# Patient Record
Sex: FEMALE | Race: WHITE | NOT HISPANIC OR LATINO | Employment: UNEMPLOYED | URBAN - METROPOLITAN AREA
[De-identification: names, ages, dates, MRNs, and addresses within clinical notes are randomized per-mention and may not be internally consistent; named-entity substitution may affect disease eponyms.]

---

## 2017-01-11 ENCOUNTER — GENERIC CONVERSION - ENCOUNTER (OUTPATIENT)
Dept: OTHER | Facility: OTHER | Age: 52
End: 2017-01-11

## 2017-01-11 ENCOUNTER — HOSPITAL ENCOUNTER (OUTPATIENT)
Dept: MAMMOGRAPHY | Facility: HOSPITAL | Age: 52
Discharge: HOME | End: 2017-01-11
Payer: COMMERCIAL

## 2017-01-11 DIAGNOSIS — Z12.31 ENCOUNTER FOR SCREENING MAMMOGRAM FOR MALIGNANT NEOPLASM OF BREAST: ICD-10-CM

## 2017-01-11 PROCEDURE — G0202 SCR MAMMO BI INCL CAD: HCPCS

## 2017-01-11 PROCEDURE — 77063 BREAST TOMOSYNTHESIS BI: CPT

## 2017-03-02 ENCOUNTER — ALLSCRIPTS OFFICE VISIT (OUTPATIENT)
Dept: OTHER | Facility: OTHER | Age: 52
End: 2017-03-02

## 2017-03-02 DIAGNOSIS — M51.36 OTHER INTERVERTEBRAL DISC DEGENERATION, LUMBAR REGION: ICD-10-CM

## 2017-03-02 DIAGNOSIS — M47.812 SPONDYLOSIS OF CERVICAL REGION WITHOUT MYELOPATHY OR RADICULOPATHY: ICD-10-CM

## 2017-03-03 ENCOUNTER — GENERIC CONVERSION - ENCOUNTER (OUTPATIENT)
Dept: OTHER | Facility: OTHER | Age: 52
End: 2017-03-03

## 2017-03-08 ENCOUNTER — TRANSCRIBE ORDERS (OUTPATIENT)
Dept: LAB | Facility: MEDICAL CENTER | Age: 52
End: 2017-03-08

## 2017-03-08 ENCOUNTER — APPOINTMENT (OUTPATIENT)
Dept: LAB | Facility: MEDICAL CENTER | Age: 52
End: 2017-03-08
Payer: COMMERCIAL

## 2017-03-08 DIAGNOSIS — M79.671 RIGHT FOOT PAIN: ICD-10-CM

## 2017-03-08 DIAGNOSIS — M79.671 RIGHT FOOT PAIN: Primary | ICD-10-CM

## 2017-03-08 LAB
ANION GAP SERPL CALCULATED.3IONS-SCNC: 5 MMOL/L (ref 4–13)
BUN SERPL-MCNC: 8 MG/DL (ref 5–25)
CALCIUM SERPL-MCNC: 9.2 MG/DL (ref 8.3–10.1)
CHLORIDE SERPL-SCNC: 104 MMOL/L (ref 100–108)
CO2 SERPL-SCNC: 31 MMOL/L (ref 21–32)
CREAT SERPL-MCNC: 0.63 MG/DL (ref 0.6–1.3)
GFR SERPL CREATININE-BSD FRML MDRD: >60 ML/MIN/1.73SQ M
GLUCOSE SERPL-MCNC: 96 MG/DL (ref 65–140)
POTASSIUM SERPL-SCNC: 4.3 MMOL/L (ref 3.5–5.3)
SODIUM SERPL-SCNC: 140 MMOL/L (ref 136–145)

## 2017-03-08 PROCEDURE — 80048 BASIC METABOLIC PNL TOTAL CA: CPT

## 2017-03-08 PROCEDURE — 36415 COLL VENOUS BLD VENIPUNCTURE: CPT

## 2017-04-11 DIAGNOSIS — J20.9 ACUTE BRONCHITIS: ICD-10-CM

## 2017-04-11 DIAGNOSIS — I25.10 ATHEROSCLEROTIC HEART DISEASE OF NATIVE CORONARY ARTERY WITHOUT ANGINA PECTORIS: ICD-10-CM

## 2017-04-11 DIAGNOSIS — M79.606 PAIN OF LOWER EXTREMITY: ICD-10-CM

## 2017-04-11 DIAGNOSIS — J06.9 ACUTE UPPER RESPIRATORY INFECTION: ICD-10-CM

## 2017-04-11 DIAGNOSIS — J44.9 CHRONIC OBSTRUCTIVE PULMONARY DISEASE (HCC): ICD-10-CM

## 2017-04-12 ENCOUNTER — GENERIC CONVERSION - ENCOUNTER (OUTPATIENT)
Dept: OTHER | Facility: OTHER | Age: 52
End: 2017-04-12

## 2017-04-12 ENCOUNTER — TRANSCRIBE ORDERS (OUTPATIENT)
Dept: LAB | Facility: MEDICAL CENTER | Age: 52
End: 2017-04-12

## 2017-04-12 ENCOUNTER — APPOINTMENT (OUTPATIENT)
Dept: LAB | Facility: MEDICAL CENTER | Age: 52
End: 2017-04-12
Payer: COMMERCIAL

## 2017-04-12 DIAGNOSIS — J06.9 ACUTE UPPER RESPIRATORY INFECTION: ICD-10-CM

## 2017-04-12 LAB
ALBUMIN SERPL BCP-MCNC: 3.6 G/DL (ref 3.5–5)
ALP SERPL-CCNC: 69 U/L (ref 46–116)
ALT SERPL W P-5'-P-CCNC: 38 U/L (ref 12–78)
ANION GAP SERPL CALCULATED.3IONS-SCNC: 7 MMOL/L (ref 4–13)
AST SERPL W P-5'-P-CCNC: 27 U/L (ref 5–45)
BASOPHILS # BLD AUTO: 0.03 THOUSANDS/ΜL (ref 0–0.1)
BASOPHILS NFR BLD AUTO: 0 % (ref 0–1)
BILIRUB SERPL-MCNC: 0.68 MG/DL (ref 0.2–1)
BUN SERPL-MCNC: 9 MG/DL (ref 5–25)
CALCIUM SERPL-MCNC: 8.8 MG/DL (ref 8.3–10.1)
CHLORIDE SERPL-SCNC: 103 MMOL/L (ref 100–108)
CO2 SERPL-SCNC: 26 MMOL/L (ref 21–32)
CREAT SERPL-MCNC: 0.56 MG/DL (ref 0.6–1.3)
EOSINOPHIL # BLD AUTO: 0.16 THOUSAND/ΜL (ref 0–0.61)
EOSINOPHIL NFR BLD AUTO: 2 % (ref 0–6)
ERYTHROCYTE [DISTWIDTH] IN BLOOD BY AUTOMATED COUNT: 12.4 % (ref 11.6–15.1)
GFR SERPL CREATININE-BSD FRML MDRD: >60 ML/MIN/1.73SQ M
GLUCOSE P FAST SERPL-MCNC: 93 MG/DL (ref 65–99)
HCT VFR BLD AUTO: 42.7 % (ref 34.8–46.1)
HGB BLD-MCNC: 14.6 G/DL (ref 11.5–15.4)
LYMPHOCYTES # BLD AUTO: 3.41 THOUSANDS/ΜL (ref 0.6–4.47)
LYMPHOCYTES NFR BLD AUTO: 43 % (ref 14–44)
MCH RBC QN AUTO: 33.1 PG (ref 26.8–34.3)
MCHC RBC AUTO-ENTMCNC: 34.2 G/DL (ref 31.4–37.4)
MCV RBC AUTO: 97 FL (ref 82–98)
MONOCYTES # BLD AUTO: 0.66 THOUSAND/ΜL (ref 0.17–1.22)
MONOCYTES NFR BLD AUTO: 8 % (ref 4–12)
NEUTROPHILS # BLD AUTO: 3.59 THOUSANDS/ΜL (ref 1.85–7.62)
NEUTS SEG NFR BLD AUTO: 47 % (ref 43–75)
NRBC BLD AUTO-RTO: 0 /100 WBCS
PLATELET # BLD AUTO: 192 THOUSANDS/UL (ref 149–390)
PMV BLD AUTO: 11.1 FL (ref 8.9–12.7)
POTASSIUM SERPL-SCNC: 4 MMOL/L (ref 3.5–5.3)
PROT SERPL-MCNC: 7.6 G/DL (ref 6.4–8.2)
RBC # BLD AUTO: 4.41 MILLION/UL (ref 3.81–5.12)
SODIUM SERPL-SCNC: 136 MMOL/L (ref 136–145)
WBC # BLD AUTO: 7.87 THOUSAND/UL (ref 4.31–10.16)

## 2017-04-12 PROCEDURE — 85025 COMPLETE CBC W/AUTO DIFF WBC: CPT

## 2017-04-12 PROCEDURE — 80053 COMPREHEN METABOLIC PANEL: CPT

## 2017-04-12 PROCEDURE — 36415 COLL VENOUS BLD VENIPUNCTURE: CPT

## 2017-04-13 ENCOUNTER — GENERIC CONVERSION - ENCOUNTER (OUTPATIENT)
Dept: OTHER | Facility: OTHER | Age: 52
End: 2017-04-13

## 2017-04-28 ENCOUNTER — HOSPITAL ENCOUNTER (OUTPATIENT)
Dept: ULTRASOUND IMAGING | Facility: HOSPITAL | Age: 52
Discharge: HOME | End: 2017-04-28
Payer: COMMERCIAL

## 2017-04-28 DIAGNOSIS — M79.606 PAIN OF LOWER EXTREMITY: ICD-10-CM

## 2017-04-28 DIAGNOSIS — I25.10 ATHEROSCLEROTIC HEART DISEASE OF NATIVE CORONARY ARTERY WITHOUT ANGINA PECTORIS: ICD-10-CM

## 2017-04-28 PROCEDURE — 93925 LOWER EXTREMITY STUDY: CPT

## 2017-04-28 PROCEDURE — 93923 UPR/LXTR ART STDY 3+ LVLS: CPT

## 2017-05-01 ENCOUNTER — GENERIC CONVERSION - ENCOUNTER (OUTPATIENT)
Dept: OTHER | Facility: OTHER | Age: 52
End: 2017-05-01

## 2017-05-04 ENCOUNTER — TRANSCRIBE ORDERS (OUTPATIENT)
Dept: RADIOLOGY | Facility: MEDICAL CENTER | Age: 52
End: 2017-05-04

## 2017-05-04 ENCOUNTER — ALLSCRIPTS OFFICE VISIT (OUTPATIENT)
Dept: OTHER | Facility: OTHER | Age: 52
End: 2017-05-04

## 2017-05-04 ENCOUNTER — HOSPITAL ENCOUNTER (OUTPATIENT)
Dept: RADIOLOGY | Facility: MEDICAL CENTER | Age: 52
Discharge: HOME | End: 2017-05-04
Payer: COMMERCIAL

## 2017-05-04 DIAGNOSIS — J20.9 ACUTE BRONCHITIS: ICD-10-CM

## 2017-05-04 DIAGNOSIS — J44.9 CHRONIC OBSTRUCTIVE PULMONARY DISEASE (HCC): ICD-10-CM

## 2017-05-04 PROCEDURE — 71020 HB CHEST X-RAY 2VW FRONTAL&LATL: CPT

## 2017-05-05 ENCOUNTER — GENERIC CONVERSION - ENCOUNTER (OUTPATIENT)
Dept: OTHER | Facility: OTHER | Age: 52
End: 2017-05-05

## 2017-05-18 ENCOUNTER — ANESTHESIA EVENT (OUTPATIENT)
Dept: PERIOP | Facility: HOSPITAL | Age: 52
End: 2017-05-18

## 2017-05-22 ENCOUNTER — ANESTHESIA (OUTPATIENT)
Dept: PERIOP | Facility: HOSPITAL | Age: 52
End: 2017-05-22

## 2017-07-18 ENCOUNTER — ALLSCRIPTS OFFICE VISIT (OUTPATIENT)
Dept: OTHER | Facility: OTHER | Age: 52
End: 2017-07-18

## 2017-10-12 ENCOUNTER — TRANSCRIBE ORDERS (OUTPATIENT)
Dept: LAB | Facility: MEDICAL CENTER | Age: 52
End: 2017-10-12

## 2017-10-12 ENCOUNTER — APPOINTMENT (OUTPATIENT)
Dept: LAB | Facility: MEDICAL CENTER | Age: 52
End: 2017-10-12
Payer: COMMERCIAL

## 2017-10-12 DIAGNOSIS — M79.671 PAIN, FOOT, RIGHT, CHRONIC: ICD-10-CM

## 2017-10-12 DIAGNOSIS — G89.29 PAIN, FOOT, RIGHT, CHRONIC: Primary | ICD-10-CM

## 2017-10-12 DIAGNOSIS — M79.671 PAIN, FOOT, RIGHT, CHRONIC: Primary | ICD-10-CM

## 2017-10-12 DIAGNOSIS — G89.29 PAIN, FOOT, RIGHT, CHRONIC: ICD-10-CM

## 2017-10-12 LAB — URATE SERPL-MCNC: 4 MG/DL (ref 2–6.8)

## 2017-10-12 PROCEDURE — 36415 COLL VENOUS BLD VENIPUNCTURE: CPT

## 2017-10-12 PROCEDURE — 84550 ASSAY OF BLOOD/URIC ACID: CPT

## 2017-10-25 DIAGNOSIS — E78.5 HYPERLIPIDEMIA: ICD-10-CM

## 2017-10-25 DIAGNOSIS — I25.10 ATHEROSCLEROTIC HEART DISEASE OF NATIVE CORONARY ARTERY WITHOUT ANGINA PECTORIS: ICD-10-CM

## 2017-10-25 DIAGNOSIS — Z13.820 ENCOUNTER FOR SCREENING FOR OSTEOPOROSIS: ICD-10-CM

## 2017-10-26 ENCOUNTER — ALLSCRIPTS OFFICE VISIT (OUTPATIENT)
Dept: OTHER | Facility: OTHER | Age: 52
End: 2017-10-26

## 2017-10-27 NOTE — PROGRESS NOTES
Assessment  Assessed    1  CAD in native artery (414 01) (I25 10)   2  Current tobacco use (305 1) (Z72 0)   3  Hyperlipidemia (272 4) (E78 5)    Plan  CAD in native artery    · Aspirin 81 MG Oral Tablet Chewable; Take 1 tablet daily   Rx By: Craig Boxer; Dispense: 30 Days ; #:30 Tablet Chewable; Refill: 0;For: CAD in native artery; ANNE MARIE = N; Verified Transmission to [facility]; Last Updated By: System, [place]; 10/26/2017 11:28:01 AM  CAD in native artery, Chronic obstructive pulmonary disease    · EKG/ECG- POC; Status:Complete;   Done: 63JFV0681   Perform: In Office; 959 70 139; Last Updated By:Oscar Myers; 10/26/2017 11:33:09 AM;Ordered; For:CAD in native artery, Chronic obstructive pulmonary disease; Ordered By:Griselda Rausch;  CAD in native artery, Hyperlipidemia    · Rosuvastatin Calcium 5 MG Oral Tablet; take 1 tablet every other day   Rx By: Craig Boxer; Dispense: 30 Days ; #:15 Tablet; Refill: 0;For: CAD in native artery, Hyperlipidemia; ANNE MARIE = N; Verified Transmission to [facility]; Last Updated By: [facility]; 10/26/2017 11:33:11 AM   · (1) LIPID PANEL FASTING W DIRECT LDL REFLEX; Status:Active; Requested  for:16Nov2017;    Perform:[facility]; QPR:96IAP1527; Ordered; For:CAD in native artery, Hyperlipidemia; Ordered By:Griselda Rausch;   · Follow-up visit in 3 months Evaluation and Treatment  Follow-up  Status: Hold For -  Scheduling  Requested for: 26Oct2017   Ordered; For: CAD in native artery, Hyperlipidemia; Ordered By: Craig Boxer Performed:  Due: 45LFV9260    Discussion/Summary  Counseling Documentation With Imm: The patient was counseled regarding diagnostic results,-- instructions for management,-- risk factor reductions,-- prognosis,-- patient and family education,-- impressions,-- risks and benefits of treatment options,-- importance of compliance with treatment  Discussion Summary:   I had the pleasure of seeing Ms Marva Ramirez for a FU visit  She is a pleasant 51-year-old lady with a history of coronary disease, prior percutaneous coronary intervention to the left anterior descending coronary artery in 1998, hypertension as well as marked dyslipidemia with LDL has high as 170 to 200  For atypical CPs, I had her do a Bia Nuc perfusion study that did not demonstrate ischemia - in September 2014   followed up 1 years ago And prior to that 2 years before that appointment  Has also been noncompliant with medications and followups  Continues to smoke currently 2 packs a day    Her risk factors include hypertension, marked dyslipidemia, existing coronary artery disease as well as smoking 2 packs a day   cardiac physical exam is within normal limits  low volume carotid impulse on the right    pains: has had atypical chest pains, no recent chest pains  Considering her ongoing smoking and untreated dyslipidemia, no further evaluation at this time  Currently reasonably active without symptoms  Pravastatin with worsening leg pains  I'll try Crestor  5 mg q OD - prescribed at the last visit, did not , for a short while she was on  Repatha (PCSK9 inhibitor)  missed a few doses  Then it became expensive apparently  Currently not on anything2016-total cholesterol 280, triglycerides 137, HDL 54, , LDL was 225-April 2016  artery disease, status post PCI to the LAD in 98, atypical chest pains- resolved: Bia Nuc in Sept 2014 without ischemia  If has recurrence of chest pains, will then check a stress test  At this time she needs to quit smoking  Start aspirin and rosuvastatin 5 mg every other day  currently controlled, hold off on adding new medications, will await till her next visit  use: She is rolling her own cigarettes  , equivalent of at least 2 packs a day  Cessation was strongly advised again    up in 3 months with with a lipid profilewith future follow-ups and medications was strongly reinforced  without good compliance with medications and followups, we will not be able to have her approved for a PCSK9 inhibitor  her tobacco use remains in equally potent risk factor - cessation was advised  History of Present Illness  Cardiology HPI Free Text Note Form [facility]: I had the pleasure of seeing Ms Phil Ya for a FU visit  She is a pleasant 72-year-old lady with a history of coronary disease, prior percutaneous coronary intervention to the left anterior descending coronary artery in 1998, hypertension as well as marked dyslipidemia with LDL has high as 170 to 200  For atypical CPs, I had her do a Bia Nuc perfusion study that did not demonstrate ischemia - in September 2014   followed up 1 years ago And prior to that 2 years before that appointment  Has also been noncompliant with medications and followups  Continues to smoke currently 2 packs a day      Review of Systems  Cardiology Female ROS:     Cardiac: palpitations present , but-- no chest pain,-- no rhythm problems,-- no fainting/blackouts,-- no heart murmur present-- and-- no signs of swelling  Skin: No complaints of nonhealing sores or skin rash  Genitourinary: No complaints of recurrent urinary tract infections, frequent urination at night, difficult urination, blood in urine, kidney stones, loss of bladder control, kidney problems, denies any birth control or hormone replacement, is not post menopausal, not currently pregnant  Psychological: No complaints of feeling depressed, anxiety, panic attacks, or difficulty concentrating  General: No complaints of trouble sleeping, lack of energy, fatigue, appetite changes, weight changes, fever, frequent infections, or night sweats  Respiratory: No complaints of shortness of breath, cough with sputum, or wheezing  HEENT: No complaints of serious problems, hearing problems, nose problems, throat problems, or snoring     Gastrointestinal: No complaints of liver problems, nausea, vomiting, heartburn, constipation, bloody stools, diarrhea, problems swallowing, adbominal pain, or rectal bleeding  Hematologic: No complaints of bleeding disorders, anemia, blood clots, or excessive brusing  Neurological: No complaints of numbness, tingling, dizziness, weakness, seizures, headaches, syncope or fainting, AM fatigue, daytime sleepiness, no witnessed apnea episodes  Musculoskeletal: arthritis,-- back pain-- and-- swelling/pain   ROS Reviewed:   ROS reviewed  Active Problems  Problems    1  Abdominal pain, RLQ (right lower quadrant) (789 03) (R10 31)   2  Acute bronchitis (466 0) (J20 9)   3  Acute upper respiratory infection (465 9) (J06 9)   4  Allergic rhinitis (477 9) (J30 9)   5  Alternating constipation and diarrhea (787 99) (R19 8)   6  Arm pain, right (729 5) (M79 601)   7  Bilateral carpal tunnel syndrome (354 0) (G56 03)   8  CAD in native artery (414 01) (I25 10)   9  Cervical spondylosis without myelopathy (721 0) (M47 812)   10  Chronic obstructive pulmonary disease (496) (J44 9)   11  Current tobacco use (305 1) (Z72 0)   12  Degenerative disc disease, cervical (722 4) (M50 30)   13  Depression with anxiety (300 4) (F41 8)   14  Encounter for screening mammogram for malignant neoplasm of breast (V76 12)    (Z12 31)   15  Esophageal reflux (530 81) (K21 9)   16  Flank pain (789 09) (R10 9)   17  Hyperlipidemia (272 4) (E78 5)   18  Hypertension (401 9) (I10)   19  Increased BMI (783 9) (R63 8)   20  Insect sting (989 5,E905 5) (T63 481A)   21  Insomnia (780 52) (G47 00)   22  Left knee pain (719 46) (M25 562)   23  Lower back pain (724 2) (M54 5)   24  Lumbar degenerative disc disease (722 52) (M51 36)   25  Mammogram abnormal (793 80) (R92 8)   26  Myofascial pain (729 1) (M79 1)   27  Neck pain (723 1) (M54 2)   28  Need for hepatitis C screening test (V73 89) (Z11 59)   29  Need for prophylactic vaccination and inoculation against influenza (V04 81) (Z23)   30  Neuropathic pain of foot, right (355 8) (C21 76)   31   History of Osteopenia (733 90) (M85 80)   32  Osteoporosis screening (V82 81) (Z13 820)   33  Pain of lower extremity (729 5) (M79 606)   34  Right sided weakness (728 87) (R53 1)   35  Screening for depression (V79 0) (Z13 89)   36  Spondylosis of lumbar region without myelopathy or radiculopathy (721 3) (M47 816)   37  Subacromial bursitis of right shoulder joint (726 19) (M75 51)   38  Thoracic back pain (724 1) (M54 6)   39  Weight loss (783 21) (R63 4)    Past Medical History  Problems    1  History of Abdominal pain, epigastric (789 06) (R10 13)   2  History of Abdominal pain, RUQ (789 01) (R10 11)   3  History of Acquired Ichthyosis (701 1)   4  Acute upper respiratory infection (465 9) (J06 9)   5  History of Atypical chest pain (786 59) (R07 89)   6  History of Blood in stool (578 1) (K92 1)   7  History of Cervical radiculopathy (723 4) (M54 12)   8  History of Chest pain (786 50) (R07 9)   9  History of Colorectal polyps (211 3) (K63 5)   10  History of Constipation (564 00) (K59 00)   11  History of Elbow pain, unspecified laterality (719 42) (M25 529)   12  History of Encounter for routine pelvic examination (V72 31) (Z01 419)   13  History of Hip pain, left (719 45) (M25 552)   14  History of LLQ pain (789 04) (R10 32)   15  History of Osteopenia (733 90) (M85 80)   16  History of Pain in joint of left shoulder (719 41) (M25 512)   17  History of Pain in joint of right hip (719 45) (M25 551)   18  History of Pain in right foot (729 5) (M79 671)   19  History of Pain in right shoulder (719 41) (M25 511)   20  History of Pain of upper extremity, unspecified laterality (729 5) (M79 603)   21  History of Palpitations (785 1) (R00 2)   22  History of Peripheral vascular disease (443 9) (I73 9)   23  History of Postmenopausal disorder (627 9) (N95 9)   24  History of Scapular dyskinesis (781 3) (G25 89)   25  History of Tendinitis of left rotator cuff (726 10) (M75 82)   26   History of Urinary tract infection (599 0) (N39 0)   27  History of Vocal cord polyps (478 4) (J38 1)  Active Problems And Past Medical History Reviewed: The active problems and past medical history were reviewed and updated today  Surgical History  Problems    1  History of  Section   2  History of Cholecystectomy   3  History of Complete Colonoscopy   4  History of Dental Surgery   5  History of Elbow Arthroplasty   6  History of Foot Surgery   7  History of Foot Surgery   8  History of Hysterectomy   9  History of Previous Stent Placement  Surgical History Reviewed: The surgical history was reviewed and updated today  Family History  Mother    1  No pertinent family history    Social History  Problems    · Alcohol Use (History)   · Current every day smoker (305 1) (F17 200)   · Denied: History of Drug use   · No living will  Social History Reviewed: The social history was reviewed and updated today  The social history was reviewed and is unchanged  Current Meds   1  Percocet 5-325 MG Oral Tablet; TAKE 1 TABLET every 4 hours as needed for pain   Recorded   2  RaNITidine HCl - 150 MG Oral Tablet; TAKE 2 TABLETS AT BEDTIME  Requested for:   97IEH6515; Last VJ:07EPZ3613 Ordered   3  Ventolin  (90 Base) MCG/ACT Inhalation Aerosol Solution; INHALE 2 PUFFS   EVERY 6 HOURS AS NEEDED FOR WHEEZING; Therapy: 56CTU6427 to (Evaluate:2017)  Requested for: 32ZQY8436; Last   JA:82XLU9395 Ordered  Medication List Reviewed: The medication list was reviewed and updated today  Allergies  Medication    1  Penicillins   2  Ticlid   3  Cephalexin CAPS   4  Codeine Sulfate TABS    Vitals  Vital Signs    Recorded: 44LUM9429 11:26AM Recorded: 94WCO2472 61:79OL   Systolic 140 678, LUE, Sitting   Diastolic 80 84, LUE, Sitting   Height  5 ft    Weight  148 lb 2 oz   BMI Calculated  28 93   BSA Calculated  1 64     Physical Exam    Constitutional   General appearance: No acute distress, well appearing and well nourished     Eyes Conjunctiva and Sclera examination: Conjunctiva pink, sclera anicteric  Ears, Nose, Mouth, and Throat - Oropharynx: Clear, nares are clear, mucous membranes are moist    Neck   Neck and thyroid: Normal, supple, trachea midline, no thyromegaly  Pulmonary   Respiratory effort: No increased work of breathing or signs of respiratory distress  Auscultation of lungs: Clear to auscultation, no rales, no rhonchi, no wheezing, good air movement  Cardiovascular   Auscultation of heart: Normal rate and rhythm, normal S1 and S2, no murmurs  Carotid pulses: Normal, 2+ bilaterally  Peripheral vascular exam: Normal pulses throughout, no tenderness, erythema or swelling  Pedal pulses: Normal, 2+ bilaterally  Examination of extremities for edema and/or varicosities: Normal     Abdomen   Abdomen: Non-tender and no distention  Liver and spleen: No hepatomegaly or splenomegaly  Musculoskeletal Gait and station: Normal gait  -- Digits and nails: Normal without clubbing or cyanosis  -- Inspection/palpation of joints, bones, and muscles: Normal, ROM normal     Skin - Skin and subcutaneous tissue: Normal without rashes or lesions  Skin is warm and well perfused, normal turgor  Neurologic - Cranial nerves: II - XII intact  -- Speech: Normal     Psychiatric - Orientation to person, place, and time: Normal -- Mood and affect: Normal       Results/Data  ECG Report: A 12 lead ECG was performed and was normal    Rhythm and rate:  normal sinus rhythm  Signatures   Electronically signed by :  JAMILA Muniz ; Oct 26 2017 11:39AM EST                       (Author)

## 2017-11-01 ENCOUNTER — HOSPITAL ENCOUNTER (OUTPATIENT)
Dept: BONE DENSITY | Facility: HOSPITAL | Age: 52
Discharge: HOME | End: 2017-11-01
Payer: COMMERCIAL

## 2017-11-01 DIAGNOSIS — Z13.820 ENCOUNTER FOR SCREENING FOR OSTEOPOROSIS: ICD-10-CM

## 2017-11-01 PROCEDURE — 77080 DXA BONE DENSITY AXIAL: CPT

## 2017-11-02 ENCOUNTER — GENERIC CONVERSION - ENCOUNTER (OUTPATIENT)
Dept: OTHER | Facility: OTHER | Age: 52
End: 2017-11-02

## 2018-01-10 NOTE — RESULT NOTES
Verified Results  * XR CHEST PA & LATERAL [id number] 02:09PM Rosy Howard Order Number: [id number]     Test Name Result Flag Reference   XR CHEST PA & LATERAL (Report)     CHEST - DUAL ENERGY     INDICATION: Cough  Right posterior rib pain  COMPARISON: November 2, 2015  VIEWS: PA (including soft tissue/bone algorithms) and lateral projections     IMAGES: 4     FINDINGS:        Cardiomediastinal silhouette appears unremarkable  The lungs are clear  No pneumothorax or pleural effusion  There are several healed right-sided rib fractures  There is no evidence of acute fracture         IMPRESSION:     No active disease in the chest        Workstation performed: [id number]     Signed by:   Mis Shannon MD   5/5/17

## 2018-01-10 NOTE — RESULT NOTES
Verified Results  (1) TISSUE EXAM 60SVY3040 12:12PM Karis Vergara     Test Name Result Flag Reference   LAB AP CASE REPORT (Report)     Surgical Pathology Report             Case: [id number]                   Authorizing Provider: Alejandra Locke MD      Collected:      11/04/2016 1212        Ordering Location:   [facility] Received:      11/04/2016 1340                    Operating Room                                 Pathologist:      Evelyn Garland MD                                 Specimens:  A) - Duodenum, rule out Celiac, retrieved with cold biopsy forcep                   B) - Stomach, stomach biopsy, rule out h  pylori, retrieved with cold biopsy forcep   LAB AP FINAL DIAGNOSIS (Report)     A  Duodenum (biopsy):    - Small bowel mucosa with no significant pathologic abnormalities  - No villous atrophy, increased intraepithelial lymphocytes or crypt   hyperplasia to suggest     malabsorptive enteropathy     - No active inflammation, granulomas, organisms, dysplasia or neoplasia   identified  B  Stomach (biopsy):    - Gastric oxyntic and antral mucosa with no pathologic abnormality      - Immunostain for H  pylori (with appropriate positive control) is   negative  - No intestinal metaplasia, dysplasia or neoplasia identified  Electronically signed by Evelyn Garland MD on 11/8/2016 at 2:41 PM   LAB AP NOTE      Interpretation performed at [facility], [address] [place] [address], [address] [place] [id number]  LAB AP SURGICAL ADDITIONAL INFORMATION (Report)     These tests were developed and their performance characteristics   determined by [facility] or [facility]  They may not be cleared or approved by the [place]  Food and   Drug Administration  The FDA has determined that such clearance or   approval is not necessary  These tests are used for clinical purposes  They should not be regarded as investigational or for research   This   laboratory has been approved by [facility], designated as a high-complexity   laboratory and is qualified to perform these tests  LAB AP GROSS DESCRIPTION (Report)     A  The specimen is received in formalin, labeled with the patient's name   and hospital number, and is designated duodenum rule out celiac  The   specimen consists of 2 tan soft tissue fragments measuring 0 4 cm each  Entirely submitted  One cassette  B  The specimen is received in formalin, labeled with the patient's name   and hospital number, and is designated stomach biopsy rule out H    pylori  The specimen consists of 2 tan soft tissue fragments each   measuring 0 4 cm  Entirely submitted  One cassette  Note: The estimated total formalin fixation time based upon information   provided by the submitting clinician and the standard processing schedule   is 64 25 hours  MAC   LAB AP CLINICAL INFORMATION      Abnormal weight loss  ??? Right lower quadrant pain  ???  Gastro-esophageal reflux disease without esophagitis

## 2018-01-10 NOTE — RESULT NOTES
Verified Results  * XR SPINE CERVICAL COMPLETE 4 OR 5 VW NON INJURY [id number] 02:26PM [employer] Order Number: [id number]     Test Name Result Flag Reference   XR SPINE CERVICAL COMPLETE 4 OR 5 VW (Report)     CERVICAL SPINE     INDICATION: Neck pain  COMPARISON: None     VIEWS: AP, lateral and obliques an open-mouth AP ; 6 images     FINDINGS:     No evidence of fracture or subluxation  Narrowing of the C4-5 and C5-6 intervertebral disc spaces is seen with marginal osteophytes encroach upon the neural foramina bilaterally at these levels, left greater than right  The neural foramina are patent  The prevertebral soft tissues are within normal limits  The lung apices are intact  IMPRESSION:     Degenerative disc disease, C4-5 and C5-6  Workstation performed: [id number]     Signed by:   Rosalinda Pino MD   12/29/16     XR SPINE THORACIC 2 VIEW [id number] 02:26PM [employer] Order Number: [id number]     Test Name Result Flag Reference   XR SPINE THORACIC 2 VW (Report)     THORACIC SPINE     INDICATION: Back pain  COMPARISON: None     VIEWS: AP and lateral projections; 2 images     FINDINGS: Small anterolateral marginal osteophytes are noted in the lower thoracic spine  Thoracic vertebrae demonstrate normal stature and alignment  There is no fracture or pathologic bone lesion  There is no displacement of the paraspinal line  The pedicles are intact  Coronary stent present  Surgical clips are noted in the right upper quadrant of the abdomen  IMPRESSION:     Mild degenerative changes, lower thoracic spine  Workstation performed: [id number]     Signed by:   Rosalinda Pino MD   12/29/16

## 2018-01-11 NOTE — MISCELLANEOUS
Message   Recorded as Task   Date: 03/02/2017 01:47 PM, Created By: Sia Patterson   Task Name: Care Coordination   Assigned To: Roland Collins   Regarding Patient: Maximo Ferro, Status: Active   Comment:    Zainab Turner - 02 Mar 2017 1:47 PM     TASK CREATED  Pt called and stated the Lidocain cream requires authorization and the pharmacy told her to call us  Is there something else you can give her? Patricia Whitman - 97 Mar 2017 1:55 PM     TASK REPLIED TO: Previously Assigned To Patricia Whitman                      We will try to do the prior authorization for the patient because there is no good alternative at this time, or the patient can try a 4% over-the-counter lidocaine patch to the area as directed on the package   YueZainab - 03 Mar 2017 7:44 AM     TASK REASSIGNED: Previously Assigned To Judy Dexter - 01 Mar 2017 1:44 PM     TASK EDITED  Attempted to contact patient, left message w/ dr response on her voice mail  She is to call w/ any questions or concerns        Active Problems    1  Abdominal pain, RLQ (right lower quadrant) (789 03) (R10 31)   2  Acute bronchitis (466 0) (J20 9)   3  Allergic rhinitis (477 9) (J30 9)   4  Alternating constipation and diarrhea (787 99) (R19 8)   5  Arm pain, right (729 5) (M79 601)   6  Bilateral carpal tunnel syndrome (354 0) (G56 03)   7  CAD in native artery (414 01) (I25 10)   8  Cervical spondylosis without myelopathy (721 0) (M47 812)   9  Chronic obstructive pulmonary disease (496) (J44 9)   10  Current tobacco use (305 1) (Z72 0)   11  Degenerative disc disease, cervical (722 4) (M50 30)   12  Depression with anxiety (300 4) (F41 8)   13  Encounter for screening mammogram for malignant neoplasm of breast (V76 12)    (Z12 31)   14  Esophageal reflux (530 81) (K21 9)   15  Flank pain (789 09) (R10 9)   16  Hyperlipidemia (272 4) (E78 5)   17  Hypertension (401 9) (I10)   18  Insomnia (780 52) (G47 00)   19   Left knee pain (719 46) (M25 562)   20  Lower back pain (724 2) (M54 5)   21  Lumbar degenerative disc disease (722 52) (M51 36)   22  Mammogram abnormal (793 80) (R92 8)   23  Myofascial pain (729 1) (M79 1)   24  Neck pain (723 1) (M54 2)   25  Need for hepatitis C screening test (V73 89) (Z11 59)   26  Need for prophylactic vaccination and inoculation against influenza (V04 81) (Z23)   27  Neuropathic pain of foot, right (355 8) (G57 91)   28  History of Osteopenia (733 90) (M85 80)   29  Pain of lower extremity (729 5) (M79 606)   30  Right sided weakness (728 87) (M62 81)   31  Spondylosis of lumbar region without myelopathy or radiculopathy (721 3) (M47 816)   32  Subacromial bursitis of right shoulder joint (726 19) (M75 51)   33  Thoracic back pain (724 1) (M54 6)   34  Weight loss (783 21) (R63 4)    Current Meds   1  Gabapentin 300 MG Oral Capsule; TAKE 1 CAPSULE 3 TIMES DAILY; Therapy: 30CPJ9426 to (Evaluate:28Jej6683)  Requested for: 56MSO3693; Last   Rx:02Mar2017 Ordered   2  Lidocaine 5 % External Ointment; APPLY TO AFFECTED AREAS AS DIRECTED; Therapy: 23NLL9612 to (Evaluate:72Fuw0755)  Requested for: 10JGQ9914; Last   Rx:02Mar2017 Ordered   3  Methocarbamol 500 MG Oral Tablet; take 1 tablet tid prn; Therapy: 92OPZ4745 to (Evaluate:15Dwp4094)  Requested for: 58XQL9507; Last   Rx:02Mar2017 Ordered   4  Percocet 5-325 MG Oral Tablet (Oxycodone-Acetaminophen); TAKE 1 TABLET every 4   hours as needed for pain Recorded   5  Zantac 150 Maximum Strength 150 MG Oral Tablet (RaNITidine HCl); TAKE 2 TABLETS   AT BEDTIME; Therapy: (Recorded:13Caj3569) to Recorded    Allergies    1  Penicillins   2  Ticlid   3  Cephalexin CAPS   4   Codeine Sulfate TABS    Signatures   Electronically signed by : Bhupendra Platt, ; Mar  3 2017  1:44PM EST                       (Author)

## 2018-01-11 NOTE — RESULT NOTES
Verified Results  MAMMO SCREENING BILATERAL W 3D & CAD [id number] 11:21AM Ken Godfrey Order Number: [id number]    - Patient Instructions: To schedule this appointment, please contact Central Scheduling at [phone]  Do not wear any perfume, powder, lotion or deodorant on breast or underarm area  Please bring your doctors order, referral (if needed) and insurance information with you on the day of the test  Failure to bring this information may result in this test being rescheduled  Arrive 15 minutes prior to your appointment time to register  On the day of your test, please bring any prior mammogram or breast studies with you that were not performed at a [facility]  Failure to bring prior exams may result in your test needing to be rescheduled  Test Name Result Flag Reference   MAMMO SCREENING BILATERAL W 3D & CAD (Report)     Patient History:   No known family history of cancer  Patient is an every day smoker  Patient's BMI is 29 5  Reason for exam: screening (asymptomatic)  Mammo Screening Bilateral W DBT and CAD: January 11, 2017 - Check   In #: [de-identified]   2D/3D Procedure   3D views: Bilateral MLO view(s) were taken  2D views: Bilateral CC view(s) were taken  Technologist: JAUN Bernabe (JAUN)(M)   Prior study comparison: October 19, 2015, bilateral digital    screening mammogram performed at [address] [place] [address]  April 6, 2015, left breast digital unilat mammo/CAD    performed at [address] [place] [address]  April 6, 2015,    left breast unilateral limited US QUS MUS AUS TUS performed at    [address] [place] [address]  October 3, 2014, left breast    digital unilat mammo/CAD performed at [address] [place] [address]  October 3, 2014, bilateral ultrasound performed at [address] [place] [address]  September 25, 2014, bilateral    digital screening mammogram performed at [address] [place] [address]  There are scattered fibroglandular densities  A combination of    mediolateral oblique 3-D tomographic slices as well as standard    two-dimensional orthogonal images were obtained  No dominant soft tissue mass, architectural distortion or    suspicious calcifications are noted in either breast   The skin    and nipple structures are within normal limits  Scattered benign   appearing calcifications are noted  No evidence of malignancy   No significant changes when compared with prior studies  ASSESSMENT: BiRad:2 - Benign     Recommendation:   Routine screening mammogram of both breasts in 1 year  A    reminder letter will be scheduled  8-10% of cancers will be missed on mammography  Management of a    palpable abnormality must be based on clinical grounds  Patients    will be notified of their results via letter from our facility  Accredited by [employer] of [employer] and FDA       Transcription Location: [facility] 98: [id number]     Risk Value(s):   Tyrer-Cuzick 10 Year: 2 028%, Tyrer-Cuzick Lifetime: 8 333%,    Myriad Table: 1 5%, NORMA 5 Year: 0 8%, NCI Lifetime: 7 0%   Signed by:   Jahaira Garvey MD   1/11/17

## 2018-01-11 NOTE — RESULT NOTES
Verified Results  (1) CBC/PLT/DIFF [id number] 08:45AM T[facility] Order Number: [id number]     Test Name Result Flag Reference   WBC COUNT 9 68 Thousand/uL  4 31-10 16   RBC COUNT 4 72 Million/uL  3 81-5 12   HEMOGLOBIN 16 2 g/dL H 11 5-15 4   HEMATOCRIT 47 1 % H 34 8-46  1    fL H 82-98   MCH 34 3 pg  26 8-34 3   MCHC 34 4 g/dL  31 4-37 4   RDW 12 9 %  11 6-15 1   MPV 11 3 fL  8 9-12 7   PLATELET COUNT 138 Thousands/uL  149-390   nRBC AUTOMATED 0 /100 WBCs     NEUTROPHILS RELATIVE PERCENT 50 %  43-75   LYMPHOCYTES RELATIVE PERCENT 37 %  14-44   MONOCYTES RELATIVE PERCENT 9 %  4-12   EOSINOPHILS RELATIVE PERCENT 4 %  0-6   BASOPHILS RELATIVE PERCENT 0 %  0-1   NEUTROPHILS ABSOLUTE COUNT 4 88 Thousands/?L  1 85-7 62   LYMPHOCYTES ABSOLUTE COUNT 3 53 Thousands/?L  0 60-4 47   MONOCYTES ABSOLUTE COUNT 0 83 Thousand/?L  0 17-1 22   EOSINOPHILS ABSOLUTE COUNT 0 39 Thousand/?L  0 00-0 61   BASOPHILS ABSOLUTE COUNT 0 02 Thousands/?L  0 00-0 10   - Patient Instructions: This bloodwork is non-fasting  Please drink two glasses of water morning of bloodwork  - Patient Instructions: This bloodwork is non-fasting  Please drink two glasses of water morning of bloodwork  (1) COMPREHENSIVE METABOLIC PANEL [id number] 48:13UG [facility] Order Number: [id number]     Test Name Result Flag Reference   GLUCOSE,RANDM 96 mg/dL     If the patient is fasting, the ADA then defines impaired fasting glucose as > 100 mg/dL and diabetes as > or equal to 123 mg/dL     SODIUM 137 mmol/L  136-145   POTASSIUM 4 2 mmol/L  3 5-5 3   CHLORIDE 104 mmol/L  100-108   CARBON DIOXIDE 23 mmol/L  21-32   ANION GAP (CALC) 10 mmol/L  4-13   BLOOD UREA NITROGEN 13 mg/dL  5-25   CREATININE 0 63 mg/dL  0 60-1 30   Standardized to IDMS reference method   CALCIUM 8 9 mg/dL  8 3-10 1   BILI, TOTAL 0 44 mg/dL  0 20-1 00   ALK PHOSPHATAS 73 U/L     ALT (SGPT) 35 U/L  12-78   AST(SGOT) 18 U/L  5-45 ALBUMIN 4 1 g/dL  3 5-5 0   TOTAL PROTEIN 7 6 g/dL  6 4-8 2   eGFR Non-African American      >60 0 ml/min/1 73sq m   - Patient Instructions: This is a fasting blood test  Water, black tea or black coffee only after 9:00pm the night before test Drink 2 glasses of water the morning of test   National Kidney Disease Education Program recommendations are as follows:  GFR calculation is accurate only with a steady state creatinine  Chronic Kidney disease less than 60 ml/min/1 73 sq  meters  Kidney failure less than 15 ml/min/1 73 sq  meters  (1) LIPID PANEL FASTING W DIRECT LDL REFLEX [id number] 08:45AM Beto Fernandez Order Number: [id number]     Test Name Result Flag Reference   CHOLESTEROL 280 mg/dL H    LDL CHOLESTEROL CALCULATED 199 mg/dL H 0-100   - Patient Instructions: This is a fasting blood test  Water, black tea or black coffee only after 9:00pm the night before test   Drink 2 glasses of water the morning of test     - Patient Instructions: This is a fasting blood test  Water, black tea or black coffee only after 9:00pm the night before test Drink 2 glasses of water the morning of test   Triglyceride:         Normal              <150 mg/dl       Borderline High    150-199 mg/dl       High               200-499 mg/dl       Very High          >499 mg/dl  Cholesterol:         Desirable        <200 mg/dl      Borderline High  200-239 mg/dl      High             >239 mg/dl  HDL Cholesterol:        High    >59 mg/dL      Low     <41 mg/dL  LDL Cholesterol:        Optimal          <100 mg/dl        Near Optimal     100-129 mg/dl        Above Optimal          Borderline High   130-159 mg/dl          High              160-189 mg/dl          Very High        >189 mg/dl  LDL CALCULATED:    This screening LDL is a calculated result  It does not have the accuracy of the Direct Measured LDL in the monitoring of patients with hyperlipidemia and/or statin therapy     Direct Measure LDL (TAI289) must be ordered separately in these patients  TRIGLYCERIDES 137 mg/dL  <=150   Specimen collection should occur prior to N-Acetylcysteine or Metamizole administration due to the potential for falsely depressed results  HDL,DIRECT 54 mg/dL  40-60   Specimen collection should occur prior to Metamizole administration due to the potential for falsely depressed results

## 2018-01-11 NOTE — MISCELLANEOUS
To Whom It May Concern,    Mrs Cayden Pickett has been a patient of mine for several years  She has known coronary artery disease status, post angioplasty of the LAD  Patient has been intolerant of cholesterol medications, including  pravastatin and Crestor  She gets severe leg cramps with any statin  Her last LDL was 199  Since patient already has CAD and has severe hyperlipidemia, I know she is a candidate for Repatha  If you have any questions, please feel free to contact me  Sincerely,          Sung XIE        Electronically signed by:Lee Hernandez DO  Sep 22 2016  4:32PM EST

## 2018-01-12 NOTE — RESULT NOTES
Verified Results  * MRI BRAIN W WO CONTRAST [id number] 03:56PM Erika Guan Order Number: [id number]     Test Name Result Flag Reference   MRI BRAIN W [address] (Report)     This is a summary report  The complete report is available in the patient's medical record  If you cannot access the medical record, please contact the sending organization for a detailed fax or copy  MRI BRAIN WITH AND WITHOUT CONTRAST     INDICATION: Muscle weakness, right leg numbness, right arm pain     COMPARISON: CT 8/30/2011     TECHNIQUE:   Sagittal T1, axial T2, axial FLAIR, axial T1, axial Gradient, axial diffusion  Sagittal, axial and coronal T1 postcontrast  Axial BRAVO post contrast     6 mL of Gadavist was injected intravenously without immediate consequence  IMAGE QUALITY:  Minor motion related artifact     FINDINGS:     BRAIN PARENCHYMA: No acute disease  No acute ischemia, intracranial mass, mass effect, edema or pathologic enhancement  Several tiny foci of high signal in the frontal lobes, nonspecific  These could reflect sequela of complicated migraine, collagen vascular disease, Lyme disease, or precocious chronic small vessel disease  Postcontrast imaging of the brain    demonstrates no abnormal enhancement  VENTRICLES: Normal      SELLA AND PITUITARY GLAND: Normal      ORBITS: Normal      PARANASAL SINUSES: Scattered mucosal disease anterior ethmoid air cells  VASCULATURE: Evaluation of the major intracranial vasculature demonstrates appropriate flow voids  CALVARIUM AND SKULL BASE: Normal      EXTRACRANIAL SOFT TISSUES: Inflammatory cyst right nasopharynx       IMPRESSION:     No acute disease  Minor, nonspecific small white matter foci of high signal in the frontal lobes  Etiologic considerations included above  Workstation performed: [id number]     Signed by:    Grant Ching MD   4/8/16   7

## 2018-01-12 NOTE — RESULT NOTES
Verified Results  * DXA BONE DENSITY SPINE HIP AND PELVIS [id number] 01:44PM John Mccoy Order Number: [id number]    - Patient Instructions: To schedule this appointment, please contact Central Scheduling at [phone]  Test Name Result Flag Reference   DXA BONE DENSITY SPINE HIP AND PELVIS (Report)     CENTRAL DXA SCAN     CLINICAL HISTORY:  46year old post-menopausal  female with history of COPD and use of antireflux medication  The patient does not exercise and does not take calcium or vitamin D supplements  There is a stated loss in height of 3 inches  TECHNIQUE: Bone densitometry was performed using a [employer] Discovery A bone densitometer  Regions of interest appear properly placed  There are degenerative changes of the lumbar spine, which can artificially elevate bone mineral density results  COMPARISON: October 19, 2015     RESULTS:    LUMBAR SPINE: L1-L4:   BMD 0 837 gm/cm2   T-score -1 9   Z-score -1 0     LEFT TOTAL HIP:   BMD 0 815 gm/cm2   T-score -1 0   Z-score -0 5     LEFT FEMORAL NECK:   BMD 0 726 gm/cm2   T-score -1 1   Z-score -0 2     LEFT FOREARM-ONE 3RD REGION:   BMD 0 644 gm/cm2   T-score -0 7   Z-score 0 2       IMPRESSION:   1  Based on the [facility] classification, the T-score of -1 9 in the lumbar spine is consistent with low bone mineral density  2  Since the prior study, there has been a borderline significant decrease in BMD in the left hip of 0 021 gm/cm2 or 2 5%  In the left forearm, there has been a significant decrease in BMD of 0 045 gm/cm2 or 6 6%%  There has been no significant change   in BMD in the lumbar spine  These changes do exceed our own least significant change and, therefore, are statistically significant within 95% confidence level  3  Any secondary causes of low bone mineral density should be excluded prior to treatment, if clinically indicated     4  A daily intake of at least 1200 mg calcium and 800 to 1000 IU of Vitamin D, as well as weight bearing and muscle strengthening exercise, fall prevention and avoidance of tobacco and excessive alcohol intake as basic preventive measures are suggested  5  Repeat DXA in 18 - 24 months, on the same machine, as clinically indicated  The 10 year risk of hip fracture is 0 4%, with the 10 year risk of major osteoporotic fracture being 4 8%, as calculated by the [facility] fracture risk assessment tool (FRAX)  The current NOF guidelines recommend treating patients with FRAX 10 year risk score   of >3% for hip fracture and >20% for major osteoporotic fracture        WHO CLASSIFICATION:   Normal (a T-score of -1 0 or higher)   Low bone mineral density (a T-score of less than -1 0 but higher than -2 5)   Osteoporosis (a T-score of -2 5 or less)   Severe osteoporosis (a T-score of -2 5 or less with a fragility fracture)             Workstation performed: [id number]     Signed by:   Stas Kang MD   11/2/17

## 2018-01-13 VITALS
SYSTOLIC BLOOD PRESSURE: 118 MMHG | HEIGHT: 60 IN | BODY MASS INDEX: 28.49 KG/M2 | DIASTOLIC BLOOD PRESSURE: 66 MMHG | WEIGHT: 145.13 LBS

## 2018-01-13 VITALS
BODY MASS INDEX: 29.08 KG/M2 | SYSTOLIC BLOOD PRESSURE: 130 MMHG | WEIGHT: 148.13 LBS | DIASTOLIC BLOOD PRESSURE: 80 MMHG | HEIGHT: 60 IN

## 2018-01-13 NOTE — RESULT NOTES
Verified Results  * XR SPINE LUMBAR MINIMUM 4 VIEWS NON INJURY [id number] 01:26PM Ken Godfrey Order Number: [id number]     Test Name Result Flag Reference   XR SPINE LUMBAR MINIMUM 4 VIEWS (Report)     LUMBAR SPINE     INDICATION: Lower back pain  COMPARISON: January 6, 2016     VIEWS: AP, lateral and bilateral oblique projections; 4 images     FINDINGS:     Alignment is unremarkable  There is no radiographic evidence of acute fracture or destructive osseous lesion  Mild to moderate degenerative changes are noted in the posterior elements L3-4 through L5-S1  The abdominal aorta is partially calcified  Surgical clips are noted in the right upper quadrant of the abdomen and true pelvis          IMPRESSION:     Degenerative changes, lower lumbar spine  Workstation performed: [id number]     Signed by:   Rhona Severance Gerldine Gemma, MD   12/19/16

## 2018-01-14 VITALS
WEIGHT: 145.38 LBS | BODY MASS INDEX: 28.54 KG/M2 | HEIGHT: 60 IN | SYSTOLIC BLOOD PRESSURE: 114 MMHG | DIASTOLIC BLOOD PRESSURE: 62 MMHG

## 2018-01-14 VITALS
BODY MASS INDEX: 29.74 KG/M2 | TEMPERATURE: 98.3 F | HEIGHT: 60 IN | WEIGHT: 151.5 LBS | DIASTOLIC BLOOD PRESSURE: 70 MMHG | SYSTOLIC BLOOD PRESSURE: 96 MMHG

## 2018-01-14 NOTE — RESULT NOTES
Verified Results  VAS LOWER LIMB ARTERIAL DUPLEX, COMPLETE BILATERAL/GRAFTS 05Apr2016 09:47AM Claudette Pigg Order Number: [id number]     Test Name Result Flag Reference   VAS LOWER LIMB ARTERIAL DUPLEX, COMPLETE BILATERAL/GRAFTS (Report)     [facility] RE[facility]   CLINICAL:   Indications: Bilateral PVD, Unspecified [I73 9]  Right leg numbness and bilateral limb weakness when walking one block  Patient states right foot surgery in November  Risk Factors: The patient has no history of Diabetes  Right Pressure: 104/ mm Hg, Left Pressure: 120/ mm Hg  FINDINGS:      Segment        Rig    Left                        PSV EDV PSV EDV    Common Femoral Artery 158  18 104  13    Prox Profunda      64  8  65  6    Prox SFA        87  0  90  0    Mid SFA         87  0  93  0    Dist SFA        59  0  61  0    Proximal Pop      50  0  74  0    Distal Pop       43  0  57  4    Prox Post Tibial    41  0  57  0    Dist Post Tibial    29  0  46  4    Prox  Ant  Tibial    44  0  58  0    Dist  Ant  Tibial    29  0  37  0    Dist Peroneal      37  0  37  0             CONCLUSION:   Impression:   RIGHT LOWER LIMB:   There is minimal plaque noted in the common femoral artery  Ankle/Brachial index: 1 07 within the normal range   Metatarsal pressure of 155 mmHg   Great toe pressure of 90 mmHg, within the healing range      LEFT LOWER LIMB:   There is minimal plaque noted in the common femoral artery  Ankle/Brachial index: 1 06, within the normal range   Metatarsal pressure of 111 mmHg   Great toe pressure of 87 mmHg, within the healing range      No prior study available for comparison  Recommend repeat testing in 1year as per protocol unless otherwise indicated        SIGNATURE:   Electronically Signed by: Dakota Nuñez MD on 2016-04-05 12:23:32 PM

## 2018-01-15 NOTE — MISCELLANEOUS
Signatures   Electronically signed by :  Nicole Whalen, ; Apr 13 2017  3:37PM EST                       (Author)

## 2018-01-15 NOTE — RESULT NOTES
Verified Results  (1) CBC/PLT/DIFF 15Apr2016 10:29AM PF Changs Order Number: [id number]     Order Number: [id number]     Test Name Result Flag Reference   WBC COUNT 6 93 Thousand/uL  4 31-10 16   RBC COUNT 4 74 Million/uL  3 81-5 12   HEMOGLOBIN 16 1 g/dL H 11 5-15 4   HEMATOCRIT 46 0 %  34 8-46  1   MCV 97 fL  82-98   MCH 34 0 pg  26 8-34 3   MCHC 35 0 g/dL  31 4-37 4   RDW 12 4 %  11 6-15 1   MPV 11 0 fL  8 9-12 7   PLATELET COUNT 589 Thousands/uL  149-390   NEUTROPHILS RELATIVE PERCENT 50 %  43-75   LYMPHOCYTES RELATIVE PERCENT 36 %  14-44   MONOCYTES RELATIVE PERCENT 10 %  4-12   EOSINOPHILS RELATIVE PERCENT 4 %  0-6   BASOPHILS RELATIVE PERCENT 0 %  0-1   NEUTROPHILS ABSOLUTE COUNT 3 43 Thousands/µL  1 85-7 62   LYMPHOCYTES ABSOLUTE COUNT 2 49 Thousands/µL  0 60-4 47   MONOCYTES ABSOLUTE COUNT 0 70 Thousand/µL  0 17-1 22   EOSINOPHILS ABSOLUTE COUNT 0 29 Thousand/µL  0 00-0 61   BASOPHILS ABSOLUTE COUNT 0 02 Thousands/µL  0 00-0 10     (1) COMPREHENSIVE METABOLIC PANEL [id number] 73:81EV PF Changs Order Number: [id number]     Order Number: [id number] Order Number: [id number] Order Number: [id number]  National Kidney Disease Education Program recommendations are as follows:  GFR calculation is accurate only with a steady state creatinine  Chronic Kidney disease less than 60 ml/min/1 73 sq  meters  Kidney failure less than 15 ml/min/1 73 sq  meters  Test Name Result Flag Reference   GLUCOSE,RANDM 101 mg/dL     If the patient is fasting, the ADA then defines impaired fasting glucose as > 100 mg/dL and diabetes as > or equal to 123 mg/dL     SODIUM 139 mmol/L  136-145   POTASSIUM 4 2 mmol/L  3 5-5 3   CHLORIDE 100 mmol/L  100-108   CARBON DIOXIDE 28 mmol/L  21-32   ANION GAP (CALC) 11 mmol/L  4-13   BLOOD UREA NITROGEN 8 mg/dL  5-25   CREATININE 0 62 mg/dL  0 60-1 30   Standardized to IDMS reference method   CALCIUM 9 0 mg/dL  8 3-10 1   BILI, TOTAL 0 53 mg/dL 0  20-1 00   ALK PHOSPHATAS 79 U/L     ALT (SGPT) 49 U/L  12-78   AST(SGOT) 25 U/L  5-45   ALBUMIN 4 1 g/dL  3 5-5 0   TOTAL PROTEIN 7 7 g/dL  6 4-8 2   eGFR Non-African American      >60 0 ml/min/1 73sq m     (1) HEMOGLOBIN A1C 15Apr2016 10:29AM Tara Rojo Order Number: [id number]      5 7-6 4% impaired fasting glucose  >=6 5% diagnosis of diabetes    Falsely low levels are seen in conditions linked to short RBC life span-  hemolytic anemia, and splenomegaly  Falsely elevated levels are seen in situations where there is an increased production of RBC- receipt of erythropoietin or blood transfusions  Adopted from ADA-Clinical Practice Recommendations     Test Name Result Flag Reference   HEMOGLOBIN A1C 5 3 %  4 0-5 6   EST  AVG  GLUCOSE 105 mg/dl       (1) LIPID PANEL, FASTING 15Apr2016 10:29AM Tara Rojo Order Number: [id number]    TW Order Number: [id number] Order Number: [id number] Order Number: [id number]  Triglyceride:         Normal              <150 mg/dl       Borderline High    150-199 mg/dl       High               200-499 mg/dl       Very High          >499 mg/dl  Cholesterol:         Desirable        <200 mg/dl      Borderline High  200-239 mg/dl      High             >239 mg/dl  HDL Cholesterol:        High    >59 mg/dL      Low     <41 mg/dL  LDL CALCULATED:    This screening LDL is a calculated result  It does not have the accuracy of the Direct Measured LDL in the monitoring of patients with hyperlipidemia and/or statin therapy  Direct Measure LDL (LXG978) must be ordered separately in these patients  Test Name Result Flag Reference   CHOLESTEROL 314 mg/dL H    HDL,DIRECT 58 mg/dL  40-60   Specimen collection should occur prior to Metamizole administration due to the potential for falsely depressed results     LDL CHOLESTEROL CALCULATED 225 mg/dL H 0-100   TRIGLYCERIDES 153 mg/dL H <=150   Specimen collection should occur prior to N-Acetylcysteine or Metamizole administration due to the potential for falsely depressed results       (1) TSH 15Apr2016 10:29AM Eugene Carrero   TW Order Number: [id number]    TW Order Number: [id number] Order Number: [id number] Order Number: [id number]        The recommended reference ranges for TSH during pregnancy are as follows:  First trimester 0 1 to 2 5 uIU/mL  Second trimester  0 2 to 3 0 uIU/mL  Third trimester 0 3 to 3 0 uIU/m     Test Name Result Flag Reference   TSH 0 983 uIU/mL  0 358-3 740     (1) VITAMIN B12 15Apr2016 10:29AM [employer] Order Number: [id number]    TW Order Number: [id number] Order Number: [id number]     Test Name Result Flag Reference   VITAMIN B12 420 pg/mL  100-900     (1) VITAMIN D 25-HYDROXY 15Apr2016 10:29AM [employer] Order Number: [id number]    TW Order Number: [id number]     Test Name Result Flag Reference   VIT D 25-HYDROX 6 1 ng/mL L 30 0-100 0     (1) FOLATE 15Apr2016 10:29AM [employer] Order Number: [id number]    TW Order Number: [id number] Order Number: [id number]     Test Name Result Flag Reference   FOLATE >20 0 ng/mL H 3 1-17 5   [id number]

## 2018-01-16 DIAGNOSIS — Z12.31 ENCOUNTER FOR SCREENING MAMMOGRAM FOR MALIGNANT NEOPLASM OF BREAST: ICD-10-CM

## 2018-01-17 NOTE — RESULT NOTES
Verified Results  (1) CBC/PLT/DIFF 12Apr2017 12:40PM [facility] Order Number: [id number]     Test Name Result Flag Reference   WBC COUNT 7 87 Thousand/uL  4 31-10 16   RBC COUNT 4 41 Million/uL  3 81-5 12   HEMOGLOBIN 14 6 g/dL  11 5-15 4   HEMATOCRIT 42 7 %  34 8-46  1   MCV 97 fL  82-98   MCH 33 1 pg  26 8-34 3   MCHC 34 2 g/dL  31 4-37 4   RDW 12 4 %  11 6-15 1   MPV 11 1 fL  8 9-12 7   PLATELET COUNT 081 Thousands/uL  149-390   nRBC AUTOMATED 0 /100 WBCs     NEUTROPHILS RELATIVE PERCENT 47 %  43-75   LYMPHOCYTES RELATIVE PERCENT 43 %  14-44   MONOCYTES RELATIVE PERCENT 8 %  4-12   EOSINOPHILS RELATIVE PERCENT 2 %  0-6   BASOPHILS RELATIVE PERCENT 0 %  0-1   NEUTROPHILS ABSOLUTE COUNT 3 59 Thousands/? ??L  1 85-7 62   LYMPHOCYTES ABSOLUTE COUNT 3 41 Thousands/? ??L  0 60-4 47   MONOCYTES ABSOLUTE COUNT 0 66 Thousand/? ??L  0 17-1 22   EOSINOPHILS ABSOLUTE COUNT 0 16 Thousand/? ??L  0 00-0 61   BASOPHILS ABSOLUTE COUNT 0 03 Thousands/? ??L  0 00-0 10   - Patient Instructions: This bloodwork is non-fasting  Please drink two glasses of water morning of bloodwork  - Patient Instructions: This bloodwork is non-fasting  Please drink two glasses of water morning of bloodwork  (1) COMPREHENSIVE METABOLIC PANEL [id number] 33:75OQ [facility] Order Number: [id number]     Test Name Result Flag Reference   SODIUM 136 mmol/L  136-145   POTASSIUM 4 0 mmol/L  3 5-5 3   Slightly Hemolyzed; Results May be Affected   CHLORIDE 103 mmol/L  100-108   CARBON DIOXIDE 26 mmol/L  21-32   ANION GAP (CALC) 7 mmol/L  4-13   BLOOD UREA NITROGEN 9 mg/dL  5-25   CREATININE 0 56 mg/dL L 0 60-1 30   Standardized to IDMS reference method   CALCIUM 8 8 mg/dL  8 3-10 1   BILI, TOTAL 0 68 mg/dL  0 20-1 00   ALK PHOSPHATAS 69 U/L     ALT (SGPT) 38 U/L  12-78   AST(SGOT) 27 U/L  5-45   Slightly Hemolyzed;  Results May be Affected   ALBUMIN 3 6 g/dL  3 5-5 0   TOTAL PROTEIN 7 6 g/dL  6 4-8 2   eGFR Non-African American      >60 0 ml/min/1 73sq m   [employer] Disease Education Program recommendations are as follows:  GFR calculation is accurate only with a steady state creatinine  Chronic Kidney disease less than 60 ml/min/1 73 sq  meters  Kidney failure less than 15 ml/min/1 73 sq  meters     GLUCOSE FASTING 93 mg/dL  65-99

## 2018-01-17 NOTE — RESULT NOTES
Verified Results  * XR KNEE 4+ VIEW LEFT [id number] 12:40PM Guy Veliz Order Number: [id number]     Test Name Result Flag Reference   XR KNEE 4+ VW LEFT (Report)     LEFT KNEE     INDICATION: Left knee pain  COMPARISON: None     VIEWS: AP, lateral and obliques ; 4 images     FINDINGS:     There is no acute fracture or dislocation  There is no joint effusion  No degenerative changes  No lytic or blastic lesions are seen  Soft tissues are unremarkable  IMPRESSION:     No acute osseous abnormality  Workstation performed: [id number]     Signed by:   Nadira Garrett MD   5/23/16

## 2018-01-18 NOTE — RESULT NOTES
Verified Results  VAS LOWER LIMB ARTERIAL DUPLEX, COMPLETE BILATERAL/GRAFTS [id number] 02:54PM Tammy Fuentes Order Number: [id number]    - Patient Instructions: To schedule this appointment, please contact Central Scheduling at [phone]  Test Name Result Flag Reference   VAS LOWER LIMB ARTERIAL DUPLEX, COMPLETE BILATERAL/GRAFTS (Report)     [facility] REPORT   CLINICAL:   Indications: PVD, Unspecified [I73 9]  Follow up exam  Patient continues to have bilateral leg weakness  Patient has had numerous surgeries in the metatarsal region of the right foot  Risk Factors: The patient has history of Hypertension and smoking  She has no   history of Carotid Artery Disease  Operative History   Right metatarsal surgeries   Right Brachial Pressure: 124/ mmHg, Left Brachial Pressure: 129/ mmHg  FINDINGS:      Segment        Right           Left                        Impression    PSV EDV Impression    PSV EDV    Common Femoral Artery Diffuse Disease 148  16 Diffuse Disease  90  0    Prox Profunda              49  0          57  0    Prox SFA                126  0          97  21    Mid SFA                 81  0          66  0    Dist SFA                 85  0          43  6    Proximal Pop               37  2          52  0    Distal Pop                53  2          51  0    Prox Post Tibial             54  3          49  0    Dist Post Tibial             29  3          43  0    Prox  Ant  Tibial            58  0          50  4    Dist  Ant  Tibial            27  0          34  0    Prox Peroneal              40  0          27  0    Dist Peroneal              11  0          26  0             CONCLUSION:   Impression:   RIGHT LOWER LIMB:   There is minimal atherosclerotic disease noted  Ankle/Brachial index: 0 98 (within the normal range)  , Prior 1 07   PVR/ PPG tracings are normal    Metatarsal pressure declined by patient     Great toe pressure of 85 mmHg, within the healing range   LEFT LOWER LIMB:   There is minimal atherosclerotic disease noted  Ankle/Brachial index:  1 12 (within the normal range)  , Prior 1 06   PVR/ PPG tracings are normal    Metatarsal pressure of 81 mmHg   Great toe pressure of 74 mmHg, within the healing range      Compared to previous study on 4/5/2016 , there has been no significant change  Recommend repeat testing in 1year as per protocol unless otherwise indicated        SIGNATURE:   Electronically Signed by: Damaris Maynard MD on 2017-04-29 08:18:33 PM

## 2018-02-21 ENCOUNTER — APPOINTMENT (OUTPATIENT)
Dept: RADIOLOGY | Facility: MEDICAL CENTER | Age: 53
End: 2018-02-21
Payer: COMMERCIAL

## 2018-02-21 ENCOUNTER — OFFICE VISIT (OUTPATIENT)
Dept: OBGYN CLINIC | Facility: CLINIC | Age: 53
End: 2018-02-21
Payer: COMMERCIAL

## 2018-02-21 VITALS
DIASTOLIC BLOOD PRESSURE: 85 MMHG | HEIGHT: 61 IN | SYSTOLIC BLOOD PRESSURE: 118 MMHG | BODY MASS INDEX: 27.94 KG/M2 | WEIGHT: 148 LBS | HEART RATE: 82 BPM

## 2018-02-21 DIAGNOSIS — M54.12 CERVICAL RADICULOPATHY: ICD-10-CM

## 2018-02-21 DIAGNOSIS — M75.42 IMPINGEMENT SYNDROME OF LEFT SHOULDER: Primary | ICD-10-CM

## 2018-02-21 DIAGNOSIS — M25.512 PAIN IN JOINT OF LEFT SHOULDER: ICD-10-CM

## 2018-02-21 DIAGNOSIS — M25.512 ACUTE PAIN OF LEFT SHOULDER: ICD-10-CM

## 2018-02-21 PROCEDURE — 73030 X-RAY EXAM OF SHOULDER: CPT

## 2018-02-21 PROCEDURE — 72050 X-RAY EXAM NECK SPINE 4/5VWS: CPT

## 2018-02-21 PROCEDURE — 99203 OFFICE O/P NEW LOW 30 MIN: CPT | Performed by: ORTHOPAEDIC SURGERY

## 2018-02-21 PROCEDURE — 20610 DRAIN/INJ JOINT/BURSA W/O US: CPT | Performed by: ORTHOPAEDIC SURGERY

## 2018-02-21 RX ORDER — BETAMETHASONE SODIUM PHOSPHATE AND BETAMETHASONE ACETATE 3; 3 MG/ML; MG/ML
6 INJECTION, SUSPENSION INTRA-ARTICULAR; INTRALESIONAL; INTRAMUSCULAR; SOFT TISSUE
Status: COMPLETED | OUTPATIENT
Start: 2018-02-21 | End: 2018-02-21

## 2018-02-21 RX ORDER — BUPIVACAINE HYDROCHLORIDE 2.5 MG/ML
4 INJECTION, SOLUTION INFILTRATION; PERINEURAL
Status: COMPLETED | OUTPATIENT
Start: 2018-02-21 | End: 2018-02-21

## 2018-02-21 RX ORDER — ASPIRIN 81 MG/1
1 TABLET, CHEWABLE ORAL DAILY
COMMUNITY
Start: 2017-10-26

## 2018-02-21 RX ORDER — ALBUTEROL SULFATE 90 UG/1
2 AEROSOL, METERED RESPIRATORY (INHALATION) EVERY 6 HOURS PRN
COMMUNITY
Start: 2017-05-04 | End: 2018-10-21 | Stop reason: SDUPTHER

## 2018-02-21 RX ADMIN — BETAMETHASONE SODIUM PHOSPHATE AND BETAMETHASONE ACETATE 6 MG: 3; 3 INJECTION, SUSPENSION INTRA-ARTICULAR; INTRALESIONAL; INTRAMUSCULAR; SOFT TISSUE at 09:29

## 2018-02-21 RX ADMIN — BUPIVACAINE HYDROCHLORIDE 4 ML: 2.5 INJECTION, SOLUTION INFILTRATION; PERINEURAL at 09:29

## 2018-02-21 NOTE — PATIENT INSTRUCTIONS
Cigarette Smoking and Your Health   AMBULATORY CARE:   Risks to your health if you smoke:  Nicotine and other chemicals found in tobacco damage every cell in your body  Even if you are a light smoker, you have an increased risk for cancer, heart disease, and lung disease  If you are pregnant or have diabetes, smoking increases your risk for complications  Benefits to your health if you stop smoking:   · You decrease respiratory symptoms such as coughing, wheezing, and shortness of breath  · You reduce your risk for cancers of the lung, mouth, throat, kidney, bladder, pancreas, stomach, and cervix  If you already have cancer, you increase the benefits of chemotherapy  You also reduce your risk for cancer returning or a second cancer from developing  · You reduce your risk for heart disease, blood clots, heart attack, and stroke  · You reduce your risk for lung infections, and diseases such as pneumonia, asthma, chronic bronchitis, and emphysema  · Your circulation improves  More oxygen can be delivered to your body  If you have diabetes, you lower your risk for complications, such as kidney, artery, and eye diseases  You also lower your risk for nerve damage  Nerve damage can lead to amputations, poor vision, and blindness  · You improve your body's ability to heal and to fight infections  Benefits to the health of others if you stop smoking:  Tobacco is harmful to nonsmokers who breathe in your secondhand smoke  The following are ways the health of others around you may improve when you stop smoking:  · You lower the risks for lung cancer and heart disease in nonsmoking adults  · If you are pregnant, you lower the risk for miscarriage, early delivery, low birth weight, and stillbirth  You also lower your baby's risk for SIDS, obesity, developmental delay, and neurobehavioral problems, such as ADHD       · If you have children, you lower their risk for ear infections, colds, pneumonia, bronchitis, and asthma  For more information and support to stop smoking:   · Smokefree  gov  Phone: 7- 475 - 348-4240  Web Address: www [employer]  Follow up with your healthcare provider as directed:  Write down your questions so you remember to ask them during your visits  Rotator Cuff Tendinitis   WHAT YOU NEED TO KNOW:   Rotator cuff tendinitis is inflammation of the tendons in your shoulder joint  A tendon is a cord of tough tissue that connects your muscles to your bones  The rotator cuff is made up of a group of muscles and tendons that hold the shoulder joint in place  DISCHARGE INSTRUCTIONS:   Medicines:   · NSAIDs:  These medicines decrease swelling and pain  NSAIDs are available without a doctor's order  Ask your healthcare provider which medicine is right for you  Ask how much to take and when to take it  Take as directed  NSAIDs can cause stomach bleeding or kidney problems if not taken correctly  · Steroids: This medicine may be injected into the rotator cuff area to decrease inflammation and pain  · Take your medicine as directed  Contact your healthcare provider if you think your medicine is not helping or if you have side effects  Tell him or her if you are allergic to any medicine  Keep a list of the medicines, vitamins, and herbs you take  Include the amounts, and when and why you take them  Bring the list or the pill bottles to follow-up visits  Carry your medicine list with you in case of an emergency  Follow up with your healthcare provider or orthopedist as directed:  Write down your questions so you remember to ask them during your visits  Self-care:   · Rest:  Limit activity on your affected shoulder to decrease stress on the tendon  This may help prevent further damage, decrease pain, and promote healing  · Ice:  Ice helps decrease swelling and pain  Ice may also help prevent tissue damage  Use an ice pack, or put crushed ice in a plastic bag   Cover it with a towel and place it on your shoulder for 15 to 20 minutes every hour or as directed  · Shoulder position:  Keep your shoulder in the correct position so it will heal faster  This may be done by increasing the height of armrests while you work, drive, and sit  Try not to sleep on the side of your injured shoulder  If you are a woman, wear a sports bra so that the straps are closer to your neck  This may help decrease stress in the affected shoulder  Physical therapy:  A physical therapist can teach you exercises to help improve movement and strength, and to decrease pain  The exercises may help you move your shoulder normally again and strengthen your rotator cuff  You may also learn other exercises, such as stretching and strengthening of your shoulder muscles  You may learn changes to make to your daily activities that will help decrease stress on your tendons  Contact your healthcare provider or orthopedist if:   · You have a fever  · You have pain and swelling in your shoulder even after you take pain medicine  · Your skin is itchy, swollen, or has a rash  · Your symptoms are not getting better or are getting worse  · You have questions or concerns about your condition or care  Seek care immediately or call 911 if:   · You have sudden shortness of breath or chest pain  · Any part of your arm is numb, tingly, cold, blue, or pale  © 2017 [facility] Information is for End User's use only and may not be sold, redistributed or otherwise used for commercial purposes  All illustrations and images included in CareNotes® are the copyrighted property of [employer]  or Joao Edmondson  The above information is an  only  It is not intended as medical advice for individual conditions or treatments  Talk to your doctor, nurse or pharmacist before following any medical regimen to see if it is safe and effective for you

## 2018-02-21 NOTE — PROGRESS NOTES
Chief Complaint  Left shoulder pain 6 weeks    History Of Presenting Illness  Vanessa Mccarthy 1965 presents with left shoulder pain of 6 weeks duration  Started insidiously  Gradually getting worse  Aggravated by overhead activities  Decreased with rest   Now interfering with activities daily living  Patient has had no treatment for this  Patient is right handed  Patient used to work as a   Patient is in the process of getting disability due to back pain multiple foot surgeries elbow surgeries and cardiac disease and COPD  Patient smokes 1/2 pack cigarettes per day  Patient uses inhaler as needed  Current Medications  Current Outpatient Prescriptions   Medication Sig Dispense Refill    albuterol (VENTOLIN HFA) 90 mcg/act inhaler Inhale 2 puffs every 6 (six) hours as needed      aspirin 81 mg chewable tablet Chew 1 tablet daily      cyclobenzaprine (FLEXERIL) 10 mg tablet Take 10 mg by mouth 3 (three) times a day as needed for muscle spasms      oxyCODONE-acetaminophen (PERCOCET) 5-325 mg per tablet Take 1 tablet by mouth every 4 (four) hours as needed for moderate pain   ranitidine (ZANTAC) 150 mg tablet Take 150 mg by mouth 2 (two) times a day        ondansetron (ZOFRAN-ODT) 4 mg disintegrating tablet Take 1 tablet (4 mg total) by mouth every 8 (eight) hours as needed for nausea or vomiting  10 tablet 0     No current facility-administered medications for this visit          Current Problems    Active Problems:   Patient Active Problem List    Diagnosis Date Noted    Acute pain of left shoulder 02/21/2018    Gastro-esophageal reflux disease without esophagitis 11/04/2016    Right lower quadrant pain 11/04/2016    Abnormal weight loss 11/04/2016           Review Of Systems:   · Skin: Normal  · Neuro: See HPI  · Musculoskeletal: See HPI  · 14 point review of systems negative except as stated above     Past Medical History:   Past Medical History:   Diagnosis Date    Anxiety     Cervical radiculopathy     COPD (chronic obstructive pulmonary disease) (HCC)     Depression     Diverticulosis of colon     Foot pain     GERD (gastroesophageal reflux disease)     Hyperlipemia     Hypertension     Myocardial infarction     at age 28    Shortness of breath        Past Surgical History:   Past Surgical History:   Procedure Laterality Date    ABDOMINAL SURGERY      exploratory    CARDIAC SURGERY      cardiac stent      SECTION      CHOLECYSTECTOMY      COLONOSCOPY      ELBOW SURGERY Bilateral     ESOPHAGOGASTRODUODENOSCOPY      ESOPHAGOGASTRODUODENOSCOPY N/A 2016    Procedure: ESOPHAGOGASTRODUODENOSCOPY (EGD); Surgeon: Humberto Diaz MD;  Location: [facility];  Service:     FOOT SURGERY Right     x 4    HYSTERECTOMY      THROAT SURGERY      TOOTH EXTRACTION         Family History:  Family history reviewed and non-contributory  Family History   Problem Relation Age of Onset    No Known Problems Mother     No Known Problems Father     No Known Problems Maternal Grandmother     No Known Problems Maternal Grandfather     No Known Problems Paternal Grandmother     No Known Problems Paternal Grandfather        Social History:  Social History     Social History    Marital status:      Spouse name: N/A    Number of children: N/A    Years of education: N/A     Social History Main Topics    Smoking status: Current Every Day Smoker     Packs/day: 1 50    Smokeless tobacco: Never Used    Alcohol use Yes      Comment: socially    Drug use: No    Sexual activity: Not Asked     Other Topics Concern    None     Social History Narrative    None       Allergies:    Allergies   Allergen Reactions    Ceclor [Cefaclor] Anaphylaxis    Codeine Itching    Ticlid [Ticlopidine] Hives    Penicillins Rash           Physical ExaminationBP 118/85   Pulse 82   Ht 5' 1" (1 549 m)   Wt 67 1 kg (148 lb)   BMI 27 96 kg/m²   Gen: Alert and oriented to person, place, time  HEENT: EOMI, eyes clear, moist mucus membranes, hearing intact  Respiratory: Bilateral chest rise   No audible wheezing found  Cardiovascular: Regular Rate and Rhythm  Abdomen: soft nontender/nondistended    Orthopedic Exam    Left shoulder no obvious swelling or deformity  Forward flexion is 150°  AB duction is 140°  External rotation 45°  Full internal rotation  Extension 50°  Cuff strength is 4/5 with signs of impingement positive    Cervical spine pain-free range of motion with some spasm of the left para cervical muscles    No motor or sensory deficits in the left upper extremity          Impression    Impingement syndrome left shoulder with an element of a cervical spondylosis          Plan    Discussed treatment with the patient  Left subacromial bursa injected with steroid  Patient will start physical therapy program  Patient counseled on smoking cessation and lifestyle changes  Patient will follow up in 2 months  If the shoulder still symptomatic we could consider getting an MRI  Patient referred to pain and spine for neck pain    Large joint arthrocentesis  Date/Time: 2/21/2018 9:29 AM  Consent given by: patient  Site marked: site marked  Timeout: Immediately prior to procedure a time out was called to verify the correct patient, procedure, equipment, support staff and site/side marked as required   Supporting Documentation  Indications: pain and diagnostic evaluation   Procedure Details  Location: shoulder - L subacromial bursa  Preparation: Patient was prepped and draped in the usual sterile fashion  Needle size: 22 G  Ultrasound guidance: no  Approach: posterolateral  Medications administered: 4 mL bupivacaine 0 25 %; 6 mg betamethasone acetate-betamethasone sodium phosphate 6 (3-3) mg/mL    Patient tolerance: patient tolerated the procedure well with no immediate complications  Dressing:  Sterile dressing applied            Portions of the record may have been created with voice recognition software   Occasional wrong word or "sound a like" substitutions may have occurred due to the inherent limitations of voice recognition software   Read the chart carefully and recognize, using context, where substitutions have occurred

## 2018-02-28 ENCOUNTER — TELEPHONE (OUTPATIENT)
Dept: PAIN MEDICINE | Facility: CLINIC | Age: 53
End: 2018-02-28

## 2018-02-28 DIAGNOSIS — E78.5 HYPERLIPIDEMIA, UNSPECIFIED HYPERLIPIDEMIA TYPE: Primary | ICD-10-CM

## 2018-02-28 RX ORDER — ROSUVASTATIN CALCIUM 5 MG/1
5 TABLET, COATED ORAL EVERY OTHER DAY
COMMUNITY
Start: 2017-10-26 | End: 2018-02-28 | Stop reason: SDUPTHER

## 2018-03-01 RX ORDER — ROSUVASTATIN CALCIUM 5 MG/1
5 TABLET, COATED ORAL EVERY OTHER DAY
Qty: 15 TABLET | Refills: 2 | Status: SHIPPED | OUTPATIENT
Start: 2018-03-01 | End: 2018-05-30 | Stop reason: SDUPTHER

## 2018-03-01 NOTE — TELEPHONE ENCOUNTER
Received message that pt called back and left message on the MA's back line  Called pt and left message for return call  No intake started

## 2018-03-02 NOTE — TELEPHONE ENCOUNTER
Patient is prior patient of Dr Cayetano Brambila in [place]  Patient has not seen any other pm  Requesting to schedule for same issue  Patient scheduled for appointment

## 2018-03-05 ENCOUNTER — OFFICE VISIT (OUTPATIENT)
Dept: OCCUPATIONAL THERAPY | Facility: HOME HEALTHCARE | Age: 53
End: 2018-03-05
Payer: COMMERCIAL

## 2018-03-05 DIAGNOSIS — M75.42 IMPINGEMENT SYNDROME OF LEFT SHOULDER: Primary | ICD-10-CM

## 2018-03-05 PROCEDURE — 97166 OT EVAL MOD COMPLEX 45 MIN: CPT

## 2018-03-05 PROCEDURE — 97535 SELF CARE MNGMENT TRAINING: CPT

## 2018-03-05 PROCEDURE — G8984 CARRY CURRENT STATUS: HCPCS

## 2018-03-05 PROCEDURE — G8985 CARRY GOAL STATUS: HCPCS

## 2018-03-05 NOTE — PROGRESS NOTES
OT Evaluation     Today's date: 3/5/2018  Patient name: Ken Saldana  : 1965  MRN: [id number]  Referring provider: Petra Brewer MD  Dx:   Encounter Diagnosis     ICD-10-CM    1  Impingement syndrome of left shoulder M75 42 Ambulatory referral to Physical Therapy   2  Cervical radiculopathy M54 12 Ambulatory referral to Physical Therapy                  Assessment  Impairments: abnormal or restricted ROM, activity intolerance, impaired physical strength, pain with function and weight-bearing intolerance    Patient is not irritable  Assessment details: Pt presents to outpatient occupational therapy evaluation with L shoulder pain  Pt referred by orthopedic due to increased pain in L shoulder; pt with decreased AROM/PROM in L shoulder with positive neer's test  Pt reports pain with all strength and ROM assessment as well as at rest describing it as a "pulling" sensation while rested  Therapist recommended sling for comfort while resting at home due to increased pain; pt states the pain interferes with ADLs/IADLs currently  Recommend pt continue with skilled OT intervention in order to maximize (I) c ADL tasks and improve overall strength and ROM of L shoulder as well as decrease pain  Pt given HEP, demonstrated and instructed pt on exercises; pt demonstrates understanding  Recommend x2 a week OT for 4 weeks to begin, pt agreeable with plan  Thank you for the referral!  Barriers to therapy: multiple co-moridities  Understanding of Dx/Px/POC: good   Prognosis: good    Goals  STGs    Pt will increase  strength by 5-10#  Pt will increase left shoulder strength by 1/2 grade  Pt will increase L shoulder ROM to [facility]  Pt will increase pinch strength by 3-5#  Pt will report decrease in morning stiffness by 25%    Pt will report increased sleep by 1 hour        LTGs     Pt will increase  strength by 5-10#  Pt will increase left shoulder strength by 1/2 grade      Pt will increase left shoulder ROM to [facility]  Pt will report decrease in morning stiffness by 50%    Pt will report increased sleep by 2 hours  Plan  Planned modality interventions: TENS, unattended electrical stimulation and cryotherapy  Planned therapy interventions: ADL training, manual therapy, joint mobilization, massage, body mechanics training, patient education, postural training, strengthening, stretching, flexibility, therapeutic exercise, graded exercise, graded activity, functional ROM exercises and home exercise program  Frequency: 2x week  Duration in visits: 8  Duration in weeks: 4  Treatment plan discussed with: patient        Subjective Evaluation    History of Present Illness  Date of onset: 2017  Mechanism of injury: Sitting at computer; and states anterior/posterior portion are painful  Recurrent probem    Quality of life: good    Pain  Current pain ratin  At best pain ratin  At worst pain rating: 10  Location: entire shoulder   Quality: sharp, burning, dull ache, radiating and pulling  Relieving factors: heat  Aggravating factors: overhead activity, walking, sitting and lifting  Progression: worsening    Social Support  Steps to enter house: yes  Stairs in house: no   Lives in: apartment  Lives with: alone    Employment status: not working  Hand dominance: right  Life stress: significant stress      Diagnostic Tests  X-ray: abnormal  Treatments  Previous treatment: injection treatment and medication  Current treatment: medication  Current treatment comments: every 8 hours  Patient Goals  Patient goals for therapy: decreased pain, increased strength and independence with ADLs/IADLs          Objective     Static Posture     Shoulders  Rounded  Tenderness     Left Shoulder   Tenderness in the lateral scapula, medial scapula and scapular spine  Right Shoulder  No tenderness     Left Elbow   No tenderness in the medial epicondyle       Left Wrist/Hand   No tenderness in the medial epicondyle  Neurological Testing     Sensation     Shoulder   Left Shoulder   Diminished: light touch  Paresthesia: light touch    Right Shoulder   Intact: light touch    Comments   Left light touch: tingling sensation throughout lateral aspect of elbow radiating into the 1st and 2nd digit    Elbow   Left Elbow   Diminished: light touch and pin prick  Paresthesia: light touch    Right Elbow   Intact: light touch    Active Range of Motion   Left Shoulder   Flexion: 160 (with moderate pain) degrees   Extension: 20 degrees   Abduction: 130 degrees   Internal rotation 90°: 60 degrees   Horizontal abduction: 70 degrees   Horizontal adduction: 35 degrees     Right Shoulder   Normal active range of motion    Left Elbow   Normal active range of motion    Right Elbow   Normal active range of motion    Left Wrist   Normal active range of motion    Right Wrist   Normal active range of motion    Passive Range of Motion   Left Shoulder   Flexion: 165 degrees   Extension: 25 degrees     Right Shoulder   Normal passive range of motion    Left Wrist   Normal passive range of motion    Right Wrist   Normal passive range of motion    Scapular Mobility   Left Shoulder   Scapular mobility: WFL    Strength/Myotome Testing     Left Shoulder     Planes of Motion   Flexion: 3+   Extension: 3+   Abduction: 3+   Adduction: 3+   External rotation at 0°: 3+   Horizontal abduction: 3+   Horizontal adduction: 3+     Right Shoulder   Normal muscle strength    Left Elbow   Flexion: 4+  Extension: 4+  Forearm supination: 4+  Forearm pronation: 4+    Right Elbow   Normal strength    Left Wrist/Hand   Wrist extension: 4  Wrist flexion: 4  Radial deviation: 4  Ulnar deviation: 4     (2nd hand position)     Trial 1: 21    Right Wrist/Hand   Normal wrist strength     (2nd hand position)     Trial 1: 10    Additional Strength Details  Pt with moderate amount of pain during strength assessment    Tests     Left Shoulder   Positive Neer's

## 2018-03-09 ENCOUNTER — CONSULT (OUTPATIENT)
Dept: PAIN MEDICINE | Facility: CLINIC | Age: 53
End: 2018-03-09
Payer: COMMERCIAL

## 2018-03-09 ENCOUNTER — TELEPHONE (OUTPATIENT)
Dept: PAIN MEDICINE | Facility: CLINIC | Age: 53
End: 2018-03-09

## 2018-03-09 VITALS — SYSTOLIC BLOOD PRESSURE: 102 MMHG | WEIGHT: 150.2 LBS | DIASTOLIC BLOOD PRESSURE: 70 MMHG | BODY MASS INDEX: 28.38 KG/M2

## 2018-03-09 DIAGNOSIS — M75.51 SUBACROMIAL BURSITIS OF RIGHT SHOULDER JOINT: ICD-10-CM

## 2018-03-09 DIAGNOSIS — M50.30 DEGENERATIVE CERVICAL DISC: ICD-10-CM

## 2018-03-09 DIAGNOSIS — G60.9 IDIOPATHIC PERIPHERAL NEUROPATHY: ICD-10-CM

## 2018-03-09 DIAGNOSIS — M79.2 NEUROPATHIC PAIN OF RIGHT FOOT: ICD-10-CM

## 2018-03-09 DIAGNOSIS — M79.18 MYOFASCIAL PAIN SYNDROME: ICD-10-CM

## 2018-03-09 DIAGNOSIS — M47.816 LUMBAR SPONDYLOSIS: Primary | ICD-10-CM

## 2018-03-09 DIAGNOSIS — M47.22 OSTEOARTHRITIS OF SPINE WITH RADICULOPATHY, CERVICAL REGION: ICD-10-CM

## 2018-03-09 DIAGNOSIS — G56.03 BILATERAL CARPAL TUNNEL SYNDROME: ICD-10-CM

## 2018-03-09 DIAGNOSIS — M51.36 LUMBAR DEGENERATIVE DISC DISEASE: ICD-10-CM

## 2018-03-09 PROBLEM — M51.369 LUMBAR DEGENERATIVE DISC DISEASE: Status: ACTIVE | Noted: 2018-03-09

## 2018-03-09 PROCEDURE — 99214 OFFICE O/P EST MOD 30 MIN: CPT | Performed by: ANESTHESIOLOGY

## 2018-03-09 RX ORDER — PREGABALIN 50 MG/1
50 CAPSULE ORAL 2 TIMES DAILY
Qty: 90 CAPSULE | Refills: 2 | Status: SHIPPED | OUTPATIENT
Start: 2018-03-09 | End: 2018-09-27

## 2018-03-09 NOTE — TELEPHONE ENCOUNTER
Pt called regarding her Lyrica  The pharmacy stated they need more information from the doctor   Please call [phone]

## 2018-03-09 NOTE — PROGRESS NOTES
Assessment:  1  Lumbar spondylosis    2  Osteoarthritis of spine with radiculopathy, cervical region    3  Degenerative cervical disc    4  Lumbar degenerative disc disease    5  Bilateral carpal tunnel syndrome    6  Subacromial bursitis of right shoulder joint    7  Myofascial pain syndrome    8  Neuropathic pain of right foot        Plan:  Patient is a 49-year-old female with history of chronic neck pain and chronic back pain with radicular symptoms of the upper left extremity complicated by multi joint arthritic pathology presents today for follow-up visit  Patient has a history of cervical and lumbar facet hypertrophy in addition to degenerative disc disease in the lumbar spine and the cervical spine  Patient is currently attending physical therapy for left shoulder pain  Patient has taking gabapentin 300 mg 3 times q h s  And denied any alleviation of her symptoms with this dose  Patient reports having increase facial and scalp numbness with certain positions  1  We will tach on Neck and low back physical therapy to patient's current physical therapy  Once patient has completed the physical therapy will then proceed with an MRI of the neck and the low back to assess the discogenic causes spine the radicular symptoms that the patient is experiencing  Once we receive the results of that we can then proceed with an interventional therapy mainly focused on either the cervical degenerative disc disease via a ANDREIA and eventually lumbar medial branch blocks for arthritic/facetogenic low back pain  We may decided do cervical medial branch blocks based on patient's symptoms once she has finished physical therapy  2  We will trial Lyrica 50 mg p o  T i d  for peripheral neuropathy of lower and upper extremities due to the fact that patient was not able to attain relief with taking high-dose gabapentin    3  Patient was instructed to stop taking Percocet since it is not alleviate any of her symptoms due to side effect profile without benefits of taking the medication there is no need to be exposed to the risks  Patient instructed to switch from taking 3 times a day to taking 2 times a day for a week following week to take the medication once a day and then to completely stop the week thereafter  Complete risks and benefits including bleeding, infection, tissue reaction, nerve injury and allergic reaction were discussed  The approach was demonstrated using models and literature was provided  Verbal and written consent was obtained  There are risks associated with opioid medications, including dependence, addiction and tolerance  The patient understands and agrees to use these medications only as prescribed  Potential side effects of the medications include, but are not limited to, constipation, drowsiness, addiction, impaired judgment and risk of fatal overdose if not taken as prescribed  The patient was warned against driving while taking sedation medications  Sharing medications is a felony  At this point in time, the patient is showing no signs of addiction, abuse, diversion or suicidal ideation  [place] Prescription Drug Monitoring Program report was reviewed and was appropriate       History of Present Illness: The patient is a 46 y o  female who presents for a follow up office visit in regards to Back Pain; Neck Pain; and Shoulder Pain (left)  The patients current symptoms include 10 yrs    Current pain medications includes:  Percocet, gabapentin    The patient reports that this regimen is providing 25% pain relief  The patient is reporting no side effects from this pain medication regimen  I have personally reviewed and/or updated the patient's past medical history, past surgical history, family history, social history, current medications, allergies, and vital signs today  Review of Systems  Review of Systems   HENT: Positive for trouble swallowing      Eyes: Positive for visual disturbance  Respiratory: Positive for shortness of breath and wheezing  Cardiovascular: Positive for chest pain and palpitations  Musculoskeletal: Positive for back pain and gait problem  Muscle weakness, joint stiffness, decreased ROM, leg and feet swelling, pain   Neurological: Positive for dizziness  Memory loss   All other systems reviewed and are negative  Past Medical History:   Diagnosis Date    Acquired ichthyosis     last assessed: 2013    Anxiety     Cervical radiculopathy     last assessed: 2014    Colorectal polyps     last assessed: 3/9/2015    [facility]ch[facility] disease) ([facility] )     Depression     Diverticulosis of colon     Foot pain     GERD (gastroesophageal reflux disease)     Hyperlipemia     Hypertension     Myocardial infarction     at age 28    Osteopenia     last assessed: 2013    Palpitations     last assessed: 2014    PVD (peripheral vascular disease) ([facility] )     last assessed: 12/3/2012    Scapular dyskinesis     last assessed: 2014    Shortness of breath     Tendonitis of left rotator cuff     last assessed: 2014    Vocal cord polyps     last assessed: 2014       Past Surgical History:   Procedure Laterality Date    ABDOMINAL SURGERY      exploratory    CARDIAC SURGERY      cardiac stent      SECTION      last assessed: 2014    CHOLECYSTECTOMY      last assessed: 2014    COLONOSCOPY  10/27/2014    CORONARY ANGIOPLASTY WITH STENT PLACEMENT      LAD stent    DENTAL SURGERY      last assessed: 2014    ELBOW SURGERY Bilateral     arthroplasty    ESOPHAGOGASTRODUODENOSCOPY      ESOPHAGOGASTRODUODENOSCOPY N/A 2016    Procedure: ESOPHAGOGASTRODUODENOSCOPY (EGD);   Surgeon: Elisabeth Foster MD;  Location: [facility];  Service:     FOOT SURGERY Right 02/06/2015    x 4    HYSTERECTOMY      last assessed: 2014    THROAT SURGERY      TOOTH EXTRACTION Family History   Problem Relation Age of Onset    No Known Problems Mother     No Known Problems Father     No Known Problems Maternal Grandmother     No Known Problems Maternal Grandfather     No Known Problems Paternal Grandmother     No Known Problems Paternal Grandfather        Social History     Occupational History    Not on file  Social History Main Topics    Smoking status: Current Every Day Smoker     Packs/day: 1 50    Smokeless tobacco: Never Used    Alcohol use Yes      Comment: socially    Drug use: No    Sexual activity: Not on file         Current Outpatient Prescriptions:     albuterol (VENTOLIN HFA) 90 mcg/act inhaler, Inhale 2 puffs every 6 (six) hours as needed, Disp: , Rfl:     aspirin 81 mg chewable tablet, Chew 1 tablet daily, Disp: , Rfl:     cyclobenzaprine (FLEXERIL) 10 mg tablet, Take 10 mg by mouth 3 (three) times a day as needed for muscle spasms, Disp: , Rfl:     ondansetron (ZOFRAN-ODT) 4 mg disintegrating tablet, Take 1 tablet (4 mg total) by mouth every 8 (eight) hours as needed for nausea or vomiting , Disp: 10 tablet, Rfl: 0    oxyCODONE-acetaminophen (PERCOCET) 5-325 mg per tablet, Take 1 tablet by mouth every 4 (four) hours as needed for moderate pain , Disp: , Rfl:     ranitidine (ZANTAC) 150 mg tablet, Take 150 mg by mouth 2 (two) times a day  , Disp: , Rfl:     rosuvastatin (CRESTOR) 5 mg tablet, Take 1 tablet (5 mg total) by mouth every other day, Disp: 15 tablet, Rfl: 2    Allergies   Allergen Reactions    Ceclor [Cefaclor] Anaphylaxis    Codeine Itching    Ticlid [Ticlopidine] Hives    Penicillins Rash       Physical Exam:    /70   Wt 68 1 kg (150 lb 3 2 oz)   BMI 28 38 kg/m²     Constitutional:normal, well developed, well nourished, alert, in no distress and non-toxic and no overt pain behavior    Eyes:anicteric  HEENT:grossly intact  Neck:supple, symmetric, trachea midline and no masses   Pulmonary:even and unlabored  Cardiovascular:No edema or pitting edema present  Skin:Normal without rashes or lesions and well hydrated  Psychiatric:Mood and affect appropriate  Neurologic:Cranial Nerves II-XII grossly intact  Musculoskeletal:normal     Cervical Sensory Exam:  intact to light touch and pinprick in the upper extremities  ROM: Full    hand strength was normal bilaterally  wrist strength was normal bilaterally  elbow strength was normal bilaterally  shoulder strength was normal bilaterally  Evaluation of Muscle Stretch Reflexes on the right side demonstrates negative Huynh's Test right upper extremity and negative right ankle clonus   muscle stretch reflexes equal and symmetric in the right upper and lower limbs  Evaluation of Muscle Stretch Reflexes on the left side demonstrates negative left ankle clonus, but muscle stretch reflexes equal and symmetric in the left upper and lower limbs and negative Huynh's Test left upper extremity  Special Tests: positive Spurling's Maneuver to the left and positive Spurling's Maneuver to the left  Special Tests Shoulder: positive Neer Test on left and positive Contreras Test on left  Lumbar/Sacral Spine examination demonstrates  Full range of motion lumbar spine with pain upon: flexion, lateral rotation to the left/right, and bending to the left/right  Bilateral lumbar paraspinals tender to palpation  Muscle spasms noted in the lumbar area bilaterally  4/5 lower extremity strength in all muscle groups bilaterally  Positive seated straight leg raise for bilateral lower extremities  Sensitivity to light touch intact bilateral lower extremities  4+ reflexes in the patella and Achilles  No ankle clonus    Imaging  No orders to display       No orders of the defined types were placed in this encounter

## 2018-03-12 NOTE — TELEPHONE ENCOUNTER
Patient left message with answering service stating she need prior auth for her medication  Please call patient back at [phone]  Thank you

## 2018-03-13 ENCOUNTER — APPOINTMENT (OUTPATIENT)
Dept: OCCUPATIONAL THERAPY | Facility: HOME HEALTHCARE | Age: 53
End: 2018-03-13
Payer: COMMERCIAL

## 2018-03-15 ENCOUNTER — OFFICE VISIT (OUTPATIENT)
Dept: OCCUPATIONAL THERAPY | Facility: HOME HEALTHCARE | Age: 53
End: 2018-03-15
Payer: COMMERCIAL

## 2018-03-15 DIAGNOSIS — M75.42 IMPINGEMENT SYNDROME OF LEFT SHOULDER: Primary | ICD-10-CM

## 2018-03-15 PROCEDURE — 97110 THERAPEUTIC EXERCISES: CPT

## 2018-03-15 PROCEDURE — 97014 ELECTRIC STIMULATION THERAPY: CPT

## 2018-03-15 PROCEDURE — 97140 MANUAL THERAPY 1/> REGIONS: CPT

## 2018-03-15 NOTE — PROGRESS NOTES
Daily Note     Today's date: 3/15/2018  Patient name: Domenico Epperson  : 1965  MRN: [id number]  Referring provider: Debi Posey MD  Dx:   Encounter Diagnosis     ICD-10-CM    1  Impingement syndrome of left shoulder M75 42                   Subjective: I feel it pulling in my shoulder      Objective: See treatment diary below    Precautions: NA    Daily Treatment Diary     Manual  3/15/18            Should  Flex stretch 10 sec hold            Should  Ext s                                                        Exercise Diary  3/15/18            Arm Bike 8 min-5 min forward;3 min backward            Pulleys  2 min vertical; 2 min horizontal            Shoulder shrugs 2 x 10            Shoulder rolls 2 x 10            TB Should  IR 2 x 10            TB Should  ER 2 x 10            TB Scap squeezes 2 x10-yellow            FW Should  Forward rows             Bicep curls 2 x10            Finger ladder x8            Towel slides-wall             Med ball wall rolls x20            Med ball flex up wall x20            Wall push ups                                                                                               Modalities  3/15/18            E-STIM c Heat 10 min                                                Assessment: Tolerated treatment fair  Patient demonstrated fatigue post treatment and would benefit from continued OT  Pt tolerated e-stim with heat at end of session on setting 23  Pt with no pain throughout session  Plan: Continue per plan of care  Progress treatment as tolerated

## 2018-03-16 ENCOUNTER — TELEPHONE (OUTPATIENT)
Dept: PAIN MEDICINE | Facility: CLINIC | Age: 53
End: 2018-03-16

## 2018-03-16 ENCOUNTER — OFFICE VISIT (OUTPATIENT)
Dept: FAMILY MEDICINE CLINIC | Facility: CLINIC | Age: 53
End: 2018-03-16
Payer: COMMERCIAL

## 2018-03-16 VITALS
DIASTOLIC BLOOD PRESSURE: 68 MMHG | WEIGHT: 151 LBS | HEIGHT: 61 IN | SYSTOLIC BLOOD PRESSURE: 124 MMHG | BODY MASS INDEX: 28.51 KG/M2

## 2018-03-16 DIAGNOSIS — F41.8 DEPRESSION WITH ANXIETY: ICD-10-CM

## 2018-03-16 DIAGNOSIS — R51.9 HEADACHE DISORDER: ICD-10-CM

## 2018-03-16 DIAGNOSIS — I73.9 PVD (PERIPHERAL VASCULAR DISEASE) (HCC): ICD-10-CM

## 2018-03-16 DIAGNOSIS — E78.49 OTHER HYPERLIPIDEMIA: ICD-10-CM

## 2018-03-16 DIAGNOSIS — M54.16 LUMBAR RADICULOPATHY: Primary | ICD-10-CM

## 2018-03-16 DIAGNOSIS — I10 ESSENTIAL HYPERTENSION: Primary | ICD-10-CM

## 2018-03-16 PROCEDURE — 99214 OFFICE O/P EST MOD 30 MIN: CPT | Performed by: FAMILY MEDICINE

## 2018-03-16 RX ORDER — DULOXETIN HYDROCHLORIDE 20 MG/1
20 CAPSULE, DELAYED RELEASE ORAL DAILY
Qty: 30 CAPSULE | Refills: 0 | Status: SHIPPED | OUTPATIENT
Start: 2018-03-16 | End: 2018-05-30 | Stop reason: CLARIF

## 2018-03-16 RX ORDER — NITROGLYCERIN 0.4 MG/1
TABLET SUBLINGUAL
COMMUNITY
Start: 1998-02-01 | End: 2018-04-03 | Stop reason: SDUPTHER

## 2018-03-16 NOTE — TELEPHONE ENCOUNTER
Received fax from [facility] stating request for Lyrica has been denied, states that it is not medically necessary because the FDA does not recognize her dx as being medically necessary  Please advise new medication  Thank you

## 2018-03-16 NOTE — PROGRESS NOTES
Assessment/Plan: We will refer her to Neurology for her head numbness  We will get blood work on her  We will recheck bilateral lower extremity arterial duplex scan  She will follow up Cardiology in May  She will follow up here in 2 months or p r n  No problem-specific Assessment & Plan notes found for this encounter  Diagnoses and all orders for this visit:    Essential hypertension    Depression with anxiety    Other hyperlipidemia    PVD (peripheral vascular disease) ([facility] )  -     VAS lower limb arterial duplex, complete bilateral; Future    Headache disorder  -     Lyme Antibody Profile with reflex to WB; Future  -     KRISTA w/Reflex  -     RF Screen w/ Reflex to Titer  -     Sedimentation rate, automated  -     Cancel: Ambulatory referral to Neurology; Future  -     Ambulatory referral to Neurology; Future    Other orders  -     nitroglycerin (NITROSTAT) 0 4 mg SL tablet;           Subjective:      Patient ID: Dafne Colorado is a 46 y o  female  Patient here today for follow-up  Patient states for many years gets numbness on the top of her head when she gets up  Then throughout the day it travels into her cheeks  That last for about 30 seconds  Does get some weakness in her legs on occasion  She denies any chest pain or increased shortness of breath  She does have follow-up with  Cardiology in May  Of note, patient had an MRI in 2016 that showed nonspecific findings in the frontal lobe  Headache    This is a chronic problem  The problem occurs daily  The problem has been unchanged  The pain is located in the vertex region  The pain radiates to the face  The pain quality is similar to prior headaches  Quality: Numb feeling  The patient is experiencing no pain  Nothing aggravates the symptoms  She has tried nothing for the symptoms  Hypertension   This is a chronic problem  The problem is controlled  Associated symptoms include headaches   There are no associated agents to hypertension  Risk factors for coronary artery disease include diabetes mellitus, dyslipidemia, smoking/tobacco exposure and sedentary lifestyle  Past treatments include nothing  Hypertensive end-organ damage includes CAD/MI  Hyperlipidemia   This is a chronic problem  Recent lipid tests were reviewed and are variable  There are no known factors aggravating her hyperlipidemia  Current antihyperlipidemic treatment includes statins  There are no compliance problems  The following portions of the patient's history were reviewed and updated as appropriate:   She  has a past medical history of Acquired ichthyosis; Anxiety; Cervical radiculopathy; Colorectal polyps; COPD (chronic obstructive pulmonary disease) ([facility] ); Depression; Diverticulosis of colon; Foot pain; GERD (gastroesophageal reflux disease); Hyperlipemia; Hypertension; Myocardial infarction; Osteopenia; Palpitations; PVD (peripheral vascular disease) ([facility] ); Scapular dyskinesis; Shortness of breath; Tendonitis of left rotator cuff; and Vocal cord polyps  She   Patient Active Problem List    Diagnosis Date Noted    [facility]VD [facility] 2018    Headache disorder 2018    Lumbar spondylosis 2018    Osteoarthritis of spine with radiculopathy, cervical region 2018    Degenerative cervical disc 2018    Lumbar degenerative disc disease 2018    Bilateral carpal tunnel syndrome 2018    Acute pain of left shoulder 2018    Gastro-esophageal reflux disease without esophagitis 2016    Right lower quadrant pain 2016    Abnormal weight loss 2016    Depression with anxiety 2014    CAD in native artery 2012    Other hyperlipidemia 2012    Essential hypertension 2012     She  has a past surgical history that includes Foot surgery (Right, 2015); Hysterectomy; Cholecystectomy;  section; Cardiac surgery;  Throat surgery; Elbow surgery (Bilateral); Abdominal surgery; Esophagogastroduodenoscopy; Colonoscopy (10/27/2014); Tooth extraction; Esophagogastroduodenoscopy (N/A, 11/4/2016); Dental surgery; and Coronary angioplasty with stent (1999)  Her family history includes No Known Problems in her father, maternal grandfather, maternal grandmother, mother, paternal grandfather, and paternal grandmother  She  reports that she has been smoking  She has been smoking about 1 50 packs per day  She has never used smokeless tobacco  She reports that she drinks alcohol  She reports that she does not use drugs  Current Outpatient Prescriptions   Medication Sig Dispense Refill    nitroglycerin (NITROSTAT) 0 4 mg SL tablet       albuterol (VENTOLIN HFA) 90 mcg/act inhaler Inhale 2 puffs every 6 (six) hours as needed      aspirin 81 mg chewable tablet Chew 1 tablet daily      cyclobenzaprine (FLEXERIL) 10 mg tablet Take 10 mg by mouth 3 (three) times a day as needed for muscle spasms      ondansetron (ZOFRAN-ODT) 4 mg disintegrating tablet Take 1 tablet (4 mg total) by mouth every 8 (eight) hours as needed for nausea or vomiting  10 tablet 0    oxyCODONE-acetaminophen (PERCOCET) 5-325 mg per tablet Take 1 tablet by mouth every 4 (four) hours as needed for moderate pain   pregabalin (LYRICA) 50 mg capsule Take 1 capsule (50 mg total) by mouth 2 (two) times a day for 30 days 90 capsule 2    ranitidine (ZANTAC) 150 mg tablet Take 150 mg by mouth 2 (two) times a day        rosuvastatin (CRESTOR) 5 mg tablet Take 1 tablet (5 mg total) by mouth every other day 15 tablet 2     No current facility-administered medications for this visit        Current Outpatient Prescriptions on File Prior to Visit   Medication Sig    albuterol (VENTOLIN HFA) 90 mcg/act inhaler Inhale 2 puffs every 6 (six) hours as needed    aspirin 81 mg chewable tablet Chew 1 tablet daily    cyclobenzaprine (FLEXERIL) 10 mg tablet Take 10 mg by mouth 3 (three) times a day as needed for muscle spasms    ondansetron (ZOFRAN-ODT) 4 mg disintegrating tablet Take 1 tablet (4 mg total) by mouth every 8 (eight) hours as needed for nausea or vomiting   oxyCODONE-acetaminophen (PERCOCET) 5-325 mg per tablet Take 1 tablet by mouth every 4 (four) hours as needed for moderate pain   pregabalin (LYRICA) 50 mg capsule Take 1 capsule (50 mg total) by mouth 2 (two) times a day for 30 days    ranitidine (ZANTAC) 150 mg tablet Take 150 mg by mouth 2 (two) times a day      rosuvastatin (CRESTOR) 5 mg tablet Take 1 tablet (5 mg total) by mouth every other day     No current facility-administered medications on file prior to visit  She is allergic to ceclor [cefaclor]; codeine; ticlid [ticlopidine]; and penicillins       Review of Systems   Constitutional: Negative  Respiratory: Negative  Cardiovascular: Negative  Genitourinary: Negative  Neurological: Positive for headaches  Psychiatric/Behavioral: Negative for suicidal ideas  Objective:      /68   Ht 5' 1" (1 549 m)   Wt 68 5 kg (151 lb)   BMI 28 53 kg/m²          Physical Exam   Constitutional: She is oriented to person, place, and time  She appears well-developed and well-nourished  No distress  Eyes: Conjunctivae are normal    Cardiovascular: Normal rate, regular rhythm and normal heart sounds  No murmur heard  Pulmonary/Chest: Effort normal and breath sounds normal    Neurological: She is alert and oriented to person, place, and time  No cranial nerve deficit  Psychiatric: She has a normal mood and affect   Her behavior is normal

## 2018-03-16 NOTE — PROGRESS NOTES
For having numbness and increased tingling in the face with gabapentin  Insurance company failed to improve Lyrica  We will trial duloxetine at this until we can get prior authorization for Lyrica approved

## 2018-03-19 ENCOUNTER — EVALUATION (OUTPATIENT)
Dept: PHYSICAL THERAPY | Facility: HOME HEALTHCARE | Age: 53
End: 2018-03-19
Payer: COMMERCIAL

## 2018-03-19 DIAGNOSIS — M50.30 DEGENERATION OF CERVICAL INTERVERTEBRAL DISC: Primary | ICD-10-CM

## 2018-03-19 DIAGNOSIS — M51.36 DEGENERATION OF LUMBAR INTERVERTEBRAL DISC: ICD-10-CM

## 2018-03-19 PROCEDURE — 97162 PT EVAL MOD COMPLEX 30 MIN: CPT | Performed by: PHYSICAL THERAPIST

## 2018-03-19 PROCEDURE — 97535 SELF CARE MNGMENT TRAINING: CPT | Performed by: PHYSICAL THERAPIST

## 2018-03-19 PROCEDURE — G8991 OTHER PT/OT GOAL STATUS: HCPCS | Performed by: PHYSICAL THERAPIST

## 2018-03-19 PROCEDURE — G8990 OTHER PT/OT CURRENT STATUS: HCPCS | Performed by: PHYSICAL THERAPIST

## 2018-03-19 NOTE — PROGRESS NOTES
PT Evaluation     Today's date: 3/19/2018  Patient name: Tesha Rainey  : 1965  MRN: [id number]  Referring provider: Guilherme Coleman MD  Dx:   Encounter Diagnosis     ICD-10-CM    1  Degeneration of cervical intervertebral disc M50 30    2  Degeneration of lumbar intervertebral disc M51 36                   Assessment  Impairments: abnormal or restricted ROM, activity intolerance, impaired physical strength and pain with function    Assessment details: Pt Xiomy Mathur is a 46 y o  who presents to OPPT with s/s consistent with spinal DDD/DJD throughout  Pt with radiating symptoms into B UE/LE, however no clear directional preference observed at Ie  PT notes possible pinched nerves as cause for radiating symptoms due to severe DJD  She is limited with all activities due to pain and limited mobility, however she remains independent as she lives alone  Pt reporting constant positional changes as she only tolerates one position ~ 15-20 minutes at a time  Pt would benefit from skilled therapy services to address outlined impairments, work towards goals, and restore pts PLOF  Thank you!    Understanding of Dx/Px/POC: good   Prognosis: good    Goals  STG: to be achieved within 4 weeks   Decrease pain 25-50%  Improve ROM by 5-10 degrees  Improve strength by 1/2 grade     LTG: to be achieved within 8 weeks   Independent with HEP   ADL function returned to PLOF  Ambulation tolerance improved to PLOF   Stair negotiation returned to [facility]  Patient would benefit from: skilled PT  Planned modality interventions: ultrasound, traction, TENS and cryotherapy  Planned therapy interventions: manual therapy, patient education, postural training, strengthening, stretching, home exercise program, therapeutic activities, therapeutic exercise and neuromuscular re-education  Frequency: 2x week  Duration in visits: 8  Treatment plan discussed with: patient        Subjective Evaluation    History of Present Illness  Mechanism of injury: Pt reporting that she has been having pain in the back and neck for 10+ years  She states that she was in an accident years ago which may have started her pain, however pain has been getting gradually worse over the past several years  Pt first saw family physician re: pain and was referred to an Orthopedic Md  Pt then saw Dr Daryl Rushing, who is also treating pt for her shoulder impairments  MD ordered an x-ray on the neck which showed severe DJD  Pt was sent to see pain management for cervical and lumbar pain  She has previously had injections in both the neck and back, but no recent treatment  No recent imaging completed secondary to insurance  MD referred to OPPT for conservative management of s/s  She will return to see MD again in May     Pain  Current pain ratin  At best pain ratin  At worst pain ratin  Quality: burning, dull ache, pressure, sharp and radiating (Radiating symptoms B UE/LE)  Relieving factors: medications, heat and rest  Aggravating factors: sitting and standing  Progression: worsening    Social Support  Steps to enter house: yes  Stairs in house: no   L[facility] in: [facility]  Lives with: alone    Hand dominance: right      Diagnostic Tests  X-ray: abnormal (C/S (+) DJD)  Treatments  Previous treatment: injection treatment  Current treatment: physical therapy  Patient Goals  Patient goals for therapy: independence with ADLs/IADLs and decreased pain          Objective     Postural Observations  Seated posture: poor  Standing posture: poor        Active Range of Motion   Cervical/Thoracic Spine   Cervical    Subcranial retraction: Active cervical subcranial retraction: 50%   Flexion: 25 degrees   Extension: 25 degrees   Left lateral flexion: 30 degrees   Right lateral flexion: 25 degrees   Left rotation: 45 degrees   Right rotation: 40 degrees   Left Shoulder   Flexion: 160 degrees   Abduction: 140 degrees     Right Shoulder   Flexion: 160 degrees Abduction: 150 degrees   Left Hip   Flexion: 90 degrees   Abduction: 20 degrees     Right Hip   Flexion: 90 degrees   Abduction: 25 degrees   Left Knee   Flexion: 110 degrees   Extension: 0 degrees     Right Knee   Flexion: 110 degrees   Extension: 0 degrees     Additional Active Range of Motion Details  Pt seeing OT for specific L shoulder impairments; please see OT chart for full UE assessment   L/S ROM  Flexion: Limited 25%   Extension:  Limited 75%  Side bending: Limited 25%     Strength/Myotome Testing     Left Shoulder     Planes of Motion   Flexion: 4-   Abduction: 3+     Right Shoulder     Planes of Motion   Flexion: 4-   Abduction: 4-     Left Hip   Planes of Motion   Flexion: 3+  Abduction: 3+  Adduction: 3+    Right Hip   Planes of Motion   Flexion: 3+  Abduction: 3+  Adduction: 3+    Left Knee   Flexion: 4  Extension: 4    Right Knee   Flexion: 4  Extension: 4    Tests   Cervical     Left   Positive cervical compression test       Right   Positive cervical compression test      Lumbar     Left   Negative passive SLR  Right   Positive passive SLR       Additional Tests Details  (+) Hamstring tightness L LE  (+) pain prior to HS stretch R LE     Ambulation   Weight-Bearing Status   Assistive device used: single point cane    Additional Weight-Bearing Status Details  Use of SPC for long distance ambulation     Ambulation: Level Surfaces     Additional Level Surfaces Ambulation Details  Tolerance < 30 minutes    Ambulation: Stairs   Pattern: non-reciprocal  Railings: one rail  Pattern: non-reciprocal  Railings: one rail    General Comments     Lumbar Comments  Disrupted sleeping   Increased pain with any bending/lifting  Avoiding any lifting > 10#   Sitting and standing tolerance < 15 minutes       Flowsheet Rows    Flowsheet Row Most Recent Value   PT/OT G-Codes   Current Score  37   Projected Score  42   FOTO information reviewed  Yes   Assessment Type  Evaluation   G code set  Other PT/OT Primary Other PT Primary Current Status ()  CL   Other PT Primary Goal Status ()  CK        Precautions: None    Specialty Daily Treatment Diary     Manual         B LE - if tolerated                                             Exercise Diary         NuStep         SLR x 3         HR/TR        Squats         Step-ups         SBB        PPT        PPT with marches        SLR        S/L SLR        Clamshells        Hip abd        Hip add        Bridges        LTR        SKTC        Heel walk-outs        Quad alt UE        Quad alt LE         MTP/LTP            Modalities        IFC/MHP prn

## 2018-03-19 NOTE — TELEPHONE ENCOUNTER
Patient picked up script, she verbalized understanding of medication, she was pleasant and appreciative of the call

## 2018-03-20 ENCOUNTER — OFFICE VISIT (OUTPATIENT)
Dept: PHYSICAL THERAPY | Facility: HOME HEALTHCARE | Age: 53
End: 2018-03-20
Payer: COMMERCIAL

## 2018-03-20 ENCOUNTER — APPOINTMENT (OUTPATIENT)
Dept: PHYSICAL THERAPY | Facility: HOME HEALTHCARE | Age: 53
End: 2018-03-20
Payer: COMMERCIAL

## 2018-03-20 ENCOUNTER — OFFICE VISIT (OUTPATIENT)
Dept: OCCUPATIONAL THERAPY | Facility: HOME HEALTHCARE | Age: 53
End: 2018-03-20
Payer: COMMERCIAL

## 2018-03-20 DIAGNOSIS — M51.36 DEGENERATION OF LUMBAR INTERVERTEBRAL DISC: ICD-10-CM

## 2018-03-20 DIAGNOSIS — M75.42 IMPINGEMENT SYNDROME OF LEFT SHOULDER: Primary | ICD-10-CM

## 2018-03-20 DIAGNOSIS — M50.30 DEGENERATION OF CERVICAL INTERVERTEBRAL DISC: Primary | ICD-10-CM

## 2018-03-20 PROCEDURE — 97140 MANUAL THERAPY 1/> REGIONS: CPT

## 2018-03-20 PROCEDURE — 97110 THERAPEUTIC EXERCISES: CPT | Performed by: PHYSICAL THERAPIST

## 2018-03-20 PROCEDURE — 97110 THERAPEUTIC EXERCISES: CPT

## 2018-03-20 PROCEDURE — 97014 ELECTRIC STIMULATION THERAPY: CPT

## 2018-03-20 NOTE — PROGRESS NOTES
Daily Note     Today's date: 3/20/2018  Patient name: Janki Pederson  : 1965  MRN: [id number]  Referring provider: Kat Askew MD  Dx:   Encounter Diagnosis     ICD-10-CM    1  Impingement syndrome of left shoulder M75 42                   Subjective: "my foot is really bothering me"      Objective: See treatment diary below    Manual  3/15/18 3/20/18           Should  Flex stretch 10 sec hold 10 sec flex stretch           Should  Ext stretch  10 sec extension stretch                                                        Exercise Diary  3/15/18 3/20/18           Arm Bike 8 min-5 min forward;3 min backward 8 min-5 min forward;3 min backward           Pulleys  2 min vertical; 2 min horizontal 2 min vertical; 2 min horizontal            Shoulder shrugs 2 x 10 2# 3 x 10-2#           Shoulder rolls 2 x 10 2# 3 x 10 2#           TB Should  IR 2 x 10-yellow 2x 10-yellow           TB Should  ER 2 x 10-yellow 2x 10-yellow           TB Scap squeezes 2 x10-yellow 2 x 10-yellow           FW Should  Forward rows  2 x 10 1#            Bicep curls 2 x10-yellow 3 x 10-yellow           Finger ladder x8 x8           Towel slides-wall             Med ball wall rolls x20 tie dye x20 tie dye           Med ball flex up wall x20 tie dye x20 tie dye             Wall push ups                                                                                               Modalities  3/15/18 3/20/18           E-STIM c Heat 10 min 15 min                                         Assessment: Tolerated treatment well  Patient exhibited good technique with therapeutic exercises and would benefit from continued OT      Plan: Continue per plan of care  Progress treatment as tolerated

## 2018-03-20 NOTE — PROGRESS NOTES
Daily Note     Today's date: 3/20/2018  Patient name: Nallely Rios  : 1965  MRN: [id number]  Referring provider: Sabrina Tyler MD  Dx:   Encounter Diagnosis     ICD-10-CM    1  Degeneration of cervical intervertebral disc M50 30    2  Degeneration of lumbar intervertebral disc M51 36                   Subjective: The patient states that she had increased back pain last night  She still has LBP today > neck pain  She rates LBP at 4-5/10  Objective: See treatment diary below      Assessment: Initiated TE as per progressive exercise section  She had fair tolerance to TE today and noted pain with activity  Verbal cues are needed for correct form with completing Te  Pain level returned to 4-5/10 at end of session and she declined HP today and stated that she was going to use her TENS unit and heat at home later  Progress as able in upcoming visits  Plan: Continue per plan of care       Precautions: None  Re-Eval DUE: 18     Specialty Daily Treatment Diary      Manual  3/19/18  3/20/18         B LE - if tolerated    NV - if tolerated                                           Exercise Diary    3/20/18         NuStep    Level 1 11 minutes         SLR x 3    Lenin 1 x 10 each         HR/TR             Squats    1 x 10         Step-ups              SBB   1 x 10         PPT             PPT with marches             SLR   Lenin 1 x 10         S/L SLR            Clamshells             Hip abd   Green 1 x 10          Hip add   3" 1 x 10         Bridges             LTR   1 x 10         SKTC   1 x 10          Heel walk-outs             Quad alt UE             Quad alt LE              MTP/LTP                   Modalities             IFC/MHP prn

## 2018-03-22 ENCOUNTER — APPOINTMENT (OUTPATIENT)
Dept: LAB | Facility: HOSPITAL | Age: 53
End: 2018-03-22
Payer: COMMERCIAL

## 2018-03-22 ENCOUNTER — APPOINTMENT (OUTPATIENT)
Dept: OCCUPATIONAL THERAPY | Facility: HOME HEALTHCARE | Age: 53
End: 2018-03-22
Payer: COMMERCIAL

## 2018-03-22 ENCOUNTER — HOSPITAL ENCOUNTER (OUTPATIENT)
Dept: ULTRASOUND IMAGING | Facility: HOSPITAL | Age: 53
Discharge: HOME | End: 2018-03-22
Payer: COMMERCIAL

## 2018-03-22 DIAGNOSIS — I73.9 PVD (PERIPHERAL VASCULAR DISEASE) (HCC): ICD-10-CM

## 2018-03-22 DIAGNOSIS — R51.9 HEADACHE DISORDER: ICD-10-CM

## 2018-03-22 LAB — ERYTHROCYTE [SEDIMENTATION RATE] IN BLOOD: 15 MM/HOUR (ref 0–20)

## 2018-03-22 PROCEDURE — 93925 LOWER EXTREMITY STUDY: CPT

## 2018-03-22 PROCEDURE — 93925 LOWER EXTREMITY STUDY: CPT | Performed by: SURGERY

## 2018-03-22 PROCEDURE — 85652 RBC SED RATE AUTOMATED: CPT | Performed by: FAMILY MEDICINE

## 2018-03-22 PROCEDURE — 86430 RHEUMATOID FACTOR TEST QUAL: CPT | Performed by: FAMILY MEDICINE

## 2018-03-22 PROCEDURE — 93923 UPR/LXTR ART STDY 3+ LVLS: CPT

## 2018-03-22 PROCEDURE — 36415 COLL VENOUS BLD VENIPUNCTURE: CPT

## 2018-03-22 PROCEDURE — 86039 ANTINUCLEAR ANTIBODIES (ANA): CPT

## 2018-03-22 PROCEDURE — 86038 ANTINUCLEAR ANTIBODIES: CPT

## 2018-03-22 PROCEDURE — 93922 UPR/L XTREMITY ART 2 LEVELS: CPT | Performed by: SURGERY

## 2018-03-22 PROCEDURE — 86431 RHEUMATOID FACTOR QUANT: CPT | Performed by: FAMILY MEDICINE

## 2018-03-22 PROCEDURE — 86618 LYME DISEASE ANTIBODY: CPT

## 2018-03-23 DIAGNOSIS — R76.8 RHEUMATOID FACTOR POSITIVE: Primary | ICD-10-CM

## 2018-03-23 LAB
B BURGDOR IGG SER IA-ACNC: 0.12
B BURGDOR IGM SER IA-ACNC: 0.21
CRYOGLOB RF SER-ACNC: ABNORMAL [IU]/ML
RHEUMATOID FACT SER QL LA: POSITIVE

## 2018-03-26 ENCOUNTER — TELEPHONE (OUTPATIENT)
Dept: FAMILY MEDICINE CLINIC | Facility: CLINIC | Age: 53
End: 2018-03-26

## 2018-03-26 DIAGNOSIS — R76.8 POSITIVE ANA (ANTINUCLEAR ANTIBODY): ICD-10-CM

## 2018-03-26 DIAGNOSIS — R76.8 RHEUMATOID FACTOR POSITIVE: Primary | ICD-10-CM

## 2018-03-26 LAB
ANA HOMOGEN SER QL IF: NORMAL
ANA HOMOGEN TITR SER: NORMAL {TITER}
RYE IGE QN: POSITIVE

## 2018-03-26 NOTE — TELEPHONE ENCOUNTER
Pt went to bed Tuesday and woke up with left facial numbness and eye pain, no slurred speech but still has numbess in cheek bone and head pain  Said did have some issues with swollowing   Unsure if should follow up with you or go to ER

## 2018-03-27 ENCOUNTER — TELEPHONE (OUTPATIENT)
Dept: FAMILY MEDICINE CLINIC | Facility: CLINIC | Age: 53
End: 2018-03-27

## 2018-03-27 ENCOUNTER — APPOINTMENT (OUTPATIENT)
Dept: PHYSICAL THERAPY | Facility: HOME HEALTHCARE | Age: 53
End: 2018-03-27
Payer: COMMERCIAL

## 2018-03-27 ENCOUNTER — APPOINTMENT (OUTPATIENT)
Dept: OCCUPATIONAL THERAPY | Facility: HOME HEALTHCARE | Age: 53
End: 2018-03-27
Payer: COMMERCIAL

## 2018-03-27 NOTE — TELEPHONE ENCOUNTER
PT DOES NOT WANT TO GO TO [facility] FOR RHUEMATOLOGIST  SHE CALLED INSURANCE AND SHE CAN GO TO [facility] [phone]  CAN YOU DO AN ORDER AND SET HE UP FOR APPT

## 2018-03-27 NOTE — TELEPHONE ENCOUNTER
FYI- Patient did not go to ER and refuses to go because doesn't want to sit there for 6-7 hours so she is just going to make a follow up with you

## 2018-03-28 ENCOUNTER — HOSPITAL ENCOUNTER (OUTPATIENT)
Dept: MAMMOGRAPHY | Facility: HOSPITAL | Age: 53
Discharge: HOME | End: 2018-03-28
Payer: COMMERCIAL

## 2018-03-28 DIAGNOSIS — Z12.39 SCREENING FOR MALIGNANT NEOPLASM OF BREAST: Primary | ICD-10-CM

## 2018-03-28 DIAGNOSIS — Z12.31 ENCOUNTER FOR SCREENING MAMMOGRAM FOR MALIGNANT NEOPLASM OF BREAST: ICD-10-CM

## 2018-03-28 PROCEDURE — 77063 BREAST TOMOSYNTHESIS BI: CPT

## 2018-03-28 PROCEDURE — 77067 SCR MAMMO BI INCL CAD: CPT

## 2018-03-29 ENCOUNTER — APPOINTMENT (OUTPATIENT)
Dept: PHYSICAL THERAPY | Facility: HOME HEALTHCARE | Age: 53
End: 2018-03-29
Payer: COMMERCIAL

## 2018-03-29 ENCOUNTER — APPOINTMENT (OUTPATIENT)
Dept: OCCUPATIONAL THERAPY | Facility: HOME HEALTHCARE | Age: 53
End: 2018-03-29
Payer: COMMERCIAL

## 2018-04-03 ENCOUNTER — OFFICE VISIT (OUTPATIENT)
Dept: FAMILY MEDICINE CLINIC | Facility: CLINIC | Age: 53
End: 2018-04-03
Payer: COMMERCIAL

## 2018-04-03 VITALS
BODY MASS INDEX: 29.07 KG/M2 | SYSTOLIC BLOOD PRESSURE: 110 MMHG | WEIGHT: 154 LBS | DIASTOLIC BLOOD PRESSURE: 64 MMHG | HEIGHT: 61 IN

## 2018-04-03 DIAGNOSIS — R76.8 RHEUMATOID FACTOR POSITIVE: Primary | ICD-10-CM

## 2018-04-03 DIAGNOSIS — R51.9 HEADACHE DISORDER: ICD-10-CM

## 2018-04-03 DIAGNOSIS — K21.9 GASTRO-ESOPHAGEAL REFLUX DISEASE WITHOUT ESOPHAGITIS: ICD-10-CM

## 2018-04-03 DIAGNOSIS — I10 ESSENTIAL HYPERTENSION: ICD-10-CM

## 2018-04-03 DIAGNOSIS — R20.0 LEFT FACIAL NUMBNESS: ICD-10-CM

## 2018-04-03 DIAGNOSIS — I25.10 CAD IN NATIVE ARTERY: ICD-10-CM

## 2018-04-03 DIAGNOSIS — R76.8 POSITIVE ANA (ANTINUCLEAR ANTIBODY): ICD-10-CM

## 2018-04-03 PROCEDURE — 3078F DIAST BP <80 MM HG: CPT | Performed by: FAMILY MEDICINE

## 2018-04-03 PROCEDURE — 3074F SYST BP LT 130 MM HG: CPT | Performed by: FAMILY MEDICINE

## 2018-04-03 PROCEDURE — 99214 OFFICE O/P EST MOD 30 MIN: CPT | Performed by: FAMILY MEDICINE

## 2018-04-03 RX ORDER — NITROGLYCERIN 0.4 MG/1
0.4 TABLET SUBLINGUAL
Qty: 90 TABLET | Refills: 3 | Status: SHIPPED | OUTPATIENT
Start: 2018-04-03 | End: 2019-04-26 | Stop reason: SDUPTHER

## 2018-04-03 RX ORDER — CYCLOBENZAPRINE HCL 10 MG
10 TABLET ORAL 3 TIMES DAILY PRN
Qty: 30 TABLET | Refills: 0 | Status: SHIPPED | OUTPATIENT
Start: 2018-04-03 | End: 2018-08-15 | Stop reason: SDUPTHER

## 2018-04-03 RX ORDER — RANITIDINE 150 MG/1
150 TABLET ORAL 2 TIMES DAILY
Qty: 60 TABLET | Refills: 5 | Status: SHIPPED | OUTPATIENT
Start: 2018-04-03 | End: 2018-10-11 | Stop reason: SDUPTHER

## 2018-04-03 NOTE — PROGRESS NOTES
Assessment/Plan:   we will get a CT scan of her head with contrast   She will follow up with Rheumatology as scheduled  We will see her back next month or nghia Chatman Patient will call physical therapy and see if they can try any other modalities on her  I refilled her medications  She will call us with any problems beforehand  No problem-specific Assessment & Plan notes found for this encounter  Diagnoses and all orders for this visit:    Rheumatoid factor positive  -     CT head w contrast; Future  -     cyclobenzaprine (FLEXERIL) 10 mg tablet; Take 1 tablet (10 mg total) by mouth 3 (three) times a day as needed for muscle spasms    Positive KRISTA (antinuclear antibody)  -     CT head w contrast; Future  -     cyclobenzaprine (FLEXERIL) 10 mg tablet; Take 1 tablet (10 mg total) by mouth 3 (three) times a day as needed for muscle spasms    Essential hypertension  -     Basic metabolic panel    Headache disorder  -     CT head w contrast; Future    Left facial numbness  -     CT head w contrast; Future    Gastro-esophageal reflux disease without esophagitis  -     ranitidine (ZANTAC) 150 mg tablet; Take 1 tablet (150 mg total) by mouth 2 (two) times a day    CAD in native artery  -     nitroglycerin (NITROSTAT) 0 4 mg SL tablet; Place 1 tablet (0 4 mg total) under the tongue every 5 (five) minutes as needed for chest pain          Subjective:      Patient ID: Venkat Gunn is a 46 y o  female  Patient here today for follow-up  Patient still having her neck pain and left shoulder discomfort  Patient tried physical therapy, but she feels it is making her worse  After she had a 10s unit on at therapy, she started with left facial numbness and  Left eye pain  Patient did not go to the Er  Eye pain has resolved, left facial numbness is less severe  Patient's blood work showed that she had a positive KRISTA and rheumatoid factor  Patient is scheduled to see a rheumatologist in June in [facility]          The following portions of the patient's history were reviewed and updated as appropriate:   She  has a past medical history of Acquired ichthyosis; Anxiety; Cervical radiculopathy; Colorectal polyps; COPD (chronic obstructive pulmonary disease) ([facility] [address] ); Depression; Diverticulosis of colon; Foot pain; GERD (gastroesophageal reflux disease); Hyperlipemia; Hypertension; Myocardial infarction; Osteopenia; Palpitations; PVD (peripheral vascular disease) ([facility] [address] ); Scapular dyskinesis; Shortness of breath; Tendonitis of left rotator cuff; and Vocal cord polyps  She   Patient Active Problem List    Diagnosis Date Noted    Left facial numbness 2018    Positive KRISTA (antinuclear antibody) 2018    Rheumatoid factor positive 2018    [facility] 2018    Headache disorder 2018    Lumbar spondylosis 2018    Osteoarthritis of spine with radiculopathy, cervical region 2018    Degenerative cervical disc 2018    Lumbar degenerative disc disease 2018    Bilateral carpal tunnel syndrome 2018    Acute pain of left shoulder 2018    Gastro-esophageal reflux disease without esophagitis 2016    Right lower quadrant pain 2016    Abnormal weight loss 2016    Depression with anxiety 2014    CAD in native artery 2012    Other hyperlipidemia 2012    Essential hypertension 2012     She  has a past surgical history that includes Foot surgery (Right, 2015); Hysterectomy; Cholecystectomy;  section; Cardiac surgery; Throat surgery; Elbow surgery (Bilateral); Abdominal surgery; Esophagogastroduodenoscopy; Colonoscopy (10/27/2014); Tooth extraction; Esophagogastroduodenoscopy (N/A, 2016); Dental surgery; and Coronary angioplasty with stent ()    Her family history includes No Known Problems in her father, maternal grandfather, maternal grandmother, mother, paternal grandfather, and paternal grandmother  She  reports that she has been smoking  She has been smoking about 1 50 packs per day  She has never used smokeless tobacco  She reports that she drinks alcohol  She reports that she does not use drugs  Current Outpatient Prescriptions   Medication Sig Dispense Refill    albuterol (VENTOLIN HFA) 90 mcg/act inhaler Inhale 2 puffs every 6 (six) hours as needed      aspirin 81 mg chewable tablet Chew 1 tablet daily      cyclobenzaprine (FLEXERIL) 10 mg tablet Take 1 tablet (10 mg total) by mouth 3 (three) times a day as needed for muscle spasms 30 tablet 0    DULoxetine (CYMBALTA) 20 mg capsule Take 1 capsule (20 mg total) by mouth daily for 30 days 30 capsule 0    nitroglycerin (NITROSTAT) 0 4 mg SL tablet Place 1 tablet (0 4 mg total) under the tongue every 5 (five) minutes as needed for chest pain 90 tablet 3    ondansetron (ZOFRAN-ODT) 4 mg disintegrating tablet Take 1 tablet (4 mg total) by mouth every 8 (eight) hours as needed for nausea or vomiting  10 tablet 0    oxyCODONE-acetaminophen (PERCOCET) 5-325 mg per tablet Take 1 tablet by mouth every 4 (four) hours as needed for moderate pain   pregabalin (LYRICA) 50 mg capsule Take 1 capsule (50 mg total) by mouth 2 (two) times a day for 30 days 90 capsule 2    ranitidine (ZANTAC) 150 mg tablet Take 1 tablet (150 mg total) by mouth 2 (two) times a day 60 tablet 5    rosuvastatin (CRESTOR) 5 mg tablet Take 1 tablet (5 mg total) by mouth every other day 15 tablet 2     No current facility-administered medications for this visit        Current Outpatient Prescriptions on File Prior to Visit   Medication Sig    albuterol (VENTOLIN HFA) 90 mcg/act inhaler Inhale 2 puffs every 6 (six) hours as needed    aspirin 81 mg chewable tablet Chew 1 tablet daily    DULoxetine (CYMBALTA) 20 mg capsule Take 1 capsule (20 mg total) by mouth daily for 30 days    ondansetron (ZOFRAN-ODT) 4 mg disintegrating tablet Take 1 tablet (4 mg total) by mouth every 8 (eight) hours as needed for nausea or vomiting   oxyCODONE-acetaminophen (PERCOCET) 5-325 mg per tablet Take 1 tablet by mouth every 4 (four) hours as needed for moderate pain   pregabalin (LYRICA) 50 mg capsule Take 1 capsule (50 mg total) by mouth 2 (two) times a day for 30 days    rosuvastatin (CRESTOR) 5 mg tablet Take 1 tablet (5 mg total) by mouth every other day    [DISCONTINUED] cyclobenzaprine (FLEXERIL) 10 mg tablet Take 10 mg by mouth 3 (three) times a day as needed for muscle spasms    [DISCONTINUED] nitroglycerin (NITROSTAT) 0 4 mg SL tablet     [DISCONTINUED] ranitidine (ZANTAC) 150 mg tablet Take 150 mg by mouth 2 (two) times a day       No current facility-administered medications on file prior to visit  She is allergic to ceclor [cefaclor]; codeine; ticlid [ticlopidine]; and penicillins       Review of Systems   Constitutional: Positive for activity change ( due to arthritis)  Respiratory: Negative  Cardiovascular: Negative  Gastrointestinal: Negative  Genitourinary: Negative  Musculoskeletal: Positive for arthralgias  Neurological: Positive for numbness ( as per HPI)  Objective:      /64   Ht 5' 1" (1 549 m)   Wt 69 9 kg (154 lb)   BMI 29 10 kg/m²          Physical Exam   Constitutional: She is oriented to person, place, and time  She appears well-developed and well-nourished  No distress  Cardiovascular: Normal rate, regular rhythm and normal heart sounds  No murmur heard  Pulmonary/Chest: Effort normal and breath sounds normal  No respiratory distress  She has no wheezes  She has no rales  Musculoskeletal: She exhibits no edema  Neurological: She is alert and oriented to person, place, and time  Decreased sensation in her left face   Psychiatric: She has a normal mood and affect   Her behavior is normal

## 2018-04-05 ENCOUNTER — TELEPHONE (OUTPATIENT)
Dept: FAMILY MEDICINE CLINIC | Facility: CLINIC | Age: 53
End: 2018-04-05

## 2018-04-05 NOTE — TELEPHONE ENCOUNTER
STATUS OF BRAIN CT IS IN PEER TO PEER STATUS  IF YOU WANT TO SPEAK TO THEM WE NEED TO CALL [phone] OPT 3, USE TRACKING #[id number]  DO YOU WANT TO DO THIS

## 2018-04-10 ENCOUNTER — TELEPHONE (OUTPATIENT)
Dept: FAMILY MEDICINE CLINIC | Facility: CLINIC | Age: 53
End: 2018-04-10

## 2018-04-10 NOTE — TELEPHONE ENCOUNTER
Unfortunately her insurance will not approve the CAT scan or other brain imaging  They suggest referral to Neurology

## 2018-04-23 NOTE — PROGRESS NOTES
PT Discharge    Today's date: 2018  Patient name: Nallely Rios  : 1965  MRN: [id number]  Referring provider: Sabrina Tyler MD  Dx:   Encounter Diagnosis     ICD-10-CM    1  Degeneration of cervical intervertebral disc M50 30    2  Degeneration of lumbar intervertebral disc M51 36          Assessment  Impairments: abnormal or restricted ROM, activity intolerance, impaired physical strength and pain with function    Assessment details: Pt only attended PT initial assessment and one treatment session  She did not continue with POC and PT unable to update clinical findings for formal discharge assessment  Pt will be D/C from OPPT at this time secondary to script expiration; findings in discharge summary are from pts initial intake assessment  Understanding of Dx/Px/POC: good   Prognosis: good    Goals  STG: to be achieved within 4 weeks   Decrease pain 25-50%  Improve ROM by 5-10 degrees  Improve strength by 1/2 grade     LTG: to be achieved within 8 weeks   Independent with HEP   ADL function returned to PLOF  Ambulation tolerance improved to PLOF   Stair negotiation returned to [address] [address] details: Pt to be D/C from OPPT         Subjective Evaluation    History of Present Illness  Mechanism of injury: Pt reporting that she has been having pain in the back and neck for 10+ years  She states that she was in an accident years ago which may have started her pain, however pain has been getting gradually worse over the past several years  Pt first saw family physician re: pain and was referred to an Orthopedic Md  Pt then saw Dr Daryl Rushing, who is also treating pt for her shoulder impairments  MD ordered an x-ray on the neck which showed severe DJD  Pt was sent to see pain management for cervical and lumbar pain  She has previously had injections in both the neck and back, but no recent treatment  No recent imaging completed secondary to insurance   MD referred to OPPT for conservative management of s/s  She will return to see MD again in May     Pain  Current pain ratin  At best pain ratin  At worst pain ratin  Quality: burning, dull ache, pressure, sharp and radiating (Radiating symptoms B UE/LE)  Relieving factors: medications, heat and rest  Aggravating factors: sitting and standing  Progression: worsening    Social Support  Steps to enter house: yes  Stairs in house: no   Lives in: [place] house  Lives with: alone    Hand dominance: right      Diagnostic Tests  X-ray: abnormal (C/S (+) DJD)  Treatments  Previous treatment: injection treatment  Current treatment: physical therapy  Patient Goals  Patient goals for therapy: independence with ADLs/IADLs and decreased pain          Objective     Postural Observations  Seated posture: poor  Standing posture: poor        Active Range of Motion   Cervical/Thoracic Spine   Cervical    Subcranial retraction: Active cervical subcranial retraction: 50%   Flexion: 25 degrees   Extension: 25 degrees   Left lateral flexion: 30 degrees   Right lateral flexion: 25 degrees   Left rotation: 45 degrees   Right rotation: 40 degrees   Left Shoulder   Flexion: 160 degrees   Abduction: 140 degrees     Right Shoulder   Flexion: 160 degrees   Abduction: 150 degrees   Left Hip   Flexion: 90 degrees   Abduction: 20 degrees     Right Hip   Flexion: 90 degrees   Abduction: 25 degrees   Left Knee   Flexion: 110 degrees   Extension: 0 degrees     Right Knee   Flexion: 110 degrees   Extension: 0 degrees     Additional Active Range of Motion Details  Pt seeing OT for specific L shoulder impairments; please see OT chart for full UE assessment   L/S ROM  Flexion: Limited 25%   Extension:  Limited 75%  Side bending: Limited 25%     Strength/Myotome Testing     Left Shoulder     Planes of Motion   Flexion: 4-   Abduction: 3+     Right Shoulder     Planes of Motion   Flexion: 4-   Abduction: 4-     Left Hip   Planes of Motion   Flexion: 3+  Abduction: 3+  Adduction: 3+    Right Hip   Planes of Motion   Flexion: 3+  Abduction: 3+  Adduction: 3+    Left Knee   Flexion: 4  Extension: 4    Right Knee   Flexion: 4  Extension: 4    Tests   Cervical     Left   Positive cervical compression test       Right   Positive cervical compression test      Lumbar     Left   Negative passive SLR  Right   Positive passive SLR       Additional Tests Details  (+) Hamstring tightness L LE  (+) pain prior to HS stretch R LE     Ambulation   Weight-Bearing Status   Assistive device used: single point cane    Additional Weight-Bearing Status Details  Use of SPC for long distance ambulation     Ambulation: Level Surfaces     Additional Level Surfaces Ambulation Details  Tolerance < 30 minutes    Ambulation: Stairs   Pattern: non-reciprocal  Railings: one rail  Pattern: non-reciprocal  Railings: one rail    General Comments     Lumbar Comments  Disrupted sleeping   Increased pain with any bending/lifting  Avoiding any lifting > 10#   Sitting and standing tolerance < 15 minutes

## 2018-05-11 ENCOUNTER — OFFICE VISIT (OUTPATIENT)
Dept: PAIN MEDICINE | Facility: CLINIC | Age: 53
End: 2018-05-11
Payer: COMMERCIAL

## 2018-05-11 VITALS — BODY MASS INDEX: 28.42 KG/M2 | WEIGHT: 150.4 LBS | DIASTOLIC BLOOD PRESSURE: 84 MMHG | SYSTOLIC BLOOD PRESSURE: 130 MMHG

## 2018-05-11 DIAGNOSIS — M47.816 LUMBAR SPONDYLOSIS: ICD-10-CM

## 2018-05-11 DIAGNOSIS — M79.18 MYOFASCIAL PAIN SYNDROME: ICD-10-CM

## 2018-05-11 DIAGNOSIS — M50.30 DEGENERATIVE CERVICAL DISC: ICD-10-CM

## 2018-05-11 DIAGNOSIS — G56.03 BILATERAL CARPAL TUNNEL SYNDROME: ICD-10-CM

## 2018-05-11 DIAGNOSIS — M47.22 OSTEOARTHRITIS OF SPINE WITH RADICULOPATHY, CERVICAL REGION: Primary | ICD-10-CM

## 2018-05-11 DIAGNOSIS — M51.36 LUMBAR DEGENERATIVE DISC DISEASE: ICD-10-CM

## 2018-05-11 DIAGNOSIS — M25.512 ACUTE PAIN OF LEFT SHOULDER: ICD-10-CM

## 2018-05-11 PROCEDURE — 99214 OFFICE O/P EST MOD 30 MIN: CPT | Performed by: ANESTHESIOLOGY

## 2018-05-11 RX ORDER — AMITRIPTYLINE HYDROCHLORIDE 25 MG/1
25 TABLET, FILM COATED ORAL
COMMUNITY
End: 2018-10-11 | Stop reason: SDUPTHER

## 2018-05-15 ENCOUNTER — TELEPHONE (OUTPATIENT)
Dept: PAIN MEDICINE | Facility: MEDICAL CENTER | Age: 53
End: 2018-05-15

## 2018-05-15 NOTE — TELEPHONE ENCOUNTER
S/w pt, informed her that the therapy place does the authorizations, not our office  The patient is usually seen and gets an initial evaluation then submits authorization  Pt understands, she is having trouble finding a facility that accepts her insurance, but her insurance company gave her three places to try  Pt will call us with any issues we can address

## 2018-05-15 NOTE — TELEPHONE ENCOUNTER
Pt left message in voicemail at 3:30 yesterday afternoon, stating that Dr Aidan Baron referred her for aqua therapy at her last office visit on 5/11  States that this requires prior authorization before she can schedule an appt  Pt left a call back number of [phone]

## 2018-05-18 ENCOUNTER — TRANSCRIBE ORDERS (OUTPATIENT)
Dept: RADIOLOGY | Facility: MEDICAL CENTER | Age: 53
End: 2018-05-18

## 2018-05-18 ENCOUNTER — APPOINTMENT (OUTPATIENT)
Dept: LAB | Facility: MEDICAL CENTER | Age: 53
End: 2018-05-18
Payer: COMMERCIAL

## 2018-05-18 DIAGNOSIS — Z01.818 PRE-OP TESTING: ICD-10-CM

## 2018-05-18 DIAGNOSIS — Z01.818 PRE-OP TESTING: Primary | ICD-10-CM

## 2018-05-18 LAB
ANION GAP SERPL CALCULATED.3IONS-SCNC: 6 MMOL/L (ref 4–13)
BASOPHILS # BLD AUTO: 0.03 THOUSANDS/ΜL (ref 0–0.1)
BASOPHILS NFR BLD AUTO: 0 % (ref 0–1)
BUN SERPL-MCNC: 12 MG/DL (ref 5–25)
CALCIUM SERPL-MCNC: 9.4 MG/DL (ref 8.3–10.1)
CHLORIDE SERPL-SCNC: 102 MMOL/L (ref 100–108)
CO2 SERPL-SCNC: 27 MMOL/L (ref 21–32)
CREAT SERPL-MCNC: 0.64 MG/DL (ref 0.6–1.3)
EOSINOPHIL # BLD AUTO: 0.24 THOUSAND/ΜL (ref 0–0.61)
EOSINOPHIL NFR BLD AUTO: 3 % (ref 0–6)
ERYTHROCYTE [DISTWIDTH] IN BLOOD BY AUTOMATED COUNT: 12.6 % (ref 11.6–15.1)
GFR SERPL CREATININE-BSD FRML MDRD: 103 ML/MIN/1.73SQ M
GLUCOSE P FAST SERPL-MCNC: 94 MG/DL (ref 65–99)
HCT VFR BLD AUTO: 43.8 % (ref 34.8–46.1)
HGB BLD-MCNC: 15 G/DL (ref 11.5–15.4)
INR PPP: 0.9 (ref 0.86–1.17)
LYMPHOCYTES # BLD AUTO: 3.71 THOUSANDS/ΜL (ref 0.6–4.47)
LYMPHOCYTES NFR BLD AUTO: 38 % (ref 14–44)
MCH RBC QN AUTO: 34.1 PG (ref 26.8–34.3)
MCHC RBC AUTO-ENTMCNC: 34.2 G/DL (ref 31.4–37.4)
MCV RBC AUTO: 100 FL (ref 82–98)
MONOCYTES # BLD AUTO: 0.67 THOUSAND/ΜL (ref 0.17–1.22)
MONOCYTES NFR BLD AUTO: 7 % (ref 4–12)
NEUTROPHILS # BLD AUTO: 5.08 THOUSANDS/ΜL (ref 1.85–7.62)
NEUTS SEG NFR BLD AUTO: 52 % (ref 43–75)
NRBC BLD AUTO-RTO: 0 /100 WBCS
PLATELET # BLD AUTO: 245 THOUSANDS/UL (ref 149–390)
PMV BLD AUTO: 11.1 FL (ref 8.9–12.7)
POTASSIUM SERPL-SCNC: 4.2 MMOL/L (ref 3.5–5.3)
PROTHROMBIN TIME: 12.3 SECONDS (ref 11.8–14.2)
RBC # BLD AUTO: 4.4 MILLION/UL (ref 3.81–5.12)
SODIUM SERPL-SCNC: 135 MMOL/L (ref 136–145)
WBC # BLD AUTO: 9.75 THOUSAND/UL (ref 4.31–10.16)

## 2018-05-18 PROCEDURE — 80048 BASIC METABOLIC PNL TOTAL CA: CPT

## 2018-05-18 PROCEDURE — 85610 PROTHROMBIN TIME: CPT

## 2018-05-18 PROCEDURE — 36415 COLL VENOUS BLD VENIPUNCTURE: CPT

## 2018-05-18 PROCEDURE — 85025 COMPLETE CBC W/AUTO DIFF WBC: CPT

## 2018-05-22 ENCOUNTER — OFFICE VISIT (OUTPATIENT)
Dept: OBGYN CLINIC | Facility: CLINIC | Age: 53
End: 2018-05-22
Payer: COMMERCIAL

## 2018-05-22 VITALS
BODY MASS INDEX: 28.13 KG/M2 | HEART RATE: 80 BPM | WEIGHT: 149 LBS | DIASTOLIC BLOOD PRESSURE: 94 MMHG | HEIGHT: 61 IN | SYSTOLIC BLOOD PRESSURE: 130 MMHG

## 2018-05-22 DIAGNOSIS — M75.42 IMPINGEMENT SYNDROME OF LEFT SHOULDER: Primary | ICD-10-CM

## 2018-05-22 PROCEDURE — 99213 OFFICE O/P EST LOW 20 MIN: CPT | Performed by: ORTHOPAEDIC SURGERY

## 2018-05-22 NOTE — PATIENT INSTRUCTIONS
Tendinitis   WHAT YOU NEED TO KNOW:   What is tendinitis? Tendinitis is painful inflammation or breakdown of your tendons  It may also be called tendinopathy  Tendinitis often occurs in the knee, shoulder, ankle, hip, or elbow  What increases my risk for tendinitis? · Injury, overuse, or repeated movement of a joint     · Not warming up before exercise, or not resting enough between activities    · Use of shoes or exercise equipment that do not fit well    · Muscle weakness, balance problems, or poor posture  What are the signs and symptoms of tendinitis? You may have redness, pain, or swelling around your joint, tendon, or muscle  You may also have pain, stiffness, or decreased movement in your joint  How is tendinitis diagnosed? Your healthcare provider will check your range of motion of the affected joint  He may also lightly press on your tendon to check for pain  You may also need an ultrasound, x-ray, or MRI to show if you have tendinitis or another condition  How is tendinitis treated? · Pain medicines  such as NSAIDs and acetaminophen may decrease swelling and pain  These medicines are available without a doctor's order  Ask how much to take and when to take it  Follow directions  NSAIDs and acetaminophen may cause liver or kidney damage if not taken correctly  · Steroids  may be used to decrease pain and swelling  They may be given as a pill or as an injection into the affected area  Steroids are rarely used in children  · Surgery  may rarely be needed if other treatment does not work  How can I manage my symptoms? · Rest  your tendon as directed to help it heal  Ask your healthcare provider if you need to stop putting weight on your affected area  · Ice  helps decrease swelling and pain, and may help prevent tissue damage  Use an ice pack, or put crushed ice in a plastic bag   Cover it with a towel and place it on the affected area for 10 to 15 minutes every hour or as directed  · Support devices  such as a cane, splint, shoe insert, or brace may help reduce your pain  · Physical therapy  may be ordered by your healthcare provider  This may be used to teach you exercises to help improve movement and strength, and to decrease pain  You may also learn how to improve your posture, and how to lift or exercise correctly  How can I prevent tendinitis? · Warm up or stretch  before you exercise  · Exercise regularly  to strengthen the muscles around your joint  Ease into an exercise routine for the first 3 weeks to prevent another injury  Ask your healthcare provider about the best exercise plan for you  Rest fully between activities  · Use the right equipment  for sports and exercise  Wear braces or tape around weak joints as directed  When should I contact my healthcare provider? · You have increased pain even after you take medicine  · You have questions or concerns about your condition or care  When should I seek immediate help? · You have increased redness over the joint, or swelling in the joint  · You suddenly cannot move your joint  CARE AGREEMENT:   You have the right to help plan your care  Learn about your health condition and how it may be treated  Discuss treatment options with your caregivers to decide what care you want to receive  You always have the right to refuse treatment  The above information is an  only  It is not intended as medical advice for individual conditions or treatments  Talk to your doctor, nurse or pharmacist before following any medical regimen to see if it is safe and effective for you  © 2017 [employer]  Information is for End User's use only and may not be sold, redistributed or otherwise used for commercial purposes  All illustrations and images included in CareNotes® are the copyrighted property of [employer]  or [employer] Edmond[employer]

## 2018-05-22 NOTE — PROGRESS NOTES
Chief Complaint  Left shoulder pain    History Of Presenting Illness  Familia Pérez 1965 presents with left shoulder pain due to impingement possible cuff tear  Treated by steroid injection and physical therapy  Patient still complains of pain in the left shoulder  Increased by activity  Decreased with rest      Current Medications  Current Outpatient Prescriptions   Medication Sig Dispense Refill    albuterol (VENTOLIN HFA) 90 mcg/act inhaler Inhale 2 puffs every 6 (six) hours as needed      amitriptyline (ELAVIL) 25 mg tablet Take 25 mg by mouth daily at bedtime      aspirin 81 mg chewable tablet Chew 1 tablet daily      cyclobenzaprine (FLEXERIL) 10 mg tablet Take 1 tablet (10 mg total) by mouth 3 (three) times a day as needed for muscle spasms 30 tablet 0    nitroglycerin (NITROSTAT) 0 4 mg SL tablet Place 1 tablet (0 4 mg total) under the tongue every 5 (five) minutes as needed for chest pain 90 tablet 3    ondansetron (ZOFRAN-ODT) 4 mg disintegrating tablet Take 1 tablet (4 mg total) by mouth every 8 (eight) hours as needed for nausea or vomiting  10 tablet 0    oxyCODONE-acetaminophen (PERCOCET) 5-325 mg per tablet Take 1 tablet by mouth every 4 (four) hours as needed for moderate pain   ranitidine (ZANTAC) 150 mg tablet Take 1 tablet (150 mg total) by mouth 2 (two) times a day 60 tablet 5    rosuvastatin (CRESTOR) 5 mg tablet Take 1 tablet (5 mg total) by mouth every other day 15 tablet 2    DULoxetine (CYMBALTA) 20 mg capsule Take 1 capsule (20 mg total) by mouth daily for 30 days 30 capsule 0    pregabalin (LYRICA) 50 mg capsule Take 1 capsule (50 mg total) by mouth 2 (two) times a day for 30 days 90 capsule 2     No current facility-administered medications for this visit          Current Problems    Active Problems:   Patient Active Problem List    Diagnosis Date Noted    Myofascial pain syndrome 05/11/2018    Left facial numbness 04/03/2018    Positive KRISTA (antinuclear antibody) 2018    Rheumatoid factor positive 2018    [facility]per[facility] 2018    Headache disorder 2018    Lumbar spondylosis 2018    Osteoarthritis of spine with radiculopathy, cervical region 2018    Degenerative cervical disc 2018    Lumbar degenerative disc disease 2018    Bilateral carpal tunnel syndrome 2018    Acute pain of left shoulder 2018    Gastro-esophageal reflux disease without esophagitis 2016    Right lower quadrant pain 2016    Abnormal weight loss 2016    Depression with anxiety 2014    CAD in native artery 2012    Other hyperlipidemia 2012    Essential hypertension 2012         Review of Systems:    Unchanged from before    Past Medical History:   Past Medical History:   Diagnosis Date    Acquired ichthyosis     last assessed: 2013    Anxiety     Cervical radiculopathy     last assessed: 2014    Colorectal polyps     last assessed: 3/9/2015    [facility]     Depression     Diverticulosis of colon     Foot pain     GERD (gastroesophageal reflux disease)     Hyperlipemia     Hypertension     Myocardial infarction [facility] )     at age 28    Osteopenia     last assessed: 2013    Palpitations     last assessed: 2014    PVD [facility]peripheral vascular disease) ([facility] )     last assessed: 12/3/2012    Scapular dyskinesis     last assessed: 2014    Shortness of breath     Tendonitis of left rotator cuff     last assessed: 2014    Vocal cord polyps     last assessed: 2014       Past Surgical History:   Past Surgical History:   Procedure Laterality Date    ABDOMINAL SURGERY      exploratory    CARDIAC SURGERY      cardiac stent      SECTION      last assessed: 2014    CHOLECYSTECTOMY      last assessed: 2014    COLONOSCOPY  10/27/2014    CORONARY ANGIOPLASTY WITH STENT PLACEMENT  1999    LAD stent    DENTAL SURGERY      last assessed: 8/8/2014    ELBOW SURGERY Bilateral     arthroplasty    ESOPHAGOGASTRODUODENOSCOPY      ESOPHAGOGASTRODUODENOSCOPY N/A 11/4/2016    Procedure: ESOPHAGOGASTRODUODENOSCOPY (EGD); Surgeon: Eliza Wilkerson MD;  Location: [facility];  Service:     FOOT SURGERY Right 02/06/2015    x 4    HYSTERECTOMY      last assessed: 8/8/2014    THROAT SURGERY      TOOTH EXTRACTION         Family History:  Family history reviewed and non-contributory  Family History   Problem Relation Age of Onset    No Known Problems Mother     No Known Problems Father     No Known Problems Maternal Grandmother     No Known Problems Maternal Grandfather     No Known Problems Paternal Grandmother     No Known Problems Paternal Grandfather        Social History:  Social History     Social History    Marital status:      Spouse name: N/A    Number of children: N/A    Years of education: N/A     Social History Main Topics    Smoking status: Current Every Day Smoker     Packs/day: 1 50    Smokeless tobacco: Never Used    Alcohol use Yes      Comment: socially    Drug use: No    Sexual activity: Not Asked     Other Topics Concern    None     Social History Narrative    No living will       Allergies: Allergies   Allergen Reactions    Ceclor [Cefaclor] Anaphylaxis    Codeine Itching     Reaction Date: [id number];     Ticlid [Ticlopidine] Hives    Penicillins Rash           Physical ExaminationBP 130/94   Pulse 80   Ht 5' 1" (1 549 m)   Wt 67 6 kg (149 lb)   BMI 28 15 kg/m²   Gen: Alert and oriented to person, place, time  HEENT: EOMI, eyes clear, moist mucus membranes, hearing intact  Respiratory: Bilateral chest rise   No audible wheezing found  Cardiovascular: Regular Rate and Rhythm  Abdomen: soft nontender/nondistended    Orthopedic Exam  Left shoulder no obvious swelling or deformity  Cuff strength 4/5  Follow-up flexion 150  AB duction 130°          Impression  Left shoulder pain due to rotator cuff tear and impingement        Plan    Discussed treatment with the patient  Patient will return with an MRI of the left shoulder    Taurus Vilchis MD        Portions of the record may have been created with voice recognition software   Occasional wrong word or "sound a like" substitutions may have occurred due to the inherent limitations of voice recognition software   Read the chart carefully and recognize, using context, where substitutions have occurred

## 2018-05-29 ENCOUNTER — HOSPITAL ENCOUNTER (OUTPATIENT)
Dept: MRI IMAGING | Facility: HOSPITAL | Age: 53
Discharge: HOME | End: 2018-05-29
Attending: ORTHOPAEDIC SURGERY
Payer: COMMERCIAL

## 2018-05-29 DIAGNOSIS — M75.42 IMPINGEMENT SYNDROME OF LEFT SHOULDER: ICD-10-CM

## 2018-05-29 PROCEDURE — 73221 MRI JOINT UPR EXTREM W/O DYE: CPT

## 2018-05-30 ENCOUNTER — OFFICE VISIT (OUTPATIENT)
Dept: CARDIOLOGY CLINIC | Facility: HOSPITAL | Age: 53
End: 2018-05-30
Payer: COMMERCIAL

## 2018-05-30 VITALS
HEIGHT: 61 IN | SYSTOLIC BLOOD PRESSURE: 132 MMHG | HEART RATE: 61 BPM | BODY MASS INDEX: 27.94 KG/M2 | WEIGHT: 148 LBS | DIASTOLIC BLOOD PRESSURE: 84 MMHG

## 2018-05-30 DIAGNOSIS — E78.5 HYPERLIPIDEMIA, UNSPECIFIED HYPERLIPIDEMIA TYPE: ICD-10-CM

## 2018-05-30 DIAGNOSIS — I10 HYPERTENSION, UNSPECIFIED TYPE: Primary | ICD-10-CM

## 2018-05-30 DIAGNOSIS — I25.10 CAD IN NATIVE ARTERY: ICD-10-CM

## 2018-05-30 DIAGNOSIS — I10 ESSENTIAL HYPERTENSION: ICD-10-CM

## 2018-05-30 DIAGNOSIS — I73.9 PVD (PERIPHERAL VASCULAR DISEASE) (HCC): ICD-10-CM

## 2018-05-30 PROCEDURE — 93000 ELECTROCARDIOGRAM COMPLETE: CPT | Performed by: INTERNAL MEDICINE

## 2018-05-30 PROCEDURE — 99214 OFFICE O/P EST MOD 30 MIN: CPT | Performed by: INTERNAL MEDICINE

## 2018-05-30 RX ORDER — ROSUVASTATIN CALCIUM 5 MG/1
5 TABLET, COATED ORAL DAILY
Qty: 90 TABLET | Refills: 3 | Status: SHIPPED | OUTPATIENT
Start: 2018-05-30 | End: 2019-04-26

## 2018-05-30 NOTE — PROGRESS NOTES
Cardiology Follow Up    Jose De Jesus Guerrero  1965  [id number]  [facility]  [address] [place] [address] [address]    1  Hypertension, unspecified type  POCT ECG       I had the pleasure of seeing Jose De Jesus Guerrero for a follow up visit  Interval History: none    History of the presenting illness, Discussion/Summary and My Plan are as follows: She is a pleasant 80-year-old lady with a history of coronary disease, prior percutaneous coronary intervention to the left anterior descending coronary artery in 1998, hypertension as well as marked dyslipidemia with LDL has high as 170 to 220  For atypical CPs, I had her do a Bia Nuc perfusion study that did not demonstrate ischemia - in September 2014  She had also been noncompliant with medications and followups  Continues to smoke currently 1 5 packs a day      Her risk factors include hypertension, marked dyslipidemia (ran out of Crestor started at the next visit), existing coronary artery disease as well as smoking 2 packs a day     Her cardiac physical exam is within normal limits  low volume carotid impulse on the right - had recent carotid dopplers at [facility]    She might be undergoing right foot surgery - When needed - she can proceed at intermediate cardiac risk without further testing  Plan:    CAD: Currently reasonably active without symptoms    Dyslipidemia/Familial Hypercholesterolemia: Pravastatin with worsening leg pains  Crestor 5 mg q OD - Ran out of it  For a short while she was on  Repatha (PCSK9 inhibitor)  missed a few doses  Then it became expensive apparently  2016-total cholesterol 280, triglycerides 137, HDL 54, , LDL was 225-April 2016  Restart Crestor 5 mg daily  Recheck    Coronary artery disease, status post PCI to the LAD in 98, atypical chest pains- resolved: Bia Nuc in Sept 2014 without ischemia   If has recurrence of chest pains, will then check a stress test  At this time she needs to quit smoking  Start aspirin and rosuvastatin 5 mg every day to be picked up again  HTN: currently controlled,    Tobacco use: She is rolling her own cigarettes  , equivalent of at least 2 packs a day  Cessation was strongly advised again  Follow up in 6 months     Compliance with future follow-ups and medications was strongly reinforced - yet again  Her tobacco use remains in equally potent risk factor - cessation was strongly advised         Patient Active Problem List   Diagnosis    Gastro-esophageal reflux disease without esophagitis    Right lower quadrant pain    Abnormal weight loss    Acute pain of left shoulder    Lumbar spondylosis    Osteoarthritis of spine with radiculopathy, cervical region    Degenerative cervical disc    Lumbar degenerative disc disease    Bilateral carpal tunnel syndrome    CAD in native artery    Depression with anxiety    Other hyperlipidemia    Essential hypertension    [facility]    Headache disorder    Rheumatoid factor positive    Positive KRISTA (antinuclear antibody)    Left facial numbness    Myofascial pain syndrome     Past Medical History:   Diagnosis Date    Acquired ichthyosis     last assessed: 5/9/2013    Anxiety     Cervical radiculopathy     last assessed: 6/13/2014    Colorectal polyps     last assessed: 3/9/2015    COPD [facility] )     Depression     Diverticulosis of colon     Foot pain     GERD (gastroesophageal reflux disease)     Hyperlipemia     Hypertension     Myocardial infarction ([facility] )     at age 28    Osteopenia     last assessed: 12/6/2013    Palpitations     last assessed: 12/31/2014    PVD (peripheral vascular disease) ([facility] )     last assessed: 12/3/2012    Scapular dyskinesis     last assessed: 11/17/2014    Shortness of breath     Tendonitis of left rotator cuff     last assessed: 2014    Vocal cord polyps     last assessed: 2014     Social History     Social History    Marital status:      Spouse name: N/A    Number of children: N/A    Years of education: N/A     Occupational History    Not on file  Social History Main Topics    Smoking status: Current Every Day Smoker     Packs/day: 1 50    Smokeless tobacco: Never Used    Alcohol use Yes      Comment: socially    Drug use: No    Sexual activity: Not on file     Other Topics Concern    Not on file     Social History Narrative    No living will      Family History   Problem Relation Age of Onset    No Known Problems Mother     No Known Problems Father     No Known Problems Maternal Grandmother     No Known Problems Maternal Grandfather     No Known Problems Paternal Grandmother     No Known Problems Paternal Grandfather      Past Surgical History:   Procedure Laterality Date    ABDOMINAL SURGERY      exploratory    CARDIAC SURGERY      cardiac stent      SECTION      last assessed: 2014    CHOLECYSTECTOMY      last assessed: 2014    COLONOSCOPY  10/27/2014    CORONARY ANGIOPLASTY WITH STENT PLACEMENT      LAD stent    DENTAL SURGERY      last assessed: 2014    ELBOW SURGERY Bilateral     arthroplasty    ESOPHAGOGASTRODUODENOSCOPY      ESOPHAGOGASTRODUODENOSCOPY N/A 2016    Procedure: ESOPHAGOGASTRODUODENOSCOPY (EGD);   Surgeon: Sterling Larios MD;  Location: [facility];  Service:     FOOT SURGERY Right 02/06/2015    x 4    HYSTERECTOMY      last assessed: 2014    THROAT SURGERY      TOOTH EXTRACTION         Current Outpatient Prescriptions:     albuterol (VENTOLIN HFA) 90 mcg/act inhaler, Inhale 2 puffs every 6 (six) hours as needed, Disp: , Rfl:     amitriptyline (ELAVIL) 25 mg tablet, Take 25 mg by mouth daily at bedtime, Disp: , Rfl:     aspirin 81 mg chewable tablet, Chew 1 tablet daily Stop for the time being for foot surgery , Disp: , Rfl:    cyclobenzaprine (FLEXERIL) 10 mg tablet, Take 1 tablet (10 mg total) by mouth 3 (three) times a day as needed for muscle spasms, Disp: 30 tablet, Rfl: 0    nitroglycerin (NITROSTAT) 0 4 mg SL tablet, Place 1 tablet (0 4 mg total) under the tongue every 5 (five) minutes as needed for chest pain, Disp: 90 tablet, Rfl: 3    oxyCODONE-acetaminophen (PERCOCET) 5-325 mg per tablet, Take 1 tablet by mouth every 4 (four) hours as needed for moderate pain , Disp: , Rfl:     ranitidine (ZANTAC) 150 mg tablet, Take 1 tablet (150 mg total) by mouth 2 (two) times a day, Disp: 60 tablet, Rfl: 5    rosuvastatin (CRESTOR) 5 mg tablet, Take 1 tablet (5 mg total) by mouth every other day, Disp: 15 tablet, Rfl: 2    pregabalin (LYRICA) 50 mg capsule, Take 1 capsule (50 mg total) by mouth 2 (two) times a day for 30 days, Disp: 90 capsule, Rfl: 2  Allergies   Allergen Reactions    Ceclor [Cefaclor] Anaphylaxis    Codeine Itching     Reaction Date: [id number];     Ticlid [Ticlopidine] Hives    Penicillins Rash       Labs:not applicable  Imaging: Mri Shoulder Left Wo Contrast    Result Date: 5/30/2018  Narrative: MRI LEFT SHOULDER INDICATION:   M75 [id number]: Impingement syndrome of left shoulder  COMPARISON:  MRI left shoulder 11/13/2014 and left shoulder radiographs 2/21/2018 TECHNIQUE:   The following MR sequences were obtained of the left shoulder: Localizer, axial GRE/PD fat sat, oblique coronal T2 fat sat, oblique sagittal T1/T2 fat sat  Images were acquired on a 1 5 Diana unit  Gadolinium was not used  FINDINGS: SUBCUTANEOUS TISSUES: Normal JOINT EFFUSION: None  ACROMION PROCESS: Normal  ROTATOR CUFF: Minimal distal supraspinatus and infraspinatus tendinopathy  4 x 3 mm calcification in the insertional posterior infraspinatus tendon with minimal more focal T2 hyperintensity  No tear or muscle atrophy  SUBACROMIAL/SUBDELTOID BURSA: Normal  LONG HEAD OF BICEPS TENDON: Normal  GLENOID LABRUM: Intact  GLENOHUMERAL JOINT: Intact  ACROMIOCLAVICULAR JOINT:  Normal  BONES: Punctate degenerative cysts or ganglia in the posterior greater tuberosity  No fracture  Impression: Minimal distal supraspinatus and infraspinatus tendinopathy  4 x 3 mm posterior infraspinatus insertional calcific tendinitis  This is amenable to sonographic lavage if clinically warranted  Proximal long head biceps tendon is intact  Workstation performed: [id number]       Review of Systems:  Review of Systems   Constitutional: Negative  HENT: Negative  Eyes: Negative  Respiratory: Negative  Cardiovascular: Negative  Gastrointestinal: Negative  Endocrine: Negative  Genitourinary: Negative  Musculoskeletal: Positive for arthralgias and back pain  Negative for gait problem, joint swelling, myalgias, neck pain and neck stiffness  Allergic/Immunologic: Negative  Neurological: Negative  Hematological: Negative  Psychiatric/Behavioral: Negative  Physical Exam:  Physical Exam   Constitutional: She is oriented to person, place, and time  She appears well-developed and well-nourished  No distress  HENT:   Head: Normocephalic and atraumatic  Eyes: Conjunctivae and EOM are normal  Pupils are equal, round, and reactive to light  Right eye exhibits no discharge  Left eye exhibits no discharge  Neck: Normal range of motion  Neck supple  No JVD present  No tracheal deviation present  No thyromegaly present  Cardiovascular: Normal rate, normal heart sounds and intact distal pulses  Exam reveals no friction rub  No murmur heard  Pulmonary/Chest: Effort normal  No stridor  No respiratory distress  She has no wheezes  She has no rales  Abdominal: Soft  She exhibits no distension  There is no tenderness  There is no rebound  Musculoskeletal: Normal range of motion  She exhibits no edema or deformity  Neurological: She is alert and oriented to person, place, and time  No cranial nerve deficit   Coordination normal    Skin: Skin is warm and dry  No rash noted  She is not diaphoretic  No erythema  No pallor

## 2018-06-05 ENCOUNTER — TRANSCRIBE ORDERS (OUTPATIENT)
Dept: RADIOLOGY | Facility: MEDICAL CENTER | Age: 53
End: 2018-06-05

## 2018-06-05 ENCOUNTER — APPOINTMENT (OUTPATIENT)
Dept: LAB | Facility: MEDICAL CENTER | Age: 53
End: 2018-06-05
Payer: COMMERCIAL

## 2018-06-05 ENCOUNTER — TRANSCRIBE ORDERS (OUTPATIENT)
Dept: LAB | Facility: MEDICAL CENTER | Age: 53
End: 2018-06-05

## 2018-06-05 ENCOUNTER — APPOINTMENT (OUTPATIENT)
Dept: RADIOLOGY | Facility: MEDICAL CENTER | Age: 53
End: 2018-06-05
Payer: COMMERCIAL

## 2018-06-05 ENCOUNTER — OFFICE VISIT (OUTPATIENT)
Dept: OBGYN CLINIC | Facility: CLINIC | Age: 53
End: 2018-06-05
Payer: COMMERCIAL

## 2018-06-05 VITALS
DIASTOLIC BLOOD PRESSURE: 92 MMHG | HEART RATE: 82 BPM | WEIGHT: 149.9 LBS | BODY MASS INDEX: 28.3 KG/M2 | RESPIRATION RATE: 14 BRPM | HEIGHT: 61 IN | SYSTOLIC BLOOD PRESSURE: 144 MMHG

## 2018-06-05 DIAGNOSIS — M75.112 INCOMPLETE TEAR OF LEFT ROTATOR CUFF: ICD-10-CM

## 2018-06-05 DIAGNOSIS — G56.02 CARPAL TUNNEL SYNDROME ON LEFT: ICD-10-CM

## 2018-06-05 DIAGNOSIS — M75.32 CALCIFIC TENDINITIS OF LEFT SHOULDER: ICD-10-CM

## 2018-06-05 DIAGNOSIS — M75.32 CALCIFIC TENDINITIS OF LEFT SHOULDER: Primary | ICD-10-CM

## 2018-06-05 LAB
ANION GAP SERPL CALCULATED.3IONS-SCNC: 5 MMOL/L (ref 4–13)
APTT PPP: 25 SECONDS (ref 24–36)
BASOPHILS # BLD AUTO: 0.04 THOUSANDS/ΜL (ref 0–0.1)
BASOPHILS NFR BLD AUTO: 1 % (ref 0–1)
BILIRUB UR QL STRIP: NEGATIVE
BUN SERPL-MCNC: 5 MG/DL (ref 5–25)
CALCIUM SERPL-MCNC: 9.4 MG/DL (ref 8.3–10.1)
CHLORIDE SERPL-SCNC: 104 MMOL/L (ref 100–108)
CLARITY UR: CLEAR
CO2 SERPL-SCNC: 27 MMOL/L (ref 21–32)
COLOR UR: YELLOW
CREAT SERPL-MCNC: 0.62 MG/DL (ref 0.6–1.3)
EOSINOPHIL # BLD AUTO: 0.17 THOUSAND/ΜL (ref 0–0.61)
EOSINOPHIL NFR BLD AUTO: 2 % (ref 0–6)
ERYTHROCYTE [DISTWIDTH] IN BLOOD BY AUTOMATED COUNT: 12.3 % (ref 11.6–15.1)
EST. AVERAGE GLUCOSE BLD GHB EST-MCNC: 111 MG/DL
GFR SERPL CREATININE-BSD FRML MDRD: 104 ML/MIN/1.73SQ M
GLUCOSE SERPL-MCNC: 93 MG/DL (ref 65–140)
GLUCOSE UR STRIP-MCNC: NEGATIVE MG/DL
HBA1C MFR BLD: 5.5 % (ref 4.2–6.3)
HCT VFR BLD AUTO: 46.2 % (ref 34.8–46.1)
HGB BLD-MCNC: 15.1 G/DL (ref 11.5–15.4)
HGB UR QL STRIP.AUTO: NEGATIVE
IMM GRANULOCYTES # BLD AUTO: 0.02 THOUSAND/UL (ref 0–0.2)
IMM GRANULOCYTES NFR BLD AUTO: 0 % (ref 0–2)
INR PPP: 0.89 (ref 0.86–1.17)
KETONES UR STRIP-MCNC: NEGATIVE MG/DL
LEUKOCYTE ESTERASE UR QL STRIP: NEGATIVE
LYMPHOCYTES # BLD AUTO: 2.71 THOUSANDS/ΜL (ref 0.6–4.47)
LYMPHOCYTES NFR BLD AUTO: 37 % (ref 14–44)
MCH RBC QN AUTO: 33.5 PG (ref 26.8–34.3)
MCHC RBC AUTO-ENTMCNC: 32.7 G/DL (ref 31.4–37.4)
MCV RBC AUTO: 102 FL (ref 82–98)
MONOCYTES # BLD AUTO: 0.36 THOUSAND/ΜL (ref 0.17–1.22)
MONOCYTES NFR BLD AUTO: 5 % (ref 4–12)
NEUTROPHILS # BLD AUTO: 3.97 THOUSANDS/ΜL (ref 1.85–7.62)
NEUTS SEG NFR BLD AUTO: 55 % (ref 43–75)
NITRITE UR QL STRIP: NEGATIVE
NRBC BLD AUTO-RTO: 0 /100 WBCS
PH UR STRIP.AUTO: 7 [PH] (ref 4.5–8)
PLATELET # BLD AUTO: 228 THOUSANDS/UL (ref 149–390)
PMV BLD AUTO: 10.6 FL (ref 8.9–12.7)
POTASSIUM SERPL-SCNC: 4.2 MMOL/L (ref 3.5–5.3)
PROT UR STRIP-MCNC: NEGATIVE MG/DL
PROTHROMBIN TIME: 12.2 SECONDS (ref 11.8–14.2)
RBC # BLD AUTO: 4.51 MILLION/UL (ref 3.81–5.12)
SODIUM SERPL-SCNC: 136 MMOL/L (ref 136–145)
SP GR UR STRIP.AUTO: 1.01 (ref 1–1.03)
UROBILINOGEN UR QL STRIP.AUTO: 0.2 E.U./DL
WBC # BLD AUTO: 7.27 THOUSAND/UL (ref 4.31–10.16)

## 2018-06-05 PROCEDURE — 99213 OFFICE O/P EST LOW 20 MIN: CPT | Performed by: ORTHOPAEDIC SURGERY

## 2018-06-05 PROCEDURE — 83036 HEMOGLOBIN GLYCOSYLATED A1C: CPT

## 2018-06-05 PROCEDURE — 80048 BASIC METABOLIC PNL TOTAL CA: CPT

## 2018-06-05 PROCEDURE — 36415 COLL VENOUS BLD VENIPUNCTURE: CPT

## 2018-06-05 PROCEDURE — 85730 THROMBOPLASTIN TIME PARTIAL: CPT

## 2018-06-05 PROCEDURE — 85025 COMPLETE CBC W/AUTO DIFF WBC: CPT

## 2018-06-05 PROCEDURE — 81003 URINALYSIS AUTO W/O SCOPE: CPT | Performed by: ORTHOPAEDIC SURGERY

## 2018-06-05 PROCEDURE — 85610 PROTHROMBIN TIME: CPT

## 2018-06-05 PROCEDURE — 71046 X-RAY EXAM CHEST 2 VIEWS: CPT

## 2018-06-05 RX ORDER — CLINDAMYCIN PHOSPHATE 900 MG/50ML
900 INJECTION INTRAVENOUS ONCE
Status: CANCELLED | OUTPATIENT
Start: 2018-06-25 | End: 2018-06-25

## 2018-06-05 RX ORDER — CHLORHEXIDINE GLUCONATE 4 G/100ML
SOLUTION TOPICAL DAILY PRN
Status: CANCELLED | OUTPATIENT
Start: 2018-06-05

## 2018-06-05 NOTE — PROGRESS NOTES
Pt consistently attended OP OT services for 3 sessions; pt is discharged from OP OT services at this time as pt abruptly stopped attending sessions and did not schedule future appointments

## 2018-06-05 NOTE — H&P
Chief Complaint  Left shoulder pain    History Of Presenting Illness  Jose De Jesus Guerrero 1965 presents with left shoulder pain for the last 4-5 months  Not improved with nonoperative measures including physical therapy and injection therapy  Patient presents for evaluation with an MR I  Patient also complains of pins and needles numbness affecting the left hand  The symptoms have gradually been getting worse as well  Patient had cardiac stents at age 28 and is in the care of [facility]  Patient continues to smoke regularly      Current Medications  Current Outpatient Prescriptions   Medication Sig Dispense Refill    albuterol (VENTOLIN HFA) 90 mcg/act inhaler Inhale 2 puffs every 6 (six) hours as needed      amitriptyline (ELAVIL) 25 mg tablet Take 25 mg by mouth daily at bedtime      cyclobenzaprine (FLEXERIL) 10 mg tablet Take 1 tablet (10 mg total) by mouth 3 (three) times a day as needed for muscle spasms 30 tablet 0    nitroglycerin (NITROSTAT) 0 4 mg SL tablet Place 1 tablet (0 4 mg total) under the tongue every 5 (five) minutes as needed for chest pain 90 tablet 3    oxyCODONE-acetaminophen (PERCOCET) 5-325 mg per tablet Take 1 tablet by mouth every 4 (four) hours as needed for moderate pain   ranitidine (ZANTAC) 150 mg tablet Take 1 tablet (150 mg total) by mouth 2 (two) times a day 60 tablet 5    rosuvastatin (CRESTOR) 5 mg tablet Take 1 tablet (5 mg total) by mouth daily 90 tablet 3    aspirin 81 mg chewable tablet Chew 1 tablet daily Stop for the time being for foot surgery       pregabalin (LYRICA) 50 mg capsule Take 1 capsule (50 mg total) by mouth 2 (two) times a day for 30 days 90 capsule 2     No current facility-administered medications for this visit          Current Problems    Active Problems:   Patient Active Problem List    Diagnosis Date Noted    Myofascial pain syndrome 05/11/2018    Left facial numbness 04/03/2018    Positive KRISTA (antinuclear antibody) 03/26/2018    Rheumatoid factor positive 03/23/2018    PVD [facility]peripheral vascular disease) ([facility] [address] [facility] 03/16/2018    Headache disorder 03/16/2018    Lumbar spondylosis 03/09/2018    Osteoarthritis of spine with radiculopathy, cervical region 03/09/2018    Degenerative cervical disc 03/09/2018    Lumbar degenerative disc disease 03/09/2018    Bilateral carpal tunnel syndrome 03/09/2018    Acute pain of left shoulder 02/21/2018    Gastro-esophageal reflux disease without esophagitis 11/04/2016    Right lower quadrant pain 11/04/2016    Abnormal weight loss 11/04/2016    Depression with anxiety 11/11/2014    CAD in native artery 07/06/2012    Other hyperlipidemia 07/06/2012    Essential hypertension 07/06/2012         Review of Systems:    General: negative for - chills, fatigue, fever,  weight gain or weight loss  Psychological: negative for - anxiety, behavioral disorder, concentration difficulties  Rest of his review of systems noncontributory    Past Medical History:   Past Medical History:   Diagnosis Date    Acquired ichthyosis     last assessed: 5/9/2013    Anxiety     Cervical radiculopathy     last assessed: 6/13/2014    Colorectal polyps     last assessed: 3/9/2015    COPD (chronic obstructive pulmonary disease) ([facility] [address] [facility]     Depression     Diverticulosis of colon     Foot pain     GERD (gastroesophageal reflux disease)     Hyperlipemia     Hypertension     Myocardial infarction ([facility] [address] )     at age 28    Osteopenia     last assessed: 12/6/2013    Palpitations     last assessed: 12/31/2014    PVD (peripheral vascular disease) ([facility] [address] )     last assessed: 12/3/2012    Scapular dyskinesis     last assessed: 11/17/2014    Shortness of breath     Tendonitis of left rotator cuff     last assessed: 11/17/2014    Vocal cord polyps     last assessed: 8/8/2014       Past Surgical History:   Past Surgical History:   Procedure Laterality Date    ABDOMINAL SURGERY      exploratory   [facility] CARDIAC SURGERY cardiac stent      SECTION      last assessed: 2014    CHOLECYSTECTOMY      last assessed: 2014    COLONOSCOPY  10/27/2014    CORONARY ANGIOPLASTY WITH STENT PLACEMENT      LAD stent    DENTAL SURGERY      last assessed: 2014    ELBOW SURGERY Bilateral     arthroplasty    ESOPHAGOGASTRODUODENOSCOPY      ESOPHAGOGASTRODUODENOSCOPY N/A 2016    Procedure: ESOPHAGOGASTRODUODENOSCOPY (EGD); Surgeon: Janett Keita MD;  Location: [facility];  Service:     FOOT SURGERY Right 02/06/2015    x 4    HYSTERECTOMY      last assessed: 2014    THROAT SURGERY      TOOTH EXTRACTION         Family History:  Family history reviewed and non-contributory  Family History   Problem Relation Age of Onset    No Known Problems Mother     No Known Problems Father     No Known Problems Maternal Grandmother     No Known Problems Maternal Grandfather     No Known Problems Paternal Grandmother     No Known Problems Paternal Grandfather        Social History:  Social History     Social History    Marital status:      Spouse name: N/A    Number of children: N/A    Years of education: N/A     Social History Main Topics    Smoking status: Current Every Day Smoker     Packs/day: 1 50    Smokeless tobacco: Never Used    Alcohol use Yes      Comment: socially    Drug use: No    Sexual activity: Not Asked     Other Topics Concern    None     Social History Narrative    No living will       Allergies:    Allergies   Allergen Reactions    Ceclor [Cefaclor] Anaphylaxis    Codeine Itching     Reaction Date: 2011;     Ticlid [Ticlopidine] Hives    Penicillins Rash           Physical ExaminationBP 144/92 (BP Location: Left arm, Patient Position: Sitting, Cuff Size: Large)   Pulse 82   Resp 14   Ht 5' 1" (1 549 m)   Wt 68 kg (149 lb 14 4 oz)   BMI 28 32 kg/m²    Gen: Alert and oriented to person, place, time  HEENT: EOMI, eyes clear, moist mucus membranes, hearing intact  Respiratory: Bilateral chest rise  No audible wheezing found  Cardiovascular: Regular Rate and Rhythm  Abdomen: soft nontender/nondistended    Orthopedic Exam  Left shoulder no obvious swelling or deformity  Signs of impingement positive  Mild tenderness of the greater tuberosity  Cuff strength is 4/5  MRI shows calcific deposit left shoulder    Left hand slightly diminished sensation in the median nerve distribution  Tinel sign and Phalen sign positive in the wrist  No motor deficits          Impression  Left shoulder rotator cuff calcific tendinitis with possible tear  Left carpal tunnel syndrome        No results found  Plan    Discussed treatment with the patient  Counseled on smoking cessation  Patient would like to consider surgical intervention for the left shoulder  Will arrange for patient to get EMG studies of the left upper extremity  Surgery left shoulder will be scheduled once she is cleared by Cardiology and medical team    Discussed treatment options with the patient  Options are to do nothing, continue nonoperative measures, last resort  is surgical intervention  This patient has failed nonoperative measures and has  opted for surgical intervention  Risk and  benefits of treatment options discussed in detail  Risks of surgery include risk of infection, bleeding, injury to the vessels and risk of failure of the procedure, potential risk of loss of life and limb  After weighing up all treatment options available  Patient has  opted for surgical intervention and informed consent obtained          Cody Solano MD        Portions of the record may have been created with voice recognition software   Occasional wrong word or "sound a like" substitutions may have occurred due to the inherent limitations of voice recognition software   Read the chart carefully and recognize, using context, where substitutions have occurred

## 2018-06-05 NOTE — PROGRESS NOTES
Chief Complaint  Left shoulder pain    History Of Presenting Illness  Gerard Best 1965 presents with left shoulder pain for the last 4-5 months  Not improved with nonoperative measures including physical therapy and injection therapy  Patient presents for evaluation with an MR I  Patient also complains of pins and needles numbness affecting the left hand  The symptoms have gradually been getting worse as well  Patient had cardiac stents at age 28 and is in the care of [facility]  Patient continues to smoke regularly      Current Medications  Current Outpatient Prescriptions   Medication Sig Dispense Refill    albuterol (VENTOLIN HFA) 90 mcg/act inhaler Inhale 2 puffs every 6 (six) hours as needed      amitriptyline (ELAVIL) 25 mg tablet Take 25 mg by mouth daily at bedtime      cyclobenzaprine (FLEXERIL) 10 mg tablet Take 1 tablet (10 mg total) by mouth 3 (three) times a day as needed for muscle spasms 30 tablet 0    nitroglycerin (NITROSTAT) 0 4 mg SL tablet Place 1 tablet (0 4 mg total) under the tongue every 5 (five) minutes as needed for chest pain 90 tablet 3    oxyCODONE-acetaminophen (PERCOCET) 5-325 mg per tablet Take 1 tablet by mouth every 4 (four) hours as needed for moderate pain   ranitidine (ZANTAC) 150 mg tablet Take 1 tablet (150 mg total) by mouth 2 (two) times a day 60 tablet 5    rosuvastatin (CRESTOR) 5 mg tablet Take 1 tablet (5 mg total) by mouth daily 90 tablet 3    aspirin 81 mg chewable tablet Chew 1 tablet daily Stop for the time being for foot surgery       pregabalin (LYRICA) 50 mg capsule Take 1 capsule (50 mg total) by mouth 2 (two) times a day for 30 days 90 capsule 2     No current facility-administered medications for this visit          Current Problems    Active Problems:   Patient Active Problem List    Diagnosis Date Noted    Myofascial pain syndrome 05/11/2018    Left facial numbness 04/03/2018    Positive KRISTA (antinuclear antibody) 03/26/2018    Rheumatoid factor positive 03/23/2018    PVD [facility]peripheral vascular disease) ([facility] [address] ) 03/16/2018    Headache disorder 03/16/2018    Lumbar spondylosis 03/09/2018    Osteoarthritis of spine with radiculopathy, cervical region 03/09/2018    Degenerative cervical disc 03/09/2018    Lumbar degenerative disc disease 03/09/2018    Bilateral carpal tunnel syndrome 03/09/2018    Acute pain of left shoulder 02/21/2018    Gastro-esophageal reflux disease without esophagitis 11/04/2016    Right lower quadrant pain 11/04/2016    Abnormal weight loss 11/04/2016    Depression with anxiety 11/11/2014    CAD in native artery 07/06/2012    Other hyperlipidemia 07/06/2012    Essential hypertension 07/06/2012         Review of Systems:    General: negative for - chills, fatigue, fever,  weight gain or weight loss  Psychological: negative for - anxiety, behavioral disorder, concentration difficulties  Rest of his review of systems noncontributory    Past Medical History:   Past Medical History:   Diagnosis Date    Acquired ichthyosis     last assessed: 5/9/2013    Anxiety     Cervical radiculopathy     last assessed: 6/13/2014    Colorectal polyps     last assessed: 3/9/2015    COPD (chronic obstructive pulmonary disease) ([facility] [address] [facility]     Depression     Diverticulosis of colon     Foot pain     GERD (gastroesophageal reflux disease)     Hyperlipemia     Hypertension     Myocardial infarction ([facility] [address] )     at age 28    Osteopenia     last assessed: 12/6/2013    Palpitations     last assessed: 12/31/2014    PVD (peripheral vascular disease) ([facility] [address] )     last assessed: 12/3/2012    Scapular dyskinesis     last assessed: 11/17/2014    Shortness of breath     Tendonitis of left rotator cuff     last assessed: 11/17/2014    Vocal cord polyps     last assessed: 8/8/2014       Past Surgical History:   Past Surgical History:   Procedure Laterality Date    ABDOMINAL SURGERY      exploratory   [facility] CARDIAC SURGERY cardiac stent      SECTION      last assessed: 2014    CHOLECYSTECTOMY      last assessed: 2014    COLONOSCOPY  10/27/2014    CORONARY ANGIOPLASTY WITH STENT PLACEMENT      LAD stent    DENTAL SURGERY      last assessed: 2014    ELBOW SURGERY Bilateral     arthroplasty    ESOPHAGOGASTRODUODENOSCOPY      ESOPHAGOGASTRODUODENOSCOPY N/A 2016    Procedure: ESOPHAGOGASTRODUODENOSCOPY (EGD); Surgeon: Eliza Wilkerson MD;  Location: [facility];  Service:     FOOT SURGERY Right 02/06/2015    x 4    HYSTERECTOMY      last assessed: 2014    THROAT SURGERY      TOOTH EXTRACTION         Family History:  Family history reviewed and non-contributory  Family History   Problem Relation Age of Onset    No Known Problems Mother     No Known Problems Father     No Known Problems Maternal Grandmother     No Known Problems Maternal Grandfather     No Known Problems Paternal Grandmother     No Known Problems Paternal Grandfather        Social History:  Social History     Social History    Marital status:      Spouse name: N/A    Number of children: N/A    Years of education: N/A     Social History Main Topics    Smoking status: Current Every Day Smoker     Packs/day: 1 50    Smokeless tobacco: Never Used    Alcohol use Yes      Comment: socially    Drug use: No    Sexual activity: Not Asked     Other Topics Concern    None     Social History Narrative    No living will       Allergies:    Allergies   Allergen Reactions    Ceclor [Cefaclor] Anaphylaxis    Codeine Itching     Reaction Date: 2011;     Ticlid [Ticlopidine] Hives    Penicillins Rash           Physical ExaminationBP 144/92 (BP Location: Left arm, Patient Position: Sitting, Cuff Size: Large)   Pulse 82   Resp 14   Ht 5' 1" (1 549 m)   Wt 68 kg (149 lb 14 4 oz)   BMI 28 32 kg/m²   Gen: Alert and oriented to person, place, time  HEENT: EOMI, eyes clear, moist mucus membranes, hearing intact  Respiratory: Bilateral chest rise  No audible wheezing found  Cardiovascular: Regular Rate and Rhythm  Abdomen: soft nontender/nondistended    Orthopedic Exam  Left shoulder no obvious swelling or deformity  Signs of impingement positive  Mild tenderness of the greater tuberosity  Cuff strength is 4/5  MRI shows calcific deposit left shoulder    Left hand slightly diminished sensation in the median nerve distribution  Tinel sign and Phalen sign positive in the wrist  No motor deficits          Impression  Left shoulder rotator cuff calcific tendinitis with possible tear  Left carpal tunnel syndrome        No results found  Plan    Discussed treatment with the patient  Counseled on smoking cessation  Patient would like to consider surgical intervention for the left shoulder  Will arrange for patient to get EMG studies of the left upper extremity  Surgery left shoulder will be scheduled once she is cleared by Cardiology and medical team    Discussed treatment options with the patient  Options are to do nothing, continue nonoperative measures, last resort  is surgical intervention  This patient has failed nonoperative measures and has  opted for surgical intervention  Risk and  benefits of treatment options discussed in detail  Risks of surgery include risk of infection, bleeding, injury to the vessels and risk of failure of the procedure, potential risk of loss of life and limb  After weighing up all treatment options available  Patient has  opted for surgical intervention and informed consent obtained          Janet Barrientos MD        Portions of the record may have been created with voice recognition software   Occasional wrong word or "sound a like" substitutions may have occurred due to the inherent limitations of voice recognition software   Read the chart carefully and recognize, using context, where substitutions have occurred

## 2018-06-07 ENCOUNTER — TRANSCRIBE ORDERS (OUTPATIENT)
Dept: LAB | Facility: MEDICAL CENTER | Age: 53
End: 2018-06-07

## 2018-06-11 ENCOUNTER — CONSULT (OUTPATIENT)
Dept: FAMILY MEDICINE CLINIC | Facility: CLINIC | Age: 53
End: 2018-06-11
Payer: COMMERCIAL

## 2018-06-11 VITALS
BODY MASS INDEX: 28.55 KG/M2 | DIASTOLIC BLOOD PRESSURE: 86 MMHG | WEIGHT: 151.2 LBS | SYSTOLIC BLOOD PRESSURE: 130 MMHG | HEIGHT: 61 IN

## 2018-06-11 DIAGNOSIS — D36.13 NEUROMA OF FOOT: ICD-10-CM

## 2018-06-11 DIAGNOSIS — I25.10 CAD IN NATIVE ARTERY: ICD-10-CM

## 2018-06-11 DIAGNOSIS — M75.112 INCOMPLETE TEAR OF LEFT ROTATOR CUFF: ICD-10-CM

## 2018-06-11 DIAGNOSIS — Z01.818 PREOP EXAMINATION: Primary | ICD-10-CM

## 2018-06-11 PROCEDURE — 99242 OFF/OP CONSLTJ NEW/EST SF 20: CPT | Performed by: FAMILY MEDICINE

## 2018-06-11 NOTE — PROGRESS NOTES
Subjective:     Claudia Martínez is a 46 y o  female who presents to the office today for a preoperative consultation at the request of surgeon Dr Lexis Lal and Dr Mahsa Fountain who plan on performing R foot surgery and L shoulder surgery on Catie 15 for foot surgery and June 25th for L shoulder surgery    This consultation is requested for the specific conditions prompting preoperative evaluation (i e  because of potential affect on operative risk): CAD  Planned anesthesia: general  The patient has the following known anesthesia issues: None  Patients bleeding risk: no recent abnormal bleeding  The following portions of the patient's history were reviewed and updated as appropriate:   She  has a past medical history of Acquired ichthyosis; Anxiety; Cervical radiculopathy; Colorectal polyps; COPD (chronic obstructive pulmonary disease) ([address] ); Depression; Diverticulosis of colon; Foot pain; GERD (gastroesophageal reflux disease); Hyperlipemia; Hypertension; Myocardial infarction ([address] ); Osteopenia; Palpitations; PVD (peripheral vascular disease) ([facility] [address] ); Scapular dyskinesis; Shortness of breath; Tendonitis of left rotator cuff; and Vocal cord polyps    She   Patient Active Problem List    Diagnosis Date Noted    Calcific tendinitis of left shoulder 06/05/2018    Incomplete tear of left rotator cuff 06/05/2018    Myofascial pain syndrome 05/11/2018    Left facial numbness 04/03/2018    Positive KRISTA (antinuclear antibody) 03/26/2018    Rheumatoid factor positive 03/23/2018    PVD (peripheral vascular disease) ([facility] [address] ) 03/16/2018    Headache disorder 03/16/2018    Lumbar spondylosis 03/09/2018    Osteoarthritis of spine with radiculopathy, cervical region 03/09/2018    Degenerative cervical disc 03/09/2018    Lumbar degenerative disc disease 03/09/2018    Bilateral carpal tunnel syndrome 03/09/2018    Acute pain of left shoulder 02/21/2018    Gastro-esophageal reflux disease without esophagitis 11/04/2016    Right lower quadrant pain 2016    Abnormal weight loss 2016    Depression with anxiety 2014    CAD in native artery 2012    Other hyperlipidemia 2012    Essential hypertension 2012     She  has a past surgical history that includes Foot surgery (Right, 2015); Hysterectomy; Cholecystectomy;  section; Cardiac surgery; Throat surgery; Elbow surgery (Bilateral); Abdominal surgery; Esophagogastroduodenoscopy; Colonoscopy (10/27/2014); Tooth extraction; Esophagogastroduodenoscopy (N/A, 2016); Dental surgery; and Coronary angioplasty with stent ()  Her family history includes No Known Problems in her father, maternal grandfather, maternal grandmother, mother, paternal grandfather, and paternal grandmother  She  reports that she has been smoking  She has been smoking about 1 50 packs per day  She has never used smokeless tobacco  She reports that she drinks alcohol  She reports that she does not use drugs  Current Outpatient Prescriptions   Medication Sig Dispense Refill    albuterol (VENTOLIN HFA) 90 mcg/act inhaler Inhale 2 puffs every 6 (six) hours as needed      amitriptyline (ELAVIL) 25 mg tablet Take 25 mg by mouth daily at bedtime      cyclobenzaprine (FLEXERIL) 10 mg tablet Take 1 tablet (10 mg total) by mouth 3 (three) times a day as needed for muscle spasms 30 tablet 0    nitroglycerin (NITROSTAT) 0 4 mg SL tablet Place 1 tablet (0 4 mg total) under the tongue every 5 (five) minutes as needed for chest pain 90 tablet 3    oxyCODONE-acetaminophen (PERCOCET) 5-325 mg per tablet Take 1 tablet by mouth every 4 (four) hours as needed for moderate pain        ranitidine (ZANTAC) 150 mg tablet Take 1 tablet (150 mg total) by mouth 2 (two) times a day 60 tablet 5    aspirin 81 mg chewable tablet Chew 1 tablet daily Stop for the time being for foot surgery       pregabalin (LYRICA) 50 mg capsule Take 1 capsule (50 mg total) by mouth 2 (two) times a day for 30 days 90 capsule 2    rosuvastatin (CRESTOR) 5 mg tablet Take 1 tablet (5 mg total) by mouth daily 90 tablet 3     No current facility-administered medications for this visit  Current Outpatient Prescriptions on File Prior to Visit   Medication Sig    albuterol (VENTOLIN HFA) 90 mcg/act inhaler Inhale 2 puffs every 6 (six) hours as needed    amitriptyline (ELAVIL) 25 mg tablet Take 25 mg by mouth daily at bedtime    cyclobenzaprine (FLEXERIL) 10 mg tablet Take 1 tablet (10 mg total) by mouth 3 (three) times a day as needed for muscle spasms    nitroglycerin (NITROSTAT) 0 4 mg SL tablet Place 1 tablet (0 4 mg total) under the tongue every 5 (five) minutes as needed for chest pain    oxyCODONE-acetaminophen (PERCOCET) 5-325 mg per tablet Take 1 tablet by mouth every 4 (four) hours as needed for moderate pain   ranitidine (ZANTAC) 150 mg tablet Take 1 tablet (150 mg total) by mouth 2 (two) times a day    aspirin 81 mg chewable tablet Chew 1 tablet daily Stop for the time being for foot surgery     pregabalin (LYRICA) 50 mg capsule Take 1 capsule (50 mg total) by mouth 2 (two) times a day for 30 days    rosuvastatin (CRESTOR) 5 mg tablet Take 1 tablet (5 mg total) by mouth daily     No current facility-administered medications on file prior to visit  She is allergic to ceclor [cefaclor]; codeine; ticlid [ticlopidine]; and penicillins       Review of Systems  Pertinent items are noted in HPI  Objective:  Physical Exam   Constitutional: She is oriented to person, place, and time  She appears well-developed and well-nourished  No distress  Cardiovascular: Normal rate, regular rhythm and normal heart sounds  Exam reveals no gallop and no friction rub  No murmur heard  Pulmonary/Chest: Effort normal and breath sounds normal  No respiratory distress  She has no wheezes  She has no rales  She exhibits no tenderness  Musculoskeletal: She exhibits tenderness (R foot and L shoulder)   She exhibits no edema  Neurological: She is alert and oriented to person, place, and time  Skin: She is not diaphoretic  Psychiatric: She has a normal mood and affect  Her behavior is normal  Judgment and thought content normal    Vitals reviewed        Cardiographics  ECG: normal sinus rhythm, no blocks or conduction defects, no ischemic changes  Echocardiogram: not done    Imaging  Chest x-ray: normal     Lab Review   Appointment on 06/05/2018   Component Date Value    Hemoglobin A1C 06/05/2018 5 5     EAG 06/05/2018 111     Protime 06/05/2018 12 2     INR 06/05/2018 0 89     PTT 06/05/2018 25     WBC 06/05/2018 7 27     RBC 06/05/2018 4 51     Hemoglobin 06/05/2018 15 1     Hematocrit 06/05/2018 46 2*    MCV 06/05/2018 102*    MCH 06/05/2018 33 5     MCHC 06/05/2018 32 7     RDW 06/05/2018 12 3     MPV 06/05/2018 10 6     Platelets 77/38/3559 228     nRBC 06/05/2018 0     Neutrophils Relative 06/05/2018 55     Immat GRANS % 06/05/2018 0     Lymphocytes Relative 06/05/2018 37     Monocytes Relative 06/05/2018 5     Eosinophils Relative 06/05/2018 2     Basophils Relative 06/05/2018 1     Neutrophils Absolute 06/05/2018 3 97     Immature Grans Absolute 06/05/2018 0 02     Lymphocytes Absolute 06/05/2018 2 71     Monocytes Absolute 06/05/2018 0 36     Eosinophils Absolute 06/05/2018 0 17     Basophils Absolute 06/05/2018 0 04     Sodium 06/05/2018 136     Potassium 06/05/2018 4 2     Chloride 06/05/2018 104     CO2 06/05/2018 27     Anion Gap 06/05/2018 5     BUN 06/05/2018 5     Creatinine 06/05/2018 0 62     Glucose 06/05/2018 93     Calcium 06/05/2018 9 4     eGFR 06/05/2018 104    Office Visit on 06/05/2018   Component Date Value    Color, UA 06/05/2018 Yellow     Clarity, UA 06/05/2018 Clear     Specific Gravity, UA 06/05/2018 1 008     pH, UA 06/05/2018 7 0     Leukocytes, UA 06/05/2018 Negative     Nitrite, UA 06/05/2018 Negative     Protein, UA 06/05/2018 Negative     Glucose, UA 06/05/2018 Negative     Ketones, UA 06/05/2018 Negative     Urobilinogen, UA 06/05/2018 0 2     Bilirubin, UA 06/05/2018 Negative     Blood, UA 06/05/2018 Negative    Appointment on 05/18/2018   Component Date Value    WBC 05/18/2018 9 75     RBC 05/18/2018 4 40     Hemoglobin 05/18/2018 15 0     Hematocrit 05/18/2018 43 8     MCV 05/18/2018 100*    MCH 05/18/2018 34 1     MCHC 05/18/2018 34 2     RDW 05/18/2018 12 6     MPV 05/18/2018 11 1     Platelets 87/12/1236 245     nRBC 05/18/2018 0     Neutrophils Relative 05/18/2018 52     Lymphocytes Relative 05/18/2018 38     Monocytes Relative 05/18/2018 7     Eosinophils Relative 05/18/2018 3     Basophils Relative 05/18/2018 0     Neutrophils Absolute 05/18/2018 5 08     Lymphocytes Absolute 05/18/2018 3 71     Monocytes Absolute 05/18/2018 0 67     Eosinophils Absolute 05/18/2018 0 24     Basophils Absolute 05/18/2018 0 03     Sodium 05/18/2018 135*    Potassium 05/18/2018 4 2     Chloride 05/18/2018 102     CO2 05/18/2018 27     Anion Gap 05/18/2018 6     BUN 05/18/2018 12     Creatinine 05/18/2018 0 64     Glucose, Fasting 05/18/2018 94     Calcium 05/18/2018 9 4     eGFR 05/18/2018 103     Protime 05/18/2018 12 3     INR 05/18/2018 0 90         Assessment:     46 y o  female with planned surgery as above  Known risk factors for perioperative complications: None    Difficulty with intubation is not anticipated  Current medications which may produce withdrawal symptoms if withheld perioperatively: Percocet      Plan:  Medically cleared for R foot and L shoulder surgery  F/U as scheduled/PRN  1  Preoperative workup as follows chest x-ray, ECG, hemoglobin, hematocrit, electrolytes, creatinine, glucose  2  Change in medication regimen before surgery: none, continue medication regimen including morning of surgery, with sip of water    3  Prophylaxis for cardiac events with perioperative beta-blockers: should be considered, specific regimen per anesthesia    4  Invasive hemodynamic monitoring perioperatively: at the discretion of anesthesiologist   5  Deep vein thrombosis prophylaxis postoperatively:regimen to be chosen by surgical team   6  Surveillance for postoperative MI with ECG immediately postoperatively and on postoperative days 1 and 2 AND troponin levels 24 hours postoperatively and on day 4 or hospital discharge (whichever comes first): at the discretion of anesthesiologist   7  Other measures: N/A

## 2018-06-14 ENCOUNTER — ANESTHESIA EVENT (OUTPATIENT)
Dept: PERIOP | Facility: HOSPITAL | Age: 53
End: 2018-06-14
Payer: COMMERCIAL

## 2018-06-14 NOTE — ANESTHESIA PREPROCEDURE EVALUATION
Review of Systems/Medical History  Patient summary reviewed  Chart reviewed      Cardiovascular  EKG reviewed, Hyperlipidemia, Hypertension , Past MI , CAD , Cardiac stents > 1 year   Comment: NSR, VR 61  Jason scan 9/2014: negative for ischemia,  Pulmonary  Smoker (1 5 PPD) cigarette smoker  , COPD mild- PRN medicaiton , Shortness of breath,   Comment: Hx of Vocal cord polyps     GI/Hepatic    GERD ,   Comment: diverticulosis          Endo/Other  Negative endo/other ROS      GYN    Hysterectomy,        Hematology  Negative hematology ROS      Musculoskeletal    Arthritis     Neurology    Headaches,   Comment: Cervical radiculopathy Psychology   Anxiety, Depression ,              Physical Exam    Airway    Mallampati score: II  TM Distance: >3 FB  Neck ROM: full     Dental   upper dentures and lower dentures,     Cardiovascular  Rhythm: regular, Rate: normal,     Pulmonary  Breath sounds clear to auscultation,     Other Findings    Results for Lavern Nelson (MRN [id number]) as of 6/14/2018 16:07   Ref   Range 6/5/2018 14:41   Sodium Latest Ref Range: 136 - 145 mmol/L 136   Potassium Latest Ref Range: 3 5 - 5 3 mmol/L 4 2   Chloride Latest Ref Range: 100 - 108 mmol/L 104   CO2 Latest Ref Range: 21 - 32 mmol/L 27   Anion Gap Latest Ref Range: 4 - 13 mmol/L 5   BUN Latest Ref Range: 5 - 25 mg/dL 5   Creatinine Latest Ref Range: 0 60 - 1 30 mg/dL 0 62   Glucose Latest Ref Range: 65 - 140 mg/dL 93   Calcium Latest Ref Range: 8 3 - 10 1 mg/dL 9 4   eGFR Latest Units: ml/min/1 73sq m 104   WBC Latest Ref Range: 4 31 - 10 16 Thousand/uL 7 27   RBC Latest Ref Range: 3 81 - 5 12 Million/uL 4 51   Hemoglobin Latest Ref Range: 11 5 - 15 4 g/dL 15 1   Hematocrit Latest Ref Range: 34 8 - 46 1 % 46 2 (H)   MCV Latest Ref Range: 82 - 98 fL 102 (H)   MCH Latest Ref Range: 26 8 - 34 3 pg 33 5   MCHC Latest Ref Range: 31 4 - 37 4 g/dL 32 7   RDW Latest Ref Range: 11 6 - 15 1 % 12 3   Platelets Latest Ref Range: 149 - 390 Thousands/uL 228       Anesthesia Plan  ASA Score- 3     Anesthesia Type- IV sedation with anesthesia with ASA Monitors  Additional Monitors:   Airway Plan:         Plan Factors- Patient instructed to abstain from smoking on day of procedure  Patient smoked on day of surgery  Induction- intravenous  Postoperative Plan- Plan for postoperative opioid use  Informed Consent- Anesthetic plan and risks discussed with patient

## 2018-06-15 ENCOUNTER — ANESTHESIA (OUTPATIENT)
Dept: PERIOP | Facility: HOSPITAL | Age: 53
End: 2018-06-15
Payer: COMMERCIAL

## 2018-06-15 ENCOUNTER — HOSPITAL ENCOUNTER (OUTPATIENT)
Facility: HOSPITAL | Age: 53
Setting detail: OUTPATIENT SURGERY
Discharge: HOME | End: 2018-06-15
Attending: PODIATRIST | Admitting: PODIATRIST
Payer: COMMERCIAL

## 2018-06-15 VITALS
TEMPERATURE: 97 F | RESPIRATION RATE: 18 BRPM | DIASTOLIC BLOOD PRESSURE: 68 MMHG | OXYGEN SATURATION: 96 % | HEART RATE: 66 BPM | SYSTOLIC BLOOD PRESSURE: 115 MMHG

## 2018-06-15 DIAGNOSIS — G57.81 OTHER SPECIFIED MONONEUROPATHIES OF RIGHT LOWER LIMB: ICD-10-CM

## 2018-06-15 DIAGNOSIS — M79.671 PAIN OF RIGHT FOOT: ICD-10-CM

## 2018-06-15 PROCEDURE — 88304 TISSUE EXAM BY PATHOLOGIST: CPT | Performed by: PATHOLOGY

## 2018-06-15 RX ORDER — ONDANSETRON 2 MG/ML
4 INJECTION INTRAMUSCULAR; INTRAVENOUS ONCE AS NEEDED
Status: DISCONTINUED | OUTPATIENT
Start: 2018-06-15 | End: 2018-06-15 | Stop reason: HOSPADM

## 2018-06-15 RX ORDER — PROPOFOL 10 MG/ML
INJECTION, EMULSION INTRAVENOUS CONTINUOUS PRN
Status: DISCONTINUED | OUTPATIENT
Start: 2018-06-15 | End: 2018-06-15 | Stop reason: SURG

## 2018-06-15 RX ORDER — PROPOFOL 10 MG/ML
INJECTION, EMULSION INTRAVENOUS AS NEEDED
Status: DISCONTINUED | OUTPATIENT
Start: 2018-06-15 | End: 2018-06-15 | Stop reason: SURG

## 2018-06-15 RX ORDER — SODIUM CHLORIDE, SODIUM LACTATE, POTASSIUM CHLORIDE, CALCIUM CHLORIDE 600; 310; 30; 20 MG/100ML; MG/100ML; MG/100ML; MG/100ML
125 INJECTION, SOLUTION INTRAVENOUS CONTINUOUS
Status: DISCONTINUED | OUTPATIENT
Start: 2018-06-15 | End: 2018-06-15 | Stop reason: HOSPADM

## 2018-06-15 RX ORDER — MIDAZOLAM HYDROCHLORIDE 1 MG/ML
INJECTION INTRAMUSCULAR; INTRAVENOUS AS NEEDED
Status: DISCONTINUED | OUTPATIENT
Start: 2018-06-15 | End: 2018-06-15 | Stop reason: SURG

## 2018-06-15 RX ORDER — SODIUM CHLORIDE 9 MG/ML
100 INJECTION, SOLUTION INTRAVENOUS CONTINUOUS
Status: DISCONTINUED | OUTPATIENT
Start: 2018-06-15 | End: 2018-06-15 | Stop reason: HOSPADM

## 2018-06-15 RX ADMIN — PROPOFOL 100 MG: 10 INJECTION, EMULSION INTRAVENOUS at 07:44

## 2018-06-15 RX ADMIN — SODIUM CHLORIDE, SODIUM LACTATE, POTASSIUM CHLORIDE, AND CALCIUM CHLORIDE: .6; .31; .03; .02 INJECTION, SOLUTION INTRAVENOUS at 07:36

## 2018-06-15 RX ADMIN — SODIUM CHLORIDE, SODIUM LACTATE, POTASSIUM CHLORIDE, AND CALCIUM CHLORIDE 125 ML/HR: .6; .31; .03; .02 INJECTION, SOLUTION INTRAVENOUS at 06:45

## 2018-06-15 RX ADMIN — PROPOFOL 90 MCG/KG/MIN: 10 INJECTION, EMULSION INTRAVENOUS at 07:46

## 2018-06-15 RX ADMIN — SODIUM CHLORIDE 100 ML/HR: 0.9 INJECTION, SOLUTION INTRAVENOUS at 08:54

## 2018-06-15 RX ADMIN — MIDAZOLAM HYDROCHLORIDE 2 MG: 1 INJECTION, SOLUTION INTRAMUSCULAR; INTRAVENOUS at 07:36

## 2018-06-15 NOTE — ANESTHESIA POSTPROCEDURE EVALUATION
Post-Op Assessment Note      CV Status:  Stable    Mental Status:  Alert and awake    Hydration Status:  Euvolemic    PONV Controlled:  Controlled    Airway Patency:  Patent    Post Op Vitals Reviewed: Yes          Staff: Anesthesiologist           BP   131/66   Temp  96 8   Pulse  79   Resp   12    SpO2   98

## 2018-06-15 NOTE — OP NOTE
OPERATIVE REPORT  PATIENT NAME: Marilu James    :  1965  MRN: [id number]  Pt Location: [facility] OR ROOM 02    SURGERY DATE: 6/15/2018    Surgeon(s) and Role:     * Douglas Yañez DPM - Primary    Preop Diagnosis:  Other specified mononeuropathies of right lower limb [G57 81]  Pain of right foot [M79 671]    Post-Op Diagnosis Codes:     * Other specified mononeuropathies of right lower limb [G57 81]     * Pain of right foot [M79 671]    Procedure(s) (LRB):  FOOT NEUROPLASTY (Right)    Specimen(s):  ID Type Source Tests Collected by Time Destination   1 : neroma Tissue Foot, Right TISSUE EXAM Douglas Yañez DPM 6/15/2018 7045        Estimated Blood Loss:   Minimal    Drains:  None       Anesthesia Type:   Choice    Operative Indications: Other specified mononeuropathies of right lower limb [G57 81]  Pain of right foot [M79 671]      Operative Findings:  Neuroma right dorsal cutaneous nerve    Complications:   None    Procedure and Technique:    Procedure performed today due to painful nodule/lump dorsal right foot  Patient taken into OR and placed on OR table in supine position  Right foot prepped and draped in normal sterile technique  Well padded ankle tourniquet placed and inflated to 250 mm HG  20 cc 0 5% Marcaine infiltrated proximal to excision site to dorsal foot  Linear incision made over the dorsal right midfoot with #15 skin scalpel  Deep dissection continued with #15 deep blade  Dorsal cutaneous nerve was noted at area of small palpable nodule/lump pre-operatively  Nerve was scarred and had several branches extending to skin surface  Diseased portion of the dorsal cutaneous nerve was transected proximally and buried 5 mm deep to adjacent subcutaneous fat proximal medial to proximal most portion of incision to prevent painful scar or superficial stump neuroma  Diseased portion of nerve sent to pathology  There was minimal acute bleeding throughout the procedure    Surgical wound was then copiously flushed with NSS and closure was achieved with 4-O Vicryl for subcutaneous closure and 4-O Nylon using simple interrupted sutures  DSD applied to surgical site to right foot  To keep dressing clean/dry/intact until her first post-op visit        I was present for the entire procedure      Patient Disposition:  PACU     SIGNATURE: Edouard Hernandez DPM  DATE: Catie 15, 2018  TIME: 9:02 AM

## 2018-06-15 NOTE — H&P
H&P  Previously performed within the last  30  Days has been reviewed  No up dates  Will proceed to OR as planned

## 2018-06-15 NOTE — INTERIM OP NOTE
FOOT NEUROPLASTY  Postoperative Note  PATIENT NAME: Guero Senior  : 1965  MRN: [id number]  [facility] ROOM 02    Surgery Date: 6/15/2018    Preop Diagnosis:  Other specified mononeuropathies of right lower limb [G57 81]  Pain of right foot [M79 671]    Post-Op Diagnosis Codes:     * Other specified mononeuropathies of right lower limb [G57 81]     * Pain of right foot [M79 671]    Procedure(s) (LRB):  FOOT NEUROPLASTY (Right)    Surgeon(s) and Role:     * Jonathan Mcdonough DPM - Primary    Specimens:  ID Type Source Tests Collected by Time Destination   1 : neroma Tissue Foot, Right TISSUE EXAM Jonathan Mcdonough DPM 6/15/2018 0814        Estimated Blood Loss:   Minimal    Anesthesia Type:   Choice     Findings:    Scarred neuroma in continuity right dorsal cutaneous nerve  Complications:   None    SIGNATURE: Jonathan Mcdonough DPM   DATE: Catie 15, 2018   TIME: 8:46 AM

## 2018-06-18 NOTE — PRE-PROCEDURE INSTRUCTIONS
Pre-Surgery Instructions:   Medication Instructions    albuterol (VENTOLIN HFA) 90 mcg/act inhaler Instructed patient per Anesthesia Guidelines   amitriptyline (ELAVIL) 25 mg tablet Instructed patient per Anesthesia Guidelines   cyclobenzaprine (FLEXERIL) 10 mg tablet Instructed patient per Anesthesia Guidelines   nitroglycerin (NITROSTAT) 0 4 mg SL tablet Instructed patient per Anesthesia Guidelines   ranitidine (ZANTAC) 150 mg tablet Instructed patient per Anesthesia Guidelines   rosuvastatin (CRESTOR) 5 mg tablet Instructed patient per Anesthesia Guidelines

## 2018-06-22 ENCOUNTER — TELEPHONE (OUTPATIENT)
Dept: PAIN MEDICINE | Facility: MEDICAL CENTER | Age: 53
End: 2018-06-22

## 2018-06-22 NOTE — TELEPHONE ENCOUNTER
Pt left message in voicemail at 11:25 today, stating that she needs to reschedule her procedure with Dr Johny Jesus on 7/25  Pt states that she is scheduled to have surgery on 7/23 and will not be able to have the injection on the 25th  Pt left a call back number of [phone]

## 2018-06-27 NOTE — TELEPHONE ENCOUNTER
PATIENT IS SCHEDULED FOR USGI TPI IN OFFICE, I CALLED AND LEFT MESSAGE FOR PATIENT TO RETURN CALL TO THE OFFICE TO RESCHEDULE, I WILL CANCEL IN OFFICE USGI TPI

## 2018-07-06 NOTE — TELEPHONE ENCOUNTER
Patient left voicemail on 07/06/18 @ 12:46p      Patient would like to reschedule injection   Please contact patient at [phone]

## 2018-07-12 NOTE — TELEPHONE ENCOUNTER
Called pt, advised that Aviva is out this week and will follow up re: rescheduling appt  She is scheduled on 07/25 and is having shoulder surgery on 07/23

## 2018-07-13 ENCOUNTER — APPOINTMENT (OUTPATIENT)
Dept: LAB | Facility: MEDICAL CENTER | Age: 53
End: 2018-07-13
Payer: COMMERCIAL

## 2018-07-13 ENCOUNTER — TRANSCRIBE ORDERS (OUTPATIENT)
Dept: RADIOLOGY | Facility: MEDICAL CENTER | Age: 53
End: 2018-07-13

## 2018-07-13 DIAGNOSIS — R76.0 RAISED ANTIBODY TITER: ICD-10-CM

## 2018-07-13 DIAGNOSIS — R76.0 RAISED ANTIBODY TITER: Primary | ICD-10-CM

## 2018-07-13 LAB
ALBUMIN SERPL BCP-MCNC: 4 G/DL (ref 3.5–5)
ALP SERPL-CCNC: 69 U/L (ref 46–116)
ALT SERPL W P-5'-P-CCNC: 34 U/L (ref 12–78)
ANION GAP SERPL CALCULATED.3IONS-SCNC: 5 MMOL/L (ref 4–13)
AST SERPL W P-5'-P-CCNC: 23 U/L (ref 5–45)
BILIRUB SERPL-MCNC: 0.56 MG/DL (ref 0.2–1)
BILIRUB UR QL STRIP: NEGATIVE
BUN SERPL-MCNC: 9 MG/DL (ref 5–25)
C3 SERPL-MCNC: 136 MG/DL (ref 90–180)
C4 SERPL-MCNC: 30 MG/DL (ref 10–40)
CALCIUM SERPL-MCNC: 9 MG/DL (ref 8.3–10.1)
CHLORIDE SERPL-SCNC: 104 MMOL/L (ref 100–108)
CK SERPL-CCNC: 59 U/L (ref 26–192)
CLARITY UR: CLEAR
CO2 SERPL-SCNC: 26 MMOL/L (ref 21–32)
COLOR UR: NORMAL
CREAT SERPL-MCNC: 0.57 MG/DL (ref 0.6–1.3)
CRP SERPL QL: 4.4 MG/L
ERYTHROCYTE [SEDIMENTATION RATE] IN BLOOD: 18 MM/HOUR (ref 0–20)
GFR SERPL CREATININE-BSD FRML MDRD: 107 ML/MIN/1.73SQ M
GLUCOSE SERPL-MCNC: 93 MG/DL (ref 65–140)
GLUCOSE UR STRIP-MCNC: NEGATIVE MG/DL
HGB UR QL STRIP.AUTO: NEGATIVE
IGA SERPL-MCNC: 68 MG/DL (ref 70–400)
KETONES UR STRIP-MCNC: NEGATIVE MG/DL
LEUKOCYTE ESTERASE UR QL STRIP: NEGATIVE
NITRITE UR QL STRIP: NEGATIVE
PH UR STRIP.AUTO: 6 [PH] (ref 4.5–8)
POTASSIUM SERPL-SCNC: 4 MMOL/L (ref 3.5–5.3)
PROT SERPL-MCNC: 8.1 G/DL (ref 6.4–8.2)
PROT UR STRIP-MCNC: NEGATIVE MG/DL
SODIUM SERPL-SCNC: 135 MMOL/L (ref 136–145)
SP GR UR STRIP.AUTO: 1.02 (ref 1–1.03)
UROBILINOGEN UR QL STRIP.AUTO: 0.2 E.U./DL

## 2018-07-13 PROCEDURE — 82164 ANGIOTENSIN I ENZYME TEST: CPT

## 2018-07-13 PROCEDURE — 86431 RHEUMATOID FACTOR QUANT: CPT

## 2018-07-13 PROCEDURE — 86376 MICROSOMAL ANTIBODY EACH: CPT

## 2018-07-13 PROCEDURE — 85670 THROMBIN TIME PLASMA: CPT

## 2018-07-13 PROCEDURE — 86235 NUCLEAR ANTIGEN ANTIBODY: CPT

## 2018-07-13 PROCEDURE — 87340 HEPATITIS B SURFACE AG IA: CPT

## 2018-07-13 PROCEDURE — 85732 THROMBOPLASTIN TIME PARTIAL: CPT

## 2018-07-13 PROCEDURE — 82550 ASSAY OF CK (CPK): CPT

## 2018-07-13 PROCEDURE — 86200 CCP ANTIBODY: CPT

## 2018-07-13 PROCEDURE — 86038 ANTINUCLEAR ANTIBODIES: CPT

## 2018-07-13 PROCEDURE — 86803 HEPATITIS C AB TEST: CPT

## 2018-07-13 PROCEDURE — 86160 COMPLEMENT ANTIGEN: CPT

## 2018-07-13 PROCEDURE — 86039 ANTINUCLEAR ANTIBODIES (ANA): CPT

## 2018-07-13 PROCEDURE — 85613 RUSSELL VIPER VENOM DILUTED: CPT

## 2018-07-13 PROCEDURE — 82784 ASSAY IGA/IGD/IGG/IGM EACH: CPT

## 2018-07-13 PROCEDURE — 86617 LYME DISEASE ANTIBODY: CPT

## 2018-07-13 PROCEDURE — 86162 COMPLEMENT TOTAL (CH50): CPT

## 2018-07-13 PROCEDURE — 85652 RBC SED RATE AUTOMATED: CPT

## 2018-07-13 PROCEDURE — 82085 ASSAY OF ALDOLASE: CPT

## 2018-07-13 PROCEDURE — 86140 C-REACTIVE PROTEIN: CPT

## 2018-07-13 PROCEDURE — 80053 COMPREHEN METABOLIC PANEL: CPT

## 2018-07-13 PROCEDURE — 81003 URINALYSIS AUTO W/O SCOPE: CPT | Performed by: PHYSICIAN ASSISTANT

## 2018-07-13 PROCEDURE — 86225 DNA ANTIBODY NATIVE: CPT

## 2018-07-13 PROCEDURE — 85705 THROMBOPLASTIN INHIBITION: CPT

## 2018-07-13 PROCEDURE — 86147 CARDIOLIPIN ANTIBODY EA IG: CPT

## 2018-07-13 PROCEDURE — 84165 PROTEIN E-PHORESIS SERUM: CPT | Performed by: PATHOLOGY

## 2018-07-13 PROCEDURE — 86430 RHEUMATOID FACTOR TEST QUAL: CPT

## 2018-07-13 PROCEDURE — 36415 COLL VENOUS BLD VENIPUNCTURE: CPT

## 2018-07-13 PROCEDURE — 84165 PROTEIN E-PHORESIS SERUM: CPT

## 2018-07-14 ENCOUNTER — APPOINTMENT (OUTPATIENT)
Dept: LAB | Facility: MEDICAL CENTER | Age: 53
End: 2018-07-14
Payer: COMMERCIAL

## 2018-07-14 ENCOUNTER — TRANSCRIBE ORDERS (OUTPATIENT)
Dept: LAB | Facility: MEDICAL CENTER | Age: 53
End: 2018-07-14

## 2018-07-14 DIAGNOSIS — R76.0 RAISED ANTIBODY TITER: Primary | ICD-10-CM

## 2018-07-14 DIAGNOSIS — R76.0 RAISED ANTIBODY TITER: ICD-10-CM

## 2018-07-14 LAB
BASOPHILS # BLD AUTO: 0.05 THOUSANDS/ΜL (ref 0–0.1)
BASOPHILS NFR BLD AUTO: 1 % (ref 0–1)
CARDIOLIPIN IGA SER IA-ACNC: <9 APL U/ML (ref 0–11)
CARDIOLIPIN IGG SER IA-ACNC: <9 GPL U/ML (ref 0–14)
CARDIOLIPIN IGM SER IA-ACNC: <9 MPL U/ML (ref 0–12)
DSDNA AB SER-ACNC: 1 IU/ML (ref 0–9)
ENA RNP AB SER-ACNC: 0.2 AI (ref 0–0.9)
ENA SCL70 AB SER-ACNC: <0.2 AI (ref 0–0.9)
ENA SM AB SER-ACNC: <0.2 AI (ref 0–0.9)
ENA SS-A AB SER-ACNC: <0.2 AI (ref 0–0.9)
ENA SS-B AB SER-ACNC: <0.2 AI (ref 0–0.9)
EOSINOPHIL # BLD AUTO: 0.26 THOUSAND/ΜL (ref 0–0.61)
EOSINOPHIL NFR BLD AUTO: 2 % (ref 0–6)
ERYTHROCYTE [DISTWIDTH] IN BLOOD BY AUTOMATED COUNT: 12.2 % (ref 11.6–15.1)
HBV SURFACE AG SER QL: NORMAL
HCT VFR BLD AUTO: 42 % (ref 34.8–46.1)
HCV AB SER QL: ABNORMAL
HGB BLD-MCNC: 13.9 G/DL (ref 11.5–15.4)
IMM GRANULOCYTES # BLD AUTO: 0.02 THOUSAND/UL (ref 0–0.2)
IMM GRANULOCYTES NFR BLD AUTO: 0 % (ref 0–2)
LYMPHOCYTES # BLD AUTO: 5.11 THOUSANDS/ΜL (ref 0.6–4.47)
LYMPHOCYTES NFR BLD AUTO: 48 % (ref 14–44)
MCH RBC QN AUTO: 33.1 PG (ref 26.8–34.3)
MCHC RBC AUTO-ENTMCNC: 33.1 G/DL (ref 31.4–37.4)
MCV RBC AUTO: 100 FL (ref 82–98)
MONOCYTES # BLD AUTO: 0.66 THOUSAND/ΜL (ref 0.17–1.22)
MONOCYTES NFR BLD AUTO: 6 % (ref 4–12)
NEUTROPHILS # BLD AUTO: 4.59 THOUSANDS/ΜL (ref 1.85–7.62)
NEUTS SEG NFR BLD AUTO: 43 % (ref 43–75)
NRBC BLD AUTO-RTO: 0 /100 WBCS
PLATELET # BLD AUTO: 200 THOUSANDS/UL (ref 149–390)
PMV BLD AUTO: 10.9 FL (ref 8.9–12.7)
RBC # BLD AUTO: 4.2 MILLION/UL (ref 3.81–5.12)
THYROPEROXIDASE AB SERPL-ACNC: 12 IU/ML (ref 0–34)
WBC # BLD AUTO: 10.69 THOUSAND/UL (ref 4.31–10.16)

## 2018-07-14 PROCEDURE — 36415 COLL VENOUS BLD VENIPUNCTURE: CPT

## 2018-07-14 PROCEDURE — 85025 COMPLETE CBC W/AUTO DIFF WBC: CPT

## 2018-07-15 LAB
B BURGDOR IGG PATRN SER IB-IMP: NEGATIVE
B BURGDOR IGM PATRN SER IB-IMP: NEGATIVE
B BURGDOR18KD IGG SER QL IB: NORMAL
B BURGDOR23KD IGG SER QL IB: NORMAL
B BURGDOR23KD IGM SER QL IB: NORMAL
B BURGDOR28KD IGG SER QL IB: NORMAL
B BURGDOR30KD IGG SER QL IB: NORMAL
B BURGDOR39KD IGG SER QL IB: NORMAL
B BURGDOR39KD IGM SER QL IB: NORMAL
B BURGDOR41KD IGG SER QL IB: NORMAL
B BURGDOR41KD IGM SER QL IB: NORMAL
B BURGDOR45KD IGG SER QL IB: NORMAL
B BURGDOR58KD IGG SER QL IB: NORMAL
B BURGDOR66KD IGG SER QL IB: NORMAL
B BURGDOR93KD IGG SER QL IB: NORMAL

## 2018-07-16 LAB
ACE SERPL-CCNC: 38 U/L (ref 14–82)
ALDOLASE SERPL-CCNC: 3.6 U/L (ref 3.3–10.3)
ANA HOMOGEN SER QL IF: NORMAL
ANA HOMOGEN TITR SER: NORMAL {TITER}
CH50 SERPL-ACNC: 49 U/ML
CRYOGLOB RF SER-ACNC: ABNORMAL [IU]/ML
RHEUMATOID FACT SER QL LA: POSITIVE
RYE IGE QN: POSITIVE

## 2018-07-16 NOTE — TELEPHONE ENCOUNTER
I spoke with patient previously, she is having surgery done, she will call office afterwards to reschedule injections when she feels better

## 2018-07-17 LAB
ALBUMIN SERPL ELPH-MCNC: 4.7 G/DL (ref 3.5–5)
ALBUMIN SERPL ELPH-MCNC: 60.3 % (ref 52–65)
ALPHA1 GLOB SERPL ELPH-MCNC: 0.27 G/DL (ref 0.1–0.4)
ALPHA1 GLOB SERPL ELPH-MCNC: 3.5 % (ref 2.5–5)
ALPHA2 GLOB SERPL ELPH-MCNC: 0.94 G/DL (ref 0.4–1.2)
ALPHA2 GLOB SERPL ELPH-MCNC: 12 % (ref 7–13)
BETA GLOB ABNORMAL SERPL ELPH-MCNC: 0.44 G/DL (ref 0.4–0.8)
BETA1 GLOB SERPL ELPH-MCNC: 5.6 % (ref 5–13)
BETA2 GLOB SERPL ELPH-MCNC: 3.5 % (ref 2–8)
BETA2+GAMMA GLOB SERPL ELPH-MCNC: 0.27 G/DL (ref 0.2–0.5)
CCP IGA+IGG SERPL IA-ACNC: <1 UNITS (ref 0–19)
GAMMA GLOB ABNORMAL SERPL ELPH-MCNC: 1.18 G/DL (ref 0.5–1.6)
GAMMA GLOB SERPL ELPH-MCNC: 15.1 % (ref 12–22)
IGG/ALB SER: 1.52 {RATIO} (ref 1.1–1.8)
PROT PATTERN SERPL ELPH-IMP: NORMAL
PROT SERPL-MCNC: 7.8 G/DL (ref 6.4–8.2)

## 2018-07-20 ENCOUNTER — TRANSCRIBE ORDERS (OUTPATIENT)
Dept: ADMINISTRATIVE | Facility: HOSPITAL | Age: 53
End: 2018-07-20

## 2018-07-20 ENCOUNTER — ANESTHESIA EVENT (OUTPATIENT)
Dept: PERIOP | Facility: HOSPITAL | Age: 53
End: 2018-07-20
Payer: COMMERCIAL

## 2018-07-20 ENCOUNTER — APPOINTMENT (OUTPATIENT)
Dept: LAB | Facility: MEDICAL CENTER | Age: 53
End: 2018-07-20
Payer: COMMERCIAL

## 2018-07-20 DIAGNOSIS — D72.829 LEUKOCYTOSIS, UNSPECIFIED TYPE: ICD-10-CM

## 2018-07-20 DIAGNOSIS — R76.8 HEPATITIS C ANTIBODY TEST POSITIVE: Primary | ICD-10-CM

## 2018-07-20 DIAGNOSIS — R76.8 HEPATITIS C ANTIBODY TEST POSITIVE: ICD-10-CM

## 2018-07-20 LAB
APTT SCREEN TO CONFIRM RATIO: 1.06 RATIO (ref 0–1.4)
BASOPHILS # BLD AUTO: 0.05 THOUSANDS/ΜL (ref 0–0.1)
BASOPHILS NFR BLD AUTO: 1 % (ref 0–1)
CONFIRM APTT/NORMAL: 48.5 SEC (ref 0–55)
EOSINOPHIL # BLD AUTO: 0.18 THOUSAND/ΜL (ref 0–0.61)
EOSINOPHIL NFR BLD AUTO: 2 % (ref 0–6)
ERYTHROCYTE [DISTWIDTH] IN BLOOD BY AUTOMATED COUNT: 12.1 % (ref 11.6–15.1)
HCT VFR BLD AUTO: 44.4 % (ref 34.8–46.1)
HGB BLD-MCNC: 14.5 G/DL (ref 11.5–15.4)
IMM GRANULOCYTES # BLD AUTO: 0.01 THOUSAND/UL (ref 0–0.2)
IMM GRANULOCYTES NFR BLD AUTO: 0 % (ref 0–2)
LA PPP-IMP: NORMAL
LYMPHOCYTES # BLD AUTO: 3.32 THOUSANDS/ΜL (ref 0.6–4.47)
LYMPHOCYTES NFR BLD AUTO: 42 % (ref 14–44)
MCH RBC QN AUTO: 32.8 PG (ref 26.8–34.3)
MCHC RBC AUTO-ENTMCNC: 32.7 G/DL (ref 31.4–37.4)
MCV RBC AUTO: 101 FL (ref 82–98)
MONOCYTES # BLD AUTO: 0.64 THOUSAND/ΜL (ref 0.17–1.22)
MONOCYTES NFR BLD AUTO: 8 % (ref 4–12)
NEUTROPHILS # BLD AUTO: 3.64 THOUSANDS/ΜL (ref 1.85–7.62)
NEUTS SEG NFR BLD AUTO: 47 % (ref 43–75)
NRBC BLD AUTO-RTO: 0 /100 WBCS
PLATELET # BLD AUTO: 216 THOUSANDS/UL (ref 149–390)
PMV BLD AUTO: 10.6 FL (ref 8.9–12.7)
RBC # BLD AUTO: 4.42 MILLION/UL (ref 3.81–5.12)
SCREEN APTT: 34.2 SEC (ref 0–51.9)
SCREEN DRVVT: 38.2 SEC (ref 0–47)
THROMBIN TIME: 15.9 SEC (ref 0–23)
WBC # BLD AUTO: 7.84 THOUSAND/UL (ref 4.31–10.16)

## 2018-07-20 PROCEDURE — 36415 COLL VENOUS BLD VENIPUNCTURE: CPT

## 2018-07-20 PROCEDURE — 87522 HEPATITIS C REVRS TRNSCRPJ: CPT

## 2018-07-20 PROCEDURE — 85025 COMPLETE CBC W/AUTO DIFF WBC: CPT

## 2018-07-23 ENCOUNTER — HOSPITAL ENCOUNTER (OUTPATIENT)
Facility: HOSPITAL | Age: 53
Setting detail: OUTPATIENT SURGERY
Discharge: HOME | End: 2018-07-23
Attending: ORTHOPAEDIC SURGERY | Admitting: ORTHOPAEDIC SURGERY
Payer: COMMERCIAL

## 2018-07-23 ENCOUNTER — APPOINTMENT (OUTPATIENT)
Dept: RADIOLOGY | Facility: HOSPITAL | Age: 53
End: 2018-07-23
Payer: COMMERCIAL

## 2018-07-23 ENCOUNTER — ANESTHESIA (OUTPATIENT)
Dept: PERIOP | Facility: HOSPITAL | Age: 53
End: 2018-07-23
Payer: COMMERCIAL

## 2018-07-23 VITALS
RESPIRATION RATE: 20 BRPM | WEIGHT: 150 LBS | OXYGEN SATURATION: 97 % | HEART RATE: 90 BPM | TEMPERATURE: 97.2 F | BODY MASS INDEX: 28.32 KG/M2 | DIASTOLIC BLOOD PRESSURE: 74 MMHG | SYSTOLIC BLOOD PRESSURE: 132 MMHG | HEIGHT: 61 IN

## 2018-07-23 DIAGNOSIS — M75.32 CALCIFIC TENDINITIS OF LEFT SHOULDER: ICD-10-CM

## 2018-07-23 PROCEDURE — 29823 SHO ARTHRS SRG XTNSV DBRDMT: CPT | Performed by: ORTHOPAEDIC SURGERY

## 2018-07-23 PROCEDURE — 29823 SHO ARTHRS SRG XTNSV DBRDMT: CPT | Performed by: PHYSICIAN ASSISTANT

## 2018-07-23 RX ORDER — CLINDAMYCIN PHOSPHATE 150 MG/ML
INJECTION, SOLUTION INTRAVENOUS AS NEEDED
Status: DISCONTINUED | OUTPATIENT
Start: 2018-07-23 | End: 2018-07-23

## 2018-07-23 RX ORDER — SODIUM CHLORIDE, SODIUM LACTATE, POTASSIUM CHLORIDE, CALCIUM CHLORIDE 600; 310; 30; 20 MG/100ML; MG/100ML; MG/100ML; MG/100ML
125 INJECTION, SOLUTION INTRAVENOUS CONTINUOUS
Status: DISCONTINUED | OUTPATIENT
Start: 2018-07-23 | End: 2018-07-23 | Stop reason: HOSPADM

## 2018-07-23 RX ORDER — ONDANSETRON 2 MG/ML
INJECTION INTRAMUSCULAR; INTRAVENOUS AS NEEDED
Status: DISCONTINUED | OUTPATIENT
Start: 2018-07-23 | End: 2018-07-23 | Stop reason: SURG

## 2018-07-23 RX ORDER — CLINDAMYCIN PHOSPHATE 900 MG/50ML
900 INJECTION INTRAVENOUS ONCE
Status: COMPLETED | OUTPATIENT
Start: 2018-07-23 | End: 2018-07-23

## 2018-07-23 RX ORDER — NICOTINE 21 MG/24HR
21 PATCH, TRANSDERMAL 24 HOURS TRANSDERMAL DAILY
Status: DISCONTINUED | OUTPATIENT
Start: 2018-07-23 | End: 2018-07-23 | Stop reason: HOSPADM

## 2018-07-23 RX ORDER — SUCCINYLCHOLINE CHLORIDE 20 MG/ML
INJECTION INTRAMUSCULAR; INTRAVENOUS AS NEEDED
Status: DISCONTINUED | OUTPATIENT
Start: 2018-07-23 | End: 2018-07-23 | Stop reason: SURG

## 2018-07-23 RX ORDER — NICOTINE 21 MG/24HR
21 PATCH, TRANSDERMAL 24 HOURS TRANSDERMAL DAILY
Status: DISCONTINUED | OUTPATIENT
Start: 2018-07-23 | End: 2018-07-23 | Stop reason: SDUPTHER

## 2018-07-23 RX ORDER — MAGNESIUM HYDROXIDE 1200 MG/15ML
LIQUID ORAL AS NEEDED
Status: DISCONTINUED | OUTPATIENT
Start: 2018-07-23 | End: 2018-07-23 | Stop reason: HOSPADM

## 2018-07-23 RX ORDER — MIDAZOLAM HYDROCHLORIDE 1 MG/ML
INJECTION INTRAMUSCULAR; INTRAVENOUS AS NEEDED
Status: DISCONTINUED | OUTPATIENT
Start: 2018-07-23 | End: 2018-07-23 | Stop reason: SURG

## 2018-07-23 RX ORDER — OXYCODONE HYDROCHLORIDE AND ACETAMINOPHEN 5; 325 MG/1; MG/1
1 TABLET ORAL EVERY 6 HOURS PRN
Qty: 30 TABLET | Refills: 0 | Status: SHIPPED | OUTPATIENT
Start: 2018-07-23 | End: 2018-09-27

## 2018-07-23 RX ORDER — ONDANSETRON 2 MG/ML
4 INJECTION INTRAMUSCULAR; INTRAVENOUS ONCE AS NEEDED
Status: DISCONTINUED | OUTPATIENT
Start: 2018-07-23 | End: 2018-07-23 | Stop reason: HOSPADM

## 2018-07-23 RX ORDER — LIDOCAINE HYDROCHLORIDE 10 MG/ML
INJECTION, SOLUTION INFILTRATION; PERINEURAL AS NEEDED
Status: DISCONTINUED | OUTPATIENT
Start: 2018-07-23 | End: 2018-07-23 | Stop reason: SURG

## 2018-07-23 RX ORDER — CHLORHEXIDINE GLUCONATE 4 G/100ML
SOLUTION TOPICAL DAILY PRN
Status: DISCONTINUED | OUTPATIENT
Start: 2018-07-23 | End: 2018-07-23 | Stop reason: HOSPADM

## 2018-07-23 RX ORDER — OXYCODONE HYDROCHLORIDE AND ACETAMINOPHEN 5; 325 MG/1; MG/1
1 TABLET ORAL EVERY 4 HOURS PRN
Qty: 30 TABLET | Refills: 0 | Status: SHIPPED | OUTPATIENT
Start: 2018-07-23 | End: 2018-11-13

## 2018-07-23 RX ORDER — LIDOCAINE HYDROCHLORIDE AND EPINEPHRINE 10; 10 MG/ML; UG/ML
INJECTION, SOLUTION INFILTRATION; PERINEURAL AS NEEDED
Status: DISCONTINUED | OUTPATIENT
Start: 2018-07-23 | End: 2018-07-23 | Stop reason: HOSPADM

## 2018-07-23 RX ORDER — PROPOFOL 10 MG/ML
INJECTION, EMULSION INTRAVENOUS AS NEEDED
Status: DISCONTINUED | OUTPATIENT
Start: 2018-07-23 | End: 2018-07-23 | Stop reason: SURG

## 2018-07-23 RX ADMIN — DEXAMETHASONE SODIUM PHOSPHATE 4 MG: 10 INJECTION INTRAMUSCULAR; INTRAVENOUS at 10:15

## 2018-07-23 RX ADMIN — CLINDAMYCIN PHOSPHATE 900 MG: 18 INJECTION, SOLUTION INTRAMUSCULAR; INTRAVENOUS at 10:16

## 2018-07-23 RX ADMIN — SUCCINYLCHOLINE CHLORIDE 100 MG: 20 INJECTION, SOLUTION INTRAMUSCULAR; INTRAVENOUS at 10:11

## 2018-07-23 RX ADMIN — SODIUM CHLORIDE, SODIUM LACTATE, POTASSIUM CHLORIDE, AND CALCIUM CHLORIDE 125 ML/HR: .6; .31; .03; .02 INJECTION, SOLUTION INTRAVENOUS at 07:50

## 2018-07-23 RX ADMIN — PROPOFOL 200 MG: 10 INJECTION, EMULSION INTRAVENOUS at 10:11

## 2018-07-23 RX ADMIN — LIDOCAINE HYDROCHLORIDE 40 MG: 10 INJECTION, SOLUTION INFILTRATION; PERINEURAL at 10:11

## 2018-07-23 RX ADMIN — MIDAZOLAM HYDROCHLORIDE 2 MG: 1 INJECTION, SOLUTION INTRAMUSCULAR; INTRAVENOUS at 09:25

## 2018-07-23 RX ADMIN — ONDANSETRON HYDROCHLORIDE 4 MG: 2 INJECTION, SOLUTION INTRAVENOUS at 11:17

## 2018-07-23 NOTE — ANESTHESIA PREPROCEDURE EVALUATION
Review of Systems/Medical History  Patient summary reviewed  Chart reviewed      Cardiovascular  EKG reviewed, Hyperlipidemia, Hypertension , Past MI , CAD , Cardiac stents > 1 year   Comment: NSR, VR 61  Jason scan 9/2014: negative for ischemia,  Pulmonary  Smoker (1 5 PPD) cigarette smoker  , COPD mild- PRN medicaiton , Shortness of breath,   Comment: Hx of Vocal cord polyps     GI/Hepatic    GERD well controlled,   Comment: diverticulosis          Endo/Other  Negative endo/other ROS      GYN    Hysterectomy,        Hematology  Negative hematology ROS      Musculoskeletal    Arthritis     Neurology    Headaches,   Comment: Cervical radiculopathy Psychology   Anxiety, Depression ,              Physical Exam    Airway    Mallampati score: II  TM Distance: >3 FB  Neck ROM: full     Dental   upper dentures and lower dentures,     Cardiovascular  Rhythm: regular, Rate: normal,     Pulmonary  Breath sounds clear to auscultation,     Other Findings    Results for Eldon Devi (MRN [id number]) as of 6/14/2018 16:07   Ref   Range 6/5/2018 14:41   Sodium Latest Ref Range: 136 - 145 mmol/L 136   Potassium Latest Ref Range: 3 5 - 5 3 mmol/L 4 2   Chloride Latest Ref Range: 100 - 108 mmol/L 104   CO2 Latest Ref Range: 21 - 32 mmol/L 27   Anion Gap Latest Ref Range: 4 - 13 mmol/L 5   BUN Latest Ref Range: 5 - 25 mg/dL 5   Creatinine Latest Ref Range: 0 60 - 1 30 mg/dL 0 62   Glucose Latest Ref Range: 65 - 140 mg/dL 93   Calcium Latest Ref Range: 8 3 - 10 1 mg/dL 9 4   eGFR Latest Units: ml/min/1 73sq m 104   WBC Latest Ref Range: 4 31 - 10 16 Thousand/uL 7 27   RBC Latest Ref Range: 3 81 - 5 12 Million/uL 4 51   Hemoglobin Latest Ref Range: 11 5 - 15 4 g/dL 15 1   Hematocrit Latest Ref Range: 34 8 - 46 1 % 46 2 (H)   MCV Latest Ref Range: 82 - 98 fL 102 (H)   MCH Latest Ref Range: 26 8 - 34 3 pg 33 5   MCHC Latest Ref Range: 31 4 - 37 4 g/dL 32 7   RDW Latest Ref Range: 11 6 - 15 1 % 12 3   Platelets Latest Ref Range: 149 - 390 Thousands/uL 228       Anesthesia Plan  ASA Score- 3     Anesthesia Type- regional and general with ASA Monitors  Additional Monitors:   Airway Plan: ETT  Plan Factors- Patient instructed to abstain from smoking on day of procedure  Patient smoked on day of surgery  Induction- intravenous  Postoperative Plan- Plan for postoperative opioid use  Informed Consent- Anesthetic plan and risks discussed with patient  I personally reviewed this patient with the CRNA  Discussed and agreed on the Anesthesia Plan with the CRNA  Harvey Morris

## 2018-07-23 NOTE — ANESTHESIA POSTPROCEDURE EVALUATION
Post-Op Assessment Note      CV Status:  Stable    Mental Status:  Somnolent    Hydration Status:  Stable    PONV Controlled:  None    Airway Patency:  Patent    Post Op Vitals Reviewed: Yes          Staff: CRNA           BP   134/80   Temp  96 9   Pulse  94   Resp   19   SpO2   100%

## 2018-07-23 NOTE — ANESTHESIA PROCEDURE NOTES
Peripheral Block    Patient location during procedure: holding area  Start time: 7/23/2018 8:30 AM  Reason for block: at surgeon's request and post-op pain management  Staffing  Anesthesiologist: Diana Montano  Preanesthetic Checklist  Completed: patient identified, site marked, surgical consent, pre-op evaluation, timeout performed, IV checked, risks and benefits discussed and monitors and equipment checked  Peripheral Block  Patient position: supine  Prep: ChloraPrep  Patient monitoring: heart rate, continuous pulse ox and frequent blood pressure checks  Block type: interscalene  Laterality: left  Injection technique: single-shot  Procedures: ultrasound guided and nerve stimulator  Ultrasound permanent image saved  Local infiltration: ropivacaine  Infiltration strength: 0 5 %  Dose: 30 mL  Needle  Needle type: Stimuplex   Needle gauge: 22 G  Needle length: 5 cm  Needle localization: anatomical landmarks, nerve stimulator and ultrasound guidance  Needle insertion depth: 4 cm  Assessment  Injection assessment: incremental injection, local visualized surrounding nerve on ultrasound, negative aspiration for heme, negative aspiration for CSF and no paresthesia on injection  Paresthesia pain: immediately resolved  Heart rate change: no  Slow fractionated injection: yes  Post-procedure:  site cleaned  patient tolerated the procedure well with no immediate complications  Additional Notes  Versed 2mg IV was given before the nerve block

## 2018-07-23 NOTE — OP NOTE
OPERATIVE REPORT  PATIENT NAME: Chanda Closs    :  1965  MRN: [id number]  Pt Location: [facility] ROOM 02    SURGERY DATE: 2018    Surgeon(s) and Role:     * Annemarie Rodriguez MD - Primary     * Steve Silver PA-C - Assisting  no qualified resident available    Preop Diagnosis:  Calcific tendinitis of left shoulder [M75 32]  Incomplete tear of left rotator cuff [M75 112]    Post-Op Diagnosis Codes:     * Calcific tendinitis of left shoulder [M75 32]     * Incomplete tear of left rotator cuff [M75 112]    Procedure(s) (LRB):  ARTHROSCOPY SHOULDER, subacromial decompression, synovectomy (Left)    Specimen(s):  * No specimens in log *    Estimated Blood Loss:   Minimal    Drains:       Anesthesia Type:   General w/ Interscalene Block    Operative Indications:  Calcific tendinitis of left shoulder [M75 32]  Incomplete tear of left rotator cuff [M75 112]      Operative Findings:  Calcific tendinitis in the subacromial bursa excised  Extensive bursitis subacromial bursa excised  Synovitis in the glenohumeral joint debrided  Intact biceps tendon labrum glenohumeral joint subscap tendon and rotator cuff    Complications:   None    Procedure and Technique: All treatment options were discussed with the patient including non-operative and operative treatment options  Patient has failed non-perative treatment and has opted for surgical intervention  Risks, complications and benefits of all treatment options were discussed in detail  The risks of surgical intervention including infection, injury to vessels and nerves  risk of failure to achieve desired results, risk of need for further procedures, potential risk of loss of life and limb were discussed with the patient  Informed consent was obtained from the patient  The operative site was marked and signed  A timeout was performed prior to the procedure  The patient was re-identified ,including name, date of birth, procedure, consent form reviewed, site and laterality  Appropriate antibiotics were administered preoperatively    The Physician Assistant was present for the entire case and provided essential assistance with patient positioning, prep and draping, wound closure, sterile dressing and splint application, all under my direct supervision(there was no resident available to assist with this case)      Patient was brought to the operating room and the left shoulder was examined anesthesia  Shoulder stable with excellent range of motion  Standard posterior anterior lateral portals were used  Arthroscopy revealed intact glenohumeral joint intact biceps tendon subscap tendon synovitis in the glenohumeral joint which was debrided intact footprint no loose bodies    Instruments were then placed in subacromial bursa  Extensive bursitis present with calcium deposits    Painstaking bursectomy was performed acromioplasty was not done as there is no spurring in the sprain give groove between the acromion and the rotator cuff    Complete bursectomy performed the extensive bursa which almost had another layer on top of the cuff, cuff was examined and found to be intact   I was present for the entire procedure    Patient Disposition:  PACU     SIGNATURE: Ciaran Lewis MD  DATE: July 23, 2018  TIME: 11:14 AM

## 2018-07-23 NOTE — H&P
H&P Exam - Orthopedics   Rad Mckay 48 y o  female MRN: [id number]  Unit/Bed#: OR [place] Encounter: [id number]      History of Present Illness   HPI:  Rad Mckay is a 48 y o  female who presents with left shoulder pain for left shoulder arthroscopy  Review of Systems    Historical Information   Past Medical History:   Diagnosis Date    Acquired ichthyosis     last assessed: 2013    Anxiety     Cervical radiculopathy     last assessed: 2014    Colorectal polyps     last assessed: 3/9/2015    COPD (chronic obstructive pulmonary disease) ([facility] )     Depression     Diverticulosis of colon     Foot pain     GERD (gastroesophageal reflux disease)     Hyperlipemia     Hypertension     Myocardial infarction ([facility] )     at age 28    Osteopenia     last assessed: 2013    Palpitations     last assessed: 2014    PVD (peripheral vascular disease) ([facility] )     last assessed: 12/3/2012    Scapular dyskinesis     last assessed: 2014    Shortness of breath     Tendonitis of left rotator cuff     last assessed: 2014    Vocal cord polyps     last assessed: 2014     Past Surgical History:   Procedure Laterality Date    ABDOMINAL SURGERY      exploratory    CARDIAC SURGERY      cardiac stent      SECTION      last assessed: 2014    CHOLECYSTECTOMY      last assessed: 2014    COLONOSCOPY  10/27/2014    CORONARY ANGIOPLASTY WITH STENT PLACEMENT      LAD stent    DENTAL SURGERY      last assessed: 2014    ELBOW SURGERY Bilateral     arthroplasty    ESOPHAGOGASTRODUODENOSCOPY      ESOPHAGOGASTRODUODENOSCOPY N/A 2016    Procedure: ESOPHAGOGASTRODUODENOSCOPY (EGD);   Surgeon: Laurence Beverly MD;  Location: [facility];  Service:     FOOT SURGERY Right 02/06/2015    x 4    HYSTERECTOMY      last assessed: 2014    THROAT SURGERY      TOOTH EXTRACTION      TRIGEMINAL NERVE DECOMPRESSION Right 6/15/2018    Procedure: FOOT NEUROPLASTY; Surgeon: Debra Blackwell DPM;  Location: [facility];  Service: Podiatry     Social History   History   Alcohol Use    Yes     Comment: rare     History   Drug Use No     History   Smoking Status    Current Every Day Smoker    Packs/day: 1 50   Smokeless Tobacco    Never Used     Family History:   Family History   Problem Relation Age of Onset    No Known Problems Mother     No Known Problems Father     No Known Problems Maternal Grandmother     No Known Problems Maternal Grandfather     No Known Problems Paternal Grandmother     No Known Problems Paternal Grandfather        Meds/Allergies   Prescriptions Prior to Admission   Medication    albuterol (VENTOLIN HFA) 90 mcg/act inhaler    amitriptyline (ELAVIL) 25 mg tablet    cyclobenzaprine (FLEXERIL) 10 mg tablet    oxyCODONE-acetaminophen (PERCOCET) 5-325 mg per tablet    ranitidine (ZANTAC) 150 mg tablet    rosuvastatin (CRESTOR) 5 mg tablet    aspirin 81 mg chewable tablet    nitroglycerin (NITROSTAT) 0 4 mg SL tablet    pregabalin (LYRICA) 50 mg capsule     Current Facility-Administered Medications   Medication Dose Route Frequency Provider Last Rate Last Dose    chlorhexidine (HIBICLENS) 4 % topical liquid   Topical Daily PRN Martha Weeks MD        clindamycin (CLEOCIN) IVPB (premix) 900 mg  900 mg Intravenous Once Martha Weeks MD        lactated ringers infusion  125 mL/hr Intravenous Continuous Shantel Kori, CRNA 125 mL/hr at 07/23/18 0750 125 mL/hr at 07/23/18 0750    nicotine (NICODERM CQ) 21 mg/24 hr TD 24 hr patch 21 mg  21 mg Transdermal Daily Martha Weeks MD         Facility-Administered Medications Ordered in Other Encounters   Medication Dose Route Frequency Provider Last Rate Last Dose    lidocaine (XYLOCAINE) 1 % injection    PRN Krysta Johnson MD   40 mg at 07/23/18 1011    midazolam (VERSED) injection   Intravenous PRN Krysta Johnson MD   2 mg at 07/23/18 0925    propofol (DIPRIVAN) 200 MG/20ML bolus injection Intravenous PRN Mannie Fagan MD   200 mg at 07/23/18 1011    succinylcholine (ANECTINE) 20 mg/mL injection   Intravenous PRN Mannie Fagan MD   100 mg at 07/23/18 1011     Allergies   Allergen Reactions    Ceclor [Cefaclor] Anaphylaxis    Codeine Itching     Reaction Date: [id number];     Ticlid [Ticlopidine] Hives    Penicillins Rash       Objective   Vitals: Blood pressure 123/58, pulse 75, temperature (!) 96 1 °F (35 6 °C), temperature source Tympanic, resp  rate 18, height 5' 1" (1 549 m), weight 68 kg (150 lb), SpO2 98 %  ,Body mass index is 28 34 kg/m²  No intake or output data in the 24 hours ending 07/23/18 1015    No intake/output data recorded  Invasive Devices     Peripheral Intravenous Line            Peripheral IV 07/23/18 Right Hand less than 1 day                Physical Exam   Patient awake alert oriented GCS 15  CVS and RS normal to examine  Ortho Exam     Left shoulder no obvious swelling or deformity  Signs of impingement positive  Cuff strength maintained  Tenderness over the subacromial bursa  MRI shows calcific tendinitis with no cuff tear tendinopathy    Lab Results: I have personally reviewed pertinent lab results  Imaging: I have personally reviewed pertinent reports  EKG, Pathology, and Other Studies: I have personally reviewed pertinent reports  Code Status: No Order  Advance Directive and Living Will:      Power of :    POLST:      Assessment/Plan     Assessment:  Left shoulder pain due to impingement and calcific tendinitis  Plan:  Discussed treatment with the patient  Informed consent obtained  Operative site marked and signed  Discussed treatment options with the patient  Options are to do nothing, continue nonoperative measures, last resort  is surgical intervention  This patient has failed nonoperative measures and has  opted for surgical intervention  Risk and  benefits of treatment options discussed in detail    Risks of surgery include risk of infection, bleeding, injury to the vessels and risk of failure of the procedure, potential risk of loss of life and limb  After weighing up all treatment options available  Patient has  opted for surgical intervention and informed consent obtained      Counseling / Coordination of Care  Total floor / unit time spent today 30 minutes  Greater than 50% of total time was spent with the patient and / or family counseling and / or coordination of care  A description of the counseling / coordination of care:  Patient  Portions of the record may have been created with voice recognition software   Occasional wrong word or "sound a like" substitutions may have occurred due to the inherent limitations of voice recognition software   Read the chart carefully and recognize, using context, where substitutions have occurred

## 2018-07-23 NOTE — H&P
Prior history and physical reviewed  Performed within last 30 days  No update from prior  Will proceed to OR as planned

## 2018-07-24 ENCOUNTER — TELEPHONE (OUTPATIENT)
Dept: OBGYN CLINIC | Facility: HOSPITAL | Age: 53
End: 2018-07-24

## 2018-07-24 DIAGNOSIS — M54.2 NECK PAIN: Primary | ICD-10-CM

## 2018-07-24 DIAGNOSIS — M75.40 IMPINGEMENT SYNDROME OF SHOULDER REGION, UNSPECIFIED LATERALITY: Primary | ICD-10-CM

## 2018-07-24 RX ORDER — TRAMADOL HYDROCHLORIDE 50 MG/1
50 TABLET ORAL EVERY 6 HOURS PRN
Qty: 30 TABLET | Refills: 0 | Status: SHIPPED | OUTPATIENT
Start: 2018-07-24 | End: 2018-09-27

## 2018-07-24 NOTE — TELEPHONE ENCOUNTER
Patient sees Dr Lynette Queen   [phone]    Patient had sx yesterday  Patient is calling because [facility] called her to schedule therapy but they said they don't know when to start or how many times per week  Please advise  Also patient is stating she has a lot of pain & she feels like the percocet is not helping at all

## 2018-07-24 NOTE — TELEPHONE ENCOUNTER
I called and spoke to the patient  She has taken tramadol before and tramadol has been prescribed and sent to the pharmacy  Patient can start physical therapy this week 2 times a week for the next 6 weeks  Patient also referred to pain management

## 2018-07-28 LAB
HCV RNA SERPL NAA+PROBE-ACNC: NORMAL IU/ML
HCV RNA SERPL NAA+PROBE-ACNC: NORMAL IU/ML
HCV RNA SERPL NAA+PROBE-LOG IU: 7.13 LOG10 IU/ML
TEST INFORMATION: NORMAL

## 2018-08-01 ENCOUNTER — TELEPHONE (OUTPATIENT)
Dept: OBGYN CLINIC | Facility: CLINIC | Age: 53
End: 2018-08-01

## 2018-08-01 ENCOUNTER — EVALUATION (OUTPATIENT)
Dept: PHYSICAL THERAPY | Facility: HOME HEALTHCARE | Age: 53
End: 2018-08-01
Payer: COMMERCIAL

## 2018-08-01 DIAGNOSIS — M75.32 CALCIFIC TENDINITIS OF LEFT SHOULDER: Primary | ICD-10-CM

## 2018-08-01 PROCEDURE — 97161 PT EVAL LOW COMPLEX 20 MIN: CPT | Performed by: PHYSICAL THERAPIST

## 2018-08-01 PROCEDURE — G8991 OTHER PT/OT GOAL STATUS: HCPCS | Performed by: PHYSICAL THERAPIST

## 2018-08-01 PROCEDURE — 97535 SELF CARE MNGMENT TRAINING: CPT | Performed by: PHYSICAL THERAPIST

## 2018-08-01 PROCEDURE — G8990 OTHER PT/OT CURRENT STATUS: HCPCS | Performed by: PHYSICAL THERAPIST

## 2018-08-01 NOTE — PROGRESS NOTES
PT Evaluation     Today's date: 2018  Patient name: Dago Mccarty  : 1965  MRN: [id number]  Referring provider: Ilsa Jane MD  Dx:   Encounter Diagnosis     ICD-10-CM    1  Calcific tendinitis of left shoulder M75 32                   Assessment  Impairments: abnormal or restricted ROM, activity intolerance, impaired physical strength, lacks appropriate home exercise program, pain with function and poor posture     Assessment details: Pt Suresh Baker is a 48 y o  who presents to [facility]PPT with s/p L shoulder arthroscopic surgery completed on 18  PT phoned MD office to clarify if surgery was just a debridement or if RTC repair was involved; PT awaiting phone back from MD and protocol limitations  Pt is currently wearing sling per MD orders and is not using L UE for any ADLs  She is R hand dominate and therefore has maintained independence but states significant difficulty with dressing, household chores, and sleeping  Pt states that she was wearing the sling at home when she had a strong cough and felt a "pop" in the shoulder which was painful; PT advised pt to report event to MD when she follows up on Friday, 8/3/18  Pt would benefit from skilled therapy services to address outlined impairments, work towards goals, and restore pts PLOF  Thank you!    Understanding of Dx/Px/POC: good   Prognosis: good    Goals  STG: to be achieved within 6 weeks   Decrease pain 25-50%  Improve ROM within MD protocol parameters   Decrease edema > 25%     LTG: to be achieved within 12 weeks   Independent with HEP   ADL function returned to PLOF  L shoulder ROM WFL   L shoulder strength > 4/5 throughout     Plan  Patient would benefit from: skilled physical therapy  Planned modality interventions: cryotherapy, TENS and thermotherapy: hydrocollator packs  Planned therapy interventions: manual therapy, neuromuscular re-education, home exercise program, postural training, strengthening, stretching, therapeutic activities, therapeutic exercise, functional ROM exercises and flexibility  Frequency: 2x week  Duration in weeks: 12  Treatment plan discussed with: patient        Subjective Evaluation    History of Present Illness  Date of surgery: 2018  Mechanism of injury: surgery  Mechanism of injury: Pt reporting that she had been having pain in the L shoulder for 5+ years  She did have an injection several years ago which did help ease pain for a few years  Pt states that pain began to return within the past year with insidious onset  She had another injection and tried conservative physical therapy but neither of them helped to ease her pain this time  Pt states that she followed up with an Orthopedic and had x-rays and MRI taken which showed a RTC tear  She went for surgery on 18 for L shoulder arthroscopy  PT notes that script and diagnosis are consistent with arthroscopy for debridement but pt reporting that she had a RTC repair  PT phoned MD office for clarification and protocol  Pain  At best pain ratin  At worst pain rating: 10  Quality: sharp  Relieving factors: medications    Social Support  Lives with: alone    Hand dominance: right      Diagnostic Tests  X-ray: abnormal  MRI studies: abnormal  Treatments  Previous treatment: injection treatment and physical therapy  Current treatment: physical therapy  Patient Goals  Patient goals for therapy: decreased pain, increased motion and increased strength          Objective     Postural Observations  Seated posture: fair    Additional Postural Observation Details  B rounded shoulders     Observations   Left Shoulder   Positive for edema and incision       Neurological Testing     Sensation     Shoulder   Left Shoulder   Intact: light touch    Right Shoulder   Intact: light touch    Active Range of Motion   Left Shoulder   Flexion: Left shoulder active forward flexion: NT    Abduction: Left shoulder active abduction: NT    External rotation 45°: Left shoulder active external rotation at 45 degrees: NT  Internal rotation 45°: Left shoulder active internal rotation at 45 degrees: NT      Right Shoulder   Normal active range of motion    Left Elbow   Flexion: 135 degrees   Extension: -70 degrees     Right Elbow   Flexion: 135 degrees   Extension: 0 degrees     Additional Active Range of Motion Details  AROM NT per MD protocol limitations     Passive Range of Motion   Left Shoulder   Flexion: 35 degrees   Adduction: 30 degrees   External rotation 45°: Left shoulder passive external rotation at 45 degrees: -10  Internal rotation 45°: Left shoulder passive external rotation at 45 degrees: stomach       Right Shoulder   Normal passive range of motion    Left Elbow   Extension: -40 degrees     General Comments     Shoulder Comments   Pt is R hand dominate   She is sleeping in recliner chair   Wearing sling on L UE; no lifting, no AROM of L shoulder per MD orders      Flowsheet Rows      Most Recent Value   PT/OT G-Codes   Current Score  4   FOTO information reviewed  Yes   Assessment Type  Evaluation   G code set  Other PT/OT Primary   Other PT Primary Current Status ()  CM   Other PT Primary Goal Status ()  CK          Precautions: per MD office via phone 8/2/18; debridement only completed                          L UE WBAT; no restrictions; no protocol   RE Due: 9/1/18  Specialty Daily Treatment Diary     Manual         L shoulder                             Exercise Diary         C/S ROM         Pendulums        Elbow curls        Digi flex        Wrist ROM         Shurgs/Rolls        Scapular retractions        Pulleys: flex/abd        Finger ladder        Table slides                                                                                             Modalities        CP prn

## 2018-08-01 NOTE — TELEPHONE ENCOUNTER
Patient only had subacromial decompression  No protocol needed  Patient allowed all activities as tolerated no restriction  Weight-bearing as tolerated with modalities

## 2018-08-03 ENCOUNTER — OFFICE VISIT (OUTPATIENT)
Dept: OBGYN CLINIC | Facility: CLINIC | Age: 53
End: 2018-08-03

## 2018-08-03 VITALS — HEIGHT: 61 IN | BODY MASS INDEX: 28.51 KG/M2 | WEIGHT: 151 LBS

## 2018-08-03 DIAGNOSIS — Z98.890 STATUS POST ARTHROSCOPY OF LEFT SHOULDER: Primary | ICD-10-CM

## 2018-08-03 PROCEDURE — 99024 POSTOP FOLLOW-UP VISIT: CPT | Performed by: ORTHOPAEDIC SURGERY

## 2018-08-03 NOTE — PATIENT INSTRUCTIONS
Exercises for Shoulder Abduction and Adduction   AMBULATORY CARE:   Shoulder abduction and adduction exercises  work the muscles at the back of your shoulder and your upper back  Before you exercise:  Warm up and stretch before you exercise  Walk or ride a stationary bike for 5 to 10 minutes to help you warm up  Stretching helps increase range of motion  It may also decrease muscle soreness and help prevent another injury  Your healthcare provider will tell you which of the following stretches to do:  · Crossover arm stretch:  Relax your shoulders  Hold your upper arm with the opposite hand  Pull your arm across your chest until you feel a stretch  Hold the stretch for 30 seconds  Return to the starting position  · Shoulder flexion stretch:  Stand facing a wall  Slowly walk your fingers up the wall until you feel a stretch  Hold the stretch for 30 seconds  Return to the starting position  · Sleeper stretch:  Lie on your injured side on a firm, flat surface  Bend the elbow of your injured arm 90° with your hand facing up  Use your arm that is not injured to slowly push your injured arm down  Stop when you feel a stretch at the back of your injured shoulder  Hold the stretch for 30 seconds  Slowly return to the starting position  Contact your healthcare provider if:   · You have sharp or worsening pain during exercise or at rest     · You have questions or concerns about your shoulder exercises  How to exercise with a weight:  Your healthcare provider will tell you how much weight to use  · Shoulder abduction:  Stand and hold a weight in your hand with your palm facing your body  Slowly raise your arm to the side with your thumb pointing up  Then raise your arm over your head as far as you can without pain  Hold this position for as long as directed  Do not raise your arm over your head unless your healthcare provider says it is okay  · Shoulder adduction:  Lie on your back on a firm surface   Extend your arm out to a "T " Bend your elbow so your forearm in the air  Hold a weight in your hand  Slowly raise your arm toward the ceiling and straighten your elbow  Hold this position for as long as directed  Slowly return to the starting position  How to exercise with an exercise band:   · Shoulder abduction:  Wrap the exercise band around a heavy, stable object near your foot  Grab the band with the hand of your injured shoulder  Keep your arm straight  Slowly raise your arm to the side with your thumb pointing up  Then, slowly pull the band over your head as far as you can without pain  Do not raise your arm over your head unless your healthcare provider says it is okay  Do not let your shoulder shrug  Hold this position for as long as directed  Slowly return to the starting position  · Shoulder adduction:  Wrap the exercise band around a heavy, stable object  Stand and face away from where the band is anchored  Hold each end of the band in both hands with your elbows bent  Your elbows should not be behind your body  Keep your arms parallel to the floor and slowly straighten your elbows  Hold this position for as long as directed  Slowly return to the starting position  Follow up with your physical therapist as directed:  Write down your questions so you remember to ask them at your visits  © 2017 [employer] Information is for End User's use only and may not be sold, redistributed or otherwise used for commercial purposes  All illustrations and images included in CareNotes® are the copyrighted property of [employer] , [employer]  or Joao Edmondson  The above information is an  only  It is not intended as medical advice for individual conditions or treatments  Talk to your doctor, nurse or pharmacist before following any medical regimen to see if it is safe and effective for you

## 2018-08-03 NOTE — PROGRESS NOTES
Chief Complaint  Status post left shoulder arthroscopy    History Of Presenting Illness  Kwame Beltran 1965 presents with left shoulder arthroscopy subacromial decompression and resection of AC joint spur procedure done on July 23rd, 2018  Patient presents for 1st postop evaluation  Pain has decreased significantly      Current Medications  Current Outpatient Prescriptions   Medication Sig Dispense Refill    albuterol (VENTOLIN HFA) 90 mcg/act inhaler Inhale 2 puffs every 6 (six) hours as needed      amitriptyline (ELAVIL) 25 mg tablet Take 25 mg by mouth daily at bedtime      aspirin 81 mg chewable tablet Chew 1 tablet daily Stop for the time being for foot surgery       cyclobenzaprine (FLEXERIL) 10 mg tablet Take 1 tablet (10 mg total) by mouth 3 (three) times a day as needed for muscle spasms 30 tablet 0    nitroglycerin (NITROSTAT) 0 4 mg SL tablet Place 1 tablet (0 4 mg total) under the tongue every 5 (five) minutes as needed for chest pain 90 tablet 3    oxyCODONE-acetaminophen (PERCOCET) 5-325 mg per tablet Take 1 tablet by mouth every 4 (four) hours as needed for moderate pain        oxyCODONE-acetaminophen (PERCOCET) 5-325 mg per tablet Take 1 tablet by mouth every 6 (six) hours as needed for moderate pain for up to 30 doses Max Daily Amount: 4 tablets 30 tablet 0    oxyCODONE-acetaminophen (PERCOCET) 5-325 mg per tablet Take 1 tablet by mouth every 4 (four) hours as needed for moderate pain for up to 30 doses Max Daily Amount: 6 tablets 30 tablet 0    pregabalin (LYRICA) 50 mg capsule Take 1 capsule (50 mg total) by mouth 2 (two) times a day for 30 days 90 capsule 2    ranitidine (ZANTAC) 150 mg tablet Take 1 tablet (150 mg total) by mouth 2 (two) times a day 60 tablet 5    rosuvastatin (CRESTOR) 5 mg tablet Take 1 tablet (5 mg total) by mouth daily 90 tablet 3    traMADol (ULTRAM) 50 mg tablet Take 1 tablet (50 mg total) by mouth every 6 (six) hours as needed for moderate pain 30 tablet 0     No current facility-administered medications for this visit          Current Problems    Active Problems:   Patient Active Problem List    Diagnosis Date Noted    Neuroma of foot 06/11/2018    Calcific tendinitis of left shoulder 06/05/2018    Incomplete tear of left rotator cuff 06/05/2018    Myofascial pain syndrome 05/11/2018    Left facial numbness 04/03/2018    Positive KRISTA (antinuclear antibody) 03/26/2018    Rheumatoid factor positive 03/23/2018    PVD (peripheral vascular disease) ([facility] [address] ) 03/16/2018    Headache disorder 03/16/2018    Lumbar spondylosis 03/09/2018    Osteoarthritis of spine with radiculopathy, cervical region 03/09/2018    Degenerative cervical disc 03/09/2018    Lumbar degenerative disc disease 03/09/2018    Bilateral carpal tunnel syndrome 03/09/2018    Acute pain of left shoulder 02/21/2018    Gastro-esophageal reflux disease without esophagitis 11/04/2016    Right lower quadrant pain 11/04/2016    Abnormal weight loss 11/04/2016    Depression with anxiety 11/11/2014    CAD in native artery 07/06/2012    Other hyperlipidemia 07/06/2012    Essential hypertension 07/06/2012         Review of Systems:    General: negative for - chills, fatigue, fever,  weight gain or weight loss  Psychological: negative for - anxiety, behavioral disorder, concentration difficulties      Past Medical History:   Past Medical History:   Diagnosis Date    Acquired ichthyosis     last assessed: 5/9/2013    Anxiety     Cervical radiculopathy     last assessed: 6/13/2014    Colorectal polyps     last assessed: 3/9/2015    COPD (chronic obstructive pulmonary disease) ([facility] [address] )     Depression     Diverticulosis of colon     Foot pain     GERD (gastroesophageal reflux disease)     Hyperlipemia     Hypertension     Myocardial infarction ([facility] [address] )     at age 28    Osteopenia     last assessed: 12/6/2013    Palpitations     last assessed: 12/31/2014    PVD (peripheral vascular disease) ([facility] [address] )     last assessed: 12/3/2012    Scapular dyskinesis     last assessed: 2014    Shortness of breath     Tendonitis of left rotator cuff     last assessed: 2014    Vocal cord polyps     last assessed: 2014       Past Surgical History:   Past Surgical History:   Procedure Laterality Date    ABDOMINAL SURGERY      exploratory    CARDIAC SURGERY      cardiac stent      SECTION      last assessed: 2014    CHOLECYSTECTOMY      last assessed: 2014    COLONOSCOPY  10/27/2014    CORONARY ANGIOPLASTY WITH STENT PLACEMENT  1999    LAD stent    DENTAL SURGERY      last assessed: 2014    ELBOW SURGERY Bilateral     arthroplasty    ESOPHAGOGASTRODUODENOSCOPY      ESOPHAGOGASTRODUODENOSCOPY N/A 2016    Procedure: ESOPHAGOGASTRODUODENOSCOPY (EGD);   Surgeon: Jihan Levin MD;  Location: [facility];  Service:     FOOT SURGERY Right 02/06/2015    x 4    HYSTERECTOMY      last assessed: 2014    AR SHLDR ARTHROSCOP,SURG,W/ROTAT CUFF REPR Left 2018    Procedure: ARTHROSCOPY SHOULDER, subacromial decompression, synovectomy;  Surgeon: Cody Solano MD;  Location: [facility];  Service: Orthopedics    THROAT SURGERY      TOOTH EXTRACTION      TRIGEMINAL NERVE DECOMPRESSION Right 6/15/2018    Procedure: FOOT NEUROPLASTY;  Surgeon: Luisa Nur DPM;  Location: [facility];  Service: Podiatry       Family History:  Family history reviewed and non-contributory  Family History   Problem Relation Age of Onset    No Known Problems Mother     No Known Problems Father     No Known Problems Maternal Grandmother     No Known Problems Maternal Grandfather     No Known Problems Paternal Grandmother     No Known Problems Paternal Grandfather        Social History:  Social History     Social History    Marital status:      Spouse name: N/A    Number of children: N/A    Years of education: N/A     Social History Main Topics    Smoking status: Current Every Day Smoker     Packs/day: 1 50    Smokeless tobacco: Never Used    Alcohol use Yes      Comment: rare    Drug use: No    Sexual activity: Not on file     Other Topics Concern    Not on file     Social History Narrative    No living will       Allergies: Allergies   Allergen Reactions    Ceclor [Cefaclor] Anaphylaxis    Codeine Itching     Reaction Date: [id number];     Ticlid [Ticlopidine] Hives    Penicillins Rash           Physical ExaminationThere were no vitals taken for this visit  Gen: Alert and oriented to person, place, time      Orthopedic Exam  Left shoulder all portal sites healed sutures removed  No distal deficits          Impression  Stable status post left shoulder arthroscopy        Plan    Patient allowed all activities as tolerated  Follow-up in 6 weeks    Hardeep Flores MD        Portions of the record may have been created with voice recognition software   Occasional wrong word or "sound a like" substitutions may have occurred due to the inherent limitations of voice recognition software   Read the chart carefully and recognize, using context, where substitutions have occurred

## 2018-08-07 ENCOUNTER — APPOINTMENT (OUTPATIENT)
Dept: PHYSICAL THERAPY | Facility: HOME HEALTHCARE | Age: 53
End: 2018-08-07
Payer: COMMERCIAL

## 2018-08-08 ENCOUNTER — OFFICE VISIT (OUTPATIENT)
Dept: PHYSICAL THERAPY | Facility: HOME HEALTHCARE | Age: 53
End: 2018-08-08
Payer: COMMERCIAL

## 2018-08-08 DIAGNOSIS — M75.32 CALCIFIC TENDINITIS OF LEFT SHOULDER: Primary | ICD-10-CM

## 2018-08-08 PROCEDURE — 97140 MANUAL THERAPY 1/> REGIONS: CPT

## 2018-08-08 PROCEDURE — 97110 THERAPEUTIC EXERCISES: CPT

## 2018-08-08 NOTE — PROGRESS NOTES
Daily Note     Today's date: 2018  Patient name: Mychal Devine  : 1965  MRN: [id number]  Referring provider: Magy Canela MD  Dx:   Encounter Diagnosis     ICD-10-CM    1  Calcific tendinitis of left shoulder M75 32                   Subjective: Pt reports she saw the Dr on Friday and had her stitches removed  Pt reports Dr told her she doesn't have to wear her sling anymore  Pt reports she told  the Dr she felt a "pop"  in her L bicep two days after surgery when she coughed  Pt reports  didn't say anything when she told him about the "pop" she felt  Objective: See treatment diary below      Assessment: Tolerated treatment fair  Verbal cues needed t/o TE to perform correctly  Pt reports soreness L shoulder/bicep are with TE  ROM deficits noted t/o L shoulder all directions  Patient would benefit from continued PT      Plan: Continue per plan of care         Precautions: per MD office via phone 18; debridement only completed                          L UE WBAT; no restrictions; no protocol   RE Due: 18  Specialty Daily Treatment Diary     Manual  18       L shoulder  10 min                           Exercise Diary  18       C/S ROM  1 x 10 ea       Pendulums 1 x 20 ea       Elbow curls 1 x 20       Digi flex Red x 20        Wrist ROM  1 x 20 ea       Shurgs/Rolls 1 x 20 ea       Scapular retractions 1 x 20       Pulleys: flex/abd 2 min ea       Finger ladder NV       Table slides  1 x 10 ea                                                                                           Modalities 18       CP prn  Pt declined

## 2018-08-09 ENCOUNTER — APPOINTMENT (OUTPATIENT)
Dept: PHYSICAL THERAPY | Facility: HOME HEALTHCARE | Age: 53
End: 2018-08-09
Payer: COMMERCIAL

## 2018-08-13 ENCOUNTER — APPOINTMENT (OUTPATIENT)
Dept: PHYSICAL THERAPY | Facility: HOME HEALTHCARE | Age: 53
End: 2018-08-13
Payer: COMMERCIAL

## 2018-08-15 ENCOUNTER — APPOINTMENT (OUTPATIENT)
Dept: LAB | Facility: MEDICAL CENTER | Age: 53
End: 2018-08-15
Payer: COMMERCIAL

## 2018-08-15 ENCOUNTER — OFFICE VISIT (OUTPATIENT)
Dept: PHYSICAL THERAPY | Facility: HOME HEALTHCARE | Age: 53
End: 2018-08-15
Payer: COMMERCIAL

## 2018-08-15 ENCOUNTER — OFFICE VISIT (OUTPATIENT)
Dept: FAMILY MEDICINE CLINIC | Facility: CLINIC | Age: 53
End: 2018-08-15
Payer: COMMERCIAL

## 2018-08-15 VITALS
SYSTOLIC BLOOD PRESSURE: 112 MMHG | DIASTOLIC BLOOD PRESSURE: 80 MMHG | BODY MASS INDEX: 28.47 KG/M2 | HEIGHT: 61 IN | WEIGHT: 150.8 LBS

## 2018-08-15 DIAGNOSIS — R76.8 POSITIVE ANA (ANTINUCLEAR ANTIBODY): ICD-10-CM

## 2018-08-15 DIAGNOSIS — R76.8 RHEUMATOID FACTOR POSITIVE: ICD-10-CM

## 2018-08-15 DIAGNOSIS — M75.32 CALCIFIC TENDINITIS OF LEFT SHOULDER: Primary | ICD-10-CM

## 2018-08-15 DIAGNOSIS — B18.2 CHRONIC HEPATITIS C WITHOUT HEPATIC COMA (HCC): ICD-10-CM

## 2018-08-15 DIAGNOSIS — B18.2 CHRONIC HEPATITIS C WITHOUT HEPATIC COMA (HCC): Primary | ICD-10-CM

## 2018-08-15 PROCEDURE — 97110 THERAPEUTIC EXERCISES: CPT

## 2018-08-15 PROCEDURE — 36415 COLL VENOUS BLD VENIPUNCTURE: CPT

## 2018-08-15 PROCEDURE — 97140 MANUAL THERAPY 1/> REGIONS: CPT

## 2018-08-15 PROCEDURE — 99213 OFFICE O/P EST LOW 20 MIN: CPT | Performed by: FAMILY MEDICINE

## 2018-08-15 PROCEDURE — 87902 NFCT AGT GNTYP ALYS HEP C: CPT

## 2018-08-15 RX ORDER — CYCLOBENZAPRINE HCL 10 MG
10 TABLET ORAL 3 TIMES DAILY PRN
Qty: 30 TABLET | Refills: 0 | Status: SHIPPED | OUTPATIENT
Start: 2018-08-15 | End: 2018-09-27

## 2018-08-15 NOTE — PROGRESS NOTES
Daily Note     Today's date: 8/15/2018  Patient name: Savage Tejeda  : 1965  MRN: [id number]  Referring provider: Arvind Zuniga MD  Dx:   Encounter Diagnosis     ICD-10-CM    1  Calcific tendinitis of left shoulder M75 32               Subjective: Pt reports constant pain at L ant, post sh that radiates to biceps region  She states that pain causes difficulty with ADL's and interrupts sleep  Objective: See treatment diary below    Assessment: Tolerated treatment fair  Pt reports pain with all TE and with all planes of PROM  Advised pt to ice more frequently at home  Patient would benefit from continued PT  Plan: Cont POC     Precautions: per MD office via phone 18; debridement only completed                          L UE WBAT; no restrictions; no protocol   RE Due: 18  Specialty Daily Treatment Diary      Manual  8/8/18  8-15-18         L shoulder  10 min  10'               Exercise Diary  8/8/18  8-15-18         C/S ROM  1 x 10 ea  1 x 10 ea         Pendulums 1 x 20 ea  1 x 20 ea         Elbow curls 1 x 20  1 x 20         Digi flex Red x 20   Red x 20          Wrist ROM  1 x 20 ea  1 x 20 ea          Shrugs/Rolls 1 x 20 ea  1 x 20 ea         Scapular retractions 1 x 20  1 x 20          Pulleys: flex/abd 2 min ea  2' ea         Finger ladder NV           Table slides  1 x 10 ea  1 x 20 ea                                                                                                                                                           Modalities 8/8/18  8-15-18         CP prn  Pt declined   10'

## 2018-08-15 NOTE — PROGRESS NOTES
Assessment/Plan:  Patient will follow up with GI in October  We will get a hepatitis C RNA genotype  She will follow up with us in 3 months or p r n  No problem-specific Assessment & Plan notes found for this encounter  Diagnoses and all orders for this visit:    Chronic hepatitis C without hepatic coma ([facility] [address] )  -     Hepatitis C genotype; Future  -     Ambulatory referral to Gastroenterology; Future    Rheumatoid factor positive  -     cyclobenzaprine (FLEXERIL) 10 mg tablet; Take 1 tablet (10 mg total) by mouth 3 (three) times a day as needed for muscle spasms    Positive KRISTA (antinuclear antibody)  -     cyclobenzaprine (FLEXERIL) 10 mg tablet; Take 1 tablet (10 mg total) by mouth 3 (three) times a day as needed for muscle spasms          Subjective:      Patient ID: Rad Mckay is a 48 y o  female  Patient here today for follow-up on her blood work  Rheumatology did hepatitis C on her and found out she is positive  Her viral load is high  Her liver enzymes are normal         The following portions of the patient's history were reviewed and updated as appropriate:   She  has a past medical history of Acquired ichthyosis; Anxiety; Cervical radiculopathy; Colorectal polyps; COPD (chronic obstructive pulmonary disease) ([address] ); Depression; Diverticulosis of colon; Foot pain; GERD (gastroesophageal reflux disease); Hyperlipemia; Hypertension; Myocardial infarction ([address] ); Osteopenia; Palpitations; PVD (peripheral vascular disease) ([address] ); Scapular dyskinesis; Shortness of breath; Tendonitis of left rotator cuff; and Vocal cord polyps    She   Patient Active Problem List    Diagnosis Date Noted    Chronic hepatitis C without hepatic coma ([facility] [address] ) 08/15/2018    Neuroma of foot 06/11/2018    Calcific tendinitis of left shoulder 06/05/2018    Incomplete tear of left rotator cuff 06/05/2018    Myofascial pain syndrome 05/11/2018    Left facial numbness 04/03/2018    Positive KRISTA (antinuclear antibody) 2018    Rheumatoid factor positive 2018    PVD [facility]peripheral vascular disease) ([facility] [address] ) 2018    Headache disorder 2018    Lumbar spondylosis 2018    Osteoarthritis of spine with radiculopathy, cervical region 2018    Degenerative cervical disc 2018    Lumbar degenerative disc disease 2018    Bilateral carpal tunnel syndrome 2018    Acute pain of left shoulder 2018    Gastro-esophageal reflux disease without esophagitis 2016    Right lower quadrant pain 2016    Abnormal weight loss 2016    Depression with anxiety 2014    CAD in native artery 2012    Other hyperlipidemia 2012    Essential hypertension 2012     She  has a past surgical history that includes Foot surgery (Right, 2015); Hysterectomy; Cholecystectomy;  section; Cardiac surgery; Throat surgery; Elbow surgery (Bilateral); Abdominal surgery; Esophagogastroduodenoscopy; Colonoscopy (10/27/2014); Tooth extraction; Esophagogastroduodenoscopy (N/A, 2016); Dental surgery; Coronary angioplasty with stent (); Trigeminal nerve decompression (Right, 6/15/2018); and pr shldr arthroscop,surg,w/rotat cuff repr (Left, 2018)  Her family history includes No Known Problems in her father, maternal grandfather, maternal grandmother, mother, paternal grandfather, and paternal grandmother  She  reports that she has been smoking  She has been smoking about 1 50 packs per day  She has never used smokeless tobacco  She reports that she drinks alcohol  She reports that she does not use drugs    Current Outpatient Prescriptions   Medication Sig Dispense Refill    albuterol (VENTOLIN HFA) 90 mcg/act inhaler Inhale 2 puffs every 6 (six) hours as needed      amitriptyline (ELAVIL) 25 mg tablet Take 25 mg by mouth daily at bedtime      aspirin 81 mg chewable tablet Chew 1 tablet daily Stop for the time being for foot surgery       cyclobenzaprine (FLEXERIL) 10 mg tablet Take 1 tablet (10 mg total) by mouth 3 (three) times a day as needed for muscle spasms 30 tablet 0    nitroglycerin (NITROSTAT) 0 4 mg SL tablet Place 1 tablet (0 4 mg total) under the tongue every 5 (five) minutes as needed for chest pain 90 tablet 3    oxyCODONE-acetaminophen (PERCOCET) 5-325 mg per tablet Take 1 tablet by mouth every 6 (six) hours as needed for moderate pain for up to 30 doses Max Daily Amount: 4 tablets 30 tablet 0    ranitidine (ZANTAC) 150 mg tablet Take 1 tablet (150 mg total) by mouth 2 (two) times a day 60 tablet 5    rosuvastatin (CRESTOR) 5 mg tablet Take 1 tablet (5 mg total) by mouth daily 90 tablet 3    oxyCODONE-acetaminophen (PERCOCET) 5-325 mg per tablet Take 1 tablet by mouth every 4 (four) hours as needed for moderate pain   oxyCODONE-acetaminophen (PERCOCET) 5-325 mg per tablet Take 1 tablet by mouth every 4 (four) hours as needed for moderate pain for up to 30 doses Max Daily Amount: 6 tablets (Patient not taking: Reported on 8/15/2018 ) 30 tablet 0    pregabalin (LYRICA) 50 mg capsule Take 1 capsule (50 mg total) by mouth 2 (two) times a day for 30 days 90 capsule 2    traMADol (ULTRAM) 50 mg tablet Take 1 tablet (50 mg total) by mouth every 6 (six) hours as needed for moderate pain (Patient not taking: Reported on 8/15/2018 ) 30 tablet 0     No current facility-administered medications for this visit        Current Outpatient Prescriptions on File Prior to Visit   Medication Sig    albuterol (VENTOLIN HFA) 90 mcg/act inhaler Inhale 2 puffs every 6 (six) hours as needed    amitriptyline (ELAVIL) 25 mg tablet Take 25 mg by mouth daily at bedtime    aspirin 81 mg chewable tablet Chew 1 tablet daily Stop for the time being for foot surgery     nitroglycerin (NITROSTAT) 0 4 mg SL tablet Place 1 tablet (0 4 mg total) under the tongue every 5 (five) minutes as needed for chest pain    oxyCODONE-acetaminophen (PERCOCET) 5-325 mg per tablet Take 1 tablet by mouth every 6 (six) hours as needed for moderate pain for up to 30 doses Max Daily Amount: 4 tablets    ranitidine (ZANTAC) 150 mg tablet Take 1 tablet (150 mg total) by mouth 2 (two) times a day    rosuvastatin (CRESTOR) 5 mg tablet Take 1 tablet (5 mg total) by mouth daily    [DISCONTINUED] cyclobenzaprine (FLEXERIL) 10 mg tablet Take 1 tablet (10 mg total) by mouth 3 (three) times a day as needed for muscle spasms    oxyCODONE-acetaminophen (PERCOCET) 5-325 mg per tablet Take 1 tablet by mouth every 4 (four) hours as needed for moderate pain   oxyCODONE-acetaminophen (PERCOCET) 5-325 mg per tablet Take 1 tablet by mouth every 4 (four) hours as needed for moderate pain for up to 30 doses Max Daily Amount: 6 tablets (Patient not taking: Reported on 8/15/2018 )    pregabalin (LYRICA) 50 mg capsule Take 1 capsule (50 mg total) by mouth 2 (two) times a day for 30 days    traMADol (ULTRAM) 50 mg tablet Take 1 tablet (50 mg total) by mouth every 6 (six) hours as needed for moderate pain (Patient not taking: Reported on 8/15/2018 )     No current facility-administered medications on file prior to visit  She is allergic to ceclor [cefaclor]; codeine; ticlid [ticlopidine]; and penicillins       Review of Systems   Constitutional: Negative  Respiratory: Negative  Cardiovascular: Negative  Gastrointestinal:        As per HPI   Genitourinary: Negative  Objective:      /80   Ht 5' 1" (1 549 m)   Wt 68 4 kg (150 lb 12 8 oz)   BMI 28 49 kg/m²          Physical Exam   Constitutional: She is oriented to person, place, and time  She appears well-developed and well-nourished  No distress  Cardiovascular: Normal rate, regular rhythm and normal heart sounds  Exam reveals no gallop and no friction rub  No murmur heard  Pulmonary/Chest: Effort normal and breath sounds normal  No respiratory distress  She has no wheezes  She has no rales     Musculoskeletal: She exhibits no edema  Neurological: She is alert and oriented to person, place, and time  Skin: She is not diaphoretic  Psychiatric: She has a normal mood and affect  Her behavior is normal  Judgment and thought content normal    Vitals reviewed

## 2018-08-16 ENCOUNTER — OFFICE VISIT (OUTPATIENT)
Dept: PHYSICAL THERAPY | Facility: HOME HEALTHCARE | Age: 53
End: 2018-08-16
Payer: COMMERCIAL

## 2018-08-16 DIAGNOSIS — M75.32 CALCIFIC TENDINITIS OF LEFT SHOULDER: Primary | ICD-10-CM

## 2018-08-16 PROCEDURE — 97110 THERAPEUTIC EXERCISES: CPT

## 2018-08-16 PROCEDURE — 97140 MANUAL THERAPY 1/> REGIONS: CPT

## 2018-08-16 NOTE — PROGRESS NOTES
Daily Note     Today's date: 2018  Patient name: Amos Faust  : 1965  MRN: [id number]  Referring provider: Edward Eisenberg MD  Dx:   Encounter Diagnosis     ICD-10-CM    1  Calcific tendinitis of left shoulder M75 32               Subjective: My sh feels about the same  No difference noticed  Objective: See treatment diary below    Assessment: Tolerated treatment fair  Pt remains with report of entire sh to biceps/elbow pain with all TE and MT  Reports relief with CP at end  Patient would benefit from continued PT    Plan: Continue per plan of care     Precautions: per MD office via phone 18; debridement only completed                          L UE WBAT; no restrictions; no protocol   RE Due: 18  Specialty Daily Treatment Diary      Manual  8/8/18  8-15-18  8-16-18       L shoulder  10 min  10'  10'             Exercise Diary  8/8/18  8-15-18  [id number]       C/S ROM  1 x 10 ea  1 x 10 ea  1 x 10 ea         Pendulums 1 x 20 ea  1 x 20 ea  1 x  10 ea       Elbow curls 1 x 20  1 x 20  1 x 20       Digi flex Red x 20   Red x 20   Red x 20 ea       Wrist ROM  1 x 20 ea  1 x 20 ea   1 x 20       Shrugs/Rolls 1 x 20 ea  1 x 20 ea  1 x 20       Scapular retractions 1 x 20  1 x 20   1 x 20       Pulleys: flex/abd 2 min ea  2' ea  2' ea       Finger ladder NV    x 3 ea       Table slides  1 x 10 ea  1 x 20 ea  1 x 20 ea                                         Modalities 8/8/18  8-15-18  9-96-22       CP prn  Pt declined   10'  10'

## 2018-08-20 ENCOUNTER — OFFICE VISIT (OUTPATIENT)
Dept: PHYSICAL THERAPY | Facility: HOME HEALTHCARE | Age: 53
End: 2018-08-20
Payer: COMMERCIAL

## 2018-08-20 DIAGNOSIS — M75.32 CALCIFIC TENDINITIS OF LEFT SHOULDER: Primary | ICD-10-CM

## 2018-08-20 LAB
HCV GENTYP SERPL NAA+PROBE: NORMAL
HCV PLEASE NOTE: NORMAL

## 2018-08-20 PROCEDURE — 97110 THERAPEUTIC EXERCISES: CPT

## 2018-08-20 PROCEDURE — 97140 MANUAL THERAPY 1/> REGIONS: CPT

## 2018-08-20 NOTE — PROGRESS NOTES
Daily Note     Today's date: 2018  Patient name: Bri Sims  : 1965  MRN: [id number]  Referring provider: Eduin Pritchard MD  Dx:   Encounter Diagnosis     ICD-10-CM    1  Calcific tendinitis of left shoulder M75 32               Subjective: I have constant pain at my R sh and into the biceps  I hardly slept last night  I also have swelling at my elbow area  Objective: See treatment diary below    Assessment: Tolerated treatment fair  Pt with pain of 8/10 upon arrival  Pt was able to complete TE with less rest periods but continues to report pain t/o  PT session  Advised pt to contact Dr concerning pain levels  Reports use of CP more frequently at home  Patient would benefit from continued PT    Plan: Continue per plan of care     Precautions: per MD office via phone 18; debridement only completed                          L UE WBAT; no restrictions; no protocol   RE Due: 18  Specialty Daily Treatment Diary      Manual  8/8/18  8-15-18  8-16-18  8-20-18     L shoulder  10 min  10'  10'  10'           Exercise Diary  8/8/18  8-15-18  8-16-18  8-20-18     C/S ROM  1 x 10 ea  1 x 10 ea  1 x 10 ea    1 x 10 ea     Pendulums 1 x 20 ea  1 x 20 ea  1 x  10 ea  1 x 20 ea     Elbow curls 1 x 20  1 x 20  1 x 20  1 x 20      Digi flex Red x 20   Red x 20   Red x 20 ea  Red    1 x 20      Wrist ROM  1 x 20 ea  1 x 20 ea   1 x 20  1 x 20     Shrugs/Rolls 1 x 20 ea  1 x 20 ea  1 x 20  1 x 20     Scapular retractions 1 x 20  1 x 20   1 x 20  1 x 20     Pulleys: flex/abd 2 min ea  2' ea  2' ea  2' ea       Finger ladder NV    x 3 ea  x 3     Table slides  1 x 10 ea  1 x 20 ea  1 x 20 ea  1 x 20 ea                                        Modalities 8/8/18  8-15-18  3-10-47  18     CP prn  Pt declined   10'  10'  10'

## 2018-08-22 ENCOUNTER — APPOINTMENT (OUTPATIENT)
Dept: PHYSICAL THERAPY | Facility: HOME HEALTHCARE | Age: 53
End: 2018-08-22
Payer: COMMERCIAL

## 2018-08-24 ENCOUNTER — OFFICE VISIT (OUTPATIENT)
Dept: PHYSICAL THERAPY | Facility: HOME HEALTHCARE | Age: 53
End: 2018-08-24
Payer: COMMERCIAL

## 2018-08-24 DIAGNOSIS — M75.32 CALCIFIC TENDINITIS OF LEFT SHOULDER: Primary | ICD-10-CM

## 2018-08-24 PROCEDURE — 97110 THERAPEUTIC EXERCISES: CPT

## 2018-08-24 NOTE — PROGRESS NOTES
Daily Note     Today's date: 2018  Patient name: Joe Nash  : 1965  MRN: [id number]  Referring provider: Kae Ohara MD  Dx:   Encounter Diagnosis     ICD-10-CM    1  Calcific tendinitis of left shoulder M75 32               Subjective: Pt reports she is having a lot of pain in her  L shoulder to bicep and into her forearm  Pt reports she has had this pain since surgery  Pt reports she messaged the Dr on my chart yesterday and is waiting to hear back from him  Objective: See treatment diary below      Assessment: Tolerated treatment fair  Pt able to tolerate all TE but declined manual therapy due to pain  A few verbal cues needed t/o TE to perform correctly  Patient would benefit from continued PT      Plan: Continue per plan of care  Will await any Dr recommendations       Precautions: per MD office via phone 18; debridement only completed                          L UE WBAT; no restrictions; no protocol   RE Due: 18  Specialty Daily Treatment Diary      Manual  8/8/18  8-15-18  0-76-95  18   L shoulder  10 min  10'  10'  10' Hold           Exercise Diary  18  0-02-06  7-87-73  18   C/S ROM  1 x 10 ea  1 x 10 ea  1 x 10 ea    1 x 10 ea  1 x 10 ea   Pendulums 1 x 20 ea  1 x 20 ea  1 x  10 ea  1 x 20 ea  1 x 20 ea   Elbow curls 1 x 20  1 x 20  1 x 20  1 x 20   1 x 20   Digi flex Red x 20   Red x 20   Red x 20 ea  Red    1 x 20   Red     1 x 20   Wrist ROM  1 x 20 ea  1 x 20 ea   1 x 20  1 x 20  1 x 20   Shrugs/Rolls 1 x 20 ea  1 x 20 ea  1 x 20  1 x 20  1 x 20 ea    Scapular retractions 1 x 20  1 x 20   1 x 20  1 x 20  1 x 20   Pulleys: flex/abd 2 min ea  2' ea  2' ea  2' ea    2 min ea   Finger ladder NV    x 3 ea  x 3  x 3 ea    Table slides  1 x 10 ea  1 x 20 ea  1 x 20 ea  1 x 20 ea   1 x 20 ea                                      Modalities 8/8/18  8-15-18  7-43-78  18   CP prn  Pt declined   10'  10'  10'  10 min

## 2018-08-27 ENCOUNTER — APPOINTMENT (OUTPATIENT)
Dept: PHYSICAL THERAPY | Facility: HOME HEALTHCARE | Age: 53
End: 2018-08-27
Payer: COMMERCIAL

## 2018-08-30 ENCOUNTER — APPOINTMENT (OUTPATIENT)
Dept: PHYSICAL THERAPY | Facility: HOME HEALTHCARE | Age: 53
End: 2018-08-30
Payer: COMMERCIAL

## 2018-09-04 ENCOUNTER — OFFICE VISIT (OUTPATIENT)
Dept: OBGYN CLINIC | Facility: CLINIC | Age: 53
End: 2018-09-04

## 2018-09-04 ENCOUNTER — APPOINTMENT (OUTPATIENT)
Dept: RADIOLOGY | Facility: MEDICAL CENTER | Age: 53
End: 2018-09-04
Payer: COMMERCIAL

## 2018-09-04 ENCOUNTER — APPOINTMENT (OUTPATIENT)
Dept: LAB | Facility: MEDICAL CENTER | Age: 53
End: 2018-09-04
Payer: COMMERCIAL

## 2018-09-04 VITALS
BODY MASS INDEX: 28.32 KG/M2 | HEART RATE: 97 BPM | HEIGHT: 61 IN | DIASTOLIC BLOOD PRESSURE: 76 MMHG | SYSTOLIC BLOOD PRESSURE: 109 MMHG | WEIGHT: 150 LBS

## 2018-09-04 DIAGNOSIS — M54.12 CERVICAL RADICULOPATHY: ICD-10-CM

## 2018-09-04 DIAGNOSIS — M67.922 TENDINOPATHY OF LEFT BICEPS: ICD-10-CM

## 2018-09-04 DIAGNOSIS — M19.012 ARTHRITIS OF LEFT ACROMIOCLAVICULAR JOINT: Primary | ICD-10-CM

## 2018-09-04 DIAGNOSIS — E78.5 HYPERLIPIDEMIA, UNSPECIFIED HYPERLIPIDEMIA TYPE: ICD-10-CM

## 2018-09-04 DIAGNOSIS — M19.012 ARTHRITIS OF LEFT ACROMIOCLAVICULAR JOINT: ICD-10-CM

## 2018-09-04 LAB
ALBUMIN SERPL BCP-MCNC: 3.8 G/DL (ref 3.5–5)
ALP SERPL-CCNC: 64 U/L (ref 46–116)
ALT SERPL W P-5'-P-CCNC: 28 U/L (ref 12–78)
ANION GAP SERPL CALCULATED.3IONS-SCNC: 4 MMOL/L (ref 4–13)
AST SERPL W P-5'-P-CCNC: 22 U/L (ref 5–45)
BASOPHILS # BLD AUTO: 0.04 THOUSANDS/ΜL (ref 0–0.1)
BASOPHILS NFR BLD AUTO: 1 % (ref 0–1)
BILIRUB SERPL-MCNC: 0.42 MG/DL (ref 0.2–1)
BUN SERPL-MCNC: 6 MG/DL (ref 5–25)
CALCIUM SERPL-MCNC: 8.8 MG/DL (ref 8.3–10.1)
CHLORIDE SERPL-SCNC: 103 MMOL/L (ref 100–108)
CHOLEST SERPL-MCNC: 177 MG/DL (ref 50–200)
CO2 SERPL-SCNC: 29 MMOL/L (ref 21–32)
CREAT SERPL-MCNC: 0.63 MG/DL (ref 0.6–1.3)
CRP SERPL QL: 3.4 MG/L
EOSINOPHIL # BLD AUTO: 0.21 THOUSAND/ΜL (ref 0–0.61)
EOSINOPHIL NFR BLD AUTO: 3 % (ref 0–6)
ERYTHROCYTE [DISTWIDTH] IN BLOOD BY AUTOMATED COUNT: 12.5 % (ref 11.6–15.1)
ERYTHROCYTE [SEDIMENTATION RATE] IN BLOOD: 14 MM/HOUR (ref 0–20)
GFR SERPL CREATININE-BSD FRML MDRD: 103 ML/MIN/1.73SQ M
GLUCOSE P FAST SERPL-MCNC: 89 MG/DL (ref 65–99)
HCT VFR BLD AUTO: 44.4 % (ref 34.8–46.1)
HDLC SERPL-MCNC: 49 MG/DL (ref 40–60)
HGB BLD-MCNC: 14.6 G/DL (ref 11.5–15.4)
IMM GRANULOCYTES # BLD AUTO: 0.01 THOUSAND/UL (ref 0–0.2)
IMM GRANULOCYTES NFR BLD AUTO: 0 % (ref 0–2)
LDLC SERPL CALC-MCNC: 101 MG/DL (ref 0–100)
LYMPHOCYTES # BLD AUTO: 2.74 THOUSANDS/ΜL (ref 0.6–4.47)
LYMPHOCYTES NFR BLD AUTO: 39 % (ref 14–44)
MCH RBC QN AUTO: 33.3 PG (ref 26.8–34.3)
MCHC RBC AUTO-ENTMCNC: 32.9 G/DL (ref 31.4–37.4)
MCV RBC AUTO: 101 FL (ref 82–98)
MONOCYTES # BLD AUTO: 0.45 THOUSAND/ΜL (ref 0.17–1.22)
MONOCYTES NFR BLD AUTO: 6 % (ref 4–12)
NEUTROPHILS # BLD AUTO: 3.58 THOUSANDS/ΜL (ref 1.85–7.62)
NEUTS SEG NFR BLD AUTO: 51 % (ref 43–75)
NRBC BLD AUTO-RTO: 0 /100 WBCS
PLATELET # BLD AUTO: 233 THOUSANDS/UL (ref 149–390)
PMV BLD AUTO: 10.8 FL (ref 8.9–12.7)
POTASSIUM SERPL-SCNC: 3.9 MMOL/L (ref 3.5–5.3)
PROT SERPL-MCNC: 7.7 G/DL (ref 6.4–8.2)
RBC # BLD AUTO: 4.39 MILLION/UL (ref 3.81–5.12)
SODIUM SERPL-SCNC: 136 MMOL/L (ref 136–145)
TRIGL SERPL-MCNC: 135 MG/DL
WBC # BLD AUTO: 7.03 THOUSAND/UL (ref 4.31–10.16)

## 2018-09-04 PROCEDURE — 86430 RHEUMATOID FACTOR TEST QUAL: CPT

## 2018-09-04 PROCEDURE — 86431 RHEUMATOID FACTOR QUANT: CPT

## 2018-09-04 PROCEDURE — 80053 COMPREHEN METABOLIC PANEL: CPT

## 2018-09-04 PROCEDURE — 85652 RBC SED RATE AUTOMATED: CPT

## 2018-09-04 PROCEDURE — 80061 LIPID PANEL: CPT

## 2018-09-04 PROCEDURE — 99024 POSTOP FOLLOW-UP VISIT: CPT | Performed by: ORTHOPAEDIC SURGERY

## 2018-09-04 PROCEDURE — 73030 X-RAY EXAM OF SHOULDER: CPT

## 2018-09-04 PROCEDURE — 86618 LYME DISEASE ANTIBODY: CPT

## 2018-09-04 PROCEDURE — 85025 COMPLETE CBC W/AUTO DIFF WBC: CPT

## 2018-09-04 PROCEDURE — 86140 C-REACTIVE PROTEIN: CPT

## 2018-09-04 PROCEDURE — 36415 COLL VENOUS BLD VENIPUNCTURE: CPT

## 2018-09-04 NOTE — PROGRESS NOTES
Chief Complaint  Left shoulder pain status post arthroscopy    History Of Presenting Illness  Enrique Dempsey 1965 presents with left shoulder pain status post arthroscopy  Patient underwent an arthroscopic debridement the left shoulder July 23rd, 2018  Patient tells me a pain in the shoulders increased the surgery  Patient also has developed pain in front of the shoulder on the biceps tendon  The pain at times radiates into the left hand  Patient had history of previous cervical radiculopathy treated by pain management  Patient tells me symptoms are worsened after the surgery  Not getting better with physical therapy      Current Medications  Current Outpatient Prescriptions   Medication Sig Dispense Refill    albuterol (VENTOLIN HFA) 90 mcg/act inhaler Inhale 2 puffs every 6 (six) hours as needed      amitriptyline (ELAVIL) 25 mg tablet Take 25 mg by mouth daily at bedtime      aspirin 81 mg chewable tablet Chew 1 tablet daily Stop for the time being for foot surgery       cyclobenzaprine (FLEXERIL) 10 mg tablet Take 1 tablet (10 mg total) by mouth 3 (three) times a day as needed for muscle spasms 30 tablet 0    nitroglycerin (NITROSTAT) 0 4 mg SL tablet Place 1 tablet (0 4 mg total) under the tongue every 5 (five) minutes as needed for chest pain 90 tablet 3    oxyCODONE-acetaminophen (PERCOCET) 5-325 mg per tablet Take 1 tablet by mouth every 4 (four) hours as needed for moderate pain        oxyCODONE-acetaminophen (PERCOCET) 5-325 mg per tablet Take 1 tablet by mouth every 6 (six) hours as needed for moderate pain for up to 30 doses Max Daily Amount: 4 tablets 30 tablet 0    oxyCODONE-acetaminophen (PERCOCET) 5-325 mg per tablet Take 1 tablet by mouth every 4 (four) hours as needed for moderate pain for up to 30 doses Max Daily Amount: 6 tablets 30 tablet 0    ranitidine (ZANTAC) 150 mg tablet Take 1 tablet (150 mg total) by mouth 2 (two) times a day 60 tablet 5    rosuvastatin (CRESTOR) 5 mg tablet Take 1 tablet (5 mg total) by mouth daily 90 tablet 3    traMADol (ULTRAM) 50 mg tablet Take 1 tablet (50 mg total) by mouth every 6 (six) hours as needed for moderate pain 30 tablet 0    pregabalin (LYRICA) 50 mg capsule Take 1 capsule (50 mg total) by mouth 2 (two) times a day for 30 days 90 capsule 2     No current facility-administered medications for this visit          Current Problems    Active Problems:   Patient Active Problem List    Diagnosis Date Noted    [facility] ) 08/15/2018    Neuroma of foot 06/11/2018    Calcific tendinitis of left shoulder 06/05/2018    Incomplete tear of left rotator cuff 06/05/2018    Myofascial pain syndrome 05/11/2018    Left facial numbness 04/03/2018    Positive KRISTA (antinuclear antibody) 03/26/2018    Rheumatoid factor positive 03/23/2018    PVD [facility]peripheral vascular disease) ([facility] ) 03/16/2018    Headache disorder 03/16/2018    Lumbar spondylosis 03/09/2018    Osteoarthritis of spine with radiculopathy, cervical region 03/09/2018    Degenerative cervical disc 03/09/2018    Lumbar degenerative disc disease 03/09/2018    Bilateral carpal tunnel syndrome 03/09/2018    Acute pain of left shoulder 02/21/2018    Gastro-esophageal reflux disease without esophagitis 11/04/2016    Right lower quadrant pain 11/04/2016    Abnormal weight loss 11/04/2016    Depression with anxiety 11/11/2014    CAD in native artery 07/06/2012    Other hyperlipidemia 07/06/2012    Essential hypertension 07/06/2012         Review of Systems:    General: negative for - chills, fatigue, fever,  weight gain or weight loss  Psychological: negative for - anxiety, behavioral disorder, concentration difficulties    Past Medical History:   Past Medical History:   Diagnosis Date    Acquired ichthyosis     last assessed: 5/9/2013    Anxiety     Cervical radiculopathy     last assessed: 6/13/2014    Colorectal polyps     last assessed: 3/9/2015    COPD [facility]ch[facility]     Depression     Diverticulosis of colon     Foot pain     GERD (gastroesophageal reflux disease)     Hyperlipemia     Hypertension     Myocardial infarction [facility])     at age 28    Osteopenia     last assessed: 2013    Palpitations     last assessed: 2014    PVD (peripheral vascular disease) ([address] )     last assessed: 12/3/2012    Scapular dyskinesis     last assessed: 2014    Shortness of breath     Tendonitis of left rotator cuff     last assessed: 2014    Vocal cord polyps     last assessed: 2014       Past Surgical History:   Past Surgical History:   Procedure Laterality Date    ABDOMINAL SURGERY      exploratory    CARDIAC SURGERY      cardiac stent      SECTION      last assessed: 2014    CHOLECYSTECTOMY      last assessed: 2014    COLONOSCOPY  10/27/2014    CORONARY ANGIOPLASTY WITH STENT PLACEMENT      LAD stent    DENTAL SURGERY      last assessed: 2014    ELBOW SURGERY Bilateral     arthroplasty    ESOPHAGOGASTRODUODENOSCOPY      ESOPHAGOGASTRODUODENOSCOPY N/A 2016    Procedure: ESOPHAGOGASTRODUODENOSCOPY (EGD);   Surgeon: Zaira Tyler MD;  [facility];  Service:     FOOT SURGERY Right 02/06/2015    x 4    HYSTERECTOMY      last assessed: 2014    SD SHLDR ARTHROSCOP,SURG,W/ROTAT CUFF REPR Left 2018    Procedure: ARTHROSCOPY SHOULDER, subacromial decompression, synovectomy;  Surgeon: Hardeep Flores MD;  [facility]: [facility];  Service: Orthopedics    THROAT SURGERY      TOOTH EXTRACTION      TRIGEMINAL NERVE DECOMPRESSION Right 6/15/2018    Procedure: FOOT NEUROPLASTY;  Surgeon: Asael Oliveira DPM;  [facility]: [facility];  Service: Podiatry       Family History:  Family history reviewed and non-contributory  Family History   Problem Relation Age of Onset    No Known Problems Mother     No Known Problems Father     No Known Problems Maternal Grandmother     No Known Problems Maternal Grandfather     No Known Problems Paternal Grandmother     No Known Problems Paternal Grandfather        Social History:  Social History     Social History    Marital status:      Spouse name: N/A    Number of children: N/A    Years of education: N/A     Social History Main Topics    Smoking status: Current Every Day Smoker     Packs/day: 1 50    Smokeless tobacco: Never Used    Alcohol use Yes      Comment: rare    Drug use: No    Sexual activity: Not Asked     Other Topics Concern    None     Social History Narrative    No living will       Allergies:    Allergies   Allergen Reactions    Ceclor [Cefaclor] Anaphylaxis    Codeine Itching     Reaction Date: [id number];     Ticlid [Ticlopidine] Hives    Penicillins Rash           Physical ExaminationBP 109/76   Pulse 97   Ht 5' 1" (1 549 m)   Wt 68 kg (150 lb)   BMI 28 34 kg/m²   Gen: Alert and oriented to person, place, time  HEENT: EOMI, eyes clear, moist mucus membranes, hearing intact      Orthopedic Exam  Left shoulder portal sites heel no evidence of infection  Tenderness present over the bursa with cuff strength 4/5  Signs of impingement positive  Tenderness present anterior bicipital groove  No distal deficits          Impression  Left shoulder pain status post surgery possibly due to cervical pathology and biceps pathology        Plan    Discussed treatment with the patient  Will arrange for patient to get an MRI of the cervical spine a bone scan and EMG studies  Patient also get routine lab tests including Lyme titer sed rate CRP and rheumatoid factor  Follow-up in 4-6 weeks  Patient referred back to the [facility]    Monica Sweet MD        Portions of the record may have been created with voice recognition software   Occasional wrong word or "sound a like" substitutions may have occurred due to the inherent limitations of voice recognition software   Read the chart carefully and recognize, using context, where substitutions have occurred

## 2018-09-05 LAB
B BURGDOR IGG SER IA-ACNC: 0.11
B BURGDOR IGM SER IA-ACNC: 0.18
CRYOGLOB RF SER-ACNC: ABNORMAL [IU]/ML
RHEUMATOID FACT SER QL LA: POSITIVE

## 2018-09-06 DIAGNOSIS — M05.719 RHEUMATOID ARTHRITIS INVOLVING SHOULDER WITH POSITIVE RHEUMATOID FACTOR, UNSPECIFIED LATERALITY (HCC): Primary | ICD-10-CM

## 2018-09-10 ENCOUNTER — HOSPITAL ENCOUNTER (OUTPATIENT)
Dept: MRI IMAGING | Facility: HOSPITAL | Age: 53
Discharge: HOME | End: 2018-09-10
Attending: ORTHOPAEDIC SURGERY
Payer: COMMERCIAL

## 2018-09-10 ENCOUNTER — HOSPITAL ENCOUNTER (OUTPATIENT)
Dept: NUCLEAR MEDICINE | Facility: HOSPITAL | Age: 53
Discharge: HOME | End: 2018-09-10
Attending: ORTHOPAEDIC SURGERY
Payer: COMMERCIAL

## 2018-09-10 DIAGNOSIS — M67.922 TENDINOPATHY OF LEFT BICEPS: ICD-10-CM

## 2018-09-10 DIAGNOSIS — M54.12 CERVICAL RADICULOPATHY: ICD-10-CM

## 2018-09-10 DIAGNOSIS — M19.012 ARTHRITIS OF LEFT ACROMIOCLAVICULAR JOINT: ICD-10-CM

## 2018-09-10 PROCEDURE — 78315 BONE IMAGING 3 PHASE: CPT

## 2018-09-10 PROCEDURE — A9503 TC99M MEDRONATE: HCPCS

## 2018-09-10 PROCEDURE — 72141 MRI NECK SPINE W/O DYE: CPT

## 2018-09-18 ENCOUNTER — TELEPHONE (OUTPATIENT)
Dept: GASTROENTEROLOGY | Facility: HOSPITAL | Age: 53
End: 2018-09-18

## 2018-09-18 ENCOUNTER — TELEPHONE (OUTPATIENT)
Dept: OBGYN CLINIC | Facility: CLINIC | Age: 53
End: 2018-09-18

## 2018-09-18 ENCOUNTER — OFFICE VISIT (OUTPATIENT)
Dept: GASTROENTEROLOGY | Facility: HOSPITAL | Age: 53
End: 2018-09-18
Payer: COMMERCIAL

## 2018-09-18 VITALS
WEIGHT: 149.8 LBS | TEMPERATURE: 96.7 F | SYSTOLIC BLOOD PRESSURE: 96 MMHG | BODY MASS INDEX: 28.28 KG/M2 | HEIGHT: 61 IN | DIASTOLIC BLOOD PRESSURE: 63 MMHG | HEART RATE: 85 BPM

## 2018-09-18 DIAGNOSIS — K58.0 IRRITABLE BOWEL SYNDROME WITH DIARRHEA: ICD-10-CM

## 2018-09-18 DIAGNOSIS — K62.1 COLORECTAL POLYPS: Primary | ICD-10-CM

## 2018-09-18 DIAGNOSIS — K21.9 GASTRO-ESOPHAGEAL REFLUX DISEASE WITHOUT ESOPHAGITIS: ICD-10-CM

## 2018-09-18 DIAGNOSIS — D75.89 MACROCYTIC: ICD-10-CM

## 2018-09-18 DIAGNOSIS — M75.40 IMPINGEMENT SYNDROME OF SHOULDER REGION, UNSPECIFIED LATERALITY: Primary | ICD-10-CM

## 2018-09-18 DIAGNOSIS — K63.5 COLORECTAL POLYPS: Primary | ICD-10-CM

## 2018-09-18 DIAGNOSIS — Z98.890 STATUS POST ARTHROSCOPY OF LEFT SHOULDER: ICD-10-CM

## 2018-09-18 DIAGNOSIS — B18.2 CHRONIC HEPATITIS C WITHOUT HEPATIC COMA (HCC): ICD-10-CM

## 2018-09-18 PROCEDURE — 99214 OFFICE O/P EST MOD 30 MIN: CPT | Performed by: INTERNAL MEDICINE

## 2018-09-18 RX ORDER — ZINC OXIDE 13 %
1 CREAM (GRAM) TOPICAL DAILY
Qty: 30 CAPSULE | Refills: 0 | Status: SHIPPED | OUTPATIENT
Start: 2018-09-18 | End: 2018-10-18

## 2018-09-18 NOTE — PROGRESS NOTES
Assessment/Plan:    Colorectal polyps  She has a history of colon polyps  She would like to hold off on repeating colonoscopy at this time  I would suggest the colonoscopy in have discussed this with her  We will discuss this again at the next appointment  Gastro-esophageal reflux disease without esophagitis    She is on Zantac 150 mg twice daily as  Needed  Last endoscopy in November 2016 showed a small hiatal hernia and gastritis  Chronic hepatitis C without hepatic coma ([facility] )   She was recently diagnosed with hepatitis-C genotype 1A  Her LFTs are normal    I would recommend treatment and likely would consider Mavyret  She is having arthralgias and this may be due to that as well  We will start workup for approval for the [address]  we will do an ultrasound of the liver  Check hepatitis B surface antibody and hepatitis a IgG  If she is not immune she will require vaccinations  Macrocytic    Her MCV is 101  Check B12 and folate levels  Irritable bowel syndrome with diarrhea    Recommend probiotics and Xifaxan  The Xifaxan is not approved would recommend doxycycline  Diagnoses and all orders for this visit:    Colorectal polyps  -     Hepatitis B surface antibody; Future  -     Hepatitis A antibody, total; Future  -     HCV FIBROSURE; Future  -     US right upper quadrant; Future  -     Vitamin B12; Future  -     Folate; Future    Gastro-esophageal reflux disease without esophagitis  -     Hepatitis B surface antibody; Future  -     Hepatitis A antibody, total; Future  -     HCV FIBROSURE; Future  -     US right upper quadrant; Future  -     Vitamin B12; Future  -     Folate; Future    Chronic hepatitis C without hepatic coma (HCC)  -     Hepatitis B surface antibody; Future  -     Hepatitis A antibody, total; Future  -     HCV FIBROSURE; Future  -     US right upper quadrant; Future  -     Vitamin B12; Future  -     Folate;  Future    Macrocytic  -     Hepatitis B surface antibody; Future  -     Hepatitis A antibody, total; Future  -     HCV FIBROSURE; Future  -     US right upper quadrant; Future  -     Vitamin B12; Future  -     Folate; Future    Irritable bowel syndrome with diarrhea  -     Probiotic Product (PROBIOTIC DAILY) CAPS; Take 1 capsule by mouth daily for 30 days May use any over the counter brand - align, equate ([employer]), culturelle, hinson or other store brands  -     rifaximin (XIFAXAN) 550 mg tablet; Take 1 tablet (550 mg total) by mouth every 8 (eight) hours for 14 days          Subjective:      Patient ID: Brielle Marin is a 48 y o  female  HPI     She was recently diagnosed with Hepatitis C  Her RF was positive  Her Hep C positive and Log 7 1, genotype 1a  She has never had blood transfusion  She did use drugs in the 1980's but has not since  She does not have any h/o jaundice  She does have some right sided abdominal pain  She mostly has arthralgias  No skin rashes  She does smoke  No alcohol - used to drink heavy in the 1980's  Her hepatitis B sAg was negative  She does have diarrhea almost every day  She had an EGD done in 11/16 and that showed a hiatal hernia and gastritis  She was diagnosed with IBS  The following portions of the patient's history were reviewed and updated as appropriate: allergies, current medications, past family history, past medical history, past social history, past surgical history and problem list     Review of Systems   Constitutional: Negative  Negative for fever  HENT: Negative  Eyes: Negative  Respiratory: Negative  Cardiovascular: Negative  Gastrointestinal: Positive for abdominal pain and diarrhea  Negative for constipation, nausea and vomiting  See HPI   Endocrine: Negative  Genitourinary: Positive for dysuria  Negative for frequency and hematuria  Musculoskeletal: Positive for arthralgias and myalgias  Skin: Negative  Allergic/Immunologic: Negative      Neurological: Negative  Negative for headaches  Hematological: Negative  Psychiatric/Behavioral: Negative  All other systems reviewed and are negative  Objective:      BP 96/63 (BP Location: Left arm, Patient Position: Sitting, Cuff Size: Standard)   Pulse 85   Temp (!) 96 7 °F (35 9 °C) (Tympanic)   Ht 5' 1" (1 549 m)   Wt 67 9 kg (149 lb 12 8 oz)   BMI 28 30 kg/m²          Physical Exam   Constitutional: She is oriented to person, place, and time  Vital signs are normal  She appears well-developed and well-nourished  HENT:   Head: Normocephalic and atraumatic  Eyes: Conjunctivae are normal  Pupils are equal, round, and reactive to light  No scleral icterus  Neck: Normal range of motion  Cardiovascular: Normal rate, regular rhythm and normal heart sounds  Pulmonary/Chest: Effort normal and breath sounds normal  No respiratory distress  Abdominal: Soft  Normal appearance and bowel sounds are normal  She exhibits no distension, no ascites and no mass  There is no hepatosplenomegaly  There is no tenderness  No hernia  Musculoskeletal: Normal range of motion  Lymphadenopathy:     She has no cervical adenopathy  Neurological: She is alert and oriented to person, place, and time  Skin: Skin is warm  Psychiatric: She has a normal mood and affect   Her behavior is normal  Thought content normal

## 2018-09-18 NOTE — ASSESSMENT & PLAN NOTE
She has a history of colon polyps  She would like to hold off on repeating colonoscopy at this time  I would suggest the colonoscopy in have discussed this with her  We will discuss this again at the next appointment

## 2018-09-18 NOTE — ASSESSMENT & PLAN NOTE
She was recently diagnosed with hepatitis-C genotype 1A  Her LFTs are normal    I would recommend treatment and likely would consider Mandy  She is having arthralgias and this may be due to that as well  We will start workup for approval for the [address]  we will do an ultrasound of the liver  Check hepatitis B surface antibody and hepatitis a IgG  If she is not immune she will require vaccinations

## 2018-09-18 NOTE — ASSESSMENT & PLAN NOTE
She is on Zantac 150 mg twice daily as  Needed  Last endoscopy in November 2016 showed a small hiatal hernia and gastritis

## 2018-09-18 NOTE — PROGRESS NOTES
MRI arthrogram left shoulder ordered  Patient referred to Rheumatology  Patient referred to pain management

## 2018-09-19 ENCOUNTER — TELEPHONE (OUTPATIENT)
Dept: GASTROENTEROLOGY | Facility: CLINIC | Age: 53
End: 2018-09-19

## 2018-09-19 DIAGNOSIS — B18.2 HEP C W/O COMA, CHRONIC (HCC): Primary | ICD-10-CM

## 2018-09-20 ENCOUNTER — TELEPHONE (OUTPATIENT)
Dept: GASTROENTEROLOGY | Facility: CLINIC | Age: 53
End: 2018-09-20

## 2018-09-20 NOTE — TELEPHONE ENCOUNTER
Spoke with patient  Patient made aware of blood work needed to submit for hep c tmt  She will be going for BW this week

## 2018-09-25 ENCOUNTER — HOSPITAL ENCOUNTER (OUTPATIENT)
Dept: RADIOLOGY | Facility: HOSPITAL | Age: 53
Discharge: HOME | End: 2018-09-25
Attending: ORTHOPAEDIC SURGERY
Payer: COMMERCIAL

## 2018-09-25 ENCOUNTER — HOSPITAL ENCOUNTER (OUTPATIENT)
Dept: MRI IMAGING | Facility: HOSPITAL | Age: 53
Discharge: HOME | End: 2018-09-25
Attending: ORTHOPAEDIC SURGERY
Payer: COMMERCIAL

## 2018-09-25 ENCOUNTER — HOSPITAL ENCOUNTER (OUTPATIENT)
Dept: ULTRASOUND IMAGING | Facility: HOSPITAL | Age: 53
Discharge: HOME | End: 2018-09-25
Attending: INTERNAL MEDICINE
Payer: COMMERCIAL

## 2018-09-25 DIAGNOSIS — Z98.890 STATUS POST ARTHROSCOPY OF LEFT SHOULDER: ICD-10-CM

## 2018-09-25 DIAGNOSIS — B18.2 CHRONIC HEPATITIS C WITHOUT HEPATIC COMA (HCC): ICD-10-CM

## 2018-09-25 DIAGNOSIS — M75.40 IMPINGEMENT SYNDROME OF SHOULDER REGION, UNSPECIFIED LATERALITY: ICD-10-CM

## 2018-09-25 DIAGNOSIS — K63.5 COLORECTAL POLYPS: ICD-10-CM

## 2018-09-25 DIAGNOSIS — K62.1 COLORECTAL POLYPS: ICD-10-CM

## 2018-09-25 DIAGNOSIS — K21.9 GASTRO-ESOPHAGEAL REFLUX DISEASE WITHOUT ESOPHAGITIS: ICD-10-CM

## 2018-09-25 DIAGNOSIS — D75.89 MACROCYTIC: ICD-10-CM

## 2018-09-25 PROCEDURE — 77002 NEEDLE LOCALIZATION BY XRAY: CPT

## 2018-09-25 PROCEDURE — 23350 INJECTION FOR SHOULDER X-RAY: CPT

## 2018-09-25 PROCEDURE — 73222 MRI JOINT UPR EXTREM W/DYE: CPT

## 2018-09-25 PROCEDURE — 76705 ECHO EXAM OF ABDOMEN: CPT

## 2018-09-25 PROCEDURE — A9585 GADOBUTROL INJECTION: HCPCS | Performed by: ORTHOPAEDIC SURGERY

## 2018-09-25 RX ORDER — LIDOCAINE HYDROCHLORIDE 10 MG/ML
5 INJECTION, SOLUTION INFILTRATION; PERINEURAL
Status: COMPLETED | OUTPATIENT
Start: 2018-09-25 | End: 2018-09-25

## 2018-09-25 RX ADMIN — LIDOCAINE HYDROCHLORIDE 5 ML: 10 INJECTION, SOLUTION INFILTRATION; PERINEURAL at 09:24

## 2018-09-25 RX ADMIN — IOHEXOL 4 ML: 240 INJECTION, SOLUTION INTRATHECAL; INTRAVASCULAR; INTRAVENOUS; ORAL at 09:24

## 2018-09-25 RX ADMIN — GADOBUTROL 0.2 ML: 604.72 INJECTION INTRAVENOUS at 09:23

## 2018-09-26 ENCOUNTER — TELEPHONE (OUTPATIENT)
Dept: GASTROENTEROLOGY | Facility: CLINIC | Age: 53
End: 2018-09-26

## 2018-09-26 NOTE — TELEPHONE ENCOUNTER
Received via fax for request to approve xifaxin 550mg with a qty of 90 for 30 days on 09/25/2018    This medication was denied because it is a non formulary medication  It is required to first try loperamide (Linzess)    Documentation must be provided if this medication was tried and failed       This decision will take effect on 10/10/2018    Fax is scanned into patients chart

## 2018-09-26 NOTE — TELEPHONE ENCOUNTER
Alexandre Griffiths,  This patient has IBS with diarrhea per Dr Cody Zuniga note  Valjennifer Barone is for constipation and would not be appropriate for this patient   Can we please appeal this denial?  Thank you,  Maksim Sosa

## 2018-09-27 ENCOUNTER — OFFICE VISIT (OUTPATIENT)
Dept: PAIN MEDICINE | Facility: CLINIC | Age: 53
End: 2018-09-27
Payer: COMMERCIAL

## 2018-09-27 VITALS — WEIGHT: 149.8 LBS | BODY MASS INDEX: 28.3 KG/M2

## 2018-09-27 DIAGNOSIS — M79.18 MYOFASCIAL PAIN SYNDROME: Primary | ICD-10-CM

## 2018-09-27 DIAGNOSIS — M25.512 ACUTE PAIN OF LEFT SHOULDER: ICD-10-CM

## 2018-09-27 DIAGNOSIS — M75.112 INCOMPLETE TEAR OF LEFT ROTATOR CUFF: ICD-10-CM

## 2018-09-27 PROCEDURE — 99214 OFFICE O/P EST MOD 30 MIN: CPT | Performed by: ANESTHESIOLOGY

## 2018-09-27 RX ORDER — GABAPENTIN 600 MG/1
600 TABLET ORAL 2 TIMES DAILY
Qty: 60 TABLET | Refills: 0 | Status: SHIPPED | OUTPATIENT
Start: 2018-09-27 | End: 2018-10-25 | Stop reason: SDUPTHER

## 2018-09-27 RX ORDER — DICLOFENAC POTASSIUM 50 MG/1
50 TABLET, FILM COATED ORAL 3 TIMES DAILY
Qty: 90 TABLET | Refills: 0 | Status: SHIPPED | OUTPATIENT
Start: 2018-09-27 | End: 2018-10-25 | Stop reason: SDUPTHER

## 2018-09-27 RX ORDER — GABAPENTIN 100 MG/1
CAPSULE ORAL 2 TIMES DAILY
COMMUNITY
End: 2018-09-27

## 2018-09-27 RX ORDER — BACLOFEN 10 MG/1
10 TABLET ORAL 3 TIMES DAILY
Qty: 90 TABLET | Refills: 0 | Status: SHIPPED | OUTPATIENT
Start: 2018-09-27 | End: 2018-10-25 | Stop reason: SDUPTHER

## 2018-09-27 NOTE — PROGRESS NOTES
Assessment:  1  Myofascial pain syndrome    2  Incomplete tear of left rotator cuff    3  Acute pain of left shoulder        Plan:  Patient is a 60-year-old female with history of chronic neck pain and chronic back pain with radicular symptoms of the upper left extremity complicated by multi joint arthritic pathology presents today for follow-up visit  Patient is status post left shoulder rotator arthroscopy in notes increasing left shoulder pain  For physical exam appears that patient has a severe muscle spasm of the lateral head of the triceps muscle in the left upper extremity in addition exacerbation of biceps tendinitis  1  We will schedule patient for a left lateral trapezius trigger point injection and deltoid trigger point injection  2  May consider a subacromial bursa steroid injection  3  We will titrate up gabapentin 600 mg p o  B i d   4  We will obtain a CT of the left upper extremity to rule out a torn triceps versus biceps  5  We will discontinue Flexeril and trial baclofen 10 mg p o  T i d  For myofascial pain      Complete risks and benefits including bleeding, infection, tissue reaction, nerve injury and allergic reaction were discussed  The approach was demonstrated using models and literature was provided  Verbal and written consent was obtained  There are risks associated with opioid medications, including dependence, addiction and tolerance  The patient understands and agrees to use these medications only as prescribed  Potential side effects of the medications include, but are not limited to, constipation, drowsiness, addiction, impaired judgment and risk of fatal overdose if not taken as prescribed  The patient was warned against driving while taking sedation medications  Sharing medications is a felony  At this point in time, the patient is showing no signs of addiction, abuse, diversion or suicidal ideation          [place] Prescription Drug Monitoring Program report was reviewed and was appropriate       History of Present Illness: The patient is a 48 y o  female who presents for a follow up office visit in regards to Back Pain and Neck Pain  The patients current symptoms include  8/10 constant burning, dull /aching, sharp, throbbing, cramping, pressure-like, shooting, numbness, pins and needles and neck and bilateral upper extremities, low back, right lower extremity without any particular pain pattern  Current pain medications includes:    The patient reports that this regimen is providing 0% pain relief  The patient is reporting no side effects from this pain medication regimen  I have personally reviewed and/or updated the patient's past medical history, past surgical history, family history, social history, current medications, allergies, and vital signs today  Review of Systems  Review of Systems   Respiratory: Positive for shortness of breath  Musculoskeletal: Positive for arthralgias, back pain and gait problem  Joint stiffness, swelling, pain legs and arms,   Neurological: Positive for dizziness  Memory loss   All other systems reviewed and are negative          Past Medical History:   Diagnosis Date    Acquired ichthyosis     last assessed: 5/9/2013    Anxiety     Cervical radiculopathy     last assessed: 6/13/2014    Colorectal polyps     last assessed: 3/9/2015    [facility]     Depression     Diverticulosis of colon     Foot pain     GERD (gastroesophageal reflux disease)     Hyperlipemia     Hypertension     My[facility] )     at age 28    Osteopenia     last assessed: 12/6/2013    Palpitations     last assessed: 12/31/2014    PVD (peripheral vascular disease) ([facility] )     last assessed: 12/3/2012    Scapular dyskinesis     last assessed: 11/17/2014    Shortness of breath     Tendonitis of left rotator cuff     last assessed: 11/17/2014    Vocal cord polyps     last assessed: 2014       Past Surgical History:   Procedure Laterality Date    ABDOMINAL SURGERY      exploratory    CARDIAC SURGERY      cardiac stent      SECTION      last assessed: 2014    CHOLECYSTECTOMY      last assessed: 2014    COLONOSCOPY  10/27/2014    CORONARY ANGIOPLASTY WITH STENT PLACEMENT  1999    LAD stent    DENTAL SURGERY      last assessed: 2014    ELBOW SURGERY Bilateral     arthroplasty    ESOPHAGOGASTRODUODENOSCOPY      ESOPHAGOGASTRODUODENOSCOPY N/A 2016    Procedure: ESOPHAGOGASTRODUODENOSCOPY (EGD); Surgeon: Merlin Lulas, MD;  [facility]: [facility];  Service:     FL INJECTION LEFT SHOULDER (ARTHROGRAM)  2018    FOOT SURGERY Right 02/06/2015    x 4    HYSTERECTOMY      last assessed: 2014    [place] SHLDR ARTHROSCOP,SURG,W/ROTAT CUFF REPR Left 2018    Procedure: ARTHROSCOPY SHOULDER, subacromial decompression, synovectomy;  Surgeon: Ricky Rodrigez MD;  [facility]: [facility];  Service: Orthopedics    THROAT SURGERY      TOOTH EXTRACTION      TRIGEMINAL NERVE DECOMPRESSION Right 6/15/2018    Procedure: FOOT NEUROPLASTY;  Surgeon: Antonino Oakley DPM;  [facility];  Service: Podiatry       Family History   Problem Relation Age of Onset    No Known Problems Mother     No Known Problems Father     No Known Problems Maternal Grandmother     No Known Problems Maternal Grandfather     No Known Problems Paternal Grandmother     No Known Problems Paternal Grandfather        Social History     Occupational History    Not on file       Social History Main Topics    Smoking status: Current Every Day Smoker     Packs/day: 1 50    Smokeless tobacco: Never Used    Alcohol use Yes      Comment: rare    Drug use: No    Sexual activity: Not on file         Current Outpatient Prescriptions:     albuterol (VENTOLIN HFA) 90 mcg/act inhaler, Inhale 2 puffs every 6 (six) hours as needed, Disp: , Rfl:     amitriptyline (ELAVIL) 25 mg tablet, Take 25 mg by mouth daily at bedtime, Disp: , Rfl:     aspirin 81 mg chewable tablet, Chew 1 tablet daily Stop for the time being for foot surgery , Disp: , Rfl:     cyclobenzaprine (FLEXERIL) 10 mg tablet, Take 1 tablet (10 mg total) by mouth 3 (three) times a day as needed for muscle spasms, Disp: 30 tablet, Rfl: 0    gabapentin (NEURONTIN) 100 mg capsule, Take by mouth 2 (two) times a day, Disp: , Rfl:     nitroglycerin (NITROSTAT) 0 4 mg SL tablet, Place 1 tablet (0 4 mg total) under the tongue every 5 (five) minutes as needed for chest pain, Disp: 90 tablet, Rfl: 3    oxyCODONE-acetaminophen (PERCOCET) 5-325 mg per tablet, Take 1 tablet by mouth every 4 (four) hours as needed for moderate pain for up to 30 doses Max Daily Amount: 6 tablets, Disp: 30 tablet, Rfl: 0    Probiotic Product (PROBIOTIC DAILY) CAPS, Take 1 capsule by mouth daily for 30 days May use any over the counter brand - align, equate ([employer]), culturelle, hinson or other store brands  , Disp: 30 capsule, Rfl: 0    ranitidine (ZANTAC) 150 mg tablet, Take 1 tablet (150 mg total) by mouth 2 (two) times a day, Disp: 60 tablet, Rfl: 5    rifaximin (XIFAXAN) 550 mg tablet, Take 1 tablet (550 mg total) by mouth every 8 (eight) hours for 14 days, Disp: 42 tablet, Rfl: 0    rosuvastatin (CRESTOR) 5 mg tablet, Take 1 tablet (5 mg total) by mouth daily, Disp: 90 tablet, Rfl: 3    Allergies   Allergen Reactions    Ceclor [Cefaclor] Anaphylaxis    Codeine Itching     Reaction Date: [id number];     Ticlid [Ticlopidine] Hives    Penicillins Rash       Physical Exam:    Wt 67 9 kg (149 lb 12 8 oz)   BMI 28 30 kg/m²     Constitutional:normal, well developed, well nourished, alert, in no distress and non-toxic and no overt pain behavior    Eyes:anicteric  HEENT:grossly intact  Neck:supple, symmetric, trachea midline and no masses   Pulmonary:even and unlabored  Cardiovascular:No edema or pitting edema present  Skin:Normal without rashes or lesions and well hydrated  Psychiatric:Mood and affect appropriate  Neurologic:Cranial Nerves II-XII grossly intact  Musculoskeletal:normal, tenderness to palpation of the left deltoid and trapezius    Imaging  No orders to display       No orders of the defined types were placed in this encounter

## 2018-09-27 NOTE — TELEPHONE ENCOUNTER
Hi Dr Cyrilla Schilder,    What dose of doxycycline do you like to use for IBS/SIBO? The Xifaxin was not approved for this pt  Thanks!     Shanae Quezada

## 2018-09-27 NOTE — TELEPHONE ENCOUNTER
Since Xifaxan is not approved, can we prescribe doxycycline? That was in Dr Yudy Escamilla note from patient's last office visit, it states if the Xifaxan is not approved to try doxycycline

## 2018-09-28 ENCOUNTER — TELEPHONE (OUTPATIENT)
Dept: RADIOLOGY | Facility: CLINIC | Age: 53
End: 2018-09-28

## 2018-09-28 DIAGNOSIS — K58.0 IRRITABLE BOWEL SYNDROME WITH DIARRHEA: Primary | ICD-10-CM

## 2018-09-28 RX ORDER — DOXYCYCLINE 100 MG/1
100 TABLET ORAL 2 TIMES DAILY
Qty: 28 TABLET | Refills: 0 | Status: SHIPPED | OUTPATIENT
Start: 2018-09-28 | End: 2018-10-12

## 2018-09-28 NOTE — TELEPHONE ENCOUNTER
Please let her know that I prescribed the doxycycline that Dr Ally Shin recommended in place of the Xifaxan  Thanks!

## 2018-09-28 NOTE — TELEPHONE ENCOUNTER
----- Message from La Sallear Gaona sent at 9/28/2018  3:26 PM EDT -----  Regarding: RE:Your Recent Visit  Contact: [phone]  I just want to say thank you so much,the meds you prescribed i took last night and it was the 1st night in 2 1/2 months that i was able to sleep without waking every 20 minutes screaming in pain ,1st night i slept in long time, i still have pain in arm but the meds or combination of the meds  have helped to relieve the intensity level,so just want to thank you so much for helping me   ----- Message -----  From: Danie Talamantes MD  Sent: 9/27/2018  3:46 PM EDT  To: Petrona Mcdaniel  Subject: Your Recent Visit  Starr Krista, you have a new After Visit Summary in [address] [place] [address]  Please click on visits and then your most recent appointment, to view your After Visit Summary  If you are experiencing any technical issues please call our patient service desk at [phone]) option [phone]

## 2018-10-01 ENCOUNTER — TELEPHONE (OUTPATIENT)
Dept: PAIN MEDICINE | Facility: CLINIC | Age: 53
End: 2018-10-01

## 2018-10-01 NOTE — TELEPHONE ENCOUNTER
----- Message from Petrona Hernández sent at 9/29/2018  7:18 PM EDT -----  Regarding: RE:Your Recent Visit  Contact: [phone]  thank you but it was short lived only 1st night now back to same pain level and no sleep am in agony but happy i had 1 good night  ----- Message -----  From: Nurse Ana Farley: 9/28/2018  3:31 PM EDT  To: Petrona Mcdaniel  Subject: RE:Your Recent Visit  Thank you for the update, I will make Dr Mary Lundberg aware of your improvement          ----- Message -----     From: Petrona Hernández     Sent: 9/28/2018  3:26 PM EDT       To: Kamaljit Perez MD  Subject: RE:Your Recent Visit    I just want to say thank you so much,the meds you prescribed i took last night and it was the 1st night in 2 1/2 months that i was able to sleep without waking every 20 minutes screaming in pain ,1st night i slept in long time, i still have pain in arm but the meds or combination of the meds  have helped to relieve the intensity level,so just want to thank you so much for helping me   ----- Message -----  From: Kamaljit Perez MD  Sent: 9/27/2018  3:46 PM EDT  To: Petrona Mcdaniel  Subject: Your Recent Visit  Domenico Epperson, you have a new After Visit Summary in [address] [place] [address]  Please click on visits and then your most recent appointment, to view your After Visit Summary  If you are experiencing any technical issues please call our patient service desk at [phone]) option [phone]

## 2018-10-02 ENCOUNTER — OFFICE VISIT (OUTPATIENT)
Dept: OBGYN CLINIC | Facility: CLINIC | Age: 53
End: 2018-10-02
Payer: COMMERCIAL

## 2018-10-02 ENCOUNTER — APPOINTMENT (OUTPATIENT)
Dept: LAB | Facility: MEDICAL CENTER | Age: 53
End: 2018-10-02
Payer: COMMERCIAL

## 2018-10-02 VITALS
HEIGHT: 61 IN | WEIGHT: 148 LBS | DIASTOLIC BLOOD PRESSURE: 89 MMHG | SYSTOLIC BLOOD PRESSURE: 125 MMHG | HEART RATE: 87 BPM | BODY MASS INDEX: 27.94 KG/M2

## 2018-10-02 DIAGNOSIS — M75.112 INCOMPLETE ROTATOR CUFF TEAR OR RUPTURE OF LEFT SHOULDER, NOT SPECIFIED AS TRAUMATIC: Primary | ICD-10-CM

## 2018-10-02 DIAGNOSIS — M67.814 BICEPS TENDINOSIS OF LEFT SHOULDER: ICD-10-CM

## 2018-10-02 DIAGNOSIS — M25.511 RIGHT SHOULDER PAIN, UNSPECIFIED CHRONICITY: ICD-10-CM

## 2018-10-02 LAB
ANION GAP SERPL CALCULATED.3IONS-SCNC: 4 MMOL/L (ref 4–13)
BUN SERPL-MCNC: 11 MG/DL (ref 5–25)
CALCIUM SERPL-MCNC: 8.7 MG/DL (ref 8.3–10.1)
CHLORIDE SERPL-SCNC: 102 MMOL/L (ref 100–108)
CO2 SERPL-SCNC: 29 MMOL/L (ref 21–32)
CREAT SERPL-MCNC: 0.6 MG/DL (ref 0.6–1.3)
GFR SERPL CREATININE-BSD FRML MDRD: 104 ML/MIN/1.73SQ M
GLUCOSE P FAST SERPL-MCNC: 86 MG/DL (ref 65–99)
POTASSIUM SERPL-SCNC: 4.1 MMOL/L (ref 3.5–5.3)
SODIUM SERPL-SCNC: 135 MMOL/L (ref 136–145)

## 2018-10-02 PROCEDURE — 80048 BASIC METABOLIC PNL TOTAL CA: CPT

## 2018-10-02 PROCEDURE — 99213 OFFICE O/P EST LOW 20 MIN: CPT | Performed by: ORTHOPAEDIC SURGERY

## 2018-10-02 PROCEDURE — 36415 COLL VENOUS BLD VENIPUNCTURE: CPT

## 2018-10-02 RX ORDER — CLINDAMYCIN PHOSPHATE 900 MG/50ML
900 INJECTION INTRAVENOUS ONCE
Status: CANCELLED | OUTPATIENT
Start: 2018-10-15 | End: 2018-10-02

## 2018-10-02 RX ORDER — CHLORHEXIDINE GLUCONATE 4 G/100ML
SOLUTION TOPICAL DAILY PRN
Status: CANCELLED | OUTPATIENT
Start: 2018-10-02

## 2018-10-02 NOTE — H&P
Chief Complaint  Left shoulder pain    History Of Presenting Illness  Latonya Sims 1965 presents with left shoulder pain mainly located in the bicipital groove  Gradually getting worse interfering with activities daily living  Patient presents for evaluation with lab tests MRI and bone scan  Patient was with the pain is aggravated by movements decreased with rest   Patient had only minimal pain in the bicipital groove prior to her shoulder arthroscopy      Current Medications  Current Outpatient Prescriptions   Medication Sig Dispense Refill    albuterol (VENTOLIN HFA) 90 mcg/act inhaler Inhale 2 puffs every 6 (six) hours as needed      amitriptyline (ELAVIL) 25 mg tablet Take 25 mg by mouth daily at bedtime      aspirin 81 mg chewable tablet Chew 1 tablet daily Stop for the time being for foot surgery       baclofen 10 mg tablet Take 1 tablet (10 mg total) by mouth 3 (three) times a day for 30 days 90 tablet 0    diclofenac potassium (CATAFLAM) 50 mg tablet Take 1 tablet (50 mg total) by mouth 3 (three) times a day for 30 days 90 tablet 0    doxycycline (ADOXA) 100 MG tablet Take 1 tablet (100 mg total) by mouth 2 (two) times a day for 14 days 28 tablet 0    gabapentin (NEURONTIN) 600 MG tablet Take 1 tablet (600 mg total) by mouth 2 (two) times a day for 30 days 60 tablet 0    nitroglycerin (NITROSTAT) 0 4 mg SL tablet Place 1 tablet (0 4 mg total) under the tongue every 5 (five) minutes as needed for chest pain 90 tablet 3    oxyCODONE-acetaminophen (PERCOCET) 5-325 mg per tablet Take 1 tablet by mouth every 4 (four) hours as needed for moderate pain for up to 30 doses Max Daily Amount: 6 tablets 30 tablet 0    Probiotic Product (PROBIOTIC DAILY) CAPS Take 1 capsule by mouth daily for 30 days May use any over the counter brand - align, equate ([employer]), culturelle, hinson or other store brands   30 capsule 0    ranitidine (ZANTAC) 150 mg tablet Take 1 tablet (150 mg total) by mouth 2 (two) times a day 60 tablet 5    rifaximin (XIFAXAN) 550 mg tablet Take 1 tablet (550 mg total) by mouth every 8 (eight) hours for 14 days 42 tablet 0    rosuvastatin (CRESTOR) 5 mg tablet Take 1 tablet (5 mg total) by mouth daily 90 tablet 3     No current facility-administered medications for this visit          Current Problems    Active Problems:   Patient Active Problem List    Diagnosis Date Noted    Colorectal polyps 09/18/2018    Macrocytic 09/18/2018    Irritable bowel syndrome with diarrhea 09/18/2018    Chronic hepatitis C without hepatic coma ([facility] [address] ) 08/15/2018    Neuroma of foot 06/11/2018    Calcific tendinitis of left shoulder 06/05/2018    Incomplete tear of left rotator cuff 06/05/2018    Myofascial pain syndrome 05/11/2018    Left facial numbness 04/03/2018    Positive KRISTA (antinuclear antibody) 03/26/2018    Rheumatoid factor positive 03/23/2018    PVD (peripheral vascular disease) ([facility] [address] ) 03/16/2018    Headache disorder 03/16/2018    Lumbar spondylosis 03/09/2018    Osteoarthritis of spine with radiculopathy, cervical region 03/09/2018    Degenerative cervical disc 03/09/2018    Lumbar degenerative disc disease 03/09/2018    Bilateral carpal tunnel syndrome 03/09/2018    Acute pain of left shoulder 02/21/2018    Gastro-esophageal reflux disease without esophagitis 11/04/2016    Right lower quadrant pain 11/04/2016    Abnormal weight loss 11/04/2016    Depression with anxiety 11/11/2014    CAD in native artery 07/06/2012    Other hyperlipidemia 07/06/2012    Essential hypertension 07/06/2012         Review of Systems:    General: negative for - chills, fatigue, fever,  weight gain or weight loss  Psychological: negative for - anxiety, behavioral disorder, concentration difficulties      Past Medical History:   Past Medical History:   Diagnosis Date    Acquired ichthyosis     last assessed: 5/9/2013    Anxiety     Cervical radiculopathy     last assessed: 6/13/2014    Colorectal polyps     last assessed: 3/9/2015    COPD (chronic obstructive pulmonary disease) (HCC)     Depression     Diverticulosis of colon     Foot pain     GERD (gastroesophageal reflux disease)     Hyperlipemia     Hypertension     Myocardial infarction [facility])     at age 28    Osteopenia     last assessed: 2013    Palpitations     last assessed: 2014    PVD (peripheral vascular disease) ([address] )     last assessed: 12/3/2012    Scapular dyskinesis     last assessed: 2014    Shortness of breath     Tendonitis of left rotator cuff     last assessed: 2014    Vocal cord polyps     last assessed: 2014       Past Surgical History:   Past Surgical History:   Procedure Laterality Date    ABDOMINAL SURGERY      exploratory    CARDIAC SURGERY      cardiac stent      SECTION      last assessed: 2014    CHOLECYSTECTOMY      last assessed: 2014    COLONOSCOPY  10/27/2014    CORONARY ANGIOPLASTY WITH STENT PLACEMENT  1999    LAD stent    DENTAL SURGERY      last assessed: 2014    ELBOW SURGERY Bilateral     arthroplasty    ESOPHAGOGASTRODUODENOSCOPY      ESOPHAGOGASTRODUODENOSCOPY N/A 2016    Procedure: ESOPHAGOGASTRODUODENOSCOPY (EGD);   Surgeon: Randell Mcgrath MD;  Location: [facility];  Service:     FL INJECTION LEFT SHOULDER (ARTHROGRAM)  2018    FOOT SURGERY Right 02/06/2015    x 4    HYSTERECTOMY      last assessed: 2014    NE SHLDR ARTHROSCOP,SURG,W/ROTAT CUFF REPR Left 2018    Procedure: ARTHROSCOPY SHOULDER, subacromial decompression, synovectomy;  Surgeon: Jadyn Alonzo MD;  Location: [facility];  Service: Orthopedics    THROAT SURGERY      TOOTH EXTRACTION      TRIGEMINAL NERVE DECOMPRESSION Right 6/15/2018    Procedure: FOOT NEUROPLASTY;  Surgeon: Mayer Schirmer, DPM;  Location: [facility];  Service: Podiatry       Family History:  Family history reviewed and non-contributory  Family History   Problem Relation Age of Onset  No Known Problems Mother     No Known Problems Father     No Known Problems Maternal Grandmother     No Known Problems Maternal Grandfather     No Known Problems Paternal Grandmother     No Known Problems Paternal Grandfather        Social History:  Social History     Social History    Marital status:      Spouse name: N/A    Number of children: N/A    Years of education: N/A     Social History Main Topics    Smoking status: Current Every Day Smoker     Packs/day: 1 50    Smokeless tobacco: Never Used    Alcohol use Yes      Comment: rare    Drug use: No    Sexual activity: Not Asked     Other Topics Concern    None     Social History Narrative    No living will       Allergies: Allergies   Allergen Reactions    Ceclor [Cefaclor] Anaphylaxis    Codeine Itching     Reaction Date: [id number];     Ticlid [Ticlopidine] Hives    Penicillins Rash           Physical ExaminationBP 125/89   Pulse 87   Ht 5' 1" (1 549 m)   Wt 67 1 kg (148 lb)   BMI 27 96 kg/m²    Gen: Alert and oriented to person, place, time  HEENT: EOMI, eyes clear, moist mucus membranes, hearing intact  Respiratory: Bilateral chest rise  No audible wheezing found  Cardiovascular: Regular Rate and Rhythm  Abdomen: soft nontender/nondistended    Orthopedic Exam  Left shoulder no obvious swelling or deformity all portal sites well healed  Tenderness over the bicipital groove +++  Palpation here reproduces symptoms  Cuff strength 4/5  MRI shows moderate bicipital tendon an [place] with the tendinosis of the supra and infraspinatus tendons          Impression  Left shoulder pain due to bicipital tendinosis and rotator cuff tendinosis            Plan    Discussed treatment with the patient  Patient counseled on smoking cessation    Discussed treatment options with the patient  Options are to do nothing, continue nonoperative measures, last resort  is surgical intervention    This patient has failed nonoperative measures and has opted for surgical intervention  Risk and  benefits of treatment options discussed in detail  Risks of surgery include risk of infection, bleeding, injury to the vessels and risk of failure of the procedure, potential risk of loss of life and limb  After weighing up all treatment options available  Patient has  opted for surgical intervention and informed consent obtained          Martir Woods MD        Portions of the record may have been created with voice recognition software   Occasional wrong word or "sound a like" substitutions may have occurred due to the inherent limitations of voice recognition software   Read the chart carefully and recognize, using context, where substitutions have occurred

## 2018-10-02 NOTE — PATIENT INSTRUCTIONS
Pre-Surgery Instructions:   Medication Instructions    albuterol (VENTOLIN HFA) 90 mcg/act inhaler Take morning of surgery    amitriptyline (ELAVIL) 25 mg tablet per anesthesia guidelines     aspirin 81 mg chewable tablet Stop taking 3 days prior to surgery    baclofen 10 mg tablet per anesthesia guidelines     diclofenac potassium (CATAFLAM) 50 mg tablet Stop taking 3 days prior to surgery    doxycycline (ADOXA) 100 MG tablet per anesthesia guidelines     gabapentin (NEURONTIN) 600 MG tablet per anesthesia guidelines     nitroglycerin (NITROSTAT) 0 4 mg SL tablet per anesthesia guidelines     oxyCODONE-acetaminophen (PERCOCET) 5-325 mg per tablet per anesthesia guidelines     Probiotic Product (PROBIOTIC DAILY) CAPS per anesthesia guidelines     ranitidine (ZANTAC) 150 mg tablet per anesthesia guidelines     rifaximin (XIFAXAN) 550 mg tablet per anesthesia guidelines     rosuvastatin (CRESTOR) 5 mg tablet per anesthesia guidelines      Shoulder Arthroscopy   AMBULATORY CARE:   A shoulder arthroscopy  is a procedure to look inside your shoulder with an arthroscope  An arthroscope is a thin tube with a light and camera on the end  During a shoulder arthroscopy your healthcare provider may fix problems in your joint  Problems may include a torn rotator cuff, swollen tissue, or bone spurs  How to prepare for a shoulder arthroscopy:   · Your healthcare provider will talk to you about how to prepare for your procedure  You may need an x-ray, ultrasound, or MRI before your procedure  These tests will take pictures of your joint and help your healthcare provider plan for your surgery  Arrange for someone to drive you home and stay with you for at least 24 hours after the procedure  · Your healthcare provider may tell you not to eat or drink anything after midnight on the day of your procedure  He will tell you what medicines to take or not take on the day of your procedure   You may be given an antibiotic through your IV to help prevent a bacterial infection  What will happen during a shoulder arthroscopy:   · You may be given general anesthesia to keep you asleep and free from pain during surgery  You may instead be given local anesthesia to numb the surgery area  With local anesthesia, you may still feel pressure or pushing during surgery, but you should not feel any pain  Your healthcare provider will inject fluid into your shoulder  This will help him see inside your joint more clearly and prevent bleeding  · Your healthcare provider will make a small incision in your shoulder and insert the arthroscope  He may insert tools through 2 to 3 other small incisions in different places on your shoulder  The tools may be used to repair a torn rotator cuff, ligament, or dislocation  Tools may also be used to remove swollen tissue, cartilage, or a bone spur  Your healthcare provider may close your incisions with stitches or medical tape and cover them with a small bandage  What will happen after a shoulder arthroscopy:  Healthcare providers will monitor you until you are awake  You may need an x-ray to look at your shoulder joint and monitor for complications  Do not move your shoulder until your healthcare provider says it is okay  You may be given instructions on what movements to avoid  You may able to go home when your pain is controlled or you may need to spend a night in the hospital    Risks of a shoulder arthroscopy: You may bleed more than expected or get an infection  Nerves, ligaments, tendons, or blood vessels may be damaged during your procedure  You may get a blood clot in your arm or have trouble moving your shoulder  Call 911 for the following:   · You feel lightheaded, short of breath, and have chest pain  · You cough up blood  · You have trouble breathing  Seek care immediately if:   · Blood soaks through your bandage  · Your stitches come apart      · Your arm or fingers feel numb or look pale  · Your arm is larger than normal, red, and feels warm  · You cannot move your arm  Contact your healthcare provider if:   · You have a fever or chills  · Your wound is red, swollen, or draining pus  · You have nausea or are vomiting  · Your skin is itchy, swollen, or you have a rash  · You have questions or concerns about your condition or care  Medicines: You may need any of the following:  · Prescription pain medicine  may be given  Ask your healthcare provider how to take this medicine safely  · NSAIDs , such as ibuprofen, help decrease swelling, pain, and fever  NSAIDs can cause stomach bleeding or kidney problems in certain people  If you take blood thinner medicine, always ask your healthcare provider if NSAIDs are safe for you  Always read the medicine label and follow directions  · Take your medicine as directed  Contact your healthcare provider if you think your medicine is not helping or if you have side effects  Tell him or her if you are allergic to any medicine  Keep a list of the medicines, vitamins, and herbs you take  Include the amounts, and when and why you take them  Bring the list or the pill bottles to follow-up visits  Carry your medicine list with you in case of an emergency  Care for yourself at home:   · Apply ice  on your shoulder for 15 to 20 minutes every hour or as directed  Use an ice pack, or put crushed ice in a plastic bag  Cover it with a towel  Ice helps prevent tissue damage and decreases swelling and pain  · Keep your arm in a sling or immobilizer as directed  You may need to wear a sling or immobilizer to keep your shoulder close to your body and prevent movement  This may help your shoulder heal and decrease pain  Wear your sling or immobilizer at all times, unless your healthcare provider tells you otherwise  Ask your healthcare provider if you can remove your sling or immobilizer to bathe  · Move your arm only as directed  Ask your healthcare provider if you can remove your sling or immobilizer to move your arm  Ask him what arm movements are okay to do  He may tell you it is okay to straighten your arm below your elbow or move your wrist and hand  While your arm is in the sling or immobilizer, he may tell you to move your fingers, hand, and wrist 3 to 4 times per day  Some arm movements may cause injury or put too much stress on your shoulder  Do not do any of the following:      ¨ Move your arm over your head or away from your body    ¨ Lift anything with your arm or hand    ¨ Lean on the arm    ¨ Pull objects toward you with your arm    ¨ Move or twist your elbow behind your back    · Sleep in an upright position  This position may decrease pain and make it more comfortable to sleep  Place 2 to 3 pillows lengthwise behind your back when in bed  Make sure the pillows do not move your shoulder forward  Instead, you can sleep in a reclining chair  · Go to physical therapy as directed  A physical therapist teaches you exercises to help improve movement and strength, and to decrease pain  Ask your healthcare provider when you need to start physical therapy  Wound care:  Ask your healthcare provider when your wound can get wet  Carefully wash around the wound with soap and water  Allow soap and water to gently run over your incision  Do not scrub the incision  Dry the area and put on new, clean bandages as directed  Change your bandages when they get wet or dirty  Check your wound every day for signs of infection such as swelling, redness, or pus  Follow up with your healthcare provider as directed:  Write down your questions so you remember to ask them during your visits  © 2017 [employer]  Information is for End User's use only and may not be sold, redistributed or otherwise used for commercial purposes   All illustrations and images included in CareNotes® are the copyrighted property of [employer]  or Jo[employer] Edmondson  The above information is an  only  It is not intended as medical advice for individual conditions or treatments  Talk to your doctor, nurse or pharmacist before following any medical regimen to see if it is safe and effective for you

## 2018-10-02 NOTE — PROGRESS NOTES
Chief Complaint  Left shoulder pain    History Of Presenting Illness  Domenico Epperson 1965 presents with left shoulder pain mainly located in the bicipital groove  Gradually getting worse interfering with activities daily living  Patient presents for evaluation with lab tests MRI and bone scan  Patient was with the pain is aggravated by movements decreased with rest   Patient had only minimal pain in the bicipital groove prior to her shoulder arthroscopy      Current Medications  Current Outpatient Prescriptions   Medication Sig Dispense Refill    albuterol (VENTOLIN HFA) 90 mcg/act inhaler Inhale 2 puffs every 6 (six) hours as needed      amitriptyline (ELAVIL) 25 mg tablet Take 25 mg by mouth daily at bedtime      aspirin 81 mg chewable tablet Chew 1 tablet daily Stop for the time being for foot surgery       baclofen 10 mg tablet Take 1 tablet (10 mg total) by mouth 3 (three) times a day for 30 days 90 tablet 0    diclofenac potassium (CATAFLAM) 50 mg tablet Take 1 tablet (50 mg total) by mouth 3 (three) times a day for 30 days 90 tablet 0    doxycycline (ADOXA) 100 MG tablet Take 1 tablet (100 mg total) by mouth 2 (two) times a day for 14 days 28 tablet 0    gabapentin (NEURONTIN) 600 MG tablet Take 1 tablet (600 mg total) by mouth 2 (two) times a day for 30 days 60 tablet 0    nitroglycerin (NITROSTAT) 0 4 mg SL tablet Place 1 tablet (0 4 mg total) under the tongue every 5 (five) minutes as needed for chest pain 90 tablet 3    oxyCODONE-acetaminophen (PERCOCET) 5-325 mg per tablet Take 1 tablet by mouth every 4 (four) hours as needed for moderate pain for up to 30 doses Max Daily Amount: 6 tablets 30 tablet 0    Probiotic Product (PROBIOTIC DAILY) CAPS Take 1 capsule by mouth daily for 30 days May use any over the counter brand - align, equate ([employer]), culturelle, hinson or other store brands   30 capsule 0    ranitidine (ZANTAC) 150 mg tablet Take 1 tablet (150 mg total) by mouth 2 (two) times a day 60 tablet 5    rifaximin (XIFAXAN) 550 mg tablet Take 1 tablet (550 mg total) by mouth every 8 (eight) hours for 14 days 42 tablet 0    rosuvastatin (CRESTOR) 5 mg tablet Take 1 tablet (5 mg total) by mouth daily 90 tablet 3     No current facility-administered medications for this visit          Current Problems    Active Problems:   Patient Active Problem List    Diagnosis Date Noted    Colorectal polyps 09/18/2018    Macrocytic 09/18/2018    Irritable bowel syndrome with diarrhea 09/18/2018    Chronic hepatitis C without hepatic coma ([facility] [address] ) 08/15/2018    Neuroma of foot 06/11/2018    Calcific tendinitis of left shoulder 06/05/2018    Incomplete tear of left rotator cuff 06/05/2018    Myofascial pain syndrome 05/11/2018    Left facial numbness 04/03/2018    Positive KRISTA (antinuclear antibody) 03/26/2018    Rheumatoid factor positive 03/23/2018    PVD (peripheral vascular disease) ([facility] [address] ) 03/16/2018    Headache disorder 03/16/2018    Lumbar spondylosis 03/09/2018    Osteoarthritis of spine with radiculopathy, cervical region 03/09/2018    Degenerative cervical disc 03/09/2018    Lumbar degenerative disc disease 03/09/2018    Bilateral carpal tunnel syndrome 03/09/2018    Acute pain of left shoulder 02/21/2018    Gastro-esophageal reflux disease without esophagitis 11/04/2016    Right lower quadrant pain 11/04/2016    Abnormal weight loss 11/04/2016    Depression with anxiety 11/11/2014    CAD in native artery 07/06/2012    Other hyperlipidemia 07/06/2012    Essential hypertension 07/06/2012         Review of Systems:    General: negative for - chills, fatigue, fever,  weight gain or weight loss  Psychological: negative for - anxiety, behavioral disorder, concentration difficulties      Past Medical History:   Past Medical History:   Diagnosis Date    Acquired ichthyosis     last assessed: 5/9/2013    Anxiety     Cervical radiculopathy     last assessed: 6/13/2014    Colorectal polyps     last assessed: 3/9/2015    COPD (chronic obstructive pulmonary disease) (HCC)     Depression     Diverticulosis of colon     Foot pain     GERD (gastroesophageal reflux disease)     Hyperlipemia     Hypertension     Myocardial infarction [facility])     at age 28    Osteopenia     last assessed: 2013    Palpitations     last assessed: 2014    PVD (peripheral vascular disease) ([address] )     last assessed: 12/3/2012    Scapular dyskinesis     last assessed: 2014    Shortness of breath     Tendonitis of left rotator cuff     last assessed: 2014    Vocal cord polyps     last assessed: 2014       Past Surgical History:   Past Surgical History:   Procedure Laterality Date    ABDOMINAL SURGERY      exploratory    CARDIAC SURGERY      cardiac stent      SECTION      last assessed: 2014    CHOLECYSTECTOMY      last assessed: 2014    COLONOSCOPY  10/27/2014    CORONARY ANGIOPLASTY WITH STENT PLACEMENT  1999    LAD stent    DENTAL SURGERY      last assessed: 2014    ELBOW SURGERY Bilateral     arthroplasty    ESOPHAGOGASTRODUODENOSCOPY      ESOPHAGOGASTRODUODENOSCOPY N/A 2016    Procedure: ESOPHAGOGASTRODUODENOSCOPY (EGD);   Surgeon: Oleg Busby MD;  Location: [facility];  Service:     FL INJECTION LEFT SHOULDER (ARTHROGRAM)  2018    FOOT SURGERY Right 02/06/2015    x 4    HYSTERECTOMY      last assessed: 2014    OK SHLDR ARTHROSCOP,SURG,W/ROTAT CUFF REPR Left 2018    Procedure: ARTHROSCOPY SHOULDER, subacromial decompression, synovectomy;  Surgeon: Cecy Alva MD;  Location: [facility];  Service: Orthopedics    THROAT SURGERY      TOOTH EXTRACTION      TRIGEMINAL NERVE DECOMPRESSION Right 6/15/2018    Procedure: FOOT NEUROPLASTY;  Surgeon: Jess Goode DPM;  Location: [facility];  Service: Podiatry       Family History:  Family history reviewed and non-contributory  Family History   Problem Relation Age of Onset  No Known Problems Mother     No Known Problems Father     No Known Problems Maternal Grandmother     No Known Problems Maternal Grandfather     No Known Problems Paternal Grandmother     No Known Problems Paternal Grandfather        Social History:  Social History     Social History    Marital status:      Spouse name: N/A    Number of children: N/A    Years of education: N/A     Social History Main Topics    Smoking status: Current Every Day Smoker     Packs/day: 1 50    Smokeless tobacco: Never Used    Alcohol use Yes      Comment: rare    Drug use: No    Sexual activity: Not Asked     Other Topics Concern    None     Social History Narrative    No living will       Allergies: Allergies   Allergen Reactions    Ceclor [Cefaclor] Anaphylaxis    Codeine Itching     Reaction Date: [id number];     Ticlid [Ticlopidine] Hives    Penicillins Rash           Physical ExaminationBP 125/89   Pulse 87   Ht 5' 1" (1 549 m)   Wt 67 1 kg (148 lb)   BMI 27 96 kg/m²   Gen: Alert and oriented to person, place, time  HEENT: EOMI, eyes clear, moist mucus membranes, hearing intact  Respiratory: Bilateral chest rise  No audible wheezing found  Cardiovascular: Regular Rate and Rhythm  Abdomen: soft nontender/nondistended    Orthopedic Exam  Left shoulder no obvious swelling or deformity all portal sites well healed  Tenderness over the bicipital groove +++  Palpation here reproduces symptoms  Cuff strength 4/5  MRI shows moderate bicipital tendon an [place] with the tendinosis of the supra and infraspinatus tendons          Impression  Left shoulder pain due to bicipital tendinosis and rotator cuff tendinosis            Plan    Discussed treatment with the patient  Patient counseled on smoking cessation    Discussed treatment options with the patient  Options are to do nothing, continue nonoperative measures, last resort  is surgical intervention    This patient has failed nonoperative measures and has opted for surgical intervention  Risk and  benefits of treatment options discussed in detail  Risks of surgery include risk of infection, bleeding, injury to the vessels and risk of failure of the procedure, potential risk of loss of life and limb  After weighing up all treatment options available  Patient has  opted for surgical intervention and informed consent obtained          Abigail Shultz MD        Portions of the record may have been created with voice recognition software   Occasional wrong word or "sound a like" substitutions may have occurred due to the inherent limitations of voice recognition software   Read the chart carefully and recognize, using context, where substitutions have occurred

## 2018-10-03 ENCOUNTER — TELEPHONE (OUTPATIENT)
Dept: GASTROENTEROLOGY | Facility: MEDICAL CENTER | Age: 53
End: 2018-10-03

## 2018-10-06 ENCOUNTER — HOSPITAL ENCOUNTER (EMERGENCY)
Facility: HOSPITAL | Age: 53
Discharge: HOME | End: 2018-10-06
Attending: EMERGENCY MEDICINE | Admitting: EMERGENCY MEDICINE
Payer: COMMERCIAL

## 2018-10-06 ENCOUNTER — APPOINTMENT (EMERGENCY)
Dept: RADIOLOGY | Facility: HOSPITAL | Age: 53
End: 2018-10-06
Payer: COMMERCIAL

## 2018-10-06 VITALS
BODY MASS INDEX: 27.95 KG/M2 | RESPIRATION RATE: 20 BRPM | DIASTOLIC BLOOD PRESSURE: 99 MMHG | TEMPERATURE: 98.2 F | WEIGHT: 147.93 LBS | OXYGEN SATURATION: 98 % | HEART RATE: 95 BPM | SYSTOLIC BLOOD PRESSURE: 164 MMHG

## 2018-10-06 DIAGNOSIS — J01.10 ACUTE NON-RECURRENT FRONTAL SINUSITIS: Primary | ICD-10-CM

## 2018-10-06 PROCEDURE — 71046 X-RAY EXAM CHEST 2 VIEWS: CPT

## 2018-10-06 PROCEDURE — 99283 EMERGENCY DEPT VISIT LOW MDM: CPT

## 2018-10-06 RX ORDER — DOXYCYCLINE HYCLATE 100 MG/1
100 CAPSULE ORAL ONCE
Status: COMPLETED | OUTPATIENT
Start: 2018-10-06 | End: 2018-10-06

## 2018-10-06 RX ORDER — FLUTICASONE PROPIONATE 50 MCG
1 SPRAY, SUSPENSION (ML) NASAL DAILY
Qty: 16 G | Refills: 0 | Status: SHIPPED | OUTPATIENT
Start: 2018-10-06 | End: 2018-12-31 | Stop reason: HOSPADM

## 2018-10-06 RX ORDER — DOXYCYCLINE HYCLATE 100 MG/1
100 CAPSULE ORAL 2 TIMES DAILY
Qty: 14 CAPSULE | Refills: 0 | Status: SHIPPED | OUTPATIENT
Start: 2018-10-06 | End: 2018-10-13

## 2018-10-06 RX ADMIN — DOXYCYCLINE HYCLATE 100 MG: 100 CAPSULE ORAL at 16:37

## 2018-10-06 NOTE — DISCHARGE INSTRUCTIONS
Sinusitis, Ambulatory Care   GENERAL INFORMATION:   Sinusitis  is inflammation or infection of your sinuses  It is most often caused by a virus  Acute sinusitis may last up to 12 weeks  Chronic sinusitis lasts longer than 12 weeks  Recurrent sinusitis is when you have 3 or more episodes of sinusitis in 1 year  Common symptoms include the following:   · Fever    · Pain, pressure, redness, or swelling around the forehead, cheeks, or eyes    · Thick yellow or green discharge from your nose    · Tenderness when you touch your face over your sinuses    · Dry cough that happens mostly at night or when you lie down    · Headache and face pain that is worse when you lean forward    · Teeth pain or pain when you chew  Seek immediate care for the following symptoms:   · Vision changes such as double vision    · Confusion or trouble thinking clearly    · Headache and stiff neck    · Trouble breathing  Treatment for sinusitis  may include medicines to relieve nasal and sinus congestion or to decrease pain and fever  Ask your healthcare provider which medicines you should take and how much is safe  Manage sinusitis:   · Drink liquids as directed  Ask your healthcare provider how much liquid to drink each day and which liquids are best for you  Liquids will help loosen and drain the mucus in your sinuses  · Breathe in steam   Heat a bowl of water until you see steam  Lean over the bowl and make a tent over your head with a large towel  Breathe deeply for about 20 minutes  Be careful not to get too close to the steam or burn yourself  Do this 3 times a day  You can also breathe deeply when you take a hot shower  · Rinse your sinuses  Use a sinus rinse device to rinse your nasal passages with a saline (salt water) solution  This will help thin the mucus in your nose and rinse away pollen and dirt  It will also help reduce swelling so you can breathe normally  Ask how often to do this       · Use heat on your sinuses  to decrease pain  Apply heat for 15 to 20 minutes every hour for as many days as directed  · Sleep with your head elevated  Place an extra pillow under your head before you go to sleep to help your sinuses drain  · Do not smoke and avoid secondhand smoke  If you smoke, it is never too late to quit  Ask for information about how to stop smoking if you need help  Prevent the spread of germs that cause sinusitis:  Wash your hands often with soap and water  Wash your hands after you use the bathroom, change a child's diaper, or sneeze  Wash your hands before you prepare or eat food  Follow up with your healthcare provider as directed:  Write down your questions so you remember to ask them during your visits  CARE AGREEMENT:   You have the right to help plan your care  Learn about your health condition and how it may be treated  Discuss treatment options with your caregivers to decide what care you want to receive  You always have the right to refuse treatment  The above information is an  only  It is not intended as medical advice for individual conditions or treatments  Talk to your doctor, nurse or pharmacist before following any medical regimen to see if it is safe and effective for you  © 2014 [address] is for End User's use only and may not be sold, redistributed or otherwise used for commercial purposes  All illustrations and images included in CareNotes® are the copyrighted property of [employer]  or [employer]  Sinusitis   WHAT YOU NEED TO KNOW:   What is sinusitis? Sinusitis is inflammation or infection of your sinuses  It is most often caused by a virus  Acute sinusitis may last up to 12 weeks  Chronic sinusitis lasts longer than 12 weeks  Recurrent sinusitis means you have 4 or more times in 1 year  What increases my risk for sinusitis?    · Medical conditions, such as an upper respiratory infection, allergies, asthma, or cystic fibrosis    · Dental infections or procedures, such as gum infections, tooth decay, or a root canal    · Smoking    · Abnormal sinus structure, such as nasal growths, swollen tonsils, or a deviated septum    · A weak immune system, from diseases such as diabetes or HIV  What are the signs and symptoms of sinusitis? · Fever    · Pain, pressure, redness, or swelling around the forehead, cheeks, or eyes    · Thick yellow or green discharge from your nose    · Tenderness when you touch your face over your sinuses    · Dry cough that happens mostly at night or when you lie down    · Headache and face pain that is worse when you lean forward    · Tooth pain, or pain when you chew  How is sinusitis diagnosed? Your healthcare provider will examine you and ask about your symptoms  He or she will check inside your nose using a nasal speculum  This is a small tool used to open your nostrils  A sample of the mucus from your nose may show what germ is causing your infection  How is sinusitis treated? Your symptoms may go away on their own  Your healthcare provider may recommend watchful waiting for up to 10 days before starting antibiotics  You may  need any of the following:  · Acetaminophen  decreases pain and fever  It is available without a doctor's order  Ask how much to take and how often to take it  Follow directions  Read the labels of all other medicines you are using to see if they also contain acetaminophen, or ask your doctor or pharmacist  Acetaminophen can cause liver damage if not taken correctly  Do not use more than 4 grams (4,000 milligrams) total of acetaminophen in one day  · NSAIDs , such as ibuprofen, help decrease swelling, pain, and fever  This medicine is available with or without a doctor's order  NSAIDs can cause stomach bleeding or kidney problems in certain people  If you take blood thinner medicine, always ask your healthcare provider if NSAIDs are safe for you   Always read the medicine label and follow directions  · Nasal steroid sprays  may help decrease inflammation in your nose and sinuses  · Decongestants  help reduce swelling and drain mucus in the nose and sinuses  They may help you breathe easier  · Antihistamines  help dry mucus in the nose and relieve sneezing  · Antibiotics  help treat or prevent a bacterial infection  How can I manage my symptoms? · Rinse your sinuses  Use a sinus rinse device to rinse your nasal passages with a saline (salt water) solution or distilled water  Do not use tap water  This will help thin the mucus in your nose and rinse away pollen and dirt  It will also help reduce swelling so you can breathe normally  Ask your healthcare provider how often to do this  · Breathe in steam   Heat a bowl of water until you see steam  Lean over the bowl and make a tent over your head with a large towel  Breathe deeply for about 20 minutes  Be careful not to get too close to the steam or burn yourself  Do this 3 times a day  You can also breathe deeply when you take a hot shower  · Sleep with your head elevated  Place an extra pillow under your head before you go to sleep to help your sinuses drain  · Drink liquids as directed  Ask your healthcare provider how much liquid to drink each day and which liquids are best for you  Liquids will thin the mucus in your nose and help it drain  Avoid drinks that contain alcohol or caffeine  · Do not smoke, and avoid secondhand smoke  Nicotine and other chemicals in cigarettes and cigars can make your symptoms worse  Ask your healthcare provider for information if you currently smoke and need help to quit  E-cigarettes or smokeless tobacco still contain nicotine  Talk to your healthcare provider before you use these products  How can I help prevent the spread of germs that cause sinusitis? Wash your hands often with soap and water   Wash your hands after you use the bathroom, change a child's diaper, or sneeze  Wash your hands before you prepare or eat food  When should I seek immediate care? · Your eye and eyelid are red, swollen, and painful  · You cannot open your eye  · You have vision changes, such as double vision  · Your eyeball bulges out or you cannot move your eye  · You are more sleepy than normal, or you notice changes in your ability to think, move, or talk  · You have a stiff neck, a fever, or a bad headache  · You have swelling of your forehead or scalp  When should I contact my healthcare provider? · Your symptoms do not improve after 3 days  · Your symptoms do not go away after 10 days  · You have nausea and are vomiting  · Your nose is bleeding  · You have questions or concerns about your condition or care  CARE AGREEMENT:   You have the right to help plan your care  Learn about your health condition and how it may be treated  Discuss treatment options with your caregivers to decide what care you want to receive  You always have the right to refuse treatment  The above information is an  only  It is not intended as medical advice for individual conditions or treatments  Talk to your doctor, nurse or pharmacist before following any medical regimen to see if it is safe and effective for you  © 2017 [address]  Information is for End User's use only and may not be sold, redistributed or otherwise used for commercial purposes  All illustrations and images included in CareNotes® are the copyrighted property of [employer]  or [employer] Edmondson

## 2018-10-06 NOTE — ED PROVIDER NOTES
History  Chief Complaint   Patient presents with    Allergic Reaction     possible allergic reaction to spray for "bugs" in her appartment yesterday     29-year-old female presents complaining of sinus pain pressure postnasal drip sore throat and cough  She is concerned that this might be due to the fact that they sprayed her apartment yesterday for roaches  Patient denies chest pain or shortness of breath  Patient states she has tenderness in her frontal sinuses  She states that she stop for lunch just prior to coming here today and had a cheeseburger and [employer] fries  Patient states she does smoke and was able to smoke 4 cigarettes today  History provided by:  Patient  Allergic Reaction   Presenting symptoms: no difficulty breathing and no rash    Severity:  Mild  Prior allergic episodes:  No prior episodes  Context: not animal exposure, not chemicals and not cosmetics    Relieved by:  Nothing  Worsened by:  Nothing  Ineffective treatments:  None tried      Prior to Admission Medications   Prescriptions Last Dose Informant Patient Reported? Taking? Probiotic Product (PROBIOTIC DAILY) CAPS   No Yes   Sig: Take 1 capsule by mouth daily for 30 days May use any over the counter brand - align, equate ([employer]), culturelle, hinson or other store brands     albuterol (VENTOLIN HFA) 90 mcg/act inhaler  Self Yes Yes   Sig: Inhale 2 puffs every 6 (six) hours as needed   amitriptyline (ELAVIL) 25 mg tablet  Self Yes Yes   Sig: Take 25 mg by mouth daily at bedtime   aspirin 81 mg chewable tablet  Self Yes Yes   Sig: Chew 1 tablet daily Stop for the time being for foot surgery    baclofen 10 mg tablet   No Yes   Sig: Take 1 tablet (10 mg total) by mouth 3 (three) times a day for 30 days   diclofenac potassium (CATAFLAM) 50 mg tablet   No Yes   Sig: Take 1 tablet (50 mg total) by mouth 3 (three) times a day for 30 days   doxycycline (ADOXA) 100 MG tablet   No Yes   Sig: Take 1 tablet (100 mg total) by mouth 2 (two) times a day for 14 days   gabapentin (NEURONTIN) 600 MG tablet   No Yes   Sig: Take 1 tablet (600 mg total) by mouth 2 (two) times a day for 30 days   nitroglycerin (NITROSTAT) 0 4 mg SL tablet  Self No Yes   Sig: Place 1 tablet (0 4 mg total) under the tongue every 5 (five) minutes as needed for chest pain   oxyCODONE-acetaminophen (PERCOCET) 5-325 mg per tablet  Self No Yes   Sig: Take 1 tablet by mouth every 4 (four) hours as needed for moderate pain for up to 30 doses Max Daily Amount: 6 tablets   ranitidine (ZANTAC) 150 mg tablet  Self No Yes   Sig: Take 1 tablet (150 mg total) by mouth 2 (two) times a day   rosuvastatin (CRESTOR) 5 mg tablet  Self No Yes   Sig: Take 1 tablet (5 mg total) by mouth daily      Facility-Administered Medications: None       Past Medical History:   Diagnosis Date    Acquired ichthyosis     last assessed: 2013    Anxiety     Cervical radiculopathy     last assessed: 2014    Colorectal polyps     last assessed: 3/9/2015    [facility]     Depression     Diverticulosis of colon     Foot pain     GERD (gastroesophageal reflux disease)     Hyperlipemia     Hypertension     Myocardial infarction ([facility] )     at age 28    Osteopenia     last assessed: 2013    Palpitations     last assessed: 2014    [facility]VD [facility]peripheral vascular disease) [facility] )     last assessed: 12/3/2012    Scapular dyskinesis     last assessed: 2014    Shortness of breath     Tendonitis of left rotator cuff     last assessed: 2014    Vocal cord polyps     last assessed: 2014       Past Surgical History:   Procedure Laterality Date    ABDOMINAL SURGERY      exploratory    CARDIAC SURGERY      cardiac stent      SECTION      last assessed: 2014    CHOLECYSTECTOMY      last assessed: 2014    COLONOSCOPY  10/27/2014    CORONARY ANGIOPLASTY WITH STENT PLACEMENT      LAD stent   Hailey Courser DENTAL SURGERY      last assessed: 8/8/2014    ELBOW SURGERY Bilateral     arthroplasty    ESOPHAGOGASTRODUODENOSCOPY      ESOPHAGOGASTRODUODENOSCOPY N/A 11/4/2016    Procedure: ESOPHAGOGASTRODUODENOSCOPY (EGD); Surgeon: Shamar Smith MD;  Location: [facility];  Service:     FL INJECTION LEFT SHOULDER (ARTHROGRAM)  9/25/2018    FOOT SURGERY Right 02/06/2015    x 4    HYSTERECTOMY      last assessed: 8/8/2014    OH SHLDR ARTHROSCOP,SURG,W/ROTAT CUFF REPR Left 7/23/2018    Procedure: ARTHROSCOPY SHOULDER, subacromial decompression, synovectomy;  Surgeon: Ray Payne MD;  Location: [facility];  Service: Orthopedics    THROAT SURGERY      TOOTH EXTRACTION      TRIGEMINAL NERVE DECOMPRESSION Right 6/15/2018    Procedure: FOOT NEUROPLASTY;  Surgeon: Gaylord Buerger, DPM;  Location: [facility];  Service: Podiatry       Family History   Problem Relation Age of Onset    No Known Problems Mother     No Known Problems Father     No Known Problems Maternal Grandmother     No Known Problems Maternal Grandfather     No Known Problems Paternal Grandmother     No Known Problems Paternal Grandfather      I have reviewed and agree with the history as documented  Social History   Substance Use Topics    Smoking status: Current Every Day Smoker     Packs/day: 1 50    Smokeless tobacco: Never Used    Alcohol use Yes      Comment: rare        Review of Systems   Constitutional: Negative for activity change, appetite change, chills and diaphoresis  HENT: Positive for congestion, ear pain, postnasal drip, sinus pain, sinus pressure and sneezing  Negative for facial swelling and hearing loss  Eyes: Negative for pain, discharge and itching  Respiratory: Negative for apnea, choking and chest tightness  Cardiovascular: Negative for chest pain and leg swelling  Gastrointestinal: Negative for abdominal distention, abdominal pain and anal bleeding  Endocrine: Negative for cold intolerance, heat intolerance and polydipsia  Genitourinary: Negative for difficulty urinating and dyspareunia  Musculoskeletal: Negative for arthralgias, back pain and gait problem  Skin: Negative for color change, pallor and rash  Allergic/Immunologic: Negative for environmental allergies  Neurological: Negative for dizziness, facial asymmetry, light-headedness and headaches  Hematological: Negative for adenopathy  Psychiatric/Behavioral: Negative for agitation, behavioral problems and confusion  All other systems reviewed and are negative  Physical Exam  Physical Exam   Constitutional: She appears well-developed and well-nourished  HENT:   Head: Normocephalic  Right Ear: External ear normal    Left Ear: External ear normal    Nose: Mucosal edema present  No sinus tenderness  Right sinus exhibits frontal sinus tenderness  Left sinus exhibits frontal sinus tenderness  Eyes: Pupils are equal, round, and reactive to light  Right eye exhibits no discharge  Left eye exhibits no discharge  Neck: Normal range of motion  No tracheal deviation present  Cardiovascular: Normal rate, regular rhythm and normal heart sounds  Pulmonary/Chest: Effort normal and breath sounds normal  No respiratory distress  She has no wheezes  She has no rales  Abdominal: Soft  She exhibits no distension  There is no tenderness  There is no guarding  Musculoskeletal: Normal range of motion  She exhibits no edema or deformity  Neurological: She is alert  She displays normal reflexes  No cranial nerve deficit  Coordination normal    Skin: Skin is warm  Capillary refill takes less than 2 seconds  No erythema  Psychiatric: She has a normal mood and affect  Her behavior is normal    Vitals reviewed        Vital Signs  ED Triage Vitals   Temperature Pulse Respirations Blood Pressure SpO2   10/06/18 1535 10/06/18 1535 10/06/18 1535 10/06/18 1538 10/06/18 1535   98 2 °F (36 8 °C) (!) 110 20 164/99 98 %      Temp Source Heart Rate Source Patient Position - Orthostatic VS BP Location FiO2 (%)   10/06/18 1535 10/06/18 1535 10/06/18 1535 10/06/18 1535 --   Temporal Right Sitting Right arm       Pain Score       10/06/18 1535       No Pain           Vitals:    10/06/18 1535 10/06/18 1538 10/06/18 1539   BP:  164/99    Pulse: (!) 110  95   Patient Position - Orthostatic VS: Sitting         Visual Acuity      ED Medications  Medications - No data to display    Diagnostic Studies  Results Reviewed     None                 XR chest 2 views   ED Interpretation by Debbie Mart DO (10/06 1630)   No infiltrate                  Procedures  Procedures       Phone Contacts  ED Phone Contact    ED Course                               MDM  Number of Diagnoses or Management Options  Diagnosis management comments: Differential diagnosis 1  Sinusitis 2  URI 3  Allergic reaction to chemically regions  Patient is not having any breathing difficulty  The patient has no signs or symptoms of airway edema and/or concerns in that regard  Patient has no skin rash  Patient will be instructed to take antibiotics use Flonase nasal spray and take Allegra or Claritin or Zyrtec  CritCare Time    Disposition  Final diagnoses:   Acute non-recurrent frontal sinusitis     Time reflects when diagnosis was documented in both MDM as applicable and the Disposition within this note     Time User Action Codes Description Comment    10/6/2018  3:49 PM Ishaan Osorio Add [J01 10] Acute non-recurrent frontal sinusitis       ED Disposition     ED Disposition Condition Comment    Discharge  Tesha Rainey discharge to home/self care      Condition at discharge: Good        Follow-up Information    None         Patient's Medications   Discharge Prescriptions    DOXYCYCLINE HYCLATE (VIBRAMYCIN) 100 MG CAPSULE    Take 1 capsule (100 mg total) by mouth 2 (two) times a day for 7 days       Start Date: 10/6/2018 End Date: 10/13/2018       Order Dose: 100 mg       Quantity: 14 capsule    Refills: 0 No discharge procedures on file      ED Provider  Electronically Signed by           Oliver Cuello,   10/06/18 [address]  NUSRAT, DO  10/06/18 1634

## 2018-10-11 ENCOUNTER — APPOINTMENT (OUTPATIENT)
Dept: RADIOLOGY | Facility: MEDICAL CENTER | Age: 53
End: 2018-10-11
Payer: COMMERCIAL

## 2018-10-11 ENCOUNTER — OFFICE VISIT (OUTPATIENT)
Dept: FAMILY MEDICINE CLINIC | Facility: CLINIC | Age: 53
End: 2018-10-11
Payer: COMMERCIAL

## 2018-10-11 VITALS
HEIGHT: 61 IN | BODY MASS INDEX: 28.32 KG/M2 | WEIGHT: 150 LBS | SYSTOLIC BLOOD PRESSURE: 118 MMHG | DIASTOLIC BLOOD PRESSURE: 86 MMHG

## 2018-10-11 DIAGNOSIS — M19.90 ARTHRITIS: Primary | ICD-10-CM

## 2018-10-11 DIAGNOSIS — S60.551A SPLINTER OF HAND, RIGHT, INITIAL ENCOUNTER: ICD-10-CM

## 2018-10-11 DIAGNOSIS — Z23 ENCOUNTER FOR IMMUNIZATION: ICD-10-CM

## 2018-10-11 DIAGNOSIS — K21.9 GASTRO-ESOPHAGEAL REFLUX DISEASE WITHOUT ESOPHAGITIS: ICD-10-CM

## 2018-10-11 DIAGNOSIS — S60.551A SPLINTER OF HAND, RIGHT, INITIAL ENCOUNTER: Primary | ICD-10-CM

## 2018-10-11 DIAGNOSIS — G47.00 INSOMNIA, UNSPECIFIED TYPE: ICD-10-CM

## 2018-10-11 PROCEDURE — 90715 TDAP VACCINE 7 YRS/> IM: CPT | Performed by: FAMILY MEDICINE

## 2018-10-11 PROCEDURE — 73130 X-RAY EXAM OF HAND: CPT

## 2018-10-11 PROCEDURE — 90471 IMMUNIZATION ADMIN: CPT | Performed by: FAMILY MEDICINE

## 2018-10-11 PROCEDURE — 99213 OFFICE O/P EST LOW 20 MIN: CPT | Performed by: FAMILY MEDICINE

## 2018-10-11 RX ORDER — CYCLOBENZAPRINE HCL 10 MG
10 TABLET ORAL 3 TIMES DAILY PRN
Qty: 30 TABLET | Refills: 0 | Status: SHIPPED | OUTPATIENT
Start: 2018-10-11 | End: 2019-02-11 | Stop reason: SDUPTHER

## 2018-10-11 RX ORDER — RANITIDINE 150 MG/1
150 TABLET ORAL 2 TIMES DAILY
Qty: 60 TABLET | Refills: 5 | Status: SHIPPED | OUTPATIENT
Start: 2018-10-11 | End: 2020-05-13

## 2018-10-11 RX ORDER — AMITRIPTYLINE HYDROCHLORIDE 25 MG/1
25 TABLET, FILM COATED ORAL
Qty: 30 TABLET | Refills: 5 | Status: ON HOLD | OUTPATIENT
Start: 2018-10-11 | End: 2018-10-15 | Stop reason: ALTCHOICE

## 2018-10-11 NOTE — PROGRESS NOTES
Assessment/Plan:  Will check a xray of R hand to see if there is a splinter  We will give her an Adacel shot  We will call her with the results of the x-ray  Call symptoms continue or increase  Patient will continue and finish her doxycycline  No problem-specific Assessment & Plan notes found for this encounter  Diagnoses and all orders for this visit:    Splinter of hand, right, initial encounter  -     XR hand 3+ vw right; Future    Encounter for immunization  -     TDAP VACCINE GREATER THAN OR EQUAL TO 6YO IM    Insomnia, unspecified type  -     amitriptyline (ELAVIL) 25 mg tablet; Take 1 tablet (25 mg total) by mouth daily at bedtime          Subjective:      Patient ID: Ellyn Marina is a 48 y o  female  Patient states she was in the basement cleaning up yesterday when she lifted up a marli bucket, and scratched her right arm and hand  Patient think she might have a splinter in her right hand  No discharge from the scratch or fever chills  Patient is on doxycycline for sinusitis now  The following portions of the patient's history were reviewed and updated as appropriate:   She  has a past medical history of Acquired ichthyosis; Anxiety; Cervical radiculopathy; Colorectal polyps; COPD (chronic obstructive pulmonary disease) ([address] ); Depression; Diverticulosis of colon; Foot pain; GERD (gastroesophageal reflux disease); Hyperlipemia; Hypertension; Myocardial infarction ([address] ); Osteopenia; Palpitations; PVD (peripheral vascular disease) ([address] ); Scapular dyskinesis; Shortness of breath; Tendonitis of left rotator cuff; and Vocal cord polyps    She   Patient Active Problem List    Diagnosis Date Noted    Incomplete rotator cuff tear or rupture of left shoulder, not specified as traumatic 10/02/2018    Biceps tendinosis of left shoulder 10/02/2018    Colorectal polyps 09/18/2018    Macrocytic 09/18/2018    Irritable bowel syndrome with diarrhea 09/18/2018    Chronic hepatitis C without hepatic coma ([facility] 08/15/2018    Neuroma of foot 2018    Calcific tendinitis of left shoulder 2018    Incomplete tear of left rotator cuff 2018    Myofascial pain syndrome 2018    Left facial numbness 2018    Positive KRISTA (antinuclear antibody) 2018    Rheumatoid factor positive 2018    PVD (peripheral vascular disease) ([facility] 2018    Headache disorder 2018    Lumbar spondylosis 2018    Osteoarthritis of spine with radiculopathy, cervical region 2018    Degenerative cervical disc 2018    Lumbar degenerative disc disease 2018    Bilateral carpal tunnel syndrome 2018    Acute pain of left shoulder 2018    Gastro-esophageal reflux disease without esophagitis 2016    Right lower quadrant pain 2016    Abnormal weight loss 2016    Depression with anxiety 2014    CAD in native artery 2012    Other hyperlipidemia 2012    Essential hypertension 2012     She  has a past surgical history that includes Foot surgery (Right, 2015); Hysterectomy; Cholecystectomy;  section; Cardiac surgery; Throat surgery; Elbow surgery (Bilateral); Abdominal surgery; Esophagogastroduodenoscopy; Colonoscopy (10/27/2014); Tooth extraction; Esophagogastroduodenoscopy (N/A, 2016); Dental surgery; Coronary angioplasty with stent (); Trigeminal nerve decompression (Right, 6/15/2018); pr shldr arthroscop,surg,w/rotat cuff repr (Left, 2018); and FL injection left shoulder (arthrogram) (2018)  Her family history includes No Known Problems in her father, maternal grandfather, maternal grandmother, mother, paternal grandfather, and paternal grandmother  She  reports that she has been smoking  She has been smoking about 1 50 packs per day  She has never used smokeless tobacco  She reports that she drinks alcohol  She reports that she does not use drugs    Current Outpatient Prescriptions   Medication Sig Dispense Refill    albuterol (VENTOLIN HFA) 90 mcg/act inhaler Inhale 2 puffs every 6 (six) hours as needed      aspirin 81 mg chewable tablet Chew 1 tablet daily Stop for the time being for foot surgery       baclofen 10 mg tablet Take 1 tablet (10 mg total) by mouth 3 (three) times a day for 30 days 90 tablet 0    diclofenac potassium (CATAFLAM) 50 mg tablet Take 1 tablet (50 mg total) by mouth 3 (three) times a day for 30 days 90 tablet 0    doxycycline (ADOXA) 100 MG tablet Take 1 tablet (100 mg total) by mouth 2 (two) times a day for 14 days 28 tablet 0    doxycycline hyclate (VIBRAMYCIN) 100 mg capsule Take 1 capsule (100 mg total) by mouth 2 (two) times a day for 7 days 14 capsule 0    fluticasone (FLONASE) 50 mcg/act nasal spray 1 spray into each nostril daily 16 g 0    gabapentin (NEURONTIN) 600 MG tablet Take 1 tablet (600 mg total) by mouth 2 (two) times a day for 30 days 60 tablet 0    nitroglycerin (NITROSTAT) 0 4 mg SL tablet Place 1 tablet (0 4 mg total) under the tongue every 5 (five) minutes as needed for chest pain 90 tablet 3    oxyCODONE-acetaminophen (PERCOCET) 5-325 mg per tablet Take 1 tablet by mouth every 4 (four) hours as needed for moderate pain for up to 30 doses Max Daily Amount: 6 tablets 30 tablet 0    rosuvastatin (CRESTOR) 5 mg tablet Take 1 tablet (5 mg total) by mouth daily 90 tablet 3    amitriptyline (ELAVIL) 25 mg tablet Take 1 tablet (25 mg total) by mouth daily at bedtime 30 tablet 5    Probiotic Product (PROBIOTIC DAILY) CAPS Take 1 capsule by mouth daily for 30 days May use any over the counter brand - align, equate ([employer]), [employer], [email or web address] or other store brands  (Patient not taking: Reported on 10/11/2018 ) 30 capsule 0    ranitidine (ZANTAC) 150 mg tablet Take 1 tablet (150 mg total) by mouth 2 (two) times a day 60 tablet 5     No current facility-administered medications for this visit        Current Outpatient Prescriptions on File Prior to Visit   Medication Sig    albuterol (VENTOLIN HFA) 90 mcg/act inhaler Inhale 2 puffs every 6 (six) hours as needed    aspirin 81 mg chewable tablet Chew 1 tablet daily Stop for the time being for foot surgery     baclofen 10 mg tablet Take 1 tablet (10 mg total) by mouth 3 (three) times a day for 30 days    diclofenac potassium (CATAFLAM) 50 mg tablet Take 1 tablet (50 mg total) by mouth 3 (three) times a day for 30 days    doxycycline (ADOXA) 100 MG tablet Take 1 tablet (100 mg total) by mouth 2 (two) times a day for 14 days    doxycycline hyclate (VIBRAMYCIN) 100 mg capsule Take 1 capsule (100 mg total) by mouth 2 (two) times a day for 7 days    fluticasone (FLONASE) 50 mcg/act nasal spray 1 spray into each nostril daily    gabapentin (NEURONTIN) 600 MG tablet Take 1 tablet (600 mg total) by mouth 2 (two) times a day for 30 days    nitroglycerin (NITROSTAT) 0 4 mg SL tablet Place 1 tablet (0 4 mg total) under the tongue every 5 (five) minutes as needed for chest pain    oxyCODONE-acetaminophen (PERCOCET) 5-325 mg per tablet Take 1 tablet by mouth every 4 (four) hours as needed for moderate pain for up to 30 doses Max Daily Amount: 6 tablets    rosuvastatin (CRESTOR) 5 mg tablet Take 1 tablet (5 mg total) by mouth daily    [DISCONTINUED] ranitidine (ZANTAC) 150 mg tablet Take 1 tablet (150 mg total) by mouth 2 (two) times a day    Probiotic Product (PROBIOTIC DAILY) CAPS Take 1 capsule by mouth daily for 30 days May use any over the counter brand - align, equate ([employer]), culturelle, hinson or other store brands  (Patient not taking: Reported on 10/11/2018 )    [DISCONTINUED] amitriptyline (ELAVIL) 25 mg tablet Take 25 mg by mouth daily at bedtime     No current facility-administered medications on file prior to visit  She is allergic to ceclor [cefaclor]; codeine; ticlid [ticlopidine]; and penicillins       Review of Systems   Constitutional: Negative  Respiratory: Negative  Cardiovascular: Negative  Gastrointestinal: Negative  Genitourinary: Negative  Skin:        As per HPI         Objective:      /86   Ht 5' 1" (1 549 m)   Wt 68 kg (150 lb)   BMI 28 34 kg/m²          Physical Exam   Constitutional: She is oriented to person, place, and time  She appears well-developed and well-nourished  No distress  Cardiovascular: Normal heart sounds  Neurological: She is alert and oriented to person, place, and time  Skin: She is not diaphoretic  Positive 2 millimeters scratch on her right lateral proximal thumb  No discharge or erythema  No signs of a splinter  Psychiatric: She has a normal mood and affect  Her behavior is normal  Judgment and thought content normal    Vitals reviewed

## 2018-10-15 ENCOUNTER — ANESTHESIA (OUTPATIENT)
Dept: PERIOP | Facility: HOSPITAL | Age: 53
End: 2018-10-15
Payer: COMMERCIAL

## 2018-10-15 ENCOUNTER — HOSPITAL ENCOUNTER (OUTPATIENT)
Facility: HOSPITAL | Age: 53
Setting detail: OUTPATIENT SURGERY
Discharge: HOME | End: 2018-10-15
Attending: ORTHOPAEDIC SURGERY | Admitting: ORTHOPAEDIC SURGERY
Payer: COMMERCIAL

## 2018-10-15 ENCOUNTER — ANESTHESIA EVENT (OUTPATIENT)
Dept: PERIOP | Facility: HOSPITAL | Age: 53
End: 2018-10-15
Payer: COMMERCIAL

## 2018-10-15 ENCOUNTER — TELEPHONE (OUTPATIENT)
Dept: OBGYN CLINIC | Facility: HOSPITAL | Age: 53
End: 2018-10-15

## 2018-10-15 VITALS
RESPIRATION RATE: 20 BRPM | HEIGHT: 61 IN | HEART RATE: 78 BPM | DIASTOLIC BLOOD PRESSURE: 60 MMHG | OXYGEN SATURATION: 92 % | SYSTOLIC BLOOD PRESSURE: 110 MMHG | TEMPERATURE: 98.1 F | WEIGHT: 150 LBS | BODY MASS INDEX: 28.32 KG/M2

## 2018-10-15 DIAGNOSIS — M67.814 BICEPS TENDINOSIS OF LEFT SHOULDER: ICD-10-CM

## 2018-10-15 DIAGNOSIS — M75.112 INCOMPLETE ROTATOR CUFF TEAR OR RUPTURE OF LEFT SHOULDER, NOT SPECIFIED AS TRAUMATIC: ICD-10-CM

## 2018-10-15 PROCEDURE — 29821 SHO ARTHRS SRG COMPL SYNVCT: CPT | Performed by: ORTHOPAEDIC SURGERY

## 2018-10-15 PROCEDURE — 29826 SHO ARTHRS SRG DECOMPRESSION: CPT | Performed by: ORTHOPAEDIC SURGERY

## 2018-10-15 PROCEDURE — 88305 TISSUE EXAM BY PATHOLOGIST: CPT | Performed by: PATHOLOGY

## 2018-10-15 RX ORDER — CHLORHEXIDINE GLUCONATE 4 G/100ML
SOLUTION TOPICAL DAILY PRN
Status: DISCONTINUED | OUTPATIENT
Start: 2018-10-15 | End: 2018-10-15 | Stop reason: HOSPADM

## 2018-10-15 RX ORDER — HYDROMORPHONE HCL/PF 1 MG/ML
0.2 SYRINGE (ML) INJECTION
Status: DISCONTINUED | OUTPATIENT
Start: 2018-10-15 | End: 2018-10-15 | Stop reason: HOSPADM

## 2018-10-15 RX ORDER — ROPIVACAINE HYDROCHLORIDE 5 MG/ML
INJECTION, SOLUTION EPIDURAL; INFILTRATION; PERINEURAL AS NEEDED
Status: DISCONTINUED | OUTPATIENT
Start: 2018-10-15 | End: 2018-10-15 | Stop reason: SURG

## 2018-10-15 RX ORDER — PROPOFOL 10 MG/ML
INJECTION, EMULSION INTRAVENOUS AS NEEDED
Status: DISCONTINUED | OUTPATIENT
Start: 2018-10-15 | End: 2018-10-15 | Stop reason: SURG

## 2018-10-15 RX ORDER — DOXYCYCLINE HYCLATE 100 MG/1
100 CAPSULE ORAL EVERY 12 HOURS SCHEDULED
Status: ON HOLD | COMMUNITY
End: 2019-01-17 | Stop reason: ALTCHOICE

## 2018-10-15 RX ORDER — CLINDAMYCIN PHOSPHATE 900 MG/50ML
900 INJECTION INTRAVENOUS ONCE
Status: COMPLETED | OUTPATIENT
Start: 2018-10-15 | End: 2018-10-15

## 2018-10-15 RX ORDER — OXYCODONE HYDROCHLORIDE AND ACETAMINOPHEN 5; 325 MG/1; MG/1
1 TABLET ORAL EVERY 4 HOURS PRN
Qty: 30 TABLET | Refills: 0 | Status: SHIPPED | OUTPATIENT
Start: 2018-10-15 | End: 2018-11-13

## 2018-10-15 RX ORDER — ONDANSETRON 2 MG/ML
4 INJECTION INTRAMUSCULAR; INTRAVENOUS ONCE AS NEEDED
Status: DISCONTINUED | OUTPATIENT
Start: 2018-10-15 | End: 2018-10-15 | Stop reason: HOSPADM

## 2018-10-15 RX ORDER — MAGNESIUM HYDROXIDE 1200 MG/15ML
LIQUID ORAL AS NEEDED
Status: DISCONTINUED | OUTPATIENT
Start: 2018-10-15 | End: 2018-10-15 | Stop reason: HOSPADM

## 2018-10-15 RX ORDER — SODIUM CHLORIDE, SODIUM LACTATE, POTASSIUM CHLORIDE, CALCIUM CHLORIDE 600; 310; 30; 20 MG/100ML; MG/100ML; MG/100ML; MG/100ML
20 INJECTION, SOLUTION INTRAVENOUS CONTINUOUS
Status: DISCONTINUED | OUTPATIENT
Start: 2018-10-15 | End: 2018-10-15 | Stop reason: HOSPADM

## 2018-10-15 RX ORDER — LIDOCAINE HYDROCHLORIDE 10 MG/ML
INJECTION, SOLUTION INFILTRATION; PERINEURAL AS NEEDED
Status: DISCONTINUED | OUTPATIENT
Start: 2018-10-15 | End: 2018-10-15 | Stop reason: SURG

## 2018-10-15 RX ORDER — LIDOCAINE HYDROCHLORIDE AND EPINEPHRINE 10; 10 MG/ML; UG/ML
INJECTION, SOLUTION INFILTRATION; PERINEURAL AS NEEDED
Status: DISCONTINUED | OUTPATIENT
Start: 2018-10-15 | End: 2018-10-15 | Stop reason: HOSPADM

## 2018-10-15 RX ORDER — FENTANYL CITRATE/PF 50 MCG/ML
25 SYRINGE (ML) INJECTION
Status: DISCONTINUED | OUTPATIENT
Start: 2018-10-15 | End: 2018-10-15 | Stop reason: HOSPADM

## 2018-10-15 RX ORDER — OXYCODONE HYDROCHLORIDE AND ACETAMINOPHEN 5; 325 MG/1; MG/1
2 TABLET ORAL EVERY 4 HOURS PRN
Status: DISCONTINUED | OUTPATIENT
Start: 2018-10-15 | End: 2018-10-15 | Stop reason: HOSPADM

## 2018-10-15 RX ORDER — ALBUTEROL SULFATE 2.5 MG/3ML
2.5 SOLUTION RESPIRATORY (INHALATION) ONCE AS NEEDED
Status: COMPLETED | OUTPATIENT
Start: 2018-10-15 | End: 2018-10-15

## 2018-10-15 RX ORDER — NICOTINE 21 MG/24HR
21 PATCH, TRANSDERMAL 24 HOURS TRANSDERMAL DAILY
Status: DISCONTINUED | OUTPATIENT
Start: 2018-10-15 | End: 2018-10-15 | Stop reason: HOSPADM

## 2018-10-15 RX ORDER — ONDANSETRON 2 MG/ML
INJECTION INTRAMUSCULAR; INTRAVENOUS AS NEEDED
Status: DISCONTINUED | OUTPATIENT
Start: 2018-10-15 | End: 2018-10-15 | Stop reason: SURG

## 2018-10-15 RX ORDER — SODIUM CHLORIDE, SODIUM LACTATE, POTASSIUM CHLORIDE, CALCIUM CHLORIDE 600; 310; 30; 20 MG/100ML; MG/100ML; MG/100ML; MG/100ML
125 INJECTION, SOLUTION INTRAVENOUS CONTINUOUS
Status: DISCONTINUED | OUTPATIENT
Start: 2018-10-15 | End: 2018-10-15 | Stop reason: HOSPADM

## 2018-10-15 RX ORDER — FENTANYL CITRATE 50 UG/ML
INJECTION, SOLUTION INTRAMUSCULAR; INTRAVENOUS AS NEEDED
Status: DISCONTINUED | OUTPATIENT
Start: 2018-10-15 | End: 2018-10-15 | Stop reason: SURG

## 2018-10-15 RX ORDER — ROCURONIUM BROMIDE 10 MG/ML
INJECTION, SOLUTION INTRAVENOUS AS NEEDED
Status: DISCONTINUED | OUTPATIENT
Start: 2018-10-15 | End: 2018-10-15 | Stop reason: SURG

## 2018-10-15 RX ORDER — MIDAZOLAM HYDROCHLORIDE 1 MG/ML
INJECTION INTRAMUSCULAR; INTRAVENOUS AS NEEDED
Status: DISCONTINUED | OUTPATIENT
Start: 2018-10-15 | End: 2018-10-15 | Stop reason: SURG

## 2018-10-15 RX ADMIN — SODIUM CHLORIDE, SODIUM LACTATE, POTASSIUM CHLORIDE, AND CALCIUM CHLORIDE 20 ML/HR: .6; .31; .03; .02 INJECTION, SOLUTION INTRAVENOUS at 08:08

## 2018-10-15 RX ADMIN — CLINDAMYCIN PHOSPHATE 900 MG: 18 INJECTION, SOLUTION INTRAMUSCULAR; INTRAVENOUS at 09:12

## 2018-10-15 RX ADMIN — ROPIVACAINE HYDROCHLORIDE 25 ML: 5 INJECTION, SOLUTION EPIDURAL; INFILTRATION; PERINEURAL at 08:34

## 2018-10-15 RX ADMIN — SODIUM CHLORIDE, SODIUM LACTATE, POTASSIUM CHLORIDE, AND CALCIUM CHLORIDE: .6; .31; .03; .02 INJECTION, SOLUTION INTRAVENOUS at 09:11

## 2018-10-15 RX ADMIN — ONDANSETRON HYDROCHLORIDE 4 MG: 2 INJECTION, SOLUTION INTRAVENOUS at 09:25

## 2018-10-15 RX ADMIN — SUGAMMADEX 150 MG: 100 INJECTION, SOLUTION INTRAVENOUS at 10:15

## 2018-10-15 RX ADMIN — ROCURONIUM BROMIDE 35 MG: 10 INJECTION INTRAVENOUS at 09:12

## 2018-10-15 RX ADMIN — MIDAZOLAM HYDROCHLORIDE 2 MG: 1 INJECTION, SOLUTION INTRAMUSCULAR; INTRAVENOUS at 08:20

## 2018-10-15 RX ADMIN — FENTANYL CITRATE 100 MCG: 50 INJECTION, SOLUTION INTRAMUSCULAR; INTRAVENOUS at 08:20

## 2018-10-15 RX ADMIN — LIDOCAINE HYDROCHLORIDE 50 MG: 10 INJECTION, SOLUTION INFILTRATION; PERINEURAL at 09:17

## 2018-10-15 RX ADMIN — DEXAMETHASONE SODIUM PHOSPHATE 4 MG: 10 INJECTION INTRAMUSCULAR; INTRAVENOUS at 08:34

## 2018-10-15 RX ADMIN — SODIUM CHLORIDE, SODIUM LACTATE, POTASSIUM CHLORIDE, AND CALCIUM CHLORIDE: .6; .31; .03; .02 INJECTION, SOLUTION INTRAVENOUS at 10:00

## 2018-10-15 RX ADMIN — PROPOFOL 150 MG: 10 INJECTION, EMULSION INTRAVENOUS at 09:12

## 2018-10-15 RX ADMIN — ALBUTEROL SULFATE 2.5 MG: 2.5 SOLUTION RESPIRATORY (INHALATION) at 10:57

## 2018-10-15 NOTE — ANESTHESIA PREPROCEDURE EVALUATION
Review of Systems/Medical History  Patient summary reviewed  Chart reviewed      Cardiovascular  EKG reviewed, Exercise tolerance (METS): >4,  Hyperlipidemia, Hypertension , Past MI , CAD , Cardiac stents (1998, stress test neg 2014; > 4 mets without difficulty) > 1 year   Comment: NSR, VR 61  Jason scan 9/2014: negative for ischemia,  Pulmonary  Smoker (1 5 PPD) cigarette smoker  , COPD mild- PRN medicaiton , Shortness of breath, No recent URI , No sleep apnea ,   Comment: Hx of Vocal cord polyps     GI/Hepatic    GERD well controlled, Liver disease , Hepatitis C,   Comment: diverticulosis          Endo/Other  Negative endo/other ROS      GYN    Hysterectomy,        Hematology  Negative hematology ROS      Musculoskeletal    Arthritis     Neurology    No motor deficit , Headaches,   Comment: Cervical radiculopathy Psychology   Anxiety, Depression ,   Chronic opioid dependence (oxycodone 5-10 mg 1-2 times per day) Chronic pain,          09/10/18 1051 MRI cervical spine wo contrast    Impression:     Degenerative disc disease results in mild central and bilateral foraminal narrowing at C4-5  Minimal central and foraminal narrowing C3-4 and C5-6  No cord compression or cord signal abnormality  Cerebellar tonsils descend 5 mm below the foramen magnum which is borderline for Chiari I malformation  Physical Exam    Airway    Mallampati score: II  TM Distance: >3 FB       Dental   lower dentures and upper dentures,     Cardiovascular      Pulmonary      Other Findings  5/5 b/l strength upper extrems proximally biceps/triceps; hand strength normal bilaterally; normal sensation bilaterally      Anesthesia Plan  ASA Score- 2     Anesthesia Type- general with ASA Monitors  Additional Monitors:   Airway Plan: ETT  Comment:  JAMILA Clark , have personally seen and evaluated the patient prior to anesthetic care    I have reviewed the pre-anesthetic record, and other medical records if appropriate to the anesthetic care  If a CRNA is involved in the case, I have reviewed the CRNA assessment, if present, and agree  Risks/benefits and alternatives discussed with patient including possible PONV, sore throat, and possibility of rare anesthetic and surgical emergencies  Surgeon requests PNB for post-operative pain control  Patient admits to history of neuropathy or radiculopathy in operative limb  MRI reviewed Patient reports history of weakness and numbness which comes and goes, especially with temperature  Denies radicular pain  The patient has been counseled on risks of nerve block including but not limited to infection, damage to surrounding structures including blood vessels, transient and/or permanent nerve injury, as well as the possibility of phrenic nerve anesthesia resulting in SOB and/or possible post-operative admission  Patient counseled on possible occurrence of ptosis, nasal congestion, and/or hoarseness, and has been instructed to keep limb in protective sling and to exercise caution near hot surfaces until nerve block is completely resolved  Risks and benefits of alternative pain control strategies have been discussed, with risks including failure to adequately control pain with oral or intravenous analgesics and/or respiratory depression associated with opioid analgesics  The patient has verbalized an understanding of the risks and benefits of the PNB AND of alternative pain control strategies and agrees to proceed with PNB  Sg Mercholo Plan Factors- Patient instructed to abstain from smoking on day of procedure  Patient did not smoke on day of surgery  Induction- intravenous  Postoperative Plan-     Informed Consent- Anesthetic plan and risks discussed with patient

## 2018-10-15 NOTE — ANESTHESIA POSTPROCEDURE EVALUATION
Post-Op Assessment Note      CV Status:  Stable    Mental Status:  Alert and awake    Hydration Status:  Euvolemic    PONV Controlled:  Controlled    Airway Patency:  Patent    Post Op Vitals Reviewed: Yes          Staff: Anesthesiologist, CRNA           BP   147/93   Temp  98 1   Pulse  97   Resp   20   SpO2  100%

## 2018-10-15 NOTE — NURSING NOTE
Dressing dry and intact left shoulder  Sling intact left shoulder  Cap refill less than 2 sec with fingers warm to touch left hand

## 2018-10-15 NOTE — OP NOTE
OPERATIVE REPORT  PATIENT NAME: Katelyn Noriega    :  1965  MRN: [id number]  Pt Location: [facility] ROOM 02    SURGERY DATE: 10/15/2018    Surgeon(s) and Role:     * Rk Devine MD - Primary     * Melvinia Osgood, PA-C - Assisting no qualified resident available    Preop Diagnosis:  Incomplete rotator cuff tear or rupture of left shoulder, not specified as traumatic [M75 112]  Biceps tendinosis of left shoulder [M67 912]    Post-Op Diagnosis Codes:     * Incomplete rotator cuff tear or rupture of left shoulder, not specified as traumatic [M75 112]     * synovitis of the shoulder joint, subacromial bursitis    Procedure(s) (LRB):  ARTHROSCOPY SHOULDER; Debridement of shoulder (Left), debridement of cuff tear, synovectomy, synovial biopsy with subacromial decompression    Specimen(s):  ID Type Source Tests Collected by Time Destination   1 : LEFT shoulder synovium biopsy Tissue Joint, Left Shoulder TISSUE EXAM Rk Devine MD 10/15/2018 7131        Estimated Blood Loss:   Minimal    Drains:       Anesthesia Type:   General w/ Interscalene Block    Operative Indications:  Incomplete rotator cuff tear or rupture of left shoulder, not specified as traumatic [M75 112]  Biceps tendinosis of left shoulder [M67 912]      Operative Findings:  Synovitis in the shoulder joint synovial biopsy obtained synovectomy done with the shaver and Wand  Intact biceps tendon in its entirety no information tendon pulled back into the joint and examined pristine tendon  Partial undersurface tear supraspinatus debrided with the shaver and sealed with the Wand around 20-30% torn  This area marked with a PDS suture  for examination from the bursal side  Posterior synovectomy performed with synovial biopsy  Grade 1 changes in the humeral head and grade 1 changes on the glenoid  Subscap intact infraspinatus intact    Subacromial bursa inflammation of the bursa bursectomy performed with the excision of spur from the inferior aspect of the acromion  Cuff examined thoroughly including the area of previously marked ,cuff intact hence  cuff repair not performed    Complications:   None    Procedure and Technique: All treatment options were discussed with the patient including non-operative and operative treatment options  Patient has failed non-perative treatment and has opted for surgical intervention  Risks, complications and benefits of all treatment options were discussed in detail  The risks of surgical intervention including infection, injury to vessels and nerves  risk of failure to achieve desired results, risk of need for further procedures, potential risk of loss of life and limb were discussed with the patient  Informed consent was obtained from the patient  The operative site was marked and signed  A timeout was performed prior to the procedure  The patient was re-identified ,including name, date of birth, procedure, consent form reviewed, site and laterality  Appropriate antibiotics were administered preoperatively    The Physician Assistant was present for the entire case and provided essential assistance with patient positioning, prep and draping, wound closure, sterile dressing and splint application, all under my direct supervision(there was no resident available to assist with this case)      Shoulder was examined under anesthesia found to be stable with good range of motion  Patient was placed in the lateral decubitus position with all bony problems well padded    Left shoulder injected with 60 cc of saline preoperatively after was prepared and draped in sterile fashion    Balance traction was used  Standard posterior anterior lateral portals were used  Inflamed shoulder joint especially in the rotator interval with synovitis activities performed a synovial biopsy was obtained    Labrum was intact but synovial inflammation present around the labrum this was debrided with the shaver and the Wand  Subacromial decompression was performed including excising a spur from the anterior aspect of acromion  Rest of the cuff thoroughly probed examined no evidence of rotator cuff tear  Biceps was intact in its entirety regular sheen no inflammation the biceps tendon was pulled into the joint examined found to be intact  Undersurface cuff tear especially supraspinatus around the biceps region this was debrided with the shaver sealed with the Wand and involved 20-25% of the rotator cuff this area then marked with a PDS suture from outside in technique  Subscap tendon was intact infra scap tendon was intact  Synovitis in the superior aspect of the labrum this was resected with a shaver and a 1 synovial biopsy obtained posterior  Synovectomy was also performed posterior labrum intact joint grade 1 changes    Patient's subacromial bursa  The area of the previously marked the tendon tear from the inside was examined thoroughly  The PDS suture was localized  Bursectomy performed aid visualization    This area was then probed found to be intact from the bursal side hands cuff repair was not performed     I was present for the entire procedure    Patient Disposition:  PACU     SIGNATURE: Starr Askew MD  DATE: October 15, 2018  TIME: 10:31 AM

## 2018-10-15 NOTE — TELEPHONE ENCOUNTER
Patient sees Dr Juan Tapia  He performed surgery today  Patient was prescribed oxyCODONE-acetaminophen (PERCOCET) 5-325 mg per tablet 2 tablet, to take after surgery, however his pharmacy denied filling it because he is already on the same rx with his Foot doctor  He takes 1 every 8 hours for his foot  He is asking if we can have the rx changed to something else for the pain  Please advise      Patient call back #[phone]

## 2018-10-15 NOTE — DISCHARGE INSTRUCTIONS
Shoulder Arthroscopy   WHAT YOU SHOULD KNOW:   Shoulder arthroscopy is a surgery to examine or repair your damaged or diseased shoulder joint  AFTER YOU LEAVE:   Medicines:   · Pain medicine: You may be given a prescription medicine to decrease pain  Do not wait until the pain is severe before you take this medicine  · Take your medicine as directed  Call your healthcare provider if you think your medicine is not helping or if you have side effects  Tell him if you are allergic to any medicine  Keep a list of the medicines, vitamins, and herbs you take  Include the amounts, and when and why you take them  Bring the list or the pill bottles to follow-up visits  Carry your medicine list with you in case of an emergency  Follow up with your primary healthcare provider or orthopedic surgeon as directed: You will need to return to have your shoulder checked and stitches removed  Write down your questions so you remember to ask them during your visits  Wound care:   · Wash your hands before and after you care for your incision wound  This will help prevent an infection  · Carefully wash the wound as directed  Dry the area and put on new, clean bandages as directed  Change your bandages when they get wet or dirty  · If you have steri-strips (thin strips of tape) over the wound, do not pull them off  Let them fall off by themselves  · Keep the stitches clean and dry  Do not trim or shorten the ends of your stitches  If they are rubbing on your clothing, you can put a soft gauze bandage between the stitches and your clothes  Self-care:   · Use ice:  Ice helps decrease swelling and pain  Ice may also help prevent tissue damage  Use an ice pack, or put crushed ice in a plastic bag  Cover it with a towel and place it on your shoulder for 15 to 20 minutes every hour or as directed  · Use an arm sling or a brace as directed:   You may need to wear a sling or brace to keep your shoulder close to your body and keep it from moving  This may help your shoulder heal faster and be more comfortable  Wear your sling or brace all the time, even while you sleep, until you are told it is okay to remove it  You can remove your sling or brace to bathe  · Ask about bathing:  Do not let your affected shoulder get wet unless your primary healthcare provider says it is okay  Ask your primary healthcare provider when you can bathe or swim  · Limit activities:  Do not use your arm to lift, pull, or push  Ask when you can return to sports or physical activity  Exercises:   · Home exercises:  After your surgery, you may be asked to do light and easy exercises at first  Do not bend forward from the waist or stretch your arm across your chest in front  Do not stretch your arm behind your back  You may be able to do more as you get stronger and as the pain decreases  Follow exercise instructions from your primary healthcare provider or orthopedic surgeon  · Physical therapy:  A physical therapist can teach you safe exercises to help improve movement and strength, and to decrease pain  Contact your primary healthcare provider or orthopedic surgeon if:   · You have a fever  · You have pain and swelling in your shoulder even after you take your medicines  · Your skin is itchy, swollen, or has a rash  · You have questions or concerns about your condition or care  Seek care immediately or call 911 if:   · You have chest pain or shortness of breath  · You fall and injure your shoulder  · Blood soaks through your bandage  · Any part of your arm is numb, tingly, cool to the touch, blue, or pale  · Your incision wounds are swollen, red, or have pus coming from them  · Your stitches come apart    Talk to your doctor, nurse or pharmacist before following any medical regimen to see if it is safe and effective for you      Please call Dr Jayne Owen office with any Problems  [address] [facility] [place]  [phone]

## 2018-10-15 NOTE — ANESTHESIA PROCEDURE NOTES
Peripheral Block    Patient location during procedure: holding area  Start time: 10/15/2018 8:35 AM  Reason for block: at surgeon's request and post-op pain management  Staffing  Anesthesiologist: Saniya Partida  Preanesthetic Checklist  Completed: patient identified, site marked, surgical consent, pre-op evaluation, timeout performed, IV checked, risks and benefits discussed and monitors and equipment checked  Peripheral Block  Patient position: supine  Prep: ChloraPrep  Patient monitoring: continuous pulse ox and frequent blood pressure checks  Block type: interscalene  Laterality: left  Injection technique: single-shot  Procedures: ultrasound guided  Ultrasound permanent image saved  Needle  Needle type: Stimuplex   Needle gauge: 22 G  Needle length: 5 cm  Needle localization: nerve stimulator and ultrasound guidance  Test dose: negative  Assessment  Injection assessment: incremental injection, local visualized surrounding nerve on ultrasound, negative aspiration for heme and no paresthesia on injection  Paresthesia pain: none  Heart rate change: no  Slow fractionated injection: yes  patient tolerated the procedure well with no immediate complications  Additional Notes  Ultrasound images found in "media" tab of EMR  US anatomy somewhat distorted  Nerve stimulator used to confirm placement with loss of deltoid twitch at 0 4 mA  Single skin puncture  LA injected Q5ml  With neg aspiration q 5ml  No PE  Patient reported onset of KENDRA numbness prior to injection of LA due to "neck position "  Neck repositioned and numbness resolved

## 2018-10-15 NOTE — H&P
H&P Exam - Orthopedics   Vanessa Mccarthy 48 y o  female MRN: [id number]  Unit/Bed#: OR [place] Encounter: [id number]      History of Present Illness   HPI:  Vanessa Mccarthy is a 48 y o  female who presents with left shoulder pain  Patient underwent recent left shoulder arthroscopy  Patient had persistent pain in the left shoulder developed a new pain in the anterior shoulder  Patient has been diagnosed with bicipital tenosynovitis proximal which is not responding to nonoperative measures    Patient brought in for surgical intervention    Review of Systems    Historical Information   Past Medical History:   Diagnosis Date    Acquired ichthyosis     last assessed: 2013    Anxiety     Cervical radiculopathy     last assessed: 2014    Colorectal polyps     last assessed: 3/9/2015    COPD [facility]chronic obstructive pulmonary disease) ([facility] )     Depression     Diverticulosis of colon     Foot pain     GERD (gastroesophageal reflux disease)     Hyperlipemia     Hypertension     Myocardial infarction ([facility] )     at age 28    Osteopenia     last assessed: 2013    Palpitations     last assessed: 2014    PVD (peripheral vascular disease) ([facility] )     last assessed: 12/3/2012    Scapular dyskinesis     last assessed: 2014    Shortness of breath     Tendonitis of left rotator cuff     last assessed: 2014    Vocal cord polyps     last assessed: 2014     Past Surgical History:   Procedure Laterality Date    ABDOMINAL SURGERY      exploratory    CARDIAC SURGERY      cardiac stent      SECTION      last assessed: 2014    CHOLECYSTECTOMY      last assessed: 2014    COLONOSCOPY  10/27/2014    CORONARY ANGIOPLASTY WITH STENT PLACEMENT      LAD stent    DENTAL SURGERY      last assessed: 2014    ELBOW SURGERY Bilateral     arthroplasty    ESOPHAGOGASTRODUODENOSCOPY      ESOPHAGOGASTRODUODENOSCOPY N/A 2016    Procedure: ESOPHAGOGASTRODUODENOSCOPY (EGD);   Surgeon: Angela Weiss MD;  Location: [facility];  Service:    [facility] INJECTION LEFT SHOULDER (ARTHROGRAM)  9/25/2018    FOOT SURGERY Right 02/06/2015    x 4    HYSTERECTOMY      last assessed: 8/8/2014    CO SHLDR ARTHROSCOP,SURG,W/ROTAT CUFF REPR Left 7/23/2018    Procedure: ARTHROSCOPY SHOULDER, subacromial decompression, synovectomy;  Surgeon: Joao Esquivel MD;  Location: [facility];  Service: Orthopedics    THROAT SURGERY      TOOTH EXTRACTION      TRIGEMINAL NERVE DECOMPRESSION Right 6/15/2018    Procedure: FOOT NEUROPLASTY;  Surgeon: Saray Wallace DPM;  Location: [facility];  Service: Podiatry     Social History   History   Alcohol Use    Yes     Comment: rare     History   Drug Use No     History   Smoking Status    Current Every Day Smoker    Packs/day: 1 00   Smokeless Tobacco    Never Used     Family History:   Family History   Problem Relation Age of Onset    No Known Problems Mother     No Known Problems Father     No Known Problems Maternal Grandmother     No Known Problems Maternal Grandfather     No Known Problems Paternal Grandmother     No Known Problems Paternal Grandfather        Meds/Allergies   Prescriptions Prior to Admission   Medication    albuterol (VENTOLIN HFA) 90 mcg/act inhaler    baclofen 10 mg tablet    diclofenac potassium (CATAFLAM) 50 mg tablet    doxycycline hyclate (VIBRAMYCIN) 100 mg capsule    gabapentin (NEURONTIN) 600 MG tablet    oxyCODONE-acetaminophen (PERCOCET) 5-325 mg per tablet    Probiotic Product (PROBIOTIC DAILY) CAPS    ranitidine (ZANTAC) 150 mg tablet    rosuvastatin (CRESTOR) 5 mg tablet    aspirin 81 mg chewable tablet    cyclobenzaprine (FLEXERIL) 10 mg tablet    fluticasone (FLONASE) 50 mcg/act nasal spray    nitroglycerin (NITROSTAT) 0 4 mg SL tablet     Current Facility-Administered Medications   Medication Dose Route Frequency Provider Last Rate Last Dose    chlorhexidine (HIBICLENS) 4 % topical liquid   Topical Daily PRN Dennise Quiñones MD        clindamycin (CLEOCIN) IVPB (premix) 900 mg  900 mg Intravenous Once Dennise Quiñones MD        lactated ringers infusion  125 mL/hr Intravenous Continuous Gerson Hatfield MD        lactated ringers infusion  20 mL/hr Intravenous Continuous Gerson Hatfield MD 20 mL/hr at 10/15/18 0808 20 mL/hr at 10/15/18 0808     Allergies   Allergen Reactions    Ceclor [Cefaclor] Anaphylaxis    Codeine Itching     Reaction Date: [id number];     Ticlid [Ticlopidine] Hives    Penicillins Rash       Objective   Vitals: Blood pressure 122/55, pulse 93, temperature (!) 96 °F (35 6 °C), temperature source Tympanic, resp  rate 18, height 5' 1" (1 549 m), weight 68 kg (150 lb), SpO2 94 %  ,Body mass index is 28 34 kg/m²  No intake or output data in the 24 hours ending 10/15/18 0815    No intake/output data recorded  Invasive Devices     Peripheral Intravenous Line            Peripheral IV 10/15/18 Right Arm less than 1 day                Physical Exam     Patient awake alert oriented GCS 15  Ortho Exam     Left shoulder healed portals  Good range of motion with the tenderness over the bicipital groove proximally  Cuff strength 4/5  MR arthrogram shows intact cuff with tenosynovitis and biceps tenosynovitis      Lab Results: I have personally reviewed pertinent lab results  Imaging: I have personally reviewed pertinent reports  EKG, Pathology, and Other Studies: I have personally reviewed pertinent reports        Code Status: No Order  Advance Directive and Living Will:      Power of :    POLST:      Assessment/Plan     Assessment:  Left shoulder pain due to bicipital tenosynovitis with some underlying pathology arising from the cervical spine  Plan:  Discussed treatment with the patient  Informed consent obtained for left shoulder arthroscopy biceps tenotomy or tenodesis  Patient made aware that the any pain arising from the cervical spine will be unchanged with shoulder arthroscopy  Patient counseled on smoking cessation and postop physical therapy and she has agreed to comply      Counseling / Coordination of Care  Total floor / unit time spent today 30 minutes  Greater than 50% of total time was spent with the patient and / or family counseling and / or coordination of care  A description of the counseling / coordination of care:  Patient  Portions of the record may have been created with voice recognition software   Occasional wrong word or "sound a like" substitutions may have occurred due to the inherent limitations of voice recognition software   Read the chart carefully and recognize, using context, where substitutions have occurred

## 2018-10-16 DIAGNOSIS — M75.112 INCOMPLETE ROTATOR CUFF TEAR OR RUPTURE OF LEFT SHOULDER, NOT SPECIFIED AS TRAUMATIC: Primary | ICD-10-CM

## 2018-10-16 RX ORDER — HYDROCODONE BITARTRATE AND ACETAMINOPHEN 5; 325 MG/1; MG/1
1 TABLET ORAL EVERY 6 HOURS PRN
Qty: 21 TABLET | Refills: 0 | Status: ON HOLD | OUTPATIENT
Start: 2018-10-16 | End: 2019-01-17 | Stop reason: ALTCHOICE

## 2018-10-16 NOTE — TELEPHONE ENCOUNTER
Patient is calling because her son was at the pharmacy to  the new script and she states the doctor told her it would be Vicodin and the pharmacy has a script for tramadol  She is asking if she can receive a call back

## 2018-10-16 NOTE — TELEPHONE ENCOUNTER
Vicodin script sent to the pharmacy now  Please inform patient that no more narcotic medication will be given by the office as she gets narcotic medication from multiple prescribers as per  according to the [place] state  drug monitoring web site  If patient needs any further pain medication she will be referred to pain management  Patient also has been referred to Rheumatology

## 2018-10-17 NOTE — TELEPHONE ENCOUNTER
Spoke with patient  Patient states she had shoulder surgery in July and has been recovering from this  Patient will have BW done at the end of this month  She will call me with an update when she goes

## 2018-10-21 DIAGNOSIS — J06.9 VIRAL UPPER RESPIRATORY TRACT INFECTION: Primary | ICD-10-CM

## 2018-10-23 ENCOUNTER — TELEPHONE (OUTPATIENT)
Dept: OBGYN CLINIC | Facility: HOSPITAL | Age: 53
End: 2018-10-23

## 2018-10-23 NOTE — TELEPHONE ENCOUNTER
#: [phone]  Dr Byrnes      Patient called in wanting to know when can she shower? And when can she take her sling off? She states she takes it off some times so her arm won't get stiff  Patient had surgery 10/15  Please advise, thank you

## 2018-10-23 NOTE — TELEPHONE ENCOUNTER
Patient can have a quick shower  Patient can discard sling whenever she feels she has no pain  Patient can start outpatient physical therapy  Patient needs to be seen by pain management as well

## 2018-10-25 ENCOUNTER — TELEPHONE (OUTPATIENT)
Dept: PAIN MEDICINE | Facility: CLINIC | Age: 53
End: 2018-10-25

## 2018-10-25 ENCOUNTER — TELEPHONE (OUTPATIENT)
Dept: PAIN MEDICINE | Facility: MEDICAL CENTER | Age: 53
End: 2018-10-25

## 2018-10-25 ENCOUNTER — OFFICE VISIT (OUTPATIENT)
Dept: PAIN MEDICINE | Facility: CLINIC | Age: 53
End: 2018-10-25
Payer: COMMERCIAL

## 2018-10-25 DIAGNOSIS — M19.90 ARTHRITIS: ICD-10-CM

## 2018-10-25 DIAGNOSIS — M25.512 ACUTE PAIN OF LEFT SHOULDER: ICD-10-CM

## 2018-10-25 DIAGNOSIS — M50.30 DEGENERATIVE CERVICAL DISC: ICD-10-CM

## 2018-10-25 DIAGNOSIS — M54.12 CERVICAL RADICULOPATHY: ICD-10-CM

## 2018-10-25 DIAGNOSIS — M79.18 MYOFASCIAL PAIN SYNDROME: Primary | ICD-10-CM

## 2018-10-25 PROCEDURE — 20552 NJX 1/MLT TRIGGER POINT 1/2: CPT | Performed by: ANESTHESIOLOGY

## 2018-10-25 PROCEDURE — 99214 OFFICE O/P EST MOD 30 MIN: CPT | Performed by: ANESTHESIOLOGY

## 2018-10-25 RX ORDER — DICLOFENAC POTASSIUM 50 MG/1
50 TABLET, FILM COATED ORAL 3 TIMES DAILY
Qty: 90 TABLET | Refills: 0 | Status: SHIPPED | OUTPATIENT
Start: 2018-10-25 | End: 2018-11-13 | Stop reason: SDUPTHER

## 2018-10-25 RX ORDER — BACLOFEN 10 MG/1
10 TABLET ORAL 3 TIMES DAILY
Qty: 90 TABLET | Refills: 0 | Status: SHIPPED | OUTPATIENT
Start: 2018-10-25 | End: 2019-02-28 | Stop reason: SDUPTHER

## 2018-10-25 RX ORDER — GABAPENTIN 600 MG/1
600 TABLET ORAL 2 TIMES DAILY
Qty: 60 TABLET | Refills: 0 | Status: SHIPPED | OUTPATIENT
Start: 2018-10-25 | End: 2018-11-13 | Stop reason: SDUPTHER

## 2018-10-25 NOTE — PROGRESS NOTES
Assessment:  1  Myofascial pain syndrome    2  Degenerative cervical disc    3  Arthritis    4  Acute pain of left shoulder    5  Cervical radiculopathy        Plan:  Patient is a 51-year-old female with history of chronic neck pain and chronic back pain with radicular symptoms of the upper left extremity complicated by multi joint arthritic pathology presents today for follow-up visit  Patient is status post left shoulder rotator arthroscopy in notes increasing left shoulder pain  patient denies any alleviation of her pain status post left shoulder arthroplasty with rotator cuff revision  There is the high possibility that her pain can be manifested from the cervical spine  1  We will schedule patient for a cervical epidural steroid injection  2  We will perform a  left lateral trapezius trigger point injection and deltoid trigger point injection with steroid    Procedure: left lateral trapezius trigger point injection and deltoid trigger point injection with steroid      After discussing the risks, benefits, and alternatives to the procedure, the patient expressed understanding and wished to proceed  The patient was brought to the fluoroscopy suite and placed in the prone position  Procedural pause conducted to verify:  correct patient identity, procedure to be performed and as applicable, correct side and site, correct patient position, and availability of implants, special equipment and special requirements  The appropriate hyper irritable musculoskeletal tender points were marked with a sterile pen, prepped with ChloraPrep and sterilely draped  After appropriate reproduction of pain at each of the tender points marked, a total of 10 mL of 0 25% bupivacaine and 40 mg of Depo-Medrol was injected after negative aspiration of air, CSF, heme, or other bodily fluids with a 1 5 inch 25-gauge needle  A total number of 2 muscle groups were injected   Vital signs were monitored before, during, and after the procedure and remained stable at all points  The patient was discharged with no apparent complications on their own power after an appropriate observation period in the recovery area  0 25% Bupivacaine        NDC 7966422677 EXP 11/2019 LOT [id number]  40mg/ml Depomedrol  NDC 9467902161 EXP 01/2019 LOT [id number]  1% lidocaine                  NDC [id number] EXP 02/2020 LOT [id number]    Complete risks and benefits including bleeding, infection, tissue reaction, nerve injury and allergic reaction were discussed  The approach was demonstrated using models and literature was provided  Verbal and written consent was obtained  My impressions and treatment recommendations were discussed in detail with the patient who verbalized understanding and had no further questions  Discharge instructions were provided  I personally saw and examined the patient and I agree with the above discussed plan of care  No orders of the defined types were placed in this encounter  No orders of the defined types were placed in this encounter  History of Present Illness:  Ravindra Babb is a 48 y o  female who presents for a follow up office visit in regards to No chief complaint on file      The patients current symptoms include 8/10 sharp, throbbing, pressure-like pain in the neck the any particular pain pattern  Current pain medications includes:  none  The patient reports that this regimen is providing 0% pain relief  The patient is reporting no side effects from this pain medication regimen  I have personally reviewed and/or updated the patient's past medical history, past surgical history, family history, social history, current medications, allergies, and vital signs today       Review of Systems    Patient Active Problem List   Diagnosis    Gastro-esophageal reflux disease without esophagitis    Right lower quadrant pain    Abnormal weight loss    Acute pain of left shoulder    Lumbar spondylosis    Osteoarthritis of spine with radiculopathy, cervical region    Degenerative cervical disc    Lumbar degenerative disc disease    Bilateral carpal tunnel syndrome    CAD in native artery    Depression with anxiety    Other hyperlipidemia    Essential hypertension    PVD (peripheral vascular disease) (HCC)    Headache disorder    Rheumatoid factor positive    Positive KRISTA (antinuclear antibody)    Left facial numbness    Myofascial pain syndrome    Calcific tendinitis of left shoulder    Incomplete tear of left rotator cuff    Neuroma of foot    Chronic hepatitis C without hepatic coma (HCC)    Colorectal polyps    Macrocytic    Irritable bowel syndrome with diarrhea    Incomplete rotator cuff tear or rupture of left shoulder, not specified as traumatic    Biceps tendinosis of left shoulder       Past Medical History:   Diagnosis Date    Acquired ichthyosis     last assessed: 2013    Anxiety     Cervical radiculopathy     last assessed: 2014    Colorectal polyps     last assessed: 3/9/2015    COPD (chronic obstructive pulmonary disease) (HCC)     Depression     Diverticulosis of colon     Foot pain     GERD (gastroesophageal reflux disease)     Hyperlipemia     Hypertension     Myocardial infarction ([facility] )     at age 28    Osteopenia     last assessed: 2013    Palpitations     last assessed: 2014    PVD (peripheral vascular disease) ([facility] )     last assessed: 12/3/2012    Scapular dyskinesis     last assessed: 2014    Shortness of breath     Tendonitis of left rotator cuff     last assessed: 2014    Vocal cord polyps     last assessed: 2014       Past Surgical History:   Procedure Laterality Date    ABDOMINAL SURGERY      exploratory    CARDIAC SURGERY      cardiac stent      SECTION      last assessed: 2014    CHOLECYSTECTOMY      last assessed: 2014    COLONOSCOPY  10/27/2014    CORONARY ANGIOPLASTY WITH STENT PLACEMENT 1999    LAD stent    DENTAL SURGERY      last assessed: 8/8/2014    ELBOW SURGERY Bilateral     arthroplasty    ESOPHAGOGASTRODUODENOSCOPY      ESOPHAGOGASTRODUODENOSCOPY N/A 11/4/2016    Procedure: ESOPHAGOGASTRODUODENOSCOPY (EGD); Surgeon: Ronnie Hong MD;  [facility];  Service:     FL INJECTION LEFT SHOULDER (ARTHROGRAM)  9/25/2018    FOOT SURGERY Right 02/06/2015    x 4    HYSTERECTOMY      last assessed: 8/8/2014    WY ARTHROSCOPY SHOULDER SURGICAL BICEPS TENODESIS Left 10/15/2018    Procedure: ARTHROSCOPY SHOULDER; Debridement of shoulder;  Surgeon: Starr Askew MD;  [facility];  Service: Orthopedics    WY SHLDR ARTHROSCOP,SURG,W/ROTAT CUFF REPR Left 7/23/2018    Procedure: ARTHROSCOPY SHOULDER, subacromial decompression, synovectomy;  Surgeon: Starr Askew MD;  [facility];  Service: Orthopedics    THROAT SURGERY      TOOTH EXTRACTION      TRIGEMINAL NERVE DECOMPRESSION Right 6/15/2018    Procedure: FOOT NEUROPLASTY;  Surgeon: Jonathan Nath DPM;  [facility];  Service: Podiatry       Family History   Problem Relation Age of Onset    No Known Problems Mother     No Known Problems Father     No Known Problems Maternal Grandmother     No Known Problems Maternal Grandfather     No Known Problems Paternal Grandmother     No Known Problems Paternal Grandfather        Social History     Occupational History    Not on file       Social History Main Topics    Smoking status: Current Every Day Smoker     Packs/day: 1 00    Smokeless tobacco: Never Used    Alcohol use Yes      Comment: rare    Drug use: No    Sexual activity: Not on file       Current Outpatient Prescriptions on File Prior to Visit   Medication Sig    aspirin 81 mg chewable tablet Chew 1 tablet daily Stop for the time being for foot surgery     baclofen 10 mg tablet Take 1 tablet (10 mg total) by mouth 3 (three) times a day for 30 days    cyclobenzaprine (FLEXERIL) 10 mg tablet Take 1 tablet (10 mg total) by mouth 3 (three) times a day as needed for muscle spasms    diclofenac potassium (CATAFLAM) 50 mg tablet Take 1 tablet (50 mg total) by mouth 3 (three) times a day for 30 days    doxycycline hyclate (VIBRAMYCIN) 100 mg capsule Take 100 mg by mouth every 12 (twelve) hours    fluticasone (FLONASE) 50 mcg/act nasal spray 1 spray into each nostril daily    gabapentin (NEURONTIN) 600 MG tablet Take 1 tablet (600 mg total) by mouth 2 (two) times a day for 30 days    HYDROcodone-acetaminophen (NORCO) 5-325 mg per tablet Take 1 tablet by mouth every 6 (six) hours as needed for pain Max Daily Amount: 4 tablets    nitroglycerin (NITROSTAT) 0 4 mg SL tablet Place 1 tablet (0 4 mg total) under the tongue every 5 (five) minutes as needed for chest pain    oxyCODONE-acetaminophen (PERCOCET) 5-325 mg per tablet Take 1 tablet by mouth every 4 (four) hours as needed for moderate pain for up to 30 doses Max Daily Amount: 6 tablets    oxyCODONE-acetaminophen (PERCOCET) 5-325 mg per tablet Take 1 tablet by mouth every 4 (four) hours as needed for moderate pain for up to 30 doses Max Daily Amount: 6 tablets    ranitidine (ZANTAC) 150 mg tablet Take 1 tablet (150 mg total) by mouth 2 (two) times a day    rosuvastatin (CRESTOR) 5 mg tablet Take 1 tablet (5 mg total) by mouth daily    VENTOLIN  (90 Base) MCG/ACT inhaler inhale 2 puffs by mouth every 6 hours if needed for wheezing     No current facility-administered medications on file prior to visit  Allergies   Allergen Reactions    Ceclor [Cefaclor] Anaphylaxis    Codeine Itching     Reaction Date: [id number];     Ticlid [Ticlopidine] Hives    Penicillins Rash       Physical Exam:    There were no vitals taken for this visit  Constitutional:normal, well developed, well nourished, alert, in no distress and non-toxic and no overt pain behavior    Eyes:anicteric  HEENT:grossly intact  Neck:supple, symmetric, trachea midline and no masses   Pulmonary:even and unlabored  Cardiovascular:No edema or pitting edema present  Skin:Normal without rashes or lesions and well hydrated  Psychiatric:Mood and affect appropriate  Neurologic:Cranial Nerves II-XII grossly intact  Musculoskeletal:normal         Cervical Spine examination demonstrates  Decreased ROM secondary to pain with lateral rotation to the left/right and bending to the left/right, in addition to neck flexion  3/5 left upper extremity strength in all muscle groups including  strength  Notable severe muscle spasms in the triceps  ROM of the shoulder is adequate   Negative Spurling's maneuver to the b/l Ue, sensitivity to light touch intact b/l Ue         Imaging

## 2018-10-25 NOTE — TELEPHONE ENCOUNTER
Patient is scheduled for ANDREIA on 12/6/18, patient is on aspirin by her cardiologist Dr Ed Mckenzie w/ [address]  Patient is aware that she needs an asa hold and was instructed to call the office by Thanksgiving if she has not heard from [facility] with specific instructions on when and how to hold her asa

## 2018-10-26 ENCOUNTER — TELEPHONE (OUTPATIENT)
Dept: OBGYN CLINIC | Facility: HOSPITAL | Age: 53
End: 2018-10-26

## 2018-10-26 ENCOUNTER — OFFICE VISIT (OUTPATIENT)
Dept: OBGYN CLINIC | Facility: CLINIC | Age: 53
End: 2018-10-26

## 2018-10-26 ENCOUNTER — TELEPHONE (OUTPATIENT)
Dept: RADIOLOGY | Facility: CLINIC | Age: 53
End: 2018-10-26

## 2018-10-26 ENCOUNTER — APPOINTMENT (OUTPATIENT)
Dept: LAB | Facility: MEDICAL CENTER | Age: 53
End: 2018-10-26
Payer: COMMERCIAL

## 2018-10-26 VITALS
DIASTOLIC BLOOD PRESSURE: 75 MMHG | SYSTOLIC BLOOD PRESSURE: 117 MMHG | BODY MASS INDEX: 28.32 KG/M2 | HEIGHT: 61 IN | HEART RATE: 88 BPM | WEIGHT: 150 LBS

## 2018-10-26 DIAGNOSIS — B18.2 HEP C W/O COMA, CHRONIC (HCC): Primary | ICD-10-CM

## 2018-10-26 DIAGNOSIS — B18.2 CHRONIC HEPATITIS C WITHOUT HEPATIC COMA (HCC): ICD-10-CM

## 2018-10-26 DIAGNOSIS — Z98.890 STATUS POST ARTHROSCOPY OF LEFT SHOULDER: Primary | ICD-10-CM

## 2018-10-26 DIAGNOSIS — K21.9 GASTRO-ESOPHAGEAL REFLUX DISEASE WITHOUT ESOPHAGITIS: ICD-10-CM

## 2018-10-26 DIAGNOSIS — K62.1 COLORECTAL POLYPS: ICD-10-CM

## 2018-10-26 DIAGNOSIS — K63.5 COLORECTAL POLYPS: ICD-10-CM

## 2018-10-26 DIAGNOSIS — D75.89 MACROCYTIC: ICD-10-CM

## 2018-10-26 LAB
FOLATE SERPL-MCNC: >20 NG/ML (ref 3.1–17.5)
VIT B12 SERPL-MCNC: 384 PG/ML (ref 100–900)

## 2018-10-26 PROCEDURE — 82977 ASSAY OF GGT: CPT

## 2018-10-26 PROCEDURE — 82247 BILIRUBIN TOTAL: CPT

## 2018-10-26 PROCEDURE — 87389 HIV-1 AG W/HIV-1&-2 AB AG IA: CPT

## 2018-10-26 PROCEDURE — 83883 ASSAY NEPHELOMETRY NOT SPEC: CPT

## 2018-10-26 PROCEDURE — 84460 ALANINE AMINO (ALT) (SGPT): CPT

## 2018-10-26 PROCEDURE — 86706 HEP B SURFACE ANTIBODY: CPT

## 2018-10-26 PROCEDURE — 82746 ASSAY OF FOLIC ACID SERUM: CPT

## 2018-10-26 PROCEDURE — 82607 VITAMIN B-12: CPT

## 2018-10-26 PROCEDURE — 99024 POSTOP FOLLOW-UP VISIT: CPT | Performed by: ORTHOPAEDIC SURGERY

## 2018-10-26 PROCEDURE — 82172 ASSAY OF APOLIPOPROTEIN: CPT

## 2018-10-26 PROCEDURE — 80307 DRUG TEST PRSMV CHEM ANLYZR: CPT

## 2018-10-26 PROCEDURE — 86708 HEPATITIS A ANTIBODY: CPT

## 2018-10-26 PROCEDURE — 36415 COLL VENOUS BLD VENIPUNCTURE: CPT

## 2018-10-26 PROCEDURE — 83010 ASSAY OF HAPTOGLOBIN QUANT: CPT

## 2018-10-26 NOTE — TELEPHONE ENCOUNTER
----- Message from Petrona Lazcano sent at 10/25/2018  4:57 PM EDT -----  Regarding: RE:Your Recent Visit  Contact: [phone]  hi, i called after appt today about a tpi in right arm also but now that i'm home a bit i realize my left bicep still hurts as does the right now i have 2 bad arms i suppose i will have to wait and pray the neck injection in december helps what am i going to do if i cant use either arm? both biceps hurt very much  ----- Message -----  From: Hannah Ibarra MD  Sent: 10/25/2018 11:08 AM EDT  To: Petrona Mcdaniel  Subject: Your Recent Visit  Alvarez Almaraz, you have a new After Visit Summary in [address] [place] [address]  Please click on visits and then your most recent appointment, to view your After Visit Summary  If you are experiencing any technical issues please call our patient service desk at [phone] option [phone]

## 2018-10-26 NOTE — PATIENT INSTRUCTIONS
Early Postoperative or Post Injury Shoulder Exercises   WHAT YOU NEED TO KNOW:   You may need to wait until your swelling and pain have gone down before you start to exercise  Do not start an exercise program before you talk to your healthcare provider  DISCHARGE INSTRUCTIONS:   Contact your healthcare provider if:   · You have sharp or worsening pain during exercise or at rest     · You have questions or concerns about your shoulder exercises  Before you exercise:  Warm up and stretch before you exercise  Walk or ride a stationary bike for 5 to 10 minutes to help you warm up  Stretching helps increase range of motion  It may also decrease muscle soreness and help prevent another injury  Your healthcare provider will tell you which of the following stretches to do:  · Crossover arm stretch:  Relax your shoulders  Hold your upper arm with the opposite hand  Pull your arm across your chest until you feel a stretch  Hold the stretch for 30 seconds  Return to the starting position  · Shoulder flexion stretch:  Stand facing a wall  Slowly walk your fingers up the wall until you feel a stretch  Hold the stretch for 30 seconds  Return to the starting position  · Sleeper stretch:  Lie on your injured side on a firm, flat surface  Bend the elbow of your injured arm 90° with your hand facing up  Use your arm that is not injured to slowly push your injured arm down  Stop when you feel a stretch at the back of your injured shoulder  Hold the stretch for 30 seconds  Slowly return to the starting position  How to perform exercises without a weight or an exercise band:   · Pendulum swings:  Lean forward and rest the arm that is not injured on a table  Do not round your back or lock your knees during the exercise  Let your other arm hang freely by your side  Gently swing your injured arm forward and backward, side to side, and in circles  · Shrugs:  Stand with your arms by your side   Gently lift your shoulders up to your ears and hold for 5 seconds  With your shoulders lifted, pinch your shoulder blades together  Hold for 5 seconds  Slowly return to the starting position  How to exercise with a weight:  Hold a weight with your arm slightly in front of your body  Slowly raise your arm to the side with your thumb pointing up or down as directed  Stop when you reach the level of your shoulder  Hold for as many seconds as directed  Slowly return to the starting position  How to exercise with an exercise band:   · Hold the exercise band with both hands in front of your body  Slowly raise your injured arm up and to the side with your thumb pointing up or down as directed  Stop when you reach the level of your shoulder  Hold for as many seconds as directed  Slowly return to the starting position  · Tie one end of the exercise band to a heavy object  Stand and hold the band in your hand  Bend your elbow  Keep your arm close to your side and pull the band straight back  Squeeze your shoulder blades together as you pull  Slowly return to the starting position  Follow up with your physical therapist as directed:  Write down your questions so you remember to ask them at your visits  © 2017 [facility] Information is for End User's use only and may not be sold, redistributed or otherwise used for commercial purposes  All illustrations and images included in CareNotes® are the copyrighted property of [employer]  or Joao Edmondson  The above information is an  only  It is not intended as medical advice for individual conditions or treatments  Talk to your doctor, nurse or pharmacist before following any medical regimen to see if it is safe and effective for you

## 2018-10-26 NOTE — PROGRESS NOTES
Chief Complaint  Status post left shoulder arthroscopy    History Of Presenting Illness  Yana Shepherd 1965 presents with left shoulder arthroscopy synovectomy synovial biopsy and subacromial decompression on October 15th, 2018  Patient still complains of pain in the shoulder the neck  Patient is being treated by pain management    Unfortunately patient continues to smoke        Current Medications  Current Outpatient Prescriptions   Medication Sig Dispense Refill    aspirin 81 mg chewable tablet Chew 1 tablet daily Stop for the time being for foot surgery       baclofen 10 mg tablet Take 1 tablet (10 mg total) by mouth 3 (three) times a day for 30 days 90 tablet 0    cyclobenzaprine (FLEXERIL) 10 mg tablet Take 1 tablet (10 mg total) by mouth 3 (three) times a day as needed for muscle spasms 30 tablet 0    diclofenac potassium (CATAFLAM) 50 mg tablet Take 1 tablet (50 mg total) by mouth 3 (three) times a day for 30 days 90 tablet 0    doxycycline hyclate (VIBRAMYCIN) 100 mg capsule Take 100 mg by mouth every 12 (twelve) hours      fluticasone (FLONASE) 50 mcg/act nasal spray 1 spray into each nostril daily 16 g 0    gabapentin (NEURONTIN) 600 MG tablet Take 1 tablet (600 mg total) by mouth 2 (two) times a day for 30 days 60 tablet 0    HYDROcodone-acetaminophen (NORCO) 5-325 mg per tablet Take 1 tablet by mouth every 6 (six) hours as needed for pain Max Daily Amount: 4 tablets 21 tablet 0    nitroglycerin (NITROSTAT) 0 4 mg SL tablet Place 1 tablet (0 4 mg total) under the tongue every 5 (five) minutes as needed for chest pain 90 tablet 3    oxyCODONE-acetaminophen (PERCOCET) 5-325 mg per tablet Take 1 tablet by mouth every 4 (four) hours as needed for moderate pain for up to 30 doses Max Daily Amount: 6 tablets 30 tablet 0    oxyCODONE-acetaminophen (PERCOCET) 5-325 mg per tablet Take 1 tablet by mouth every 4 (four) hours as needed for moderate pain for up to 30 doses Max Daily Amount: 6 tablets 30 tablet 0    ranitidine (ZANTAC) 150 mg tablet Take 1 tablet (150 mg total) by mouth 2 (two) times a day 60 tablet 5    rosuvastatin (CRESTOR) 5 mg tablet Take 1 tablet (5 mg total) by mouth daily 90 tablet 3    VENTOLIN  (90 Base) MCG/ACT inhaler inhale 2 puffs by mouth every 6 hours if needed for wheezing 18 g 0     No current facility-administered medications for this visit          Current Problems    Active Problems:   Patient Active Problem List    Diagnosis Date Noted    Arthritis 10/25/2018    Cervical radiculopathy 10/25/2018    Incomplete rotator cuff tear or rupture of left shoulder, not specified as traumatic 10/02/2018    Biceps tendinosis of left shoulder 10/02/2018    Colorectal polyps 09/18/2018    Macrocytic 09/18/2018    Irritable bowel syndrome with diarrhea 09/18/2018    Chronic hepatitis C without hepatic coma ([facility] ) 08/15/2018    Neuroma of foot 06/11/2018    Calcific tendinitis of left shoulder 06/05/2018    Incomplete tear of left rotator cuff 06/05/2018    Myofascial pain syndrome 05/11/2018    Left facial numbness 04/03/2018    Positive KRISTA (antinuclear antibody) 03/26/2018    Rheumatoid factor positive 03/23/2018    PVD (peripheral vascular disease) ([facility] ) 03/16/2018    Headache disorder 03/16/2018    Lumbar spondylosis 03/09/2018    Osteoarthritis of spine with radiculopathy, cervical region 03/09/2018    Degenerative cervical disc 03/09/2018    Lumbar degenerative disc disease 03/09/2018    Bilateral carpal tunnel syndrome 03/09/2018    Acute pain of left shoulder 02/21/2018    Gastro-esophageal reflux disease without esophagitis 11/04/2016    Right lower quadrant pain 11/04/2016    Abnormal weight loss 11/04/2016    Depression with anxiety 11/11/2014    CAD in native artery 07/06/2012    Other hyperlipidemia 07/06/2012    Essential hypertension 07/06/2012         Review of Systems:    General: negative for - chills, fatigue, fever,  weight gain or weight loss  Psychological: negative for - anxiety, behavioral disorder, concentration difficulties      Past Medical History:   Past Medical History:   Diagnosis Date    Acquired ichthyosis     last assessed: 2013    Anxiety     Cervical radiculopathy     last assessed: 2014    Colorectal polyps     last assessed: 3/9/2015    COPD [facility]chronic obstructive pulmonary disease) ([facility] )     Depression     Diverticulosis of colon     Foot pain     GERD (gastroesophageal reflux disease)     Hyperlipemia     Hypertension     Myocardial infarction ([facility] )     at age 28    Osteopenia     last assessed: 2013    Palpitations     last assessed: 2014    PVD (peripheral vascular disease) ([facility] )     last assessed: 12/3/2012    Scapular dyskinesis     last assessed: 2014    Shortness of breath     Tendonitis of left rotator cuff     last assessed: 2014    Vocal cord polyps     last assessed: 2014       Past Surgical History:   Past Surgical History:   Procedure Laterality Date    ABDOMINAL SURGERY      exploratory    CARDIAC SURGERY      cardiac stent      SECTION      last assessed: 2014    CHOLECYSTECTOMY      last assessed: 2014    COLONOSCOPY  10/27/2014    CORONARY ANGIOPLASTY WITH STENT PLACEMENT      LAD stent    DENTAL SURGERY      last assessed: 2014    ELBOW SURGERY Bilateral     arthroplasty    ESOPHAGOGASTRODUODENOSCOPY      ESOPHAGOGASTRODUODENOSCOPY N/A 2016    Procedure: ESOPHAGOGASTRODUODENOSCOPY (EGD);   Surgeon: Joanie Manning MD;  [facility];  Service:    [facility] INJECTION LEFT SHOULDER (ARTHROGRAM)  2018    FOOT SURGERY Right 02/06/2015    x 4    HYSTERECTOMY      last assessed: 2014    NE ARTHROSCOPY SHOULDER SURGICAL BICEPS TENODESIS Left 10/15/2018    Procedure: ARTHROSCOPY SHOULDER; Debridement of shoulder;  Surgeon: Martir Woods MD;  [facility];  Service: [address] [place] [address] ARTHROSCOP,SURG,W/ROTAT CUFF REPR Left 7/23/2018    Procedure: ARTHROSCOPY SHOULDER, subacromial decompression, synovectomy;  Surgeon: Princess Wilber MD;  [facility];  Service: Orthopedics    THROAT SURGERY      TOOTH EXTRACTION      TRIGEMINAL NERVE DECOMPRESSION Right 6/15/2018    Procedure: FOOT NEUROPLASTY;  Surgeon: Demario Goss DPM;  [facility];  Service: Podiatry       Family History:  Family history reviewed and non-contributory  Family History   Problem Relation Age of Onset    No Known Problems Mother     No Known Problems Father     No Known Problems Maternal Grandmother     No Known Problems Maternal Grandfather     No Known Problems Paternal Grandmother     No Known Problems Paternal Grandfather        Social History:  Social History     Social History    Marital status:      Spouse name: N/A    Number of children: N/A    Years of education: N/A     Social History Main Topics    Smoking status: Current Every Day Smoker     Packs/day: 1 00    Smokeless tobacco: Never Used    Alcohol use Yes      Comment: rare    Drug use: No    Sexual activity: Not on file     Other Topics Concern    Not on file     Social History Narrative    No living will       Allergies: Allergies   Allergen Reactions    Ceclor [Cefaclor] Anaphylaxis    Codeine Itching     Reaction Date: [id number];     Ticlid [Ticlopidine] Hives    Penicillins Rash           Physical ExaminationThere were no vitals taken for this visit    Gen: Alert and oriented to person, place, time      Orthopedic Exam  Left shoulder incisions healed sutures removed  Patient has functional range of motion with no distal neurovascular deficits  All compartments soft non-tender          Impression  Slow improvement status post left shoulder arthroscopy            Plan    Patient will continue treatment and pain management and probably getting cervical in epidural injections later next month  Patient has been referred to Rheumatology  Patient will start a physical therapy program  Smoking cessation counseling  Follow-up in 8 weeks    Brent Caro MD        Portions of the record may have been created with voice recognition software   Occasional wrong word or "sound a like" substitutions may have occurred due to the inherent limitations of voice recognition software   Read the chart carefully and recognize, using context, where substitutions have occurred

## 2018-10-26 NOTE — TELEPHONE ENCOUNTER
Patient is calling to report that she forgot to give the names of 2 meds that she is also taking in addition to what she confirmed  Patient is also taking vicodin & amitriptyline  Patient left a voice mail & did not give any other information about these meds

## 2018-10-27 LAB
HAV AB SER QL IA: NORMAL
HBV SURFACE AB SER-ACNC: <3.1 MIU/ML

## 2018-10-29 LAB — HIV 1+2 AB+HIV1 P24 AG SERPL QL IA: NORMAL

## 2018-10-30 LAB
A2 MACROGLOB SERPL-MCNC: 227 MG/DL (ref 110–276)
ALT SERPL W P-5'-P-CCNC: 23 IU/L (ref 0–40)
APO A-I SERPL-MCNC: 151 MG/DL (ref 116–209)
BILIRUB SERPL-MCNC: 0.3 MG/DL (ref 0–1.2)
COMMENT: ABNORMAL
FIBROSIS SCORING:: ABNORMAL
FIBROSIS STAGE SERPL QL: ABNORMAL
GGT SERPL-CCNC: 30 IU/L (ref 0–60)
HAPTOGLOB SERPL-MCNC: 208 MG/DL (ref 34–200)
INTERPRETATIONS: ABNORMAL
LIVER FIBR SCORE SERPL CALC.FIBROSURE: 0.13 (ref 0–0.21)
NECROINFLAMM ACTIVITY SCORING:: ABNORMAL
NECROINFLAMMATORY ACT GRADE SERPL QL: ABNORMAL
NECROINFLAMMATORY ACT SCORE SERPL: 0.08 (ref 0–0.17)
SERVICE CMNT-IMP: ABNORMAL

## 2018-10-30 NOTE — TELEPHONE ENCOUNTER
Spoke with patient and she states Vicodin 5-325 mg  every 6 hours until gone (from her surgery)  States she is almost done  Amitriptyline 25 mg  1x daily at bedtime  Pt states, however, that she meant to contact Dr Byrnes's office with that information, not our office  Pt can be reached at [phone] with any questions

## 2018-10-31 ENCOUNTER — TELEPHONE (OUTPATIENT)
Dept: GASTROENTEROLOGY | Facility: CLINIC | Age: 53
End: 2018-10-31

## 2018-10-31 ENCOUNTER — TELEPHONE (OUTPATIENT)
Dept: GASTROENTEROLOGY | Facility: MEDICAL CENTER | Age: 53
End: 2018-10-31

## 2018-10-31 LAB — ETHANOL UR-MCNC: NEGATIVE %

## 2018-10-31 NOTE — TELEPHONE ENCOUNTER
Spoke with patient  Patient is aware to have HEP a and B vaccines done with PCP  She will make this appointment  Patient aware we are waiting for UDS for tmt

## 2018-10-31 NOTE — TELEPHONE ENCOUNTER
----- Message from Hamlet Ayala MA sent at 10/31/2018  9:46 AM EDT -----  Regarding: hep c treatment  Patient had her blood work done and the results are in her chart! She was wondering what is the next step

## 2018-11-01 LAB
AMPHETAMINES UR QL SCN: NEGATIVE NG/ML
BARBITURATES UR QL SCN: NEGATIVE NG/ML
BENZODIAZ UR QL SCN: NEGATIVE NG/ML
BZE UR QL: NEGATIVE NG/ML
CANNABINOIDS UR QL SCN: POSITIVE
METHADONE UR QL SCN: NEGATIVE NG/ML
OPIATES UR QL: NEGATIVE
PCP UR QL: NEGATIVE NG/ML
PROPOXYPH UR QL: NEGATIVE NG/ML

## 2018-11-05 DIAGNOSIS — B18.2 HEP C W/O COMA, CHRONIC (HCC): Primary | ICD-10-CM

## 2018-11-05 NOTE — TELEPHONE ENCOUNTER
Attempted to call the patient and left a detailed  mom stating her last dose of ASA will be on 11/30  Procedure scheduled for 12/6  Patient to CB if any questions

## 2018-11-06 NOTE — TELEPHONE ENCOUNTER
Pt left a message in voicemail at 4:16 yesterday afternoon, stating that she was returning a call from the office  Pt left a call back number of [phone]

## 2018-11-09 ENCOUNTER — TELEPHONE (OUTPATIENT)
Dept: GASTROENTEROLOGY | Facility: CLINIC | Age: 53
End: 2018-11-09

## 2018-11-09 DIAGNOSIS — B18.2 HEP C W/O COMA, CHRONIC (HCC): Primary | ICD-10-CM

## 2018-11-09 NOTE — TELEPHONE ENCOUNTER
LMOM  Patient Hep c medication is approved  It will be getting filled through [facility]  Mavyret x 8 weeks

## 2018-11-13 ENCOUNTER — PROCEDURE VISIT (OUTPATIENT)
Dept: PAIN MEDICINE | Facility: CLINIC | Age: 53
End: 2018-11-13
Payer: COMMERCIAL

## 2018-11-13 DIAGNOSIS — M67.814 BICEPS TENDINOSIS OF LEFT SHOULDER: ICD-10-CM

## 2018-11-13 DIAGNOSIS — M79.18 MYOFASCIAL PAIN SYNDROME: Primary | ICD-10-CM

## 2018-11-13 DIAGNOSIS — M54.12 CERVICAL RADICULOPATHY: ICD-10-CM

## 2018-11-13 DIAGNOSIS — M25.512 ACUTE PAIN OF LEFT SHOULDER: ICD-10-CM

## 2018-11-13 PROCEDURE — 20552 NJX 1/MLT TRIGGER POINT 1/2: CPT | Performed by: ANESTHESIOLOGY

## 2018-11-13 RX ORDER — DICLOFENAC POTASSIUM 50 MG/1
50 TABLET, FILM COATED ORAL 3 TIMES DAILY
Qty: 90 TABLET | Refills: 0 | Status: SHIPPED | OUTPATIENT
Start: 2018-11-13 | End: 2019-01-15 | Stop reason: SDUPTHER

## 2018-11-13 RX ORDER — AMITRIPTYLINE HYDROCHLORIDE 25 MG/1
25 TABLET, FILM COATED ORAL
Qty: 30 TABLET | Refills: 1 | Status: ON HOLD | OUTPATIENT
Start: 2018-11-13 | End: 2019-01-17 | Stop reason: HOSPADM

## 2018-11-13 RX ORDER — GABAPENTIN 600 MG/1
600 TABLET ORAL 2 TIMES DAILY
Qty: 60 TABLET | Refills: 0 | Status: SHIPPED | OUTPATIENT
Start: 2018-11-13 | End: 2019-01-15 | Stop reason: SDUPTHER

## 2018-11-13 NOTE — PROGRESS NOTES
Inj Trigger Point Single/Multiple     Date/Time 11/13/2018 3:01 PM     Performed by  Hydrobolt Sender     Authorized by Hydrobolt Sender       Consent: Written consent obtained  Consent given by: patient  Patient understanding: patient states understanding of the procedure being performed  Patient consent: the patient's understanding of the procedure matches consent given  Procedure consent: procedure consent matches procedure scheduled  Relevant documents: relevant documents present and verified  Test results: test results available and properly labeled  Site marked: the operative site was marked  Imaging studies: imaging studies available  Patient identity confirmed: verbally with patient  Time out: Immediately prior to procedure a "time out" was called to verify the correct patient, procedure, equipment, support staff and site/side marked as required  Preparation: Patient was prepped and draped in the usual sterile fashion  Site preparation: Chloraprep    Local anesthesia used: yes      Anesthesia: local infiltration     Anesthesia   Local anesthesia used: yes  Local Anesthetic: bupivacaine 0 25% without epinephrine  Anesthetic total: 9 mL     Sedation   Patient sedated: no      Specimen: no    Culture: no   Procedure Details   Procedure Notes: Left deltoid, trapezius, biceps Trigger Point Injection with steroid  After trigger point injection discussing the risks, benefits, and alternatives to the procedure, the patient expressed understanding and wished to proceed  The patient was brought to the fluoroscopy suite and placed in the prone position  Procedural pause conducted to verify:  correct patient identity, procedure to be performed and as applicable, correct side and site, correct patient position, and availability of implants, special equipment and special requirements   The appropriate hyper irritable musculoskeletal tender points were marked with a sterile pen, prepped with ChloraPrep and sterilely draped  After appropriate reproduction of pain at each of the tender points marked, a total of 10 mL of 40 mg of Depo-Medrol and 0 25% bupivacaine was injected after negative aspiration of air, CSF, heme, or other bodily fluids with a 1 5 inch 25-gauge needle  A total number of 3 muscle groups muscle groups were injected  Vital signs were monitored before, during, and after the procedure and remained stable at all points  The patient was discharged with no apparent complications on their own power after an appropriate observation period in the recovery area      0 25% Bupivacaine        NDC [id number] EXP 11/2019 LOT [id number]  40mg/ml Depomedrol  NDC [id number] EXP 01/2019 LOT [id number]  1% lidocaine                  NDC [id number] EXP 02/2020 LOT [id number]  Patient Transportation: confirmed  Patient tolerance: Patient tolerated the procedure well with no immediate complications          We will also provide patient with a physical therapy referral for left deltoid, biceps, triceps Graston myofascial release

## 2018-11-20 ENCOUNTER — APPOINTMENT (OUTPATIENT)
Dept: OCCUPATIONAL THERAPY | Facility: HOME HEALTHCARE | Age: 53
End: 2018-11-20
Payer: COMMERCIAL

## 2018-11-20 ENCOUNTER — TELEPHONE (OUTPATIENT)
Dept: OBGYN CLINIC | Facility: HOSPITAL | Age: 53
End: 2018-11-20

## 2018-11-20 DIAGNOSIS — M79.18 MYOFASCIAL PAIN SYNDROME: Primary | ICD-10-CM

## 2018-11-20 DIAGNOSIS — M67.814 BICEPS TENDINOSIS OF LEFT SHOULDER: ICD-10-CM

## 2018-11-20 NOTE — TELEPHONE ENCOUNTER
Spoke with patient  She will start Mavyret x 8 weeks 11/21/2018  Education is done verbally on the phone to include potential side effects, potential drug interactions, f/u bloodwork and office visit follow-up       4 week BW- 12/19/2018  8 week BW - 1/15/2019  SVR- 4/9/2019

## 2018-11-20 NOTE — TELEPHONE ENCOUNTER
[facility]  Call back number: [phone]  Fax number: [phone]     Patient had a script for the bilateral bicep PT  They are asking for OT instead  Can they please have a script for this written and faxed?  Please advise

## 2018-11-20 NOTE — TELEPHONE ENCOUNTER
Faxed OT script to # listed below  Left a detailed message on Aneta's #, advising her the script was just fax, C/B # provided for any issues

## 2018-11-27 ENCOUNTER — EVALUATION (OUTPATIENT)
Dept: OCCUPATIONAL THERAPY | Facility: HOME HEALTHCARE | Age: 53
End: 2018-11-27
Payer: COMMERCIAL

## 2018-11-27 DIAGNOSIS — M67.814 BICEPS TENDINOSIS OF LEFT SHOULDER: Primary | ICD-10-CM

## 2018-11-27 PROCEDURE — G8984 CARRY CURRENT STATUS: HCPCS

## 2018-11-27 PROCEDURE — G8985 CARRY GOAL STATUS: HCPCS

## 2018-11-27 PROCEDURE — 97166 OT EVAL MOD COMPLEX 45 MIN: CPT

## 2018-11-27 PROCEDURE — 97535 SELF CARE MNGMENT TRAINING: CPT

## 2018-11-27 NOTE — PROGRESS NOTES
OT Evaluation     Today's date: 2018  Patient name: Ravindra Babb  : 1965  MRN: [id number]  Referring provider: Estela Hsieh MD  Dx:   Encounter Diagnosis     ICD-10-CM    1  Biceps tendinosis of left shoulder M67 912                   Assessment  Assessment details: Pt presents to OP OT evaluation this date for biceps tendonitis with a history of L shoulder arthroscopy performed by Dr Abdoul Santos  Pt has attended therapy services in the past 6 months with both PT and OT for the L shoulder  Pt is inconsistent with appointments and reports "I wasn't even going to come today"  Pt reports L shoulder pain which is consistently at a 10/10 throughout the L UE radiating into the fingers; pt reports nothing relieves this pain and she has trialed ESTIM in the home, ice, heat, and manual massage which she self-administers, with no relief  Pt with referral from pain management and reports receiving injection in L UE with no relief and thus presents to therapy per recommendation  Pt demonstrates significantly decreased AROM/strength as well as increased pain when performing all functional tasks  Pt reports "I can't use the arm at all" and "I don't know what to do with it"; pt educated in the importance of attending OP therapy sessions  Pt will attend skilled OT intervention x2 days a week for 4 weeks to begin POC; pt demonstrates understanding and reports she will be seeking bus transportation  Impairments: abnormal or restricted ROM, impaired physical strength and pain with function    Symptom irritability: lowBarriers to therapy: Inconsistent with therapy appointments  Understanding of Dx/Px/POC: fair   Prognosis: fair    Goals  STGs     Pt will increase  strength by 5-10# in L UE  Pt will increase L UE strength by 1/2 grade  Pt will report a decrease in pain in L UE by 50%  Pt will increase L UE ROM by 50%       Pt will report an increase in ability to perform ADL/IADL tasks in the home with decreased pain  Pt will report decrease in morning stiffness by 50%  Pt will attend OP OT intervention consistently x2 days a week  LTGs     Pt will increase  strength by an additional 5-10# in L UE  Pt will increase L UE strength to [facility]  Pt will report a decrease in pain in L UE by 75%  Pt will increase L UE ROM to [facility]  Pt will report an increase in ability to perform ADL/IADL tasks in the home with decreased pain by 75%  Pt will report decrease in morning stiffness by 100%  Pt will attend OP OT intervention consistently x2 days a week  Plan  Plan details: Thank you for referral!  Patient would benefit from: skilled occupational therapy  Planned modality interventions: thermotherapy: hydrocollator packs  Planned therapy interventions: joint mobilization, manual therapy, strengthening, stretching, therapeutic exercise, graded exercise, flexibility and functional ROM exercises  Frequency: 2x week  Duration in visits: 8  Duration in weeks: 4  Plan of Care beginning date: 11/27/2018  Treatment plan discussed with: patient        Subjective Evaluation    History of Present Illness  Date of onset: 7/1/2018  Mechanism of injury: surgery  Recurrent probem    Quality of life: fair    Pain  Current pain rating: 10  At best pain rating: 10  At worst pain rating: 10  Quality: radiating, sharp, knife-like and dull ache  Alleviating factors: none    Exacerbated by: "everything"  Progression: no change    Social Support  Steps to enter house: yes  Stairs in house: no   Lives in: apartment  Lives with: alone    Employment status: not working  Hand dominance: right      Diagnostic Tests  X-ray: normal  MRI studies: abnormal  CT scan: abnormal  EMG: normal  Treatments  Previous treatment: physical therapy, occupational therapy, immobilization, injection treatment and medication  Current treatment: occupational therapy  Patient Goals  Patient goals for therapy: increased strength, independence with ADLs/IADLs, return to sport/leisure activities, decreased pain and increased motion  Patient goal: pt reports "all functional activity is very painful"        Objective     Tenderness     Left Shoulder   Tenderness in the biceps tendon (proximal), clavicle and SC joint       Additional Tenderness Details  Pt reports pain "everywhere" in regards to shoulder joint      Neurological Testing     Sensation     Shoulder   Left Shoulder   Paresthesia: light touch    Right Shoulder   Intact: light touch    Comments   Left light touch: reports numbness "at times"    Active Range of Motion     Right Shoulder   Flexion: 60 degrees with pain  Extension: 20 degrees with pain  Abduction: 45 degrees with pain  External rotation 90°: 25 degreeswith pain  Internal rotation 90°: WFL    Left Elbow   Normal active range of motion    Additional Active Range of Motion Details  Pt very limited this session by pain in L UE    Scapular Mobility   Left Shoulder   Scapular mobility: fair  Scapular Mobility with Shoulder to 90° FF   Scapular winging: minimal  Scapular elevation: minimal    Additional Scapular Mobility Details  Report of pain throughout assessment    Strength/Myotome Testing     Left Shoulder     Planes of Motion   Flexion: 3   Extension: 3   Abduction: 3   Adduction: 3   External rotation at 90°: 3     Left Elbow   Flexion: 4-  Extension: 4-      Flowsheet Rows      Most Recent Value   PT/OT G-Codes   FOTO information reviewed  N/A   Assessment Type  Evaluation   G code set  Carrying, Moving & Handling Objects   Carrying, Moving and Handling Objects Current Status ()  CL   Carrying, Moving and Handling Objects Goal Status ()  CK          Precautions: none, MD specifically requesting graston to triceps, biceps    Specialty Daily Treatment Diary     Manual         Graston-biceps/triceps        Stretching all planes                                    Exercise Diary Modalities        Heat                            No STIM- pt reports STIM is not affective

## 2018-11-29 ENCOUNTER — HOSPITAL ENCOUNTER (OUTPATIENT)
Dept: CT IMAGING | Facility: HOSPITAL | Age: 53
Discharge: HOME | End: 2018-11-29
Attending: ANESTHESIOLOGY
Payer: COMMERCIAL

## 2018-11-29 DIAGNOSIS — M75.112 INCOMPLETE TEAR OF LEFT ROTATOR CUFF: ICD-10-CM

## 2018-11-29 PROCEDURE — 73200 CT UPPER EXTREMITY W/O DYE: CPT

## 2018-11-30 ENCOUNTER — OFFICE VISIT (OUTPATIENT)
Dept: OCCUPATIONAL THERAPY | Facility: HOME HEALTHCARE | Age: 53
End: 2018-11-30
Payer: COMMERCIAL

## 2018-11-30 DIAGNOSIS — M67.814 BICEPS TENDINOSIS OF LEFT SHOULDER: Primary | ICD-10-CM

## 2018-11-30 PROCEDURE — 97110 THERAPEUTIC EXERCISES: CPT

## 2018-11-30 PROCEDURE — 97140 MANUAL THERAPY 1/> REGIONS: CPT

## 2018-11-30 NOTE — PROGRESS NOTES
Daily Note     Today's date: 2018  Patient name: Katelyn Noriega  : 1965  MRN: [id number]  Referring provider: Christine Wesley MD  Dx:   Encounter Diagnosis     ICD-10-CM    1  Biceps tendinosis of left shoulder M67 912                   Subjective: Im going for neck injections   I had a CAT scan yesterday and I havent heard back yet  I have a hard time driving even, turning and stuff  I wish I would have never got that surgery  Objective: See treatment diary below    Precautions: none, MD specifically requesting nusratton to triceps, biceps     Specialty Daily Treatment Diary      Manual  18           Graston-biceps/triceps 5 min           Stretching all planes Standing self stretch 3# 20 sec x 2           Manual Massage 5 min                                             Exercise Diary  18           Digiflex Green x 20           Flexbar Yellow x 20 flex/extn           3 Way Elbow Flexion X 20 each           Pro/Sup Roll X 20           X Trainer Red x 20            Tension Pins All on/off           Wrist Maze             Pendulums             Simulated Steering Wheel                                                                                                                                                                             Modalities 18           Heat 10 min                                    Pain in: 7  Pain out: 7      Assessment: Tolerated treatment fair  Patient demonstrated fatigue post treatment, exhibited good technique with therapeutic exercises and would benefit from continued OT Therapist educating pt to complete elbow exercises as well as pendulums at home every day  Therapist trialed Nusratton #3 this date to bicep/tricep areas with pt stating a feeling of the muscle spasming  Therapist completing manual massage as well to try and decrease spasm as well as having pt complete stretches prior to ice   Therapist explaining Nusratton technique and effects to pt with pt stating verbal understanding  Plan: Continue per plan of care  Progress treatment as tolerated

## 2018-12-04 ENCOUNTER — TELEPHONE (OUTPATIENT)
Dept: PAIN MEDICINE | Facility: MEDICAL CENTER | Age: 53
End: 2018-12-04

## 2018-12-04 NOTE — TELEPHONE ENCOUNTER
RN s w pt regarding previous and advised that she would alert RM to the CT result being in [facility] and will call back with his suggestions and recs for same  Pt aware that RM is in the OR today and that we will have to get back to her tomorrow  Pt appreciative of same      --please advise thank you-  Pt CT results in EPIC

## 2018-12-04 NOTE — PROGRESS NOTES
PT DISCHARGE    Today's date: 2018  Patient name: Yael Lucero  : 1965  MRN: [id number]  Referring provider: Nehal Stone MD  Dx:   Encounter Diagnosis     ICD-10-CM    1  Calcific tendinitis of left shoulder M75 32        Start Time: 1300  Stop Time: 1345  Total time in clinic (min): 45 minutes    Assessment  Assessment details: The patient had her PT IE on 18 and she was seen in PT for a total of 6 visits with her last treatment on 18  She did not return for more visits  Unable to assess her current status, refer to her PT IE for her final assessment  Unable to assess progress towards her goals as she did not return prior to a re-eval being completed  Unable to keep patient on hold, D/C PT secondary to script   D/C PT  Impairments: abnormal or restricted ROM, activity intolerance, impaired physical strength, lacks appropriate home exercise program, pain with function and poor posture   Understanding of Dx/Px/POC: good   Prognosis: good    Goals  STG: to be achieved within 6 weeks   Decrease pain 25-50%  Improve ROM within MD protocol parameters   Decrease edema > 25%     LTG: to be achieved within 12 weeks   Independent with HEP   ADL function returned to PLOF  L shoulder ROM WFL   L shoulder strength > 4/5 throughout     Plan  Plan details: Unable to keep patient on hold, D/C PT secondary to script   D/C PT  Planned modality interventions: cryotherapy, TENS and thermotherapy: hydrocollator packs  Planned therapy interventions: manual therapy, neuromuscular re-education, home exercise program, postural training, strengthening, stretching, therapeutic activities, therapeutic exercise, functional ROM exercises and flexibility        Subjective Evaluation    History of Present Illness  Date of surgery: 2018  Mechanism of injury: surgery  Mechanism of injury: Pt reporting that she had been having pain in the L shoulder for 5+ years   She did have an injection several years ago which did help ease pain for a few years  Pt states that pain began to return within the past year with insidious onset  She had another injection and tried conservative physical therapy but neither of them helped to ease her pain this time  Pt states that she followed up with an Orthopedic and had x-rays and MRI taken which showed a RTC tear  She went for surgery on 18 for L shoulder arthroscopy  PT notes that script and diagnosis are consistent with arthroscopy for debridement but pt reporting that she had a RTC repair  PT phoned MD office for clarification and protocol  Pain  At best pain ratin  At worst pain rating: 10  Quality: sharp  Relieving factors: medications    Social Support  Lives with: alone    Hand dominance: right      Diagnostic Tests  X-ray: abnormal  MRI studies: abnormal  Treatments  Previous treatment: injection treatment and physical therapy  Current treatment: physical therapy  Patient Goals  Patient goals for therapy: decreased pain, increased motion and increased strength          Objective     Postural Observations  Seated posture: fair    Additional Postural Observation Details  B rounded shoulders     Observations   Left Shoulder   Positive for edema and incision  Neurological Testing     Sensation     Shoulder   Left Shoulder   Intact: light touch    Right Shoulder   Intact: light touch    Active Range of Motion   Left Shoulder   Flexion: Left shoulder active forward flexion: NT    Abduction: Left shoulder active abduction: NT    External rotation 45°: Left shoulder active external rotation at 45 degrees: NT     Internal rotation 45°: Left shoulder active internal rotation at 45 degrees: NT      Right Shoulder   Normal active range of motion    Left Elbow   Flexion: 135 degrees   Extension: -70 degrees     Right Elbow   Flexion: 135 degrees   Extension: 0 degrees     Additional Active Range of Motion Details  AROM NT per MD protocol limitations     Passive Range of Motion   Left Shoulder   Flexion: 35 degrees   Adduction: 30 degrees   External rotation 45°: Left shoulder passive external rotation at 45 degrees: -10  Internal rotation 45°: Left shoulder passive external rotation at 45 degrees: stomach       Right Shoulder   Normal passive range of motion    Left Elbow   Extension: -40 degrees     General Comments     Shoulder Comments   Pt is R hand dominate   She is sleeping in recliner chair   Wearing sling on L UE; no lifting, no AROM of L shoulder per MD orders

## 2018-12-04 NOTE — TELEPHONE ENCOUNTER
Pt called asking if Dr Barnabas Peabody had a chance to review her CT results yet  States that she is having a very difficult time doing therapy and wants to know if he would advise to discontinue that due to the results  Pt would like a call back to advise  She can be reached at [phone]

## 2018-12-05 NOTE — TELEPHONE ENCOUNTER
CT of the left arm shows rotator cuff muscles are all intact  There is some calcification in the insertion site of the infraspinatus muscles

## 2018-12-10 ENCOUNTER — TELEPHONE (OUTPATIENT)
Dept: GASTROENTEROLOGY | Facility: CLINIC | Age: 53
End: 2018-12-10

## 2018-12-10 ENCOUNTER — TELEPHONE (OUTPATIENT)
Dept: PAIN MEDICINE | Facility: CLINIC | Age: 53
End: 2018-12-10

## 2018-12-10 PROBLEM — M54.12 CERVICAL RADICULITIS: Status: ACTIVE | Noted: 2018-12-10

## 2018-12-10 NOTE — TELEPHONE ENCOUNTER
Spoke with patient  Patient is asking if Selene Morales is an ABX   Advised patient [facility] is an anti-viral

## 2018-12-10 NOTE — TELEPHONE ENCOUNTER
Sched pt for C7 T1 ANDREIA for 1/17/18   Went over pre-procedure instructions below:    Pt states she is not taking prescription blood thinners and has not been taking aspirin any longer  Pt denies Nsaids  Nothing to eat or drink 1 hr prior to procedure  Need to arrange transportation  Proper clothing for procedure  If ill or placed on antibiotics please call to reschedule

## 2018-12-18 ENCOUNTER — OFFICE VISIT (OUTPATIENT)
Dept: OBGYN CLINIC | Facility: CLINIC | Age: 53
End: 2018-12-18

## 2018-12-18 VITALS
SYSTOLIC BLOOD PRESSURE: 91 MMHG | HEART RATE: 98 BPM | DIASTOLIC BLOOD PRESSURE: 65 MMHG | WEIGHT: 148 LBS | HEIGHT: 61 IN | BODY MASS INDEX: 27.94 KG/M2

## 2018-12-18 DIAGNOSIS — Z98.890 STATUS POST ARTHROSCOPY OF LEFT SHOULDER: Primary | ICD-10-CM

## 2018-12-18 PROCEDURE — 99024 POSTOP FOLLOW-UP VISIT: CPT | Performed by: ORTHOPAEDIC SURGERY

## 2018-12-18 NOTE — PROGRESS NOTES
Chief Complaint  Persistent left shoulder pain    History Of Presenting Illness  Ellyn Marina 1965 presents with left shoulder pain  Patient complains of aching discomfort in the left shoulder with spasm of her deltoid and biceps muscles  Patient underwent 2 arthroscopies in the past July 23rd and October 15th  Patient has had no relief of her left shoulder symptoms  Both times no gross pathology is detected in the shoulder joint except subacromial inflammation some synovitis and a partial tear of the cuff  Patient tells me his surgery is made no difference to her symptoms  Patient continues to smoke    Patient is under the care of  pain management for chronic neck and back pain    Current Medications  Current Outpatient Prescriptions   Medication Sig Dispense Refill    amitriptyline (ELAVIL) 25 mg tablet Take 1 tablet (25 mg total) by mouth daily at bedtime for 30 days 30 tablet 1    baclofen 10 mg tablet Take 1 tablet (10 mg total) by mouth 3 (three) times a day for 30 days 90 tablet 0    cyclobenzaprine (FLEXERIL) 10 mg tablet Take 1 tablet (10 mg total) by mouth 3 (three) times a day as needed for muscle spasms 30 tablet 0    diclofenac potassium (CATAFLAM) 50 mg tablet Take 1 tablet (50 mg total) by mouth 3 (three) times a day for 30 days 90 tablet 0    fluticasone (FLONASE) 50 mcg/act nasal spray 1 spray into each nostril daily 16 g 0    gabapentin (NEURONTIN) 600 MG tablet Take 1 tablet (600 mg total) by mouth 2 (two) times a day for 30 days 60 tablet 0    Glecaprevir-Pibrentasvir 100-40 MG TABS Take 3 tablets by mouth daily for 56 days 84 tablet 1    HYDROcodone-acetaminophen (NORCO) 5-325 mg per tablet Take 1 tablet by mouth every 6 (six) hours as needed for pain Max Daily Amount: 4 tablets 21 tablet 0    nitroglycerin (NITROSTAT) 0 4 mg SL tablet Place 1 tablet (0 4 mg total) under the tongue every 5 (five) minutes as needed for chest pain 90 tablet 3    ranitidine (ZANTAC) 150 mg tablet Take 1 tablet (150 mg total) by mouth 2 (two) times a day 60 tablet 5    rosuvastatin (CRESTOR) 5 mg tablet Take 1 tablet (5 mg total) by mouth daily 90 tablet 3    VENTOLIN  (90 Base) MCG/ACT inhaler inhale 2 puffs by mouth every 6 hours if needed for wheezing 18 g 0    aspirin 81 mg chewable tablet Chew 1 tablet daily Stop for the time being for foot surgery       doxycycline hyclate (VIBRAMYCIN) 100 mg capsule Take 100 mg by mouth every 12 (twelve) hours       No current facility-administered medications for this visit          Current Problems    Active Problems:   Patient Active Problem List    Diagnosis Date Noted    Cervical radiculitis 12/10/2018    Arthritis 10/25/2018    Cervical radiculopathy 10/25/2018    Incomplete rotator cuff tear or rupture of left shoulder, not specified as traumatic 10/02/2018    Biceps tendinosis of left shoulder 10/02/2018    Colorectal polyps 09/18/2018    Macrocytic 09/18/2018    Irritable bowel syndrome with diarrhea 09/18/2018    [facility] 08/15/2018    Neuroma of foot 06/11/2018    Calcific tendinitis of left shoulder 06/05/2018    Incomplete tear of left rotator cuff 06/05/2018    Myofascial pain syndrome 05/11/2018    Left facial numbness 04/03/2018    Positive KRISTA (antinuclear antibody) 03/26/2018    Rheumatoid factor positive 03/23/2018    PVD [facility]peripheral vascular disease) ([facility] ) 03/16/2018    Headache disorder 03/16/2018    Lumbar spondylosis 03/09/2018    Osteoarthritis of spine with radiculopathy, cervical region 03/09/2018    Degenerative cervical disc 03/09/2018    Lumbar degenerative disc disease 03/09/2018    Bilateral carpal tunnel syndrome 03/09/2018    Acute pain of left shoulder 02/21/2018    Gastro-esophageal reflux disease without esophagitis 11/04/2016    Right lower quadrant pain 11/04/2016    Abnormal weight loss 11/04/2016    Depression with anxiety 11/11/2014    CAD in native artery 2012    Other hyperlipidemia 2012    Essential hypertension 2012         Review of Systems:    General: negative for - chills, fatigue, fever,  weight gain or weight loss  Psychological: negative for - anxiety, behavioral disorder, concentration difficulties      Past Medical History:   Past Medical History:   Diagnosis Date    Acquired ichthyosis     last assessed: 2013    Anxiety     Cervical radiculopathy     last assessed: 2014    Colorectal polyps     last assessed: 3/9/2015    COPD (chronic obstructive pulmonary disease) ([facility] )     Depression     Diverticulosis of colon     Foot pain     GERD (gastroesophageal reflux disease)     Hyperlipemia     Hypertension     Myocardial infarction ([facility] )     at age 28    Osteopenia     last assessed: 2013    Palpitations     last assessed: 2014    PVD (peripheral vascular disease) ([facility] )     last assessed: 12/3/2012    Scapular dyskinesis     last assessed: 2014    Shortness of breath     Tendonitis of left rotator cuff     last assessed: 2014    Vocal cord polyps     last assessed: 2014       Past Surgical History:   Past Surgical History:   Procedure Laterality Date    ABDOMINAL SURGERY      exploratory    CARDIAC SURGERY      cardiac stent      SECTION      last assessed: 2014    CHOLECYSTECTOMY      last assessed: 2014    COLONOSCOPY  10/27/2014    CORONARY ANGIOPLASTY WITH STENT PLACEMENT      LAD stent    DENTAL SURGERY      last assessed: 2014    ELBOW SURGERY Bilateral     arthroplasty    ESOPHAGOGASTRODUODENOSCOPY      ESOPHAGOGASTRODUODENOSCOPY N/A 2016    Procedure: ESOPHAGOGASTRODUODENOSCOPY (EGD);   Surgeon: Radhika Montero MD;  Location: [facility];  Service:    [facility] INJECTION LEFT SHOULDER (ARTHROGRAM)  2018    FOOT SURGERY Right 02/06/2015    x 4    HYSTERECTOMY      last assessed: 2014    GA ARTHROSCOPY SHOULDER SURGICAL BICEPS TENODESIS Left 10/15/2018    Procedure: ARTHROSCOPY SHOULDER; Debridement of shoulder;  Surgeon: Sid Jackson MD;  Location: [facility];  Service: Orthopedics    AL SHLDR ARTHROSCOP,SURG,W/ROTAT CUFF REPR Left 7/23/2018    Procedure: ARTHROSCOPY SHOULDER, subacromial decompression, synovectomy;  Surgeon: Sid Jackson MD;  Location: [facility];  Service: Orthopedics    THROAT SURGERY      TOOTH EXTRACTION      TRIGEMINAL NERVE DECOMPRESSION Right 6/15/2018    Procedure: FOOT NEUROPLASTY;  Surgeon: Sean Isaac DPM;  Location: [facility];  Service: Podiatry       Family History:  Family history reviewed and non-contributory  Family History   Problem Relation Age of Onset    No Known Problems Mother     No Known Problems Father     No Known Problems Maternal Grandmother     No Known Problems Maternal Grandfather     No Known Problems Paternal Grandmother     No Known Problems Paternal Grandfather        Social History:  Social History     Social History    Marital status:      Spouse name: N/A    Number of children: N/A    Years of education: N/A     Social History Main Topics    Smoking status: Current Every Day Smoker     Packs/day: 1 00    Smokeless tobacco: Never Used    Alcohol use Yes      Comment: rare    Drug use: No    Sexual activity: Not Asked     Other Topics Concern    None     Social History Narrative    No living will       Allergies:    Allergies   Allergen Reactions    Ceclor [Cefaclor] Anaphylaxis    Codeine Itching     Reaction Date: 09Jun2011;     Ticlid [Ticlopidine] Hives    Penicillins Rash           Physical ExaminationBP 91/65   Pulse 98   Ht 5' 1" (1 549 m)   Wt 67 1 kg (148 lb)   BMI 27 96 kg/m²   Gen: Alert and oriented to person, place, time  HEENT: EOMI, eyes clear, moist mucus membranes, hearing intact      Orthopedic Exam  Left shoulder no obvious swelling or deformity  All portal sites well healed  Mild tenderness anterior joint line bicipital groove AC joint and posterior portal site  Cuff strength 4/5          Impression  Persistent left shoulder pain status post arthroscopy            Plan    Discussed treatment with the patient  Have scoped the patient twice and not been able to fix her symptoms of find any obvious treatable pathology  Will refer to one of my sports medicine colleagues to see if  there is anything I am  missing here for 2nd opinion  Follow-up 3 months    Vinod Marquis MD        Portions of the record may have been created with voice recognition software   Occasional wrong word or "sound a like" substitutions may have occurred due to the inherent limitations of voice recognition software   Read the chart carefully and recognize, using context, where substitutions have occurred

## 2018-12-21 NOTE — TELEPHONE ENCOUNTER
Spoke with patient  Patient is doing well on tmt  Patient is aware to have 4 week HCV viral load checked       4 week BW DUE- 12/19/2018  8 week BW - 1/15/2019  SVR- 4/9/2019

## 2018-12-26 ENCOUNTER — APPOINTMENT (OUTPATIENT)
Dept: LAB | Facility: MEDICAL CENTER | Age: 53
End: 2018-12-26
Payer: COMMERCIAL

## 2018-12-26 ENCOUNTER — APPOINTMENT (OUTPATIENT)
Dept: RADIOLOGY | Facility: MEDICAL CENTER | Age: 53
End: 2018-12-26
Payer: COMMERCIAL

## 2018-12-26 DIAGNOSIS — B18.2 HEP C W/O COMA, CHRONIC (HCC): ICD-10-CM

## 2018-12-26 DIAGNOSIS — Z98.890 STATUS POST ARTHROSCOPY OF LEFT SHOULDER: ICD-10-CM

## 2018-12-26 LAB
ALBUMIN SERPL BCP-MCNC: 3.9 G/DL (ref 3.5–5)
ALP SERPL-CCNC: 75 U/L (ref 46–116)
ALT SERPL W P-5'-P-CCNC: 14 U/L (ref 12–78)
ANION GAP SERPL CALCULATED.3IONS-SCNC: 6 MMOL/L (ref 4–13)
AST SERPL W P-5'-P-CCNC: 13 U/L (ref 5–45)
BASOPHILS # BLD AUTO: 0.07 THOUSANDS/ΜL (ref 0–0.1)
BASOPHILS NFR BLD AUTO: 1 % (ref 0–1)
BILIRUB SERPL-MCNC: 0.43 MG/DL (ref 0.2–1)
BUN SERPL-MCNC: 8 MG/DL (ref 5–25)
CALCIUM SERPL-MCNC: 9.3 MG/DL (ref 8.3–10.1)
CHLORIDE SERPL-SCNC: 102 MMOL/L (ref 100–108)
CO2 SERPL-SCNC: 28 MMOL/L (ref 21–32)
CREAT SERPL-MCNC: 0.6 MG/DL (ref 0.6–1.3)
EOSINOPHIL # BLD AUTO: 0.22 THOUSAND/ΜL (ref 0–0.61)
EOSINOPHIL NFR BLD AUTO: 2 % (ref 0–6)
ERYTHROCYTE [DISTWIDTH] IN BLOOD BY AUTOMATED COUNT: 11.9 % (ref 11.6–15.1)
GFR SERPL CREATININE-BSD FRML MDRD: 104 ML/MIN/1.73SQ M
GLUCOSE P FAST SERPL-MCNC: 84 MG/DL (ref 65–99)
HCT VFR BLD AUTO: 45.3 % (ref 34.8–46.1)
HGB BLD-MCNC: 15 G/DL (ref 11.5–15.4)
IMM GRANULOCYTES # BLD AUTO: 0.03 THOUSAND/UL (ref 0–0.2)
IMM GRANULOCYTES NFR BLD AUTO: 0 % (ref 0–2)
LYMPHOCYTES # BLD AUTO: 3.69 THOUSANDS/ΜL (ref 0.6–4.47)
LYMPHOCYTES NFR BLD AUTO: 39 % (ref 14–44)
MCH RBC QN AUTO: 33.6 PG (ref 26.8–34.3)
MCHC RBC AUTO-ENTMCNC: 33.1 G/DL (ref 31.4–37.4)
MCV RBC AUTO: 102 FL (ref 82–98)
MONOCYTES # BLD AUTO: 0.54 THOUSAND/ΜL (ref 0.17–1.22)
MONOCYTES NFR BLD AUTO: 6 % (ref 4–12)
NEUTROPHILS # BLD AUTO: 4.87 THOUSANDS/ΜL (ref 1.85–7.62)
NEUTS SEG NFR BLD AUTO: 52 % (ref 43–75)
NRBC BLD AUTO-RTO: 0 /100 WBCS
PLATELET # BLD AUTO: 190 THOUSANDS/UL (ref 149–390)
PMV BLD AUTO: 10.9 FL (ref 8.9–12.7)
POTASSIUM SERPL-SCNC: 4.1 MMOL/L (ref 3.5–5.3)
PROT SERPL-MCNC: 7.8 G/DL (ref 6.4–8.2)
RBC # BLD AUTO: 4.46 MILLION/UL (ref 3.81–5.12)
SODIUM SERPL-SCNC: 136 MMOL/L (ref 136–145)
WBC # BLD AUTO: 9.42 THOUSAND/UL (ref 4.31–10.16)

## 2018-12-26 PROCEDURE — 80053 COMPREHEN METABOLIC PANEL: CPT

## 2018-12-26 PROCEDURE — 85025 COMPLETE CBC W/AUTO DIFF WBC: CPT

## 2018-12-26 PROCEDURE — 36415 COLL VENOUS BLD VENIPUNCTURE: CPT

## 2018-12-26 PROCEDURE — 87522 HEPATITIS C REVRS TRNSCRPJ: CPT

## 2018-12-26 PROCEDURE — 73030 X-RAY EXAM OF SHOULDER: CPT

## 2019-01-02 ENCOUNTER — TELEPHONE (OUTPATIENT)
Dept: GASTROENTEROLOGY | Facility: AMBULARY SURGERY CENTER | Age: 54
End: 2019-01-02

## 2019-01-02 LAB
HCV RNA SERPL NAA+PROBE-ACNC: NORMAL IU/ML
TEST INFORMATION: NORMAL

## 2019-01-02 NOTE — TELEPHONE ENCOUNTER
DR JOHNSON'S PT    Pt called requesting to schedule a follow with dr Geeta Townsend in [address]

## 2019-01-03 NOTE — PROGRESS NOTES
Pt attended x1 session of OT treatment; pt reports to MD she "is having difficulties with therapy"; pt noncompliant with therapeutic intervention and recommendations  Pt inconsistently attended OT treatment sessions in the past  Contacted pt x2 and no response to reschedule; therefore, pt will be discharged from OT services and episode will be completed

## 2019-01-15 ENCOUNTER — EVALUATION (OUTPATIENT)
Dept: OCCUPATIONAL THERAPY | Facility: HOME HEALTHCARE | Age: 54
End: 2019-01-15
Payer: COMMERCIAL

## 2019-01-15 DIAGNOSIS — M67.912 UNSPECIFIED DISORDER OF SYNOVIUM AND TENDON, LEFT SHOULDER: Primary | ICD-10-CM

## 2019-01-15 DIAGNOSIS — M25.512 ACUTE PAIN OF LEFT SHOULDER: ICD-10-CM

## 2019-01-15 PROCEDURE — 97140 MANUAL THERAPY 1/> REGIONS: CPT

## 2019-01-15 PROCEDURE — 97166 OT EVAL MOD COMPLEX 45 MIN: CPT

## 2019-01-15 PROCEDURE — G8985 CARRY GOAL STATUS: HCPCS

## 2019-01-15 PROCEDURE — G8984 CARRY CURRENT STATUS: HCPCS

## 2019-01-15 RX ORDER — DICLOFENAC POTASSIUM 50 MG/1
TABLET, FILM COATED ORAL
Qty: 90 TABLET | Refills: 0 | Status: SHIPPED | OUTPATIENT
Start: 2019-01-15 | End: 2019-06-11

## 2019-01-15 RX ORDER — GABAPENTIN 600 MG/1
TABLET ORAL
Qty: 60 TABLET | Refills: 0 | Status: SHIPPED | OUTPATIENT
Start: 2019-01-15 | End: 2019-06-11

## 2019-01-15 NOTE — PROGRESS NOTES
OT Evaluation     Today's date: 1/15/2019  Patient name: Fletcher Crespo  : 1965  MRN: [id number]  Referring provider: Venancio Petty MD  Dx:   Encounter Diagnosis     ICD-10-CM    1  Unspecified disorder of synovium and tendon, left shoulder M67 912                   Assessment  Assessment details: Pt presents to OP OT evaluation with complaints of L shoulder/bicep pain which has been present for ~6 months-1 year  Pt had x2 surgeries to attempt to address multiple issues in L UE from Dr Marychuy Drake with no success  Pt has attended OP OT and PT services in the past; pt is active with pain management for multiple neck and back chronic pain and takes pain medication regularly  Pt demonstrates increased pain in all planes of movement as well as decreased AROM and strength grossly in L UE  Pt describes a "lump" in L UE in triceps area where pain originates  Pt has had multiple CT scans, MRIs, and x-rays to attempt to address this deficit; results were reviewed and imaging concludes calcification at bicep  Pt reports she finds "no relief from anything"  Pt trialed P/AROM stretching this date with minimal relief and heat applied  Pt will attend OP OT services x2 days a week for 4 weeks to begin POC; pt is understanding of the same  Impairments: abnormal or restricted ROM, impaired physical strength, lacks appropriate home exercise program, pain with function and weight-bearing intolerance  Other impairment: impaired ADL and IADL performance    Symptom irritability: moderateBarriers to therapy: none  Understanding of Dx/Px/POC: good   Prognosis: good    Goals  STGs    Pt will increase  strength by 5-10# in L UE  Pt will increase L UE strength grossly by 1/2 grade  Pt will increase L UE ROM by 50%  Pt will demonstrate decrease in edema in L UE by 25%  Pt will report a decrease in pain by 50% in L UE     LTGs    Pt will increase  strength by an additional 5-10# in L UE      Pt will increase L UE strength grossly to [facility]  Pt will increase L UE ROM by 75%  Pt will demonstrate decrease in edema in L UE by 75%  Pt will report a decrease in pain by 75% in L UE  Plan  Plan details: Thank you for the referral!  Patient would benefit from: skilled occupational therapy  Planned modality interventions: thermotherapy: hydrocollator packs  Planned therapy interventions: joint mobilization, manual therapy, massage, therapeutic exercise, stretching, strengthening, graded exercise, functional ROM exercises and flexibility  Frequency: 2x week  Duration in visits: 8  Duration in weeks: 4  Plan of Care beginning date: 1/15/2019  Treatment plan discussed with: patient        Subjective Evaluation    History of Present Illness  Mechanism of injury: Onset is more than 6 months ago   Not a recurrent problem   Quality of life: fair    Pain  Current pain ratin  At best pain ratin  At worst pain rating: 10  Quality: radiating, throbbing, tight, sharp, dull ache, cramping and grinding  Relieving factors: heat, rest and medications  Aggravating factors: overhead activity (all functional activity)  Progression: no change    Social Support  Steps to enter house: no  Stairs in house: no   Lives in: apartment  Lives with: alone    Employment status: not working  Hand dominance: right      Diagnostic Tests  X-ray: abnormal  MRI studies: abnormal  CT scan: abnormal  Treatments  Previous treatment: occupational therapy, medication, injection treatment and immobilization  Current treatment: medication and occupational therapy  Patient Goals  Patient goals for therapy: increased strength, independence with ADLs/IADLs, return to sport/leisure activities, increased motion and decreased pain  Patient goal: bathing and dressing        Objective     Tenderness     Left Shoulder   Tenderness in the [facility] joint, biceps tendon (proximal), bicipital groove and lateral scapula       Additional Tenderness Details  Very tender to palpation in above areas    Neurological Testing     Sensation     Shoulder   Left Shoulder   Paresthesia: light touch    Right Shoulder   Intact: light touch    Comments   Left light touch: into the hand    Elbow   Left Elbow   Paresthesia: light touch    Wrist/Hand   Left   Paresthesia: light touch    Additional Neurological Details  History of elbow surgery- tendonitis on bilateral elbows several years ago    Active Range of Motion   Left Shoulder   Flexion: 65 degrees with pain  Extension: 25 degrees with pain  External rotation 90°: 30 degrees with pain  Internal rotation 90°: WFL  Horizontal abduction: 60 degrees with pain    Left Elbow   Flexion: WFL  Extension: WFL    Left Wrist   Normal active range of motion    Additional Active Range of Motion Details  Pain in all planes of motion as above  WFL opposition    Strength/Myotome Testing     Left Shoulder     Planes of Motion   Flexion: 3   Extension: 3   External rotation at 90°: 3   Internal rotation at 90°: 3   Horizontal abduction: 3     Left Elbow   Flexion: 4  Extension: 4    Left Wrist/Hand   Normal wrist strength     (2nd hand position)     Trial 1: 15    Right Wrist/Hand      (2nd hand position)     Trial 1: 40    Additional Strength Details  Pain in all planes of motion    Tests     Additional Tests Details  Unable to perform special tests due to patient intolerance to pain and all planes of movement    General Comments     Shoulder Comments   Measurements of R UE of 11 5 inches; L UE measurements of 12 5 inches; pt complains of "lump" on tricep/bicep area      Flowsheet Rows      Most Recent Value   PT/OT G-Codes   Assessment Type  Evaluation   G code set  Carrying, Moving & Handling Objects   Carrying, Moving and Handling Objects Current Status ()  CL   Carrying, Moving and Handling Objects Goal Status ()  CK          Precautions: STRETCHING FIRST    Specialty Daily Treatment Diary     Manual  1-15-19       Shoulder flex/exten 10 min       Shoulder abd  5 min                                   Exercise Diary  1-15-19       UBE        Pulleys        Bicep curls-2 ways        Finger ladder        Shoulder retractions        Towel slides-table                                                                                                                            Modalities 1-15-19       Heat-shoulder/bicep 10 min

## 2019-01-16 ENCOUNTER — OFFICE VISIT (OUTPATIENT)
Dept: OCCUPATIONAL THERAPY | Facility: HOME HEALTHCARE | Age: 54
End: 2019-01-16
Payer: COMMERCIAL

## 2019-01-16 DIAGNOSIS — M67.912 UNSPECIFIED DISORDER OF SYNOVIUM AND TENDON, LEFT SHOULDER: Primary | ICD-10-CM

## 2019-01-16 PROCEDURE — 97110 THERAPEUTIC EXERCISES: CPT

## 2019-01-16 PROCEDURE — 97140 MANUAL THERAPY 1/> REGIONS: CPT

## 2019-01-16 NOTE — PROGRESS NOTES
Daily Note     Today's date: 2019  Patient name: Roseann Mojica  : 1965  MRN: [id number]  Referring provider: Negrita Clifford MD  Dx:   Encounter Diagnosis     ICD-10-CM    1  Unspecified disorder of synovium and tendon, left shoulder M67 912                   Subjective: "It's the same; one day won't make a difference"      Objective: See treatment diary below    Manual  1-15-19 1-16-19      Shoulder flex/exten 10 min 10 min      Shoulder abd  5 min 5 min                                  Exercise Diary  1-15-19 1-16-19      UBE  5 min      Digiflex  x20 red      Bicep curls-2 ways  x20 each 1#      Finger ladder  x5-abducted      Shoulder retractions  x20      Towel slides-table  x15      Neck stretch  x10 sec x3      Median nerve glide  x10       Single arm doorway stretch  x10 sec x5      Shoulder shrugs  x15      Shoulder Rolls  x10 forward  x10 back      Pro/Sup  x20 1#      Mr  Anival Curls; no weight      Ball Wall rotations-2 ways  x10                                                          Modalities 1-15-19 1-16-19      Heat-shoulder/bicep 10 min 10 min                            Assessment: Tolerated treatment fair  Patient demonstrated fatigue post treatment and would benefit from continued OT  Throughout session, pt reports soreness and minimal pain; pt progresses through exercises at own tolerance with rest breaks between sets  Plan: Continue per plan of care  Progress treatment as tolerated  Given and educated in [address] [place] [address], pt demonstrates understanding       Pain In: 8/10  Pain Out: 4/10

## 2019-01-17 ENCOUNTER — HOSPITAL ENCOUNTER (OUTPATIENT)
Facility: HOSPITAL | Age: 54
Setting detail: OUTPATIENT SURGERY
Discharge: HOME | End: 2019-01-17
Attending: ANESTHESIOLOGY | Admitting: ANESTHESIOLOGY
Payer: COMMERCIAL

## 2019-01-17 ENCOUNTER — APPOINTMENT (OUTPATIENT)
Dept: RADIOLOGY | Facility: HOSPITAL | Age: 54
End: 2019-01-17
Payer: COMMERCIAL

## 2019-01-17 VITALS
HEIGHT: 61 IN | WEIGHT: 148 LBS | SYSTOLIC BLOOD PRESSURE: 110 MMHG | HEART RATE: 88 BPM | OXYGEN SATURATION: 96 % | RESPIRATION RATE: 18 BRPM | DIASTOLIC BLOOD PRESSURE: 70 MMHG | TEMPERATURE: 97 F | BODY MASS INDEX: 27.94 KG/M2

## 2019-01-17 PROCEDURE — 72040 X-RAY EXAM NECK SPINE 2-3 VW: CPT

## 2019-01-17 PROCEDURE — 62321 NJX INTERLAMINAR CRV/THRC: CPT | Performed by: ANESTHESIOLOGY

## 2019-01-17 RX ORDER — METHYLPREDNISOLONE ACETATE 80 MG/ML
INJECTION, SUSPENSION INTRA-ARTICULAR; INTRALESIONAL; INTRAMUSCULAR; SOFT TISSUE AS NEEDED
Status: DISCONTINUED | OUTPATIENT
Start: 2019-01-17 | End: 2019-01-17 | Stop reason: HOSPADM

## 2019-01-17 RX ORDER — LIDOCAINE HYDROCHLORIDE 10 MG/ML
INJECTION, SOLUTION INFILTRATION; PERINEURAL AS NEEDED
Status: DISCONTINUED | OUTPATIENT
Start: 2019-01-17 | End: 2019-01-17 | Stop reason: HOSPADM

## 2019-01-17 RX ORDER — OXYCODONE HYDROCHLORIDE AND ACETAMINOPHEN 5; 325 MG/1; MG/1
1 TABLET ORAL EVERY 12 HOURS
COMMUNITY

## 2019-01-17 NOTE — H&P
Assessment:  1  Myofascial pain syndrome    2  Degenerative cervical disc    3  Arthritis    4  Acute pain of left shoulder    5  Cervical radiculopathy          Plan:  Patient is a 51-year-old female with history of chronic neck pain and chronic back pain with radicular symptoms of the upper left extremity complicated by multi joint arthritic pathology presents today for follow-up visit  Patient is status post left shoulder rotator arthroscopy in notes increasing left shoulder pain    patient denies any alleviation of her pain status post left shoulder arthroplasty with rotator cuff revision  There is the high possibility that her pain can be manifested from the cervical spine  1  We will schedule patient for a cervical epidural steroid injection       Procedure: left lateral trapezius trigger point injection and deltoid trigger point injection with steroid        After discussing the risks, benefits, and alternatives to the procedure, the patient expressed understanding and wished to proceed  The patient was brought to the fluoroscopy suite and placed in the prone position  Procedural pause conducted to verify:  correct patient identity, procedure to be performed and as applicable, correct side and site, correct patient position, and availability of implants, special equipment and special requirements  The appropriate hyper irritable musculoskeletal tender points were marked with a sterile pen, prepped with ChloraPrep and sterilely draped  After appropriate reproduction of pain at each of the tender points marked, a total of 10 mL of 0 25% bupivacaine and 40 mg of Depo-Medrol was injected after negative aspiration of air, CSF, heme, or other bodily fluids with a 1 5 inch 25-gauge needle  A total number of 2 muscle groups were injected  Vital signs were monitored before, during, and after the procedure and remained stable at all points   The patient was discharged with no apparent complications on their own power after an appropriate observation period in the recovery area  0 25% Bupivacaine        NDC 2628780092 EXP 11/2019 LOT [id number]  40mg/ml Depomedrol  NDC 7223950084 EXP 01/2019 LOT [id number]  1% lidocaine                  NDC 7598327086 EXP 02/2020 LOT [id number]     Complete risks and benefits including bleeding, infection, tissue reaction, nerve injury and allergic reaction were discussed  The approach was demonstrated using models and literature was provided  Verbal and written consent was obtained      My impressions and treatment recommendations were discussed in detail with the patient who verbalized understanding and had no further questions  Discharge instructions were provided  I personally saw and examined the patient and I agree with the above discussed plan of care      No orders of the defined types were placed in this encounter      No orders of the defined types were placed in this encounter         History of Present Illness:  Lacy Butt is a 48 y o  female who presents for a follow up office visit in regards to No chief complaint on file      The patients current symptoms include 8/10 sharp, throbbing, pressure-like pain in the neck the any particular pain pattern      Current pain medications includes:  none  The patient reports that this regimen is providing 0% pain relief    The patient is reporting no side effects from this pain medication regimen            I have personally reviewed and/or updated the patient's past medical history, past surgical history, family history, social history, current medications, allergies, and vital signs today       Review of Systems         Patient Active Problem List   Diagnosis    Gastro-esophageal reflux disease without esophagitis    Right lower quadrant pain    Abnormal weight loss    Acute pain of left shoulder    Lumbar spondylosis    Osteoarthritis of spine with radiculopathy, cervical region  Degenerative cervical disc    Lumbar degenerative disc disease    Bilateral carpal tunnel syndrome    CAD in native artery    Depression with anxiety    Other hyperlipidemia    Essential hypertension    PVD (peripheral vascular disease) (HCC)    Headache disorder    Rheumatoid factor positive    Positive KRISTA (antinuclear antibody)    Left facial numbness    Myofascial pain syndrome    Calcific tendinitis of left shoulder    Incomplete tear of left rotator cuff    Neuroma of foot    Chronic hepatitis C without hepatic coma (HCC)    Colorectal polyps    Macrocytic    Irritable bowel syndrome with diarrhea    Incomplete rotator cuff tear or rupture of left shoulder, not specified as traumatic    Biceps tendinosis of left shoulder         Medical History   Past Medical History:   Diagnosis Date    Acquired ichthyosis       last assessed: 2013    Anxiety      Cervical radiculopathy       last assessed: 2014    Colorectal polyps       last assessed: 3/9/2015    COPD (chronic obstructive pulmonary disease) (HCC)      Depression      Diverticulosis of colon      Foot pain      GERD (gastroesophageal reflux disease)      Hyperlipemia      Hypertension      Myocardial infarction [facility])       at age 28   [facility] Osteopenia       last assessed: 2013    Palpitations       last assessed: 2014    [facility]VD [facility]peripheral vascular disease) ([facility] )       last assessed: 12/3/2012    Scapular dyskinesis       last assessed: 2014    Shortness of breath      Tendonitis of left rotator cuff       last assessed: 2014    Vocal cord polyps       last assessed: 2014            Surgical History   Past Surgical History:   Procedure Laterality Date    ABDOMINAL SURGERY         exploratory    CARDIAC SURGERY         cardiac stent      SECTION         last assessed: 2014    CHOLECYSTECTOMY         last assessed: 2014    COLONOSCOPY   10/27/2014    CORONARY ANGIOPLASTY WITH STENT PLACEMENT   1999     LAD stent    DENTAL SURGERY         last assessed: 8/8/2014    ELBOW SURGERY Bilateral       arthroplasty    ESOPHAGOGASTRODUODENOSCOPY        ESOPHAGOGASTRODUODENOSCOPY N/A 11/4/2016     Procedure: ESOPHAGOGASTRODUODENOSCOPY (EGD);   Surgeon: Kaylie Horne MD;  Location: [facility];  Service:     FL INJECTION LEFT SHOULDER (ARTHROGRAM)   9/25/2018    FOOT SURGERY Right 02/06/2015     x 4    HYSTERECTOMY         last assessed: 8/8/2014    ME ARTHROSCOPY SHOULDER SURGICAL BICEPS TENODESIS Left 10/15/2018     Procedure: ARTHROSCOPY SHOULDER; Debridement of shoulder;  Surgeon: Indra Mccain MD;  Location: [facility];  Service: Orthopedics    ME SHLDR ARTHROSCOP,SURG,W/ROTAT CUFF REPR Left 7/23/2018     Procedure: ARTHROSCOPY SHOULDER, subacromial decompression, synovectomy;  Surgeon: Indra Mccain MD;  Location: [facility];  Service: Orthopedics    THROAT SURGERY        TOOTH EXTRACTION        TRIGEMINAL NERVE DECOMPRESSION Right 6/15/2018     Procedure: FOOT NEUROPLASTY;  Surgeon: Corrie Chandler DPM;  Location: [facility];  Service: Podiatry                  Family History   Problem Relation Age of Onset    No Known Problems Mother      No Known Problems Father      No Known Problems Maternal Grandmother      No Known Problems Maternal Grandfather      No Known Problems Paternal Grandmother      No Known Problems Paternal Grandfather           Social History      Occupational History    Not on file              Social History Main Topics    Smoking status: Current Every Day Smoker       Packs/day: 1 00    Smokeless tobacco: Never Used    Alcohol use Yes         Comment: rare    Drug use: No    Sexual activity: Not on file              Current Outpatient Prescriptions on File Prior to Visit   Medication Sig    aspirin 81 mg chewable tablet Chew 1 tablet daily Stop for the time being for foot surgery     baclofen 10 mg tablet Take 1 tablet (10 mg total) by mouth 3 (three) times a day for 30 days    cyclobenzaprine (FLEXERIL) 10 mg tablet Take 1 tablet (10 mg total) by mouth 3 (three) times a day as needed for muscle spasms    diclofenac potassium (CATAFLAM) 50 mg tablet Take 1 tablet (50 mg total) by mouth 3 (three) times a day for 30 days    doxycycline hyclate (VIBRAMYCIN) 100 mg capsule Take 100 mg by mouth every 12 (twelve) hours    fluticasone (FLONASE) 50 mcg/act nasal spray 1 spray into each nostril daily    gabapentin (NEURONTIN) 600 MG tablet Take 1 tablet (600 mg total) by mouth 2 (two) times a day for 30 days    HYDROcodone-acetaminophen (NORCO) 5-325 mg per tablet Take 1 tablet by mouth every 6 (six) hours as needed for pain Max Daily Amount: 4 tablets    nitroglycerin (NITROSTAT) 0 4 mg SL tablet Place 1 tablet (0 4 mg total) under the tongue every 5 (five) minutes as needed for chest pain    oxyCODONE-acetaminophen (PERCOCET) 5-325 mg per tablet Take 1 tablet by mouth every 4 (four) hours as needed for moderate pain for up to 30 doses Max Daily Amount: 6 tablets    oxyCODONE-acetaminophen (PERCOCET) 5-325 mg per tablet Take 1 tablet by mouth every 4 (four) hours as needed for moderate pain for up to 30 doses Max Daily Amount: 6 tablets    ranitidine (ZANTAC) 150 mg tablet Take 1 tablet (150 mg total) by mouth 2 (two) times a day    rosuvastatin (CRESTOR) 5 mg tablet Take 1 tablet (5 mg total) by mouth daily    VENTOLIN  (90 Base) MCG/ACT inhaler inhale 2 puffs by mouth every 6 hours if needed for wheezing      No current facility-administered medications on file prior to visit                 Allergies   Allergen Reactions    Ceclor [Cefaclor] Anaphylaxis    Codeine Itching       Reaction Date: 09Jun2011;     Ticlid [Ticlopidine] Hives    Penicillins Rash         Physical Exam:     There were no vitals taken for this visit      Constitutional:normal, well developed, well nourished, alert, in no distress and non-toxic and no overt pain behavior  Eyes:anicteric  HEENT:grossly intact  Neck:supple, symmetric, trachea midline and no masses   Pulmonary:even and unlabored  Cardiovascular:No edema or pitting edema present  Skin:Normal without rashes or lesions and well hydrated  Psychiatric:Mood and affect appropriate  Neurologic:Cranial Nerves II-XII grossly intact  Musculoskeletal:normal         Cervical Spine examination demonstrates  Decreased ROM secondary to pain with lateral rotation to the left/right and bending to the left/right, in addition to neck flexion  3/5 left upper extremity strength in all muscle groups including  strength  Notable severe muscle spasms in the triceps  ROM of the shoulder is adequate  Negative Spurling's maneuver to the b/l Ue, sensitivity to light touch intact b/l Ue

## 2019-01-17 NOTE — DISCHARGE INSTRUCTIONS
,Epidural Steroid Injection   WHAT YOU NEED TO KNOW:   An epidural steroid injection (CARLI) is a procedure to inject steroid medicine into the epidural space  The epidural space is between your spinal cord and vertebrae  Steroids reduce inflammation and fluid buildup in your spine that may be causing pain  You may be given pain medicine along with the steroids  ACTIVITY  · Do not drive or operate machinery today  · No strenuous activity today - bending, lifting, etc   · You may resume normal activites starting tomorrow - start slowly and as tolerated  · You may shower today, but no tub baths or hot tubs  · You may have numbness for several hours from the local anesthetic  Please use caution and common sense, especially with weight-bearing activities  CARE OF THE INJECTION SITE  · If you have soreness or pain, apply ice to the area today (20 minutes on/20 minutes off)  · Starting tomorrow, you may use warm, moist heat or ice if needed  · You may have an increase or change in your discomfort for 36-48 hours after your treatment  · Apply ice and continue with any pain medication you have been prescribed  · Notify [facility] if you have any of the following: redness, drainage, swelling, headache, stiff neck or fever above 100°F     SPECIAL INSTRUCTIONS  · Our office will contact you in approximately 7 days for a progress report  MEDICATIONS  · Continue to take all routine medications  · Our office may have instructed you to hold some medications  If you have a problem specifically related to your procedure, please call our office at [phone]  Problems not related to your procedure should be directed to your primary care physician

## 2019-01-17 NOTE — OP NOTE
OPERATIVE REPORT  PATIENT NAME: Tamanna Jones    :  1965  MRN: [id number]  Pt Location: [facility]OM 01    SURGERY DATE: 2019    Surgeon(s) and Role:     * Edis Plummer MD - Primary    Preop Diagnosis:  Cervical radiculitis [M54 12]    Post-Op Diagnosis Codes:     * Cervical radiculitis [M54 12]    Procedure(s) (LRB):  C7 T1 Cervical Epidural Steroid Injection (20279) (N/A)    Specimen(s):  * No specimens in log *    Estimated Blood Loss:   Minimal    Drains:       Anesthesia Type:   Local    Operative Indications:  Cervical radiculitis [M54 12]      Operative Findings:  same    Complications:   None    Procedure and Technique:  Fluoroscopically-guided cervical Interlaminar Epidural Steroid Injection     Indication:  Cervical pain  Preoperative diagnosis:  Cervical degenerative disc disease  Postoperative diagnosis:  Cervical degenerative disc disease    Procedure: Fluoroscopically-guided  C7-T1 interlaminar epidural steroid injection under fluoroscopy      After discussing the risks, benefits, and alternatives to the procedure, the patient expressed understanding and wished to proceed  The patient was brought to the fluoroscopy suite and placed in the prone position  A procedural pause was conducted to verify:  correct patient identity, procedure to be performed and as applicable, correct side and site, correct patient position, and availability of implants, special equipment and special requirements  After identifying the C7-T1 space fluoroscopically, the skin was sterilely prepped and draped in the usual fashion using Chloraprep skin prep  The skin and subcutaneous tissue were anesthetized with 0 5 % lidocaine  Utilizing a loss of resistance technique and intermittent fluoroscopic guidance, a 3 5 20 gauge Tuohy needle was advanced into the epidural space  Proper needle positioning was confirmed using multiple fluoroscopic views    After negative aspiration, Omnipaque 300 contrast was injected confirming epidural spread without evidence of intravascular or intrathecal spread  A 4 ml solution consisting of 80mg depomedrol in sterile saline was injected slowly and incrementally into the epidural space  Following the injection the needle was withdrawn slightly and flushed with 0 5 % lidocaine as it was fully extracted  The patient tolerated the procedure well and there were no apparent complications  After appropriate observation, the patient was dismissed from the clinic in good condition under their own power       I was present for the entire procedure    Patient Disposition:  hemodynamically stable    SIGNATURE: Kashmir Fuentes MD  DATE: January 17, 2019  TIME: 8:23 AM

## 2019-01-18 ENCOUNTER — APPOINTMENT (OUTPATIENT)
Dept: LAB | Facility: MEDICAL CENTER | Age: 54
End: 2019-01-18
Payer: COMMERCIAL

## 2019-01-18 DIAGNOSIS — B18.2 HEP C W/O COMA, CHRONIC (HCC): ICD-10-CM

## 2019-01-18 LAB
ALBUMIN SERPL BCP-MCNC: 4.2 G/DL (ref 3.5–5)
ALP SERPL-CCNC: 84 U/L (ref 46–116)
ALT SERPL W P-5'-P-CCNC: 17 U/L (ref 12–78)
ANION GAP SERPL CALCULATED.3IONS-SCNC: 4 MMOL/L (ref 4–13)
AST SERPL W P-5'-P-CCNC: 8 U/L (ref 5–45)
BASOPHILS # BLD AUTO: 0.05 THOUSANDS/ΜL (ref 0–0.1)
BASOPHILS NFR BLD AUTO: 1 % (ref 0–1)
BILIRUB SERPL-MCNC: 0.31 MG/DL (ref 0.2–1)
BUN SERPL-MCNC: 13 MG/DL (ref 5–25)
CALCIUM SERPL-MCNC: 9.3 MG/DL (ref 8.3–10.1)
CHLORIDE SERPL-SCNC: 104 MMOL/L (ref 100–108)
CO2 SERPL-SCNC: 29 MMOL/L (ref 21–32)
CREAT SERPL-MCNC: 0.52 MG/DL (ref 0.6–1.3)
EOSINOPHIL # BLD AUTO: 0.15 THOUSAND/ΜL (ref 0–0.61)
EOSINOPHIL NFR BLD AUTO: 2 % (ref 0–6)
ERYTHROCYTE [DISTWIDTH] IN BLOOD BY AUTOMATED COUNT: 12.3 % (ref 11.6–15.1)
GFR SERPL CREATININE-BSD FRML MDRD: 109 ML/MIN/1.73SQ M
GLUCOSE SERPL-MCNC: 83 MG/DL (ref 65–140)
HCT VFR BLD AUTO: 45.2 % (ref 34.8–46.1)
HGB BLD-MCNC: 14.6 G/DL (ref 11.5–15.4)
IMM GRANULOCYTES # BLD AUTO: 0.02 THOUSAND/UL (ref 0–0.2)
IMM GRANULOCYTES NFR BLD AUTO: 0 % (ref 0–2)
LYMPHOCYTES # BLD AUTO: 3.93 THOUSANDS/ΜL (ref 0.6–4.47)
LYMPHOCYTES NFR BLD AUTO: 40 % (ref 14–44)
MCH RBC QN AUTO: 33.3 PG (ref 26.8–34.3)
MCHC RBC AUTO-ENTMCNC: 32.3 G/DL (ref 31.4–37.4)
MCV RBC AUTO: 103 FL (ref 82–98)
MONOCYTES # BLD AUTO: 0.76 THOUSAND/ΜL (ref 0.17–1.22)
MONOCYTES NFR BLD AUTO: 8 % (ref 4–12)
NEUTROPHILS # BLD AUTO: 4.99 THOUSANDS/ΜL (ref 1.85–7.62)
NEUTS SEG NFR BLD AUTO: 49 % (ref 43–75)
NRBC BLD AUTO-RTO: 0 /100 WBCS
PLATELET # BLD AUTO: 255 THOUSANDS/UL (ref 149–390)
PMV BLD AUTO: 10.5 FL (ref 8.9–12.7)
POTASSIUM SERPL-SCNC: 3.9 MMOL/L (ref 3.5–5.3)
PROT SERPL-MCNC: 8 G/DL (ref 6.4–8.2)
RBC # BLD AUTO: 4.39 MILLION/UL (ref 3.81–5.12)
SODIUM SERPL-SCNC: 137 MMOL/L (ref 136–145)
WBC # BLD AUTO: 9.9 THOUSAND/UL (ref 4.31–10.16)

## 2019-01-18 PROCEDURE — 36415 COLL VENOUS BLD VENIPUNCTURE: CPT

## 2019-01-18 PROCEDURE — 87522 HEPATITIS C REVRS TRNSCRPJ: CPT

## 2019-01-18 PROCEDURE — 85025 COMPLETE CBC W/AUTO DIFF WBC: CPT

## 2019-01-18 PROCEDURE — 80053 COMPREHEN METABOLIC PANEL: CPT

## 2019-01-21 DIAGNOSIS — R10.11 RUQ PAIN: Primary | ICD-10-CM

## 2019-01-21 RX ORDER — DICYCLOMINE HCL 20 MG
20 TABLET ORAL EVERY 6 HOURS
Qty: 60 TABLET | Refills: 2 | Status: SHIPPED | OUTPATIENT
Start: 2019-01-21 | End: 2019-03-01 | Stop reason: SDUPTHER

## 2019-01-22 ENCOUNTER — APPOINTMENT (OUTPATIENT)
Dept: OCCUPATIONAL THERAPY | Facility: HOME HEALTHCARE | Age: 54
End: 2019-01-22
Payer: COMMERCIAL

## 2019-01-22 ENCOUNTER — HOSPITAL ENCOUNTER (OUTPATIENT)
Dept: ULTRASOUND IMAGING | Facility: HOSPITAL | Age: 54
Discharge: HOME | End: 2019-01-22
Payer: COMMERCIAL

## 2019-01-22 DIAGNOSIS — R10.11 RUQ PAIN: ICD-10-CM

## 2019-01-22 LAB
HCV RNA SERPL NAA+PROBE-ACNC: NORMAL IU/ML
TEST INFORMATION: NORMAL

## 2019-01-22 PROCEDURE — 76705 ECHO EXAM OF ABDOMEN: CPT

## 2019-01-23 ENCOUNTER — APPOINTMENT (OUTPATIENT)
Dept: OCCUPATIONAL THERAPY | Facility: HOME HEALTHCARE | Age: 54
End: 2019-01-23
Payer: COMMERCIAL

## 2019-01-24 ENCOUNTER — APPOINTMENT (OUTPATIENT)
Dept: OCCUPATIONAL THERAPY | Facility: HOME HEALTHCARE | Age: 54
End: 2019-01-24
Payer: COMMERCIAL

## 2019-01-24 ENCOUNTER — TELEPHONE (OUTPATIENT)
Dept: PAIN MEDICINE | Facility: CLINIC | Age: 54
End: 2019-01-24

## 2019-01-28 ENCOUNTER — APPOINTMENT (OUTPATIENT)
Dept: OCCUPATIONAL THERAPY | Facility: HOME HEALTHCARE | Age: 54
End: 2019-01-28
Payer: COMMERCIAL

## 2019-01-30 ENCOUNTER — APPOINTMENT (OUTPATIENT)
Dept: OCCUPATIONAL THERAPY | Facility: HOME HEALTHCARE | Age: 54
End: 2019-01-30
Payer: COMMERCIAL

## 2019-01-30 ENCOUNTER — TELEPHONE (OUTPATIENT)
Dept: GASTROENTEROLOGY | Facility: CLINIC | Age: 54
End: 2019-01-30

## 2019-01-30 NOTE — PROGRESS NOTES
Pt has cancelled x3 appointments; pt has completed therapy services in the past and has not attended sessions as well as "no showed"; pt is noncompliant with therapy recommendations and appointments;  pt is therefore discharged from OT services and episode will be completed

## 2019-01-30 NOTE — TELEPHONE ENCOUNTER
Patient with hx of Hep C with recent treatment with Mavyret for 8 wks - treatment completed and no viral load detected on lab from 1/18/2019, and GERD  U/S was unremarkable  Patient has been having right  Upper quadrant pain, directly under her breast for about 3 weeks  The pain is constant  Movement or rest does not change the pain, but she says eating makes the pain worse  She does take pain medication - percocet, gabapentin, flexeril which does not affect the pain  She does take Zantac bid  Bentyl was ordered on 1/21/19 but pt says she was not aware  She will  the Bentyl and try it tonight  Please advise

## 2019-01-31 ENCOUNTER — TELEPHONE (OUTPATIENT)
Dept: OBGYN CLINIC | Facility: MEDICAL CENTER | Age: 54
End: 2019-01-31

## 2019-01-31 DIAGNOSIS — Z98.890 STATUS POST ARTHROSCOPY OF LEFT SHOULDER: Primary | ICD-10-CM

## 2019-01-31 NOTE — TELEPHONE ENCOUNTER
Pt called stating that Dr Glenn Salas office needs a referral from Dr Ana Morton so pt can be seen on her pt tomorrow morning,    Pt can be reached at [phone]

## 2019-02-01 ENCOUNTER — OFFICE VISIT (OUTPATIENT)
Dept: OBGYN CLINIC | Facility: MEDICAL CENTER | Age: 54
End: 2019-02-01
Payer: COMMERCIAL

## 2019-02-01 VITALS
DIASTOLIC BLOOD PRESSURE: 85 MMHG | HEIGHT: 60 IN | SYSTOLIC BLOOD PRESSURE: 128 MMHG | HEART RATE: 79 BPM | BODY MASS INDEX: 29.84 KG/M2 | WEIGHT: 152 LBS

## 2019-02-01 DIAGNOSIS — Z98.890 STATUS POST ARTHROSCOPY OF LEFT SHOULDER: ICD-10-CM

## 2019-02-01 DIAGNOSIS — M54.12 CERVICAL RADICULOPATHY: Primary | ICD-10-CM

## 2019-02-01 PROCEDURE — 20610 DRAIN/INJ JOINT/BURSA W/O US: CPT | Performed by: ORTHOPAEDIC SURGERY

## 2019-02-01 PROCEDURE — 99214 OFFICE O/P EST MOD 30 MIN: CPT | Performed by: ORTHOPAEDIC SURGERY

## 2019-02-01 RX ORDER — BUPIVACAINE HYDROCHLORIDE 2.5 MG/ML
4 INJECTION, SOLUTION INFILTRATION; PERINEURAL
Status: COMPLETED | OUTPATIENT
Start: 2019-02-01 | End: 2019-02-01

## 2019-02-01 RX ORDER — METHYLPREDNISOLONE ACETATE 40 MG/ML
1 INJECTION, SUSPENSION INTRA-ARTICULAR; INTRALESIONAL; INTRAMUSCULAR; SOFT TISSUE
Status: COMPLETED | OUTPATIENT
Start: 2019-02-01 | End: 2019-02-01

## 2019-02-01 RX ADMIN — BUPIVACAINE HYDROCHLORIDE 4 ML: 2.5 INJECTION, SOLUTION INFILTRATION; PERINEURAL at 17:19

## 2019-02-01 RX ADMIN — METHYLPREDNISOLONE ACETATE 1 ML: 40 INJECTION, SUSPENSION INTRA-ARTICULAR; INTRALESIONAL; INTRAMUSCULAR; SOFT TISSUE at 17:19

## 2019-02-01 NOTE — PROGRESS NOTES
Orthopaedic Surgery - Office Note  Skinny Edgar (86 y o  female)   : 1965   MRN: [id number]  Encounter Date: 2019    Chief Complaint   Patient presents with    Left Shoulder - Pain       Assessment / Plan  Left shoulder pain  Partial thickness supraspinatus tear & persistent subacromial / subdeltoid bursitis  Cervical DDD with referred pain to left arm      · I administered a CSI into the subdeltoid bursa / deltoid tendon insertion  · EMG of BUE was ordered  · No Follow-up on file  History of Present Illness  Skinny Edgar is a 48 y o  female who presents for a second opinion at the request of Dr Stevie Flores for evaluation of her left shoulder pain  She has had left shoulder pain for several years  There was no injury  She had was sounds like calcific tendinitis and underwent arthroscopic debridement in 2018 with Dr Stevie Flores  She continued to have shoulder pain postoperatively but also reports that she had more lateral arm pain and muscle spasms which were not there before the surgery  She had a second MRI showing a partial articular supraspinatus tear, and she was felt to have bicipital tendinitis as well  She had a second surgery in 2018 which ended up being an arthroscopic debridement  Unfortunately she still has lateral arm pain and muscle spasms  She reports compliance with postoperative PT  Her pain is present with all shoulder movements but especially overhead  She does have some numbness that radiates into the radial forearm and index/thumb  She had a C7-T1 epidural injection that gave no relief  Review of Systems  Pertinent items are noted in HPI  All other systems were reviewed and are negative  Physical Exam  /85   Pulse 79   Ht 5' (1 524 m)   Wt 68 9 kg (152 lb)   BMI 29 69 kg/m²   Cons: Appears well  No apparent distress  Psych: Alert  Oriented x3  Mood and affect normal   Eyes: PERRLA, EOMI  Resp: Normal effort    No audible wheezing or stridor  CV: Palpable pulse  No discernable arrhythmia  No LE edema  Lymph:  No palpable cervical, axillary, or inguinal lymphadenopathy  Skin: Warm  No palpable masses  No visible lesions  Neuro: Normal muscle tone  Normal and symmetric DTR's  Left Shoulder Exam  Alignment / Posture:  significant scapular protraction  Inspection:  mild lateral arm swelling  Incisions healed  Palpation:  moderate tenderness at subacromial bursa and deltoid tendon insertion  ROM:  Shoulder   Shoulder ER 40  Shoulder IR sacrum  Strength:  Supraspinatus 4/5  Infraspinatus 4/5  Subscapularis 5/5  Stability:  No objective shoulder instability  Tests: (+) Contreras  (+) Neer  (+) Painful arc  (-) Belly press  (-) Speed  (-) Lulu  Neurovascular:  Sensation intact in Ax/R/M/U nerve distributions  2+ radial pulse  Studies Reviewed  No studies to review    Large joint arthrocentesis  Date/Time: 2/1/2019 5:19 PM  Consent given by: patient  Supporting Documentation  Indications: pain   Procedure Details  Location: shoulder - Shoulder joint: L deltoid tendon insertion  Preparation: Alcohol  Needle size: 22 G  Approach: anterolateral  Medications administered: 4 mL bupivacaine 0 25 %; 1 mL methylPREDNISolone acetate 40 mg/mL    Patient tolerance: patient tolerated the procedure well with no immediate complications  Dressing:  Sterile dressing applied           Medical, Surgical, Family, and Social History  The patient's medical history, family history, and social history, were reviewed and updated as appropriate      Past Medical History:   Diagnosis Date    Acquired ichthyosis     last assessed: 5/9/2013    Anxiety     Cervical radiculopathy     last assessed: 6/13/2014    Colorectal polyps     last assessed: 3/9/2015    COPD (chronic obstructive pulmonary disease) (HCC)     Depression     Diverticulosis of colon     Foot pain     GERD (gastroesophageal reflux disease)     Hyperlipemia     Hypertension  Myocardial infarction [facility])     at age 28    Osteopenia     last assessed: 2013    Palpitations     last assessed: 2014    PVD (peripheral vascular disease) ([address] )     last assessed: 12/3/2012    Scapular dyskinesis     last assessed: 2014    Shortness of breath     Tendonitis of left rotator cuff     last assessed: 2014    Vocal cord polyps     last assessed: 2014       Past Surgical History:   Procedure Laterality Date    ABDOMINAL SURGERY      exploratory    CARDIAC SURGERY      cardiac stent      SECTION      last assessed: 2014    CHOLECYSTECTOMY      last assessed: 2014    COLONOSCOPY  10/27/2014    CORONARY ANGIOPLASTY WITH STENT PLACEMENT      LAD stent    DENTAL SURGERY      last assessed: 2014    ELBOW SURGERY Bilateral     arthroplasty    EPIDURAL BLOCK INJECTION N/A 2019    Procedure: C7 T1 Cervical Epidural Steroid Injection (65390); Surgeon: Harman Espinoza MD;  Location: [facility];  Service: Pain Management     ESOPHAGOGASTRODUODENOSCOPY      ESOPHAGOGASTRODUODENOSCOPY N/A 2016    Procedure: ESOPHAGOGASTRODUODENOSCOPY (EGD);   Surgeon: Afsaneh Mack MD;  Location: [facility];  Service:     FL INJECTION LEFT SHOULDER (ARTHROGRAM)  2018    FOOT SURGERY Right 02/06/2015    x 4    HYSTERECTOMY      last assessed: 2014    PA ARTHROSCOPY SHOULDER SURGICAL BICEPS TENODESIS Left 10/15/2018    Procedure: ARTHROSCOPY SHOULDER; Debridement of shoulder;  Surgeon: Tana Guadarrama MD;  Location: [facility];  Service: Orthopedics    PA SHLDR ARTHROSCOP,SURG,W/ROTAT CUFF REPR Left 2018    Procedure: ARTHROSCOPY SHOULDER, subacromial decompression, synovectomy;  Surgeon: Tana Guadarrama MD;  [facility]: [facility];  Service: Orthopedics    THROAT SURGERY      TOOTH EXTRACTION      TRIGEMINAL NERVE DECOMPRESSION Right 6/15/2018    Procedure: FOOT NEUROPLASTY;  Surgeon: Carolin Gregg DPM;  Location: [facility]; Service: Podiatry       Family History   Problem Relation Age of Onset    No Known Problems Mother     No Known Problems Father     No Known Problems Maternal Grandmother     No Known Problems Maternal Grandfather     No Known Problems Paternal Grandmother     No Known Problems Paternal Grandfather        Social History     Occupational History    Not on file       Social History Main Topics    Smoking status: Current Every Day Smoker     Packs/day: 1 00    Smokeless tobacco: Never Used    Alcohol use Yes      Comment: rare    Drug use: No    Sexual activity: Not on file       Allergies   Allergen Reactions    Ceclor [Cefaclor] Anaphylaxis    Codeine Itching     Reaction Date: [id number];     Ticlid [Ticlopidine] Hives    Penicillins Rash         Current Outpatient Prescriptions:     aspirin 81 mg chewable tablet, Chew 1 tablet daily Stop for the time being for foot surgery , Disp: , Rfl:     baclofen 10 mg tablet, Take 1 tablet (10 mg total) by mouth 3 (three) times a day for 30 days, Disp: 90 tablet, Rfl: 0    cyclobenzaprine (FLEXERIL) 10 mg tablet, Take 1 tablet (10 mg total) by mouth 3 (three) times a day as needed for muscle spasms, Disp: 30 tablet, Rfl: 0    diclofenac potassium (CATAFLAM) 50 mg tablet, take 1 tablet by mouth three times a day, Disp: 90 tablet, Rfl: 0    dicyclomine (BENTYL) 20 mg tablet, Take 1 tablet (20 mg total) by mouth every 6 (six) hours, Disp: 60 tablet, Rfl: 2    gabapentin (NEURONTIN) 600 MG tablet, take 1 tablet by mouth twice a day, Disp: 60 tablet, Rfl: 0    methocarbamol (ROBAXIN) 500 mg tablet, , Disp: , Rfl:     nitroglycerin (NITROSTAT) 0 4 mg SL tablet, Place 1 tablet (0 4 mg total) under the tongue every 5 (five) minutes as needed for chest pain, Disp: 90 tablet, Rfl: 3    oxyCODONE-acetaminophen (PERCOCET) 5-325 mg per tablet, Take 1 tablet by mouth every 4 (four) hours as needed for moderate pain, Disp: , Rfl:     ranitidine (ZANTAC) 150 mg tablet, Take 1 tablet (150 mg total) by mouth 2 (two) times a day, Disp: 60 tablet, Rfl: 5    rosuvastatin (CRESTOR) 5 mg tablet, Take 1 tablet (5 mg total) by mouth daily, Disp: 90 tablet, Rfl: 3    VENTOLIN  (90 Base) MCG/ACT inhaler, inhale 2 puffs by mouth every 6 hours if needed for wheezing, Disp: 18 g, Rfl: 0      Jace Boo MD    Scribe Attestation    I,:    am acting as a scribe while in the presence of the attending physician :        I,:    personally performed the services described in this documentation    as scribed in my presence :

## 2019-02-07 DIAGNOSIS — M54.12 CERVICAL RADICULOPATHY: ICD-10-CM

## 2019-02-08 RX ORDER — AMITRIPTYLINE HYDROCHLORIDE 25 MG/1
TABLET, FILM COATED ORAL
Qty: 30 TABLET | Refills: 1 | Status: SHIPPED | OUTPATIENT
Start: 2019-02-08 | End: 2019-04-06 | Stop reason: SDUPTHER

## 2019-02-11 ENCOUNTER — TELEPHONE (OUTPATIENT)
Dept: OBGYN CLINIC | Facility: HOSPITAL | Age: 54
End: 2019-02-11

## 2019-02-11 DIAGNOSIS — M19.90 ARTHRITIS: ICD-10-CM

## 2019-02-11 DIAGNOSIS — Z98.890 S/P ARTHROSCOPY OF LEFT SHOULDER: Primary | ICD-10-CM

## 2019-02-11 DIAGNOSIS — J06.9 VIRAL UPPER RESPIRATORY TRACT INFECTION: ICD-10-CM

## 2019-02-11 NOTE — TELEPHONE ENCOUNTER
Patient Info: Gloria Mcdaniel  1965    Provider:  Dr Shima Red    Patient called and asked for a referral for her to go to Physical Therapy      [facility]# [phone]    Please advise,    Thank you

## 2019-02-12 RX ORDER — CYCLOBENZAPRINE HCL 10 MG
10 TABLET ORAL 3 TIMES DAILY PRN
Qty: 30 TABLET | Refills: 0 | Status: SHIPPED | OUTPATIENT
Start: 2019-02-12 | End: 2019-03-19 | Stop reason: SDUPTHER

## 2019-02-12 RX ORDER — ALBUTEROL SULFATE 90 UG/1
2 AEROSOL, METERED RESPIRATORY (INHALATION) EVERY 6 HOURS PRN
Qty: 18 G | Refills: 0 | Status: SHIPPED | OUTPATIENT
Start: 2019-02-12 | End: 2019-02-25 | Stop reason: SDUPTHER

## 2019-02-12 NOTE — TELEPHONE ENCOUNTER
Called and spoke to patient, she said she will be staying within [address] [place] [address] for her physical therapy  I advised that the script will already be in the system she does not need to  the script but if she does decide to go else where to give our office a call back and we will fax over the script to whichever office she decides to go

## 2019-02-14 ENCOUNTER — TELEPHONE (OUTPATIENT)
Dept: PAIN MEDICINE | Facility: CLINIC | Age: 54
End: 2019-02-14

## 2019-02-14 NOTE — TELEPHONE ENCOUNTER
----- Message from Kashmir Fuentes MD sent at 2/14/2019 10:38 AM EST -----  Could you ask pt how long it took for the muscle spasms to retunr after the C7-T1 ILESI

## 2019-02-19 NOTE — TELEPHONE ENCOUNTER
I spoke with patient, she states that the muscle spasms return within 2 days after  Patient states that she went to see Dr Geovany Story, an ortho dr in [facility] (was referred by Dr Myra Goff)  He gave her an injection into her bicep where the lump is and she states that injection did not help her at all  Patient states that she is currently in the process of moving and has to use her upper body and she is having increased pain, she is using a heating pad at night which seems to work somewhat  She is currently taking percocet, gabapentin, cyclobenzaprine at night because it makes sleepy and would not be able to function if taken during the day, she states that the medications are not helping  She is going to start PT but with moving she doesn't have the extra time to spare right now  Please advise

## 2019-02-19 NOTE — TELEPHONE ENCOUNTER
--please clarify-  Anything other recs or suggestions for pt besides what she is already doing?   PT and an heating pad and:  Percocet,gabapentin and cyclobenzaprine at HS due to making her sleepy and not able to take those during the day

## 2019-02-21 NOTE — TELEPHONE ENCOUNTER
I called and spoke with patient, she wants to think things over and will call back once she decides how she would like to proceed  Patient was pleasant and appreciative

## 2019-02-26 ENCOUNTER — EVALUATION (OUTPATIENT)
Dept: PHYSICAL THERAPY | Facility: CLINIC | Age: 54
End: 2019-02-26
Payer: COMMERCIAL

## 2019-02-26 DIAGNOSIS — Z98.890 S/P ARTHROSCOPY OF LEFT SHOULDER: Primary | ICD-10-CM

## 2019-02-26 PROCEDURE — 97110 THERAPEUTIC EXERCISES: CPT | Performed by: PHYSICAL THERAPIST

## 2019-02-26 PROCEDURE — 97140 MANUAL THERAPY 1/> REGIONS: CPT | Performed by: PHYSICAL THERAPIST

## 2019-02-26 PROCEDURE — 97161 PT EVAL LOW COMPLEX 20 MIN: CPT | Performed by: PHYSICAL THERAPIST

## 2019-02-26 PROCEDURE — 97530 THERAPEUTIC ACTIVITIES: CPT | Performed by: PHYSICAL THERAPIST

## 2019-02-26 NOTE — PROGRESS NOTES
PT Evaluation     Today's date: 2019  Patient name: Tamanna Jones  : 1965  MRN: [id number]  Referring provider: Nguyen Meza PA-C  Dx:   Encounter Diagnosis     ICD-10-CM    1  S/P arthroscopy of left shoulder [id number] [id number] Ambulatory referral to Physical Therapy                  Assessment  Assessment details: Tamanna Jones is a 48y o  year old female presenting to PT with pain, decreased range of motion, decreased strength, and decreased tolerance to activity  Signs and symptoms are consistent with referring diagnosis of s/p L shoulder arthroscopy, accompanied by cervical DDD  The existing impairments result in difficulty with all lifting and reaching tasks  She is noted with partial RCT in her PMH, as well as diagnosis of RCT tendonopathy  She reports that no surgery, injection or therapy has been helpful to her in the past   Many of her deficits appear to be muscular in origin and she will benefit from course of PT to attempt to address deficits  Gloria would benefit from skilled PT services to address these issues and to maximize function  Home exercise provided and all questions answered   Thank you for the referral     Impairments: abnormal or restricted ROM, activity intolerance, impaired physical strength, lacks appropriate home exercise program and pain with function  Barriers to therapy: No benefit in the past  Understanding of Dx/Px/POC: fair   Prognosis: fair    Goals  STG 2-4 weeks  Increase UE strenth 5-10#  Decrease pain to <5/10 with activity  Increase UE PROM to [facility] all planes    LTG 6-8 weeks  Demonstrate UE AROM WFL all planes  Decrease pain to <2/10 with activity  Patient independent with HEP      Plan  Planned modality interventions: cryotherapy, thermotherapy: hydrocollator packs and unattended electrical stimulation  Planned therapy interventions: manual therapy, joint mobilization, neuromuscular re-education, therapeutic activities and therapeutic exercise  Frequency: 2x week  Duration in weeks: 6        Subjective Evaluation    History of Present Illness  Mechanism of injury: Gloria reports 2 shoulder scopes and 2 cervical spine injections in the past and reports increased pain in both sites since beginning those interventions  She also reports intermittent facial numbness of unknown cause  Additionally, she is currently moving and must pack everything on her own - this exacerbates her symptoms  She attempted PT in the past but did not feel it helped  She was wearing a compression sleeve in the past and plans to return to wearing it soon  Quality of life: fair    Pain  Current pain ratin  At best pain ratin  At worst pain rating: 10  Location: L shoulder  Quality: dull ache, pressure and sharp  Relieving factors: rest  Aggravating factors: lifting and overhead activity  Progression: no change    Treatments  Previous treatment: medication, physical therapy and injection treatment  Current treatment: medication and physical therapy  Patient Goals  Patient goals for therapy: decreased edema, decreased pain, increased motion, increased strength, independence with ADLs/IADLs and return to sport/leisure activities          Objective     Tenderness     Left Shoulder   Tenderness in the subscapularis tendon and supraspinatus tendon  Additional Tenderness Details  TTP in upper trap and levator  Active Range of Motion   Left Shoulder   Flexion: 120 degrees   Abduction: 80 degrees     Strength/Myotome Testing     Left Shoulder     Planes of Motion   Left shoulder forward flexion strength: 11  Left shoulder abduction strength: 6  Left shoulder external rotation strength at 0 degrees: 9       Right Shoulder     Planes of Motion   Right shoulder forward flexion strength: 18    Right shoulder abduction strength: 15    Right shoulder external rotation strength at 0 degrees: 13      Tests   Cervical   Positive neck flexor muscle endurance test   Negative repeated extension and repeated flexion (Unable to reproduce symptoms with repeated movements)           Precautions: n/a    Daily Treatment Diary         Manual  2-26         PROM          Shoulder  STM 5'         Scapular mobility 5'                             Exercise Diary           Pendulums          Pulleys          Wand          Table Slides          UT, levator S 30"x2 ea         UBE: S 5/5         Side lying shoulder x4          Serratus wall slide          C retractions 10"x10         Long/Short arm extension          T-Band Biceps          T-Band Triceps          T-Band IR          T-Band ER          Forward/Lateral Raises          [place]          Prone ITY          Supine press plus                                                                      Modalities           CP/IFC

## 2019-02-28 ENCOUNTER — OFFICE VISIT (OUTPATIENT)
Dept: PHYSICAL THERAPY | Facility: CLINIC | Age: 54
End: 2019-02-28
Payer: COMMERCIAL

## 2019-02-28 ENCOUNTER — OFFICE VISIT (OUTPATIENT)
Dept: PAIN MEDICINE | Facility: CLINIC | Age: 54
End: 2019-02-28
Payer: COMMERCIAL

## 2019-02-28 ENCOUNTER — TELEPHONE (OUTPATIENT)
Dept: PAIN MEDICINE | Facility: CLINIC | Age: 54
End: 2019-02-28

## 2019-02-28 DIAGNOSIS — G89.4 CHRONIC PAIN SYNDROME: ICD-10-CM

## 2019-02-28 DIAGNOSIS — Z98.890 S/P ARTHROSCOPY OF LEFT SHOULDER: Primary | ICD-10-CM

## 2019-02-28 DIAGNOSIS — M54.12 CERVICAL RADICULOPATHY: Primary | ICD-10-CM

## 2019-02-28 DIAGNOSIS — M25.512 ACUTE PAIN OF LEFT SHOULDER: ICD-10-CM

## 2019-02-28 DIAGNOSIS — M79.18 MYOFASCIAL PAIN SYNDROME: ICD-10-CM

## 2019-02-28 PROBLEM — M46.1 SACROILIITIS (HCC): Status: ACTIVE | Noted: 2019-02-28

## 2019-02-28 PROCEDURE — 97110 THERAPEUTIC EXERCISES: CPT | Performed by: PHYSICAL THERAPIST

## 2019-02-28 PROCEDURE — 99214 OFFICE O/P EST MOD 30 MIN: CPT | Performed by: ANESTHESIOLOGY

## 2019-02-28 PROCEDURE — 97010 HOT OR COLD PACKS THERAPY: CPT | Performed by: PHYSICAL THERAPIST

## 2019-02-28 PROCEDURE — 97140 MANUAL THERAPY 1/> REGIONS: CPT | Performed by: PHYSICAL THERAPIST

## 2019-02-28 RX ORDER — BACLOFEN 10 MG/1
10 TABLET ORAL 3 TIMES DAILY
Qty: 90 TABLET | Refills: 0 | Status: SHIPPED | OUTPATIENT
Start: 2019-02-28 | End: 2019-06-19 | Stop reason: SDUPTHER

## 2019-02-28 NOTE — PROGRESS NOTES
Today's date: 2019  Patient name: Nallely Rios  : 1965  MRN: [id number]  Referring provider: Zain Narvaez PA-C  Dx:   Encounter Diagnosis     ICD-10-CM    1  S/P arthroscopy of left shoulder [id number] [id number]                   Subjective: Gloria reports that her shoulder is "always sore"  Continues to lift heavy large objects while packing/moving which increases her shoulder pain  Objective: See treatment diary below      Assessment: Tolerated treatment fair and limited by pain  Patient continues to over work outside of therapy which negatively impacts her tolerance for treatment  Plan: Continue per plan of care          Precautions: n/a    Daily Treatment Diary         Manual  2--28        PROM          Shoulder  STM 5' 5'        Scapular mobility 5' 5'                            Exercise Diary           Pendulums          Pulleys  4'        Wand          Table Slides          UT, levator S 30"x2 ea         UBE: S 5/5 5/5        Side lying shoulder x4          Serratus wall slide          C retractions 10"x10         Long/Short arm extension  L3 2x10        T-Band Biceps          T-Band Triceps          T-Band IR  L2 2x10        T-Band ER  L2 2x10        Forward/Lateral Raises          [place]urns          Prone ITY          Supine press plus                                                                      Modalities           MHP  10'

## 2019-02-28 NOTE — PROGRESS NOTES
Assessment:  1  Cervical radiculopathy    2  Acute pain of left shoulder    3  Myofascial pain syndrome    4  Chronic pain syndrome        Plan:  Patient is a 75-year-old female with history of chronic neck pain and chronic back pain with radicular symptoms of the upper left extremity complicated by multi joint arthritic pathology presents today for follow-up visit  Patient is status post cervical epidural steroid injection at C7-T1 to patient reported 2 days of 70% relief of the muscle spasms in left arm pain which was exacerbated after she started moving out of her apartment to which muscle spasms then return  Patient reported getting a trigger point injection into the left trapezius and deltoid which only exacerbated the pain in her arm  Patient reports she is now starting experience pain in her right arm  1  We will schedule patient for a cervical interlaminar epidural steroid injection most likely at C6-C7 or C5-C6 in order to get medication to the C4 and C5 nerve roots  Complete risks and benefits including bleeding, infection, tissue reaction, nerve injury and allergic reaction were discussed  The approach was demonstrated using models and literature was provided  Verbal and written consent was obtained  [place] Prescription Drug Monitoring Program report was reviewed and was appropriate       History of Present Illness: The patient is a 48 y o  female who presents for a follow up office visit in regards to Headache; Shoulder Pain; Arm Pain; Hip Pain; Knee Pain; and Foot Pain  The patients current symptoms include 7/10 constant burning, sharp, throbbing, spasm in a bilateral upper extremities which is worse in the morning, evening, afternoon  Current pain medications includes:    The patient reports that this regimen is providing 0% pain relief  The patient is reporting no side effects from this pain medication regimen      I have personally reviewed and/or updated the patient's past medical history, past surgical history, family history, social history, current medications, allergies, and vital signs today  Review of Systems  Review of Systems   Musculoskeletal: Positive for arthralgias, gait problem and joint swelling  Decreased range of motion  Joint stiffness  Pain in extremity - arms, legs   Neurological: Positive for dizziness  All other systems reviewed and are negative  Past Medical History:   Diagnosis Date    Acquired ichthyosis     last assessed: 2013    Anxiety     Cervical radiculopathy     last assessed: 2014    Colorectal polyps     last assessed: 3/9/2015    [facility]     Depression     Diverticulosis of colon     Foot pain     GERD (gastroesophageal reflux disease)     Hyperlipemia     Hypertension     Myocardial infarction ([facility] )     at age 28    Osteopenia     last assessed: 2013    Palpitations     last assessed: 2014    PVD (peripheral vascular disease) ([facility] )     last assessed: 12/3/2012    Scapular dyskinesis     last assessed: 2014    Shortness of breath     Tendonitis of left rotator cuff     last assessed: 2014    Vocal cord polyps     last assessed: 2014       Past Surgical History:   Procedure Laterality Date    ABDOMINAL SURGERY      exploratory    CARDIAC SURGERY      cardiac stent      SECTION      last assessed: 2014    CHOLECYSTECTOMY      last assessed: 2014    COLONOSCOPY  10/27/2014    CORONARY ANGIOPLASTY WITH STENT PLACEMENT      LAD stent    DENTAL SURGERY      last assessed: 2014    ELBOW SURGERY Bilateral     arthroplasty    EPIDURAL BLOCK INJECTION N/A 2019    Procedure: C7 T1 Cervical Epidural Steroid Injection (66882);   Surgeon: Nathanel Lanes, MD;  [facility];  Service: Pain Management     ESOPHAGOGASTRODUODENOSCOPY      ESOPHAGOGASTRODUODENOSCOPY N/A 2016    Procedure: ESOPHAGOGASTRODUODENOSCOPY (EGD);   Surgeon: Randell Mcgrath MD;  [facility];  Service:    [facility] INJECTION LEFT SHOULDER (ARTHROGRAM)  9/25/2018    FOOT SURGERY Right 02/06/2015    x 4    HYSTERECTOMY      last assessed: 8/8/2014    LA ARTHROSCOPY SHOULDER SURGICAL BICEPS TENODESIS Left 10/15/2018    Procedure: ARTHROSCOPY SHOULDER; Debridement of shoulder;  Surgeon: Jadyn Alonzo MD;  [facility];  Service: Orthopedics    LA SHLDR ARTHROSCOP,SURG,W/ROTAT CUFF REPR Left 7/23/2018    Procedure: ARTHROSCOPY SHOULDER, subacromial decompression, synovectomy;  Surgeon: Jadyn Alonzo MD;  [facility];  Service: Orthopedics    THROAT SURGERY      TOOTH EXTRACTION      TRIGEMINAL NERVE DECOMPRESSION Right 6/15/2018    Procedure: FOOT NEUROPLASTY;  Surgeon: Mayer Schirmer, DPM;  [facility];  Service: Podiatry       Family History   Problem Relation Age of Onset    No Known Problems Mother     No Known Problems Father     No Known Problems Maternal Grandmother     No Known Problems Maternal Grandfather     No Known Problems Paternal Grandmother     No Known Problems Paternal Grandfather        Social History     Occupational History    Not on file   Tobacco Use    Smoking status: Current Every Day Smoker     Packs/day: 1 00    Smokeless tobacco: Never Used   Substance and Sexual Activity    Alcohol use: Yes     Comment: rare    Drug use: No    Sexual activity: Not on file         Current Outpatient Medications:     albuterol (PROVENTIL HFA,VENTOLIN HFA) 90 mcg/act inhaler, inhale 2 puffs by mouth every 6 hours if needed for wheezing, Disp: , Rfl: 0    amitriptyline (ELAVIL) 25 mg tablet, take 1 tablet by mouth at bedtime, Disp: 30 tablet, Rfl: 1    aspirin 81 mg chewable tablet, Chew 1 tablet daily Stop for the time being for foot surgery , Disp: , Rfl:     baclofen 10 mg tablet, Take 1 tablet (10 mg total) by mouth 3 (three) times a day for 30 days, Disp: 90 tablet, Rfl: 0    cyclobenzaprine (FLEXERIL) 10 mg tablet, Take 1 tablet (10 mg total) by mouth 3 (three) times a day as needed for muscle spasms, Disp: 30 tablet, Rfl: 0    diclofenac potassium (CATAFLAM) 50 mg tablet, take 1 tablet by mouth three times a day, Disp: 90 tablet, Rfl: 0    dicyclomine (BENTYL) 20 mg tablet, Take 1 tablet (20 mg total) by mouth every 6 (six) hours, Disp: 60 tablet, Rfl: 2    gabapentin (NEURONTIN) 600 MG tablet, take 1 tablet by mouth twice a day, Disp: 60 tablet, Rfl: 0    methocarbamol (ROBAXIN) 500 mg tablet, , Disp: , Rfl:     nitroglycerin (NITROSTAT) 0 4 mg SL tablet, Place 1 tablet (0 4 mg total) under the tongue every 5 (five) minutes as needed for chest pain, Disp: 90 tablet, Rfl: 3    oxyCODONE-acetaminophen (PERCOCET) 5-325 mg per tablet, Take 1 tablet by mouth every 4 (four) hours as needed for moderate pain, Disp: , Rfl:     ranitidine (ZANTAC) 150 mg tablet, Take 1 tablet (150 mg total) by mouth 2 (two) times a day, Disp: 60 tablet, Rfl: 5    rosuvastatin (CRESTOR) 5 mg tablet, Take 1 tablet (5 mg total) by mouth daily, Disp: 90 tablet, Rfl: 3    Allergies   Allergen Reactions    Ceclor [Cefaclor] Anaphylaxis    Codeine Itching     Reaction Date: [id number];     Ticlid [Ticlopidine] Hives    Penicillins Rash       Physical Exam:    There were no vitals taken for this visit  Constitutional:normal, well developed, well nourished, alert, in no distress and non-toxic and no overt pain behavior  Eyes:anicteric  HEENT:grossly intact  Neck:supple, symmetric, trachea midline and no masses   Pulmonary:even and unlabored  Cardiovascular:No edema or pitting edema present  Skin:Normal without rashes or lesions and well hydrated  Psychiatric:Mood and affect appropriate  Neurologic:Cranial Nerves II-XII grossly intact  Musculoskeletal:normal     Cervical Spine examination demonstrates   Decreased ROM secondary to pain with lateral rotation to the left/right and bending to the left/right, in addition to neck flexion  3/5 left upper extremity strength in all muscle groups including  strength  Notable severe muscle spasms in the triceps  ROM of the shoulder is adequate  Negative Spurling's maneuver to the b/l Ue, sensitivity to light touch intact b/l Ue  Imaging  No orders to display       No orders of the defined types were placed in this encounter

## 2019-02-28 NOTE — TELEPHONE ENCOUNTER
Patient contacted call center stated she have an appointment for injections 03/07 and EMG for 03/14 patient will like to know of Dr Lawson prefers for her to reschedule EMG to give it time to work                         Call back# [phone]  Dr Lawson

## 2019-03-01 ENCOUNTER — TELEPHONE (OUTPATIENT)
Dept: GASTROENTEROLOGY | Facility: HOSPITAL | Age: 54
End: 2019-03-01

## 2019-03-01 ENCOUNTER — OFFICE VISIT (OUTPATIENT)
Dept: GASTROENTEROLOGY | Facility: HOSPITAL | Age: 54
End: 2019-03-01
Payer: COMMERCIAL

## 2019-03-01 ENCOUNTER — APPOINTMENT (OUTPATIENT)
Dept: RADIOLOGY | Facility: MEDICAL CENTER | Age: 54
End: 2019-03-01
Payer: COMMERCIAL

## 2019-03-01 ENCOUNTER — TRANSCRIBE ORDERS (OUTPATIENT)
Dept: RADIOLOGY | Facility: MEDICAL CENTER | Age: 54
End: 2019-03-01

## 2019-03-01 VITALS
HEART RATE: 97 BPM | HEIGHT: 60 IN | WEIGHT: 152.6 LBS | DIASTOLIC BLOOD PRESSURE: 71 MMHG | SYSTOLIC BLOOD PRESSURE: 100 MMHG | BODY MASS INDEX: 29.96 KG/M2 | TEMPERATURE: 98 F

## 2019-03-01 DIAGNOSIS — K62.1 COLORECTAL POLYPS: ICD-10-CM

## 2019-03-01 DIAGNOSIS — R10.11 RUQ PAIN: ICD-10-CM

## 2019-03-01 DIAGNOSIS — B18.2 CHRONIC HEPATITIS C WITHOUT HEPATIC COMA (HCC): ICD-10-CM

## 2019-03-01 DIAGNOSIS — K21.9 GASTROESOPHAGEAL REFLUX DISEASE, ESOPHAGITIS PRESENCE NOT SPECIFIED: Primary | ICD-10-CM

## 2019-03-01 DIAGNOSIS — M25.571 RIGHT ANKLE PAIN, UNSPECIFIED CHRONICITY: ICD-10-CM

## 2019-03-01 DIAGNOSIS — K63.5 COLORECTAL POLYPS: ICD-10-CM

## 2019-03-01 DIAGNOSIS — K83.8 DILATED BILE DUCT: ICD-10-CM

## 2019-03-01 DIAGNOSIS — M79.671 RIGHT FOOT PAIN: Primary | ICD-10-CM

## 2019-03-01 DIAGNOSIS — M79.671 RIGHT FOOT PAIN: ICD-10-CM

## 2019-03-01 DIAGNOSIS — R10.11 RUQ ABDOMINAL PAIN: ICD-10-CM

## 2019-03-01 DIAGNOSIS — K58.0 IRRITABLE BOWEL SYNDROME WITH DIARRHEA: ICD-10-CM

## 2019-03-01 PROCEDURE — 99214 OFFICE O/P EST MOD 30 MIN: CPT | Performed by: PHYSICIAN ASSISTANT

## 2019-03-01 PROCEDURE — 73610 X-RAY EXAM OF ANKLE: CPT

## 2019-03-01 PROCEDURE — 73630 X-RAY EXAM OF FOOT: CPT

## 2019-03-01 RX ORDER — PANTOPRAZOLE SODIUM 40 MG/1
40 TABLET, DELAYED RELEASE ORAL DAILY
Qty: 30 TABLET | Refills: 5 | Status: SHIPPED | OUTPATIENT
Start: 2019-03-01 | End: 2019-06-11

## 2019-03-01 RX ORDER — DICYCLOMINE HCL 20 MG
20 TABLET ORAL EVERY 6 HOURS
Qty: 60 TABLET | Refills: 2 | Status: SHIPPED | OUTPATIENT
Start: 2019-03-01 | End: 2019-06-11

## 2019-03-01 NOTE — PROGRESS NOTES
[facility] - Outpatient Follow-up Note  Marcel Dhillon 48 y o  female MRN: [id number]  Encounter: [id number]          ASSESSMENT AND PLAN:    1  GERD   -she has been taking Zantac twice daily for her reflux and she states that generally she feels she has good control  -Given her right upper quadrant pain after eating, would recommend a trial of PPI  If this is not helpful after 2 weeks she can discontinue this      -She has tried omeprazole in the past, will prescribe pantoprazole    -colonoscopy is planned as below, she does relate a history of peptic ulcer disease so it is reasonable to plan for an upper endoscopy at the same appointment as the colonoscopy to re-evaluate for ulcers or gastritis given her right upper quadrant pain  -reviewed reflux precautions, avoid trigger foods   -counseled her on smoking cessation    2  Right upper quadrant pain  3  Dilated common bile duct   -she states she has been having persistent right upper quadrant abdominal pain that is sometimes worse after she eats, she had a cholecystectomy many years ago but she states she is concerned that there is a stone in the common bile duct    -she had a right upper quadrant ultrasound that did show dilated bile duct up to 10 mm, this may be a result of her previous cholecystectomy however given her pain would recommend an MRI/MRCP to further evaluate the biliary tree for any signs of obstruction  -her LFTs were normal last month, will repeat    -her right upper quadrant abdominal pain is reproducible on exam with palpation to her ribs, this may be more related to her myofascial pain syndrome and to a GI cause  4  IBS-D   -she has tried a low FODMAP diet in the past but did not feel this was helpful, she has also tried several medications  She was prescribed Bentyl recently but she did not pick this up in the pharmacy  She does not believe that she tried the doxycycline either    Xifaxan was not covered by her insurance    -she has tried probiotics in the past   -she is on low-dose amitriptyline for myofascial pain syndrome, this may help with functional GI pain as well    -recommend keeping a food diary to identify trigger foods   -she denies any recent change in her bowel habits, she states that her current symptoms are stable    -sent prescription for Bentyl, if this is not helpful could try doxycycline but she states that currently she cannot have an antibiotic as she is receiving steroid injections  -recommend light exercise such as walking a yoga    5  Hepatitis-C   -she has completed treatment with Victoriano Boyce and her most recent viral load was negative, she states she has not yet due for the 3 month viral load level  6  History of colon polyps   -she is overdue for screening colonoscopy, we discussed this today and she is agreeable to scheduling the procedure although she would prefer to schedule it after she moves  Follow-up in the office after the above procedures  ____________________________________________________________    SUBJECTIVE:    49-year-old female past medical history of GERD, IBS D, hepatitis-C, colon polyps, COPD, hypertension, mi, CAD, PVD, myofascial pain syndrome, anxiety, depression presents to the office for follow-up of multiple GI complaints  She reports that she has been having right upper quadrant pain which acutely began a month ago but she notes that she has had episodes of this in the past for several years  She states it is constant but she feels it is somewhat worse when she eats  She denies any nausea, vomiting, difficulty swallowing, change in appetite, unintended weight loss, hematochezia, melena or constipation  She knows that she does get loose stool intermittently with 2-3 bowel movements per day depending on what she eats  She has tried a low FODMAP diet but did not find this helpful    She was prescribed Bentyl previously but did not pick this up at the pharmacy  She does take NSAIDs as well as narcotics on a regular basis for her chronic pain  For her reflux, she takes Zantac twice daily  She states she does have intermittent symptoms especially when she eats things like peppers  She has a longstanding history of reflux and had an upper endoscopy in 2016 that showed a hiatal hernia and gastritis but was otherwise unremarkable  She relates a history of peptic ulcer disease many years ago  She has tried omeprazole in the past but felt that the Zantac was more effective for her  She has also been having a left lower quadrant pain that is intermittent and mild, associated with loose stool that improves after a bowel movement  REVIEW OF SYSTEMS IS OTHERWISE NEGATIVE        Historical Information   Past Medical History:   Diagnosis Date    Acquired ichthyosis     last assessed: 2013    Anxiety     Cervical radiculopathy     last assessed: 2014    Colorectal polyps     last assessed: 3/9/2015    [facility]     Depression     Diverticulosis of colon     Foot pain     GERD (gastroesophageal reflux disease)     Hyperlipemia     Hypertension     Myocardial infarction ([facility] )     at age 28    Osteopenia     last assessed: 2013    Palpitations     last assessed: 2014    PVD (peripheral vascular disease) ([facility] )     last assessed: 12/3/2012    Scapular dyskinesis     last assessed: 2014    Shortness of breath     Tendonitis of left rotator cuff     last assessed: 2014    Vocal cord polyps     last assessed: 2014     Past Surgical History:   Procedure Laterality Date    ABDOMINAL SURGERY      exploratory    CARDIAC SURGERY      cardiac stent      SECTION      last assessed: 2014    CHOLECYSTECTOMY      last assessed: 2014    COLONOSCOPY  10/27/2014    CORONARY ANGIOPLASTY WITH STENT PLACEMENT  1999    LAD stent   [employer] DENT[facility]GERY      last assessed: 8/8/2014    ELBOW SURGERY Bilateral     arthroplasty    EPIDURAL BLOCK INJECTION N/A 1/17/2019    Procedure: C7 T1 Cervical Epidural Steroid Injection (62661); Surgeon: Shaw Mancia MD;  [facility];  Service: Pain Management     ESOPHAGOGASTRODUODENOSCOPY      ESOPHAGOGASTRODUODENOSCOPY N/A 11/4/2016    Procedure: ESOPHAGOGASTRODUODENOSCOPY (EGD);   Surgeon: Malgorzata Huntley MD;  [facility];  Service:    [facility] INJECTION LEFT SHOULDER (ARTHROGRAM)  9/25/2018    FOOT SURGERY Right 02/06/2015    x 4    HYSTERECTOMY      last assessed: 8/8/2014    KS ARTHROSCOPY SHOULDER SURGICAL BICEPS TENODESIS Left 10/15/2018    Procedure: ARTHROSCOPY SHOULDER; Debridement of shoulder;  Surgeon: Blayne Bliss MD;  [facility];  Service: Orthopedics    KS SHLDR ARTHROSCOP,SURG,W/ROTAT CUFF REPR Left 7/23/2018    Procedure: ARTHROSCOPY SHOULDER, subacromial decompression, synovectomy;  Surgeon: Blayne Bliss MD;  [facility];  Service: Orthopedics    THROAT SURGERY      TOOTH EXTRACTION      TRIGEMINAL NERVE DECOMPRESSION Right 6/15/2018    Procedure: FOOT NEUROPLASTY;  Surgeon: Kristel Strange DPM;  [facility];  Service: Podiatry     Social History   Social History     Substance and Sexual Activity   Alcohol Use Yes    Comment: rare     Social History     Substance and Sexual Activity   Drug Use No     Social History     Tobacco Use   Smoking Status Current Every Day Smoker    Packs/day: 1 00   Smokeless Tobacco Never Used     Family History   Problem Relation Age of Onset    No Known Problems Mother     No Known Problems Father     No Known Problems Maternal Grandmother     No Known Problems Maternal Grandfather     No Known Problems Paternal Grandmother     No Known Problems Paternal Grandfather        Meds/Allergies       Current Outpatient Medications:     albuterol (PROVENTIL HFA,VENTOLIN HFA) 90 mcg/act inhaler    amitriptyline (ELAVIL) 25 mg tablet    aspirin 81 mg chewable tablet    cyclobenzaprine (FLEXERIL) 10 mg tablet    diclofenac sodium (VOLTAREN) 50 mg EC tablet    dicyclomine (BENTYL) 20 mg tablet    gabapentin (NEURONTIN) 600 MG tablet    methocarbamol (ROBAXIN) 500 mg tablet    oxyCODONE-acetaminophen (PERCOCET) 5-325 mg per tablet    ranitidine (ZANTAC) 150 mg tablet    rosuvastatin (CRESTOR) 5 mg tablet    baclofen 10 mg tablet    diclofenac potassium (CATAFLAM) 50 mg tablet    nitroglycerin (NITROSTAT) 0 4 mg SL tablet    Allergies   Allergen Reactions    Ceclor [Cefaclor] Anaphylaxis    Codeine Itching     Reaction Date: 09Jun2011;     Ticlid [Ticlopidine] Hives    Penicillins Rash           Objective     Blood pressure 100/71, pulse 97, temperature 98 °F (36 7 °C), temperature source Tympanic, height 5' (1 524 m), weight 69 2 kg (152 lb 9 6 oz)  PHYSICAL EXAM:      Physical Exam   Constitutional: She is oriented to person, place, and time  No distress  Smells of cigarette smoke, overweight   HENT:   Head: Normocephalic and atraumatic  Eyes: Right eye exhibits no discharge  Left eye exhibits no discharge  No scleral icterus  Neck: Neck supple  No tracheal deviation present  Cardiovascular: Normal rate, regular rhythm and normal heart sounds  Exam reveals no gallop and no friction rub  No murmur heard  Pulmonary/Chest: Effort normal  No respiratory distress  She has no wheezes  She has no rales  She exhibits tenderness (Right side, over ribs)  Abdominal: Soft  Bowel sounds are normal  She exhibits no distension  There is no tenderness  There is no rebound and no guarding  Neurological: She is alert and oriented to person, place, and time  Skin: Skin is warm and dry  Psychiatric: She has a normal mood and affect  Lab Results:   No visits with results within 1 Day(s) from this visit     Latest known visit with results is:   Appointment on 01/18/2019   Component Date Value    HCV PCR Quantitative 01/18/2019 HCV Not Detected     Test Information 01/18/2019 Comment     WBC 01/18/2019 9 90     RBC 01/18/2019 4 39     Hemoglobin 01/18/2019 14 6     Hematocrit 01/18/2019 45 2     MCV 01/18/2019 103*    MCH 01/18/2019 33 3     MCHC 01/18/2019 32 3     RDW 01/18/2019 12 3     MPV 01/18/2019 10 5     Platelets 87/70/8823 255     nRBC 01/18/2019 0     Neutrophils Relative 01/18/2019 49     Immat GRANS % 01/18/2019 0     Lymphocytes Relative 01/18/2019 40     Monocytes Relative 01/18/2019 8     Eosinophils Relative 01/18/2019 2     Basophils Relative 01/18/2019 1     Neutrophils Absolute 01/18/2019 4 99     Immature Grans Absolute 01/18/2019 0 02     Lymphocytes Absolute 01/18/2019 3 93     Monocytes Absolute 01/18/2019 0 76     Eosinophils Absolute 01/18/2019 0 15     Basophils Absolute 01/18/2019 0 05     Sodium 01/18/2019 137     Potassium 01/18/2019 3 9     Chloride 01/18/2019 104     CO2 01/18/2019 29     ANION GAP 01/18/2019 4     BUN 01/18/2019 13     Creatinine 01/18/2019 0 52*    Glucose 01/18/2019 83     Calcium 01/18/2019 9 3     AST 01/18/2019 8     ALT 01/18/2019 17     Alkaline Phosphatase 01/18/2019 84     Total Protein 01/18/2019 8 0     Albumin 01/18/2019 4 2     Total Bilirubin 01/18/2019 0 31     eGFR 01/18/2019 109          Radiology Results:   No results found

## 2019-03-01 NOTE — TELEPHONE ENCOUNTER
Called Andi at  [phone]  to get prior- authorization for this patients MRI Abdomen w wo contrast and MRCP, but they told me its pending  Tracking # [id number]  Talked to Kathy Wan

## 2019-03-01 NOTE — PATIENT INSTRUCTIONS
EGD Colon scheduled with Dr Freddy Hubbard 4/9/19 at [facility] instructions given in office

## 2019-03-01 NOTE — TELEPHONE ENCOUNTER
S/w pt and advised the same  Pt states she started PT on 2/26 as ordered by ortho  States increased pain in neck, shoulder and arms since starting therapy  Advised increased pain is normal after initiating PT and should subside  Pt asking if RM thinks she should hold off until after ANDREIA or continue through the pain?

## 2019-03-04 ENCOUNTER — TELEPHONE (OUTPATIENT)
Dept: GASTROENTEROLOGY | Facility: HOSPITAL | Age: 54
End: 2019-03-04

## 2019-03-04 NOTE — TELEPHONE ENCOUNTER
[facility] [phone] got a voice recording said this is still pending  Tracking # [id number]

## 2019-03-05 ENCOUNTER — TELEPHONE (OUTPATIENT)
Dept: GASTROENTEROLOGY | Facility: HOSPITAL | Age: 54
End: 2019-03-05

## 2019-03-05 NOTE — TELEPHONE ENCOUNTER
Called [facility] [phone] got a voice recording said this is still  Being reviewed  Tracking # [id number]  I wanted to talk to a Representative so I call back and Talked to Siva Manuel from [employer] today and she said Sofie Moran from [employer] chose the wrong cpt code MRI Abdomen w w/o contrast and MRCP for the Preauthorization     she said she only put in for the one for MRI Abdomen w w/o contrast so Lorie put a new Preauthorization for MRCP    Tracking # for MRCP is: [id number]

## 2019-03-07 ENCOUNTER — TELEPHONE (OUTPATIENT)
Dept: GASTROENTEROLOGY | Facility: HOSPITAL | Age: 54
End: 2019-03-07

## 2019-03-07 NOTE — TELEPHONE ENCOUNTER
Got a fax from [facility] for this patient they approved her MRCP,  Authorization # is [facility] [id number]  They did not authorize for the  MRI, they would like a peer to peer   Can call [phone]

## 2019-03-08 ENCOUNTER — TELEPHONE (OUTPATIENT)
Dept: GASTROENTEROLOGY | Facility: CLINIC | Age: 54
End: 2019-03-08

## 2019-03-08 ENCOUNTER — TELEPHONE (OUTPATIENT)
Dept: GASTROENTEROLOGY | Facility: HOSPITAL | Age: 54
End: 2019-03-08

## 2019-03-08 NOTE — TELEPHONE ENCOUNTER
Dr Linda Bob pt   N I  A called stating the MRI ABD was denied  They will be faxing over denial and this can be appealed   You can also do a peer to peer by calling [phone] opt 3

## 2019-03-08 NOTE — TELEPHONE ENCOUNTER
HERBER Wallace did a peer to peer MRI and MRCP are approved  Tracking # [id number],  Tracking # [id number]

## 2019-03-11 NOTE — TELEPHONE ENCOUNTER
Harper Collins called [employer] and did a peer to peer with them, she stated that it is covered with the MRCP

## 2019-03-11 NOTE — TELEPHONE ENCOUNTER
I called [employer] at [phone] opt 3  I talked to Ivette Riedel  The reference # for this phone call is [id number]  She stated that they are not approving the MRI, but are approving the MRCP

## 2019-03-13 ENCOUNTER — TELEPHONE (OUTPATIENT)
Dept: OBGYN CLINIC | Facility: HOSPITAL | Age: 54
End: 2019-03-13

## 2019-03-13 NOTE — TELEPHONE ENCOUNTER
Carline Bowers   [phone]    Dr Felicia White    Patient checked with insurance and they will not pay for another emg until 7/19 because she had one previously  Please advise patient

## 2019-03-13 NOTE — TELEPHONE ENCOUNTER
Patient does have cervical epidural injections scheduled for next week  She will get these injections done and see how she progresses from them  I did mention that she should wait till after the shots have had a chance to work for follow-up as her shoulder exam in the past seems to be more negative in relation to recurrent or progressive rotator cuff tear and more likely cervical radiculopathy  Unfortunately if there insurance will not pay for an EMG until July we do not believe there is anything we can do to facilitate that    Patient will contact us if cervical injections do not improve her symptomatology

## 2019-03-19 ENCOUNTER — TELEPHONE (OUTPATIENT)
Dept: FAMILY MEDICINE CLINIC | Facility: CLINIC | Age: 54
End: 2019-03-19

## 2019-03-19 DIAGNOSIS — M19.90 ARTHRITIS: ICD-10-CM

## 2019-03-19 DIAGNOSIS — J06.9 UPPER RESPIRATORY TRACT INFECTION, UNSPECIFIED TYPE: Primary | ICD-10-CM

## 2019-03-19 RX ORDER — AZITHROMYCIN 250 MG/1
TABLET, FILM COATED ORAL
Qty: 6 TABLET | Refills: 0 | Status: SHIPPED | OUTPATIENT
Start: 2019-03-19 | End: 2019-03-24

## 2019-03-19 RX ORDER — CYCLOBENZAPRINE HCL 10 MG
10 TABLET ORAL 3 TIMES DAILY PRN
Qty: 30 TABLET | Refills: 0 | Status: SHIPPED | OUTPATIENT
Start: 2019-03-19 | End: 2019-04-23 | Stop reason: SDUPTHER

## 2019-03-20 ENCOUNTER — HOSPITAL ENCOUNTER (OUTPATIENT)
Dept: MRI IMAGING | Facility: HOSPITAL | Age: 54
Discharge: HOME | End: 2019-03-20
Payer: COMMERCIAL

## 2019-03-20 DIAGNOSIS — K83.8 DILATED BILE DUCT: ICD-10-CM

## 2019-03-20 DIAGNOSIS — R10.11 RUQ ABDOMINAL PAIN: ICD-10-CM

## 2019-03-20 PROCEDURE — 74183 MRI ABD W/O CNTR FLWD CNTR: CPT

## 2019-03-20 PROCEDURE — 76376 3D RENDER W/INTRP POSTPROCES: CPT

## 2019-03-20 PROCEDURE — A9585 GADOBUTROL INJECTION: HCPCS | Performed by: PHYSICIAN ASSISTANT

## 2019-03-20 RX ADMIN — GADOBUTROL 6 ML: 604.72 INJECTION INTRAVENOUS at 14:55

## 2019-03-22 NOTE — PROGRESS NOTES
Gloria will be discharged from outpatient physical therapy care due to expiration of plan of care  No objective measures updated, Gloria is not present at time of discharge

## 2019-04-02 ENCOUNTER — TELEPHONE (OUTPATIENT)
Dept: GASTROENTEROLOGY | Facility: AMBULARY SURGERY CENTER | Age: 54
End: 2019-04-02

## 2019-04-06 DIAGNOSIS — M54.12 CERVICAL RADICULOPATHY: ICD-10-CM

## 2019-04-08 ENCOUNTER — TELEPHONE (OUTPATIENT)
Dept: PAIN MEDICINE | Facility: MEDICAL CENTER | Age: 54
End: 2019-04-08

## 2019-04-08 RX ORDER — AMITRIPTYLINE HYDROCHLORIDE 25 MG/1
TABLET, FILM COATED ORAL
Qty: 30 TABLET | Refills: 1 | Status: SHIPPED | OUTPATIENT
Start: 2019-04-08 | End: 2019-06-11

## 2019-04-09 ENCOUNTER — TELEPHONE (OUTPATIENT)
Dept: RADIOLOGY | Facility: CLINIC | Age: 54
End: 2019-04-09

## 2019-04-10 PROBLEM — M54.12 RADICULOPATHY, CERVICAL: Status: ACTIVE | Noted: 2019-04-10

## 2019-04-11 ENCOUNTER — APPOINTMENT (OUTPATIENT)
Dept: LAB | Facility: MEDICAL CENTER | Age: 54
End: 2019-04-11
Payer: COMMERCIAL

## 2019-04-11 DIAGNOSIS — K83.8 DILATED BILE DUCT: ICD-10-CM

## 2019-04-11 DIAGNOSIS — B18.2 HEP C W/O COMA, CHRONIC (HCC): ICD-10-CM

## 2019-04-11 DIAGNOSIS — R10.11 RUQ ABDOMINAL PAIN: ICD-10-CM

## 2019-04-11 LAB
ALBUMIN SERPL BCP-MCNC: 4.4 G/DL (ref 3.5–5)
ALP SERPL-CCNC: 67 U/L (ref 46–116)
ALT SERPL W P-5'-P-CCNC: 17 U/L (ref 12–78)
ANION GAP SERPL CALCULATED.3IONS-SCNC: 4 MMOL/L (ref 4–13)
AST SERPL W P-5'-P-CCNC: 17 U/L (ref 5–45)
BILIRUB SERPL-MCNC: 0.42 MG/DL (ref 0.2–1)
BUN SERPL-MCNC: 7 MG/DL (ref 5–25)
CALCIUM SERPL-MCNC: 9.1 MG/DL (ref 8.3–10.1)
CHLORIDE SERPL-SCNC: 103 MMOL/L (ref 100–108)
CO2 SERPL-SCNC: 30 MMOL/L (ref 21–32)
CREAT SERPL-MCNC: 0.62 MG/DL (ref 0.6–1.3)
GFR SERPL CREATININE-BSD FRML MDRD: 103 ML/MIN/1.73SQ M
GLUCOSE SERPL-MCNC: 93 MG/DL (ref 65–140)
POTASSIUM SERPL-SCNC: 4.3 MMOL/L (ref 3.5–5.3)
PROT SERPL-MCNC: 7.9 G/DL (ref 6.4–8.2)
SODIUM SERPL-SCNC: 137 MMOL/L (ref 136–145)

## 2019-04-11 PROCEDURE — 80053 COMPREHEN METABOLIC PANEL: CPT

## 2019-04-11 PROCEDURE — 87522 HEPATITIS C REVRS TRNSCRPJ: CPT

## 2019-04-11 PROCEDURE — 36415 COLL VENOUS BLD VENIPUNCTURE: CPT

## 2019-04-15 DIAGNOSIS — Z12.39 SCREENING FOR BREAST CANCER: Primary | ICD-10-CM

## 2019-04-15 LAB
HCV RNA SERPL NAA+PROBE-ACNC: NORMAL IU/ML
TEST INFORMATION: NORMAL

## 2019-04-16 ENCOUNTER — TELEPHONE (OUTPATIENT)
Dept: GASTROENTEROLOGY | Facility: CLINIC | Age: 54
End: 2019-04-16

## 2019-04-23 DIAGNOSIS — M19.90 ARTHRITIS: ICD-10-CM

## 2019-04-23 RX ORDER — CYCLOBENZAPRINE HCL 10 MG
10 TABLET ORAL 3 TIMES DAILY PRN
Qty: 30 TABLET | Refills: 0 | Status: SHIPPED | OUTPATIENT
Start: 2019-04-23 | End: 2019-06-18 | Stop reason: SDUPTHER

## 2019-04-25 ENCOUNTER — OFFICE VISIT (OUTPATIENT)
Dept: CARDIOLOGY CLINIC | Facility: HOSPITAL | Age: 54
End: 2019-04-25
Payer: COMMERCIAL

## 2019-04-25 VITALS
HEIGHT: 60 IN | BODY MASS INDEX: 29.17 KG/M2 | DIASTOLIC BLOOD PRESSURE: 74 MMHG | WEIGHT: 148.6 LBS | OXYGEN SATURATION: 97 % | HEART RATE: 86 BPM | SYSTOLIC BLOOD PRESSURE: 136 MMHG

## 2019-04-25 DIAGNOSIS — I73.9 PVD (PERIPHERAL VASCULAR DISEASE) (HCC): ICD-10-CM

## 2019-04-25 DIAGNOSIS — E78.49 OTHER HYPERLIPIDEMIA: ICD-10-CM

## 2019-04-25 DIAGNOSIS — I25.10 CAD IN NATIVE ARTERY: Primary | ICD-10-CM

## 2019-04-25 DIAGNOSIS — I10 ESSENTIAL HYPERTENSION: ICD-10-CM

## 2019-04-25 DIAGNOSIS — E78.5 HYPERLIPIDEMIA, UNSPECIFIED HYPERLIPIDEMIA TYPE: ICD-10-CM

## 2019-04-25 DIAGNOSIS — I25.10 CAD IN NATIVE ARTERY: ICD-10-CM

## 2019-04-25 PROCEDURE — 99214 OFFICE O/P EST MOD 30 MIN: CPT | Performed by: INTERNAL MEDICINE

## 2019-04-25 RX ORDER — ROSUVASTATIN CALCIUM 5 MG/1
5 TABLET, COATED ORAL DAILY
Qty: 90 TABLET | Refills: 3 | Status: CANCELLED | OUTPATIENT
Start: 2019-04-25

## 2019-04-26 ENCOUNTER — TELEPHONE (OUTPATIENT)
Dept: FAMILY MEDICINE CLINIC | Facility: CLINIC | Age: 54
End: 2019-04-26

## 2019-04-26 DIAGNOSIS — E78.5 HYPERLIPIDEMIA, UNSPECIFIED HYPERLIPIDEMIA TYPE: ICD-10-CM

## 2019-04-26 DIAGNOSIS — I25.10 CAD IN NATIVE ARTERY: ICD-10-CM

## 2019-04-26 RX ORDER — NITROGLYCERIN 0.4 MG/1
0.4 TABLET SUBLINGUAL
Qty: 90 TABLET | Refills: 3 | Status: SHIPPED | OUTPATIENT
Start: 2019-04-26

## 2019-04-26 RX ORDER — NITROGLYCERIN 0.4 MG/1
0.4 TABLET SUBLINGUAL
Qty: 90 TABLET | Refills: 3 | Status: SHIPPED | OUTPATIENT
Start: 2019-04-26 | End: 2019-10-02 | Stop reason: SDUPTHER

## 2019-04-26 RX ORDER — ROSUVASTATIN CALCIUM 5 MG/1
5 TABLET, COATED ORAL DAILY
Qty: 90 TABLET | Refills: 3 | Status: SHIPPED | OUTPATIENT
Start: 2019-04-26 | End: 2019-11-13 | Stop reason: SDUPTHER

## 2019-05-01 DIAGNOSIS — M79.18 MYOFASCIAL PAIN SYNDROME: ICD-10-CM

## 2019-05-01 DIAGNOSIS — M25.512 ACUTE PAIN OF LEFT SHOULDER: ICD-10-CM

## 2019-05-02 ENCOUNTER — HOSPITAL ENCOUNTER (OUTPATIENT)
Facility: HOSPITAL | Age: 54
Setting detail: OUTPATIENT SURGERY
Discharge: HOME | End: 2019-05-02
Attending: ANESTHESIOLOGY | Admitting: ANESTHESIOLOGY
Payer: COMMERCIAL

## 2019-05-02 ENCOUNTER — APPOINTMENT (OUTPATIENT)
Dept: RADIOLOGY | Facility: HOSPITAL | Age: 54
End: 2019-05-02
Payer: COMMERCIAL

## 2019-05-02 VITALS
HEIGHT: 60 IN | HEART RATE: 84 BPM | DIASTOLIC BLOOD PRESSURE: 75 MMHG | RESPIRATION RATE: 18 BRPM | WEIGHT: 148 LBS | OXYGEN SATURATION: 97 % | BODY MASS INDEX: 29.06 KG/M2 | SYSTOLIC BLOOD PRESSURE: 117 MMHG

## 2019-05-02 RX ORDER — LIDOCAINE HYDROCHLORIDE 10 MG/ML
INJECTION, SOLUTION INFILTRATION; PERINEURAL AS NEEDED
Status: DISCONTINUED | OUTPATIENT
Start: 2019-05-02 | End: 2019-05-02 | Stop reason: HOSPADM

## 2019-05-02 RX ORDER — DEXAMETHASONE SODIUM PHOSPHATE 10 MG/ML
INJECTION, SOLUTION INTRAMUSCULAR; INTRAVENOUS AS NEEDED
Status: DISCONTINUED | OUTPATIENT
Start: 2019-05-02 | End: 2019-05-02 | Stop reason: HOSPADM

## 2019-05-09 ENCOUNTER — TRANSCRIBE ORDERS (OUTPATIENT)
Dept: ADMINISTRATIVE | Facility: HOSPITAL | Age: 54
End: 2019-05-09

## 2019-05-09 ENCOUNTER — HOSPITAL ENCOUNTER (OUTPATIENT)
Dept: MAMMOGRAPHY | Facility: HOSPITAL | Age: 54
Discharge: HOME | End: 2019-05-09
Payer: COMMERCIAL

## 2019-05-09 ENCOUNTER — APPOINTMENT (OUTPATIENT)
Dept: RADIOLOGY | Facility: MEDICAL CENTER | Age: 54
End: 2019-05-09
Payer: COMMERCIAL

## 2019-05-09 ENCOUNTER — TELEPHONE (OUTPATIENT)
Dept: PAIN MEDICINE | Facility: CLINIC | Age: 54
End: 2019-05-09

## 2019-05-09 VITALS — HEIGHT: 60 IN | WEIGHT: 148 LBS | BODY MASS INDEX: 29.06 KG/M2

## 2019-05-09 DIAGNOSIS — M25.572 PAIN IN LATERAL PORTION OF LEFT ANKLE: ICD-10-CM

## 2019-05-09 DIAGNOSIS — M25.572 PAIN IN LATERAL PORTION OF LEFT ANKLE: Primary | ICD-10-CM

## 2019-05-09 DIAGNOSIS — Z12.39 SCREENING FOR BREAST CANCER: ICD-10-CM

## 2019-05-09 PROCEDURE — 73610 X-RAY EXAM OF ANKLE: CPT

## 2019-05-09 PROCEDURE — 77067 SCR MAMMO BI INCL CAD: CPT

## 2019-05-09 PROCEDURE — 77063 BREAST TOMOSYNTHESIS BI: CPT

## 2019-05-10 ENCOUNTER — TELEPHONE (OUTPATIENT)
Dept: GASTROENTEROLOGY | Facility: HOSPITAL | Age: 54
End: 2019-05-10

## 2019-05-22 ENCOUNTER — HOSPITAL ENCOUNTER (OUTPATIENT)
Dept: PERIOP | Facility: HOSPITAL | Age: 54
Discharge: HOME | End: 2019-05-22
Attending: INTERNAL MEDICINE

## 2019-06-11 ENCOUNTER — APPOINTMENT (OUTPATIENT)
Dept: RADIOLOGY | Facility: MEDICAL CENTER | Age: 54
End: 2019-06-11
Payer: COMMERCIAL

## 2019-06-11 ENCOUNTER — OFFICE VISIT (OUTPATIENT)
Dept: PAIN MEDICINE | Facility: CLINIC | Age: 54
End: 2019-06-11
Payer: COMMERCIAL

## 2019-06-11 VITALS
WEIGHT: 147.2 LBS | SYSTOLIC BLOOD PRESSURE: 118 MMHG | HEIGHT: 60 IN | DIASTOLIC BLOOD PRESSURE: 73 MMHG | BODY MASS INDEX: 28.9 KG/M2

## 2019-06-11 DIAGNOSIS — M47.816 LUMBAR SPONDYLOSIS: ICD-10-CM

## 2019-06-11 DIAGNOSIS — G89.4 CHRONIC PAIN SYNDROME: ICD-10-CM

## 2019-06-11 DIAGNOSIS — M25.512 ACUTE PAIN OF LEFT SHOULDER: ICD-10-CM

## 2019-06-11 DIAGNOSIS — M54.16 LUMBAR RADICULOPATHY: ICD-10-CM

## 2019-06-11 DIAGNOSIS — M25.551 BILATERAL HIP PAIN: ICD-10-CM

## 2019-06-11 DIAGNOSIS — M25.552 BILATERAL HIP PAIN: ICD-10-CM

## 2019-06-11 DIAGNOSIS — M25.562 PAIN IN BOTH KNEES, UNSPECIFIED CHRONICITY: ICD-10-CM

## 2019-06-11 DIAGNOSIS — M25.561 PAIN IN BOTH KNEES, UNSPECIFIED CHRONICITY: ICD-10-CM

## 2019-06-11 DIAGNOSIS — M46.1 SACROILIITIS (HCC): Primary | ICD-10-CM

## 2019-06-11 DIAGNOSIS — M54.12 CERVICAL RADICULOPATHY: ICD-10-CM

## 2019-06-11 DIAGNOSIS — M79.18 MYOFASCIAL PAIN SYNDROME: ICD-10-CM

## 2019-06-11 PROCEDURE — 99214 OFFICE O/P EST MOD 30 MIN: CPT | Performed by: ANESTHESIOLOGY

## 2019-06-11 PROCEDURE — 72110 X-RAY EXAM L-2 SPINE 4/>VWS: CPT

## 2019-06-11 PROCEDURE — 73502 X-RAY EXAM HIP UNI 2-3 VIEWS: CPT

## 2019-06-11 PROCEDURE — 73562 X-RAY EXAM OF KNEE 3: CPT

## 2019-06-17 ENCOUNTER — TELEPHONE (OUTPATIENT)
Dept: RADIOLOGY | Facility: CLINIC | Age: 54
End: 2019-06-17

## 2019-06-17 ENCOUNTER — TELEPHONE (OUTPATIENT)
Dept: PULMONOLOGY | Facility: HOSPITAL | Age: 54
End: 2019-06-17

## 2019-06-18 ENCOUNTER — TELEPHONE (OUTPATIENT)
Dept: GASTROENTEROLOGY | Facility: HOSPITAL | Age: 54
End: 2019-06-18

## 2019-06-18 ENCOUNTER — TELEPHONE (OUTPATIENT)
Dept: PAIN MEDICINE | Facility: CLINIC | Age: 54
End: 2019-06-18

## 2019-06-18 DIAGNOSIS — M19.90 ARTHRITIS: ICD-10-CM

## 2019-06-18 RX ORDER — CYCLOBENZAPRINE HCL 10 MG
10 TABLET ORAL 3 TIMES DAILY PRN
Qty: 30 TABLET | Refills: 0 | Status: SHIPPED | OUTPATIENT
Start: 2019-06-18 | End: 2019-10-02

## 2019-06-19 ENCOUNTER — PROCEDURE VISIT (OUTPATIENT)
Dept: PAIN MEDICINE | Facility: CLINIC | Age: 54
End: 2019-06-19
Payer: COMMERCIAL

## 2019-06-19 DIAGNOSIS — M79.18 MYOFASCIAL PAIN SYNDROME: ICD-10-CM

## 2019-06-19 DIAGNOSIS — M46.1 SACROILIITIS (HCC): Primary | ICD-10-CM

## 2019-06-19 PROCEDURE — 20611 DRAIN/INJ JOINT/BURSA W/US: CPT | Performed by: ANESTHESIOLOGY

## 2019-06-19 RX ORDER — LIDOCAINE HYDROCHLORIDE 10 MG/ML
1 INJECTION, SOLUTION INFILTRATION; PERINEURAL
Status: COMPLETED | OUTPATIENT
Start: 2019-06-19 | End: 2019-06-19

## 2019-06-19 RX ORDER — METHYLPREDNISOLONE ACETATE 40 MG/ML
1 INJECTION, SUSPENSION INTRA-ARTICULAR; INTRALESIONAL; INTRAMUSCULAR; SOFT TISSUE
Status: COMPLETED | OUTPATIENT
Start: 2019-06-19 | End: 2019-06-19

## 2019-06-19 RX ORDER — BUPIVACAINE HYDROCHLORIDE 2.5 MG/ML
1 INJECTION, SOLUTION INFILTRATION; PERINEURAL
Status: COMPLETED | OUTPATIENT
Start: 2019-06-19 | End: 2019-06-19

## 2019-06-19 RX ORDER — BACLOFEN 10 MG/1
10 TABLET ORAL 3 TIMES DAILY
Qty: 90 TABLET | Refills: 0 | Status: SHIPPED | OUTPATIENT
Start: 2019-06-19 | End: 2019-07-19 | Stop reason: SDUPTHER

## 2019-06-19 RX ADMIN — LIDOCAINE HYDROCHLORIDE 1 ML: 10 INJECTION, SOLUTION INFILTRATION; PERINEURAL at 10:31

## 2019-06-19 RX ADMIN — BUPIVACAINE HYDROCHLORIDE 1 ML: 2.5 INJECTION, SOLUTION INFILTRATION; PERINEURAL at 10:31

## 2019-06-19 RX ADMIN — METHYLPREDNISOLONE ACETATE 1 ML: 40 INJECTION, SUSPENSION INTRA-ARTICULAR; INTRALESIONAL; INTRAMUSCULAR; SOFT TISSUE at 10:31

## 2019-06-24 ENCOUNTER — TELEPHONE (OUTPATIENT)
Dept: GASTROENTEROLOGY | Facility: HOSPITAL | Age: 54
End: 2019-06-24

## 2019-06-26 ENCOUNTER — TELEPHONE (OUTPATIENT)
Dept: PAIN MEDICINE | Facility: CLINIC | Age: 54
End: 2019-06-26

## 2019-06-28 NOTE — TELEPHONE ENCOUNTER
Pt states that the right hip seems better, pt states that the left hip is still really painful, pt states right hip pain is the worst at night but the right pain is a 2-10 and left is 8/10 pain

## 2019-07-19 ENCOUNTER — OFFICE VISIT (OUTPATIENT)
Dept: PAIN MEDICINE | Facility: CLINIC | Age: 54
End: 2019-07-19
Payer: COMMERCIAL

## 2019-07-19 VITALS
SYSTOLIC BLOOD PRESSURE: 112 MMHG | RESPIRATION RATE: 16 BRPM | HEIGHT: 60 IN | BODY MASS INDEX: 28.47 KG/M2 | HEART RATE: 85 BPM | DIASTOLIC BLOOD PRESSURE: 74 MMHG | WEIGHT: 145 LBS

## 2019-07-19 DIAGNOSIS — M70.62 GREATER TROCHANTERIC BURSITIS OF BOTH HIPS: Primary | ICD-10-CM

## 2019-07-19 DIAGNOSIS — M25.512 ACUTE PAIN OF LEFT SHOULDER: ICD-10-CM

## 2019-07-19 DIAGNOSIS — M47.816 LUMBAR SPONDYLOSIS: ICD-10-CM

## 2019-07-19 DIAGNOSIS — G89.4 CHRONIC PAIN SYNDROME: ICD-10-CM

## 2019-07-19 DIAGNOSIS — M79.18 MYOFASCIAL PAIN SYNDROME: ICD-10-CM

## 2019-07-19 DIAGNOSIS — M70.61 GREATER TROCHANTERIC BURSITIS OF BOTH HIPS: Primary | ICD-10-CM

## 2019-07-19 DIAGNOSIS — M54.12 CERVICAL RADICULITIS: ICD-10-CM

## 2019-07-19 DIAGNOSIS — M51.36 LUMBAR DEGENERATIVE DISC DISEASE: ICD-10-CM

## 2019-07-19 DIAGNOSIS — M47.22 OSTEOARTHRITIS OF SPINE WITH RADICULOPATHY, CERVICAL REGION: ICD-10-CM

## 2019-07-19 DIAGNOSIS — M54.12 CERVICAL RADICULOPATHY: ICD-10-CM

## 2019-07-19 PROCEDURE — 99214 OFFICE O/P EST MOD 30 MIN: CPT | Performed by: NURSE PRACTITIONER

## 2019-07-19 RX ORDER — BACLOFEN 10 MG/1
10 TABLET ORAL 3 TIMES DAILY
Qty: 90 TABLET | Refills: 0 | Status: SHIPPED | OUTPATIENT
Start: 2019-07-19 | End: 2019-09-13 | Stop reason: SDUPTHER

## 2019-07-19 NOTE — PROGRESS NOTES
Pt c/o neck pain that radiates to the left shoulder and lower back pain that radiates to the hips and legs    Assessment:  1  Greater trochanteric bursitis of both hips    2  Myofascial pain syndrome    3  Acute pain of left shoulder    4  Cervical radiculitis    5  Cervical radiculopathy    6  Osteoarthritis of spine with radiculopathy, cervical region    7  Lumbar degenerative disc disease    8  Lumbar spondylosis    9  Chronic pain syndrome        Plan:  Terry Sawyer is a 47 y o  female with a history of chronic pain syndrome secondary to cervical radiculopathy, acute left shoulder pain, bilateral knee pain, lumbar radiculopathy, lumbar spondylosis, sacroiliitis  The patient continues with ongoing bilateral hip pain, despite undergoing bilateral sacroiliac joint injections on 6/19/2019  On examination today she was noted to have tenderness over her bilateral greater trochanteric bursa  She reports that she has difficulty laying on her hips at nighttime  She recently had x-rays of her bilateral hips,which were within normal limits  I discussed with the patient about proceeding with bilateral greater trochanteric bursa injections to help decrease inflammation and help relieve her pain  She was educated on the procedures most common risks  She verbalized understanding, would like to proceed with the procedure  Therefore the patient be scheduled for an upcoming procedure day  The patient will continue on baclofen and diclofenac as prescribed by our office and refills for these medications were sent to her pharmacy on file  The patient is taking oxycodone/acetaminophen 5/325 mg 1 tablet 2-3 times daily as prescribed by her family doctor which she reports is helping with her pain  Complete risks and benefits including bleeding, infection, tissue reaction, nerve injury and allergic reaction were discussed  The approach was demonstrated using models and literature was provided       The patient will follow up after her bilateral greater trochanteric bursa injections, or sooner with the worsening of symptoms  My impressions and treatment recommendations were discussed in detail with the patient who verbalized understanding and had no further questions  Discharge instructions were provided  I personally saw and examined the patient and I agree with the above discussed plan of care  Orders Placed This Encounter   Procedures    FL spine and pain procedure     Standing Status:   Future     Standing Expiration Date:   7/19/2023     Order Specific Question:   Reason for Exam:     Answer:   Bilateral greater trochanteric bursitis     Order Specific Question:   Is the patient pregnant? Answer:   No     Order Specific Question:   Anticoagulant hold needed? Answer:   No     New Medications Ordered This Visit   Medications    baclofen 10 mg tablet     Sig: Take 1 tablet (10 mg total) by mouth 3 (three) times a day for 103 days     Dispense:  90 tablet     Refill:  0    diclofenac sodium (VOLTAREN) 50 mg EC tablet     Sig: Take 1 tablet (50 mg total) by mouth 2 (two) times a day for 30 days     Dispense:  60 tablet     Refill:  1       History of Present Illness:  Dayna Almeida is a 47 y o  female with a history of chronic pain syndrome secondary to cervical radiculopathy, acute left shoulder pain, bilateral knee pain, lumbar radiculopathy, lumbar spondylosis, sacroiliitis  The patient was last seen office on 6/19/2019 where she underwent bilateral sacroiliac joint injections  She reported no relief of symptoms after these injections  She presents for a follow up office visit in regards to Neck Pain (radiates to the left shoulder); Shoulder Pain; Back Pain (lower back pain that radiates to the hips and legs); Hip Pain; Leg Pain; and Knee Pain     The patients current symptoms include left-sided neck pain that radiates into her left shoulder and low back pain that radiates into her bilateral hips and down the lateral aspect of her bilateral thighs stopping her mid thigh  She describes her pain as burning, sharp pain that is constant and intermittent nature with symptoms worsening during the evening hours  She currently rates her pain as 7-8 out 10 numeric pain scale  Current pain medications includes:  Baclofen 10 mg 1 tablet 3 times daily, diclofenac 50 mg 1 tablet 2 times daily  The patient reports that this regimen is providing 50% pain relief  The patient is reporting no side effects from this pain medication regimen  I have personally reviewed and/or updated the patient's past medical history, past surgical history, family history, social history, current medications, allergies, and vital signs today  Review of Systems   Respiratory: Negative for shortness of breath  Cardiovascular: Negative for chest pain  Gastrointestinal: Negative for constipation, diarrhea, nausea and vomiting  Musculoskeletal: Positive for gait problem  Negative for arthralgias, joint swelling and myalgias  Decreased ROM  joint stiffness   Skin: Negative for rash  Neurological: Positive for weakness  Negative for dizziness and seizures  All other systems reviewed and are negative        Patient Active Problem List   Diagnosis    Gastro-esophageal reflux disease without esophagitis    Right lower quadrant pain    Abnormal weight loss    Acute pain of left shoulder    Lumbar spondylosis    Osteoarthritis of spine with radiculopathy, cervical region    Degenerative cervical disc    Lumbar degenerative disc disease    Bilateral carpal tunnel syndrome    CAD in native artery    Depression with anxiety    Other hyperlipidemia    Essential hypertension    PVD (peripheral vascular disease) (HCC)    Headache disorder    Rheumatoid factor positive    Positive KRISTA (antinuclear antibody)    Left facial numbness    Myofascial pain syndrome    Calcific tendinitis of left shoulder    Incomplete tear of left rotator cuff    Neuroma of foot    Chronic hepatitis C without hepatic coma (HCC)    Colorectal polyps    Macrocytic    Irritable bowel syndrome with diarrhea    Incomplete rotator cuff tear or rupture of left shoulder, not specified as traumatic    Biceps tendinosis of left shoulder    Arthritis    Cervical radiculopathy    Cervical radiculitis    RUQ pain    Sacroiliitis (HCC)    Chronic pain syndrome    RUQ abdominal pain    Dilated bile duct    Gastroesophageal reflux disease    Radiculopathy, cervical    Trochanteric bursitis of right hip    Trochanteric bursitis of left hip       Past Medical History:   Diagnosis Date    Acquired ichthyosis     last assessed: 2013    Anxiety     Cervical radiculopathy     last assessed: 2014    Colorectal polyps     last assessed: 3/9/2015    COPD (chronic obstructive pulmonary disease) ([facility] )     Depression     Diverticulosis of colon     Foot pain     GERD (gastroesophageal reflux disease)     Hyperlipemia     Hypertension     Myocardial infarction ([facility] )     at age 28    Osteopenia     last assessed: 2013    Palpitations     last assessed: 2014    PVD (peripheral vascular disease) ([facility] )     last assessed: 12/3/2012    Scapular dyskinesis     last assessed: 2014    Shortness of breath     Tendonitis of left rotator cuff     last assessed: 2014    Vocal cord polyps     last assessed: 2014       Past Surgical History:   Procedure Laterality Date    ABDOMINAL SURGERY      exploratory    CARDIAC SURGERY      cardiac stent      SECTION      last assessed: 2014    CHOLECYSTECTOMY      last assessed: 2014    COLONOSCOPY  10/27/2014    CORONARY ANGIOPLASTY WITH STENT PLACEMENT      LAD stent    DENTAL SURGERY      last assessed: 2014    ELBOW SURGERY Bilateral     arthroplasty    EPIDURAL BLOCK INJECTION N/A 2019    Procedure: C7 T1 Cervical Epidural Steroid Injection (63572); Surgeon: Anna Petersen MD;  Location: [facility];  Service: Pain Management     EPIDURAL BLOCK INJECTION Bilateral 5/2/2019    Procedure: BLOCK / INJECTION EPIDURAL STEROID CERVICAL - C7-T1;  Surgeon: Anna Petersen MD;  Location: [facility];  Service: Pain Management     ESOPHAGOGASTRODUODENOSCOPY      ESOPHAGOGASTRODUODENOSCOPY N/A 11/4/2016    Procedure: ESOPHAGOGASTRODUODENOSCOPY (EGD);   Surgeon: Fermin Rodriguez MD;  Location: [facility];  Service:     FL GUIDED NEEDLE PLAC BX/ASP/INJ  5/2/2019    FL INJECTION LEFT SHOULDER (ARTHROGRAM)  9/25/2018    FOOT SURGERY Right 02/06/2015    x 4    HYSTERECTOMY      last assessed: 8/8/2014    VA ARTHROSCOPY SHOULDER SURGICAL BICEPS TENODESIS Left 10/15/2018    Procedure: ARTHROSCOPY SHOULDER; Debridement of shoulder;  Surgeon: Akbar Narvaez MD;  Location: [facility];  Service: Orthopedics    VA SHLDR ARTHROSCOP,SURG,W/ROTAT CUFF REPR Left 7/23/2018    Procedure: ARTHROSCOPY SHOULDER, subacromial decompression, synovectomy;  Surgeon: Akbar Narvaez MD;  Location: [facility];  Service: Orthopedics    THROAT SURGERY      TOOTH EXTRACTION      TRIGEMINAL NERVE DECOMPRESSION Right 6/15/2018    Procedure: FOOT NEUROPLASTY;  Surgeon: Kalin Bran DPM;  Location: [facility];  Service: Podiatry       Family History   Problem Relation Age of Onset    No Known Problems Mother     No Known Problems Father     No Known Problems Maternal Grandmother     No Known Problems Maternal Grandfather     No Known Problems Paternal Grandmother     No Known Problems Paternal Grandfather        Social History     Occupational History    Not on file   Tobacco Use    Smoking status: Current Every Day Smoker     Packs/day: 0 50    Smokeless tobacco: Never Used   Substance and Sexual Activity    Alcohol use: Yes     Comment: rare    Drug use: No    Sexual activity: Not on file       Current Outpatient Medications on File Prior to Visit Medication Sig    albuterol (PROVENTIL HFA,VENTOLIN HFA) 90 mcg/act inhaler inhale 2 puffs by mouth every 6 hours if needed for wheezing    aspirin 81 mg chewable tablet Chew 1 tablet daily Stop for the time being for foot surgery     cyclobenzaprine (FLEXERIL) 10 mg tablet Take 1 tablet (10 mg total) by mouth 3 (three) times a day as needed for muscle spasms    nitroglycerin (NITROSTAT) 0 4 mg SL tablet Place 1 tablet (0 4 mg total) under the tongue every 5 (five) minutes as needed for chest pain    nitroglycerin (NITROSTAT) 0 4 mg SL tablet Place 1 tablet (0 4 mg total) under the tongue every 5 (five) minutes as needed for chest pain    oxyCODONE-acetaminophen (PERCOCET) 5-325 mg per tablet Take 1 tablet by mouth every 4 (four) hours as needed for moderate pain    ranitidine (ZANTAC) 150 mg tablet Take 1 tablet (150 mg total) by mouth 2 (two) times a day    rosuvastatin (CRESTOR) 5 mg tablet Take 1 tablet (5 mg total) by mouth daily    [DISCONTINUED] baclofen 10 mg tablet Take 1 tablet (10 mg total) by mouth 3 (three) times a day for 103 days    [DISCONTINUED] diclofenac sodium (VOLTAREN) 50 mg EC tablet Take 1 tablet (50 mg total) by mouth 2 (two) times a day for 30 days     No current facility-administered medications on file prior to visit  Allergies   Allergen Reactions    Ceclor [Cefaclor] Anaphylaxis    Codeine Itching     Reaction Date: [id number];     Ticlid [Ticlopidine] Hives    Penicillins Rash       Physical Exam:    /74 (BP Location: Right arm, Patient Position: Sitting, Cuff Size: Standard)   Pulse 85   Resp 16   Ht 5' (1 524 m)   Wt 65 8 kg (145 lb)   BMI 28 32 kg/m²     Constitutional:normal, well developed, well nourished, alert, in no distress and non-toxic and no overt pain behavior   and overweight  Eyes:anicteric  HEENT:grossly intact  Neck:supple, symmetric, trachea midline and no masses   Pulmonary:even and unlabored  Cardiovascular:No edema or pitting edema present  Skin:Normal without rashes or lesions and well hydrated  Psychiatric:Mood and affect appropriate  Neurologic:Cranial Nerves II-XII grossly intact  Musculoskeletal:antalgic and ambulates with cane     Lumbar Spine Exam    Appearance:  Normal lordosis  Palpation/Tenderness:  Bilateral greater trochanteric bursa tenderness  Sensory:  no sensory deficits noted  Range of Motion:  Flexion:  Minimally limited  with pain  Extension:  Minimally limited  with pain  Lateral Flexion - Left:  Minimally limited  with pain  Lateral Flexion - Right:  Minimally limited  with pain  Rotation - Left:  Minimally limited  with pain  Rotation - Right:  Minimally limited  with pain  Motor Strength:  Left hip flexion:  5/5  Right hip flexion:  5/5  Left knee extension:  5/5  Right knee extension:  5/5  Left foot dorsiflexion:  5/5  Left foot plantar flexion:  5/5  Right foot dorsiflexion:  5/5  Right foot plantar flexion:  5/5    Imaging  LUMBAR SPINE     INDICATION:   [id number]: Spondylosis without myelopathy or radiculopathy, lumbar region  M54 16: Radiculopathy, lumbar region      COMPARISON:  Lumbar spine radiograph 12/19/2016      VIEWS:  XR SPINE LUMBAR MINIMUM 4 VIEWS NON INJURY        FINDINGS:     There is no evidence of acute fracture or destructive osseous lesion      Alignment is unremarkable      Scattered endplate and facet joint degenerative changes throughout the lumbar spine most prominent at L4-5 and L5-S1      The pedicles appear intact      There are atherosclerotic calcifications  Soft tissues are otherwise unremarkable    There are surgical clips in the upper abdomen and pelvis      IMPRESSION:     No acute osseous abnormality        Degenerative changes as described         Workstation performed: [id number]

## 2019-07-19 NOTE — H&P (VIEW-ONLY)
Pt c/o neck pain that radiates to the left shoulder and lower back pain that radiates to the hips and legs    Assessment:  1  Greater trochanteric bursitis of both hips    2  Myofascial pain syndrome    3  Acute pain of left shoulder    4  Cervical radiculitis    5  Cervical radiculopathy    6  Osteoarthritis of spine with radiculopathy, cervical region    7  Lumbar degenerative disc disease    8  Lumbar spondylosis    9  Chronic pain syndrome        Plan:  Qing Hill is a 47 y o  female with a history of chronic pain syndrome secondary to cervical radiculopathy, acute left shoulder pain, bilateral knee pain, lumbar radiculopathy, lumbar spondylosis, sacroiliitis  The patient continues with ongoing bilateral hip pain, despite undergoing bilateral sacroiliac joint injections on 6/19/2019  On examination today she was noted to have tenderness over her bilateral greater trochanteric bursa  She reports that she has difficulty laying on her hips at nighttime  She recently had x-rays of her bilateral hips,which were within normal limits  I discussed with the patient about proceeding with bilateral greater trochanteric bursa injections to help decrease inflammation and help relieve her pain  She was educated on the procedures most common risks  She verbalized understanding, would like to proceed with the procedure  Therefore the patient be scheduled for an upcoming procedure day  The patient will continue on baclofen and diclofenac as prescribed by our office and refills for these medications were sent to her pharmacy on file  The patient is taking oxycodone/acetaminophen 5/325 mg 1 tablet 2-3 times daily as prescribed by her family doctor which she reports is helping with her pain  Complete risks and benefits including bleeding, infection, tissue reaction, nerve injury and allergic reaction were discussed  The approach was demonstrated using models and literature was provided       The patient will follow up after her bilateral greater trochanteric bursa injections, or sooner with the worsening of symptoms  My impressions and treatment recommendations were discussed in detail with the patient who verbalized understanding and had no further questions  Discharge instructions were provided  I personally saw and examined the patient and I agree with the above discussed plan of care  Orders Placed This Encounter   Procedures    FL spine and pain procedure     Standing Status:   Future     Standing Expiration Date:   7/19/2023     Order Specific Question:   Reason for Exam:     Answer:   Bilateral greater trochanteric bursitis     Order Specific Question:   Is the patient pregnant? Answer:   No     Order Specific Question:   Anticoagulant hold needed? Answer:   No     New Medications Ordered This Visit   Medications    baclofen 10 mg tablet     Sig: Take 1 tablet (10 mg total) by mouth 3 (three) times a day for 103 days     Dispense:  90 tablet     Refill:  0    diclofenac sodium (VOLTAREN) 50 mg EC tablet     Sig: Take 1 tablet (50 mg total) by mouth 2 (two) times a day for 30 days     Dispense:  60 tablet     Refill:  1       History of Present Illness:  Roge Esteban is a 47 y o  female with a history of chronic pain syndrome secondary to cervical radiculopathy, acute left shoulder pain, bilateral knee pain, lumbar radiculopathy, lumbar spondylosis, sacroiliitis  The patient was last seen office on 6/19/2019 where she underwent bilateral sacroiliac joint injections  She reported no relief of symptoms after these injections  She presents for a follow up office visit in regards to Neck Pain (radiates to the left shoulder); Shoulder Pain; Back Pain (lower back pain that radiates to the hips and legs); Hip Pain; Leg Pain; and Knee Pain     The patients current symptoms include left-sided neck pain that radiates into her left shoulder and low back pain that radiates into her bilateral hips and down the lateral aspect of her bilateral thighs stopping her mid thigh  She describes her pain as burning, sharp pain that is constant and intermittent nature with symptoms worsening during the evening hours  She currently rates her pain as 7-8 out 10 numeric pain scale  Current pain medications includes:  Baclofen 10 mg 1 tablet 3 times daily, diclofenac 50 mg 1 tablet 2 times daily  The patient reports that this regimen is providing 50% pain relief  The patient is reporting no side effects from this pain medication regimen  I have personally reviewed and/or updated the patient's past medical history, past surgical history, family history, social history, current medications, allergies, and vital signs today  Review of Systems   Respiratory: Negative for shortness of breath  Cardiovascular: Negative for chest pain  Gastrointestinal: Negative for constipation, diarrhea, nausea and vomiting  Musculoskeletal: Positive for gait problem  Negative for arthralgias, joint swelling and myalgias  Decreased ROM  joint stiffness   Skin: Negative for rash  Neurological: Positive for weakness  Negative for dizziness and seizures  All other systems reviewed and are negative        Patient Active Problem List   Diagnosis    Gastro-esophageal reflux disease without esophagitis    Right lower quadrant pain    Abnormal weight loss    Acute pain of left shoulder    Lumbar spondylosis    Osteoarthritis of spine with radiculopathy, cervical region    Degenerative cervical disc    Lumbar degenerative disc disease    Bilateral carpal tunnel syndrome    CAD in native artery    Depression with anxiety    Other hyperlipidemia    Essential hypertension    PVD (peripheral vascular disease) (HCC)    Headache disorder    Rheumatoid factor positive    Positive KRISTA (antinuclear antibody)    Left facial numbness    Myofascial pain syndrome    Calcific tendinitis of left shoulder    Incomplete tear of left rotator cuff    Neuroma of foot    Chronic hepatitis C without hepatic coma (HCC)    Colorectal polyps    Macrocytic    Irritable bowel syndrome with diarrhea    Incomplete rotator cuff tear or rupture of left shoulder, not specified as traumatic    Biceps tendinosis of left shoulder    Arthritis    Cervical radiculopathy    Cervical radiculitis    RUQ pain    Sacroiliitis (HCC)    Chronic pain syndrome    RUQ abdominal pain    Dilated bile duct    Gastroesophageal reflux disease    Radiculopathy, cervical    Trochanteric bursitis of right hip    Trochanteric bursitis of left hip       Past Medical History:   Diagnosis Date    Acquired ichthyosis     last assessed: 2013    Anxiety     Cervical radiculopathy     last assessed: 2014    Colorectal polyps     last assessed: 3/9/2015    COPD [facility]chronic obstructive pulmonary disease) ([facility] )     Depression     Diverticulosis of colon     Foot pain     GERD (gastroesophageal reflux disease)     Hyperlipemia     Hypertension     Myocardial infarction ([facility] )     at age 28    Osteopenia     last assessed: 2013    Palpitations     last assessed: 2014    PVD (peripheral vascular disease) ([facility] )     last assessed: 12/3/2012    Scapular dyskinesis     last assessed: 2014    Shortness of breath     Tendonitis of left rotator cuff     last assessed: 2014    Vocal cord polyps     last assessed: 2014       Past Surgical History:   Procedure Laterality Date    ABDOMINAL SURGERY      exploratory    CARDIAC SURGERY      cardiac stent      SECTION      last assessed: 2014    CHOLECYSTECTOMY      last assessed: 2014    COLONOSCOPY  10/27/2014    CORONARY ANGIOPLASTY WITH STENT PLACEMENT      LAD stent    DENTAL SURGERY      last assessed: 2014    ELBOW SURGERY Bilateral     arthroplasty    EPIDURAL BLOCK INJECTION N/A 2019    Procedure: C7 T1 Cervical Epidural Steroid Injection (51601); Surgeon: Bethanie Cabral MD;  Location: [facility];  Service: Pain Management     EPIDURAL BLOCK INJECTION Bilateral 5/2/2019    Procedure: BLOCK / INJECTION EPIDURAL STEROID CERVICAL - C7-T1;  Surgeon: Bethanie Cabral MD;  [facility]: [facility];  Service: Pain Management     ESOPHAGOGASTRODUODENOSCOPY      ESOPHAGOGASTRODUODENOSCOPY N/A 11/4/2016    Procedure: ESOPHAGOGASTRODUODENOSCOPY (EGD);   Surgeon: Lalita Tovar MD;  Location: [facility];  Service:     FL GUIDED NEEDLE PLAC BX/ASP/INJ  5/2/2019    FL INJECTION LEFT SHOULDER (ARTHROGRAM)  9/25/2018    FOOT SURGERY Right 02/06/2015    x 4    HYSTERECTOMY      last assessed: 8/8/2014    MI ARTHROSCOPY SHOULDER SURGICAL BICEPS TENODESIS Left 10/15/2018    Procedure: ARTHROSCOPY SHOULDER; Debridement of shoulder;  Surgeon: Ciaran Lewis MD;  [facility]: [facility];  Service: Orthopedics    MI SHLDR ARTHROSCOP,SURG,W/ROTAT CUFF REPR Left 7/23/2018    Procedure: ARTHROSCOPY SHOULDER, subacromial decompression, synovectomy;  Surgeon: Ciaran Lewis MD;  [facility];  Service: Orthopedics    THROAT SURGERY      TOOTH EXTRACTION      TRIGEMINAL NERVE DECOMPRESSION Right 6/15/2018    Procedure: FOOT NEUROPLASTY;  Surgeon: Cj Huynh DPM;  [facility]: [facility];  Service: Podiatry       Family History   Problem Relation Age of Onset    No Known Problems Mother     No Known Problems Father     No Known Problems Maternal Grandmother     No Known Problems Maternal Grandfather     No Known Problems Paternal Grandmother     No Known Problems Paternal Grandfather        Social History     Occupational History    Not on file   Tobacco Use    Smoking status: Current Every Day Smoker     Packs/day: 0 50    Smokeless tobacco: Never Used   Substance and Sexual Activity    Alcohol use: Yes     Comment: rare    Drug use: No    Sexual activity: Not on file       Current Outpatient Medications on File Prior to Visit Medication Sig    albuterol (PROVENTIL HFA,VENTOLIN HFA) 90 mcg/act inhaler inhale 2 puffs by mouth every 6 hours if needed for wheezing    aspirin 81 mg chewable tablet Chew 1 tablet daily Stop for the time being for foot surgery     cyclobenzaprine (FLEXERIL) 10 mg tablet Take 1 tablet (10 mg total) by mouth 3 (three) times a day as needed for muscle spasms    nitroglycerin (NITROSTAT) 0 4 mg SL tablet Place 1 tablet (0 4 mg total) under the tongue every 5 (five) minutes as needed for chest pain    nitroglycerin (NITROSTAT) 0 4 mg SL tablet Place 1 tablet (0 4 mg total) under the tongue every 5 (five) minutes as needed for chest pain    oxyCODONE-acetaminophen (PERCOCET) 5-325 mg per tablet Take 1 tablet by mouth every 4 (four) hours as needed for moderate pain    ranitidine (ZANTAC) 150 mg tablet Take 1 tablet (150 mg total) by mouth 2 (two) times a day    rosuvastatin (CRESTOR) 5 mg tablet Take 1 tablet (5 mg total) by mouth daily    [DISCONTINUED] baclofen 10 mg tablet Take 1 tablet (10 mg total) by mouth 3 (three) times a day for 103 days    [DISCONTINUED] diclofenac sodium (VOLTAREN) 50 mg EC tablet Take 1 tablet (50 mg total) by mouth 2 (two) times a day for 30 days     No current facility-administered medications on file prior to visit  Allergies   Allergen Reactions    Ceclor [Cefaclor] Anaphylaxis    Codeine Itching     Reaction Date: [id number];     Ticlid [Ticlopidine] Hives    Penicillins Rash       Physical Exam:    /74 (BP Location: Right arm, Patient Position: Sitting, Cuff Size: Standard)   Pulse 85   Resp 16   Ht 5' (1 524 m)   Wt 65 8 kg (145 lb)   BMI 28 32 kg/m²     Constitutional:normal, well developed, well nourished, alert, in no distress and non-toxic and no overt pain behavior   and overweight  Eyes:anicteric  HEENT:grossly intact  Neck:supple, symmetric, trachea midline and no masses   Pulmonary:even and unlabored  Cardiovascular:No edema or pitting edema present  Skin:Normal without rashes or lesions and well hydrated  Psychiatric:Mood and affect appropriate  Neurologic:Cranial Nerves II-XII grossly intact  Musculoskeletal:antalgic and ambulates with cane     Lumbar Spine Exam    Appearance:  Normal lordosis  Palpation/Tenderness:  Bilateral greater trochanteric bursa tenderness  Sensory:  no sensory deficits noted  Range of Motion:  Flexion:  Minimally limited  with pain  Extension:  Minimally limited  with pain  Lateral Flexion - Left:  Minimally limited  with pain  Lateral Flexion - Right:  Minimally limited  with pain  Rotation - Left:  Minimally limited  with pain  Rotation - Right:  Minimally limited  with pain  Motor Strength:  Left hip flexion:  5/5  Right hip flexion:  5/5  Left knee extension:  5/5  Right knee extension:  5/5  Left foot dorsiflexion:  5/5  Left foot plantar flexion:  5/5  Right foot dorsiflexion:  5/5  Right foot plantar flexion:  5/5    Imaging  LUMBAR SPINE     INDICATION:   M47 816: Spondylosis without myelopathy or radiculopathy, lumbar region  M54 16: Radiculopathy, lumbar region      COMPARISON:  Lumbar spine radiograph 12/19/2016      VIEWS:  XR SPINE LUMBAR MINIMUM 4 VIEWS NON INJURY        FINDINGS:     There is no evidence of acute fracture or destructive osseous lesion      Alignment is unremarkable      Scattered endplate and facet joint degenerative changes throughout the lumbar spine most prominent at L4-5 and L5-S1      The pedicles appear intact      There are atherosclerotic calcifications  Soft tissues are otherwise unremarkable    There are surgical clips in the upper abdomen and pelvis      IMPRESSION:     No acute osseous abnormality        Degenerative changes as described         Workstation performed: [id number]

## 2019-07-23 ENCOUNTER — TELEPHONE (OUTPATIENT)
Dept: GASTROENTEROLOGY | Facility: HOSPITAL | Age: 54
End: 2019-07-23

## 2019-07-23 NOTE — TELEPHONE ENCOUNTER
Patient called me back to tell me that she is cancelling her appointment she had on 07/24/2019, because she cancelled her EGD/Colonoscopy on 05/16/2019 and did not need the follow up appointment  I asked her if she wanted to reschedule  for her Colonoscopy/EGD at this time and she did not want to  She moved so now she has to arrange to have someone take her to it and take her home  She will call the office to reschedule for her EGD/Colonoscopy

## 2019-07-23 NOTE — TELEPHONE ENCOUNTER
Called patient to see if she still wanted to come into the office for her follow up appointment  She cancelled her EGD/Colon  On 5/16/2019

## 2019-07-31 ENCOUNTER — HOSPITAL ENCOUNTER (OUTPATIENT)
Facility: HOSPITAL | Age: 54
Setting detail: OUTPATIENT SURGERY
Discharge: HOME | End: 2019-07-31
Attending: ANESTHESIOLOGY | Admitting: ANESTHESIOLOGY
Payer: COMMERCIAL

## 2019-07-31 ENCOUNTER — APPOINTMENT (OUTPATIENT)
Dept: RADIOLOGY | Facility: HOSPITAL | Age: 54
End: 2019-07-31
Payer: COMMERCIAL

## 2019-07-31 VITALS
RESPIRATION RATE: 18 BRPM | HEIGHT: 60 IN | WEIGHT: 145 LBS | HEART RATE: 78 BPM | DIASTOLIC BLOOD PRESSURE: 74 MMHG | BODY MASS INDEX: 28.47 KG/M2 | TEMPERATURE: 98 F | OXYGEN SATURATION: 98 % | SYSTOLIC BLOOD PRESSURE: 105 MMHG

## 2019-07-31 PROCEDURE — 77002 NEEDLE LOCALIZATION BY XRAY: CPT | Performed by: ANESTHESIOLOGY

## 2019-07-31 PROCEDURE — 77002 NEEDLE LOCALIZATION BY XRAY: CPT

## 2019-07-31 PROCEDURE — 20610 DRAIN/INJ JOINT/BURSA W/O US: CPT | Performed by: ANESTHESIOLOGY

## 2019-07-31 RX ORDER — BUPIVACAINE HYDROCHLORIDE 2.5 MG/ML
INJECTION, SOLUTION INFILTRATION; PERINEURAL AS NEEDED
Status: DISCONTINUED | OUTPATIENT
Start: 2019-07-31 | End: 2019-07-31 | Stop reason: HOSPADM

## 2019-07-31 RX ORDER — METHYLPREDNISOLONE ACETATE 80 MG/ML
INJECTION, SUSPENSION INTRA-ARTICULAR; INTRALESIONAL; INTRAMUSCULAR; SOFT TISSUE AS NEEDED
Status: DISCONTINUED | OUTPATIENT
Start: 2019-07-31 | End: 2019-07-31 | Stop reason: HOSPADM

## 2019-07-31 RX ORDER — LIDOCAINE HYDROCHLORIDE 10 MG/ML
INJECTION, SOLUTION INFILTRATION; PERINEURAL AS NEEDED
Status: DISCONTINUED | OUTPATIENT
Start: 2019-07-31 | End: 2019-07-31 | Stop reason: HOSPADM

## 2019-07-31 NOTE — OP NOTE
OPERATIVE REPORT  PATIENT NAME: Enrique Dempsey    :  1965  MRN: [id number]  Pt Location: [facility] 01    SURGERY DATE: 2019    Surgeon(s) and Role:     * Jadyn Scruggs MD - Primary    Preop Diagnosis:  Trochanteric bursitis of right hip [M70 61]  Trochanteric bursitis of left hip [M70 62]    Post-Op Diagnosis Codes:     * Trochanteric bursitis of right hip [M70 61]     * Trochanteric bursitis of left hip [M70 62]    Procedure(s) (LRB):  GREATER TROCHANTERIC HIP INJECTION (Bilateral)    Specimen(s):  * No specimens in log *    Estimated Blood Loss:   Minimal    Drains:  * No LDAs found *    Anesthesia Type:   Local    Operative Indications:  Trochanteric bursitis of right hip [M70 61]  Trochanteric bursitis of left hip [M70 62]      Operative Findings:  same    Complications:   None    Procedure and Technique:  Fluoroscopically-guided bilateral Greater trochanteric bursa steroid injection    Using sterile technique, the skin overlying the right greater trochanteric bursa was prepped using Chloraprep skin prep  Fluoroscopic guidance was utilized to visualize the greater trochanter of the femur  After negative aspiration, 1 mL Omnipaque 300 contrast was injected confirming bursal spread without evidence of intravascular spread  The bursa was then infiltrated using a 3 5-inch, 22-gauge needle  The injectate consisted of 3 mL of 0 25% bupivacaine containing 40 mg of Depo-Medrol  Following the injection, the needle was removed, and pressure was applied to the site  There were no apparent complications  The same procedure was performed on the opposite side using the same technique and achieving the same results  Th patient tolerated the procedure well and there were no apparen complications  After an appropriate amount of observation, the patient was dismissed from the clinic in good condition under their own power      COMMENTS: The patient received a total steroid dose of 80 milligrams of Depo-Medrol             I was present for the entire procedure    Patient Disposition:  hemodynamically stable    SIGNATURE: Ulises Boyd MD  DATE: July 31, 2019  TIME: 11:50 AM

## 2019-07-31 NOTE — INTERVAL H&P NOTE
H&P reviewed  After examining the patient I find no changes in the patients condition since the H&P had been written        We will proceed with bilateral greater trochanteric bursa steroid injections

## 2019-07-31 NOTE — DISCHARGE INSTRUCTIONS
1  Do not apply heat to any area that is numb  If you have discomfort or soreness at the injection site, you may apply ice today, 20 minutes on and 20 minutes off  Tomorrow you may use ice or warm, moist heat  Do not apply ice or heat directly to the skin  2  If you experience severe shortness of breath, go to the Emergency Room  3  You may have numbness for several hours from the local anesthetic  Please use caution and common sense, especially with weight-bearing activities  4  You may have an increase or change in the discomfort for 36-48 hours after your treatment  Apply ice and continue with any pain medicine you have been prescribed  5  Do not do anything strenuous today  You may shower, but no tub baths or hot tubs today  You may resume your normal activities tomorrow, but do not overdo it  Resume normal activities slowly when you are feeling better  6  If you experience redness, drainage or swelling at the injection site, or if you develop a fever above 100 degrees, please call [facility] at [phone] or go to the Emergency Room  7  Continue to take all routine medicines prescribed by your primary care physician unless otherwise instructed by our staff  Most blood thinners should be started again according to your regularly scheduled dosing  If you have any questions, please give our office a call  If you have a problem specifically related to your procedure, please call our office at [phone]  Problems not related to your procedure should be directed to your primary care physician

## 2019-08-09 NOTE — TELEPHONE ENCOUNTER
S/w the patient and she stated the pain is not getting any better in her neck and she thought by this time maybe she would be having some relief but no  Emotional support provided and HA to advise   thanks

## 2019-08-09 NOTE — TELEPHONE ENCOUNTER
Patient called stating 3 weeks that she felt a popping sensation in her neck & since then she feels as though depending on her neck movement her neck feels like it wants to lock up on her  She also is experiencing left spasm in her bicep area/shoulder & left hip pain  She would like to speak with a nurse about her status   Please advise, toyin    Call back# [phone]

## 2019-09-13 ENCOUNTER — APPOINTMENT (OUTPATIENT)
Dept: RADIOLOGY | Facility: MEDICAL CENTER | Age: 54
End: 2019-09-13
Payer: COMMERCIAL

## 2019-09-13 ENCOUNTER — OFFICE VISIT (OUTPATIENT)
Dept: PAIN MEDICINE | Facility: CLINIC | Age: 54
End: 2019-09-13
Payer: COMMERCIAL

## 2019-09-13 VITALS
SYSTOLIC BLOOD PRESSURE: 115 MMHG | DIASTOLIC BLOOD PRESSURE: 80 MMHG | WEIGHT: 144 LBS | HEIGHT: 60 IN | BODY MASS INDEX: 28.27 KG/M2

## 2019-09-13 DIAGNOSIS — M54.12 CERVICAL RADICULITIS: Primary | ICD-10-CM

## 2019-09-13 DIAGNOSIS — M54.12 CERVICAL RADICULITIS: ICD-10-CM

## 2019-09-13 DIAGNOSIS — M47.812 OSTEOARTHRITIS OF CERVICAL SPINE, UNSPECIFIED SPINAL OSTEOARTHRITIS COMPLICATION STATUS: ICD-10-CM

## 2019-09-13 DIAGNOSIS — M47.816 LUMBAR SPONDYLOSIS: ICD-10-CM

## 2019-09-13 DIAGNOSIS — M54.42 CHRONIC BILATERAL LOW BACK PAIN WITH LEFT-SIDED SCIATICA: ICD-10-CM

## 2019-09-13 DIAGNOSIS — G89.4 CHRONIC PAIN SYNDROME: ICD-10-CM

## 2019-09-13 DIAGNOSIS — M79.18 MYOFASCIAL PAIN SYNDROME: ICD-10-CM

## 2019-09-13 DIAGNOSIS — G89.29 CHRONIC BILATERAL LOW BACK PAIN WITH LEFT-SIDED SCIATICA: ICD-10-CM

## 2019-09-13 DIAGNOSIS — M54.16 LUMBAR RADICULOPATHY: ICD-10-CM

## 2019-09-13 PROCEDURE — 72052 X-RAY EXAM NECK SPINE 6/>VWS: CPT

## 2019-09-13 PROCEDURE — 99214 OFFICE O/P EST MOD 30 MIN: CPT | Performed by: ANESTHESIOLOGY

## 2019-09-13 RX ORDER — BACLOFEN 10 MG/1
10 TABLET ORAL 3 TIMES DAILY
Qty: 90 TABLET | Refills: 0 | Status: SHIPPED | OUTPATIENT
Start: 2019-09-13 | End: 2019-10-24 | Stop reason: SDUPTHER

## 2019-09-13 RX ORDER — GABAPENTIN 300 MG/1
300 CAPSULE ORAL 3 TIMES DAILY
Qty: 90 CAPSULE | Refills: 1 | Status: SHIPPED | OUTPATIENT
Start: 2019-09-13 | End: 2019-10-24

## 2019-09-13 NOTE — H&P (VIEW-ONLY)
Assessment:  1  Cervical radiculitis    2  Osteoarthritis of cervical spine, unspecified spinal osteoarthritis complication status    3  Chronic bilateral low back pain with left-sided sciatica    4  Lumbar spondylosis    5  Lumbar radiculopathy    6  Myofascial pain syndrome    7  Chronic pain syndrome        Plan:  The patient is a 26-year-old female with complaints of low back pain and bilateral hip pain with a history significant for cervical degenerative disc disease, cervical radiculopathy presents today for follow-up visit  patient complains of increasing left lower extremity radicular symptoms in the L5 nerve root distribution bilaterally  Patient is status post bilateral greater trochanteric bursa steroid injection to which she reported significant relief of the right greater trochanteric bursa however the left continues to bother her  Patient reports on the left side she feels and numbness and tingling going down her legs which is worse at nighttime  We feel that this is more of her radicular symptoms, from a discogenic pathology in her back more so than a greater trochanteric bursa presentation  X-ray of lumbar spine does show degenerative disc changes prominent at L4-5 and L5-S1  We will do more diagnostic imaging to assess the discogenic pathology behind patient's low back and left leg pain  Patient reports increasing left-sided neck pain and shoulder pain after she had an incident when turning her head in her in a "big pop"  Now she notes increasing left shoulder tension and pain in addition to left trapezius tension in   1  We will schedule patient for physical therapy for lumbar core strengthening exercises  After patient has completed 4-6 weeks will then order an MRI of the lumbar spine to assess the discogenic pathology  This is assuming that her leg pain does not alleviate after physical therapy sessions    2  We will schedule patient for a left C7-T1 interlaminar epidural steroid injection  3  We will obtain an x-ray of the cervical spine with flexion extension  4  Follow-up 1 month after injection       [place] Pres[employer] Drug Monitoring Program report was reviewed and was appropriate     Complete risks and benefits including bleeding, infection, tissue reaction, nerve injury and allergic reaction were discussed  The approach was demonstrated using models and literature was provided  Verbal and written consent was obtained  My impressions and treatment recommendations were discussed in detail with the patient who verbalized understanding and had no further questions  Discharge instructions were provided  I personally saw and examined the patient and I agree with the above discussed plan of care  No orders of the defined types were placed in this encounter  No orders of the defined types were placed in this encounter  History of Present Illness:  Sulema Soto is a 47 y o  female who presents for a follow up office visit in regards to Neck Pain and Hip Pain (left hip is not doing any better but right hip is )  The patients current symptoms include 10/10 constant burning, sharp, shooting in the neck and in left leg which is worse at nighttime  Current pain medications includes:  baclofen, diclofenac  The patient reports that this regimen is providing 10% pain relief  The patient is reporting no side effects from this pain medication regimen  I have personally reviewed and/or updated the patient's past medical history, past surgical history, family history, social history, current medications, allergies, and vital signs today  Review of Systems   Gastrointestinal: Positive for nausea and vomiting  Musculoskeletal: Positive for gait problem, joint swelling and myalgias  Neurological: Positive for dizziness  All other systems reviewed and are negative        Patient Active Problem List   Diagnosis    Gastro-esophageal reflux disease without esophagitis    Right lower quadrant pain    Abnormal weight loss    Acute pain of left shoulder    Lumbar spondylosis    Osteoarthritis of spine with radiculopathy, cervical region    Degenerative cervical disc    Lumbar degenerative disc disease    Bilateral carpal tunnel syndrome    CAD in native artery    Depression with anxiety    Other hyperlipidemia    Essential hypertension    PVD (peripheral vascular disease) (HCC)    Headache disorder    Rheumatoid factor positive    Positive KRISTA (antinuclear antibody)    Left facial numbness    Myofascial pain syndrome    Calcific tendinitis of left shoulder    Incomplete tear of left rotator cuff    Neuroma of foot    Chronic hepatitis C without hepatic coma (HCC)    Colorectal polyps    Macrocytic    Irritable bowel syndrome with diarrhea    Incomplete rotator cuff tear or rupture of left shoulder, not specified as traumatic    Biceps tendinosis of left shoulder    Arthritis    Cervical radiculopathy    Cervical radiculitis    RUQ pain    Sacroiliitis (HCC)    Chronic pain syndrome    RUQ abdominal pain    Dilated bile duct    Gastroesophageal reflux disease    Radiculopathy, cervical    Trochanteric bursitis of right hip    Trochanteric bursitis of left hip       Past Medical History:   Diagnosis Date    Acquired ichthyosis     last assessed: 5/9/2013    Anxiety     Cervical radiculopathy     last assessed: 6/13/2014    Colorectal polyps     last assessed: 3/9/2015    COPD (chronic obstructive pulmonary disease) (HCC)     Depression     Diverticulosis of colon     Foot pain     GERD (gastroesophageal reflux disease)     Hyperlipemia     Hypertension     Myocardial infarction ([facility] [address] )     at age 28    Osteopenia     last assessed: 12/6/2013    Palpitations     last assessed: 12/31/2014    PVD (peripheral vascular disease) ([facility] [address] )     last assessed: 12/3/2012    Scapular dyskinesis     last assessed: 11/17/2014   [employer] Shortness of breath     Tendonitis of left rotator cuff     last assessed: 2014    Vocal cord polyps     last assessed: 2014       Past Surgical History:   Procedure Laterality Date    ABDOMINAL SURGERY      exploratory    CARDIAC SURGERY      cardiac stent      SECTION      last assessed: 2014    CHOLECYSTECTOMY      last assessed: 2014    COLONOSCOPY  10/27/2014    CORONARY ANGIOPLASTY WITH STENT PLACEMENT      LAD stent    DENTAL SURGERY      last assessed: 2014    ELBOW SURGERY Bilateral     arthroplasty    EPIDURAL BLOCK INJECTION N/A 2019    Procedure: C7 T1 Cervical Epidural Steroid Injection (88131); Surgeon: Maykel Cisneros MD;  [facility];  Service: Pain Management     EPIDURAL BLOCK INJECTION Bilateral 2019    Procedure: BLOCK / INJECTION EPIDURAL STEROID CERVICAL - C7-T1;  Surgeon: Maykel Cisneros MD;  [facility];  Service: Pain Management     ESOPHAGOGASTRODUODENOSCOPY      ESOPHAGOGASTRODUODENOSCOPY N/A 2016    Procedure: ESOPHAGOGASTRODUODENOSCOPY (EGD);   Surgeon: Yonas Ortiz MD;  [facility];  Service:     FL GUIDED NEEDLE PLAC BX/ASP/INJ  2019    FL GUIDED NEEDLE PLAC BX/ASP/INJ  2019    FL INJECTION LEFT SHOULDER (ARTHROGRAM)  2018    FOOT SURGERY Right 02/06/2015    x 4    HYSTERECTOMY      last assessed: 2014    ID ARTHROCENTESIS ASPIR&/INJ MAJOR JT/BURSA W/O US Bilateral 2019    Procedure: GREATER TROCHANTERIC HIP INJECTION;  Surgeon: Maykel Cisneros MD;  [facility];  Service: Pain Management     ID ARTHROSCOPY SHOULDER SURGICAL BICEPS TENODESIS Left 10/15/2018    Procedure: ARTHROSCOPY SHOULDER; Debridement of shoulder;  Surgeon: Sheri Tilley MD;  [facility];  Service: Orthopedics    ID SHLDR ARTHROSCOP,SURG,W/ROTAT CUFF REPR Left 2018    Procedure: ARTHROSCOPY SHOULDER, subacromial decompression, synovectomy;  Surgeon: Merly Harvey Paula Rice MD;  [facility];  Service: Orthopedics    THROAT SURGERY      TOOTH EXTRACTION      TRIGEMINAL NERVE DECOMPRESSION Right 6/15/2018    Procedure: FOOT NEUROPLASTY;  Surgeon: Carla De La Torre DPM;  [facility];  Service: Podiatry       Family History   Problem Relation Age of Onset    No Known Problems Mother     No Known Problems Father     No Known Problems Maternal Grandmother     No Known Problems Maternal Grandfather     No Known Problems Paternal Grandmother     No Known Problems Paternal Grandfather        Social History     Occupational History    Not on file   Tobacco Use    Smoking status: Current Every Day Smoker     Packs/day: 0 50    Smokeless tobacco: Never Used   Substance and Sexual Activity    Alcohol use: Yes     Comment: rare    Drug use: No    Sexual activity: Not on file       Current Outpatient Medications on File Prior to Visit   Medication Sig    albuterol (PROVENTIL HFA,VENTOLIN HFA) 90 mcg/act inhaler inhale 2 puffs by mouth every 6 hours if needed for wheezing    aspirin 81 mg chewable tablet Chew 1 tablet daily Stop for the time being for foot surgery     baclofen 10 mg tablet Take 1 tablet (10 mg total) by mouth 3 (three) times a day for 103 days    cyclobenzaprine (FLEXERIL) 10 mg tablet Take 1 tablet (10 mg total) by mouth 3 (three) times a day as needed for muscle spasms    diclofenac sodium (VOLTAREN) 50 mg EC tablet Take 1 tablet (50 mg total) by mouth 2 (two) times a day for 30 days    nitroglycerin (NITROSTAT) 0 4 mg SL tablet Place 1 tablet (0 4 mg total) under the tongue every 5 (five) minutes as needed for chest pain    nitroglycerin (NITROSTAT) 0 4 mg SL tablet Place 1 tablet (0 4 mg total) under the tongue every 5 (five) minutes as needed for chest pain    oxyCODONE-acetaminophen (PERCOCET) 5-325 mg per tablet Take 1 tablet by mouth every 4 (four) hours as needed for moderate pain    ranitidine (ZANTAC) 150 mg tablet Take 1 tablet (150 mg total) by mouth 2 (two) times a day    rosuvastatin (CRESTOR) 5 mg tablet Take 1 tablet (5 mg total) by mouth daily     No current facility-administered medications on file prior to visit  Allergies   Allergen Reactions    Ceclor [Cefaclor] Anaphylaxis    Codeine Itching     Reaction Date: 09Jun2011;     Ticlid [Ticlopidine] Hives    Penicillins Rash       Physical Exam:    /80   Ht 5' (1 524 m)   Wt 65 3 kg (144 lb)   BMI 28 12 kg/m²     Constitutional:normal, well developed, well nourished, alert, in no distress and non-toxic and no overt pain behavior  Eyes:anicteric  HEENT:grossly intact  Neck:supple, symmetric, trachea midline and no masses   Pulmonary:even and unlabored  Cardiovascular:No edema or pitting edema present  Skin:Normal without rashes or lesions and well hydrated  Psychiatric:Mood and affect appropriate  Neurologic:Cranial Nerves II-XII grossly intact  Musculoskeletal:antalgic     Lumbar/Sacral Spine examination demonstrates  Decreased range of motion lumbar spine with pain upon: flexion, lateral rotation to the left/right, and bending to the left/right  Bilateral lumbar paraspinals tender to palpation  Muscle spasms noted in the lumbar area bilaterally  4/5 lower extremity strength in all muscle groups bilaterally  Positive seated straight leg raise for bilateral lower extremities  Sensitivity to light touch intact bilateral lower extremities  2+ reflexes in the patella and Achilles    No ankle clonus     Imaging

## 2019-09-13 NOTE — PROGRESS NOTES
Assessment:  1  Cervical radiculitis    2  Osteoarthritis of cervical spine, unspecified spinal osteoarthritis complication status    3  Chronic bilateral low back pain with left-sided sciatica    4  Lumbar spondylosis    5  Lumbar radiculopathy    6  Myofascial pain syndrome    7  Chronic pain syndrome        Plan:  The patient is a 59-year-old female with complaints of low back pain and bilateral hip pain with a history significant for cervical degenerative disc disease, cervical radiculopathy presents today for follow-up visit  patient complains of increasing left lower extremity radicular symptoms in the L5 nerve root distribution bilaterally  Patient is status post bilateral greater trochanteric bursa steroid injection to which she reported significant relief of the right greater trochanteric bursa however the left continues to bother her  Patient reports on the left side she feels and numbness and tingling going down her legs which is worse at nighttime  We feel that this is more of her radicular symptoms, from a discogenic pathology in her back more so than a greater trochanteric bursa presentation  X-ray of lumbar spine does show degenerative disc changes prominent at L4-5 and L5-S1  We will do more diagnostic imaging to assess the discogenic pathology behind patient's low back and left leg pain  Patient reports increasing left-sided neck pain and shoulder pain after she had an incident when turning her head in her in a "big pop"  Now she notes increasing left shoulder tension and pain in addition to left trapezius tension in   1  We will schedule patient for physical therapy for lumbar core strengthening exercises  After patient has completed 4-6 weeks will then order an MRI of the lumbar spine to assess the discogenic pathology  This is assuming that her leg pain does not alleviate after physical therapy sessions    2  We will schedule patient for a left C7-T1 interlaminar epidural steroid injection  3  We will obtain an x-ray of the cervical spine with flexion extension  4  Follow-up 1 month after injection       [place] Pres[facility] Drug Monitoring Program report was reviewed and was appropriate     Complete risks and benefits including bleeding, infection, tissue reaction, nerve injury and allergic reaction were discussed  The approach was demonstrated using models and literature was provided  Verbal and written consent was obtained  My impressions and treatment recommendations were discussed in detail with the patient who verbalized understanding and had no further questions  Discharge instructions were provided  I personally saw and examined the patient and I agree with the above discussed plan of care  No orders of the defined types were placed in this encounter  No orders of the defined types were placed in this encounter  History of Present Illness:  Venkat Gunn is a 47 y o  female who presents for a follow up office visit in regards to Neck Pain and Hip Pain (left hip is not doing any better but right hip is )  The patients current symptoms include 10/10 constant burning, sharp, shooting in the neck and in left leg which is worse at nighttime  Current pain medications includes:  baclofen, diclofenac  The patient reports that this regimen is providing 10% pain relief  The patient is reporting no side effects from this pain medication regimen  I have personally reviewed and/or updated the patient's past medical history, past surgical history, family history, social history, current medications, allergies, and vital signs today  Review of Systems   Gastrointestinal: Positive for nausea and vomiting  Musculoskeletal: Positive for gait problem, joint swelling and myalgias  Neurological: Positive for dizziness  All other systems reviewed and are negative        Patient Active Problem List   Diagnosis    Gastro-esophageal reflux disease without esophagitis    Right lower quadrant pain    Abnormal weight loss    Acute pain of left shoulder    Lumbar spondylosis    Osteoarthritis of spine with radiculopathy, cervical region    Degenerative cervical disc    Lumbar degenerative disc disease    Bilateral carpal tunnel syndrome    CAD in native artery    Depression with anxiety    Other hyperlipidemia    Essential hypertension    PVD (peripheral vascular disease) (HCC)    Headache disorder    Rheumatoid factor positive    Positive KRISTA (antinuclear antibody)    Left facial numbness    Myofascial pain syndrome    Calcific tendinitis of left shoulder    Incomplete tear of left rotator cuff    Neuroma of foot    Chronic hepatitis C without hepatic coma (HCC)    Colorectal polyps    Macrocytic    Irritable bowel syndrome with diarrhea    Incomplete rotator cuff tear or rupture of left shoulder, not specified as traumatic    Biceps tendinosis of left shoulder    Arthritis    Cervical radiculopathy    Cervical radiculitis    RUQ pain    Sacroiliitis (HCC)    Chronic pain syndrome    RUQ abdominal pain    Dilated bile duct    Gastroesophageal reflux disease    Radiculopathy, cervical    Trochanteric bursitis of right hip    Trochanteric bursitis of left hip       Past Medical History:   Diagnosis Date    Acquired ichthyosis     last assessed: 5/9/2013    Anxiety     Cervical radiculopathy     last assessed: 6/13/2014    Colorectal polyps     last assessed: 3/9/2015    COPD (chronic obstructive pulmonary disease) (HCC)     Depression     Diverticulosis of colon     Foot pain     GERD (gastroesophageal reflux disease)     Hyperlipemia     Hypertension     Myocardial infarction ([facility] [address] )     at age 28    Osteopenia     last assessed: 12/6/2013    Palpitations     last assessed: 12/31/2014    PVD (peripheral vascular disease) ([facility] [address] )     last assessed: 12/3/2012    Scapular dyskinesis     last assessed: 11/17/2014   Magdalena Vincent Shortness of breath     Tendonitis of left rotator cuff     last assessed: 2014    Vocal cord polyps     last assessed: 2014       Past Surgical History:   Procedure Laterality Date    ABDOMINAL SURGERY      exploratory    CARDIAC SURGERY      cardiac stent      SECTION      last assessed: 2014    CHOLECYSTECTOMY      last assessed: 2014    COLONOSCOPY  10/27/2014    CORONARY ANGIOPLASTY WITH STENT PLACEMENT      LAD stent    DENTAL SURGERY      last assessed: 2014    ELBOW SURGERY Bilateral     arthroplasty    EPIDURAL BLOCK INJECTION N/A 2019    Procedure: C7 T1 Cervical Epidural Steroid Injection (83158); Surgeon: Gisela Gross MD;  [facility];  Service: Pain Management     EPIDURAL BLOCK INJECTION Bilateral 2019    Procedure: BLOCK / INJECTION EPIDURAL STEROID CERVICAL - C7-T1;  Surgeon: Gisela Gross MD;  [facility];  Service: Pain Management     ESOPHAGOGASTRODUODENOSCOPY      ESOPHAGOGASTRODUODENOSCOPY N/A 2016    Procedure: ESOPHAGOGASTRODUODENOSCOPY (EGD);   Surgeon: Janett Keita MD;  [facility];  Service:     FL GUIDED NEEDLE PLAC BX/ASP/INJ  2019    FL GUIDED NEEDLE PLAC BX/ASP/INJ  2019    FL INJECTION LEFT SHOULDER (ARTHROGRAM)  2018    FOOT SURGERY Right 02/06/2015    x 4    HYSTERECTOMY      last assessed: 2014    MN ARTHROCENTESIS ASPIR&/INJ MAJOR JT/BURSA W/O US Bilateral 2019    Procedure: GREATER TROCHANTERIC HIP INJECTION;  Surgeon: Gisela Gross MD;  [facility];  Service: Pain Management     MN ARTHROSCOPY SHOULDER SURGICAL BICEPS TENODESIS Left 10/15/2018    Procedure: ARTHROSCOPY SHOULDER; Debridement of shoulder;  Surgeon: Janet Barrientos MD;  [facility];  Service: Orthopedics    MN SHLDR ARTHROSCOP,SURG,W/ROTAT CUFF REPR Left 2018    Procedure: ARTHROSCOPY SHOULDER, subacromial decompression, synovectomy;  Surgeon: Juan Montenegro Jj Betancur MD;  [facility];  Service: Orthopedics    THROAT SURGERY      TOOTH EXTRACTION      TRIGEMINAL NERVE DECOMPRESSION Right 6/15/2018    Procedure: FOOT NEUROPLASTY;  Surgeon: Lloyd Morales DPM;  [facility];  Service: Podiatry       Family History   Problem Relation Age of Onset    No Known Problems Mother     No Known Problems Father     No Known Problems Maternal Grandmother     No Known Problems Maternal Grandfather     No Known Problems Paternal Grandmother     No Known Problems Paternal Grandfather        Social History     Occupational History    Not on file   Tobacco Use    Smoking status: Current Every Day Smoker     Packs/day: 0 50    Smokeless tobacco: Never Used   Substance and Sexual Activity    Alcohol use: Yes     Comment: rare    Drug use: No    Sexual activity: Not on file       Current Outpatient Medications on File Prior to Visit   Medication Sig    albuterol (PROVENTIL HFA,VENTOLIN HFA) 90 mcg/act inhaler inhale 2 puffs by mouth every 6 hours if needed for wheezing    aspirin 81 mg chewable tablet Chew 1 tablet daily Stop for the time being for foot surgery     baclofen 10 mg tablet Take 1 tablet (10 mg total) by mouth 3 (three) times a day for 103 days    cyclobenzaprine (FLEXERIL) 10 mg tablet Take 1 tablet (10 mg total) by mouth 3 (three) times a day as needed for muscle spasms    diclofenac sodium (VOLTAREN) 50 mg EC tablet Take 1 tablet (50 mg total) by mouth 2 (two) times a day for 30 days    nitroglycerin (NITROSTAT) 0 4 mg SL tablet Place 1 tablet (0 4 mg total) under the tongue every 5 (five) minutes as needed for chest pain    nitroglycerin (NITROSTAT) 0 4 mg SL tablet Place 1 tablet (0 4 mg total) under the tongue every 5 (five) minutes as needed for chest pain    oxyCODONE-acetaminophen (PERCOCET) 5-325 mg per tablet Take 1 tablet by mouth every 4 (four) hours as needed for moderate pain    ranitidine (ZANTAC) 150 mg tablet Take 1 tablet (150 mg total) by mouth 2 (two) times a day    rosuvastatin (CRESTOR) 5 mg tablet Take 1 tablet (5 mg total) by mouth daily     No current facility-administered medications on file prior to visit  Allergies   Allergen Reactions    Ceclor [Cefaclor] Anaphylaxis    Codeine Itching     Reaction Date: 09Jun2011;     Ticlid [Ticlopidine] Hives    Penicillins Rash       Physical Exam:    /80   Ht 5' (1 524 m)   Wt 65 3 kg (144 lb)   BMI 28 12 kg/m²     Constitutional:normal, well developed, well nourished, alert, in no distress and non-toxic and no overt pain behavior  Eyes:anicteric  HEENT:grossly intact  Neck:supple, symmetric, trachea midline and no masses   Pulmonary:even and unlabored  Cardiovascular:No edema or pitting edema present  Skin:Normal without rashes or lesions and well hydrated  Psychiatric:Mood and affect appropriate  Neurologic:Cranial Nerves II-XII grossly intact  Musculoskeletal:antalgic     Lumbar/Sacral Spine examination demonstrates  Decreased range of motion lumbar spine with pain upon: flexion, lateral rotation to the left/right, and bending to the left/right  Bilateral lumbar paraspinals tender to palpation  Muscle spasms noted in the lumbar area bilaterally  4/5 lower extremity strength in all muscle groups bilaterally  Positive seated straight leg raise for bilateral lower extremities  Sensitivity to light touch intact bilateral lower extremities  2+ reflexes in the patella and Achilles    No ankle clonus     Imaging

## 2019-09-15 DIAGNOSIS — K21.9 GASTROESOPHAGEAL REFLUX DISEASE, ESOPHAGITIS PRESENCE NOT SPECIFIED: ICD-10-CM

## 2019-09-16 RX ORDER — PANTOPRAZOLE SODIUM 40 MG/1
TABLET, DELAYED RELEASE ORAL
Qty: 30 TABLET | Refills: 5 | OUTPATIENT
Start: 2019-09-16

## 2019-09-17 ENCOUNTER — TELEPHONE (OUTPATIENT)
Dept: PAIN MEDICINE | Facility: CLINIC | Age: 54
End: 2019-09-17

## 2019-09-17 NOTE — TELEPHONE ENCOUNTER
Patient   [phone]  Dr Vanessa Philippe     Patient is calling in checking on the status of her X-rays results  Please follow up with with they come in

## 2019-09-18 NOTE — TELEPHONE ENCOUNTER
There is a staff message regarding NSAID hold due to pt's upcoming ANDREIA 10/3  When calling pt with xray results, also remind pt of 4 day voltaren hold prior to procedure

## 2019-09-20 NOTE — TELEPHONE ENCOUNTER
Arian Driver MD  [facility]             Please call the patient regarding her abnormal result     X-rays cervical spine shows no evidence of instability

## 2019-09-20 NOTE — TELEPHONE ENCOUNTER
Please review c-spine xray results    When calling pt back please advise of below NSAID hold for upcoming ANDREIA

## 2019-09-24 NOTE — TELEPHONE ENCOUNTER
RN s/w pt regarding previous  Pt aware last dose of Voltaren will be taken on 9/28 and if then takes ibuprofen the past dose would be on 9/1  Pt aware may take tylenol if needed as per bottle directions

## 2019-10-02 ENCOUNTER — OFFICE VISIT (OUTPATIENT)
Dept: FAMILY MEDICINE CLINIC | Facility: CLINIC | Age: 54
End: 2019-10-02
Payer: COMMERCIAL

## 2019-10-02 VITALS
DIASTOLIC BLOOD PRESSURE: 84 MMHG | BODY MASS INDEX: 27.96 KG/M2 | HEIGHT: 60 IN | WEIGHT: 142.4 LBS | SYSTOLIC BLOOD PRESSURE: 128 MMHG

## 2019-10-02 DIAGNOSIS — E16.2 LOW BLOOD SUGAR: ICD-10-CM

## 2019-10-02 DIAGNOSIS — R23.2 FLUSHING: Primary | ICD-10-CM

## 2019-10-02 DIAGNOSIS — M19.90 ARTHRITIS: ICD-10-CM

## 2019-10-02 DIAGNOSIS — I25.10 CAD IN NATIVE ARTERY: ICD-10-CM

## 2019-10-02 DIAGNOSIS — M79.671 RIGHT FOOT PAIN: ICD-10-CM

## 2019-10-02 DIAGNOSIS — Z72.0 TOBACCO ABUSE: ICD-10-CM

## 2019-10-02 DIAGNOSIS — I10 ESSENTIAL HYPERTENSION: ICD-10-CM

## 2019-10-02 PROCEDURE — 3074F SYST BP LT 130 MM HG: CPT | Performed by: FAMILY MEDICINE

## 2019-10-02 PROCEDURE — 99214 OFFICE O/P EST MOD 30 MIN: CPT | Performed by: FAMILY MEDICINE

## 2019-10-02 PROCEDURE — 3079F DIAST BP 80-89 MM HG: CPT | Performed by: FAMILY MEDICINE

## 2019-10-02 RX ORDER — CYCLOBENZAPRINE HCL 10 MG
10 TABLET ORAL 3 TIMES DAILY PRN
Qty: 30 TABLET | Refills: 0 | Status: SHIPPED | OUTPATIENT
Start: 2019-10-02 | End: 2019-12-23 | Stop reason: SDUPTHER

## 2019-10-02 NOTE — PROGRESS NOTES
Assessment/Plan:  Tobacco Cessation Counseling: Tobacco cessation counseling and education was provided  The patient is sincerely urged to quit consumption of tobacco  She is not ready to quit tobacco  The numerous health risks of tobacco consumption were discussed  If she decides to quit, there are a number of helpful adjunctive aids, and she can see me to discuss nicotine replacement therapy, chantix, or bupropion anytime in the future  Refusing flu shot  We will get blood work on her including a urine for 5 HIAA  Follow up with specialists as scheduled  We will call her with results and make further recommendations at that time  Problem List Items Addressed This Visit        Cardiovascular and Mediastinum    CAD in native artery    Relevant Orders    Comprehensive metabolic panel    Lipid Panel with Direct LDL reflex    Essential hypertension    Relevant Orders    CBC and differential    Comprehensive metabolic panel    Flushing - Primary    Relevant Orders    CBC and differential    TSH, 3rd generation with Free T4 reflex    5 HIAA, urine, quantitative, 24 hour       Musculoskeletal and Integument    Arthritis    Relevant Medications    cyclobenzaprine (FLEXERIL) 10 mg tablet    Other Relevant Orders    KRISTA w/Reflex    RF Screen w/ Reflex to Titer       Other    Tobacco abuse      Other Visit Diagnoses     Low blood sugar        Relevant Orders    Hemoglobin A1C    Right foot pain        Relevant Orders    Uric acid           Diagnoses and all orders for this visit:    Flushing  -     CBC and differential  -     TSH, 3rd generation with Free T4 reflex  -     5 HIAA, urine, quantitative, 24 hour; Future    Essential hypertension  -     CBC and differential  -     Comprehensive metabolic panel    Arthritis  -     KRISTA w/Reflex  -     RF Screen w/ Reflex to Titer  -     cyclobenzaprine (FLEXERIL) 10 mg tablet;  Take 1 tablet (10 mg total) by mouth 3 (three) times a day as needed for muscle spasms    Low blood sugar  -     Hemoglobin A1C    CAD in native artery  -     Comprehensive metabolic panel  -     Lipid Panel with Direct LDL reflex    Right foot pain  -     Uric acid    Tobacco abuse        No problem-specific Assessment & Plan notes found for this encounter  Subjective:      Patient ID: Xiomy Kuo is a 47 y o  female  Patient here today for follow-up  Patient states she has not been feeling well, mostly with arthritic symptoms  She seeing Pain Management for this  Patient also has had pain and swelling on her right 1st toe  Podiatry thought it might be gout, they ordered blood work on her, she has not gotten it done yet  Patient also states for the past 6-8 months has been flushing, this is increased over the past 2 months  Patient states she has episodes flushing all over, even the palms of her hands  States gets palpitations every now and then  She is following up with Cardiology  Arthritis   Presents for follow-up visit  She complains of pain and joint swelling  The symptoms have been stable  Affected locations include the right foot and neck  Compliance with total regimen is %  The following portions of the patient's history were reviewed and updated as appropriate:   She has a past medical history of Acquired ichthyosis, Anxiety, Cervical radiculopathy, Colorectal polyps, COPD (chronic obstructive pulmonary disease) ([address] ), Depression, Diverticulosis of colon, Foot pain, GERD (gastroesophageal reflux disease), Hyperlipemia, Hypertension, Myocardial infarction ([address] ), Osteopenia, Palpitations, PVD (peripheral vascular disease) ([address] ), Scapular dyskinesis, Shortness of breath, Tendonitis of left rotator cuff, and Vocal cord polyps  ,  does not have any pertinent problems on file  ,   has a past surgical history that includes Foot surgery (Right, 2015); Hysterectomy; Cholecystectomy;  section; Cardiac surgery; Throat surgery; Elbow surgery (Bilateral);  Abdominal surgery; Esophagogastroduodenoscopy; Colonoscopy (10/27/2014); Tooth extraction; Esophagogastroduodenoscopy (N/A, 11/4/2016); Dental surgery; Coronary angioplasty with stent (1999); Trigeminal nerve decompression (Right, 6/15/2018); pr shldr arthroscop,surg,w/rotat cuff repr (Left, 7/23/2018); FL injection left shoulder (arthrogram) (9/25/2018); pr arthroscopy shoulder surgical biceps tenodesis (Left, 10/15/2018); Epidural block injection (N/A, 1/17/2019); Epidural block injection (Bilateral, 5/2/2019); FL guided needle plac bx/asp/inj (5/2/2019); pr arthrocentesis aspir&/inj major jt/bursa w/o us (Bilateral, 7/31/2019); and FL guided needle plac bx/asp/inj (7/31/2019)  ,  family history includes No Known Problems in her father, maternal grandfather, maternal grandmother, mother, paternal grandfather, and paternal grandmother  ,   reports that she has been smoking  She has been smoking about 0 50 packs per day  She has never used smokeless tobacco  She reports that she drank alcohol  She reports that she does not use drugs  ,  is allergic to ceclor [cefaclor]; codeine; ticlid [ticlopidine]; and penicillins     Current Outpatient Medications   Medication Sig Dispense Refill    albuterol (PROVENTIL HFA,VENTOLIN HFA) 90 mcg/act inhaler inhale 2 puffs by mouth every 6 hours if needed for wheezing  0    aspirin 81 mg chewable tablet Chew 1 tablet daily Stop for the time being for foot surgery       diclofenac sodium (VOLTAREN) 50 mg EC tablet Take 1 tablet (50 mg total) by mouth 2 (two) times a day 60 tablet 1    nitroglycerin (NITROSTAT) 0 4 mg SL tablet Place 1 tablet (0 4 mg total) under the tongue every 5 (five) minutes as needed for chest pain 90 tablet 3    oxyCODONE-acetaminophen (PERCOCET) 5-325 mg per tablet Take 1 tablet by mouth every 12 (twelve) hours       ranitidine (ZANTAC) 150 mg tablet Take 1 tablet (150 mg total) by mouth 2 (two) times a day 60 tablet 5    rosuvastatin (CRESTOR) 5 mg tablet Take 1 tablet (5 mg total) by mouth daily 90 tablet 3    baclofen 10 mg tablet Take 1 tablet (10 mg total) by mouth 3 (three) times a day (Patient not taking: Reported on 10/2/2019) 90 tablet 0    cyclobenzaprine (FLEXERIL) 10 mg tablet Take 1 tablet (10 mg total) by mouth 3 (three) times a day as needed for muscle spasms 30 tablet 0    gabapentin (NEURONTIN) 300 mg capsule Take 1 capsule (300 mg total) by mouth 3 (three) times a day (Patient not taking: Reported on 10/2/2019) 90 capsule 1     No current facility-administered medications for this visit  Review of Systems   Constitutional: Negative  Respiratory: Negative  Cardiovascular: Negative  Gastrointestinal: Negative  Genitourinary: Negative  Musculoskeletal: Positive for arthralgias, arthritis, joint swelling and neck pain  Skin: Positive for color change (Flushing)  Objective:  Vitals:    10/02/19 1504   BP: 128/84   Weight: 64 6 kg (142 lb 6 4 oz)   Height: 5' (1 524 m)     Body mass index is 27 81 kg/m²  Physical Exam   Constitutional: She is oriented to person, place, and time  She appears well-developed and well-nourished  No distress  HENT:   Head: Normocephalic and atraumatic  Eyes: Conjunctivae are normal    Cardiovascular: Normal rate, regular rhythm and normal heart sounds  Exam reveals no gallop and no friction rub  No murmur heard  Pulmonary/Chest: Effort normal and breath sounds normal  No respiratory distress  She has no wheezes  She has no rales  Musculoskeletal: She exhibits tenderness (Right medial foot)  She exhibits no edema  Neurological: She is alert and oriented to person, place, and time  Skin: She is not diaphoretic  Psychiatric: She has a normal mood and affect  Her behavior is normal  Judgment and thought content normal    Vitals reviewed

## 2019-10-03 ENCOUNTER — HOSPITAL ENCOUNTER (OUTPATIENT)
Facility: HOSPITAL | Age: 54
Setting detail: OUTPATIENT SURGERY
Discharge: HOME | End: 2019-10-03
Attending: ANESTHESIOLOGY | Admitting: ANESTHESIOLOGY
Payer: COMMERCIAL

## 2019-10-03 ENCOUNTER — TRANSCRIBE ORDERS (OUTPATIENT)
Dept: ADMINISTRATIVE | Facility: HOSPITAL | Age: 54
End: 2019-10-03

## 2019-10-03 ENCOUNTER — HOSPITAL ENCOUNTER (OUTPATIENT)
Dept: RADIOLOGY | Facility: HOSPITAL | Age: 54
Discharge: HOME | End: 2019-10-03
Attending: PODIATRIST
Payer: COMMERCIAL

## 2019-10-03 ENCOUNTER — APPOINTMENT (OUTPATIENT)
Dept: RADIOLOGY | Facility: HOSPITAL | Age: 54
End: 2019-10-03
Payer: COMMERCIAL

## 2019-10-03 ENCOUNTER — APPOINTMENT (OUTPATIENT)
Dept: LAB | Facility: HOSPITAL | Age: 54
End: 2019-10-03
Payer: COMMERCIAL

## 2019-10-03 VITALS
HEART RATE: 74 BPM | SYSTOLIC BLOOD PRESSURE: 121 MMHG | DIASTOLIC BLOOD PRESSURE: 78 MMHG | TEMPERATURE: 97 F | OXYGEN SATURATION: 97 % | RESPIRATION RATE: 18 BRPM

## 2019-10-03 DIAGNOSIS — M79.671 PAIN IN RIGHT FOOT: ICD-10-CM

## 2019-10-03 DIAGNOSIS — M10.9 GOUT, UNSPECIFIED CAUSE, UNSPECIFIED CHRONICITY, UNSPECIFIED SITE: ICD-10-CM

## 2019-10-03 DIAGNOSIS — M79.671 PAIN IN RIGHT FOOT: Primary | ICD-10-CM

## 2019-10-03 LAB
ALBUMIN SERPL BCP-MCNC: 4.2 G/DL (ref 3.5–5)
ALP SERPL-CCNC: 64 U/L (ref 46–116)
ALT SERPL W P-5'-P-CCNC: 10 U/L (ref 12–78)
ANION GAP SERPL CALCULATED.3IONS-SCNC: 7 MMOL/L (ref 4–13)
AST SERPL W P-5'-P-CCNC: 14 U/L (ref 5–45)
BASOPHILS # BLD AUTO: 0.04 THOUSANDS/ΜL (ref 0–0.1)
BASOPHILS NFR BLD AUTO: 1 % (ref 0–1)
BILIRUB SERPL-MCNC: 0.4 MG/DL (ref 0.2–1)
BUN SERPL-MCNC: 8 MG/DL (ref 5–25)
CALCIUM SERPL-MCNC: 9.2 MG/DL (ref 8.3–10.1)
CHLORIDE SERPL-SCNC: 102 MMOL/L (ref 100–108)
CHOLEST SERPL-MCNC: 225 MG/DL (ref 50–200)
CO2 SERPL-SCNC: 31 MMOL/L (ref 21–32)
CREAT SERPL-MCNC: 0.65 MG/DL (ref 0.6–1.3)
EOSINOPHIL # BLD AUTO: 0.19 THOUSAND/ΜL (ref 0–0.61)
EOSINOPHIL NFR BLD AUTO: 2 % (ref 0–6)
ERYTHROCYTE [DISTWIDTH] IN BLOOD BY AUTOMATED COUNT: 12.4 % (ref 11.6–15.1)
ERYTHROCYTE [SEDIMENTATION RATE] IN BLOOD: 18 MM/HOUR (ref 0–20)
EST. AVERAGE GLUCOSE BLD GHB EST-MCNC: 114 MG/DL
GFR SERPL CREATININE-BSD FRML MDRD: 101 ML/MIN/1.73SQ M
GLUCOSE P FAST SERPL-MCNC: 96 MG/DL (ref 65–99)
HBA1C MFR BLD: 5.6 % (ref 4.2–6.3)
HCT VFR BLD AUTO: 46.1 % (ref 34.8–46.1)
HDLC SERPL-MCNC: 53 MG/DL (ref 40–60)
HGB BLD-MCNC: 14.8 G/DL (ref 11.5–15.4)
IMM GRANULOCYTES # BLD AUTO: 0.01 THOUSAND/UL (ref 0–0.2)
IMM GRANULOCYTES NFR BLD AUTO: 0 % (ref 0–2)
LDLC SERPL CALC-MCNC: 145 MG/DL (ref 0–100)
LYMPHOCYTES # BLD AUTO: 2.84 THOUSANDS/ΜL (ref 0.6–4.47)
LYMPHOCYTES NFR BLD AUTO: 35 % (ref 14–44)
MCH RBC QN AUTO: 33.1 PG (ref 26.8–34.3)
MCHC RBC AUTO-ENTMCNC: 32.1 G/DL (ref 31.4–37.4)
MCV RBC AUTO: 103 FL (ref 82–98)
MONOCYTES # BLD AUTO: 0.56 THOUSAND/ΜL (ref 0.17–1.22)
MONOCYTES NFR BLD AUTO: 7 % (ref 4–12)
NEUTROPHILS # BLD AUTO: 4.42 THOUSANDS/ΜL (ref 1.85–7.62)
NEUTS SEG NFR BLD AUTO: 55 % (ref 43–75)
NRBC BLD AUTO-RTO: 0 /100 WBCS
PLATELET # BLD AUTO: 237 THOUSANDS/UL (ref 149–390)
PMV BLD AUTO: 10 FL (ref 8.9–12.7)
POTASSIUM SERPL-SCNC: 4.1 MMOL/L (ref 3.5–5.3)
PROT SERPL-MCNC: 8 G/DL (ref 6.4–8.2)
RBC # BLD AUTO: 4.47 MILLION/UL (ref 3.81–5.12)
SODIUM SERPL-SCNC: 140 MMOL/L (ref 136–145)
TRIGL SERPL-MCNC: 134 MG/DL
TSH SERPL DL<=0.05 MIU/L-ACNC: 0.72 UIU/ML (ref 0.36–3.74)
URATE SERPL-MCNC: 4.3 MG/DL (ref 2–6.8)
WBC # BLD AUTO: 8.06 THOUSAND/UL (ref 4.31–10.16)

## 2019-10-03 PROCEDURE — 86431 RHEUMATOID FACTOR QUANT: CPT | Performed by: FAMILY MEDICINE

## 2019-10-03 PROCEDURE — 84550 ASSAY OF BLOOD/URIC ACID: CPT | Performed by: FAMILY MEDICINE

## 2019-10-03 PROCEDURE — 80053 COMPREHEN METABOLIC PANEL: CPT | Performed by: FAMILY MEDICINE

## 2019-10-03 PROCEDURE — 85652 RBC SED RATE AUTOMATED: CPT

## 2019-10-03 PROCEDURE — 85025 COMPLETE CBC W/AUTO DIFF WBC: CPT | Performed by: FAMILY MEDICINE

## 2019-10-03 PROCEDURE — 84443 ASSAY THYROID STIM HORMONE: CPT | Performed by: FAMILY MEDICINE

## 2019-10-03 PROCEDURE — 73630 X-RAY EXAM OF FOOT: CPT

## 2019-10-03 PROCEDURE — 83036 HEMOGLOBIN GLYCOSYLATED A1C: CPT | Performed by: FAMILY MEDICINE

## 2019-10-03 PROCEDURE — 36415 COLL VENOUS BLD VENIPUNCTURE: CPT

## 2019-10-03 PROCEDURE — 86039 ANTINUCLEAR ANTIBODIES (ANA): CPT

## 2019-10-03 PROCEDURE — 86430 RHEUMATOID FACTOR TEST QUAL: CPT | Performed by: FAMILY MEDICINE

## 2019-10-03 PROCEDURE — 62321 NJX INTERLAMINAR CRV/THRC: CPT | Performed by: ANESTHESIOLOGY

## 2019-10-03 PROCEDURE — 80061 LIPID PANEL: CPT | Performed by: FAMILY MEDICINE

## 2019-10-03 PROCEDURE — 86038 ANTINUCLEAR ANTIBODIES: CPT

## 2019-10-03 RX ORDER — DEXAMETHASONE SODIUM PHOSPHATE 10 MG/ML
INJECTION, SOLUTION INTRAMUSCULAR; INTRAVENOUS AS NEEDED
Status: DISCONTINUED | OUTPATIENT
Start: 2019-10-03 | End: 2019-10-03 | Stop reason: HOSPADM

## 2019-10-03 RX ORDER — LIDOCAINE HYDROCHLORIDE 10 MG/ML
INJECTION, SOLUTION INFILTRATION; PERINEURAL AS NEEDED
Status: DISCONTINUED | OUTPATIENT
Start: 2019-10-03 | End: 2019-10-03 | Stop reason: HOSPADM

## 2019-10-03 NOTE — DISCHARGE INSTRUCTIONS
How to Stop Smoking   AMBULATORY CARE:   You will improve your health and the health of others around you  if you stop smoking  Your risk for heart and lung disease, cancer, stroke, heart attack, and vision problems will also decrease  You can benefit from quitting no matter how long you have smoked  Prepare to stop smoking:  Nicotine is a highly addictive drug found in cigarettes  Withdrawal symptoms can happen when you stop smoking and make it hard to quit  These include anxiety, depression, irritability, trouble sleeping, and increased appetite  You increase your chances of success if you prepare to quit  · Set a quit date  [place] a date that is within the next 2 weeks  Do not pick a day that you think may be stressful or busy  Write down the day or [place] it on your calender  · Tell friends and family that you plan to quit  Explain that you may have withdrawal symptoms when you try to quit  Ask them to support you  They may be able to encourage you and help reduce your stress to make it easier for you to quit  · Make a list of your reasons for quitting  Put the list somewhere you will see it every day, such as your refrigerator  You can look at the list when you have a craving  · Remove all tobacco and nicotine products from your home, car, and workplace  Also, remove anything else that will tempt you to smoke, such as lighters, matches, or ashtrays  Clean your car, home, and places at work that smell like smoke  The smell of smoke can trigger a craving  · Identify triggers that make you want to smoke  This may include activities, feelings, or people  Also write down 1 way you can deal with each of your triggers  For example, if you want to smoke as soon as you wake up, plan another activity during this time, such as exercise  · Make a plan for how you will quit  Learn about the tools that can help you quit, such as medicine, counseling, or nicotine replacement therapy   Choose at least 2 options to help you quit  Tools to help you stop smoking:   · Counseling  from a trained healthcare provider can provide you with support and skills to quit smoking  The provider will also teach you to manage your withdrawal symptoms and cravings  You may receive counseling from one counselor, in group therapy, or through phone therapy called a quit line  · Nicotine replacement therapy (NRT)  such as nicotine patches, gum, or lozenges may help reduce your nicotine cravings  You may get these without a doctor's order  Do not use e-cigarettes or smokeless tobacco in place of cigarettes or to help you quit  They still contain nicotine  · Prescription medicines  such as nasal sprays or nicotine inhalers may help reduce your withdrawal symptoms  Other medicines may also be used to reduce your urge to smoke  Ask your healthcare provider about these medicines  You may need to start certain medicines 2 weeks before your quit date for them to work well  · Hypnosis  is a practice that helps guide you through thoughts and feelings  Hypnosis may help decrease your cravings and make you more willing to quit  · Acupuncture therapy  uses very thin needles to balance energy channels in the body  This is thought to help decrease cravings and symptoms of nicotine withdrawal      · Support groups  let you talk to others who are trying to quit or have already quit  It may be helpful to speak with others about how they quit  Manage your cravings:   · Avoid situations, people, and places that tempt you to smoke  Go to nonsmoking places, such as libraries or restaurants  Understand what tempts you and try to avoid these things  · Keep your hands busy  Hold things such as a stress ball or pen  · Put candy or toothpicks in your mouth  Keep lollipops, sugarless gum, or toothpicks with you at all times  · Do not have alcohol or caffeine  These drinks may tempt you to smoke   Drink healthy liquids such as water or juice instead  · Reward yourself when you resist your cravings  Rewards will motivate you and help you stay positive  · Do an activity that distracts you from your craving  Examples include going for a walk, exercising, or cleaning  Prevent weight gain after you quit:  You may gain a few pounds after you quit smoking  It is healthier for you to gain a few pounds than to continue to smoke  The following can help you prevent weight gain:  · Eat healthy foods  These include fruits, vegetables, whole-grain breads, low-fat dairy products, beans, lean meats, and fish  Eat healthy snacks, such as low-fat yogurt, if you get hungry between meals  · Drink water before, during, and between meals  This will make your stomach feel full and help prevent you from overeating  Ask your healthcare provider how much liquid to drink each day and which liquids are best for you  · Exercise  Take a walk or do some kind of exercise every day  Ask your healthcare provider what exercise is right for you  This may help reduce your cravings and reduce stress  For more support and information:   · [employer]  Phone: 5- 424 - 677-0457  Web Address: www [employer]  © 2017 [address] Information is for End User's use only and may not be sold, redistributed or otherwise used for commercial purposes  All illustrations and images included in CareNotes® are the copyrighted property of [employer] [facility] [employer]  or Joao Edmondson  The above information is an  only  It is not intended as medical advice for individual conditions or treatments  Talk to your doctor, nurse or pharmacist before following any medical regimen to see if it is safe and effective for you  Epidural Steroid Injection   WHAT YOU NEED TO KNOW:   An epidural steroid injection (CARLI) is a procedure to inject steroid medicine into the epidural space  The epidural space is between your spinal cord and vertebrae   Steroids reduce inflammation and fluid buildup in your spine that may be causing pain  You may be given pain medicine along with the steroids  ACTIVITY  · Do not drive or operate machinery today  · No strenuous activity today - bending, lifting, etc   · You may resume normal activites starting tomorrow - start slowly and as tolerated  · You may shower today, but no tub baths or hot tubs  · You may have numbness for several hours from the local anesthetic  Please use caution and common sense, especially with weight-bearing activities  CARE OF THE INJECTION SITE  · If you have soreness or pain, apply ice to the area today (20 minutes on/20 minutes off)  · Starting tomorrow, you may use warm, moist heat or ice if needed  · You may have an increase or change in your discomfort for 36-48 hours after your treatment  · Apply ice and continue with any pain medication you have been prescribed  · Notify the [facility] if you have any of the following: redness, drainage, swelling, headache, stiff neck or fever above 100°F     SPECIAL INSTRUCTIONS  · Our office will contact you in approximately 7 days for a progress report  MEDICATIONS  · Continue to take all routine medications  · Our office may have instructed you to hold some medications  If you have a problem specifically related to your procedure, please call our office at [phone]  Problems not related to your procedure should be directed to your primary care physician

## 2019-10-03 NOTE — INTERVAL H&P NOTE
H&P reviewed  After examining the patient I find no changes in the patients condition since the H&P had been written      Vitals:    10/03/19 1109   BP: 109/71   Pulse: 80   Resp: 18   Temp: (!) 97 °F (36 1 °C)   SpO2: 97%

## 2019-10-03 NOTE — OP NOTE
OPERATIVE REPORT  PATIENT NAME: Nkechi Mejía    :  1965  MRN: [id number]  Pt Location: [facility] OR ROOM 01    SURGERY DATE: 10/3/2019    Surgeon(s) and Role:     * Cecilia Arnold MD - Primary    Preop Diagnosis:  Cervical radiculitis [M54 12]    Post-Op Diagnosis Codes:     * Cervical radiculitis [M54 12]    Procedure(s) (LRB):  C7-T1 interlaminar epidural steroid injection (Left)    Specimen(s):  * No specimens in log *    Estimated Blood Loss:   Minimal    Drains:  * No LDAs found *    Anesthesia Type:   Local    Operative Indications:  Cervical radiculitis [M54 12]      Operative Findings:  same    Complications:   None    Procedure and Technique:  Fluoroscopically-guided cervical Interlaminar Epidural Steroid Injection     Indication:  Cervical pain  Preoperative diagnosis:  Cervical degenerative disc disease  Postoperative diagnosis:  Cervical degenerative disc disease    Procedure: Fluoroscopically-guided Left C7-T1 interlaminar epidural steroid injection under fluoroscopy      After discussing the risks, benefits, and alternatives to the procedure, the patient expressed understanding and wished to proceed  The patient was brought to the fluoroscopy suite and placed in the prone position  A procedural pause was conducted to verify:  correct patient identity, procedure to be performed and as applicable, correct side and site, correct patient position, and availability of implants, special equipment and special requirements  After identifying the C7-T1 space fluoroscopically, the skin was sterilely prepped and draped in the usual fashion using Chloraprep skin prep  The skin and subcutaneous tissue were anesthetized with 0 5 % lidocaine  Utilizing a loss of resistance technique and intermittent fluoroscopic guidance, a 3 5 20 gauge Tuohy needle was advanced into the epidural space  Proper needle positioning was confirmed using multiple fluoroscopic views    After negative aspiration, Omnipaque 300 contrast was injected confirming epidural spread without evidence of intravascular or intrathecal spread  A 4 ml solution consisting of 10 mg of dexamethasone in sterile saline was injected slowly and incrementally into the epidural space  Following the injection the needle was withdrawn slightly and flushed with 0 5 % lidocaine as it was fully extracted  The patient tolerated the procedure well and there were no apparent complications  After appropriate observation, the patient was dismissed from the clinic in good condition under their own power       I was present for the entire procedure    Patient Disposition:  hemodynamically stable    SIGNATURE: Gera Duvall MD  DATE: October 3, 2019  TIME: 11:18 AM

## 2019-10-04 DIAGNOSIS — R76.8 RHEUMATOID FACTOR POSITIVE: Primary | ICD-10-CM

## 2019-10-04 LAB
ANA HOMOGEN SER QL IF: NORMAL
ANA HOMOGEN TITR SER: NORMAL {TITER}
CRYOGLOB RF SER-ACNC: ABNORMAL [IU]/ML
RHEUMATOID FACT SER QL LA: POSITIVE
RYE IGE QN: POSITIVE

## 2019-10-04 NOTE — TELEPHONE ENCOUNTER
Patient called to schedule her EGD/colonoscopy  She is scheduled 11/8/19 with Dr Saima Burns at [facility]  Patient stated that she already has Suprep, but is not sure if she still has instructions  Suprep instructions and forms mailed to patient's home

## 2019-10-07 ENCOUNTER — TELEPHONE (OUTPATIENT)
Dept: FAMILY MEDICINE CLINIC | Facility: CLINIC | Age: 54
End: 2019-10-07

## 2019-10-07 DIAGNOSIS — E78.49 OTHER HYPERLIPIDEMIA: ICD-10-CM

## 2019-10-07 DIAGNOSIS — I25.10 CAD IN NATIVE ARTERY: ICD-10-CM

## 2019-10-07 DIAGNOSIS — M79.671 RIGHT FOOT PAIN: Primary | ICD-10-CM

## 2019-10-07 DIAGNOSIS — I73.9 PVD (PERIPHERAL VASCULAR DISEASE) (HCC): ICD-10-CM

## 2019-10-07 RX ORDER — PREDNISONE 10 MG/1
TABLET ORAL
Qty: 11 TABLET | Refills: 0 | Status: SHIPPED | OUTPATIENT
Start: 2019-10-07 | End: 2020-01-09 | Stop reason: ALTCHOICE

## 2019-10-07 NOTE — TELEPHONE ENCOUNTER
Have her call Podiatry  I will put in for a small amount of prednisone taper    Also, ask her if she is willing to go on the injectable cholesterol medication

## 2019-10-08 ENCOUNTER — APPOINTMENT (OUTPATIENT)
Dept: LAB | Facility: HOSPITAL | Age: 54
End: 2019-10-08
Payer: COMMERCIAL

## 2019-10-08 DIAGNOSIS — R23.2 FLUSHING: ICD-10-CM

## 2019-10-08 PROCEDURE — 83497 ASSAY OF 5-HIAA: CPT

## 2019-10-10 ENCOUNTER — TELEPHONE (OUTPATIENT)
Dept: PAIN MEDICINE | Facility: CLINIC | Age: 54
End: 2019-10-10

## 2019-10-11 LAB
5OH-INDOLEACETATE 24H UR-MCNC: 1.1 MG/L
5OH-INDOLEACETATE 24H UR-MRATE: 3.5 MG/24 HR (ref 0–14.9)

## 2019-10-14 ENCOUNTER — TELEPHONE (OUTPATIENT)
Dept: FAMILY MEDICINE CLINIC | Facility: CLINIC | Age: 54
End: 2019-10-14

## 2019-10-14 DIAGNOSIS — E78.49 OTHER HYPERLIPIDEMIA: ICD-10-CM

## 2019-10-14 DIAGNOSIS — I25.10 CAD IN NATIVE ARTERY: Primary | ICD-10-CM

## 2019-10-14 RX ORDER — EZETIMIBE 10 MG/1
10 TABLET ORAL DAILY
Qty: 30 TABLET | Refills: 5 | Status: SHIPPED | OUTPATIENT
Start: 2019-10-14 | End: 2020-01-21 | Stop reason: SDUPTHER

## 2019-10-14 NOTE — TELEPHONE ENCOUNTER
Pt called and left a vm message at 11:52 AM today stating she felt some improvement but would like to schedule another appt   Pt states she is approx  70% better

## 2019-10-14 NOTE — TELEPHONE ENCOUNTER
----- Message from Bebeto Robbins sent at 10/14/2019  3:00 PM EDT -----  [facility] STATES THEY WILL NOT APPROVE REPATHA UNTIL PT SANTIAGO Powell

## 2019-10-14 NOTE — TELEPHONE ENCOUNTER
I insurance will pay for the Repatha until we prescribe Zetia for her    I will put in a for prescription

## 2019-10-24 ENCOUNTER — OFFICE VISIT (OUTPATIENT)
Dept: PAIN MEDICINE | Facility: CLINIC | Age: 54
End: 2019-10-24
Payer: COMMERCIAL

## 2019-10-24 VITALS
SYSTOLIC BLOOD PRESSURE: 111 MMHG | WEIGHT: 141.6 LBS | DIASTOLIC BLOOD PRESSURE: 72 MMHG | HEIGHT: 60 IN | BODY MASS INDEX: 27.8 KG/M2

## 2019-10-24 DIAGNOSIS — M47.812 OSTEOARTHRITIS OF CERVICAL SPINE, UNSPECIFIED SPINAL OSTEOARTHRITIS COMPLICATION STATUS: ICD-10-CM

## 2019-10-24 DIAGNOSIS — M54.42 CHRONIC BILATERAL LOW BACK PAIN WITH LEFT-SIDED SCIATICA: ICD-10-CM

## 2019-10-24 DIAGNOSIS — M54.12 CERVICAL RADICULOPATHY: Primary | ICD-10-CM

## 2019-10-24 DIAGNOSIS — M54.12 CERVICAL RADICULITIS: ICD-10-CM

## 2019-10-24 DIAGNOSIS — M54.16 LUMBAR RADICULOPATHY: ICD-10-CM

## 2019-10-24 DIAGNOSIS — M79.18 MYOFASCIAL PAIN SYNDROME: ICD-10-CM

## 2019-10-24 DIAGNOSIS — G89.29 CHRONIC BILATERAL LOW BACK PAIN WITH LEFT-SIDED SCIATICA: ICD-10-CM

## 2019-10-24 DIAGNOSIS — M47.816 LUMBAR SPONDYLOSIS: ICD-10-CM

## 2019-10-24 PROCEDURE — 99214 OFFICE O/P EST MOD 30 MIN: CPT | Performed by: ANESTHESIOLOGY

## 2019-10-24 RX ORDER — NORTRIPTYLINE HYDROCHLORIDE 25 MG/1
25 CAPSULE ORAL
Qty: 30 CAPSULE | Refills: 1 | Status: SHIPPED | OUTPATIENT
Start: 2019-10-24 | End: 2020-05-13

## 2019-10-24 RX ORDER — BACLOFEN 10 MG/1
10 TABLET ORAL 3 TIMES DAILY
Qty: 90 TABLET | Refills: 0 | Status: SHIPPED | OUTPATIENT
Start: 2019-10-24 | End: 2019-12-30 | Stop reason: SDUPTHER

## 2019-10-24 NOTE — PROGRESS NOTES
Assessment:  1  Cervical radiculopathy    2  Lumbar radiculopathy    3  Myofascial pain syndrome    4  Cervical radiculitis    5  Osteoarthritis of cervical spine, unspecified spinal osteoarthritis complication status    6  Chronic bilateral low back pain with left-sided sciatica    7  Lumbar spondylosis        Plan:  Patient is a 40-year-old female complains of neck pain, left shoulder pain, low back pain, bilateral hip pain left worse than right presents to office for follow-up visit  Patient is status post C7-T1 interlaminar epidural steroid injection which was performed on 10/03/2019  Patient reported significant relief or weakness reported after which left shoulder pain return  1  We will start patient on physical therapy for cervical spine strengthening and lumbar core strengthening  The patient does not showing significant alleviation of her symptoms were improvement of her symptoms will then order an MRI of the cervical spine  This results of the MRI of the cervical spine will then proceed with either interventional procedures to alleviate her symptoms targeting specific anatomical locations present and the MRI of the cervical spine or will send patient for surgical intervention  Patient may be a spinal cord stimulator had  2  We will schedule patient for an ultrasound-guided paracervical trigger point injection and a left greater trochanteric bursa steroid injection  3  We will trial Nortriptyline 25 mg p o  T i d  For radicular symptoms  4  Follow-up 1 month after injection       Complete risks and benefits including bleeding, infection, tissue reaction, nerve injury and allergic reaction were discussed  The approach was demonstrated using models and literature was provided  Verbal and written consent was obtained  [place] Prescription Drug Monitoring Program report was reviewed and was appropriate       History of Present Illness:   The patient is a 47 y o  female who presents for a follow up office visit in regards to Back Pain; Neck Pain; Shoulder Pain; Leg Pain; Knee Pain; and Foot Pain  The patients current symptoms include 7/10 constant burning, dull/aching sharp, throbbing, pressure-like, shooting pain in the neck into the left shoulder, low back and bilateral hips and bilateral knees and bilateral feet without any particular time pattern  Current pain medications includes:    The patient reports that this regimen is providing 20% pain relief  The patient is reporting no side effects from this pain medication regimen  I have personally reviewed and/or updated the patient's past medical history, past surgical history, family history, social history, current medications, allergies, and vital signs today  Review of Systems  Review of Systems   Musculoskeletal: Positive for arthralgias, gait problem and joint swelling (left shoulder)  Decreased range of motion  Joint stiffness  Pain in extremity - left bicep   All other systems reviewed and are negative          Past Medical History:   Diagnosis Date    Acquired ichthyosis     last assessed: 5/9/2013    Anxiety     Cervical radiculopathy     last assessed: 6/13/2014    Colorectal polyps     last assessed: 3/9/2015    [facility] obstructive pulmonary [facility])     Depression     Diverticulosis of colon     Foot pain     GERD (gastroesophageal reflux disease)     Hyperlipemia     Hypertension     My[facility]     at age 28    Osteopenia     last assessed: 12/6/2013    Palpitations     last assessed: 12/31/2014    PVD (peripheral vascular disease) ([facility] )     last assessed: 12/3/2012    Scapular dyskinesis     last assessed: 11/17/2014    Shortness of breath     Tendonitis of left rotator cuff     last assessed: 11/17/2014    Vocal cord polyps     last assessed: 8/8/2014       Past Surgical History:   Procedure Laterality Date    ABDOMINAL SURGERY      exploratory   Izabella Fulton CARDIAC SURGERY cardiac stent      SECTION      last assessed: 2014    CHOLECYSTECTOMY      last assessed: 2014    COLONOSCOPY  10/27/2014    CORONARY ANGIOPLASTY WITH STENT PLACEMENT  1999    LAD stent    DENTAL SURGERY      last assessed: 2014    ELBOW SURGERY Bilateral     arthroplasty    EPIDURAL BLOCK INJECTION N/A 2019    Procedure: C7 T1 Cervical Epidural Steroid Injection (43275); Surgeon: Divina Garza MD;  [facility]: [facility];  Service: Pain Management     EPIDURAL BLOCK INJECTION Bilateral 2019    Procedure: BLOCK / INJECTION EPIDURAL STEROID CERVICAL - C7-T1;  Surgeon: Divina Garza MD;  [facility]: [facility];  Service: Pain Management     EPIDURAL BLOCK INJECTION Left 10/3/2019    Procedure: C7-T1 interlaminar epidural steroid injection;  Surgeon: Divina Garza MD;  [facility];  Service: Pain Management     ESOPHAGOGASTRODUODENOSCOPY      ESOPHAGOGASTRODUODENOSCOPY N/A 2016    Procedure: ESOPHAGOGASTRODUODENOSCOPY (EGD);   Surgeon: Maria E Joyner MD;  [facility];  Service:     FL GUIDED NEEDLE PLAC BX/ASP/INJ  2019    FL GUIDED NEEDLE PLAC BX/ASP/INJ  2019    FL GUIDED NEEDLE PLAC BX/ASP/INJ  10/3/2019    FL INJECTION LEFT SHOULDER (ARTHROGRAM)  2018    FOOT SURGERY Right 02/06/2015    x 4    HYSTERECTOMY      last assessed: 2014    SD ARTHROCENTESIS ASPIR&/INJ MAJOR JT/BURSA W/O US Bilateral 2019    Procedure: GREATER TROCHANTERIC HIP INJECTION;  Surgeon: Divina Garza MD;  [facility];  Service: Pain Management     SD ARTHROSCOPY SHOULDER SURGICAL BICEPS TENODESIS Left 10/15/2018    Procedure: ARTHROSCOPY SHOULDER; Debridement of shoulder;  Surgeon: Barrington Peoples MD;  [facility]: [facility];  Service: Orthopedics    SD SHLDR ARTHROSCOP,SURG,W/ROTAT CUFF REPR Left 2018    Procedure: ARTHROSCOPY SHOULDER, subacromial decompression, synovectomy;  Surgeon: Barrington Peoples MD; [facility]: [facility];  Service: Orthopedics    THROAT SURGERY      TOOTH EXTRACTION      TRIGEMINAL NERVE DECOMPRESSION Right 6/15/2018    Procedure: FOOT NEUROPLASTY;  Surgeon: Catrachita De Los Santos DPM;  [facility];  Service: Podiatry       Family History   Problem Relation Age of Onset    No Known Problems Mother     No Known Problems Father     No Known Problems Maternal Grandmother     No Known Problems Maternal Grandfather     No Known Problems Paternal Grandmother     No Known Problems Paternal Grandfather        Social History     Occupational History    Not on file   Tobacco Use    Smoking status: Current Every Day Smoker     Packs/day: 0 50    Smokeless tobacco: Never Used   Substance and Sexual Activity    Alcohol use: Not Currently     Comment: rare    Drug use: No    Sexual activity: Not on file         Current Outpatient Medications:     albuterol (PROVENTIL HFA,VENTOLIN HFA) 90 mcg/act inhaler, inhale 2 puffs by mouth every 6 hours if needed for wheezing, Disp: , Rfl: 0    aspirin 81 mg chewable tablet, Chew 1 tablet daily Stop for the time being for foot surgery , Disp: , Rfl:     baclofen 10 mg tablet, Take 1 tablet (10 mg total) by mouth 3 (three) times a day, Disp: 90 tablet, Rfl: 0    cyclobenzaprine (FLEXERIL) 10 mg tablet, Take 1 tablet (10 mg total) by mouth 3 (three) times a day as needed for muscle spasms, Disp: 30 tablet, Rfl: 0    diclofenac sodium (VOLTAREN) 50 mg EC tablet, Take 1 tablet (50 mg total) by mouth 2 (two) times a day, Disp: 60 tablet, Rfl: 1    Evolocumab (REPATHA SURECLICK) 849 MG/ML SOAJ, Inject 1 mL (140 mg total) under the skin every 14 (fourteen) days, Disp: 2 pen, Rfl: 5    ezetimibe (ZETIA) 10 mg tablet, Take 1 tablet (10 mg total) by mouth daily, Disp: 30 tablet, Rfl: 5    gabapentin (NEURONTIN) 300 mg capsule, Take 1 capsule (300 mg total) by mouth 3 (three) times a day (Patient not taking: Reported on 10/2/2019), Disp: 90 capsule, Rfl: 1    nitroglycerin (NITROSTAT) 0 4 mg SL tablet, Place 1 tablet (0 4 mg total) under the tongue every 5 (five) minutes as needed for chest pain, Disp: 90 tablet, Rfl: 3    oxyCODONE-acetaminophen (PERCOCET) 5-325 mg per tablet, Take 1 tablet by mouth every 12 (twelve) hours , Disp: , Rfl:     predniSONE 10 mg tablet, 2 tablets daily for 3 days, then 1 tablet daily for 3 days, then half tablet daily for 4 days, Disp: 11 tablet, Rfl: 0    ranitidine (ZANTAC) 150 mg tablet, Take 1 tablet (150 mg total) by mouth 2 (two) times a day, Disp: 60 tablet, Rfl: 5    rosuvastatin (CRESTOR) 5 mg tablet, Take 1 tablet (5 mg total) by mouth daily, Disp: 90 tablet, Rfl: 3    Allergies   Allergen Reactions    Ceclor [Cefaclor] Anaphylaxis    Codeine Itching     Reaction Date: [id number];     Ticlid [Ticlopidine] Hives    Penicillins Rash       Physical Exam:    There were no vitals taken for this visit  Constitutional:normal, well developed, well nourished, alert, in no distress and non-toxic and no overt pain behavior  Eyes:anicteric  HEENT:grossly intact  Neck:supple, symmetric, trachea midline and no masses   Pulmonary:even and unlabored  Cardiovascular:No edema or pitting edema present  Skin:Normal without rashes or lesions and well hydrated  Psychiatric:Mood and affect appropriate  Neurologic:Cranial Nerves II-XII grossly intact  Musculoskeletal:antalgic , tenderness to palpation of the left greater trochanteric bursa    Cervical Spine examination demonstrates  Decreased ROM secondary to pain with lateral rotation to the left/right and bending to the left/right, in addition to neck flexion  5/5 upper extremity strength in all muscle groups bilaterally  Palpable muscle spasm in the left side of her neck along the sternocleidomastoid muscle in addition to the left biceps  Negative Spurling's maneuver to the b/l Ue, sensitivity to light touch intact b/l Ue             Imaging  INDICATION:   [id number] [id number]: Pain in right foot   Pain on the medial aspect of the 1st metatarsal      COMPARISON:  Right foot plain films from 3/1/2019      VIEWS:  XR FOOT 3+ VW RIGHT         FINDINGS:     There is no acute fracture or dislocation    There has been prior resection of the distal 4th metatarsal      Mild 1st metatarsophalangeal joint osteoarthritis with marginal osteophytes      No lytic or blastic lesions seen      Soft tissues are unremarkable      IMPRESSION:     No acute osseous abnormality      Mild 1st metatarsophalangeal joint osteoarthritis

## 2019-10-31 ENCOUNTER — TELEPHONE (OUTPATIENT)
Dept: GASTROENTEROLOGY | Facility: CLINIC | Age: 54
End: 2019-10-31

## 2019-11-11 ENCOUNTER — TELEPHONE (OUTPATIENT)
Dept: FAMILY MEDICINE CLINIC | Facility: CLINIC | Age: 54
End: 2019-11-11

## 2019-11-11 NOTE — TELEPHONE ENCOUNTER
Patient asking if she is supposed to take her Crestor with the Ezetimibe or if she is supposed to discontinue the Crestor?

## 2019-11-13 ENCOUNTER — PROCEDURE VISIT (OUTPATIENT)
Dept: PAIN MEDICINE | Facility: CLINIC | Age: 54
End: 2019-11-13
Payer: COMMERCIAL

## 2019-11-13 DIAGNOSIS — G89.4 CHRONIC PAIN SYNDROME: ICD-10-CM

## 2019-11-13 DIAGNOSIS — E78.5 HYPERLIPIDEMIA, UNSPECIFIED HYPERLIPIDEMIA TYPE: ICD-10-CM

## 2019-11-13 DIAGNOSIS — M54.2 NECK PAIN: ICD-10-CM

## 2019-11-13 DIAGNOSIS — M70.62 TROCHANTERIC BURSITIS OF LEFT HIP: Primary | ICD-10-CM

## 2019-11-13 DIAGNOSIS — M79.18 MYOFASCIAL PAIN SYNDROME: ICD-10-CM

## 2019-11-13 PROCEDURE — 20611 DRAIN/INJ JOINT/BURSA W/US: CPT | Performed by: ANESTHESIOLOGY

## 2019-11-13 PROCEDURE — 20552 NJX 1/MLT TRIGGER POINT 1/2: CPT | Performed by: ANESTHESIOLOGY

## 2019-11-13 PROCEDURE — 76942 ECHO GUIDE FOR BIOPSY: CPT | Performed by: ANESTHESIOLOGY

## 2019-11-13 RX ORDER — BUPIVACAINE HYDROCHLORIDE 2.5 MG/ML
5 INJECTION, SOLUTION INFILTRATION; PERINEURAL ONCE
Status: COMPLETED | OUTPATIENT
Start: 2019-11-13 | End: 2019-11-13

## 2019-11-13 RX ORDER — ROSUVASTATIN CALCIUM 5 MG/1
5 TABLET, COATED ORAL DAILY
Qty: 90 TABLET | Refills: 3 | Status: SHIPPED | OUTPATIENT
Start: 2019-11-13 | End: 2020-01-21 | Stop reason: SDUPTHER

## 2019-11-13 RX ORDER — METHYLPREDNISOLONE ACETATE 40 MG/ML
1 INJECTION, SUSPENSION INTRA-ARTICULAR; INTRALESIONAL; INTRAMUSCULAR; SOFT TISSUE
Status: COMPLETED | OUTPATIENT
Start: 2019-11-13 | End: 2019-11-13

## 2019-11-13 RX ORDER — LIDOCAINE HYDROCHLORIDE 10 MG/ML
1 INJECTION, SOLUTION INFILTRATION; PERINEURAL
Status: COMPLETED | OUTPATIENT
Start: 2019-11-13 | End: 2019-11-13

## 2019-11-13 RX ORDER — METHYLPREDNISOLONE ACETATE 40 MG/ML
40 INJECTION, SUSPENSION INTRA-ARTICULAR; INTRALESIONAL; INTRAMUSCULAR; SOFT TISSUE ONCE
Status: COMPLETED | OUTPATIENT
Start: 2019-11-13 | End: 2019-11-13

## 2019-11-13 RX ORDER — BUPIVACAINE HYDROCHLORIDE 2.5 MG/ML
2 INJECTION, SOLUTION INFILTRATION; PERINEURAL
Status: COMPLETED | OUTPATIENT
Start: 2019-11-13 | End: 2019-11-13

## 2019-11-13 RX ADMIN — LIDOCAINE HYDROCHLORIDE 1 ML: 10 INJECTION, SOLUTION INFILTRATION; PERINEURAL at 09:00

## 2019-11-13 RX ADMIN — METHYLPREDNISOLONE ACETATE 1 ML: 40 INJECTION, SUSPENSION INTRA-ARTICULAR; INTRALESIONAL; INTRAMUSCULAR; SOFT TISSUE at 09:00

## 2019-11-13 RX ADMIN — METHYLPREDNISOLONE ACETATE 40 MG: 40 INJECTION, SUSPENSION INTRA-ARTICULAR; INTRALESIONAL; INTRAMUSCULAR; SOFT TISSUE at 09:03

## 2019-11-13 RX ADMIN — BUPIVACAINE HYDROCHLORIDE 2 ML: 2.5 INJECTION, SOLUTION INFILTRATION; PERINEURAL at 09:00

## 2019-11-13 RX ADMIN — BUPIVACAINE HYDROCHLORIDE 5 ML: 2.5 INJECTION, SOLUTION INFILTRATION; PERINEURAL at 09:03

## 2019-11-13 NOTE — PATIENT INSTRUCTIONS
1  Do not apply heat to any area that is numb  If you have discomfort or soreness at the injection site, you may apply ice today, 20 minutes on and 20 minutes off  Tomorrow you may use ice or warm, moist heat  Do not apply ice or heat directly to the skin  2  If you experience severe shortness of breath, go to the Emergency Room  3  You may have numbness for several hours from the local anesthetic  Please use caution and common sense, especially with weight-bearing activities  4  You may have an increase or change in the discomfort for 36-48 hours after your treatment  Apply ice and continue with any pain medicine you have been prescribed  5  Do not do anything strenuous today  You may shower, but no tub baths or hot tubs today  You may resume your normal activities tomorrow, but do not overdo it  Resume normal activities slowly when you are feeling better  6  If you experience redness, drainage or swelling at the injection site, or if you develop a fever above 100 degrees, please call [facility] at [phone] or go to the Emergency Room  7  Continue to take all routine medicines prescribed by your primary care physician unless otherwise instructed by our staff  Most blood thinners should be started again according to your regularly scheduled dosing  If you have any questions, please give our office a call  If you have a problem specifically related to your procedure, please call our office at [phone]  Problems not related to your procedure should be directed to your primary care physician

## 2019-11-13 NOTE — PROGRESS NOTES
Ultrasound-guided left greater trochanteric bursa steroid injection  Date/Time: 11/13/2019 9:00 AM  Consent given by: patient  Site marked: site marked  Timeout: Immediately prior to procedure a time out was called to verify the correct patient, procedure, equipment, support staff and site/side marked as required   Supporting Documentation  Indications: pain   Procedure Details  Location: hip - L greater trochanteric bursa  Preparation: Patient was prepped and draped in the usual sterile fashion  Needle size: 22 G  Ultrasound guidance: yes  Approach: lateral  Medications administered: 2 mL bupivacaine 0 25 %; 1 mL lidocaine 1 %; 1 mL methylPREDNISolone acetate 40 mg/mL    Patient tolerance: patient tolerated the procedure well with no immediate complications  Dressing:  Sterile dressing applied          Ultrasound-guided left paracervical and trapezius trigger point injection with steroid     Date/Time 11/13/2019 9:02 AM     Performed by  Divina Garza MD     Authorized by Divina Garza MD      Universal Protocol Consent: Verbal consent obtained  Written consent obtained  Risks and benefits: risks, benefits and alternatives were discussed  Consent given by: patient  Patient understanding: patient states understanding of the procedure being performed  Patient consent: the patient's understanding of the procedure matches consent given  Procedure consent: procedure consent matches procedure scheduled  Relevant documents: relevant documents present and verified  Test results: test results available and properly labeled  Site marked: the operative site was marked  Radiology Images displayed and confirmed  If images not available, report reviewed: imaging studies available  Patient identity confirmed: verbally with patient  Time out: Immediately prior to procedure a "time out" was called to verify the correct patient, procedure, equipment, support staff and site/side marked as required        Preparation: Patient was prepped and draped in the usual sterile fashion  Site preparation: Chloraprep    Local anesthesia used: no     Anesthesia   Local anesthesia used: no     Sedation   Patient sedated: no        Specimen: no    Culture: no   Procedure Details   Procedure Notes: Indication:  Neck and back pain  Preoperative diagnosis: Myofascial pain  Postoperative diagnosis: Myofascial pain  Procedure: Ultrasound guided left paracervical, trapeziustrigger point injection(s)    After discussing the risks, benefits, and alternatives to the procedure, the patient expressed understanding and wished to proceed  The patient was brought to the procedure suite and placed in the paracervical, trapezius position  A procedural pause was conducted to verify: Correct patient identity, procedure to be performed and as applicable, correct side and site, correct patient position, and availability of implants, special equipment or special requirements  A simple surgical tray was used  Prior to the procedure, the paracervical, trapezius, rhomboid, lumbosacral musculature was examined with a 12MHz linear transducer to visualize the paracervical, trapezius, rhomboid, lumbosacral musculature and determine the optimal needle  Following this, the area was prepped with ChloraPrep scrub, then reexamined using the same transducer, a sterile ultrasound transducer cover, and sterile ultrasound transducer gel  Thereafter, using ultrasound guidance, a 3 5 inch 25 guage needle was advanced into the paracervical, trapezius, rhomboid, lumbosacral musculature  After visualization of the tip and negative aspiration of blood, air, or bodily fluids; 1cc of solution containing 40mg of depomedrol and 9 ml of 0 25% bupivacaine was injected into the paracervical, trapezius, rhomboid, lumbosacral musculature  The procedure was then repeated in the exact same way at the paracervical, trapezius, rhomboid, lumbosacral musculature   The patient tolerated the procedure well and there were no apparent complications  After an appropriate amount of observation, the patient was dismissed from the recovery area in good condition under their own power  The patient received a total steroid dose today of 40mg Depo-Medrol    Patient Transportation: confirmed  Patient tolerance: Patient tolerated the procedure well with no immediate complications

## 2019-11-19 ENCOUNTER — TELEPHONE (OUTPATIENT)
Dept: PAIN MEDICINE | Facility: CLINIC | Age: 54
End: 2019-11-19

## 2019-11-19 ENCOUNTER — APPOINTMENT (EMERGENCY)
Dept: CT IMAGING | Facility: HOSPITAL | Age: 54
End: 2019-11-19
Payer: COMMERCIAL

## 2019-11-19 ENCOUNTER — HOSPITAL ENCOUNTER (EMERGENCY)
Facility: HOSPITAL | Age: 54
Discharge: HOME | End: 2019-11-19
Attending: EMERGENCY MEDICINE | Admitting: EMERGENCY MEDICINE
Payer: COMMERCIAL

## 2019-11-19 VITALS
TEMPERATURE: 97 F | BODY MASS INDEX: 26.75 KG/M2 | SYSTOLIC BLOOD PRESSURE: 132 MMHG | DIASTOLIC BLOOD PRESSURE: 80 MMHG | WEIGHT: 136.24 LBS | HEART RATE: 84 BPM | RESPIRATION RATE: 18 BRPM | OXYGEN SATURATION: 97 % | HEIGHT: 60 IN

## 2019-11-19 DIAGNOSIS — R20.0 LEFT FACIAL NUMBNESS: ICD-10-CM

## 2019-11-19 DIAGNOSIS — R51.9 ACUTE INTRACTABLE HEADACHE, UNSPECIFIED HEADACHE TYPE: Primary | ICD-10-CM

## 2019-11-19 DIAGNOSIS — R51.9 INTRACTABLE HEADACHE, UNSPECIFIED CHRONICITY PATTERN, UNSPECIFIED HEADACHE TYPE: Primary | ICD-10-CM

## 2019-11-19 LAB
ANION GAP SERPL CALCULATED.3IONS-SCNC: 13 MMOL/L (ref 4–13)
BUN SERPL-MCNC: 21 MG/DL (ref 5–25)
CALCIUM SERPL-MCNC: 9.5 MG/DL (ref 8.3–10.1)
CHLORIDE SERPL-SCNC: 99 MMOL/L (ref 100–108)
CO2 SERPL-SCNC: 24 MMOL/L (ref 21–32)
CREAT SERPL-MCNC: 0.63 MG/DL (ref 0.6–1.3)
GFR SERPL CREATININE-BSD FRML MDRD: 102 ML/MIN/1.73SQ M
GLUCOSE SERPL-MCNC: 118 MG/DL (ref 65–140)
HOLD SPECIMEN: NORMAL
POTASSIUM SERPL-SCNC: 3.4 MMOL/L (ref 3.5–5.3)
SODIUM SERPL-SCNC: 136 MMOL/L (ref 136–145)

## 2019-11-19 PROCEDURE — 96365 THER/PROPH/DIAG IV INF INIT: CPT

## 2019-11-19 PROCEDURE — 99284 EMERGENCY DEPT VISIT MOD MDM: CPT | Performed by: EMERGENCY MEDICINE

## 2019-11-19 PROCEDURE — 80048 BASIC METABOLIC PNL TOTAL CA: CPT | Performed by: EMERGENCY MEDICINE

## 2019-11-19 PROCEDURE — 99284 EMERGENCY DEPT VISIT MOD MDM: CPT

## 2019-11-19 PROCEDURE — 70470 CT HEAD/BRAIN W/O & W/DYE: CPT

## 2019-11-19 PROCEDURE — 36415 COLL VENOUS BLD VENIPUNCTURE: CPT | Performed by: EMERGENCY MEDICINE

## 2019-11-19 PROCEDURE — 96367 TX/PROPH/DG ADDL SEQ IV INF: CPT

## 2019-11-19 PROCEDURE — 96375 TX/PRO/DX INJ NEW DRUG ADDON: CPT

## 2019-11-19 RX ORDER — METOCLOPRAMIDE HYDROCHLORIDE 5 MG/ML
10 INJECTION INTRAMUSCULAR; INTRAVENOUS ONCE
Status: COMPLETED | OUTPATIENT
Start: 2019-11-19 | End: 2019-11-19

## 2019-11-19 RX ORDER — PREDNISONE 20 MG/1
40 TABLET ORAL ONCE
Status: COMPLETED | OUTPATIENT
Start: 2019-11-19 | End: 2019-11-19

## 2019-11-19 RX ORDER — MAGNESIUM SULFATE HEPTAHYDRATE 40 MG/ML
2 INJECTION, SOLUTION INTRAVENOUS ONCE
Status: COMPLETED | OUTPATIENT
Start: 2019-11-19 | End: 2019-11-19

## 2019-11-19 RX ORDER — KETOROLAC TROMETHAMINE 30 MG/ML
30 INJECTION, SOLUTION INTRAMUSCULAR; INTRAVENOUS ONCE
Status: COMPLETED | OUTPATIENT
Start: 2019-11-19 | End: 2019-11-19

## 2019-11-19 RX ORDER — PREDNISONE 10 MG/1
40 TABLET ORAL DAILY
Qty: 20 TABLET | Refills: 0 | Status: SHIPPED | OUTPATIENT
Start: 2019-11-19 | End: 2019-11-24

## 2019-11-19 RX ORDER — DIPHENHYDRAMINE HYDROCHLORIDE 50 MG/ML
25 INJECTION INTRAMUSCULAR; INTRAVENOUS ONCE
Status: COMPLETED | OUTPATIENT
Start: 2019-11-19 | End: 2019-11-19

## 2019-11-19 RX ADMIN — DIPHENHYDRAMINE HYDROCHLORIDE 25 MG: 50 INJECTION, SOLUTION INTRAMUSCULAR; INTRAVENOUS at 16:20

## 2019-11-19 RX ADMIN — METOCLOPRAMIDE 10 MG: 5 INJECTION, SOLUTION INTRAMUSCULAR; INTRAVENOUS at 16:21

## 2019-11-19 RX ADMIN — KETOROLAC TROMETHAMINE 30 MG: 30 INJECTION, SOLUTION INTRAMUSCULAR at 18:48

## 2019-11-19 RX ADMIN — MAGNESIUM SULFATE HEPTAHYDRATE 2 G: 40 INJECTION, SOLUTION INTRAVENOUS at 16:21

## 2019-11-19 RX ADMIN — VALPROATE SODIUM 500 MG: 100 INJECTION, SOLUTION INTRAVENOUS at 19:01

## 2019-11-19 RX ADMIN — IOHEXOL 100 ML: 350 INJECTION, SOLUTION INTRAVENOUS at 17:54

## 2019-11-19 RX ADMIN — PREDNISONE 40 MG: 20 TABLET ORAL at 21:12

## 2019-11-19 RX ADMIN — SODIUM CHLORIDE 1000 ML: 0.9 INJECTION, SOLUTION INTRAVENOUS at 16:12

## 2019-11-19 NOTE — TELEPHONE ENCOUNTER
S/W pt  Pt stated since the injection her head feels like there is a "vice  on it "  She vomited from the pain, can't eat, hasn't showered the past week and has been laying around  She stated it will be a week tomorrow  Pt stated her face is red and she feels hot at times since the injection  She stated it hurts to turn her eyes  Pt stated her friend is coming to get her b/c she has 17 steps to stay at his house and she is weak  Headaches is a 10+/10  Pt has been using ice packs, tylenol, percocet and baclofen  Pt stated "I tried anything and everything to get rid of it "  Pt stated it hurts to talk and head feels like it is being squeezed  Advised pt to to to the ER since it is bad and excruciating and will get this message to RM and he is doing procedures today  Pharmacy on file  Please advise

## 2019-11-19 NOTE — TELEPHONE ENCOUNTER
Pt left message on voicemail with answering service stating that she has had a severe headache since her injection on Wednesday  States "it feels like her head is in a vice "  She stated she has been unable to talk until today and is not able to eat  She has been taking pain medicine for the headache with no relief  She would like to know what she can do for this

## 2019-11-19 NOTE — ED NOTES
Carlito Beyer from pharmacy notified about valproate (depacon)  Pharmacy stated he will bring them down as soon as possible        Mio Barlow RN  11/19/19 [id number]

## 2019-11-19 NOTE — ED PROVIDER NOTES
History  Chief Complaint   Patient presents with    Headache     pt recieved an injection in neck last tuesday for pain manangement  ever since getting injection pt had terrible pain in head and behind the eye  pt took percocet (today 1400), tylenol arthritis (0300a today), baclofen (early AM today) PTA     Patient presents to the emergency department at the direction of her pain management specialist, Dr Meghan Son, for further evaluation of 1 week of headache  She had a low, left cervical injection 1 week ago for her chronic cervical pain  The headache is diffuse, squeezing like a vice ", associated with episodes of lancinating pain in the left parietal scalp and several episodes of nausea with vomiting  She also has photophobia and fatigue  She does occasionally get headaches but states they are nothing like this in intensity, location or associated nausea/vomiting  She has been taking her prescribed baclofen, cyclobenzaprine and diclofenac for her chronic pain syndromes without relief  She has additionally taken Tylenol or Percocet without relief  She last took Tylenol this morning and Percocet at 1400 hours with no change  She has not eaten today and feels dry and dizzy as a results  She had crackers last night for dinner  She has no previously diagnosed h/o migraines  She does continue to smoke  Denies f/c, new or different neck or back pain, CP, SOB, abdominal pain, diarrhea or dysuria  12 system ROS o/w negative                    History provided by:  Patient and medical records  Headache   Pain location:  Generalized  Quality:  Dull (Sharp, stabbing episodes in the left parietal scalp)  Severity currently:  10/10  Severity at highest:  10/10  Onset quality:  Sudden  Duration:  7 hours  Timing:  Constant  Progression:  Waxing and waning  Chronicity:  New  Similar to prior headaches: no    Context: activity, bright light, coughing and straining    Relieved by:  Nothing  Worsened by: Activity, light, neck movement and sound  Ineffective treatments:  Acetaminophen and prescription medications  Associated symptoms: back pain (Chronic, unchanged), dizziness, fatigue, nausea, neck pain (Chronic, unchanged), photophobia and vomiting    Associated symptoms: no abdominal pain, no blurred vision, no congestion, no cough, no diarrhea, no drainage, no ear pain, no eye pain, no facial pain, no fever, no focal weakness, no hearing loss, no loss of balance, no myalgias, no near-syncope, no neck stiffness, no numbness, no paresthesias, no seizures, no sinus pressure, no sore throat, no swollen glands, no syncope, no tingling, no visual change and no weakness        Prior to Admission Medications   Prescriptions Last Dose Informant Patient Reported? Taking?    Evolocumab (REPATHA SURECLICK) 078 MG/ML SOAJ   No No   Sig: Inject 1 mL (140 mg total) under the skin every 14 (fourteen) days   albuterol (PROVENTIL HFA,VENTOLIN HFA) 90 mcg/act inhaler  Self Yes No   Sig: inhale 2 puffs by mouth every 6 hours if needed for wheezing   aspirin 81 mg chewable tablet 11/18/2019 at Unknown time Self Yes Yes   Sig: Chew 1 tablet daily Stop for the time being for foot surgery    baclofen 10 mg tablet 11/18/2019 at Unknown time  No Yes   Sig: Take 1 tablet (10 mg total) by mouth 3 (three) times a day   cyclobenzaprine (FLEXERIL) 10 mg tablet Past Week at Unknown time  No Yes   Sig: Take 1 tablet (10 mg total) by mouth 3 (three) times a day as needed for muscle spasms   diclofenac sodium (VOLTAREN) 50 mg EC tablet   No No   Sig: Take 1 tablet (50 mg total) by mouth 2 (two) times a day   ezetimibe (ZETIA) 10 mg tablet Past Week at Unknown time  No Yes   Sig: Take 1 tablet (10 mg total) by mouth daily   nitroglycerin (NITROSTAT) 0 4 mg SL tablet   No No   Sig: Place 1 tablet (0 4 mg total) under the tongue every 5 (five) minutes as needed for chest pain   nortriptyline (PAMELOR) 25 mg capsule Past Week at Unknown time  No Yes Sig: Take 1 capsule (25 mg total) by mouth daily at bedtime   oxyCODONE-acetaminophen (PERCOCET) 5-325 mg per tablet 2019 at Unknown time Self Yes Yes   Sig: Take 1 tablet by mouth every 12 (twelve) hours    predniSONE 10 mg tablet Not Taking at Unknown time  No No   Si tablets daily for 3 days, then 1 tablet daily for 3 days, then half tablet daily for 4 days   Patient not taking: Reported on 2019   ranitidine (ZANTAC) 150 mg tablet Not Taking at Unknown time Self No No   Sig: Take 1 tablet (150 mg total) by mouth 2 (two) times a day   Patient not taking: Reported on 2019   rosuvastatin (CRESTOR) 5 mg tablet Past Week at Unknown time  No Yes   Sig: Take 1 tablet (5 mg total) by mouth daily      Facility-Administered Medications: None       Past Medical History:   Diagnosis Date    Acquired ichthyosis     last assessed: 2013    Anxiety     Cervical radiculopathy     last assessed: 2014    Colorectal polyps     last assessed: 3/9/2015    [facility]     Depression     Diverticulosis of colon     Foot pain     GERD (gastroesophageal reflux disease)     Hyperlipemia     Hypertension     Myocardial infarction ([facility] )     at age 28    Osteopenia     last assessed: 2013    Palpitations     last assessed: 2014    PVD [facility]peripheral vascular disease) [facility]     last assessed: 12/3/2012    Scapular dyskinesis     last assessed: 2014    Shortness of breath     Tendonitis of left rotator cuff     last assessed: 2014    Vocal cord polyps     last assessed: 2014       Past Surgical History:   Procedure Laterality Date    ABDOMINAL SURGERY      exploratory    CARDIAC SURGERY      cardiac stent      SECTION      last assessed: 2014    CHOLECYSTECTOMY      last assessed: 2014    COLONOSCOPY  10/27/2014    CORONARY ANGIOPLASTY WITH STENT PLACEMENT      LAD stent   [facility]      last assessed: 8/8/2014    ELBOW SURGERY Bilateral     arthroplasty    EPIDURAL BLOCK INJECTION N/A 1/17/2019    Procedure: C7 T1 Cervical Epidural Steroid Injection (81873); Surgeon: Tiana Rivas MD;  [facility];  Service: Pain Management     EPIDURAL BLOCK INJECTION Bilateral 5/2/2019    Procedure: BLOCK / INJECTION EPIDURAL STEROID CERVICAL - C7-T1;  Surgeon: Tiana Rivas MD;  [facility];  Service: Pain Management     EPIDURAL BLOCK INJECTION Left 10/3/2019    Procedure: C7-T1 interlaminar epidural steroid injection;  Surgeon: Tiana Rivas MD;  [facility];  Service: Pain Management     ESOPHAGOGASTRODUODENOSCOPY      ESOPHAGOGASTRODUODENOSCOPY N/A 11/4/2016    Procedure: ESOPHAGOGASTRODUODENOSCOPY (EGD);   Surgeon: Raul Ching MD;  [facility];  Service:     FL GUIDED NEEDLE PLAC BX/ASP/INJ  5/2/2019    FL GUIDED NEEDLE PLAC BX/ASP/INJ  7/31/2019    FL GUIDED NEEDLE PLAC BX/ASP/INJ  10/3/2019    FL INJECTION LEFT SHOULDER (ARTHROGRAM)  9/25/2018    FOOT SURGERY Right 02/06/2015    x 4    HYSTERECTOMY      last assessed: 8/8/2014    ME ARTHROCENTESIS ASPIR&/INJ MAJOR JT/BURSA W/O US Bilateral 7/31/2019    Procedure: GREATER TROCHANTERIC HIP INJECTION;  Surgeon: Tiana Rivas MD;  [facility];  Service: Pain Management     ME ARTHROSCOPY SHOULDER SURGICAL BICEPS TENODESIS Left 10/15/2018    Procedure: ARTHROSCOPY SHOULDER; Debridement of shoulder;  Surgeon: Laxmi Nassar MD;  [facility];  Service: Orthopedics    ME SHLDR ARTHROSCOP,SURG,W/ROTAT CUFF REPR Left 7/23/2018    Procedure: ARTHROSCOPY SHOULDER, subacromial decompression, synovectomy;  Surgeon: Laxmi Nassar MD;  [facility];  Service: Orthopedics    THROAT SURGERY      TOOTH EXTRACTION      TRIGEMINAL NERVE DECOMPRESSION Right 6/15/2018    Procedure: FOOT NEUROPLASTY;  Surgeon: Radha Olsen DPM;  [facility];  Service: Podiatry Family History   Problem Relation Age of Onset    No Known Problems Mother     No Known Problems Father     No Known Problems Maternal Grandmother     No Known Problems Maternal Grandfather     No Known Problems Paternal Grandmother     No Known Problems Paternal Grandfather      I have reviewed and agree with the history as documented  Social History     Tobacco Use    Smoking status: Current Every Day Smoker     Packs/day: 0 50    Smokeless tobacco: Never Used   Substance Use Topics    Alcohol use: Not Currently     Comment: rare    Drug use: No        Review of Systems   Constitutional: Positive for appetite change and fatigue  Negative for chills and fever  HENT: Negative for congestion, ear pain, facial swelling, hearing loss, postnasal drip, sinus pressure and sore throat  Eyes: Positive for photophobia  Negative for blurred vision, pain and visual disturbance  Respiratory: Negative for cough, chest tightness and shortness of breath  Cardiovascular: Negative for chest pain, leg swelling, syncope and near-syncope  Gastrointestinal: Positive for nausea and vomiting  Negative for abdominal distention, abdominal pain and diarrhea  Genitourinary: Negative for dysuria and flank pain  Musculoskeletal: Positive for back pain (Chronic, unchanged) and neck pain (Chronic, unchanged)  Negative for myalgias and neck stiffness  Skin: Negative for pallor and rash  Neurological: Positive for dizziness and headaches  Negative for focal weakness, seizures, facial asymmetry, weakness, light-headedness, numbness, paresthesias and loss of balance  Hematological: Negative for adenopathy  Psychiatric/Behavioral: Negative for confusion  All other systems reviewed and are negative  Physical Exam  Physical Exam   Constitutional: She is oriented to person, place, and time  She appears well-developed and well-nourished  No distress  HENT:   Head: Normocephalic and atraumatic     Right Ear: External ear normal    Left Ear: External ear normal    Nose: Nose normal    Mouth/Throat: Oropharynx is clear and moist  No oropharyngeal exudate  Eyes: Pupils are equal, round, and reactive to light  Conjunctivae and EOM are normal  No scleral icterus    (+) photophobia   Neck: Normal range of motion  Neck supple  No nuchal rigidity   Cardiovascular: Normal rate and regular rhythm  No murmur heard  Pulmonary/Chest: Effort normal and breath sounds normal    Abdominal: Soft  Bowel sounds are normal  There is no tenderness  Musculoskeletal: Normal range of motion  She exhibits no edema or tenderness  Lymphadenopathy:     She has no cervical adenopathy  Neurological: She is alert and oriented to person, place, and time  She has normal reflexes  No cranial nerve deficit or sensory deficit  She exhibits normal muscle tone  Skin: Skin is warm and dry  No rash noted  She is not diaphoretic  Psychiatric: She has a normal mood and affect  Her behavior is normal  Thought content normal    Vitals reviewed        Vital Signs  ED Triage Vitals [11/19/19 1542]   Temperature Pulse Respirations Blood Pressure SpO2   (!) 97 °F (36 1 °C) (!) 125 18 124/88 95 %      Temp Source Heart Rate Source Patient Position - Orthostatic VS BP Location FiO2 (%)   Temporal Monitor Sitting Right arm --      Pain Score       7           Vitals:    11/19/19 1930 11/19/19 2000 11/19/19 2030 11/19/19 2100   BP: 148/88 139/76 138/85 132/80   Pulse: 93 89 85 84   Patient Position - Orthostatic VS: Sitting Sitting Sitting Sitting         Visual Acuity  Visual Acuity      Most Recent Value   L Pupil Size (mm)  3   R Pupil Size (mm)  3          ED Medications  Medications   predniSONE tablet 40 mg (has no administration in time range)   sodium chloride 0 9 % bolus 1,000 mL (0 mL Intravenous Stopped 11/19/19 1732)   diphenhydrAMINE (BENADRYL) injection 25 mg (25 mg Intravenous Given 11/19/19 1620)   metoclopramide (REGLAN) injection 10 mg (10 mg Intravenous Given 11/19/19 1621)   ketorolac (TORADOL) injection 30 mg (30 mg Intravenous Given 11/19/19 1848)   magnesium sulfate 2 g/50 mL IVPB (premix) 2 g (0 g Intravenous Stopped 11/19/19 1733)   iohexol (OMNIPAQUE) 350 MG/ML injection (MULTI-DOSE) 100 mL (100 mL Intravenous Given 11/19/19 1754)   valproate (DEPACON) 500 mg in sodium chloride 0 9 % 50 mL IVPB (0 mg Intravenous Stopped 11/19/19 2003)       Diagnostic Studies  Results Reviewed     Procedure Component Value Units Date/Time    [place] draw [[id number]] Collected:  11/19/19 1611    Lab Status: In process Specimen:  Blood from Arm, Right Updated:  11/19/19 1802    Narrative: The following orders were created for panel order [place] draw  Procedure                               Abnormality         Status                     ---------                               -----------         ------                     Emily Dust Top on UGQO[[id number]]                           Final result               Gold top on WYER[[id number]]                                 Final result               Green / Yellow tube on HSTU[[id number]]                      Final result               Green / Black tube on WOBX[[id number]]                       Final result               Lavender Top 3 ml on MTUJ[[id number]]                        Final result               Lavender Top 7ml on KEXN[[id number]]                         In process                   Please view results for these tests on the individual orders      Basic metabolic panel [[id number]]  (Abnormal) Collected:  11/19/19 1611    Lab Status:  Final result Specimen:  Blood from Arm, Right Updated:  11/19/19 1712     Sodium 136 mmol/L      Potassium 3 4 mmol/L      Chloride 99 mmol/L      CO2 24 mmol/L      ANION GAP 13 mmol/L      BUN 21 mg/dL      Creatinine 0 63 mg/dL      Glucose 118 mg/dL      Calcium 9 5 mg/dL      eGFR 102 ml/min/1 73sq m     Narrative:       Betzaida guidelines for Chronic Kidney Disease (CKD):     Stage 1 with normal or high GFR (GFR > 90 mL/min/1 73 square meters)    Stage 2 Mild CKD (GFR = 60-89 mL/min/1 73 square meters)    Stage 3A Moderate CKD (GFR = 45-59 mL/min/1 73 square meters)    Stage 3B Moderate CKD (GFR = 30-44 mL/min/1 73 square meters)    Stage 4 Severe CKD (GFR = 15-29 mL/min/1 73 square meters)    Stage 5 End Stage CKD (GFR <15 mL/min/1 73 square meters)  Note: GFR calculation is accurate only with a steady state creatinine                 CT head with and without contrast   Final Result by Maury Lorenzo MD (11/19 1824)      No acute intracranial hemorrhage, mass or edema  There is no abnormal enhancement  There is no enhancing intracranial mass lesion  Workstation performed: [id number]                    Procedures  Procedures       ED Course  ED Course as of Nov 19 2108   Tue Nov 19, 2019   1733 Patient reporting increased pain after Mag and Reglan  Awaiting CT head  2105 Patient reports the pain is improved but not gone  She would like to go home and follow up with Dr Dejan Harris as scheduled on Friday  Will give her a course of steroids until then  [facility]  Number of Diagnoses or Management Options  Diagnosis management comments: DDx: HA - migraine, doubt ICH, edema, pseudotumor cerebri  A/P: Will treat symptoms, reevaluate for further w/u or disposition         Amount and/or Complexity of Data Reviewed  Tests in the radiology section of CPT®: ordered and reviewed  Obtain history from someone other than the patient: yes (Pain management office notes)  Review and summarize past medical records: yes        Disposition  Final diagnoses:   Acute intractable headache, unspecified headache type     Time reflects when diagnosis was documented in both MDM as applicable and the Disposition within this note     Time User Action Codes Description Comment    11/19/2019  9:07 PM [address] [place] [address],[facility]  [address], [address] [[id number]] Acute intractable headache, unspecified headache type       ED Disposition     ED Disposition Condition Date/Time Comment    Discharge Stable [place] Nov 19, 2019  9:07 PM Eddi Skinner discharge to home/self care  Follow-up Information     Follow up With Specialties Details Why Contact Info    Miguel Angel Lambert MD Pain Medicine Go on 11/22/2019 as scheduled, for further evaluation and treatment [address] [place] [address], [facility] 1019 [phone]            Patient's Medications   Discharge Prescriptions    PREDNISONE 10 MG TABLET    Take 4 tablets (40 mg total) by mouth daily for 5 days       Start Date: 11/19/2019End Date: 11/24/2019       Order Dose: 40 mg       Quantity: 20 tablet    Refills: 0     No discharge procedures on file      ED Provider  Electronically Signed by           Pastor Grady DO  11/19/19 4053

## 2019-11-19 NOTE — TELEPHONE ENCOUNTER
S/W pt  Advised pt of the same  Pt verbalized understanding  Pt stated her friend will take her to the ER and he will be at her house by 2 and then go to the ER

## 2019-11-19 NOTE — CASE MANAGEMENT
I self referred the patient to discuss resources that might be available to her  She denied needing any help at this time  The patient lives alone in a 2nd floor apartment  There is 17 steps to get into her apartment  She has a cane she uses  She does not receive meals on wheels or home health services at this time  She takes care of her own ADL's and she drives  She uses [employer] in [place]  She has no trouble getting or paying for her medications  She has [employer] insurance  The patient stated she called her PCP prior to coming to the ED today

## 2019-11-20 ENCOUNTER — TELEPHONE (OUTPATIENT)
Dept: PAIN MEDICINE | Facility: CLINIC | Age: 54
End: 2019-11-20

## 2019-11-20 NOTE — ED NOTES
Patient provided with fluids  Patient states her headache is now at a 5/10        Rachelle Nolen RN  11/19/19  [address] , RN  11/19/19 5505

## 2019-11-21 NOTE — TELEPHONE ENCOUNTER
Spoke with patient, returning call to office from injection,  procedure,[facility] [address] was on11/13/19  Patient went to ED 11/19/19, had  CT and was given medication   Still having pain is in the eyes

## 2019-11-21 NOTE — TELEPHONE ENCOUNTER
CT head within normal limits  Pain in the head may be due to arthritis in the neck   How is her left hip

## 2019-11-22 NOTE — TELEPHONE ENCOUNTER
S/W pt  Advised pt of the same  Pt verbalized understanding  Pt stated her left hip is doing better, not 100 % yet  Her head pain varies from a 4-10/10, is better at times and is bearable  Pt stated overall she is better  Pt is taking prednisone from the ER  Advised pt will get this message to RM when he returns to the office on Monday

## 2019-12-23 DIAGNOSIS — M19.90 ARTHRITIS: ICD-10-CM

## 2019-12-23 RX ORDER — CYCLOBENZAPRINE HCL 10 MG
10 TABLET ORAL 3 TIMES DAILY PRN
Qty: 30 TABLET | Refills: 0 | Status: SHIPPED | OUTPATIENT
Start: 2019-12-23 | End: 2019-12-29 | Stop reason: SDUPTHER

## 2019-12-27 ENCOUNTER — TELEPHONE (OUTPATIENT)
Dept: PAIN MEDICINE | Facility: MEDICAL CENTER | Age: 54
End: 2019-12-27

## 2019-12-27 NOTE — TELEPHONE ENCOUNTER
LMOM to c/b, c/b# and OH given      **last ordered 10/24 with 0 refills, pt also taking Flexeril as prescribed by PCP

## 2019-12-29 DIAGNOSIS — M19.90 ARTHRITIS: ICD-10-CM

## 2019-12-29 RX ORDER — CYCLOBENZAPRINE HCL 10 MG
TABLET ORAL
Qty: 30 TABLET | Refills: 0 | Status: SHIPPED | OUTPATIENT
Start: 2019-12-29 | End: 2020-01-09

## 2019-12-30 DIAGNOSIS — M79.18 MYOFASCIAL PAIN SYNDROME: ICD-10-CM

## 2019-12-30 RX ORDER — BACLOFEN 10 MG/1
10 TABLET ORAL 3 TIMES DAILY
Qty: 90 TABLET | Refills: 0 | Status: SHIPPED | OUTPATIENT
Start: 2019-12-30 | End: 2020-05-06 | Stop reason: SDUPTHER

## 2019-12-30 NOTE — TELEPHONE ENCOUNTER
S/w pt, states she is taking baclofen 10mg TID and has not been taking flexeril much at all (reports she takes it occasionally at night but has not needed it much since nortriptyline was prescribed)  Please advise, thank you

## 2020-01-09 ENCOUNTER — OFFICE VISIT (OUTPATIENT)
Dept: PAIN MEDICINE | Facility: CLINIC | Age: 55
End: 2020-01-09
Payer: COMMERCIAL

## 2020-01-09 ENCOUNTER — APPOINTMENT (OUTPATIENT)
Dept: RADIOLOGY | Facility: MEDICAL CENTER | Age: 55
End: 2020-01-09
Payer: COMMERCIAL

## 2020-01-09 VITALS
WEIGHT: 138.8 LBS | HEIGHT: 60 IN | SYSTOLIC BLOOD PRESSURE: 128 MMHG | BODY MASS INDEX: 27.25 KG/M2 | DIASTOLIC BLOOD PRESSURE: 76 MMHG

## 2020-01-09 DIAGNOSIS — M79.18 MYOFASCIAL PAIN SYNDROME: Primary | ICD-10-CM

## 2020-01-09 DIAGNOSIS — M47.22 OSTEOARTHRITIS OF SPINE WITH RADICULOPATHY, CERVICAL REGION: ICD-10-CM

## 2020-01-09 DIAGNOSIS — M51.36 LUMBAR DEGENERATIVE DISC DISEASE: ICD-10-CM

## 2020-01-09 DIAGNOSIS — M54.2 NECK PAIN: ICD-10-CM

## 2020-01-09 DIAGNOSIS — M47.816 LUMBAR SPONDYLOSIS: ICD-10-CM

## 2020-01-09 DIAGNOSIS — M54.12 CERVICAL RADICULOPATHY: ICD-10-CM

## 2020-01-09 DIAGNOSIS — M54.16 LUMBAR RADICULOPATHY: ICD-10-CM

## 2020-01-09 DIAGNOSIS — M47.812 OSTEOARTHRITIS OF CERVICAL SPINE, UNSPECIFIED SPINAL OSTEOARTHRITIS COMPLICATION STATUS: ICD-10-CM

## 2020-01-09 PROCEDURE — 99214 OFFICE O/P EST MOD 30 MIN: CPT | Performed by: ANESTHESIOLOGY

## 2020-01-09 PROCEDURE — 72114 X-RAY EXAM L-S SPINE BENDING: CPT

## 2020-01-09 NOTE — PROGRESS NOTES
Assessment:  1  Lumbar spondylosis    2  Lumbar degenerative disc disease    3  Osteoarthritis of spine with radiculopathy, cervical region    4  Cervical radiculopathy    5  Osteoarthritis of cervical spine, unspecified spinal osteoarthritis complication status    6  Myofascial pain syndrome    7  Lumbar radiculopathy        Plan:  Patient's right basal skull was examined with ultrasound guidance to assess the questionable lump that patient reports clinically  On ultrasound did show hyperechoic region which was highly vascular without any significant findings of the capsulated mass  With physical examination distal the a questionable lesion on the right side of the base of her skull significant spasmatic muscles appreciated on most likely the trapezius and splenius capitis muscles  The patient reports alleviation of some myofascial pain  The patient is status post bilateral greater trochanteric bursa steroid injection with ultrasound guidance to which he reported significant alleviation the right bursa however her left hip/bursa continues to be unchanged  Patient does clinically present also with radicular symptoms in the L4 and L5 nerve root distribution left side worse than right  At this time we feel it is pertinent to exam patient's lumbar spine due to patient's presentation of a discogenic pathology with radicular symptoms  1  We will provide patient with a physical therapy referral for [place] vs Myofacial release of the right splenius capitis, trapezius muscles radiating to the base of the skull down into the shoulders  2  We will provide patient with a neurosurgical evaluation possible spinal cord stimulator for surgical intervention due to patient's reason only at left upper extremity radicular symptoms status post multiple cervical epidural steroid injections to alleviate the C5 radicular symptoms      3  We will order an x-ray of the lumbar spine to assess degenerative pathology that would correlate patient's bilateral leg pain left worse than right  The  4  Provide pains with physical therapy for the lumbar spine strengthening exercises  Patient feels showed improvement of her radicular symptoms we will then proceed with MRI of the lumbar spine  5  Refill baclofen 10 mg p o  T i d  myofascial pain  6  Refill diclofenac 50 mg p  O  T i d  For arthritic joint pain  7  Follow up in 6 weeks      [place] [id number] Program report was reviewed and was appropriate       History of Present Illness: The patient is a 47 y o  female who presents for a follow up office visit in regards to Shoulder Pain; Hip Pain; Headache; Back Pain; Knee Pain; and Foot Pain  The patients current symptoms include 5/10 constant burning, sharp, throbbing, pins and needles and neck and into the left upper extremity, low back and into bilateral lower extremities which is worse in the morning at nighttime  Current pain medications includes:    The patient reports that this regimen is providing 15% pain relief  The patient is reporting no side effects from this pain medication regimen  I have personally reviewed and/or updated the patient's past medical history, past surgical history, family history, social history, current medications, allergies, and vital signs today  Review of Systems  Review of Systems   Gastrointestinal: Positive for nausea  Musculoskeletal: Positive for arthralgias and gait problem  Decreased range of motion  Joint stiffness  Pain in extremity - Left arm / leg right leg     Neurological: Positive for dizziness  All other systems reviewed and are negative          Past Medical History:   Diagnosis Date    Acquired ichthyosis     last assessed: 5/9/2013    Anxiety     Cervical radiculopathy     last assessed: 6/13/2014    Colorectal polyps     last assessed: 3/9/2015    COPD (chronic obstructive pulmonary disease) (HCC)     Depression     Diverticulosis of colon     Foot pain     GERD (gastroesophageal reflux disease)     Hyperlipemia     Hypertension     Myocardial infarction [facility])     at age 28    Osteopenia     last assessed: 2013    Palpitations     last assessed: 2014    PVD (peripheral vascular disease) ([address] )     last assessed: 12/3/2012    Scapular dyskinesis     last assessed: 2014    Shortness of breath     Tendonitis of left rotator cuff     last assessed: 2014    Vocal cord polyps     last assessed: 2014       Past Surgical History:   Procedure Laterality Date    ABDOMINAL SURGERY      exploratory    CARDIAC SURGERY      cardiac stent      SECTION      last assessed: 2014    CHOLECYSTECTOMY      last assessed: 2014    COLONOSCOPY  10/27/2014    CORONARY ANGIOPLASTY WITH STENT PLACEMENT      LAD stent    DENTAL SURGERY      last assessed: 2014    ELBOW SURGERY Bilateral     arthroplasty    EPIDURAL BLOCK INJECTION N/A 2019    Procedure: C7 T1 Cervical Epidural Steroid Injection (26706); Surgeon: Analy Wilson MD;  [facility];  Service: Pain Management     EPIDURAL BLOCK INJECTION Bilateral 2019    Procedure: BLOCK / INJECTION EPIDURAL STEROID CERVICAL - C7-T1;  Surgeon: Analy Wilson MD;  [facility];  Service: Pain Management     EPIDURAL BLOCK INJECTION Left 10/3/2019    Procedure: C7-T1 interlaminar epidural steroid injection;  Surgeon: Analy Wilson MD;  [facility];  Service: Pain Management     ESOPHAGOGASTRODUODENOSCOPY      ESOPHAGOGASTRODUODENOSCOPY N/A 2016    Procedure: ESOPHAGOGASTRODUODENOSCOPY (EGD);   Surgeon: Grover Moffett MD;  [facility];  Service:     FL GUIDED NEEDLE PLAC BX/ASP/INJ  2019    FL GUIDED NEEDLE PLAC BX/ASP/INJ  2019    FL GUIDED NEEDLE PLAC BX/ASP/INJ  10/3/2019    FL INJECTION LEFT SHOULDER (ARTHROGRAM)  2018    FOOT SURGERY Right 02/06/2015    x 4    HYSTERECTOMY      last assessed: 8/8/2014    MT ARTHROCENTESIS ASPIR&/INJ MAJOR JT/BURSA W/O US Bilateral 7/31/2019    Procedure: GREATER TROCHANTERIC HIP INJECTION;  Surgeon: Miguel Angel Lambert MD;  [facility];  Service: Pain Management     MT ARTHROSCOPY SHOULDER SURGICAL BICEPS TENODESIS Left 10/15/2018    Procedure: ARTHROSCOPY SHOULDER; Debridement of shoulder;  Surgeon: Javon Perea MD;  [facility];  Service: Orthopedics    MT SHLDR ARTHROSCOP,SURG,W/ROTAT CUFF REPR Left 7/23/2018    Procedure: ARTHROSCOPY SHOULDER, subacromial decompression, synovectomy;  Surgeon: Javon Perea MD;  [facility];  Service: Orthopedics    THROAT SURGERY      TOOTH EXTRACTION      TRIGEMINAL NERVE DECOMPRESSION Right 6/15/2018    Procedure: FOOT NEUROPLASTY;  Surgeon: Chely Adhikari DPM;  [facility];  Service: Podiatry       Family History   Problem Relation Age of Onset    No Known Problems Mother     No Known Problems Father     No Known Problems Maternal Grandmother     No Known Problems Maternal Grandfather     No Known Problems Paternal Grandmother     No Known Problems Paternal Grandfather        Social History     Occupational History    Not on file   Tobacco Use    Smoking status: Current Every Day Smoker     Packs/day: 0 50    Smokeless tobacco: Never Used   Substance and Sexual Activity    Alcohol use: Not Currently     Comment: rare    Drug use: No    Sexual activity: Not on file         Current Outpatient Medications:     albuterol (PROVENTIL HFA,VENTOLIN HFA) 90 mcg/act inhaler, inhale 2 puffs by mouth every 6 hours if needed for wheezing, Disp: , Rfl: 0    aspirin 81 mg chewable tablet, Chew 1 tablet daily Stop for the time being for foot surgery , Disp: , Rfl:     baclofen 10 mg tablet, Take 1 tablet (10 mg total) by mouth 3 (three) times a day, Disp: 90 tablet, Rfl: 0    cyclobenzaprine (FLEXERIL) 10 mg tablet, take 1 tablet by mouth three times a day if needed for muscle spasm, Disp: 30 tablet, Rfl: 0    diclofenac sodium (VOLTAREN) 50 mg EC tablet, Take 1 tablet (50 mg total) by mouth 2 (two) times a day, Disp: 60 tablet, Rfl: 1    Evolocumab (REPATHA SURECLICK) 425 MG/ML SOAJ, Inject 1 mL (140 mg total) under the skin every 14 (fourteen) days, Disp: 2 pen, Rfl: 5    ezetimibe (ZETIA) 10 mg tablet, Take 1 tablet (10 mg total) by mouth daily, Disp: 30 tablet, Rfl: 5    nitroglycerin (NITROSTAT) 0 4 mg SL tablet, Place 1 tablet (0 4 mg total) under the tongue every 5 (five) minutes as needed for chest pain, Disp: 90 tablet, Rfl: 3    nortriptyline (PAMELOR) 25 mg capsule, Take 1 capsule (25 mg total) by mouth daily at bedtime, Disp: 30 capsule, Rfl: 1    oxyCODONE-acetaminophen (PERCOCET) 5-325 mg per tablet, Take 1 tablet by mouth every 12 (twelve) hours , Disp: , Rfl:     predniSONE 10 mg tablet, 2 tablets daily for 3 days, then 1 tablet daily for 3 days, then half tablet daily for 4 days (Patient not taking: Reported on 11/19/2019), Disp: 11 tablet, Rfl: 0    ranitidine (ZANTAC) 150 mg tablet, Take 1 tablet (150 mg total) by mouth 2 (two) times a day (Patient not taking: Reported on 11/19/2019), Disp: 60 tablet, Rfl: 5    rosuvastatin (CRESTOR) 5 mg tablet, Take 1 tablet (5 mg total) by mouth daily, Disp: 90 tablet, Rfl: 3    Allergies   Allergen Reactions    Ceclor [Cefaclor] Anaphylaxis    Codeine Itching     Reaction Date: 09Jun2011;     Ticlid [Ticlopidine] Hives    Penicillins Rash       Physical Exam:    /76 (BP Location: Right arm, Patient Position: Sitting, Cuff Size: Adult)   Ht 5' (1 524 m)   Wt 63 kg (138 lb 12 8 oz)   BMI 27 11 kg/m²     Constitutional:normal, well developed, well nourished, alert, in no distress and non-toxic and no overt pain behavior    Eyes:anicteric  HEENT:grossly intact  Neck:supple, symmetric, trachea midline and no masses   Pulmonary:even and unlabored  Cardiovascular:No edema or pitting edema present  Skin:Normal without rashes or lesions and well hydrated  Psychiatric:Mood and affect appropriate  Neurologic:Cranial Nerves II-XII grossly intact  Musculoskeletal:antalgic    Imaging  No orders to display       No orders of the defined types were placed in this encounter

## 2020-01-21 ENCOUNTER — OFFICE VISIT (OUTPATIENT)
Dept: CARDIOLOGY CLINIC | Facility: HOSPITAL | Age: 55
End: 2020-01-21
Payer: COMMERCIAL

## 2020-01-21 VITALS
HEART RATE: 82 BPM | DIASTOLIC BLOOD PRESSURE: 84 MMHG | BODY MASS INDEX: 26.88 KG/M2 | SYSTOLIC BLOOD PRESSURE: 124 MMHG | HEIGHT: 60 IN | WEIGHT: 136.91 LBS

## 2020-01-21 DIAGNOSIS — I25.10 CAD IN NATIVE ARTERY: ICD-10-CM

## 2020-01-21 DIAGNOSIS — Z72.0 TOBACCO ABUSE: ICD-10-CM

## 2020-01-21 DIAGNOSIS — E78.5 HYPERLIPIDEMIA, UNSPECIFIED HYPERLIPIDEMIA TYPE: ICD-10-CM

## 2020-01-21 DIAGNOSIS — E78.49 OTHER HYPERLIPIDEMIA: ICD-10-CM

## 2020-01-21 DIAGNOSIS — I10 ESSENTIAL HYPERTENSION: Primary | ICD-10-CM

## 2020-01-21 PROCEDURE — 3074F SYST BP LT 130 MM HG: CPT | Performed by: INTERNAL MEDICINE

## 2020-01-21 PROCEDURE — 93000 ELECTROCARDIOGRAM COMPLETE: CPT | Performed by: INTERNAL MEDICINE

## 2020-01-21 PROCEDURE — 99213 OFFICE O/P EST LOW 20 MIN: CPT | Performed by: INTERNAL MEDICINE

## 2020-01-21 PROCEDURE — 3079F DIAST BP 80-89 MM HG: CPT | Performed by: INTERNAL MEDICINE

## 2020-01-21 RX ORDER — EZETIMIBE 10 MG/1
10 TABLET ORAL DAILY
Qty: 90 TABLET | Refills: 3 | Status: SHIPPED | OUTPATIENT
Start: 2020-01-21 | End: 2020-06-20 | Stop reason: SDUPTHER

## 2020-01-21 RX ORDER — ROSUVASTATIN CALCIUM 5 MG/1
5 TABLET, COATED ORAL DAILY
Qty: 90 TABLET | Refills: 3 | Status: SHIPPED | OUTPATIENT
Start: 2020-01-21 | End: 2020-11-04 | Stop reason: SDUPTHER

## 2020-01-21 NOTE — PROGRESS NOTES
Cardiology Follow Up    Jonathan Shearer  1965  [id number]  [address] [facility]  [phone]    No diagnosis found  There are no diagnoses linked to this encounter  I had the pleasure of seeing Jonathan Shearer for a follow up visit  Interval History: none    History of the presenting illness, Discussion/Summary and My Plan are as follows: She is a pleasant 80-year-old lady with a history of coronary disease, prior percutaneous coronary intervention to the left anterior descending coronary artery in 1998, hypertension as well as marked dyslipidemia with LDL has high as 170 to 220 and a history of non compliance withmeds and follow ups  For atypical CPs, I had her do a Bia Nuc perfusion study that did not demonstrate ischemia - in September 2014  She had also been noncompliant with medications and followups  Continues to smoke      Her risk factors include hypertension, marked dyslipidemia, existing coronary artery disease as well as smoking     Since been modestly active without any symptoms, limited by arthritis     Plan:     CAD,  prior percutaneous coronary intervention to the left anterior descending coronary artery in 1998: Currently modestly active without symptoms  Limited by neck arthritis in pain     Dyslipidemia/Familial Hypercholesterolemia: Pravastatin with worsening leg pains  For a short while she was on Repatha (PCSK9 inhibitor)  Then it became expensive apparently  Had started her on Crestor with lowering of LDL to 101, subsequently 145, now also on Zetia which he stopped a few days ago due to a rash on the left arm, although the rash 2 maculopapular eruptions started 2 months after starting Zetia  She will resume this      Coronary artery disease, status post PCI to the LAD in 98, for atypical chest pains- resolved: Bia Nuc in Sept 2014 without ischemia  If has recurrence of chest pains, will then check a stress test  At this time she needs to quit smoking  On aspirin and statin, not on a beta-blocker - Metoprolol made her feel cold and caused hair loss     HTN: currently controlled,     Tobacco use: She is rolling her own cigarettes  , currently equivalent of half pack a day  Her tobacco use remains in equally potent risk factor - cessation was strongly advised       Follow up in 6 months      Results for Tori Casper (MRN [id number]) as of 1/21/2020 13:32   Ref   Range 4/15/2016 10:29 no meds 8/30/2016 08:45 9/4/2018 13:59 on rosuvastatin  10/3/2019 12:21 on rosuvastatin with questionable compliance   Cholesterol Latest Ref Range: 50 - 200 mg/dL 314 (H) 280 (H) 177  225 (H)   Triglycerides Latest Ref Range: <=150 mg/dL 153 (H) 137 135  134   HDL Latest Ref Range: 40 - 60 mg/dL 58 54 49  53   LDL Direct Latest Ref Range: 0 - 100 mg/dL 225 (H) 199 (H) 101 (H)  145 (H)            Patient Active Problem List   Diagnosis    Gastro-esophageal reflux disease without esophagitis    Right lower quadrant pain    Abnormal weight loss    Acute pain of left shoulder    Lumbar spondylosis    Osteoarthritis of spine with radiculopathy, cervical region    Degenerative cervical disc    Lumbar degenerative disc disease    Bilateral carpal tunnel syndrome    CAD in native artery    Depression with anxiety    Other hyperlipidemia    Essential hypertension    PVD (peripheral vascular disease) (HCC)    Headache disorder    Rheumatoid factor positive    Positive KRISTA (antinuclear antibody)    Left facial numbness    Myofascial pain syndrome    Calcific tendinitis of left shoulder    Incomplete tear of left rotator cuff    Neuroma of foot    Chronic hepatitis C without hepatic coma (HCC)    Colorectal polyps    Macrocytic    Irritable bowel syndrome with diarrhea    Incomplete rotator cuff tear or rupture of left shoulder, not specified as traumatic    Biceps tendinosis of left shoulder    Arthritis    Cervical radiculopathy    Cervical radiculitis    RUQ pain    Sacroiliitis (HCC)    Chronic pain syndrome    RUQ abdominal pain    Dilated bile duct    Gastroesophageal reflux disease    Radiculopathy, cervical    Trochanteric bursitis of right hip    Trochanteric bursitis of left hip    Lumbar radiculopathy    Chronic bilateral low back pain with left-sided sciatica    Osteoarthritis of cervical spine    Flushing    Tobacco abuse    Neck pain     Past Medical History:   Diagnosis Date    Acquired ichthyosis     last assessed: 5/9/2013    Anxiety     Cervical radiculopathy     last assessed: 6/13/2014    Colorectal polyps     last assessed: 3/9/2015    COPD (chronic obstructive pulmonary disease) ([facility] )     Depression     Diverticulosis of colon     Foot pain     GERD (gastroesophageal reflux disease)     Hyperlipemia     Hypertension     Myocardial infarction ([facility] )     at age 28    Osteopenia     last assessed: 12/6/2013    Palpitations     last assessed: 12/31/2014    PVD (peripheral vascular disease) ([place] [address] )     last assessed: 12/3/2012    Scapular dyskinesis     last assessed: 11/17/2014    Shortness of breath     Tendonitis of left rotator cuff     last assessed: 11/17/2014    Vocal cord polyps     last assessed: 8/8/2014     Social History     Socioeconomic History    Marital status:      Spouse name: Not on file    Number of children: Not on file    Years of education: Not on file    Highest education level: Not on file   Occupational History    Not on file   Social Needs    Financial resource strain: Not on file    Food insecurity:     Worry: Not on file     Inability: Not on file    Transportation needs:     Medical: Not on file     Non-medical: Not on file   Tobacco Use    Smoking status: Current Every Day Smoker     Packs/day: 0 50    Smokeless tobacco: Never Used   Substance and Sexual Activity    Alcohol use: Not Currently     Comment: rare    Drug use: No    Sexual activity: Not on file   Lifestyle    Physical activity:     Days per week: Not on file     Minutes per session: Not on file    Stress: Not on file   Relationships    Social connections:     Talks on phone: Not on file     Gets together: Not on file     Attends Oriental orthodox service: Not on file     Active member of club or organization: Not on file     Attends meetings of clubs or organizations: Not on file     Relationship status: Not on file    Intimate partner violence:     Fear of current or ex partner: Not on file     Emotionally abused: Not on file     Physically abused: Not on file     Forced sexual activity: Not on file   Other Topics Concern    Not on file   Social History Narrative    No living will      Family History   Problem Relation Age of Onset    No Known Problems Mother     No Known Problems Father     No Known Problems Maternal Grandmother     No Known Problems Maternal Grandfather     No Known Problems Paternal Grandmother     No Known Problems Paternal Grandfather      Past Surgical History:   Procedure Laterality Date    ABDOMINAL SURGERY      exploratory    CARDIAC SURGERY      cardiac stent      SECTION      last assessed: 2014    CHOLECYSTECTOMY      last assessed: 2014    COLONOSCOPY  10/27/2014    CORONARY ANGIOPLASTY WITH STENT PLACEMENT      LAD stent    DENTAL SURGERY      last assessed: 2014    ELBOW SURGERY Bilateral     arthroplasty    EPIDURAL BLOCK INJECTION N/A 2019    Procedure: C7 T1 Cervical Epidural Steroid Injection (30538);   Surgeon: Roseline Randhawa MD;  [facility];  Service: Pain Management     EPIDURAL BLOCK INJECTION Bilateral 2019    Procedure: BLOCK / INJECTION EPIDURAL STEROID CERVICAL - C7-T1;  Surgeon: Roseline Randhawa MD;  [facility];  Service: Pain Management     EPIDURAL BLOCK INJECTION Left 10/3/2019 Procedure: C7-T1 interlaminar epidural steroid injection;  Surgeon: Raguel Opitz, MD;  [facility];  Service: Pain Management     ESOPHAGOGASTRODUODENOSCOPY      ESOPHAGOGASTRODUODENOSCOPY N/A 11/4/2016    Procedure: ESOPHAGOGASTRODUODENOSCOPY (EGD);   Surgeon: Niki Vivas MD;  [facility];  Service:     FL GUIDED NEEDLE PLAC BX/ASP/INJ  5/2/2019    FL GUIDED NEEDLE PLAC BX/ASP/INJ  7/31/2019    FL GUIDED NEEDLE PLAC BX/ASP/INJ  10/3/2019    FL INJECTION LEFT SHOULDER (ARTHROGRAM)  9/25/2018    FOOT SURGERY Right 02/06/2015    x 4    HYSTERECTOMY      last assessed: 8/8/2014    NJ ARTHROCENTESIS ASPIR&/INJ MAJOR JT/BURSA W/O US Bilateral 7/31/2019    Procedure: GREATER TROCHANTERIC HIP INJECTION;  Surgeon: Raguel Opitz, MD;  [facility];  Service: Pain Management     NJ ARTHROSCOPY SHOULDER SURGICAL BICEPS TENODESIS Left 10/15/2018    Procedure: ARTHROSCOPY SHOULDER; Debridement of shoulder;  Surgeon: Manuela Barry MD;  [facility];  Service: Orthopedics    NJ SHLDR ARTHROSCOP,SURG,W/ROTAT CUFF REPR Left 7/23/2018    Procedure: ARTHROSCOPY SHOULDER, subacromial decompression, synovectomy;  Surgeon: Manuela Barry MD;  [facility];  Service: Orthopedics    THROAT SURGERY      TOOTH EXTRACTION      TRIGEMINAL NERVE DECOMPRESSION Right 6/15/2018    Procedure: FOOT NEUROPLASTY;  Surgeon: Leslye Bhagat DPM;  [facility];  Service: Podiatry       Current Outpatient Medications:     albuterol (PROVENTIL HFA,VENTOLIN HFA) 90 mcg/act inhaler, inhale 2 puffs by mouth every 6 hours if needed for wheezing, Disp: , Rfl: 0    aspirin 81 mg chewable tablet, Chew 1 tablet daily Stop for the time being for foot surgery , Disp: , Rfl:     baclofen 10 mg tablet, Take 1 tablet (10 mg total) by mouth 3 (three) times a day, Disp: 90 tablet, Rfl: 0    diclofenac sodium (VOLTAREN) 50 mg EC tablet, Take 1 tablet (50 mg total) by mouth 2 (two) times a day, Disp: 60 tablet, Rfl: 1    nortriptyline (PAMELOR) 25 mg capsule, Take 1 capsule (25 mg total) by mouth daily at bedtime, Disp: 30 capsule, Rfl: 1    oxyCODONE-acetaminophen (PERCOCET) 5-325 mg per tablet, Take 1 tablet by mouth every 12 (twelve) hours , Disp: , Rfl:     rosuvastatin (CRESTOR) 5 mg tablet, Take 1 tablet (5 mg total) by mouth daily, Disp: 90 tablet, Rfl: 3    Evolocumab (REPATHA SURECLICK) 703 MG/ML SOAJ, Inject 1 mL (140 mg total) under the skin every 14 (fourteen) days (Patient not taking: Reported on 1/21/2020), Disp: 2 pen, Rfl: 5    ezetimibe (ZETIA) 10 mg tablet, Take 1 tablet (10 mg total) by mouth daily (Patient not taking: Reported on 1/21/2020), Disp: 30 tablet, Rfl: 5    nitroglycerin (NITROSTAT) 0 4 mg SL tablet, Place 1 tablet (0 4 mg total) under the tongue every 5 (five) minutes as needed for chest pain, Disp: 90 tablet, Rfl: 3    ranitidine (ZANTAC) 150 mg tablet, Take 1 tablet (150 mg total) by mouth 2 (two) times a day (Patient not taking: Reported on 11/19/2019), Disp: 60 tablet, Rfl: 5  Allergies   Allergen Reactions    Ceclor [Cefaclor] Anaphylaxis    Cephalexin     Codeine Itching     Reaction Date: 09Jun2011;     Ticlid [Ticlopidine] Hives    Penicillins Rash       Labs:not applicable  Imaging: No results found  Review of Systems:  Review of Systems   Constitutional: Negative  HENT: Negative  Eyes: Negative  Respiratory: Negative  Cardiovascular: Negative  Endocrine: Negative  Musculoskeletal: Negative  Physical Exam:  /84 (BP Location: Right arm, Patient Position: Sitting, Cuff Size: Standard)   Pulse 82   Ht 5' (1 524 m)   Wt 62 1 kg (136 lb 14 5 oz)   BMI 26 74 kg/m²   Physical Exam   Constitutional: She appears well-developed and well-nourished  No distress  HENT:   Head: Normocephalic and atraumatic  Eyes: Pupils are equal, round, and reactive to light  Conjunctivae are normal  Right eye exhibits no discharge   Left eye exhibits no discharge  No scleral icterus  Neck: Normal range of motion  No JVD present  No tracheal deviation present  No thyromegaly present  Cardiovascular: Regular rhythm and normal heart sounds  Exam reveals no friction rub  No murmur heard  Pulmonary/Chest: Effort normal and breath sounds normal  No stridor  No respiratory distress  Musculoskeletal: Normal range of motion  She exhibits no edema, tenderness or deformity  Skin: Skin is warm  No rash noted  She is not diaphoretic  No erythema  No pallor

## 2020-01-30 ENCOUNTER — OFFICE VISIT (OUTPATIENT)
Dept: FAMILY MEDICINE CLINIC | Facility: CLINIC | Age: 55
End: 2020-01-30
Payer: COMMERCIAL

## 2020-01-30 VITALS
HEIGHT: 60 IN | DIASTOLIC BLOOD PRESSURE: 72 MMHG | OXYGEN SATURATION: 98 % | WEIGHT: 137.8 LBS | SYSTOLIC BLOOD PRESSURE: 128 MMHG | HEART RATE: 81 BPM | BODY MASS INDEX: 27.05 KG/M2

## 2020-01-30 DIAGNOSIS — L30.9 ECZEMA, UNSPECIFIED TYPE: ICD-10-CM

## 2020-01-30 DIAGNOSIS — J34.89 NASAL SORE: ICD-10-CM

## 2020-01-30 DIAGNOSIS — Z12.11 SCREENING FOR COLON CANCER: Primary | ICD-10-CM

## 2020-01-30 PROCEDURE — 4004F PT TOBACCO SCREEN RCVD TLK: CPT | Performed by: PHYSICIAN ASSISTANT

## 2020-01-30 PROCEDURE — 3008F BODY MASS INDEX DOCD: CPT | Performed by: PHYSICIAN ASSISTANT

## 2020-01-30 PROCEDURE — 99214 OFFICE O/P EST MOD 30 MIN: CPT | Performed by: PHYSICIAN ASSISTANT

## 2020-01-30 RX ORDER — TRIAMCINOLONE ACETONIDE 1 MG/G
CREAM TOPICAL 2 TIMES DAILY
Qty: 30 G | Refills: 0 | Status: SHIPPED | OUTPATIENT
Start: 2020-01-30 | End: 2020-11-04 | Stop reason: ALTCHOICE

## 2020-01-30 NOTE — PROGRESS NOTES
Assessment/Plan:    Problem List Items Addressed This Visit     None      Visit Diagnoses     Screening for colon cancer    -  Primary    Relevant Orders    Occult Bloood,Fecal Immunochemical    Eczema, unspecified type        Relevant Medications    triamcinolone (KENALOG) 0 1 % cream    Nasal sore             Diagnoses and all orders for this visit:    Screening for colon cancer  -     Occult Bloood,Fecal Immunochemical; Future    Eczema, unspecified type  -     triamcinolone (KENALOG) 0 1 % cream; Apply topically 2 (two) times a day    Nasal sore        Will try triamcinolone cream to see if it helps with eczema on arm  Recommended that she try using triple antibiotic ointment on the inside of her nose twice daily and stop picking  Suggested getting a room humidifier  She will let us know if symptoms do not improve or worsen  Agreed to FIT test  Discussed smoking cessation  She is cutting back on her own  Subjective:      Patient ID: Aleksandr Willis is a 47 y o  female  Marivel Quezada is a 47year old female who is here today with 2 concerns  First, she has a rash on her left upper arm  It is occasionally itchy  She has tried putting lotion on it, but it does not help  She has not changed any soaps  It has not spread  No one else has a similar rash  She is also complaining of sores in bilateral nares for the past 3 weeks  She keeps getting scabs that she is picking and causing to bleed  She states that the apartment she lives in is very dry and the heat is turned up very high, which she can't control  She only tried cleaning it with water        The following portions of the patient's history were reviewed and updated as appropriate:   She has a past medical history of Acquired ichthyosis, Anxiety, Cervical radiculopathy, Colorectal polyps, COPD (chronic obstructive pulmonary disease) ([address] ), Depression, Diverticulosis of colon, Foot pain, GERD (gastroesophageal reflux disease), Hyperlipemia, Hypertension, Myocardial infarction ([facility] [address] ), Osteopenia, Palpitations, PVD (peripheral vascular disease) ([facility] [address] ), Scapular dyskinesis, Shortness of breath, Tendonitis of left rotator cuff, and Vocal cord polyps  ,  does not have any pertinent problems on file  ,   has a past surgical history that includes Foot surgery (Right, 2015); Hysterectomy; Cholecystectomy;  section; Cardiac surgery; Throat surgery; Elbow surgery (Bilateral); Abdominal surgery; Esophagogastroduodenoscopy; Colonoscopy (10/27/2014); Tooth extraction; Esophagogastroduodenoscopy (N/A, 2016); Dental surgery; Coronary angioplasty with stent (); Trigeminal nerve decompression (Right, 6/15/2018); pr shldr arthroscop,surg,w/rotat cuff repr (Left, 2018); FL injection left shoulder (arthrogram) (2018); pr arthroscopy shoulder surgical biceps tenodesis (Left, 10/15/2018); Epidural block injection (N/A, 2019); Epidural block injection (Bilateral, 2019); FL guided needle plac bx/asp/inj (2019); pr arthrocentesis aspir&/inj major jt/bursa w/o us (Bilateral, 2019); FL guided needle plac bx/asp/inj (2019); Epidural block injection (Left, 10/3/2019); and FL guided needle plac bx/asp/inj (10/3/2019)  ,  family history includes No Known Problems in her father, maternal grandfather, maternal grandmother, mother, paternal grandfather, and paternal grandmother  ,   reports that she has been smoking  She has been smoking about 0 50 packs per day  She has never used smokeless tobacco  She reports that she drank alcohol  She reports that she does not use drugs  ,  is allergic to ceclor [cefaclor]; cephalexin; codeine; ticlopidine; and penicillins     Current Outpatient Medications   Medication Sig Dispense Refill    albuterol (PROVENTIL HFA,VENTOLIN HFA) 90 mcg/act inhaler inhale 2 puffs by mouth every 6 hours if needed for wheezing  0    aspirin 81 mg chewable tablet Chew 1 tablet daily Stop for the time being for foot surgery       baclofen 10 mg tablet Take 1 tablet (10 mg total) by mouth 3 (three) times a day 90 tablet 0    diclofenac sodium (VOLTAREN) 50 mg EC tablet Take 1 tablet (50 mg total) by mouth 2 (two) times a day 60 tablet 1    ezetimibe (ZETIA) 10 mg tablet Take 1 tablet (10 mg total) by mouth daily 90 tablet 3    nitroglycerin (NITROSTAT) 0 4 mg SL tablet Place 1 tablet (0 4 mg total) under the tongue every 5 (five) minutes as needed for chest pain 90 tablet 3    oxyCODONE-acetaminophen (PERCOCET) 5-325 mg per tablet Take 1 tablet by mouth every 12 (twelve) hours       ranitidine (ZANTAC) 150 mg tablet Take 1 tablet (150 mg total) by mouth 2 (two) times a day 60 tablet 5    rosuvastatin (CRESTOR) 5 mg tablet Take 1 tablet (5 mg total) by mouth daily 90 tablet 3    nortriptyline (PAMELOR) 25 mg capsule Take 1 capsule (25 mg total) by mouth daily at bedtime (Patient not taking: Reported on 1/30/2020) 30 capsule 1    triamcinolone (KENALOG) 0 1 % cream Apply topically 2 (two) times a day 30 g 0     No current facility-administered medications for this visit  Review of Systems   Constitutional: Negative for chills, diaphoresis, fatigue and fever  HENT: Negative for congestion, ear pain, postnasal drip, rhinorrhea, sneezing, sore throat and trouble swallowing  Nasal sores and crusting   Eyes: Negative for pain and visual disturbance  Respiratory: Negative for apnea, cough, shortness of breath and wheezing  Cardiovascular: Negative for chest pain and palpitations  Gastrointestinal: Negative for abdominal pain, constipation, diarrhea, nausea and vomiting  Genitourinary: Negative for dysuria and hematuria  Musculoskeletal: Negative for arthralgias, gait problem and myalgias  Skin: Positive for rash  Neurological: Negative for dizziness, syncope, weakness, light-headedness, numbness and headaches  Psychiatric/Behavioral: Negative for suicidal ideas   The patient is not nervous/anxious  Objective:  Vitals:    01/30/20 1224   BP: 128/72   Pulse: 81   SpO2: 98%   Weight: 62 5 kg (137 lb 12 8 oz)   Height: 5' (1 524 m)     Body mass index is 26 91 kg/m²  Physical Exam   Constitutional: She is oriented to person, place, and time  She appears well-developed and well-nourished  HENT:   Head: Normocephalic and atraumatic  Right Ear: Tympanic membrane, external ear and ear canal normal    Left Ear: Tympanic membrane, external ear and ear canal normal    Nose: Nose normal    Mouth/Throat: Oropharynx is clear and moist and mucous membranes are normal  No oropharyngeal exudate, posterior oropharyngeal edema or posterior oropharyngeal erythema  Nasal mucosa is very dry  Crust on the base of bilateral nares  No active drainage or bleeding  Eyes: Pupils are equal, round, and reactive to light  EOM are normal    Neck: Normal range of motion  Neck supple  Cardiovascular: Normal rate, regular rhythm and normal heart sounds  Exam reveals no gallop and no friction rub  No murmur heard  Pulmonary/Chest: Effort normal and breath sounds normal  No respiratory distress  She has no wheezes  She has no rales  Musculoskeletal: Normal range of motion  Lymphadenopathy:     She has no cervical adenopathy  Neurological: She is alert and oriented to person, place, and time  Skin: Skin is warm and dry  Rash noted  Psychiatric: She has a normal mood and affect  Her behavior is normal  Judgment and thought content normal    Vitals reviewed  BMI Counseling: Body mass index is 26 91 kg/m²  The BMI is above normal  Nutrition recommendations include decreasing overall calorie intake and 3-5 servings of fruits/vegetables daily  Exercise recommendations include exercising 3-5 times per week

## 2020-02-27 DIAGNOSIS — M54.12 CERVICAL RADICULITIS: ICD-10-CM

## 2020-02-27 DIAGNOSIS — G89.29 CHRONIC BILATERAL LOW BACK PAIN WITH LEFT-SIDED SCIATICA: ICD-10-CM

## 2020-02-27 DIAGNOSIS — M54.42 CHRONIC BILATERAL LOW BACK PAIN WITH LEFT-SIDED SCIATICA: ICD-10-CM

## 2020-02-27 DIAGNOSIS — M47.812 OSTEOARTHRITIS OF CERVICAL SPINE, UNSPECIFIED SPINAL OSTEOARTHRITIS COMPLICATION STATUS: ICD-10-CM

## 2020-02-27 DIAGNOSIS — M47.816 LUMBAR SPONDYLOSIS: ICD-10-CM

## 2020-02-27 DIAGNOSIS — M54.16 LUMBAR RADICULOPATHY: ICD-10-CM

## 2020-02-28 ENCOUNTER — TELEPHONE (OUTPATIENT)
Dept: FAMILY MEDICINE CLINIC | Facility: CLINIC | Age: 55
End: 2020-02-28

## 2020-02-28 DIAGNOSIS — J06.9 UPPER RESPIRATORY TRACT INFECTION, UNSPECIFIED TYPE: Primary | ICD-10-CM

## 2020-02-28 RX ORDER — AZITHROMYCIN 250 MG/1
TABLET, FILM COATED ORAL
Qty: 6 TABLET | Refills: 0 | Status: SHIPPED | OUTPATIENT
Start: 2020-02-28 | End: 2020-03-04

## 2020-02-28 NOTE — TELEPHONE ENCOUNTER
C/O COUGH FOR ABOUT 10 DAYS  RUNNY NOSE, IT IS CLEAR, OFF AND ON TEMP   DO YOU WANT TO RX SOMETHING? HAS BEEN TAKING MUSINEX

## 2020-03-17 ENCOUNTER — TELEPHONE (OUTPATIENT)
Dept: FAMILY MEDICINE CLINIC | Facility: CLINIC | Age: 55
End: 2020-03-17

## 2020-03-17 NOTE — TELEPHONE ENCOUNTER
C/O FINISHED Z-PACK, STILL SICK WITH COUGH,SOB, FEVER OFF AND ON OF AROUND [address] 7  Kashmir Donahue CAN'T GET RID OF  RATTLING IN THE CHEST, TAKING MUCINEX AND ACETAMINOPHEN 24/7   WHAT TO DO?

## 2020-03-18 ENCOUNTER — APPOINTMENT (OUTPATIENT)
Dept: RADIOLOGY | Facility: MEDICAL CENTER | Age: 55
End: 2020-03-18
Payer: COMMERCIAL

## 2020-03-18 ENCOUNTER — OFFICE VISIT (OUTPATIENT)
Dept: FAMILY MEDICINE CLINIC | Facility: CLINIC | Age: 55
End: 2020-03-18
Payer: COMMERCIAL

## 2020-03-18 ENCOUNTER — TELEPHONE (OUTPATIENT)
Dept: ADMINISTRATIVE | Facility: OTHER | Age: 55
End: 2020-03-18

## 2020-03-18 VITALS
SYSTOLIC BLOOD PRESSURE: 118 MMHG | OXYGEN SATURATION: 97 % | TEMPERATURE: 97.6 F | HEART RATE: 82 BPM | WEIGHT: 134 LBS | BODY MASS INDEX: 26.31 KG/M2 | DIASTOLIC BLOOD PRESSURE: 78 MMHG | HEIGHT: 60 IN

## 2020-03-18 DIAGNOSIS — J40 BRONCHITIS: ICD-10-CM

## 2020-03-18 DIAGNOSIS — J40 BRONCHITIS: Primary | ICD-10-CM

## 2020-03-18 PROCEDURE — 99213 OFFICE O/P EST LOW 20 MIN: CPT | Performed by: FAMILY MEDICINE

## 2020-03-18 PROCEDURE — 3074F SYST BP LT 130 MM HG: CPT | Performed by: FAMILY MEDICINE

## 2020-03-18 PROCEDURE — 3078F DIAST BP <80 MM HG: CPT | Performed by: FAMILY MEDICINE

## 2020-03-18 PROCEDURE — 3008F BODY MASS INDEX DOCD: CPT | Performed by: FAMILY MEDICINE

## 2020-03-18 PROCEDURE — 71046 X-RAY EXAM CHEST 2 VIEWS: CPT

## 2020-03-18 PROCEDURE — 4004F PT TOBACCO SCREEN RCVD TLK: CPT | Performed by: FAMILY MEDICINE

## 2020-03-18 RX ORDER — AZITHROMYCIN 250 MG/1
TABLET, FILM COATED ORAL
Qty: 6 TABLET | Refills: 0 | Status: SHIPPED | OUTPATIENT
Start: 2020-03-18 | End: 2020-03-23

## 2020-03-18 RX ORDER — PREDNISONE 10 MG/1
TABLET ORAL
Qty: 30 TABLET | Refills: 0 | Status: SHIPPED | OUTPATIENT
Start: 2020-03-18 | End: 2020-05-13

## 2020-03-18 NOTE — PROGRESS NOTES
Assessment/Plan:  Rx put in for a Z-Hao and prednisone taper  We will get a chest x-ray on her  Told her to cut back and hopefully quit smoking  She will continue the Mucinex  Push fluids  Call us if symptoms continue increase  Problem List Items Addressed This Visit     None      Visit Diagnoses     Bronchitis    -  Primary    Relevant Medications    azithromycin (ZITHROMAX) 250 mg tablet    predniSONE 10 mg tablet    Other Relevant Orders    XR chest pa & lateral           Diagnoses and all orders for this visit:    Bronchitis  -     XR chest pa & lateral; Future  -     azithromycin (ZITHROMAX) 250 mg tablet; Take 2 tablets today then 1 tablet daily x 4 days  -     predniSONE 10 mg tablet; 4 tablets daily for 3 days, then 3 tablets daily for 3 days, then 2 tablets daily for 3 days, then 1 tablet daily for 3 days        No problem-specific Assessment & Plan notes found for this encounter  Subjective:      Patient ID: Madison Mao is a 47 y o  female  Patient here today stating that for the past month or so has had a cough, was dry at 1st, now loose  When she does produce mucus it is clear  States has had a low-grade fever off and on  No nausea vomiting or diarrhea  She has tried Mucinex over-the-counter  She is still smoking, about half pack a day  Cough   This is a new problem  The current episode started 1 to 4 weeks ago  The problem has been unchanged  The problem occurs every few minutes  The cough is productive of sputum  Associated symptoms include a fever (Low-grade off and on), nasal congestion, shortness of breath ( with activity) and wheezing ( occasionally when lying down)  Pertinent negatives include no chills  The symptoms are aggravated by lying down  She has tried OTC cough suppressant for the symptoms  The treatment provided mild relief         The following portions of the patient's history were reviewed and updated as appropriate:   She has a past medical history of Acquired ichthyosis, Anxiety, Cervical radiculopathy, Colorectal polyps, COPD (chronic obstructive pulmonary disease) ([facility] [address] ), Depression, Diverticulosis of colon, Foot pain, GERD (gastroesophageal reflux disease), Hyperlipemia, Hypertension, Myocardial infarction ([facility] [address] ), Osteopenia, Palpitations, PVD (peripheral vascular disease) ([facility] [address] ), Scapular dyskinesis, Shortness of breath, Tendonitis of left rotator cuff, and Vocal cord polyps  ,  does not have any pertinent problems on file  ,   has a past surgical history that includes Foot surgery (Right, 2015); Hysterectomy; Cholecystectomy;  section; Cardiac surgery; Throat surgery; Elbow surgery (Bilateral); Abdominal surgery; Esophagogastroduodenoscopy; Colonoscopy (10/27/2014); Tooth extraction; Esophagogastroduodenoscopy (N/A, 2016); Dental surgery; Coronary angioplasty with stent (); Trigeminal nerve decompression (Right, 6/15/2018); pr shldr arthroscop,surg,w/rotat cuff repr (Left, 2018); FL injection left shoulder (arthrogram) (2018); pr arthroscopy shoulder surgical biceps tenodesis (Left, 10/15/2018); Epidural block injection (N/A, 2019); Epidural block injection (Bilateral, 2019); FL guided needle plac bx/asp/inj (2019); pr arthrocentesis aspir&/inj major jt/bursa w/o us (Bilateral, 2019); FL guided needle plac bx/asp/inj (2019); Epidural block injection (Left, 10/3/2019); and FL guided needle plac bx/asp/inj (10/3/2019)  ,  family history includes No Known Problems in her father, maternal grandfather, maternal grandmother, mother, paternal grandfather, and paternal grandmother  ,   reports that she has been smoking  She has been smoking about 0 50 packs per day  She has never used smokeless tobacco  She reports that she drank alcohol  She reports that she does not use drugs  ,  is allergic to ceclor [cefaclor]; cephalexin; codeine; ticlopidine; and penicillins     Current Outpatient Medications   Medication Sig Dispense Refill    albuterol (PROVENTIL HFA,VENTOLIN HFA) 90 mcg/act inhaler inhale 2 puffs by mouth every 6 hours if needed for wheezing  0    aspirin 81 mg chewable tablet Chew 1 tablet daily Stop for the time being for foot surgery       baclofen 10 mg tablet Take 1 tablet (10 mg total) by mouth 3 (three) times a day 90 tablet 0    ezetimibe (ZETIA) 10 mg tablet Take 1 tablet (10 mg total) by mouth daily 90 tablet 3    nitroglycerin (NITROSTAT) 0 4 mg SL tablet Place 1 tablet (0 4 mg total) under the tongue every 5 (five) minutes as needed for chest pain 90 tablet 3    oxyCODONE-acetaminophen (PERCOCET) 5-325 mg per tablet Take 1 tablet by mouth every 12 (twelve) hours       ranitidine (ZANTAC) 150 mg tablet Take 1 tablet (150 mg total) by mouth 2 (two) times a day 60 tablet 5    rosuvastatin (CRESTOR) 5 mg tablet Take 1 tablet (5 mg total) by mouth daily 90 tablet 3    triamcinolone (KENALOG) 0 1 % cream Apply topically 2 (two) times a day 30 g 0    azithromycin (ZITHROMAX) 250 mg tablet Take 2 tablets today then 1 tablet daily x 4 days 6 tablet 0    diclofenac sodium (VOLTAREN) 50 mg EC tablet Take 1 tablet (50 mg total) by mouth 2 (two) times a day 60 tablet 1    nortriptyline (PAMELOR) 25 mg capsule Take 1 capsule (25 mg total) by mouth daily at bedtime (Patient not taking: Reported on 1/30/2020) 30 capsule 1    predniSONE 10 mg tablet 4 tablets daily for 3 days, then 3 tablets daily for 3 days, then 2 tablets daily for 3 days, then 1 tablet daily for 3 days 30 tablet 0     No current facility-administered medications for this visit  Review of Systems   Constitutional: Positive for fever (Low-grade off and on)  Negative for chills  HENT: Positive for congestion  Respiratory: Positive for cough, shortness of breath ( with activity) and wheezing ( occasionally when lying down)  Cardiovascular: Negative  Gastrointestinal: Negative  Genitourinary: Negative  Objective:  Vitals:    03/18/20 1104 03/18/20 1124   BP: 118/78    Pulse:  82   Temp: 97 6 °F (36 4 °C)    SpO2:  97%   Weight: 60 8 kg (134 lb)    Height: 5' (1 524 m)      Body mass index is 26 17 kg/m²  Physical Exam   Constitutional: She is oriented to person, place, and time  She appears well-developed and well-nourished  No distress  HENT:   Head: Normocephalic and atraumatic  Nose: Mucosal edema present  Mouth/Throat: Posterior oropharyngeal erythema present  No oropharyngeal exudate  Clear postnasal drip   Eyes: Conjunctivae are normal    Cardiovascular: Normal rate, regular rhythm and normal heart sounds  Exam reveals no gallop and no friction rub  No murmur heard  Pulmonary/Chest: Effort normal and breath sounds normal  No respiratory distress  She has no wheezes  She has no rales  Musculoskeletal: She exhibits no edema  Neurological: She is alert and oriented to person, place, and time  Skin: She is not diaphoretic  Psychiatric: She has a normal mood and affect  Her behavior is normal  Judgment and thought content normal    Vitals reviewed

## 2020-03-18 NOTE — LETTER
[facility] Medical Records Request for Care Gap Closure    Medical Records Fax: [phone]    Date Requested: 20  Patient: Sebas Geiger  Patient : 1965   Requesting on behalf of Provider: DO Marli Leblanc DO has requested the retrieval and updating of Pap Smear (HPV) aka Cervical Cancer Screening - we are looking for Hysterectomy  The office staff has provided us with the following information listed below  Prior to sending this request I have reviewed Epic Chart Review, completed an Epic Chart Search, and reviewed Care Everywhere documents  Estimated Date of Service: When patient was in her 35s  Estimating 15 years ago  [facility]ility: [facility] in [place]   Specialty: GYN   Performing Provider: Dr Karen Amin  Additional Comments: Patient told PCP office that she had a Hysterectomy done by Dr Karen Amin when she was in her 35s  Your assistance in completing this request is greatly appreciated  Please send document to [phone] attention Clare Butt: Phone [phone]       Sincerely,      Jemma Jacome MA

## 2020-03-18 NOTE — TELEPHONE ENCOUNTER
----- Message from Gerber Hollingsworth sent at 3/18/2020 12:38 PM EDT -----  Regarding: HYSTERECTOMY  Contact: [phone]  03/18/20 12:39 PM    Hello, our patient Lizbeth Stevens has had  abdominal hysterectomy completed/performed  Please assist in obtaining the exclusion documentation by making an External outreach to  [facility], I DO  [facility] facility located in [address] [place] [address]  The date of service is PATIENT WAS IN HER 30'S       Thank you,  Ruby Blake  [facility]

## 2020-03-18 NOTE — TELEPHONE ENCOUNTER
Upon review of the In Basket request and the patient's chart, initial outreach has been made via fax, please see Contacts section for details  A second outreach attempt will be made within 3 business days      Thank you  Cheng Kenny MA

## 2020-03-21 DIAGNOSIS — M47.812 OSTEOARTHRITIS OF CERVICAL SPINE, UNSPECIFIED SPINAL OSTEOARTHRITIS COMPLICATION STATUS: ICD-10-CM

## 2020-03-21 DIAGNOSIS — M47.816 LUMBAR SPONDYLOSIS: ICD-10-CM

## 2020-03-21 DIAGNOSIS — M54.16 LUMBAR RADICULOPATHY: ICD-10-CM

## 2020-03-21 DIAGNOSIS — G89.29 CHRONIC BILATERAL LOW BACK PAIN WITH LEFT-SIDED SCIATICA: ICD-10-CM

## 2020-03-21 DIAGNOSIS — M54.12 CERVICAL RADICULITIS: ICD-10-CM

## 2020-03-21 DIAGNOSIS — M54.42 CHRONIC BILATERAL LOW BACK PAIN WITH LEFT-SIDED SCIATICA: ICD-10-CM

## 2020-03-23 NOTE — TELEPHONE ENCOUNTER
Upon review of your request/inquiry, we have found that we are unable to obtain a copy of the exclusion documentation requested because [address] Records could find no information  Any additional questions or concerns should be emailed to the Practice Liaisons via [email or web address] email, please do not reply via In Basket       Thank you  Kanu Cohen MA

## 2020-03-25 NOTE — TELEPHONE ENCOUNTER
Upon review of the In Basket request we were able to locate and update the patient chart as requested  Any additional questions or concerns should be emailed to the Practice Liaisons via [email or web address] email, please do not reply via In Basket      Thank you  Lauren Valadez MA

## 2020-04-08 ENCOUNTER — TELEPHONE (OUTPATIENT)
Dept: FAMILY MEDICINE CLINIC | Facility: CLINIC | Age: 55
End: 2020-04-08

## 2020-04-08 DIAGNOSIS — Z72.0 TOBACCO ABUSE: ICD-10-CM

## 2020-04-08 DIAGNOSIS — R05.3 CHRONIC COUGH: Primary | ICD-10-CM

## 2020-04-08 RX ORDER — NICOTINE 21 MG/24HR
1 PATCH, TRANSDERMAL 24 HOURS TRANSDERMAL EVERY 24 HOURS
Qty: 28 PATCH | Refills: 0 | Status: SHIPPED | OUTPATIENT
Start: 2020-04-08 | End: 2020-04-10

## 2020-04-10 ENCOUNTER — TELEPHONE (OUTPATIENT)
Dept: FAMILY MEDICINE CLINIC | Facility: CLINIC | Age: 55
End: 2020-04-10

## 2020-04-10 DIAGNOSIS — Z72.0 TOBACCO ABUSE: Primary | ICD-10-CM

## 2020-04-10 RX ORDER — VARENICLINE TARTRATE 25 MG
KIT ORAL
Qty: 53 TABLET | Refills: 0 | Status: SHIPPED | OUTPATIENT
Start: 2020-04-10 | End: 2020-09-10

## 2020-05-06 ENCOUNTER — TELEMEDICINE (OUTPATIENT)
Dept: PAIN MEDICINE | Facility: CLINIC | Age: 55
End: 2020-05-06
Payer: COMMERCIAL

## 2020-05-06 DIAGNOSIS — M54.16 LUMBAR RADICULOPATHY: ICD-10-CM

## 2020-05-06 DIAGNOSIS — M54.42 CHRONIC BILATERAL LOW BACK PAIN WITH LEFT-SIDED SCIATICA: ICD-10-CM

## 2020-05-06 DIAGNOSIS — G89.4 CHRONIC PAIN SYNDROME: Primary | ICD-10-CM

## 2020-05-06 DIAGNOSIS — M79.18 MYOFASCIAL PAIN SYNDROME: ICD-10-CM

## 2020-05-06 DIAGNOSIS — M54.2 NECK PAIN: ICD-10-CM

## 2020-05-06 DIAGNOSIS — G89.29 CHRONIC BILATERAL LOW BACK PAIN WITH LEFT-SIDED SCIATICA: ICD-10-CM

## 2020-05-06 DIAGNOSIS — M47.816 LUMBAR SPONDYLOSIS: ICD-10-CM

## 2020-05-06 DIAGNOSIS — M47.812 OSTEOARTHRITIS OF CERVICAL SPINE, UNSPECIFIED SPINAL OSTEOARTHRITIS COMPLICATION STATUS: ICD-10-CM

## 2020-05-06 DIAGNOSIS — M54.12 RADICULOPATHY, CERVICAL: ICD-10-CM

## 2020-05-06 DIAGNOSIS — M51.36 LUMBAR DEGENERATIVE DISC DISEASE: ICD-10-CM

## 2020-05-06 PROCEDURE — 99214 OFFICE O/P EST MOD 30 MIN: CPT | Performed by: NURSE PRACTITIONER

## 2020-05-06 RX ORDER — PREGABALIN 50 MG/1
CAPSULE ORAL
Qty: 90 CAPSULE | Refills: 1 | Status: SHIPPED | OUTPATIENT
Start: 2020-05-06 | End: 2020-05-13

## 2020-05-06 RX ORDER — BACLOFEN 10 MG/1
10 TABLET ORAL 3 TIMES DAILY
Qty: 90 TABLET | Refills: 2 | Status: SHIPPED | OUTPATIENT
Start: 2020-05-06 | End: 2020-08-28 | Stop reason: SDUPTHER

## 2020-05-11 ENCOUNTER — TELEPHONE (OUTPATIENT)
Dept: PAIN MEDICINE | Facility: CLINIC | Age: 55
End: 2020-05-11

## 2020-05-12 ENCOUNTER — HOSPITAL ENCOUNTER (OUTPATIENT)
Dept: CT IMAGING | Facility: HOSPITAL | Age: 55
Discharge: HOME | End: 2020-05-12
Payer: COMMERCIAL

## 2020-05-12 ENCOUNTER — TELEPHONE (OUTPATIENT)
Dept: NEUROSURGERY | Facility: CLINIC | Age: 55
End: 2020-05-12

## 2020-05-12 DIAGNOSIS — R05.3 CHRONIC COUGH: ICD-10-CM

## 2020-05-12 PROCEDURE — 71250 CT THORAX DX C-: CPT

## 2020-05-13 ENCOUNTER — CONSULT (OUTPATIENT)
Dept: NEUROSURGERY | Facility: CLINIC | Age: 55
End: 2020-05-13
Payer: COMMERCIAL

## 2020-05-13 VITALS
HEART RATE: 91 BPM | HEIGHT: 60 IN | TEMPERATURE: 97.2 F | DIASTOLIC BLOOD PRESSURE: 80 MMHG | RESPIRATION RATE: 16 BRPM | WEIGHT: 129 LBS | SYSTOLIC BLOOD PRESSURE: 106 MMHG | BODY MASS INDEX: 25.32 KG/M2

## 2020-05-13 DIAGNOSIS — M54.2 NECK PAIN: ICD-10-CM

## 2020-05-13 DIAGNOSIS — M54.12 CERVICAL RADICULOPATHY: ICD-10-CM

## 2020-05-13 DIAGNOSIS — M79.18 MYOFASCIAL PAIN SYNDROME: Primary | ICD-10-CM

## 2020-05-13 PROCEDURE — 99204 OFFICE O/P NEW MOD 45 MIN: CPT | Performed by: PHYSICIAN ASSISTANT

## 2020-05-15 ENCOUNTER — TELEPHONE (OUTPATIENT)
Dept: FAMILY MEDICINE CLINIC | Facility: CLINIC | Age: 55
End: 2020-05-15

## 2020-05-20 ENCOUNTER — APPOINTMENT (OUTPATIENT)
Dept: LAB | Facility: MEDICAL CENTER | Age: 55
End: 2020-05-20
Payer: COMMERCIAL

## 2020-05-20 DIAGNOSIS — Z12.11 SCREENING FOR COLON CANCER: ICD-10-CM

## 2020-05-20 LAB — HEMOCCULT STL QL IA: POSITIVE

## 2020-05-20 PROCEDURE — G0328 FECAL BLOOD SCRN IMMUNOASSAY: HCPCS

## 2020-05-21 ENCOUNTER — TELEPHONE (OUTPATIENT)
Dept: RHEUMATOLOGY | Facility: CLINIC | Age: 55
End: 2020-05-21

## 2020-05-21 DIAGNOSIS — R19.5 POSITIVE FECAL IMMUNOCHEMICAL TEST: Primary | ICD-10-CM

## 2020-05-21 DIAGNOSIS — Z12.11 SCREENING FOR COLON CANCER: ICD-10-CM

## 2020-05-22 ENCOUNTER — APPOINTMENT (OUTPATIENT)
Dept: LAB | Facility: MEDICAL CENTER | Age: 55
End: 2020-05-22
Payer: COMMERCIAL

## 2020-05-22 ENCOUNTER — OFFICE VISIT (OUTPATIENT)
Dept: RHEUMATOLOGY | Facility: CLINIC | Age: 55
End: 2020-05-22
Payer: COMMERCIAL

## 2020-05-22 VITALS
SYSTOLIC BLOOD PRESSURE: 110 MMHG | BODY MASS INDEX: 25.13 KG/M2 | HEIGHT: 60 IN | DIASTOLIC BLOOD PRESSURE: 76 MMHG | TEMPERATURE: 98.3 F | WEIGHT: 128 LBS

## 2020-05-22 DIAGNOSIS — M35.9 UNDIFFERENTIATED CONNECTIVE TISSUE DISEASE (HCC): ICD-10-CM

## 2020-05-22 DIAGNOSIS — R76.8 POSITIVE ANA (ANTINUCLEAR ANTIBODY): ICD-10-CM

## 2020-05-22 DIAGNOSIS — Z79.899 HIGH RISK MEDICATION USE: ICD-10-CM

## 2020-05-22 DIAGNOSIS — M05.9 RHEUMATOID ARTHRITIS WITH POSITIVE RHEUMATOID FACTOR, INVOLVING UNSPECIFIED SITE (HCC): Primary | ICD-10-CM

## 2020-05-22 DIAGNOSIS — R76.8 RHEUMATOID FACTOR POSITIVE: ICD-10-CM

## 2020-05-22 LAB
ALBUMIN SERPL BCP-MCNC: 4.3 G/DL (ref 3.5–5)
ALP SERPL-CCNC: 60 U/L (ref 46–116)
ALT SERPL W P-5'-P-CCNC: 26 U/L (ref 12–78)
ANION GAP SERPL CALCULATED.3IONS-SCNC: 2 MMOL/L (ref 4–13)
AST SERPL W P-5'-P-CCNC: 16 U/L (ref 5–45)
BACTERIA UR QL AUTO: ABNORMAL /HPF
BASOPHILS # BLD AUTO: 0.07 THOUSANDS/ΜL (ref 0–0.1)
BASOPHILS NFR BLD AUTO: 1 % (ref 0–1)
BILIRUB SERPL-MCNC: 0.45 MG/DL (ref 0.2–1)
BILIRUB UR QL STRIP: NEGATIVE
BUN SERPL-MCNC: 9 MG/DL (ref 5–25)
C3 SERPL-MCNC: 130 MG/DL (ref 90–180)
C4 SERPL-MCNC: 33 MG/DL (ref 10–40)
CALCIUM SERPL-MCNC: 9.9 MG/DL (ref 8.3–10.1)
CHLORIDE SERPL-SCNC: 104 MMOL/L (ref 100–108)
CLARITY UR: CLEAR
CO2 SERPL-SCNC: 31 MMOL/L (ref 21–32)
COLOR UR: YELLOW
CREAT SERPL-MCNC: 0.62 MG/DL (ref 0.6–1.3)
CREAT UR-MCNC: 73.1 MG/DL
CRP SERPL QL: <3 MG/L
EOSINOPHIL # BLD AUTO: 0.17 THOUSAND/ΜL (ref 0–0.61)
EOSINOPHIL NFR BLD AUTO: 2 % (ref 0–6)
ERYTHROCYTE [DISTWIDTH] IN BLOOD BY AUTOMATED COUNT: 13.2 % (ref 11.6–15.1)
ERYTHROCYTE [SEDIMENTATION RATE] IN BLOOD: 17 MM/HOUR (ref 0–20)
GFR SERPL CREATININE-BSD FRML MDRD: 103 ML/MIN/1.73SQ M
GLUCOSE SERPL-MCNC: 95 MG/DL (ref 65–140)
GLUCOSE UR STRIP-MCNC: NEGATIVE MG/DL
HCT VFR BLD AUTO: 45 % (ref 34.8–46.1)
HGB BLD-MCNC: 14.6 G/DL (ref 11.5–15.4)
HGB UR QL STRIP.AUTO: NEGATIVE
HYALINE CASTS #/AREA URNS LPF: ABNORMAL /LPF
IMM GRANULOCYTES # BLD AUTO: 0.02 THOUSAND/UL (ref 0–0.2)
IMM GRANULOCYTES NFR BLD AUTO: 0 % (ref 0–2)
KETONES UR STRIP-MCNC: NEGATIVE MG/DL
LEUKOCYTE ESTERASE UR QL STRIP: NEGATIVE
LYMPHOCYTES # BLD AUTO: 3.38 THOUSANDS/ΜL (ref 0.6–4.47)
LYMPHOCYTES NFR BLD AUTO: 38 % (ref 14–44)
MCH RBC QN AUTO: 32.6 PG (ref 26.8–34.3)
MCHC RBC AUTO-ENTMCNC: 32.4 G/DL (ref 31.4–37.4)
MCV RBC AUTO: 100 FL (ref 82–98)
MONOCYTES # BLD AUTO: 0.49 THOUSAND/ΜL (ref 0.17–1.22)
MONOCYTES NFR BLD AUTO: 6 % (ref 4–12)
NEUTROPHILS # BLD AUTO: 4.68 THOUSANDS/ΜL (ref 1.85–7.62)
NEUTS SEG NFR BLD AUTO: 53 % (ref 43–75)
NITRITE UR QL STRIP: NEGATIVE
NON-SQ EPI CELLS URNS QL MICRO: ABNORMAL /HPF
NRBC BLD AUTO-RTO: 0 /100 WBCS
PH UR STRIP.AUTO: 6.5 [PH]
PLATELET # BLD AUTO: 234 THOUSANDS/UL (ref 149–390)
PMV BLD AUTO: 10.4 FL (ref 8.9–12.7)
POTASSIUM SERPL-SCNC: 4.2 MMOL/L (ref 3.5–5.3)
PROT SERPL-MCNC: 8.1 G/DL (ref 6.4–8.2)
PROT UR STRIP-MCNC: NEGATIVE MG/DL
PROT UR-MCNC: 8 MG/DL
PROT/CREAT UR: 0.11 MG/G{CREAT} (ref 0–0.1)
RBC # BLD AUTO: 4.48 MILLION/UL (ref 3.81–5.12)
RBC #/AREA URNS AUTO: ABNORMAL /HPF
SODIUM SERPL-SCNC: 137 MMOL/L (ref 136–145)
SP GR UR STRIP.AUTO: 1.01 (ref 1–1.03)
UROBILINOGEN UR QL STRIP.AUTO: 0.2 E.U./DL
WBC # BLD AUTO: 8.81 THOUSAND/UL (ref 4.31–10.16)
WBC #/AREA URNS AUTO: ABNORMAL /HPF

## 2020-05-22 PROCEDURE — 81001 URINALYSIS AUTO W/SCOPE: CPT | Performed by: INTERNAL MEDICINE

## 2020-05-22 PROCEDURE — 86225 DNA ANTIBODY NATIVE: CPT | Performed by: INTERNAL MEDICINE

## 2020-05-22 PROCEDURE — 86140 C-REACTIVE PROTEIN: CPT | Performed by: INTERNAL MEDICINE

## 2020-05-22 PROCEDURE — 84156 ASSAY OF PROTEIN URINE: CPT | Performed by: INTERNAL MEDICINE

## 2020-05-22 PROCEDURE — 99245 OFF/OP CONSLTJ NEW/EST HI 55: CPT | Performed by: INTERNAL MEDICINE

## 2020-05-22 PROCEDURE — 86160 COMPLEMENT ANTIGEN: CPT | Performed by: INTERNAL MEDICINE

## 2020-05-22 PROCEDURE — 85652 RBC SED RATE AUTOMATED: CPT | Performed by: INTERNAL MEDICINE

## 2020-05-22 PROCEDURE — 80053 COMPREHEN METABOLIC PANEL: CPT | Performed by: INTERNAL MEDICINE

## 2020-05-22 PROCEDURE — 82570 ASSAY OF URINE CREATININE: CPT | Performed by: INTERNAL MEDICINE

## 2020-05-22 PROCEDURE — 85025 COMPLETE CBC W/AUTO DIFF WBC: CPT | Performed by: INTERNAL MEDICINE

## 2020-05-22 PROCEDURE — 36415 COLL VENOUS BLD VENIPUNCTURE: CPT | Performed by: INTERNAL MEDICINE

## 2020-05-22 PROCEDURE — 86235 NUCLEAR ANTIGEN ANTIBODY: CPT | Performed by: INTERNAL MEDICINE

## 2020-05-22 RX ORDER — HYDROXYCHLOROQUINE SULFATE 200 MG/1
200 TABLET, FILM COATED ORAL DAILY
Qty: 30 TABLET | Refills: 3 | Status: SHIPPED | OUTPATIENT
Start: 2020-05-22 | End: 2020-08-28 | Stop reason: SDUPTHER

## 2020-05-23 LAB
CENTROMERE B AB SER-ACNC: <0.2 AI (ref 0–0.9)
DSDNA AB SER-ACNC: 1 IU/ML (ref 0–9)
ENA RNP AB SER-ACNC: <0.2 AI (ref 0–0.9)
ENA SM AB SER-ACNC: <0.2 AI (ref 0–0.9)

## 2020-05-27 ENCOUNTER — HOSPITAL ENCOUNTER (OUTPATIENT)
Dept: MRI IMAGING | Facility: HOSPITAL | Age: 55
Discharge: HOME | End: 2020-05-27
Payer: COMMERCIAL

## 2020-05-27 ENCOUNTER — APPOINTMENT (OUTPATIENT)
Dept: RADIOLOGY | Facility: MEDICAL CENTER | Age: 55
End: 2020-05-27
Payer: COMMERCIAL

## 2020-05-27 ENCOUNTER — TELEPHONE (OUTPATIENT)
Dept: RHEUMATOLOGY | Facility: CLINIC | Age: 55
End: 2020-05-27

## 2020-05-27 DIAGNOSIS — M54.2 NECK PAIN: ICD-10-CM

## 2020-05-27 DIAGNOSIS — M54.12 CERVICAL RADICULOPATHY: ICD-10-CM

## 2020-05-27 DIAGNOSIS — R76.8 RHEUMATOID FACTOR POSITIVE: ICD-10-CM

## 2020-05-27 PROCEDURE — 72141 MRI NECK SPINE W/O DYE: CPT

## 2020-05-27 PROCEDURE — 73130 X-RAY EXAM OF HAND: CPT

## 2020-05-27 PROCEDURE — 73630 X-RAY EXAM OF FOOT: CPT

## 2020-05-28 ENCOUNTER — TELEPHONE (OUTPATIENT)
Dept: FAMILY MEDICINE CLINIC | Facility: CLINIC | Age: 55
End: 2020-05-28

## 2020-06-01 ENCOUNTER — OFFICE VISIT (OUTPATIENT)
Dept: NEUROSURGERY | Facility: CLINIC | Age: 55
End: 2020-06-01
Payer: COMMERCIAL

## 2020-06-01 VITALS
SYSTOLIC BLOOD PRESSURE: 132 MMHG | BODY MASS INDEX: 25.56 KG/M2 | DIASTOLIC BLOOD PRESSURE: 90 MMHG | TEMPERATURE: 98.2 F | HEIGHT: 60 IN | WEIGHT: 130.2 LBS

## 2020-06-01 DIAGNOSIS — M79.18 MYOFASCIAL PAIN SYNDROME: ICD-10-CM

## 2020-06-01 DIAGNOSIS — G89.29 CHRONIC BILATERAL LOW BACK PAIN WITH LEFT-SIDED SCIATICA: ICD-10-CM

## 2020-06-01 DIAGNOSIS — M54.42 CHRONIC BILATERAL LOW BACK PAIN WITH LEFT-SIDED SCIATICA: ICD-10-CM

## 2020-06-01 DIAGNOSIS — M54.42 CHRONIC BILATERAL LOW BACK PAIN WITH BILATERAL SCIATICA: ICD-10-CM

## 2020-06-01 DIAGNOSIS — M54.41 CHRONIC BILATERAL LOW BACK PAIN WITH BILATERAL SCIATICA: ICD-10-CM

## 2020-06-01 DIAGNOSIS — G89.29 CHRONIC BILATERAL LOW BACK PAIN WITH BILATERAL SCIATICA: ICD-10-CM

## 2020-06-01 DIAGNOSIS — M54.12 CERVICAL RADICULOPATHY: Primary | ICD-10-CM

## 2020-06-01 PROCEDURE — 4004F PT TOBACCO SCREEN RCVD TLK: CPT | Performed by: PHYSICIAN ASSISTANT

## 2020-06-01 PROCEDURE — 99214 OFFICE O/P EST MOD 30 MIN: CPT | Performed by: PHYSICIAN ASSISTANT

## 2020-06-01 PROCEDURE — 3008F BODY MASS INDEX DOCD: CPT | Performed by: PHYSICIAN ASSISTANT

## 2020-06-01 PROCEDURE — 3080F DIAST BP >= 90 MM HG: CPT | Performed by: PHYSICIAN ASSISTANT

## 2020-06-01 PROCEDURE — 3075F SYST BP GE 130 - 139MM HG: CPT | Performed by: PHYSICIAN ASSISTANT

## 2020-06-03 ENCOUNTER — TELEPHONE (OUTPATIENT)
Dept: GASTROENTEROLOGY | Facility: AMBULARY SURGERY CENTER | Age: 55
End: 2020-06-03

## 2020-06-10 ENCOUNTER — OFFICE VISIT (OUTPATIENT)
Dept: GASTROENTEROLOGY | Facility: CLINIC | Age: 55
End: 2020-06-10
Payer: COMMERCIAL

## 2020-06-10 VITALS
HEART RATE: 80 BPM | BODY MASS INDEX: 25.8 KG/M2 | TEMPERATURE: 97.1 F | SYSTOLIC BLOOD PRESSURE: 116 MMHG | HEIGHT: 60 IN | DIASTOLIC BLOOD PRESSURE: 65 MMHG | WEIGHT: 131.4 LBS

## 2020-06-10 DIAGNOSIS — K58.0 IRRITABLE BOWEL SYNDROME WITH DIARRHEA: ICD-10-CM

## 2020-06-10 DIAGNOSIS — R19.5 POSITIVE FIT (FECAL IMMUNOCHEMICAL TEST): ICD-10-CM

## 2020-06-10 DIAGNOSIS — K21.9 GASTRO-ESOPHAGEAL REFLUX DISEASE WITHOUT ESOPHAGITIS: ICD-10-CM

## 2020-06-10 DIAGNOSIS — K63.5 COLORECTAL POLYPS: ICD-10-CM

## 2020-06-10 DIAGNOSIS — K62.1 COLORECTAL POLYPS: ICD-10-CM

## 2020-06-10 DIAGNOSIS — R63.4 ABNORMAL WEIGHT LOSS: ICD-10-CM

## 2020-06-10 DIAGNOSIS — Z11.59 SCREENING FOR VIRAL DISEASE: Primary | ICD-10-CM

## 2020-06-10 PROCEDURE — 3074F SYST BP LT 130 MM HG: CPT | Performed by: INTERNAL MEDICINE

## 2020-06-10 PROCEDURE — 3008F BODY MASS INDEX DOCD: CPT | Performed by: INTERNAL MEDICINE

## 2020-06-10 PROCEDURE — 3078F DIAST BP <80 MM HG: CPT | Performed by: INTERNAL MEDICINE

## 2020-06-10 PROCEDURE — 4004F PT TOBACCO SCREEN RCVD TLK: CPT | Performed by: INTERNAL MEDICINE

## 2020-06-10 PROCEDURE — 99214 OFFICE O/P EST MOD 30 MIN: CPT | Performed by: INTERNAL MEDICINE

## 2020-06-10 RX ORDER — SODIUM, POTASSIUM,MAG SULFATES 17.5-3.13G
180 SOLUTION, RECONSTITUTED, ORAL ORAL ONCE
Qty: 180 ML | Refills: 0 | Status: SHIPPED | OUTPATIENT
Start: 2020-06-10 | End: 2020-11-04 | Stop reason: ALTCHOICE

## 2020-06-10 NOTE — H&P (VIEW-ONLY)
[facility] - Outpatient Follow-up Note  Marcel Dhillon 47 y o  female MRN: [id number]  Encounter: [id number]          ASSESSMENT AND PLAN:      1  Gastro-esophageal reflux disease without esophagitis  2  Irritable bowel syndrome with diarrhea  3  Colorectal polyps  4  Positive FIT (fecal immunochemical test)  5  Abnormal weight loss  I will schedule her for an upper endoscopy and colonoscopy to evaluate for reflux esophagitis, colon polyps, malignancy, inflammatory bowel disease, and peptic ulcer disease given her symptoms  I spoke to her about the benefits and risks of the procedures as well as instruction for the bowel prep and answered all of her questions  In the meantime I encouraged her to take pepcid as needed and imodium as needed  - Colonoscopy; Future  - EGD; Future  - SUPREP BOWEL PREP KIT 17 5-3 13-1 6 GM/177ML SOLN; Take 180 mL by mouth once for 1 dose  Dispense: 180 mL; Refill: 0    6  Screening for viral disease  I will check a preop COVID test prior to her procedure  - PAT Covid Screening; Future    ______________________________________________________________________    SUBJECTIVE:  She presents for follow-up of her multiple gastrointestinal complaints  She has reflux, diarrhea predominant irritable bowel syndrome, a history of colon polyps, and had a positive fecal immunochemical test   She also reports gradual weight loss that has been unintentional   She has not seen any gross evidence of bleeding and she denies dysphagia, vomiting, and nausea  She was supposed to have an upper endoscopy and colonoscopy but canceled it  REVIEW OF SYSTEMS IS OTHERWISE NEGATIVE        Historical Information   Past Medical History:   Diagnosis Date    Acquired ichthyosis     last assessed: 5/9/2013    Anxiety     Cervical radiculopathy     last assessed: 6/13/2014    Colorectal polyps     last assessed: 3/9/2015    [facility]  Depression     Diverticulosis of colon     Foot pain     GERD (gastroesophageal reflux disease)     Hyperlipemia     Hypertension     Myocardial infarction [facility])     at age 28    Osteopenia     last assessed: 2013    Palpitations     last assessed: 2014    PVD (peripheral vascular disease) ([address] )     last assessed: 12/3/2012    Scapular dyskinesis     last assessed: 2014    Shortness of breath     Tendonitis of left rotator cuff     last assessed: 2014    Vocal cord polyps     last assessed: 2014     Past Surgical History:   Procedure Laterality Date    ABDOMINAL SURGERY      exploratory    CARDIAC SURGERY      cardiac stent      SECTION      last assessed: 2014    CHOLECYSTECTOMY      last assessed: 2014    COLONOSCOPY  10/27/2014    CORONARY ANGIOPLASTY WITH STENT PLACEMENT      LAD stent    DENTAL SURGERY      last assessed: 2014    ELBOW SURGERY Bilateral     arthroplasty    EPIDURAL BLOCK INJECTION N/A 2019    Procedure: C7 T1 Cervical Epidural Steroid Injection (61231); Surgeon: John Montalvo MD;  [facility]: [facility];  Service: Pain Management     EPIDURAL BLOCK INJECTION Bilateral 2019    Procedure: BLOCK / INJECTION EPIDURAL STEROID CERVICAL - C7-T1;  Surgeon: John Montalvo MD;  [facility];  Service: Pain Management     EPIDURAL BLOCK INJECTION Left 10/3/2019    Procedure: C7-T1 interlaminar epidural steroid injection;  Surgeon: John Montalvo MD;  [facility];  Service: Pain Management     ESOPHAGOGASTRODUODENOSCOPY      ESOPHAGOGASTRODUODENOSCOPY N/A 2016    Procedure: ESOPHAGOGASTRODUODENOSCOPY (EGD);   Surgeon: Rosemarie Colorado MD;  [facility];  Service:     FL GUIDED NEEDLE PLAC BX/ASP/INJ  2019    FL GUIDED NEEDLE PLAC BX/ASP/INJ  2019    FL GUIDED NEEDLE PLAC BX/ASP/INJ  10/3/2019    FL INJECTION LEFT SHOULDER (ARTHROGRAM)  2018    FOOT SURGERY Right 02/06/2015    x 4    HYSTERECTOMY      last assessed: 8/8/2014    VA ARTHROCENTESIS ASPIR&/INJ MAJOR JT/BURSA W/O US Bilateral 7/31/2019    Procedure: GREATER TROCHANTERIC HIP INJECTION;  Surgeon: Francoise Aguilar MD;  [facility];  Service: Pain Management     VA ARTHROSCOPY SHOULDER SURGICAL BICEPS TENODESIS Left 10/15/2018    Procedure: ARTHROSCOPY SHOULDER; Debridement of shoulder;  Surgeon: Martir Woods MD;  [facility];  Service: Orthopedics    VA SHLDR ARTHROSCOP,SURG,W/ROTAT CUFF REPR Left 7/23/2018    Procedure: ARTHROSCOPY SHOULDER, subacromial decompression, synovectomy;  Surgeon: Martir Woods MD;  [facility];  Service: Orthopedics    THROAT SURGERY      TOOTH EXTRACTION      TRIGEMINAL NERVE DECOMPRESSION Right 6/15/2018    Procedure: FOOT NEUROPLASTY;  Surgeon: Jass Nixon DPM;  [facility];  Service: Podiatry     Social History   Social History     Substance and Sexual Activity   Alcohol Use Not Currently    Comment: rare     Social History     Substance and Sexual Activity   Drug Use No     Social History     Tobacco Use   Smoking Status Current Every Day Smoker    Packs/day: 0 50   Smokeless Tobacco Never Used   Tobacco Comment    She is trying to cut back on her own     Family History   Problem Relation Age of Onset    No Known Problems Mother     No Known Problems Father     No Known Problems Maternal Grandmother     No Known Problems Maternal Grandfather     No Known Problems Paternal Grandmother     No Known Problems Paternal Grandfather        Meds/Allergies       Current Outpatient Medications:     albuterol (PROVENTIL HFA,VENTOLIN HFA) 90 mcg/act inhaler    aspirin 81 mg chewable tablet    baclofen 10 mg tablet    ezetimibe (ZETIA) 10 mg tablet    oxyCODONE-acetaminophen (PERCOCET) 5-325 mg per tablet    rosuvastatin (CRESTOR) 5 mg tablet    diclofenac sodium (VOLTAREN) 50 mg EC tablet    hydroxychloroquine (PLAQUENIL) 200 mg tablet    nitroglycerin (NITROSTAT) 0 4 mg SL tablet    SUPREP BOWEL PREP KIT 17 5-3 13-1 6 GM/177ML SOLN    triamcinolone (KENALOG) 0 1 % cream    varenicline (CHANTIX KERI) 0 5 MG X 11 & 1 MG X 42 tablet    Allergies   Allergen Reactions    Ceclor [Cefaclor] Anaphylaxis    Cephalexin     Codeine Itching     Reaction Date: 09Jun2011;     Gabapentin      Body tremors, sweats    Lyrica [Pregabalin]      Body tremors, chills, heaviness in chest    Ticlopidine Hives     Other reaction(s): Hives    Penicillins Rash     Other reaction(s): Other           Objective     Blood pressure 116/65, pulse 80, temperature (!) 97 1 °F (36 2 °C), temperature source Tympanic, height 5' (1 524 m), weight 59 6 kg (131 lb 6 4 oz)  Body mass index is 25 66 kg/m²  PHYSICAL EXAM:      General Appearance:   Alert, cooperative, no distress   HEENT:   Normocephalic, atraumatic, anicteric      Neck:  Supple, symmetrical, trachea midline   Lungs:   Clear to auscultation bilaterally; no rales, rhonchi or wheezing; respirations unlabored    Heart[de-identified]   Regular rate and rhythm; no murmur, rub, or gallop  Abdomen:   Soft, left side tenderness, non-distended; normal bowel sounds; no masses, no organomegaly    Genitalia:   Deferred    Rectal:   Deferred    Extremities:  No cyanosis, clubbing or edema    Pulses:  2+ and symmetric    Skin:  No jaundice, rashes, or lesions    Lymph nodes:  No palpable cervical lymphadenopathy        Lab Results:   No visits with results within 1 Day(s) from this visit     Latest known visit with results is:   Office Visit on 05/22/2020   Component Date Value    WBC 05/22/2020 8 81     RBC 05/22/2020 4 48     Hemoglobin 05/22/2020 14 6     Hematocrit 05/22/2020 45 0     MCV 05/22/2020 100*    MCH 05/22/2020 32 6     MCHC 05/22/2020 32 4     RDW 05/22/2020 13 2     MPV 05/22/2020 10 4     Platelets 07/91/1475 234     nRBC 05/22/2020 0     Neutrophils Relative 05/22/2020 2316 Eusebio Rodríguez % 05/22/2020 0     Lymphocytes Relative 05/22/2020 38     Monocytes Relative 05/22/2020 6     Eosinophils Relative 05/22/2020 2     Basophils Relative 05/22/2020 1     Neutrophils Absolute 05/22/2020 4 68     Immature Grans Absolute 05/22/2020 0 02     Lymphocytes Absolute 05/22/2020 3 38     Monocytes Absolute 05/22/2020 0 49     Eosinophils Absolute 05/22/2020 0 17     Basophils Absolute 05/22/2020 0 07     Sodium 05/22/2020 137     Potassium 05/22/2020 4 2     Chloride 05/22/2020 104     CO2 05/22/2020 31     ANION GAP 05/22/2020 2*    BUN 05/22/2020 9     Creatinine 05/22/2020 0 62     Glucose 05/22/2020 95     Calcium 05/22/2020 9 9     AST 05/22/2020 16     ALT 05/22/2020 26     Alkaline Phosphatase 05/22/2020 60     Total Protein 05/22/2020 8 1     Albumin 05/22/2020 4 3     Total Bilirubin 05/22/2020 0 45     eGFR 05/22/2020 103     CRP 05/22/2020 <3 0     Sed Rate 05/22/2020 17     Clarity, UA 05/22/2020 Clear     Color, UA 05/22/2020 Yellow     Specific Gravity, UA 05/22/2020 1 015     pH, UA 05/22/2020 6 5     Glucose, UA 05/22/2020 Negative     Ketones, UA 05/22/2020 Negative     Blood, UA 05/22/2020 Negative     Protein, UA 05/22/2020 Negative     Nitrite, UA 05/22/2020 Negative     Bilirubin, UA 05/22/2020 Negative     Urobilinogen, UA 05/22/2020 0 2     Leukocytes, UA 05/22/2020 Negative     WBC, UA 05/22/2020 2-4*    RBC, UA 05/22/2020 None Seen     Hyaline Casts, UA 05/22/2020 5-10*    Bacteria, UA 05/22/2020 Occasional     Epithelial Cells 05/22/2020 Occasional     Creatinine, Ur 05/22/2020 73 1     Protein Urine Random 05/22/2020 8     Prot/Creat Ratio, Ur 05/22/2020 0 11*    C4, COMPLEMENT 05/22/2020 33 0     C3 Complement 05/22/2020 130 0     ds DNA Ab 05/22/2020 1     Anti-Centromere B Antibo* 05/22/2020 <0 2     KYLAH Lyman (SM) Ab 05/22/2020 <0 2     KYLAH RNP Ab 05/22/2020 <0 2          Radiology Results:   Xr Hand 3+ Vw Left    Result Date: 5/28/2020  Narrative: LEFT HAND INDICATION:   R76 8: Other specified abnormal immunological findings in serum  COMPARISON:  None VIEWS:  XR HAND 3+ VW LEFT For the purposes of institution wide universal language the following terms will apply: (thumb=1st digit/finger, index finger=2nd digit/finger, long finger=3rd digit/finger, ring=4th digit/finger and small finger=5th digit/finger) FINDINGS: There is no acute fracture or dislocation  No significant degenerative changes  No lytic or blastic osseous lesion  Soft tissues are unremarkable  Impression: No acute osseous abnormality  Workstation performed: [id number]     Xr Hand 3+ Vw Right    Result Date: 5/28/2020  Narrative: RIGHT HAND INDICATION:   R76 8: Other specified abnormal immunological findings in serum  COMPARISON:  None VIEWS:  XR HAND 3+ VW RIGHT For the purposes of institution wide universal language the following terms will apply: (thumb=1st digit/finger, index finger=2nd digit/finger, long finger=3rd digit/finger, ring=4th digit/finger and small finger=5th digit/finger) FINDINGS: There is no acute fracture or dislocation  No significant degenerative changes  No lytic or blastic osseous lesion  Soft tissues are unremarkable  Impression: No acute osseous abnormality  Workstation performed: [id number]     Xr Foot 3+ Vw Left    Result Date: 5/28/2020  Narrative: LEFT FOOT INDICATION:   R76 8: Other specified abnormal immunological findings in serum  COMPARISON:  None VIEWS:  XR FOOT 3+ VW LEFT FINDINGS: There is no acute fracture or dislocation  No significant degenerative changes  No lytic or blastic osseous lesion  Soft tissues are unremarkable  Impression: No acute osseous abnormality  Workstation performed: [id number]     Ct Chest Wo Contrast    Result Date: 5/12/2020  Narrative: CT CHEST WITHOUT IV CONTRAST INDICATION:   Chronic cough  COMPARISON:  Chest radiographs dated 1/31/2015   TECHNIQUE: CT examination of the chest was performed without intravenous contrast   Axial, sagittal, and coronal 2D reformatted images were created from the source data and submitted for interpretation  Radiation dose length product (DLP) for this visit:  162 15 mGy-cm   This examination, like all CT scans performed in the [facility], was performed utilizing techniques to minimize radiation dose exposure, including the use of iterative  reconstruction and automated exposure control  FINDINGS: LUNGS:  Trace paraseptal and centrilobular emphysema demonstrated in the upper lobes  Scattered bilateral areas of thin subpleural scarring in both upper lobes  No acute consolidations  No interlobular septal thickening  No bronchiectasis or endobronchial lesions  Triangular 4 mm intrafissural nodular density in the right minor fissure (series 3, image 59)  Morphology consistent with benign intrafissural node  No suspicious pulmonary nodules  PLEURA:  Unremarkable  HEART/GREAT VESSELS:  Heart size is normal   Coronary calcifications  No pericardial thickening or effusion  Thoracic aortic atherosclerosis without aneurysm  MEDIASTINUM AND GRABIEL:  Unremarkable  CHEST WALL AND LOWER NECK:   Unremarkable  VISUALIZED STRUCTURES IN THE UPPER ABDOMEN:  Prior cholecystectomy  No acute findings in the visualized upper abdomen  OSSEOUS STRUCTURES:  Subtle posterior buckling deformities of the right 8th and 9th ribs--probably chronic  No acute fracture or destructive osseous lesion  Impression: Mild emphysema without acute thoracic findings  Consider initiation of annual low-dose chest CT lung cancer screening in 12 months if patient meets USPSTF inclusion criteria  Note: Imaging follow-up reminder notification was scheduled in the electronic medical record  Workstation performed: [id number]     Mri Cervical Spine Without Contrast    Result Date: 5/27/2020  Narrative: MRI CERVICAL SPINE WITHOUT CONTRAST INDICATION: Cervicalgia, radiculopathy   COMPARISON: Cervical spine MRI 9/10/2018 TECHNIQUE:  Sagittal T1, sagittal T2, sagittal inversion recovery, axial T2, axial  2D merge IMAGE QUALITY:  Diagnostic FINDINGS: ALIGNMENT:  There is mild reversal of normal cervical lordosis centered at C4-5  MARROW SIGNAL:  There is Modic type II degenerative signal at C4-5 opposing endplates  CERVICAL AND VISUALIZED THORACIC CORD:  Normal signal within the visualized cord  PREVERTEBRAL AND PARASPINAL SOFT TISSUES:  Normal  VISUALIZED POSTERIOR FOSSA:  The visualized posterior fossa demonstrates no abnormal signal  Mild sphenoid sinus disease  CERVICAL DISC SPACES: C2-C3:  Unremarkable C3-C4:  No significant disc bulge  Mild bilateral uncovertebral disease  No canal or significant foraminal narrowing  C4-C5:  There is disc bulge and left greater than right uncovertebral disease resulting in left lateral recess narrowing and mild to moderate left foraminal stenosis without significant canal narrowing  C5-C6:  There is mild disc bulge and moderate left uncovertebral disease  There is left lateral recess narrowing and moderate left foraminal stenosis without significant canal narrowing  C6-C7:  No significant disc bulge  Mild uncovertebral disease  No canal or significant foraminal stenosis C7-T1:  Unremarkable UPPER THORACIC DISC SPACES:  Normal      Impression: Disc bulge and uncovertebral disease resulting in left lateral recess narrowing at levels C5-6 and C4-5 with moderate left foraminal stenosis at C5-6 and mild to moderate left foraminal narrowing at C4-5  No high-grade canal stenosis  Correlate for left C6 and possible left C5 radiculopathy  No significant interval change compared to MRI 9/10/2018   Workstation performed: [id number]

## 2020-06-18 ENCOUNTER — OFFICE VISIT (OUTPATIENT)
Dept: PAIN MEDICINE | Facility: CLINIC | Age: 55
End: 2020-06-18
Payer: COMMERCIAL

## 2020-06-18 VITALS
BODY MASS INDEX: 25.72 KG/M2 | SYSTOLIC BLOOD PRESSURE: 126 MMHG | WEIGHT: 131 LBS | HEIGHT: 60 IN | RESPIRATION RATE: 18 BRPM | DIASTOLIC BLOOD PRESSURE: 78 MMHG | TEMPERATURE: 98.4 F

## 2020-06-18 DIAGNOSIS — M54.2 NECK PAIN: ICD-10-CM

## 2020-06-18 DIAGNOSIS — M51.36 LUMBAR DEGENERATIVE DISC DISEASE: ICD-10-CM

## 2020-06-18 DIAGNOSIS — G89.4 CHRONIC PAIN SYNDROME: Primary | ICD-10-CM

## 2020-06-18 DIAGNOSIS — M54.42 CHRONIC BILATERAL LOW BACK PAIN WITH LEFT-SIDED SCIATICA: ICD-10-CM

## 2020-06-18 DIAGNOSIS — M79.18 MYOFASCIAL PAIN SYNDROME: ICD-10-CM

## 2020-06-18 DIAGNOSIS — M54.12 CERVICAL RADICULOPATHY: ICD-10-CM

## 2020-06-18 DIAGNOSIS — M47.816 LUMBAR SPONDYLOSIS: ICD-10-CM

## 2020-06-18 DIAGNOSIS — M54.16 LUMBAR RADICULOPATHY: ICD-10-CM

## 2020-06-18 DIAGNOSIS — G89.29 CHRONIC BILATERAL LOW BACK PAIN WITH LEFT-SIDED SCIATICA: ICD-10-CM

## 2020-06-18 DIAGNOSIS — M70.62 TROCHANTERIC BURSITIS OF LEFT HIP: ICD-10-CM

## 2020-06-18 PROCEDURE — 4004F PT TOBACCO SCREEN RCVD TLK: CPT | Performed by: NURSE PRACTITIONER

## 2020-06-18 PROCEDURE — 99213 OFFICE O/P EST LOW 20 MIN: CPT | Performed by: NURSE PRACTITIONER

## 2020-06-18 PROCEDURE — 3008F BODY MASS INDEX DOCD: CPT | Performed by: INTERNAL MEDICINE

## 2020-06-18 PROCEDURE — 3078F DIAST BP <80 MM HG: CPT | Performed by: NURSE PRACTITIONER

## 2020-06-18 PROCEDURE — 3074F SYST BP LT 130 MM HG: CPT | Performed by: NURSE PRACTITIONER

## 2020-06-18 PROCEDURE — 3008F BODY MASS INDEX DOCD: CPT | Performed by: NURSE PRACTITIONER

## 2020-06-19 DIAGNOSIS — E78.49 OTHER HYPERLIPIDEMIA: ICD-10-CM

## 2020-06-19 DIAGNOSIS — I25.10 CAD IN NATIVE ARTERY: ICD-10-CM

## 2020-06-20 RX ORDER — EZETIMIBE 10 MG/1
10 TABLET ORAL DAILY
Qty: 90 TABLET | Refills: 3 | Status: SHIPPED | OUTPATIENT
Start: 2020-06-20 | End: 2020-11-04 | Stop reason: SDUPTHER

## 2020-06-22 ENCOUNTER — TELEPHONE (OUTPATIENT)
Dept: GASTROENTEROLOGY | Facility: CLINIC | Age: 55
End: 2020-06-22

## 2020-06-24 DIAGNOSIS — Z11.59 SCREENING FOR VIRAL DISEASE: ICD-10-CM

## 2020-06-24 PROCEDURE — U0003 INFECTIOUS AGENT DETECTION BY NUCLEIC ACID (DNA OR RNA); SEVERE ACUTE RESPIRATORY SYNDROME CORONAVIRUS 2 (SARS-COV-2) (CORONAVIRUS DISEASE [COVID-19]), AMPLIFIED PROBE TECHNIQUE, MAKING USE OF HIGH THROUGHPUT TECHNOLOGIES AS DESCRIBED BY CMS-2020-01-R: HCPCS

## 2020-06-25 LAB — SARS-COV-2 RNA SPEC QL NAA+PROBE: NOT DETECTED

## 2020-06-28 ENCOUNTER — ANESTHESIA EVENT (OUTPATIENT)
Dept: PERIOP | Facility: HOSPITAL | Age: 55
End: 2020-06-28

## 2020-06-29 ENCOUNTER — HOSPITAL ENCOUNTER (OUTPATIENT)
Dept: PERIOP | Facility: HOSPITAL | Age: 55
Setting detail: OUTPATIENT SURGERY
Discharge: HOME | End: 2020-06-29
Attending: INTERNAL MEDICINE
Payer: COMMERCIAL

## 2020-06-29 ENCOUNTER — ANESTHESIA (OUTPATIENT)
Dept: PERIOP | Facility: HOSPITAL | Age: 55
End: 2020-06-29

## 2020-06-29 VITALS
HEIGHT: 60 IN | RESPIRATION RATE: 20 BRPM | TEMPERATURE: 97.4 F | OXYGEN SATURATION: 97 % | DIASTOLIC BLOOD PRESSURE: 78 MMHG | SYSTOLIC BLOOD PRESSURE: 144 MMHG | BODY MASS INDEX: 25.72 KG/M2 | HEART RATE: 72 BPM | WEIGHT: 131 LBS

## 2020-06-29 DIAGNOSIS — R19.5 POSITIVE FIT (FECAL IMMUNOCHEMICAL TEST): ICD-10-CM

## 2020-06-29 DIAGNOSIS — K62.1 COLORECTAL POLYPS: ICD-10-CM

## 2020-06-29 DIAGNOSIS — K21.9 GASTRO-ESOPHAGEAL REFLUX DISEASE WITHOUT ESOPHAGITIS: ICD-10-CM

## 2020-06-29 DIAGNOSIS — R63.4 ABNORMAL WEIGHT LOSS: ICD-10-CM

## 2020-06-29 DIAGNOSIS — K63.5 COLORECTAL POLYPS: ICD-10-CM

## 2020-06-29 DIAGNOSIS — K58.0 IRRITABLE BOWEL SYNDROME WITH DIARRHEA: ICD-10-CM

## 2020-06-29 DIAGNOSIS — Z11.59 SCREENING FOR VIRAL DISEASE: ICD-10-CM

## 2020-06-29 PROCEDURE — 45380 COLONOSCOPY AND BIOPSY: CPT | Performed by: INTERNAL MEDICINE

## 2020-06-29 PROCEDURE — 88305 TISSUE EXAM BY PATHOLOGIST: CPT | Performed by: PATHOLOGY

## 2020-06-29 PROCEDURE — 43239 EGD BIOPSY SINGLE/MULTIPLE: CPT | Performed by: INTERNAL MEDICINE

## 2020-06-29 PROCEDURE — NC001 PR NO CHARGE: Performed by: INTERNAL MEDICINE

## 2020-06-29 RX ORDER — SODIUM CHLORIDE, SODIUM LACTATE, POTASSIUM CHLORIDE, CALCIUM CHLORIDE 600; 310; 30; 20 MG/100ML; MG/100ML; MG/100ML; MG/100ML
125 INJECTION, SOLUTION INTRAVENOUS CONTINUOUS
Status: DISCONTINUED | OUTPATIENT
Start: 2020-06-29 | End: 2020-06-29

## 2020-06-29 RX ORDER — PROPOFOL 10 MG/ML
INJECTION, EMULSION INTRAVENOUS AS NEEDED
Status: DISCONTINUED | OUTPATIENT
Start: 2020-06-29 | End: 2020-06-29 | Stop reason: SURG

## 2020-06-29 RX ORDER — LIDOCAINE HYDROCHLORIDE 20 MG/ML
INJECTION, SOLUTION EPIDURAL; INFILTRATION; INTRACAUDAL; PERINEURAL AS NEEDED
Status: DISCONTINUED | OUTPATIENT
Start: 2020-06-29 | End: 2020-06-29 | Stop reason: SURG

## 2020-06-29 RX ADMIN — PROPOFOL 100 MG: 10 INJECTION, EMULSION INTRAVENOUS at 11:58

## 2020-06-29 RX ADMIN — PROPOFOL 50 MG: 10 INJECTION, EMULSION INTRAVENOUS at 12:09

## 2020-06-29 RX ADMIN — PROPOFOL 50 MG: 10 INJECTION, EMULSION INTRAVENOUS at 12:21

## 2020-06-29 RX ADMIN — PROPOFOL 50 MG: 10 INJECTION, EMULSION INTRAVENOUS at 12:28

## 2020-06-29 RX ADMIN — SODIUM CHLORIDE, SODIUM LACTATE, POTASSIUM CHLORIDE, AND CALCIUM CHLORIDE 125 ML/HR: .6; .31; .03; .02 INJECTION, SOLUTION INTRAVENOUS at 11:16

## 2020-06-29 RX ADMIN — PROPOFOL 50 MG: 10 INJECTION, EMULSION INTRAVENOUS at 12:16

## 2020-06-29 RX ADMIN — LIDOCAINE HYDROCHLORIDE 100 MG: 20 INJECTION, SOLUTION EPIDURAL; INFILTRATION; INTRACAUDAL; PERINEURAL at 11:58

## 2020-06-29 RX ADMIN — PROPOFOL 50 MG: 10 INJECTION, EMULSION INTRAVENOUS at 12:03

## 2020-06-29 NOTE — INTERVAL H&P NOTE
H&P reviewed  After examining the patient I find no changes in the patients condition since the H&P had been written      Vitals:    06/29/20 1112   BP: 111/72   Pulse: 76   Resp: 18   Temp: (!) 97 4 °F (36 3 °C)   SpO2: 94%

## 2020-07-02 ENCOUNTER — HOSPITAL ENCOUNTER (OUTPATIENT)
Dept: MRI IMAGING | Facility: HOSPITAL | Age: 55
Discharge: HOME | End: 2020-07-02
Payer: COMMERCIAL

## 2020-07-02 DIAGNOSIS — M79.18 MYOFASCIAL PAIN SYNDROME: ICD-10-CM

## 2020-07-02 DIAGNOSIS — M54.42 CHRONIC BILATERAL LOW BACK PAIN WITH BILATERAL SCIATICA: ICD-10-CM

## 2020-07-02 DIAGNOSIS — M54.41 CHRONIC BILATERAL LOW BACK PAIN WITH BILATERAL SCIATICA: ICD-10-CM

## 2020-07-02 DIAGNOSIS — G89.29 CHRONIC BILATERAL LOW BACK PAIN WITH BILATERAL SCIATICA: ICD-10-CM

## 2020-07-02 PROCEDURE — 72148 MRI LUMBAR SPINE W/O DYE: CPT

## 2020-07-06 NOTE — RESULT ENCOUNTER NOTE
The results were negative  The ascending colon lesion was probably a lipoma since the biopsies were negative  REpeat colonoscopy in 5 years and repeat FIT in 1 year  My medical assistant will call her to let her know the results

## 2020-07-15 ENCOUNTER — PROCEDURE VISIT (OUTPATIENT)
Dept: PAIN MEDICINE | Facility: CLINIC | Age: 55
End: 2020-07-15
Payer: COMMERCIAL

## 2020-07-15 DIAGNOSIS — M70.62 TROCHANTERIC BURSITIS OF LEFT HIP: Primary | ICD-10-CM

## 2020-07-15 PROCEDURE — 20611 DRAIN/INJ JOINT/BURSA W/US: CPT | Performed by: ANESTHESIOLOGY

## 2020-07-15 RX ORDER — LIDOCAINE HYDROCHLORIDE 10 MG/ML
1 INJECTION, SOLUTION INFILTRATION; PERINEURAL
Status: COMPLETED | OUTPATIENT
Start: 2020-07-15 | End: 2020-07-15

## 2020-07-15 RX ORDER — BUPIVACAINE HYDROCHLORIDE 2.5 MG/ML
2 INJECTION, SOLUTION INFILTRATION; PERINEURAL
Status: COMPLETED | OUTPATIENT
Start: 2020-07-15 | End: 2020-07-15

## 2020-07-15 RX ORDER — METHYLPREDNISOLONE ACETATE 40 MG/ML
1 INJECTION, SUSPENSION INTRA-ARTICULAR; INTRALESIONAL; INTRAMUSCULAR; SOFT TISSUE
Status: COMPLETED | OUTPATIENT
Start: 2020-07-15 | End: 2020-07-15

## 2020-07-15 RX ADMIN — BUPIVACAINE HYDROCHLORIDE 2 ML: 2.5 INJECTION, SOLUTION INFILTRATION; PERINEURAL at 10:31

## 2020-07-15 RX ADMIN — LIDOCAINE HYDROCHLORIDE 1 ML: 10 INJECTION, SOLUTION INFILTRATION; PERINEURAL at 10:31

## 2020-07-15 RX ADMIN — METHYLPREDNISOLONE ACETATE 1 ML: 40 INJECTION, SUSPENSION INTRA-ARTICULAR; INTRALESIONAL; INTRAMUSCULAR; SOFT TISSUE at 10:31

## 2020-07-15 NOTE — PATIENT INSTRUCTIONS
1  Do not apply heat to any area that is numb  If you have discomfort or soreness at the injection site, you may apply ice today, 20 minutes on and 20 minutes off  Tomorrow you may use ice or warm, moist heat  Do not apply ice or heat directly to the skin  2  If you experience severe shortness of breath, go to the Emergency Room  3  You may have numbness for several hours from the local anesthetic  Please use caution and common sense, especially with weight-bearing activities  4  You may have an increase or change in the discomfort for 36-48 hours after your treatment  Apply ice and continue with any pain medicine you have been prescribed  5  Do not do anything strenuous today  You may shower, but no tub baths or hot tubs today  You may resume your normal activities tomorrow, but do not overdo it  Resume normal activities slowly when you are feeling better  6  If you experience redness, drainage or swelling at the injection site, or if you develop a fever above 100 degrees, please call [facility] at [phone] or go to the Emergency Room  7  Continue to take all routine medicines prescribed by your primary care physician unless otherwise instructed by our staff  Most blood thinners should be started again according to your regularly scheduled dosing  If you have any questions, please give our office a call  If you have a problem specifically related to your procedure, please call our office at [phone]  Problems not related to your procedure should be directed to your primary care physician

## 2020-07-15 NOTE — PROGRESS NOTES
Large joint arthrocentesis: L greater trochanteric bursa  Date/Time: 7/15/2020 10:31 AM  Consent given by: patient  Site marked: site marked  Timeout: Immediately prior to procedure a time out was called to verify the correct patient, procedure, equipment, support staff and site/side marked as required   Supporting Documentation  Indications: pain   Procedure Details  Location: hip - L greater trochanteric bursa  Preparation: Patient was prepped and draped in the usual sterile fashion  Needle size: 22 G  Ultrasound guidance: yes  Approach: lateral  Medications administered: 2 mL bupivacaine 0 25 %; 1 mL methylPREDNISolone acetate 40 mg/mL; 1 mL lidocaine 1 %

## 2020-07-22 ENCOUNTER — TELEPHONE (OUTPATIENT)
Dept: PAIN MEDICINE | Facility: CLINIC | Age: 55
End: 2020-07-22

## 2020-07-22 NOTE — TELEPHONE ENCOUNTER
Pt reports no improvement post inj   Pain level 2-3/10  She said the pain gets worse at night   Pt aware takes up to 2wks for full effect

## 2020-07-29 ENCOUNTER — TELEPHONE (OUTPATIENT)
Dept: GASTROENTEROLOGY | Facility: CLINIC | Age: 55
End: 2020-07-29

## 2020-07-29 NOTE — TELEPHONE ENCOUNTER
Patients GI provider:  Dr Feng Carmona    Number to return call: [phone]    Reason for call: Pt sched via [facility] email and phone number confirmed    Scheduled procedure/appointment date if applicable: Appt -  30/00/41

## 2020-08-03 ENCOUNTER — TELEMEDICINE (OUTPATIENT)
Dept: GASTROENTEROLOGY | Facility: MEDICAL CENTER | Age: 55
End: 2020-08-03
Payer: COMMERCIAL

## 2020-08-03 DIAGNOSIS — K21.9 GASTRO-ESOPHAGEAL REFLUX DISEASE WITHOUT ESOPHAGITIS: ICD-10-CM

## 2020-08-03 DIAGNOSIS — R19.5 POSITIVE FIT (FECAL IMMUNOCHEMICAL TEST): ICD-10-CM

## 2020-08-03 DIAGNOSIS — K63.5 COLORECTAL POLYPS: ICD-10-CM

## 2020-08-03 DIAGNOSIS — R63.4 ABNORMAL WEIGHT LOSS: Primary | ICD-10-CM

## 2020-08-03 DIAGNOSIS — K58.0 IRRITABLE BOWEL SYNDROME WITH DIARRHEA: ICD-10-CM

## 2020-08-03 DIAGNOSIS — K62.1 COLORECTAL POLYPS: ICD-10-CM

## 2020-08-03 PROCEDURE — 3077F SYST BP >= 140 MM HG: CPT | Performed by: INTERNAL MEDICINE

## 2020-08-03 PROCEDURE — 3078F DIAST BP <80 MM HG: CPT | Performed by: INTERNAL MEDICINE

## 2020-08-03 PROCEDURE — 99214 OFFICE O/P EST MOD 30 MIN: CPT | Performed by: INTERNAL MEDICINE

## 2020-08-03 PROCEDURE — 4004F PT TOBACCO SCREEN RCVD TLK: CPT | Performed by: INTERNAL MEDICINE

## 2020-08-03 NOTE — PROGRESS NOTES
Virtual Regular Visit      Assessment/Plan:    Problem List Items Addressed This Visit        Digestive    Gastro-esophageal reflux disease without esophagitis    Colorectal polyps    Irritable bowel syndrome with diarrhea       Other    Abnormal weight loss - Primary    Relevant Orders    CT abdomen pelvis w contrast      Other Visit Diagnoses     Positive FIT (fecal immunochemical test)            1  Abnormal weight loss  I am concerned about her persistent weight loss as she has now lost 30 lb  Since the source was not found during her upper endoscopy or colonoscopy, I will schedule her for a CT scan to evaluate for pancreatic, biliary, liver, or gynecologic malignancy  - CT abdomen pelvis w contrast; Future    2  Gastro-esophageal reflux disease without esophagitis  I encouraged her to continue diet and lifestyle modification for her reflux  3  Irritable bowel syndrome with diarrhea  Fortunately she has not had any recent symptoms from her diarrhea predominant irritable bowel syndrome  4  Colorectal polyps  5  Positive FIT (fecal immunochemical test)  She is due for her next surveillance colonoscopy in approximately five years but since she had a positive fecal immunochemical test, I have asked her to repeat this annual and if positive she should have her colonoscopy performed sooner  Reason for visit is   Chief Complaint   Patient presents with    Virtual Regular Visit        Encounter provider Afsaneh Mack MD    Provider located at [address] [address]      Recent Visits  No visits were found meeting these conditions  Showing recent visits within past 7 days and meeting all other requirements     Future Appointments  No visits were found meeting these conditions     Showing future appointments within next 150 days and meeting all other requirements        The patient was identified by name and date of birth  Raul Locke was informed that this is a telemedicine visit and that the visit is being conducted through [employer]  My office door was closed  No one else was in the room  She acknowledged consent and understanding of privacy and security of the video platform  The patient has agreed to participate and understands they can discontinue the visit at any time  Patient is aware this is a billable service  Subjective  Raul Locke is a 54 y o  female with progressive weight loss  She has now lost approximately 30 lb over the last year  She also reports decreased appetite and while she has a history of reflux and diarrhea predominant irritable bowel syndrome she has not had any recent reflux or diarrhea symptoms  I performed upper endoscopy and colonoscopy and found gastritis, duodenitis, and a small sliding hiatal hernia during the upper endoscopy  She had internal hemorrhoids, diverticulosis, and a probable lipoma found during colonoscopy        Past Medical History:   Diagnosis Date    Acquired ichthyosis     last assessed: 5/9/2013    Anxiety     Cervical radiculopathy     last assessed: 6/13/2014    Colorectal polyps     last assessed: 3/9/2015    [facility]     Depression     Diverticulosis of colon     Foot pain     GERD (gastroesophageal reflux disease)     Hyperlipemia     Hypertension     Myocardial infarction ([facility] [address] )     at age 28    Osteopenia     last assessed: 12/6/2013    Palpitations     last assessed: 12/31/2014    PVD (peripheral vascular disease) ([facility] [address] )     last assessed: 12/3/2012    Scapular dyskinesis     last assessed: 11/17/2014    Shortness of breath     Tendonitis of left rotator cuff     last assessed: 11/17/2014    Vocal cord polyps     last assessed: 8/8/2014       Past Surgical History:   Procedure Laterality Date    ABDOMINAL SURGERY      exploratory    CARDIAC SURGERY      cardiac stent   SECTION      last assessed: 2014    CHOLECYSTECTOMY      last assessed: 2014    COLONOSCOPY  10/27/2014    CORONARY ANGIOPLASTY WITH STENT PLACEMENT  1999    LAD stent    DENTAL SURGERY      last assessed: 2014    ELBOW SURGERY Bilateral     arthroplasty    EPIDURAL BLOCK INJECTION N/A 2019    Procedure: C7 T1 Cervical Epidural Steroid Injection (07234); Surgeon: Liss Giraldo MD;  [facility];  Service: Pain Management     EPIDURAL BLOCK INJECTION Bilateral 2019    Procedure: BLOCK / INJECTION EPIDURAL STEROID CERVICAL - C7-T1;  Surgeon: Liss Giraldo MD;  [facility];  Service: Pain Management     EPIDURAL BLOCK INJECTION Left 10/3/2019    Procedure: C7-T1 interlaminar epidural steroid injection;  Surgeon: Liss Giraldo MD;  [facility];  Service: Pain Management     ESOPHAGOGASTRODUODENOSCOPY      ESOPHAGOGASTRODUODENOSCOPY N/A 2016    Procedure: ESOPHAGOGASTRODUODENOSCOPY (EGD);   Surgeon: Gavin Page MD;  [facility];  Service:     FL GUIDED NEEDLE PLAC BX/ASP/INJ  2019    FL GUIDED NEEDLE PLAC BX/ASP/INJ  2019    FL GUIDED NEEDLE PLAC BX/ASP/INJ  10/3/2019    FL INJECTION LEFT SHOULDER (ARTHROGRAM)  2018    FOOT SURGERY Right 02/06/2015    x 4    HYSTERECTOMY      last assessed: 2014    GA ARTHROCENTESIS ASPIR&/INJ MAJOR JT/BURSA W/O US Bilateral 2019    Procedure: GREATER TROCHANTERIC HIP INJECTION;  Surgeon: Liss Giraldo MD;  [facility];  Service: Pain Management     GA ARTHROSCOPY SHOULDER SURGICAL BICEPS TENODESIS Left 10/15/2018    Procedure: ARTHROSCOPY SHOULDER; Debridement of shoulder;  Surgeon: Princess Wilber MD;  [facility];  Service: Orthopedics    GA SHLDR ARTHROSCOP,SURG,W/ROTAT CUFF REPR Left 2018    Procedure: ARTHROSCOPY SHOULDER, subacromial decompression, synovectomy;  Surgeon: Princess Wilber MD;  [facility]; Service: Orthopedics    THROAT SURGERY      TOOTH EXTRACTION      TRIGEMINAL NERVE DECOMPRESSION Right 6/15/2018    Procedure: FOOT NEUROPLASTY;  Surgeon: Marcie Whitt DPM;  [facility];  Service: Podiatry       Current Outpatient Medications   Medication Sig Dispense Refill    albuterol (PROVENTIL HFA,VENTOLIN HFA) 90 mcg/act inhaler inhale 2 puffs by mouth every 6 hours if needed for wheezing  0    aspirin 81 mg chewable tablet Chew 1 tablet daily Stop for the time being for foot surgery       baclofen 10 mg tablet Take 1 tablet (10 mg total) by mouth 3 (three) times a day 90 tablet 2    diclofenac sodium (VOLTAREN) 50 mg EC tablet Take 1 tablet (50 mg total) by mouth 2 (two) times a day 60 tablet 2    ezetimibe (ZETIA) 10 mg tablet Take 1 tablet (10 mg total) by mouth daily 90 tablet 3    hydroxychloroquine (PLAQUENIL) 200 mg tablet Take 1 tablet (200 mg total) by mouth daily 30 tablet 3    nitroglycerin (NITROSTAT) 0 4 mg SL tablet Place 1 tablet (0 4 mg total) under the tongue every 5 (five) minutes as needed for chest pain 90 tablet 3    oxyCODONE-acetaminophen (PERCOCET) 5-325 mg per tablet Take 1 tablet by mouth every 12 (twelve) hours       rosuvastatin (CRESTOR) 5 mg tablet Take 1 tablet (5 mg total) by mouth daily 90 tablet 3    SUPREP BOWEL PREP KIT 17 5-3 13-1 6 GM/177ML SOLN Take 180 mL by mouth once for 1 dose 180 mL 0    triamcinolone (KENALOG) 0 1 % cream Apply topically 2 (two) times a day 30 g 0    varenicline (CHANTIX KERI) 0 5 MG X 11 & 1 MG X 42 tablet Take one 0 5mg tablet by mouth QD for 3 days, then increase to one 0 5mg tablet BID for 3 days, then increase to one 1mg tablet BID  (Patient not taking: Reported on 6/18/2020) 53 tablet 0     No current facility-administered medications for this visit           Allergies   Allergen Reactions    Ceclor [Cefaclor] Anaphylaxis    Cephalexin     Codeine Itching     Reaction Date: [id number];     Gabapentin      Body tremors, sweats    Lyrica [Pregabalin]      Body tremors, chills, heaviness in chest    Ticlopidine Hives     Other reaction(s): Hives    Penicillins Rash     Other reaction(s): Other       REVIEW OF SYSTEMS:    CONSTITUTIONAL: Denies any fever, chills, rigors, but complains of weight loss  HEENT: No earache or tinnitus  Denies hearing loss or visual disturbances  CARDIOVASCULAR: No chest pain or palpitations  RESPIRATORY: Denies any cough, hemoptysis, shortness of breath or dyspnea on exertion  GASTROINTESTINAL: As noted in the History of Present Illness  GENITOURINARY: No problems with urination  Denies any hematuria or dysuria  NEUROLOGIC: No dizziness or vertigo, denies headaches  MUSCULOSKELETAL: Denies any muscle or joint pain  SKIN: Denies skin rashes or itching  ENDOCRINE: Denies excessive thirst  Denies intolerance to heat or cold  PSYCHOSOCIAL: Denies depression or anxiety  Denies any recent memory loss  PHYSICAL EXAMINATION:  Appearance and vitals taken from home devices    General Appearance:   Alert, cooperative, no distress   HEENT:  Normocephalic, atraumatic, anicteric  Neck supple, symmetrical, trachea midline  Lungs:   Equal chest rise and unlabored breathing, normal effort, no coughing  Cardiovascular:   No visualized JVD  Abdomen:   No abdominal distension  Skin:   No jaundice, rashes, or lesions  Musculoskeletal:   Normal range of motion visualized  Psych:  Normal affect and normal insight  Neuro:  Alert and appropriate  I spent 15 minutes directly with the patient during this visit      [facility] acknowledges that she has consented to an online visit or consultation   She understands that the online visit is based solely on information provided by her, and that, in the absence of a face-to-face physical evaluation by the physician, the diagnosis she receives is both limited and provisional in terms of accuracy and completeness  This is not intended to replace a full medical face-to-face evaluation by the physician  Janki Pederson understands and accepts these terms

## 2020-08-05 ENCOUNTER — TELEPHONE (OUTPATIENT)
Dept: NEUROSURGERY | Facility: CLINIC | Age: 55
End: 2020-08-05

## 2020-08-05 NOTE — TELEPHONE ENCOUNTER
08/05/2020-CALLED PT, LEFT MESSAGE ON MACHINE TO RESCHEDULE CX APT       7/29/2020-IN BASKET  Appointment canceled for Fletcher Crespo ([id number])    Visit Type: [facility]    Date        Time      Length    Provider                  Department    7/31/2020    1:30 PM  15 mins  Kyle Mejia MD           [facility] [place]    7/31/2020    1:00 PM  45 mins  Reginaldo Schumacher PA-C     [facility]       Reason for Cancellation: Lack of Transportation       Patient Comments: my car has not been fixed yet, i have an appointment for august 4th to leave it at [facility] i need to reschedule i am also going to  contact [employer] to see if i can get transportation i will call to reschedule my appointment

## 2020-08-12 ENCOUNTER — HOSPITAL ENCOUNTER (OUTPATIENT)
Dept: CT IMAGING | Facility: HOSPITAL | Age: 55
Discharge: HOME | End: 2020-08-12
Attending: INTERNAL MEDICINE
Payer: COMMERCIAL

## 2020-08-12 DIAGNOSIS — R63.4 ABNORMAL WEIGHT LOSS: ICD-10-CM

## 2020-08-12 PROCEDURE — G1004 CDSM NDSC: HCPCS

## 2020-08-12 PROCEDURE — 74177 CT ABD & PELVIS W/CONTRAST: CPT

## 2020-08-12 RX ADMIN — IOHEXOL 100 ML: 350 INJECTION, SOLUTION INTRAVENOUS at 11:13

## 2020-08-27 ENCOUNTER — TELEPHONE (OUTPATIENT)
Dept: OBGYN CLINIC | Facility: HOSPITAL | Age: 55
End: 2020-08-27

## 2020-08-27 NOTE — TELEPHONE ENCOUNTER
Vanessa Gonzalez    [phone]    Dr Dulce Maria García    Patient has an appt for tomorrow 8/28  She is asking if you can do a VV due to transportation issues

## 2020-08-28 ENCOUNTER — TELEMEDICINE (OUTPATIENT)
Dept: RHEUMATOLOGY | Facility: CLINIC | Age: 55
End: 2020-08-28
Payer: COMMERCIAL

## 2020-08-28 DIAGNOSIS — M54.42 CHRONIC BILATERAL LOW BACK PAIN WITH LEFT-SIDED SCIATICA: ICD-10-CM

## 2020-08-28 DIAGNOSIS — M47.812 OSTEOARTHRITIS OF CERVICAL SPINE, UNSPECIFIED SPINAL OSTEOARTHRITIS COMPLICATION STATUS: ICD-10-CM

## 2020-08-28 DIAGNOSIS — M05.9 RHEUMATOID ARTHRITIS WITH POSITIVE RHEUMATOID FACTOR, INVOLVING UNSPECIFIED SITE (HCC): ICD-10-CM

## 2020-08-28 DIAGNOSIS — M79.10 MYALGIA: ICD-10-CM

## 2020-08-28 DIAGNOSIS — G89.29 CHRONIC BILATERAL LOW BACK PAIN WITH LEFT-SIDED SCIATICA: ICD-10-CM

## 2020-08-28 DIAGNOSIS — M79.18 MYOFASCIAL PAIN SYNDROME: ICD-10-CM

## 2020-08-28 DIAGNOSIS — M54.16 LUMBAR RADICULOPATHY: ICD-10-CM

## 2020-08-28 DIAGNOSIS — M35.9 UNDIFFERENTIATED CONNECTIVE TISSUE DISEASE (HCC): Primary | ICD-10-CM

## 2020-08-28 DIAGNOSIS — M47.816 LUMBAR SPONDYLOSIS: ICD-10-CM

## 2020-08-28 PROCEDURE — 99215 OFFICE O/P EST HI 40 MIN: CPT | Performed by: INTERNAL MEDICINE

## 2020-08-28 RX ORDER — DICLOFENAC SODIUM 75 MG/1
75 TABLET, DELAYED RELEASE ORAL 2 TIMES DAILY
Qty: 60 TABLET | Refills: 6 | Status: SHIPPED | OUTPATIENT
Start: 2020-08-28 | End: 2020-10-08

## 2020-08-28 RX ORDER — BACLOFEN 10 MG/1
10 TABLET ORAL 3 TIMES DAILY
Qty: 90 TABLET | Refills: 6 | Status: SHIPPED | OUTPATIENT
Start: 2020-08-28 | End: 2021-09-29

## 2020-08-28 RX ORDER — HYDROXYCHLOROQUINE SULFATE 200 MG/1
300 TABLET, FILM COATED ORAL DAILY
Qty: 45 TABLET | Refills: 6 | Status: SHIPPED | OUTPATIENT
Start: 2020-08-28 | End: 2021-03-21

## 2020-08-28 NOTE — PATIENT INSTRUCTIONS
Increase hydroxychloroquine to one and a half tablets daily; see eye doctor  Changed diclofenac prescription to 75mg twice a day   Can continue baclofen 10mg three times a day  Do bloodwork when you get the chance  Call if any worsening symptoms    Return to clinic on 2/2/21 at 2:30pm

## 2020-08-28 NOTE — PROGRESS NOTES
MD at bedside.   Virtual Regular Visit    Assessment/Plan: Madison Mao is a 54 y o  female who presents for follow-up of UCTD (arthralgia, photosensitivity, facial rashes, and Raynaud's symptoms)  Her arthralgia has improved since starting HCQ  Based on her weight, have increased her hydroxychloroquine to 300mg po daily  Changed her diclofenac prescription to 75mg po bid instead of 50mg po tid, and continued baclofen 10mg po tid  She is to call if she has any worsening symptoms  Problem List Items Addressed This Visit        Nervous and Auditory    Lumbar radiculopathy    Chronic bilateral low back pain with left-sided sciatica       Musculoskeletal and Integument    Lumbar spondylosis    Myofascial pain syndrome    Relevant Medications    baclofen 10 mg tablet    Osteoarthritis of cervical spine       Other    Undifferentiated connective tissue disease (HCC) - Primary      Other Visit Diagnoses     Rheumatoid arthritis with positive rheumatoid factor, involving unspecified site (HCC)        Relevant Medications    hydroxychloroquine (PLAQUENIL) 200 mg tablet    Myalgia        Relevant Orders    Vitamin D 25 hydroxy (Completed)        Return to clinic on 2/2/21 at 2:30pm      Reason for visit is follow-up of UCTD    Chief Complaint   Patient presents with    Virtual Regular Visit        Encounter provider Karrie Burnham MD    Provider located at [address] [address] [phone]      Recent Visits  No visits were found meeting these conditions  Showing recent visits within past 7 days and meeting all other requirements     Future Appointments  No visits were found meeting these conditions  Showing future appointments within next 150 days and meeting all other requirements        The patient was identified by name and date of birth   Madison Mao was informed that this is a telemedicine visit and that the visit is being conducted through [employer] and patient was informed that this is a secure, HIPAA-compliant platform  She agrees to proceed     My office door was closed  No one else was in the room  She acknowledged consent and understanding of privacy and security of the video platform  The patient has agreed to participate and understands they can discontinue the visit at any time  Patient is aware this is a billable service  Rheumatic Disease Summary  Danie Harley is a 54 y o   female who originally presented on 5/22/20 as a Rheumatology consult referred by her PCP Nicholas Freire DO for evaluation of possible inflammatory arthritis given positive RF of 40 and KRISTA of 1:80 in a homogenous pattern  Patient has history of negative anti-CCP  Lupus activity labs, anti-centromere Ab, and anti-RNP ordered and returned unremarkable  ESR/CRP returned normal  Patient had some features to at least make a diagnosis of undifferentiated connective tissue disease, including arthralgia, photosensitivity, facial rashes, and Raynaud's symptoms  Her arthritis symptoms were not typical of RA since she denied morning stiffness and her joint symptoms were worse later in the day and with use  Hand and feet x-rays ordered to evaluate for any inflammatory changes returned unremarkable  For time-being, started patient on hydroxychloroquine 200mg po daily; asked her to get regular eye exams while on this medication to monitor for plaquenil-related retinal toxicity  Migue Wiseman is a 54 y o  female who presents for follow-up of UCTD (arthralgia, photosensitivity, facial rashes, and Raynaud's symptoms)  Last clinic visit was on 5/22/20, which was her initial visit  She admits that her left shoulder pain has improved  Her gums are sensitive and bleeding  Has been taking diclofenac 50mg po tid  Has most pain in hip, neck, and ankles        Past Medical History:   Diagnosis Date    Acquired ichthyosis     last assessed: 2013    Anxiety     Cervical radiculopathy     last assessed: 2014    Colorectal polyps     last assessed: 3/9/2015    [facility]     Depression     Diverticulosis of colon     Foot pain     GERD (gastroesophageal reflux disease)     Hyperlipemia     Hypertension     Myocardial infarction ([facility] )     at age 28    Osteopenia     last assessed: 2013    Palpitations     last assessed: 2014    PVD (peripheral vascular disease) ([facility] )     last assessed: 12/3/2012    Scapular dyskinesis     last assessed: 2014    Shortness of breath     Tendonitis of left rotator cuff     last assessed: 2014    Vocal cord polyps     last assessed: 2014       Past Surgical History:   Procedure Laterality Date    ABDOMINAL SURGERY      exploratory    CARDIAC SURGERY      cardiac stent      SECTION      last assessed: 2014    CHOLECYSTECTOMY      last assessed: 2014    COLONOSCOPY  10/27/2014    CORONARY ANGIOPLASTY WITH STENT PLACEMENT      LAD stent    DENTAL SURGERY      last assessed: 2014    ELBOW SURGERY Bilateral     arthroplasty    EPIDURAL BLOCK INJECTION N/A 2019    Procedure: C7 T1 Cervical Epidural Steroid Injection (48977); Surgeon: Israel Nielsen MD;  [facility];  Service: Pain Management     EPIDURAL BLOCK INJECTION Bilateral 2019    Procedure: BLOCK / INJECTION EPIDURAL STEROID CERVICAL - C7-T1;  Surgeon: Israel Nielsen MD;  [facility];  Service: Pain Management     EPIDURAL BLOCK INJECTION Left 10/3/2019    Procedure: C7-T1 interlaminar epidural steroid injection;  Surgeon: Israel Nielsen MD;  [facility];  Service: Pain Management     ESOPHAGOGASTRODUODENOSCOPY      ESOPHAGOGASTRODUODENOSCOPY N/A 2016    Procedure: ESOPHAGOGASTRODUODENOSCOPY (EGD);   Surgeon: Donovan Addison MD;  [facility];  Service:  FL GUIDED NEEDLE PLAC BX/ASP/INJ  5/2/2019    FL GUIDED NEEDLE PLAC BX/ASP/INJ  7/31/2019    FL GUIDED NEEDLE PLAC BX/ASP/INJ  10/3/2019    FL INJECTION LEFT SHOULDER (ARTHROGRAM)  9/25/2018    FOOT SURGERY Right 02/06/2015    x 4    HYSTERECTOMY      last assessed: 8/8/2014    CA ARTHROCENTESIS ASPIR&/INJ MAJOR JT/BURSA W/O US Bilateral 7/31/2019    Procedure: GREATER TROCHANTERIC HIP INJECTION;  Surgeon: Cecilia Arnold MD;  [facility];  Service: Pain Management     CA ARTHROSCOPY SHOULDER SURGICAL BICEPS TENODESIS Left 10/15/2018    Procedure: ARTHROSCOPY SHOULDER; Debridement of shoulder;  Surgeon: Thomas Cody MD;  [facility];  Service: Orthopedics    CA SHLDR ARTHROSCOP,SURG,W/ROTAT CUFF REPR Left 7/23/2018    Procedure: ARTHROSCOPY SHOULDER, subacromial decompression, synovectomy;  Surgeon: Thomas Cody MD;  [facility];  Service: Orthopedics    THROAT SURGERY      TOOTH EXTRACTION      TRIGEMINAL NERVE DECOMPRESSION Right 6/15/2018    Procedure: FOOT NEUROPLASTY;  Surgeon: Jonathan Tilley DPM;  [facility];  Service: Podiatry       Current Outpatient Medications   Medication Sig Dispense Refill    albuterol (PROVENTIL HFA,VENTOLIN HFA) 90 mcg/act inhaler inhale 2 puffs by mouth every 6 hours if needed for wheezing  0    aspirin 81 mg chewable tablet Chew 1 tablet daily Stop for the time being for foot surgery       baclofen 10 mg tablet Take 1 tablet (10 mg total) by mouth 3 (three) times a day 90 tablet 6    diclofenac (VOLTAREN) 75 mg EC tablet Take 1 tablet (75 mg total) by mouth 2 (two) times a day 60 tablet 6    ergocalciferol (ERGOCALCIFEROL) 1 25 MG (54721 UT) capsule Take 1 capsule (50,000 Units total) by mouth once a week 12 capsule 1    ezetimibe (ZETIA) 10 mg tablet Take 1 tablet (10 mg total) by mouth daily 90 tablet 3    hydroxychloroquine (PLAQUENIL) 200 mg tablet Take 1 5 tablets (300 mg total) by mouth daily 45 tablet 6    nitroglycerin (NITROSTAT) 0 4 mg SL tablet Place 1 tablet (0 4 mg total) under the tongue every 5 (five) minutes as needed for chest pain 90 tablet 3    oxyCODONE-acetaminophen (PERCOCET) 5-325 mg per tablet Take 1 tablet by mouth every 12 (twelve) hours       rosuvastatin (CRESTOR) 5 mg tablet Take 1 tablet (5 mg total) by mouth daily 90 tablet 3     No current facility-administered medications for this visit  Allergies   Allergen Reactions    Ceclor [Cefaclor] Anaphylaxis    Cephalexin     Codeine Itching     Reaction Date: [id number];     Gabapentin      Body tremors, sweats    Lyrica [Pregabalin]      Body tremors, chills, heaviness in chest    Ticlopidine Hives     Other reaction(s): Hives    Penicillins Rash     Other reaction(s): Other       Review of Systems   Constitutional: Negative for fatigue and unexpected weight change  HENT: Negative for mouth sores  Respiratory: Negative for cough and shortness of breath  Gastrointestinal: Negative for constipation and diarrhea  Musculoskeletal: Positive for arthralgias  Negative for back pain, joint swelling and myalgias  Skin: Negative for color change and rash  Gum sensitivity   Neurological: Negative for weakness  Psychiatric/Behavioral: Negative for sleep disturbance  Video Exam    There were no vitals filed for this visit  Physical Exam  Constitutional:       General: She is not in acute distress  Appearance: She is well-developed  HENT:      Head: Normocephalic and atraumatic  Eyes:      General: Lids are normal  No scleral icterus  Conjunctiva/sclera: Conjunctivae normal    Pulmonary:      Effort: Pulmonary effort is normal  No tachypnea, accessory muscle usage or respiratory distress  Skin:     General: Skin is dry  Findings: No rash  Neurological:      Mental Status: She is alert  Psychiatric:         Behavior: Behavior normal  Behavior is cooperative            I spent 25 minutes directly with the patient during this visit      [facility] acknowledges that she has consented to an online visit or consultation  She understands that the online visit is based solely on information provided by her, and that, in the absence of a face-to-face physical evaluation by the physician, the diagnosis she receives is both limited and provisional in terms of accuracy and completeness  This is not intended to replace a full medical face-to-face evaluation by the physician  Autumn Christopher understands and accepts these terms

## 2020-09-10 ENCOUNTER — OFFICE VISIT (OUTPATIENT)
Dept: PAIN MEDICINE | Facility: CLINIC | Age: 55
End: 2020-09-10
Payer: COMMERCIAL

## 2020-09-10 VITALS
SYSTOLIC BLOOD PRESSURE: 122 MMHG | DIASTOLIC BLOOD PRESSURE: 72 MMHG | BODY MASS INDEX: 25.72 KG/M2 | TEMPERATURE: 97.2 F | WEIGHT: 131 LBS | HEIGHT: 60 IN

## 2020-09-10 DIAGNOSIS — M70.62 TROCHANTERIC BURSITIS OF LEFT HIP: ICD-10-CM

## 2020-09-10 DIAGNOSIS — M54.42 CHRONIC BILATERAL LOW BACK PAIN WITH LEFT-SIDED SCIATICA: ICD-10-CM

## 2020-09-10 DIAGNOSIS — G89.29 CHRONIC BILATERAL LOW BACK PAIN WITH LEFT-SIDED SCIATICA: ICD-10-CM

## 2020-09-10 DIAGNOSIS — G89.4 CHRONIC PAIN SYNDROME: Primary | ICD-10-CM

## 2020-09-10 DIAGNOSIS — M54.12 RADICULOPATHY, CERVICAL: ICD-10-CM

## 2020-09-10 DIAGNOSIS — M46.1 SACROILIITIS (HCC): ICD-10-CM

## 2020-09-10 DIAGNOSIS — M54.2 NECK PAIN: ICD-10-CM

## 2020-09-10 PROCEDURE — 99213 OFFICE O/P EST LOW 20 MIN: CPT | Performed by: NURSE PRACTITIONER

## 2020-09-10 RX ORDER — DULOXETIN HYDROCHLORIDE 30 MG/1
30 CAPSULE, DELAYED RELEASE ORAL DAILY
Qty: 30 CAPSULE | Refills: 1 | Status: SHIPPED | OUTPATIENT
Start: 2020-09-10 | End: 2020-10-08

## 2020-09-10 NOTE — PROGRESS NOTES
Assessment:  1  Chronic pain syndrome    2  Trochanteric bursitis of left hip    3  Chronic bilateral low back pain with left-sided sciatica    4  Sacroiliitis ([address] )    5  Neck pain    6  Radiculopathy, cervical        Plan:   While the patient was in the office today, I did have a thorough conversation regarding their chronic pain syndrome, medication management, and treatment plan options  Patient is a pleasant 22-year-old female with chronic pain syndrome related to left greater trochanteric bursitis, chronic low back pain, sacroiliitis, neck pain  She recently underwent an ultrasound-guided left greater trochanteric bursa injection on 07/15/2020  Overall her left hip pain has improved by 40-50 percent  She is not waking up as much with left hip pain  Her biggest complaint is pain across her low back  I discussed with the patient that since they have a neuropathic component of pain  As well as axial low back pain that it would be beneficial to start him on a medication such as Cymbalta  The patient denied being prescribed any antidepressant and/or psychiatric medications  I reviewed with the patient that it may take 3-4 weeks for the medications effects to be noticed and that it should never be abruptly stopped  Possible side effects include, but are not limited to:  Vertigo, lethargy, nausea, and edema of the extremities  Advised patient to call our office if they experience any side effects  The patient verbalized understanding  Will start Cymbalta 30 milligrams at bedtime  Prescription was sent electronically to her pharmacy  Continue with diclofenac 75 milligrams twice daily as well as baclofen 10 milligrams 3 times daily as prescribed by her rheumatologist     The patient will follow-up in 1 month for medication prescription refill and reevaluation  The patient was advised to contact the office should their symptoms worsen in the interim   The patient was agreeable and verbalized an understanding  History of Present Illness: The patient is a 54 y o  female who presents for a follow up office visit in regards to Leg Pain; Shoulder Pain; and Back Pain  The patients current symptoms include   Low back pain, left hip pain, neck pain  Current pain level is a 5/10  Pain can go up to a 7-8/10  At times  Pain is described as burning, dull, aching, sharp, tearing  Current pain medications includes:  Diclofenac 75 milligrams twice daily, baclofen 10 milligrams 3 times daily as needed for spasms    The patient reports that this regimen is providing 50% pain relief  The patient is reporting no side effects from this pain medication regimen  I have personally reviewed and/or updated the patient's past medical history, past surgical history, family history, social history, current medications, allergies, and vital signs today  Review of Systems  Review of Systems   Constitutional: Negative for fatigue and unexpected weight change  HENT: Negative for dental problem, ear pain, hearing loss and sneezing  Eyes: Negative for visual disturbance  Respiratory: Negative for cough and chest tightness  Cardiovascular: Negative for leg swelling  Gastrointestinal: Negative for anal bleeding  Endocrine: Negative for heat intolerance  Genitourinary: Negative for flank pain and genital sores  Musculoskeletal: Positive for myalgias  Decreased range of motion  Joint stiffness  Pain in extremity: left hip and leg-right knee   Skin: Negative for wound  Allergic/Immunologic: Negative for immunocompromised state  Neurological: Negative for speech difficulty and light-headedness  Hematological: Negative for adenopathy  Psychiatric/Behavioral: Negative for confusion  The patient is not hyperactive  All other systems reviewed and are negative          Past Medical History:   Diagnosis Date    Acquired ichthyosis     last assessed: 5/9/2013    Anxiety     Cervical radiculopathy     last assessed: 2014    Colorectal polyps     last assessed: 3/9/2015    COPD (chronic obstructive pulmonary disease) (HCC)     Depression     Diverticulosis of colon     Foot pain     GERD (gastroesophageal reflux disease)     Hyperlipemia     Hypertension     Myocardial infarction [facility])     at age 28    Osteopenia     last assessed: 2013    Palpitations     last assessed: 2014    PVD (peripheral vascular disease) ([address] )     last assessed: 12/3/2012    Scapular dyskinesis     last assessed: 2014    Shortness of breath     Tendonitis of left rotator cuff     last assessed: 2014    Vocal cord polyps     last assessed: 2014       Past Surgical History:   Procedure Laterality Date    ABDOMINAL SURGERY      exploratory    CARDIAC SURGERY      cardiac stent      SECTION      last assessed: 2014    CHOLECYSTECTOMY      last assessed: 2014    COLONOSCOPY  10/27/2014    CORONARY ANGIOPLASTY WITH STENT PLACEMENT      LAD stent    DENTAL SURGERY      last assessed: 2014    ELBOW SURGERY Bilateral     arthroplasty    EPIDURAL BLOCK INJECTION N/A 2019    Procedure: C7 T1 Cervical Epidural Steroid Injection (92082); Surgeon: Peggy Regalado MD;  [facility];  Service: Pain Management     EPIDURAL BLOCK INJECTION Bilateral 2019    Procedure: BLOCK / INJECTION EPIDURAL STEROID CERVICAL - C7-T1;  Surgeon: Peggy Regalado MD;  [facility];  Service: Pain Management     EPIDURAL BLOCK INJECTION Left 10/3/2019    Procedure: C7-T1 interlaminar epidural steroid injection;  Surgeon: Peggy Regalado MD;  [facility];  Service: Pain Management     ESOPHAGOGASTRODUODENOSCOPY      ESOPHAGOGASTRODUODENOSCOPY N/A 2016    Procedure: ESOPHAGOGASTRODUODENOSCOPY (EGD);   Surgeon: Briana Roca MD;  [facility];  Service:     FL GUIDED NEEDLE PLAC BX/ASP/INJ  2019    FL GUIDED NEEDLE PLAC BX/ASP/INJ  7/31/2019    FL GUIDED NEEDLE PLAC BX/ASP/INJ  10/3/2019    FL INJECTION LEFT SHOULDER (ARTHROGRAM)  9/25/2018    FOOT SURGERY Right 02/06/2015    x 4    HYSTERECTOMY      last assessed: 8/8/2014    IL ARTHROCENTESIS ASPIR&/INJ MAJOR JT/BURSA W/O US Bilateral 7/31/2019    Procedure: GREATER TROCHANTERIC HIP INJECTION;  Surgeon: Shantal Flores MD;  [facility];  Service: Pain Management     IL ARTHROSCOPY SHOULDER SURGICAL BICEPS TENODESIS Left 10/15/2018    Procedure: ARTHROSCOPY SHOULDER; Debridement of shoulder;  Surgeon: Cinthia Hammond MD;  [facility];  Service: Orthopedics    IL SHLDR ARTHROSCOP,SURG,W/ROTAT CUFF REPR Left 7/23/2018    Procedure: ARTHROSCOPY SHOULDER, subacromial decompression, synovectomy;  Surgeon: Cinthia Hammond MD;  [facility];  Service: Orthopedics    THROAT SURGERY      TOOTH EXTRACTION      TRIGEMINAL NERVE DECOMPRESSION Right 6/15/2018    Procedure: FOOT NEUROPLASTY;  Surgeon: Laurel Pichardo DPM;  [facility];  Service: Podiatry       Family History   Problem Relation Age of Onset    No Known Problems Mother     No Known Problems Father     No Known Problems Maternal Grandmother     No Known Problems Maternal Grandfather     No Known Problems Paternal Grandmother     No Known Problems Paternal Grandfather        Social History     Occupational History    Not on file   Tobacco Use    Smoking status: Current Every Day Smoker     Packs/day: 0 50    Smokeless tobacco: Never Used    Tobacco comment: She is trying to cut back on her own   Substance and Sexual Activity    Alcohol use: Not Currently     Comment: rare    Drug use: No    Sexual activity: Not on file         Current Outpatient Medications:     albuterol (PROVENTIL HFA,VENTOLIN HFA) 90 mcg/act inhaler, inhale 2 puffs by mouth every 6 hours if needed for wheezing, Disp: , Rfl: 0    aspirin 81 mg chewable tablet, Chew 1 tablet daily Stop for the time being for foot surgery , Disp: , Rfl:     baclofen 10 mg tablet, Take 1 tablet (10 mg total) by mouth 3 (three) times a day, Disp: 90 tablet, Rfl: 6    diclofenac sodium (VOLTAREN) 75 mg EC tablet, Take 1 tablet (75 mg total) by mouth 2 (two) times a day, Disp: 60 tablet, Rfl: 6    ezetimibe (ZETIA) 10 mg tablet, Take 1 tablet (10 mg total) by mouth daily, Disp: 90 tablet, Rfl: 3    hydroxychloroquine (PLAQUENIL) 200 mg tablet, Take 1 5 tablets (300 mg total) by mouth daily, Disp: 45 tablet, Rfl: 6    oxyCODONE-acetaminophen (PERCOCET) 5-325 mg per tablet, Take 1 tablet by mouth every 12 (twelve) hours , Disp: , Rfl:     rosuvastatin (CRESTOR) 5 mg tablet, Take 1 tablet (5 mg total) by mouth daily, Disp: 90 tablet, Rfl: 3    DULoxetine (CYMBALTA) 30 mg delayed release capsule, Take 1 capsule (30 mg total) by mouth daily, Disp: 30 capsule, Rfl: 1    nitroglycerin (NITROSTAT) 0 4 mg SL tablet, Place 1 tablet (0 4 mg total) under the tongue every 5 (five) minutes as needed for chest pain (Patient not taking: Reported on 9/10/2020), Disp: 90 tablet, Rfl: 3    SUPREP BOWEL PREP KIT 17 5-3 13-1 6 GM/177ML SOLN, Take 180 mL by mouth once for 1 dose, Disp: 180 mL, Rfl: 0    triamcinolone (KENALOG) 0 1 % cream, Apply topically 2 (two) times a day, Disp: 30 g, Rfl: 0    Allergies   Allergen Reactions    Ceclor [Cefaclor] Anaphylaxis    Cephalexin     Codeine Itching     Reaction Date: [id number];     Gabapentin      Body tremors, sweats    Lyrica [Pregabalin]      Body tremors, chills, heaviness in chest    Ticlopidine Hives     Other reaction(s): Hives    Penicillins Rash     Other reaction(s):  Other       Physical Exam:    /72 (BP Location: Right arm, Patient Position: Sitting, Cuff Size: Standard)   Temp (!) 97 2 °F (36 2 °C)   Ht 5' (1 524 m)   Wt 59 4 kg (131 lb)   BMI 25 58 kg/m²     Constitutional:normal, well developed, well nourished, alert, in no distress and non-toxic and no overt pain behavior  Eyes:anicteric  HEENT:grossly intact  Neck:supple, symmetric, trachea midline and no masses   Pulmonary:even and unlabored  Cardiovascular:No edema or pitting edema present  Skin:Normal without rashes or lesions and well hydrated  Psychiatric:Mood and affect appropriate  Neurologic:Cranial Nerves II-XII grossly intact  Musculoskeletal:normal    Imaging  No orders to display       No orders of the defined types were placed in this encounter

## 2020-09-10 NOTE — PATIENT INSTRUCTIONS
Duloxetine (By mouth)   Duloxetine (doo-LOX-[id number]-teen)  Treats depression, anxiety, diabetic peripheral neuropathy, fibromyalgia, and chronic muscle or bone pain  This medicine is an SSNRI  Brand Name(s): Cymbalta, DermacinRx Tadeo Vasquez   There may be other brand names for this medicine  When This Medicine Should Not Be Used: This medicine is not right for everyone  Do not use it if you had an allergic reaction to duloxetine  How to Use This Medicine:   Capsule, Delayed Release Capsule  · Take your medicine as directed  Your dose may need to be changed several times to find what works best for you  · Delayed-release capsule: Swallow the capsule whole  Do not crush, chew, break, or open it  · This medicine should come with a Medication Guide  Ask your pharmacist for a copy if you do not have one  · Missed dose: Take a dose as soon as you remember  If it is almost time for your next dose, wait until then and take a regular dose  Do not take extra medicine to make up for a missed dose  · Store the medicine in a closed container at room temperature, away from heat, moisture, and direct light  Drugs and Foods to Avoid:   Ask your doctor or pharmacist before using any other medicine, including over-the-counter medicines, vitamins, and herbal products  · Do not take duloxetine if you have used an MAO inhibitor (MAOI) within the past 14 days  Do not start taking an MAO inhibitor within 5 days of stopping duloxetine  · Some medicines can affect how duloxetine works   Tell your doctor if you are using any of the following:  ¨ Buspirone, cimetidine, ciprofloxacin, enoxacin, fentanyl, lithium, Artem's wort, theophylline, tramadol, tryptophan, or warfarin  ¨ Amphetamines  ¨ Blood pressure medicine  ¨ Diuretic (water pill)  ¨ Medicine for heart rhythm problems (including flecainide, propafenone, quinidine)  ¨ Medicine to treat migraine headaches (including triptans)  ¨ NSAID pain or arthritis medicine (including aspirin, celecoxib, diclofenac, ibuprofen, naproxen)  ¨ Other medicine to treat depression or mood disorders (including amitriptyline, desipramine, fluoxetine, imipramine, nortriptyline, paroxetine)  ¨ Phenothiazine medicine (including thioridazine)  · Tell your doctor if you use anything else that makes you sleepy  Some examples are allergy medicine, narcotic pain medicine, and alcohol  · Do not drink alcohol while you are using this medicine  Warnings While Using This Medicine:   · Tell your doctor if you are pregnant or breastfeeding, or if you have kidney disease, liver disease, diabetes, digestion problems, glaucoma, heart disease, high or low blood pressure, or problems with urination  Tell your doctor if you smoke or you have a history of seizures, or drug or alcohol addiction  · This medicine may cause the following problems:   ¨ Serious liver problems  ¨ Serotonin syndrome (more likely when used with certain other medicines)  ¨ Increased risk of bleeding problems  ¨ Serious skin reactions  ¨ Low sodium levels in the blood  · This medicine can increase thoughts of suicide  Tell your doctor right away if you start to feel depressed and have thoughts about hurting yourself  · This medicine can cause changes in your blood pressure  This may make you dizzy or drowsy  Do not drive or do anything that could be dangerous until you know how this medicine affects you  Stand up slowly to avoid falls  · Do not stop using this medicine suddenly  Your doctor will need to slowly decrease your dose before you stop it completely  · Your doctor will check your progress and the effects of this medicine at regular visits  Keep all appointments  · Keep all medicine out of the reach of children  Never share your medicine with anyone    Possible Side Effects While Using This Medicine:   Call your doctor right away if you notice any of these side effects:  · Allergic reaction: Itching or hives, swelling in your face or hands, swelling or tingling in your mouth or throat, chest tightness, trouble breathing  · Anxiety, restlessness, fever, fast heartbeat, sweating, muscle spasms, diarrhea, seeing or hearing things that are not there  · Blistering, peeling, red skin rash  · Confusion, weakness, muscle twitching  · Dark urine or pale stools, nausea, vomiting, loss of appetite, stomach pain, yellow skin or eyes  · Decrease in how much or how often you urinate  · Eye pain, vision changes, seeing halos around lights  · Feeling more energetic than usual  · Lightheadedness, dizziness, or fainting  · Unusual moods or behaviors, worsening depression, thoughts about hurting yourself, trouble sleeping  · Unusual bleeding or bruising  If you notice these less serious side effects, talk with your doctor:   · Decrease in appetite or weight  · Dry mouth, constipation, mild nausea  · Unusual drowsiness, sleepiness, or tiredness  If you notice other side effects that you think are caused by this medicine, tell your doctor  Call your doctor for medical advice about side effects  You may report side effects to FDA at 7-010-FDA-7507  © 2017 [address] Information is for End User's use only and may not be sold, redistributed or otherwise used for commercial purposes  The above information is an  only  It is not intended as medical advice for individual conditions or treatments  Talk to your doctor, nurse or pharmacist before following any medical regimen to see if it is safe and effective for you

## 2020-10-08 ENCOUNTER — OFFICE VISIT (OUTPATIENT)
Dept: PAIN MEDICINE | Facility: CLINIC | Age: 55
End: 2020-10-08
Payer: COMMERCIAL

## 2020-10-08 VITALS
DIASTOLIC BLOOD PRESSURE: 72 MMHG | WEIGHT: 131 LBS | TEMPERATURE: 97.5 F | HEIGHT: 60 IN | BODY MASS INDEX: 25.72 KG/M2 | SYSTOLIC BLOOD PRESSURE: 122 MMHG

## 2020-10-08 DIAGNOSIS — M70.62 TROCHANTERIC BURSITIS OF LEFT HIP: ICD-10-CM

## 2020-10-08 DIAGNOSIS — M54.42 CHRONIC BILATERAL LOW BACK PAIN WITH LEFT-SIDED SCIATICA: ICD-10-CM

## 2020-10-08 DIAGNOSIS — M54.16 LUMBAR RADICULOPATHY: ICD-10-CM

## 2020-10-08 DIAGNOSIS — G89.4 CHRONIC PAIN SYNDROME: Primary | ICD-10-CM

## 2020-10-08 DIAGNOSIS — M47.816 LUMBAR SPONDYLOSIS: ICD-10-CM

## 2020-10-08 DIAGNOSIS — G89.29 CHRONIC BILATERAL LOW BACK PAIN WITH LEFT-SIDED SCIATICA: ICD-10-CM

## 2020-10-08 PROCEDURE — 4004F PT TOBACCO SCREEN RCVD TLK: CPT | Performed by: NURSE PRACTITIONER

## 2020-10-08 PROCEDURE — 99213 OFFICE O/P EST LOW 20 MIN: CPT | Performed by: NURSE PRACTITIONER

## 2020-10-08 PROCEDURE — 3074F SYST BP LT 130 MM HG: CPT | Performed by: NURSE PRACTITIONER

## 2020-10-08 PROCEDURE — 3078F DIAST BP <80 MM HG: CPT | Performed by: NURSE PRACTITIONER

## 2020-10-08 RX ORDER — DICLOFENAC SODIUM 75 MG/1
75 TABLET, DELAYED RELEASE ORAL 2 TIMES DAILY
Qty: 60 TABLET | Refills: 6
Start: 2020-10-08 | End: 2021-01-25 | Stop reason: SDUPTHER

## 2020-10-20 ENCOUNTER — TELEPHONE (OUTPATIENT)
Dept: PAIN MEDICINE | Facility: CLINIC | Age: 55
End: 2020-10-20

## 2020-10-21 PROBLEM — M35.9 UNDIFFERENTIATED CONNECTIVE TISSUE DISEASE (HCC): Status: ACTIVE | Noted: 2020-10-21

## 2020-10-21 PROBLEM — Z79.899 HIGH RISK MEDICATION USE: Status: ACTIVE | Noted: 2020-10-21

## 2020-11-01 DIAGNOSIS — M54.12 RADICULOPATHY, CERVICAL: ICD-10-CM

## 2020-11-01 DIAGNOSIS — M54.2 NECK PAIN: ICD-10-CM

## 2020-11-01 DIAGNOSIS — M54.42 CHRONIC BILATERAL LOW BACK PAIN WITH LEFT-SIDED SCIATICA: ICD-10-CM

## 2020-11-01 DIAGNOSIS — G89.4 CHRONIC PAIN SYNDROME: ICD-10-CM

## 2020-11-01 DIAGNOSIS — G89.29 CHRONIC BILATERAL LOW BACK PAIN WITH LEFT-SIDED SCIATICA: ICD-10-CM

## 2020-11-01 DIAGNOSIS — M70.62 TROCHANTERIC BURSITIS OF LEFT HIP: ICD-10-CM

## 2020-11-02 RX ORDER — DULOXETIN HYDROCHLORIDE 30 MG/1
CAPSULE, DELAYED RELEASE ORAL
Qty: 30 CAPSULE | Refills: 1 | OUTPATIENT
Start: 2020-11-02

## 2020-11-04 ENCOUNTER — OFFICE VISIT (OUTPATIENT)
Dept: CARDIOLOGY CLINIC | Facility: HOSPITAL | Age: 55
End: 2020-11-04
Payer: COMMERCIAL

## 2020-11-04 VITALS
DIASTOLIC BLOOD PRESSURE: 62 MMHG | HEIGHT: 60 IN | BODY MASS INDEX: 25.76 KG/M2 | SYSTOLIC BLOOD PRESSURE: 104 MMHG | OXYGEN SATURATION: 97 % | HEART RATE: 90 BPM | WEIGHT: 131.2 LBS

## 2020-11-04 DIAGNOSIS — I73.9 PVD (PERIPHERAL VASCULAR DISEASE) (HCC): ICD-10-CM

## 2020-11-04 DIAGNOSIS — E78.49 OTHER HYPERLIPIDEMIA: ICD-10-CM

## 2020-11-04 DIAGNOSIS — I25.10 CAD IN NATIVE ARTERY: Primary | ICD-10-CM

## 2020-11-04 DIAGNOSIS — E78.5 HYPERLIPIDEMIA, UNSPECIFIED HYPERLIPIDEMIA TYPE: ICD-10-CM

## 2020-11-04 DIAGNOSIS — Z72.0 TOBACCO ABUSE: ICD-10-CM

## 2020-11-04 DIAGNOSIS — I10 ESSENTIAL HYPERTENSION: ICD-10-CM

## 2020-11-04 PROCEDURE — 3074F SYST BP LT 130 MM HG: CPT | Performed by: INTERNAL MEDICINE

## 2020-11-04 PROCEDURE — 3008F BODY MASS INDEX DOCD: CPT | Performed by: INTERNAL MEDICINE

## 2020-11-04 PROCEDURE — 4004F PT TOBACCO SCREEN RCVD TLK: CPT | Performed by: INTERNAL MEDICINE

## 2020-11-04 PROCEDURE — 3078F DIAST BP <80 MM HG: CPT | Performed by: INTERNAL MEDICINE

## 2020-11-04 PROCEDURE — 93000 ELECTROCARDIOGRAM COMPLETE: CPT | Performed by: INTERNAL MEDICINE

## 2020-11-04 PROCEDURE — 99214 OFFICE O/P EST MOD 30 MIN: CPT | Performed by: INTERNAL MEDICINE

## 2020-11-04 RX ORDER — ROSUVASTATIN CALCIUM 5 MG/1
5 TABLET, COATED ORAL DAILY
Qty: 90 TABLET | Refills: 3 | Status: SHIPPED | OUTPATIENT
Start: 2020-11-04 | End: 2021-11-26

## 2020-11-04 RX ORDER — EZETIMIBE 10 MG/1
10 TABLET ORAL DAILY
Qty: 90 TABLET | Refills: 3 | Status: SHIPPED | OUTPATIENT
Start: 2020-11-04 | End: 2021-11-08

## 2020-11-05 ENCOUNTER — TRANSCRIBE ORDERS (OUTPATIENT)
Dept: LAB | Facility: MEDICAL CENTER | Age: 55
End: 2020-11-05

## 2020-11-05 ENCOUNTER — APPOINTMENT (OUTPATIENT)
Dept: LAB | Facility: MEDICAL CENTER | Age: 55
End: 2020-11-05
Payer: COMMERCIAL

## 2020-11-05 DIAGNOSIS — I25.10 CAD IN NATIVE ARTERY: ICD-10-CM

## 2020-11-05 LAB
25(OH)D3 SERPL-MCNC: 11.3 NG/ML (ref 30–100)
TSH SERPL DL<=0.05 MIU/L-ACNC: 0.86 UIU/ML (ref 0.36–3.74)

## 2020-11-05 PROCEDURE — 84443 ASSAY THYROID STIM HORMONE: CPT

## 2020-11-05 PROCEDURE — 82306 VITAMIN D 25 HYDROXY: CPT | Performed by: INTERNAL MEDICINE

## 2020-11-05 PROCEDURE — 36415 COLL VENOUS BLD VENIPUNCTURE: CPT | Performed by: INTERNAL MEDICINE

## 2020-11-06 ENCOUNTER — TELEPHONE (OUTPATIENT)
Dept: OBGYN CLINIC | Facility: HOSPITAL | Age: 55
End: 2020-11-06

## 2020-11-06 DIAGNOSIS — E55.9 VITAMIN D DEFICIENCY: Primary | ICD-10-CM

## 2020-11-06 RX ORDER — ERGOCALCIFEROL (VITAMIN D2) 1250 MCG
50000 CAPSULE ORAL WEEKLY
Qty: 12 CAPSULE | Refills: 1 | Status: SHIPPED | OUTPATIENT
Start: 2020-11-06 | End: 2021-04-13

## 2020-11-11 NOTE — TELEPHONE ENCOUNTER
Spoke with patient she will have BW done next week  .  COPD + CHF    > Patient back to baseline O2 via NC  > No signs of distress. Able to speak full sentences in succession.   > Will hold off on opioids for now

## 2020-11-18 ENCOUNTER — PROCEDURE VISIT (OUTPATIENT)
Dept: PAIN MEDICINE | Facility: CLINIC | Age: 55
End: 2020-11-18
Payer: COMMERCIAL

## 2020-11-18 DIAGNOSIS — M70.61 TROCHANTERIC BURSITIS OF RIGHT HIP: Primary | ICD-10-CM

## 2020-11-18 PROCEDURE — 20611 DRAIN/INJ JOINT/BURSA W/US: CPT | Performed by: ANESTHESIOLOGY

## 2020-11-18 RX ORDER — LIDOCAINE HYDROCHLORIDE 10 MG/ML
1 INJECTION, SOLUTION INFILTRATION; PERINEURAL
Status: COMPLETED | OUTPATIENT
Start: 2020-11-18 | End: 2020-11-18

## 2020-11-18 RX ORDER — BUPIVACAINE HYDROCHLORIDE 2.5 MG/ML
2 INJECTION, SOLUTION INFILTRATION; PERINEURAL
Status: COMPLETED | OUTPATIENT
Start: 2020-11-18 | End: 2020-11-18

## 2020-11-18 RX ORDER — METHYLPREDNISOLONE ACETATE 40 MG/ML
1 INJECTION, SUSPENSION INTRA-ARTICULAR; INTRALESIONAL; INTRAMUSCULAR; SOFT TISSUE
Status: COMPLETED | OUTPATIENT
Start: 2020-11-18 | End: 2020-11-18

## 2020-11-18 RX ADMIN — METHYLPREDNISOLONE ACETATE 1 ML: 40 INJECTION, SUSPENSION INTRA-ARTICULAR; INTRALESIONAL; INTRAMUSCULAR; SOFT TISSUE at 11:22

## 2020-11-18 RX ADMIN — BUPIVACAINE HYDROCHLORIDE 2 ML: 2.5 INJECTION, SOLUTION INFILTRATION; PERINEURAL at 11:22

## 2020-11-18 RX ADMIN — LIDOCAINE HYDROCHLORIDE 1 ML: 10 INJECTION, SOLUTION INFILTRATION; PERINEURAL at 11:22

## 2020-11-20 ENCOUNTER — HOSPITAL ENCOUNTER (OUTPATIENT)
Dept: NON INVASIVE DIAGNOSTICS | Facility: HOSPITAL | Age: 55
Discharge: HOME | End: 2020-11-20
Attending: INTERNAL MEDICINE
Payer: COMMERCIAL

## 2020-11-20 DIAGNOSIS — I25.10 CAD IN NATIVE ARTERY: ICD-10-CM

## 2020-11-20 PROCEDURE — 93308 TTE F-UP OR LMTD: CPT

## 2020-11-20 PROCEDURE — 93306 TTE W/DOPPLER COMPLETE: CPT | Performed by: INTERNAL MEDICINE

## 2020-11-25 ENCOUNTER — TELEPHONE (OUTPATIENT)
Dept: PAIN MEDICINE | Facility: CLINIC | Age: 55
End: 2020-11-25

## 2020-12-22 ENCOUNTER — TELEPHONE (OUTPATIENT)
Dept: RHEUMATOLOGY | Facility: CLINIC | Age: 55
End: 2020-12-22

## 2020-12-22 NOTE — TELEPHONE ENCOUNTER
I left a message for the patient regarding the appt scheduled for 2/2/2021, Dr Yonathan Vazquez return from maternity leave has been delayed so the appointment has to be rescheduled   Asked for a call back      Patient can reschedule with Dr Luis A Rodriguez if able/willing otherwise can be rescheduled with Dr Skye Sheriff - if rescheduling with Dr Skye Sheriff its still a follow up just make note its [facility] patient)

## 2021-01-19 ENCOUNTER — OFFICE VISIT (OUTPATIENT)
Dept: PAIN MEDICINE | Facility: CLINIC | Age: 56
End: 2021-01-19
Payer: COMMERCIAL

## 2021-01-19 VITALS
BODY MASS INDEX: 26.07 KG/M2 | HEIGHT: 60 IN | DIASTOLIC BLOOD PRESSURE: 76 MMHG | WEIGHT: 132.8 LBS | SYSTOLIC BLOOD PRESSURE: 118 MMHG

## 2021-01-19 DIAGNOSIS — M70.62 TROCHANTERIC BURSITIS OF LEFT HIP: ICD-10-CM

## 2021-01-19 DIAGNOSIS — M79.18 MYOFASCIAL PAIN SYNDROME: ICD-10-CM

## 2021-01-19 DIAGNOSIS — M70.61 TROCHANTERIC BURSITIS OF RIGHT HIP: ICD-10-CM

## 2021-01-19 DIAGNOSIS — G89.29 CHRONIC BILATERAL LOW BACK PAIN WITH LEFT-SIDED SCIATICA: ICD-10-CM

## 2021-01-19 DIAGNOSIS — G89.4 CHRONIC PAIN SYNDROME: Primary | ICD-10-CM

## 2021-01-19 DIAGNOSIS — M47.816 LUMBAR SPONDYLOSIS: ICD-10-CM

## 2021-01-19 DIAGNOSIS — M54.16 LUMBAR RADICULOPATHY: ICD-10-CM

## 2021-01-19 DIAGNOSIS — M54.42 CHRONIC BILATERAL LOW BACK PAIN WITH LEFT-SIDED SCIATICA: ICD-10-CM

## 2021-01-19 DIAGNOSIS — M51.36 LUMBAR DEGENERATIVE DISC DISEASE: ICD-10-CM

## 2021-01-19 PROCEDURE — 3078F DIAST BP <80 MM HG: CPT | Performed by: NURSE PRACTITIONER

## 2021-01-19 PROCEDURE — 4004F PT TOBACCO SCREEN RCVD TLK: CPT | Performed by: NURSE PRACTITIONER

## 2021-01-19 PROCEDURE — 99213 OFFICE O/P EST LOW 20 MIN: CPT | Performed by: NURSE PRACTITIONER

## 2021-01-19 PROCEDURE — 3008F BODY MASS INDEX DOCD: CPT | Performed by: NURSE PRACTITIONER

## 2021-01-19 PROCEDURE — 3074F SYST BP LT 130 MM HG: CPT | Performed by: NURSE PRACTITIONER

## 2021-01-19 NOTE — PROGRESS NOTES
Assessment:  1  Chronic pain syndrome    2  Chronic bilateral low back pain with left-sided sciatica    3  Lumbar radiculopathy    4  Lumbar spondylosis    5  Lumbar degenerative disc disease    6  Myofascial pain syndrome    7  Trochanteric bursitis of right hip    8  Trochanteric bursitis of left hip        Plan:   While the patient was in the office today, I did have a thorough conversation regarding their chronic pain syndrome, medication management, and treatment plan options  Patient is a 79-year-old female with chronic pain syndrome related to chronic low back pain, chronic neck pain, left greater trochanteric bursitis  She was recently treated with a left greater trochanteric bursa injection on 11/18/2020  Unfortunately she did not obtain significant improvement from this injection  In the past, she obtained 40-50% improvement from left greater trochanteric bursa injection  Will trial patient on diclofenac gel 1%, she may apply  2 g to the left hip area 4 times daily if needed  A prescription was sent electronically to her pharmacy  Start physical therapy for left hip pain to consider ultrasound therapy for the left hip  Schedule a repeat ultrasound-guided left greater trochanteric bursa injection after 02/18/2021  History of Present Illness: The patient is a 54 y o  female who presents for a follow up office visit in regards to Back Pain, Shoulder Pain, Neck Pain, and Leg Pain  The patients current symptoms include  Complaints of low back pain, neck pain, left hip pain  Current pain level is a 4-10  Pain is described as burning, sharp, pressure-like  Current pain medications includes:  Diclofenac 75 mg twice daily    The patient reports that this regimen is providing 10% pain relief  The patient is reporting no side effects from this pain medication regimen      I have personally reviewed and/or updated the patient's past medical history, past surgical history, family history, social history, current medications, allergies, and vital signs today  Review of Systems  Review of Systems   Musculoskeletal: Positive for arthralgias, gait problem and myalgias  Decreased range of motion  Joint stiffness  Pain in extremity - hip & leg, shoulder, neck, back   All other systems reviewed and are negative  Past Medical History:   Diagnosis Date    Acquired ichthyosis     last assessed: 2013    Anxiety     Cervical radiculopathy     last assessed: 2014    Colorectal polyps     last assessed: 3/9/2015    COPD [facility])     Depression     Diverticulosis of colon     Foot pain     GERD (gastroesophageal reflux disease)     Hyperlipemia     Hypertension     Myocardial infarction ([facility] )     at age 28    Osteopenia     last assessed: 2013    Palpitations     last assessed: 2014    PVD (peripheral vascular disease) ([facility] )     last assessed: 12/3/2012    Scapular dyskinesis     last assessed: 2014    Shortness of breath     Tendonitis of left rotator cuff     last assessed: 2014    Vocal cord polyps     last assessed: 2014       Past Surgical History:   Procedure Laterality Date    ABDOMINAL SURGERY      exploratory    CARDIAC SURGERY      cardiac stent      SECTION      last assessed: 2014    CHOLECYSTECTOMY      last assessed: 2014    COLONOSCOPY  10/27/2014    CORONARY ANGIOPLASTY WITH STENT PLACEMENT      LAD stent    DENTAL SURGERY      last assessed: 2014    ELBOW SURGERY Bilateral     arthroplasty    EPIDURAL BLOCK INJECTION N/A 2019    Procedure: C7 T1 Cervical Epidural Steroid Injection (14532);   Surgeon: Rishabh Cardoza MD;  Location: [facility] OR;  Service: Pain Management     EPIDURAL BLOCK INJECTION Bilateral 2019    Procedure: BLOCK / INJECTION EPIDURAL STEROID CERVICAL - C7-T1;  Surgeon: Rishabh Cardoza MD;  Location: [facility] OR;  Service: Pain Management     EPIDURAL BLOCK INJECTION Left 10/3/2019    Procedure: C7-T1 interlaminar epidural steroid injection;  Surgeon: Debi Wells MD;  [facility];  Service: Pain Management     ESOPHAGOGASTRODUODENOSCOPY      ESOPHAGOGASTRODUODENOSCOPY N/A 11/4/2016    Procedure: ESOPHAGOGASTRODUODENOSCOPY (EGD);   Surgeon: Nathaly Currie MD;  [facility];  Service:     FL GUIDED NEEDLE PLAC BX/ASP/INJ  5/2/2019    FL GUIDED NEEDLE PLAC BX/ASP/INJ  7/31/2019    FL GUIDED NEEDLE PLAC BX/ASP/INJ  10/3/2019    FL INJECTION LEFT SHOULDER (ARTHROGRAM)  9/25/2018    FOOT SURGERY Right 02/06/2015    x 4    HYSTERECTOMY      last assessed: 8/8/2014    TN ARTHROCENTESIS ASPIR&/INJ MAJOR JT/BURSA W/O US Bilateral 7/31/2019    Procedure: GREATER TROCHANTERIC HIP INJECTION;  Surgeon: Debi Wells MD;  [facility];  Service: Pain Management     TN ARTHROSCOPY SHOULDER SURGICAL BICEPS TENODESIS Left 10/15/2018    Procedure: ARTHROSCOPY SHOULDER; Debridement of shoulder;  Surgeon: Sabas Horvath MD;  [facility];  Service: Orthopedics    TN SHLDR ARTHROSCOP,SURG,W/ROTAT CUFF REPR Left 7/23/2018    Procedure: ARTHROSCOPY SHOULDER, subacromial decompression, synovectomy;  Surgeon: Sabas Horvath MD;  [facility];  Service: Orthopedics    THROAT SURGERY      TOOTH EXTRACTION      TRIGEMINAL NERVE DECOMPRESSION Right 6/15/2018    Procedure: FOOT NEUROPLASTY;  Surgeon: Yevgeniy Calero DPM;  [facility];  Service: Podiatry       Family History   Problem Relation Age of Onset    No Known Problems Mother     No Known Problems Father     No Known Problems Maternal Grandmother     No Known Problems Maternal Grandfather     No Known Problems Paternal Grandmother     No Known Problems Paternal Grandfather        Social History     Occupational History    Not on file   Tobacco Use    Smoking status: Current Every Day Smoker     Packs/day: 0 50  Smokeless tobacco: Never Used    Tobacco comment: She is trying to cut back on her own   Substance and Sexual Activity    Alcohol use: Not Currently     Comment: rare    Drug use: No    Sexual activity: Not on file         Current Outpatient Medications:     albuterol (PROVENTIL HFA,VENTOLIN HFA) 90 mcg/act inhaler, inhale 2 puffs by mouth every 6 hours if needed for wheezing, Disp: , Rfl: 0    baclofen 10 mg tablet, Take 1 tablet (10 mg total) by mouth 3 (three) times a day, Disp: 90 tablet, Rfl: 6    diclofenac (VOLTAREN) 75 mg EC tablet, Take 1 tablet (75 mg total) by mouth 2 (two) times a day, Disp: 60 tablet, Rfl: 6    ergocalciferol (ERGOCALCIFEROL) 1 25 MG (81347 UT) capsule, Take 1 capsule (50,000 Units total) by mouth once a week, Disp: 12 capsule, Rfl: 1    ezetimibe (ZETIA) 10 mg tablet, Take 1 tablet (10 mg total) by mouth daily, Disp: 90 tablet, Rfl: 3    hydroxychloroquine (PLAQUENIL) 200 mg tablet, Take 1 5 tablets (300 mg total) by mouth daily, Disp: 45 tablet, Rfl: 6    nitroglycerin (NITROSTAT) 0 4 mg SL tablet, Place 1 tablet (0 4 mg total) under the tongue every 5 (five) minutes as needed for chest pain, Disp: 90 tablet, Rfl: 3    oxyCODONE-acetaminophen (PERCOCET) 5-325 mg per tablet, Take 1 tablet by mouth every 12 (twelve) hours , Disp: , Rfl:     rosuvastatin (CRESTOR) 5 mg tablet, Take 1 tablet (5 mg total) by mouth daily, Disp: 90 tablet, Rfl: 3    aspirin 81 mg chewable tablet, Chew 1 tablet daily Stop for the time being for foot surgery , Disp: , Rfl:     Diclofenac Sodium (VOLTAREN) 1 %, Apply 2 g topically 4 (four) times a day, Disp: 2 Tube, Rfl: 2    Allergies   Allergen Reactions    Ceclor [Cefaclor] Anaphylaxis    Cephalexin     Codeine Itching     Reaction Date: 09Jun2011;     Gabapentin      Body tremors, sweats    Lyrica [Pregabalin]      Body tremors, chills, heaviness in chest    Ticlopidine Hives     Other reaction(s): Hives    Penicillins Rash Other reaction(s): Other       Physical Exam:    /76 (BP Location: Right arm, Patient Position: Sitting, Cuff Size: Standard)   Ht 5' (1 524 m)   Wt 60 2 kg (132 lb 12 8 oz)   BMI 25 94 kg/m²     Constitutional:normal, well developed, well nourished, alert, in no distress and non-toxic and no overt pain behavior    Eyes:anicteric  HEENT:grossly intact  Neck:supple, symmetric, trachea midline and no masses   Pulmonary:even and unlabored  Cardiovascular:No edema or pitting edema present  Skin:Normal without rashes or lesions and well hydrated  Psychiatric:Mood and affect appropriate  Neurologic:Cranial Nerves II-XII grossly intact  Musculoskeletal:antalgic there is significant tenderness to palpation over the left greater trochanteric bursa    Imaging  No orders to display       Orders Placed This Encounter   Procedures    Ambulatory referral to Physical Therapy

## 2021-01-25 ENCOUNTER — TELEPHONE (OUTPATIENT)
Dept: PAIN MEDICINE | Facility: CLINIC | Age: 56
End: 2021-01-25

## 2021-01-25 ENCOUNTER — TELEPHONE (OUTPATIENT)
Dept: OBGYN CLINIC | Facility: HOSPITAL | Age: 56
End: 2021-01-25

## 2021-01-25 DIAGNOSIS — M54.42 CHRONIC BILATERAL LOW BACK PAIN WITH LEFT-SIDED SCIATICA: ICD-10-CM

## 2021-01-25 DIAGNOSIS — M47.816 LUMBAR SPONDYLOSIS: ICD-10-CM

## 2021-01-25 DIAGNOSIS — M54.16 LUMBAR RADICULOPATHY: ICD-10-CM

## 2021-01-25 DIAGNOSIS — G89.29 CHRONIC BILATERAL LOW BACK PAIN WITH LEFT-SIDED SCIATICA: ICD-10-CM

## 2021-01-25 RX ORDER — DICLOFENAC SODIUM 75 MG/1
75 TABLET, DELAYED RELEASE ORAL 2 TIMES DAILY
Qty: 14 TABLET | Refills: 0 | Status: SHIPPED | OUTPATIENT
Start: 2021-01-25 | End: 2021-02-01

## 2021-01-25 NOTE — TELEPHONE ENCOUNTER
I spoke with the patient  She indicated she is not talking about the diclofenac gel, he is aware that was refilled  She needs to diclofenac 75mg tablet - in which she takes twice a day

## 2021-01-25 NOTE — TELEPHONE ENCOUNTER
Patient stated she called previously and was advised that Dr Khushbu Bhandari did not order this for her  She stated she is holding the prescription and it has Dr Mary Morales name on it  Medication Name: Diclofenac Sodium (VOLTAREN) 1 %    Dosage of Med:   2 g    Frequency of Med: Apply 2 g topically 4 (four) times a day        Pharmacy and Location:  [address] [place] [address]   Preferred Callback Phone Number: [phone]

## 2021-01-25 NOTE — TELEPHONE ENCOUNTER
S/w pt and picked up diclofenac gel but she states she also needs oral diclofenac which is usually prescribed by her rheumatologist  There is a task out to her rheumatologist regarding this  Advised pt to call [facility] back tomorrow if they are not willing to prescribe

## 2021-01-31 DIAGNOSIS — G89.29 CHRONIC BILATERAL LOW BACK PAIN WITH LEFT-SIDED SCIATICA: ICD-10-CM

## 2021-01-31 DIAGNOSIS — M54.16 LUMBAR RADICULOPATHY: ICD-10-CM

## 2021-01-31 DIAGNOSIS — M54.42 CHRONIC BILATERAL LOW BACK PAIN WITH LEFT-SIDED SCIATICA: ICD-10-CM

## 2021-01-31 DIAGNOSIS — M47.816 LUMBAR SPONDYLOSIS: ICD-10-CM

## 2021-02-01 RX ORDER — DICLOFENAC SODIUM 75 MG/1
TABLET, DELAYED RELEASE ORAL
Qty: 60 TABLET | Refills: 3 | Status: SHIPPED | OUTPATIENT
Start: 2021-02-01 | End: 2021-04-23 | Stop reason: SDUPTHER

## 2021-02-24 ENCOUNTER — PROCEDURE VISIT (OUTPATIENT)
Dept: PAIN MEDICINE | Facility: CLINIC | Age: 56
End: 2021-02-24
Payer: COMMERCIAL

## 2021-02-24 DIAGNOSIS — M70.62 TROCHANTERIC BURSITIS OF LEFT HIP: Primary | ICD-10-CM

## 2021-02-24 PROCEDURE — 20611 DRAIN/INJ JOINT/BURSA W/US: CPT | Performed by: ANESTHESIOLOGY

## 2021-02-24 RX ORDER — METHYLPREDNISOLONE ACETATE 40 MG/ML
1 INJECTION, SUSPENSION INTRA-ARTICULAR; INTRALESIONAL; INTRAMUSCULAR; SOFT TISSUE
Status: COMPLETED | OUTPATIENT
Start: 2021-02-24 | End: 2021-02-24

## 2021-02-24 RX ORDER — LIDOCAINE HYDROCHLORIDE 10 MG/ML
1 INJECTION, SOLUTION INFILTRATION; PERINEURAL
Status: COMPLETED | OUTPATIENT
Start: 2021-02-24 | End: 2021-02-24

## 2021-02-24 RX ORDER — BUPIVACAINE HYDROCHLORIDE 2.5 MG/ML
2 INJECTION, SOLUTION INFILTRATION; PERINEURAL
Status: COMPLETED | OUTPATIENT
Start: 2021-02-24 | End: 2021-02-24

## 2021-02-24 RX ADMIN — LIDOCAINE HYDROCHLORIDE 1 ML: 10 INJECTION, SOLUTION INFILTRATION; PERINEURAL at 11:17

## 2021-02-24 RX ADMIN — BUPIVACAINE HYDROCHLORIDE 2 ML: 2.5 INJECTION, SOLUTION INFILTRATION; PERINEURAL at 11:17

## 2021-02-24 RX ADMIN — METHYLPREDNISOLONE ACETATE 1 ML: 40 INJECTION, SUSPENSION INTRA-ARTICULAR; INTRALESIONAL; INTRAMUSCULAR; SOFT TISSUE at 11:17

## 2021-02-24 NOTE — PATIENT INSTRUCTIONS
1  Do not apply heat to any area that is numb  If you have discomfort or soreness at the injection site, you may apply ice today, 20 minutes on and 20 minutes off  Tomorrow you may use ice or warm, moist heat  Do not apply ice or heat directly to the skin  2  If you experience severe shortness of breath, go to the Emergency Room  3  You may have numbness for several hours from the local anesthetic  Please use caution and common sense, especially with weight-bearing activities  4  You may have an increase or change in the discomfort for 36-48 hours after your treatment  Apply ice and continue with any pain medicine you have been prescribed  5  Do not do anything strenuous today  You may shower, but no tub baths or hot tubs today  You may resume your normal activities tomorrow, but do not overdo it  Resume normal activities slowly when you are feeling better  6  If you experience redness, drainage or swelling at the injection site, or if you develop a fever above 100 degrees, please call [facility] at [phone] or go to the Emergency Room  7  Continue to take all routine medicines prescribed by your primary care physician unless otherwise instructed by our staff  Most blood thinners should be started again according to your regularly scheduled dosing  If you have any questions, please give our office a call  If you have a problem specifically related to your procedure, please call our office at [phone]  Problems not related to your procedure should be directed to your primary care physician

## 2021-02-24 NOTE — PROGRESS NOTES
Large joint arthrocentesis: L greater trochanteric bursa  Universal Protocol:  Consent: Verbal consent obtained  Written consent obtained  Consent given by: patient  Time out: Immediately prior to procedure a "time out" was called to verify the correct patient, procedure, equipment, support staff and site/side marked as required  Timeout called at: 2/24/2021 11:16 AM   Patient understanding: patient states understanding of the procedure being performed  Patient consent: the patient's understanding of the procedure matches consent given  Procedure consent: procedure consent matches procedure scheduled  Relevant documents: relevant documents present and verified  Test results: test results available and properly labeled  Site marked: the operative site was marked  Radiology Images displayed and confirmed   If images not available, report reviewed: imaging studies available  Required items: required blood products, implants, devices, and special equipment available  Patient identity confirmed: verbally with patient, arm band, hospital-assigned identification number, provided demographic data and anonymous protocol, patient vented/unresponsive    Supporting Documentation  Indications: pain   Procedure Details  Location: hip - L greater trochanteric bursa  Preparation: Patient was prepped and draped in the usual sterile fashion  Needle size: 22 G  Ultrasound guidance: yes  Approach: lateral  Medications administered: 2 mL bupivacaine 0 25 %; 1 mL methylPREDNISolone acetate 40 mg/mL; 1 mL lidocaine 1 %    Patient tolerance: patient tolerated the procedure well with no immediate complications  Dressing:  Sterile dressing applied

## 2021-03-03 ENCOUNTER — TELEPHONE (OUTPATIENT)
Dept: PAIN MEDICINE | Facility: CLINIC | Age: 56
End: 2021-03-03

## 2021-03-21 DIAGNOSIS — M05.9 RHEUMATOID ARTHRITIS WITH POSITIVE RHEUMATOID FACTOR, INVOLVING UNSPECIFIED SITE (HCC): ICD-10-CM

## 2021-03-21 RX ORDER — HYDROXYCHLOROQUINE SULFATE 200 MG/1
300 TABLET, FILM COATED ORAL DAILY
Qty: 45 TABLET | Refills: 6 | Status: SHIPPED | OUTPATIENT
Start: 2021-03-21 | End: 2021-04-23 | Stop reason: SDUPTHER

## 2021-04-13 ENCOUNTER — OFFICE VISIT (OUTPATIENT)
Dept: PAIN MEDICINE | Facility: CLINIC | Age: 56
End: 2021-04-13
Payer: COMMERCIAL

## 2021-04-13 VITALS
DIASTOLIC BLOOD PRESSURE: 69 MMHG | SYSTOLIC BLOOD PRESSURE: 103 MMHG | HEART RATE: 64 BPM | HEIGHT: 60 IN | WEIGHT: 128 LBS | BODY MASS INDEX: 25.13 KG/M2

## 2021-04-13 DIAGNOSIS — M54.16 LUMBAR RADICULOPATHY: ICD-10-CM

## 2021-04-13 DIAGNOSIS — M54.42 CHRONIC BILATERAL LOW BACK PAIN WITH LEFT-SIDED SCIATICA: ICD-10-CM

## 2021-04-13 DIAGNOSIS — G89.29 CHRONIC BILATERAL LOW BACK PAIN WITH LEFT-SIDED SCIATICA: ICD-10-CM

## 2021-04-13 DIAGNOSIS — E55.9 VITAMIN D DEFICIENCY: ICD-10-CM

## 2021-04-13 DIAGNOSIS — G89.4 CHRONIC PAIN SYNDROME: Primary | ICD-10-CM

## 2021-04-13 DIAGNOSIS — M70.62 TROCHANTERIC BURSITIS OF LEFT HIP: ICD-10-CM

## 2021-04-13 PROCEDURE — 99214 OFFICE O/P EST MOD 30 MIN: CPT | Performed by: NURSE PRACTITIONER

## 2021-04-13 RX ORDER — ERGOCALCIFEROL 1.25 MG/1
CAPSULE ORAL
Qty: 12 CAPSULE | Refills: 1 | Status: SHIPPED | OUTPATIENT
Start: 2021-04-13 | End: 2021-09-29

## 2021-04-13 NOTE — PROGRESS NOTES
Assessment:  1  Chronic pain syndrome    2  Chronic bilateral low back pain with left-sided sciatica    3  Lumbar radiculopathy    4  Trochanteric bursitis of left hip        Plan:   While the patient was in the office today, I did have a thorough conversation regarding their chronic pain syndrome, medication management, and treatment plan options  Patient is a 60-year-old female with chronic pain syndrome related to chronic low back pain, lumbar radiculopathy, left greater trochanteric bursitis  She was most recently treated on 02/24/2021 with a left greater trochanteric bursa injection  Unfortunately, she denies significant improvement  Today, she continues with pain across her low back that radiates to the left hip and down the anterior left thigh area  Will schedule patient for left L2-3 and L3-4 transforaminal epidural steroid injection in the near future  Complete risks and benefits including bleeding, infection, tissue reaction, nerve injury and allergic reaction were discussed  The approach was demonstrated using models and literature was provided  Verbal and written consent was obtained  History of Present Illness: The patient is a 54 y o  female who presents for a follow up office visit in regards to Shoulder Pain, Back Pain, Hip Pain, and Foot Pain  The patients current symptoms include   Complaints of low back pain that radiates to the left hip and lateral left thigh  Current pain level is a 6/10  Pain is described as burning, sharp, pressure-like, shooting, tearing, stabbing  Current pain medications includes:  Diclofenac 75 mg twice daily and baclofen 10 mg 3 times daily if needed for spasms, both prescribed by her rheumatologist      The patient reports that this regimen is providing 20% pain relief  The patient is reporting no side effects from this pain medication regimen      I have personally reviewed and/or updated the patient's past medical history, past surgical history, family history, social history, current medications, allergies, and vital signs today  Review of Systems  Review of Systems   Constitutional: Negative for fatigue  HENT: Negative for ear pain and hearing loss  Eyes: Negative for redness  Respiratory: Negative for cough  Cardiovascular: Negative for leg swelling  Gastrointestinal: Negative for anal bleeding and rectal pain  Endocrine: Negative for polydipsia  Genitourinary: Negative for hematuria  Musculoskeletal: Positive for arthralgias, back pain, gait problem and myalgias (hip to thigh)  Negative for joint swelling  Skin: Negative for rash  Neurological: Positive for weakness  Negative for headaches  Hematological: Does not bruise/bleed easily  Psychiatric/Behavioral: Negative for behavioral problems and hallucinations           Past Medical History:   Diagnosis Date    Acquired ichthyosis     last assessed: 2013    Anxiety     Cervical radiculopathy     last assessed: 2014    Colorectal polyps     last assessed: 3/9/2015    [facility]     Depression     Diverticulosis of colon     Foot pain     GERD (gastroesophageal reflux disease)     Hyperlipemia     Hypertension     Myocardial infarction ([facility] )     at age 28    Osteopenia     last assessed: 2013    Palpitations     last assessed: 2014    PVD (peripheral vascular disease) ([facility] )     last assessed: 12/3/2012    Scapular dyskinesis     last assessed: 2014    Shortness of breath     Tendonitis of left rotator cuff     last assessed: 2014    Vocal cord polyps     last assessed: 2014       Past Surgical History:   Procedure Laterality Date    ABDOMINAL SURGERY      exploratory    CARDIAC SURGERY      cardiac stent      SECTION      last assessed: 2014    CHOLECYSTECTOMY      last assessed: 2014    COLONOSCOPY  10/27/2014    CORONARY ANGIOPLASTY WITH STENT PLACEMENT  1999    LAD stent    DENTAL SURGERY      last assessed: 8/8/2014    ELBOW SURGERY Bilateral     arthroplasty    EPIDURAL BLOCK INJECTION N/A 1/17/2019    Procedure: C7 T1 Cervical Epidural Steroid Injection (00172); Surgeon: Claudean Gambles, MD;  [facility];  Service: Pain Management     EPIDURAL BLOCK INJECTION Bilateral 5/2/2019    Procedure: BLOCK / INJECTION EPIDURAL STEROID CERVICAL - C7-T1;  Surgeon: Claudean Gambles, MD;  [facility];  Service: Pain Management     EPIDURAL BLOCK INJECTION Left 10/3/2019    Procedure: C7-T1 interlaminar epidural steroid injection;  Surgeon: Claudean Gambles, MD;  [facility];  Service: Pain Management     ESOPHAGOGASTRODUODENOSCOPY      ESOPHAGOGASTRODUODENOSCOPY N/A 11/4/2016    Procedure: ESOPHAGOGASTRODUODENOSCOPY (EGD);   Surgeon: Oleg Busby MD;  [facility];  Service:     FL GUIDED NEEDLE PLAC BX/ASP/INJ  5/2/2019    FL GUIDED NEEDLE PLAC BX/ASP/INJ  7/31/2019    FL GUIDED NEEDLE PLAC BX/ASP/INJ  10/3/2019    FL INJECTION LEFT SHOULDER (ARTHROGRAM)  9/25/2018    FOOT SURGERY Right 02/06/2015    x 4    HYSTERECTOMY      last assessed: 8/8/2014    CO ARTHROCENTESIS ASPIR&/INJ MAJOR JT/BURSA W/O US Bilateral 7/31/2019    Procedure: GREATER TROCHANTERIC HIP INJECTION;  Surgeon: Claudean Gambles, MD;  [facility];  Service: Pain Management     CO ARTHROSCOPY SHOULDER SURGICAL BICEPS TENODESIS Left 10/15/2018    Procedure: ARTHROSCOPY SHOULDER; Debridement of shoulder;  Surgeon: Cecy Alva MD;  [facility];  Service: Orthopedics    CO SHLDR ARTHROSCOP,SURG,W/ROTAT CUFF REPR Left 7/23/2018    Procedure: ARTHROSCOPY SHOULDER, subacromial decompression, synovectomy;  Surgeon: Cecy Alva MD;  [facility];  Service: Orthopedics    THROAT SURGERY      TOOTH EXTRACTION      TRIGEMINAL NERVE DECOMPRESSION Right 6/15/2018    Procedure: FOOT NEUROPLASTY;  Surgeon: Luz Gunn DPM;  [facility];  Service: Podiatry       Family History   Problem Relation Age of Onset    No Known Problems Mother     No Known Problems Father     No Known Problems Maternal Grandmother     No Known Problems Maternal Grandfather     No Known Problems Paternal Grandmother     No Known Problems Paternal Grandfather        Social History     Occupational History    Not on file   Tobacco Use    Smoking status: Current Every Day Smoker     Packs/day: 0 50    Smokeless tobacco: Never Used    Tobacco comment: She is trying to cut back on her own   Substance and Sexual Activity    Alcohol use: Not Currently     Comment: rare    Drug use: No    Sexual activity: Not on file         Current Outpatient Medications:     albuterol (PROVENTIL HFA,VENTOLIN HFA) 90 mcg/act inhaler, inhale 2 puffs by mouth every 6 hours if needed for wheezing, Disp: , Rfl: 0    baclofen 10 mg tablet, Take 1 tablet (10 mg total) by mouth 3 (three) times a day, Disp: 90 tablet, Rfl: 6    diclofenac (VOLTAREN) 75 mg EC tablet, take 1 tablet by mouth twice a day, Disp: 60 tablet, Rfl: 3    Diclofenac Sodium (VOLTAREN) 1 %, Apply 2 g topically 4 (four) times a day, Disp: 2 Tube, Rfl: 2    ergocalciferol (VITAMIN D2) 50,000 units, take 1 capsule by mouth every week, Disp: 12 capsule, Rfl: 1    ezetimibe (ZETIA) 10 mg tablet, Take 1 tablet (10 mg total) by mouth daily, Disp: 90 tablet, Rfl: 3    hydroxychloroquine (PLAQUENIL) 200 mg tablet, Take 1 5 tablets (300 mg total) by mouth daily, Disp: 45 tablet, Rfl: 6    nitroglycerin (NITROSTAT) 0 4 mg SL tablet, Place 1 tablet (0 4 mg total) under the tongue every 5 (five) minutes as needed for chest pain, Disp: 90 tablet, Rfl: 3    oxyCODONE-acetaminophen (PERCOCET) 5-325 mg per tablet, Take 1 tablet by mouth every 12 (twelve) hours , Disp: , Rfl:     rosuvastatin (CRESTOR) 5 mg tablet, Take 1 tablet (5 mg total) by mouth daily, Disp: 90 tablet, Rfl: 3   aspirin 81 mg chewable tablet, Chew 1 tablet daily Stop for the time being for foot surgery , Disp: , Rfl:     Allergies   Allergen Reactions    Ceclor [Cefaclor] Anaphylaxis    Cephalexin     Codeine Itching     Reaction Date: 09Jun2011;     Gabapentin      Body tremors, sweats    Lyrica [Pregabalin]      Body tremors, chills, heaviness in chest    Ticlopidine Hives     Other reaction(s): Hives    Penicillins Rash     Other reaction(s): Other       Physical Exam:    /69 (BP Location: Left arm, Patient Position: Sitting, Cuff Size: Standard)   Pulse 64   Ht 5' (1 524 m)   Wt 58 1 kg (128 lb)   BMI 25 00 kg/m²     Constitutional:normal, well developed, well nourished, alert, in no distress and non-toxic and no overt pain behavior  Eyes:anicteric  HEENT:grossly intact  Neck:supple, symmetric, trachea midline and no masses   Pulmonary:even and unlabored  Cardiovascular:No edema or pitting edema present  Skin:Normal without rashes or lesions and well hydrated  Psychiatric:Mood and affect appropriate  Neurologic:Cranial Nerves II-XII grossly intact  Musculoskeletal:normal    Imaging  No orders to display       No orders of the defined types were placed in this encounter

## 2021-04-19 ENCOUNTER — OFFICE VISIT (OUTPATIENT)
Dept: FAMILY MEDICINE CLINIC | Facility: CLINIC | Age: 56
End: 2021-04-19
Payer: COMMERCIAL

## 2021-04-19 ENCOUNTER — TELEPHONE (OUTPATIENT)
Dept: FAMILY MEDICINE CLINIC | Facility: CLINIC | Age: 56
End: 2021-04-19

## 2021-04-19 VITALS
HEART RATE: 75 BPM | DIASTOLIC BLOOD PRESSURE: 86 MMHG | BODY MASS INDEX: 25.45 KG/M2 | HEIGHT: 60 IN | TEMPERATURE: 96.8 F | OXYGEN SATURATION: 96 % | SYSTOLIC BLOOD PRESSURE: 112 MMHG | WEIGHT: 129.6 LBS

## 2021-04-19 DIAGNOSIS — S29.019A THORACIC MYOFASCIAL STRAIN, INITIAL ENCOUNTER: ICD-10-CM

## 2021-04-19 DIAGNOSIS — Z13.31 DEPRESSION SCREENING NEGATIVE: ICD-10-CM

## 2021-04-19 DIAGNOSIS — Z12.4 ENCOUNTER FOR SCREENING FOR CERVICAL CANCER: ICD-10-CM

## 2021-04-19 DIAGNOSIS — K29.00 ACUTE GASTRITIS, PRESENCE OF BLEEDING UNSPECIFIED, UNSPECIFIED GASTRITIS TYPE: Primary | ICD-10-CM

## 2021-04-19 DIAGNOSIS — Z12.31 ENCOUNTER FOR SCREENING MAMMOGRAM FOR BREAST CANCER: ICD-10-CM

## 2021-04-19 PROCEDURE — 3725F SCREEN DEPRESSION PERFORMED: CPT | Performed by: PHYSICIAN ASSISTANT

## 2021-04-19 PROCEDURE — 99214 OFFICE O/P EST MOD 30 MIN: CPT | Performed by: PHYSICIAN ASSISTANT

## 2021-04-19 PROCEDURE — 4004F PT TOBACCO SCREEN RCVD TLK: CPT | Performed by: PHYSICIAN ASSISTANT

## 2021-04-19 RX ORDER — SUCRALFATE 1 G/1
1 TABLET ORAL 4 TIMES DAILY
Qty: 30 TABLET | Refills: 0 | Status: SHIPPED | OUTPATIENT
Start: 2021-04-19 | End: 2021-05-23

## 2021-04-19 NOTE — TELEPHONE ENCOUNTER
Patient saw Rojelio Adams today  Given a prescription for Carafate  It should not cause her gas  She should try it

## 2021-04-19 NOTE — PROGRESS NOTES
Assessment/Plan:    Problem List Items Addressed This Visit     None      Visit Diagnoses     Acute gastritis, presence of bleeding unspecified, unspecified gastritis type    -  Primary    Relevant Medications    sucralfate (CARAFATE) 1 g tablet    Thoracic myofascial strain, initial encounter        Encounter for screening mammogram for breast cancer        Relevant Orders    Mammo screening bilateral w 3d & cad    Encounter for screening for cervical cancer         Relevant Orders    Ambulatory referral to Gynecology    Depression screening negative        BMI 25 0-25 9,adult               Diagnoses and all orders for this visit:    Acute gastritis, presence of bleeding unspecified, unspecified gastritis type  -     sucralfate (CARAFATE) 1 g tablet; Take 1 tablet (1 g total) by mouth 4 (four) times a day    Thoracic myofascial strain, initial encounter    Encounter for screening mammogram for breast cancer  -     Mammo screening bilateral w 3d & cad; Future    Encounter for screening for cervical cancer   -     Ambulatory referral to Gynecology; Future    Depression screening negative    BMI 25 0-25 9,adult        Pt likely with 2 issues going on, musculoskeletal pain as well as gastritis  Advised use heating pad on back in 20 minute increments  Pt to stop drinking so much coffee (states that is all she drinks) and drink more water and eat BRAT diet  Pt to follow up in 1 week or sooner PRN  Subjective:      Patient ID: Tamanna Jones is a 54 y o  female  Nonah Gear is here today complaining of abdominal pain as well as back pain since moving a dresser last week (4/13)  Pain is worse with movements  She has had decreased appetite  Today she had "yellow diarrhea" and has felt her stomach "gurgling " Denies fevers/chills, no nausea/vomiting         The following portions of the patient's history were reviewed and updated as appropriate:   She has a past medical history of Acquired ichthyosis, Anxiety, Cervical radiculopathy, Colorectal polyps, COPD (chronic obstructive pulmonary disease) ([facility] [address] ), Depression, Diverticulosis of colon, Foot pain, GERD (gastroesophageal reflux disease), Hyperlipemia, Hypertension, Myocardial infarction ([facility] [address] ), Osteopenia, Palpitations, PVD (peripheral vascular disease) ([facility] [address] ), Scapular dyskinesis, Shortness of breath, Tendonitis of left rotator cuff, and Vocal cord polyps  ,  does not have any pertinent problems on file  ,   has a past surgical history that includes Foot surgery (Right, 2015); Hysterectomy; Cholecystectomy;  section; Cardiac surgery; Throat surgery; Elbow surgery (Bilateral); Abdominal surgery; Esophagogastroduodenoscopy; Colonoscopy (10/27/2014); Tooth extraction; Esophagogastroduodenoscopy (N/A, 2016); Dental surgery; Coronary angioplasty with stent (); Trigeminal nerve decompression (Right, 6/15/2018); pr shldr arthroscop,surg,w/rotat cuff repr (Left, 2018); FL injection left shoulder (arthrogram) (2018); pr arthroscopy shoulder surgical biceps tenodesis (Left, 10/15/2018); Epidural block injection (N/A, 2019); Epidural block injection (Bilateral, 2019); FL guided needle plac bx/asp/inj (2019); pr arthrocentesis aspir&/inj major jt/bursa w/o us (Bilateral, 2019); FL guided needle plac bx/asp/inj (2019); Epidural block injection (Left, 10/3/2019); and FL guided needle plac bx/asp/inj (10/3/2019)  ,  family history includes No Known Problems in her father, maternal grandfather, maternal grandmother, mother, paternal grandfather, and paternal grandmother  ,   reports that she has been smoking  She has been smoking about 0 50 packs per day  She has never used smokeless tobacco  She reports that she does not drink alcohol or use drugs  ,  is allergic to ceclor [cefaclor]; cephalexin; codeine; gabapentin; lyrica [pregabalin]; ticlopidine; and penicillins     Current Outpatient Medications   Medication Sig Dispense Refill    albuterol (PROVENTIL HFA,VENTOLIN HFA) 90 mcg/act inhaler inhale 2 puffs by mouth every 6 hours if needed for wheezing  0    aspirin 81 mg chewable tablet Chew 1 tablet daily Stop for the time being for foot surgery       baclofen 10 mg tablet Take 1 tablet (10 mg total) by mouth 3 (three) times a day 90 tablet 6    diclofenac (VOLTAREN) 75 mg EC tablet take 1 tablet by mouth twice a day 60 tablet 3    Diclofenac Sodium (VOLTAREN) 1 % Apply 2 g topically 4 (four) times a day 2 Tube 2    ergocalciferol (VITAMIN D2) 50,000 units take 1 capsule by mouth every week 12 capsule 1    ezetimibe (ZETIA) 10 mg tablet Take 1 tablet (10 mg total) by mouth daily 90 tablet 3    hydroxychloroquine (PLAQUENIL) 200 mg tablet Take 1 5 tablets (300 mg total) by mouth daily 45 tablet 6    nitroglycerin (NITROSTAT) 0 4 mg SL tablet Place 1 tablet (0 4 mg total) under the tongue every 5 (five) minutes as needed for chest pain 90 tablet 3    oxyCODONE-acetaminophen (PERCOCET) 5-325 mg per tablet Take 1 tablet by mouth every 12 (twelve) hours       rosuvastatin (CRESTOR) 5 mg tablet Take 1 tablet (5 mg total) by mouth daily 90 tablet 3    sucralfate (CARAFATE) 1 g tablet Take 1 tablet (1 g total) by mouth 4 (four) times a day 30 tablet 0     No current facility-administered medications for this visit  Review of Systems   Constitutional: Negative for activity change, appetite change, chills, diaphoresis, fatigue, fever and unexpected weight change  HENT: Negative for congestion, ear pain, postnasal drip, rhinorrhea, sinus pressure, sinus pain, sneezing, sore throat, tinnitus and voice change  Eyes: Negative for pain, redness and visual disturbance  Respiratory: Negative for cough, chest tightness, shortness of breath and wheezing  Cardiovascular: Negative for chest pain, palpitations and leg swelling  Gastrointestinal: Positive for abdominal pain and diarrhea   Negative for blood in stool, constipation, nausea and vomiting  Genitourinary: Negative for difficulty urinating, dysuria, frequency, hematuria and urgency  Musculoskeletal: Positive for back pain  Negative for arthralgias, gait problem, joint swelling, myalgias, neck pain and neck stiffness  Skin: Negative for color change, pallor, rash and wound  Neurological: Negative for dizziness, tremors, weakness, light-headedness and headaches  Psychiatric/Behavioral: Negative for dysphoric mood, self-injury, sleep disturbance and suicidal ideas  The patient is not nervous/anxious  Objective:  Vitals:    04/19/21 1323   BP: 112/86   Pulse: 75   Temp: (!) 96 8 °F (36 °C)   SpO2: 96%   Weight: 58 8 kg (129 lb 9 6 oz)   Height: 5' (1 524 m)     Body mass index is 25 31 kg/m²  Physical Exam  Vitals signs reviewed  Constitutional:       General: She is not in acute distress  Appearance: She is well-developed  She is not diaphoretic  HENT:      Head: Normocephalic and atraumatic  Right Ear: Hearing, tympanic membrane, ear canal and external ear normal       Left Ear: Hearing, tympanic membrane, ear canal and external ear normal       Mouth/Throat:      Pharynx: Uvula midline  No oropharyngeal exudate  Eyes:      General: No scleral icterus  Right eye: No discharge  Left eye: No discharge  Conjunctiva/sclera: Conjunctivae normal    Neck:      Musculoskeletal: Neck supple  Thyroid: No thyromegaly  Vascular: No carotid bruit  Cardiovascular:      Rate and Rhythm: Normal rate and regular rhythm  Heart sounds: Normal heart sounds  No murmur  Pulmonary:      Effort: Pulmonary effort is normal  No respiratory distress  Breath sounds: Normal breath sounds  No wheezing  Abdominal:      General: Bowel sounds are normal  There is no distension  Palpations: Abdomen is soft  There is no mass  Tenderness:  There is abdominal tenderness in the right upper quadrant, epigastric area and left upper quadrant  There is no guarding or rebound  Musculoskeletal: Normal range of motion  General: No tenderness  Back:       Comments: Pain is not reproducible on exam   Lymphadenopathy:      Cervical: No cervical adenopathy  Skin:     General: Skin is warm and dry  Findings: No erythema or rash  Neurological:      Mental Status: She is alert and oriented to person, place, and time  Psychiatric:         Behavior: Behavior normal          Thought Content: Thought content normal          Judgment: Judgment normal          PHQ-9 Depression Screening    PHQ-9:   Frequency of the following problems over the past two weeks:      Little interest or pleasure in doing things: 0 - not at all  Feeling down, depressed, or hopeless: 0 - not at all  Trouble falling or staying asleep, or sleeping too much: 0 - not at all  Feeling tired or having little energy: 0 - not at all  Poor appetite or overeatin - not at all  Feeling bad about yourself - or that you are a failure or have let yourself or your family down: 0 - not at all  Trouble concentrating on things, such as reading the newspaper or watching television: 0 - not at all  Moving or speaking so slowly that other people could have noticed  Or the opposite - being so fidgety or restless that you have been moving around a lot more than usual: 0 - not at all  Thoughts that you would be better off dead, or of hurting yourself in some way: 0 - not at all  PHQ-2 Score: 0  PHQ-9 Score: 0       BMI Counseling: Body mass index is 25 31 kg/m²  The BMI is above normal  Nutrition recommendations include reducing portion sizes, decreasing overall calorie intake, 3-5 servings of fruits/vegetables daily and reducing fast food intake  Exercise recommendations include exercising 3-5 times per week

## 2021-04-19 NOTE — TELEPHONE ENCOUNTER
WAS IN FOR APPT TODAY BECAUSE SHE FILLED UP WITH AIR MOVING THINGS  YOU GAVE HER MEDICATION T TAKE, SHE WAS READING THE SIDE EFFECTS WHICH SAY IT CAN CAUSE GAS AND CONSTIPATION  SHE ALREADY HAS GAS  SHE DOES NOT WANT TO TAKE THAT

## 2021-04-22 DIAGNOSIS — K29.00 ACUTE GASTRITIS, PRESENCE OF BLEEDING UNSPECIFIED, UNSPECIFIED GASTRITIS TYPE: ICD-10-CM

## 2021-04-22 RX ORDER — SUCRALFATE 1 G/1
TABLET ORAL
Qty: 30 TABLET | Refills: 0 | OUTPATIENT
Start: 2021-04-22

## 2021-04-23 ENCOUNTER — OFFICE VISIT (OUTPATIENT)
Dept: RHEUMATOLOGY | Facility: CLINIC | Age: 56
End: 2021-04-23
Payer: COMMERCIAL

## 2021-04-23 ENCOUNTER — APPOINTMENT (OUTPATIENT)
Dept: RADIOLOGY | Facility: MEDICAL CENTER | Age: 56
End: 2021-04-23
Payer: COMMERCIAL

## 2021-04-23 ENCOUNTER — APPOINTMENT (OUTPATIENT)
Dept: LAB | Facility: MEDICAL CENTER | Age: 56
End: 2021-04-23
Payer: COMMERCIAL

## 2021-04-23 VITALS
DIASTOLIC BLOOD PRESSURE: 83 MMHG | WEIGHT: 129 LBS | BODY MASS INDEX: 25.32 KG/M2 | TEMPERATURE: 98.6 F | HEIGHT: 60 IN | SYSTOLIC BLOOD PRESSURE: 135 MMHG | HEART RATE: 72 BPM

## 2021-04-23 DIAGNOSIS — M79.674 GREAT TOE PAIN, RIGHT: ICD-10-CM

## 2021-04-23 DIAGNOSIS — M54.42 CHRONIC BILATERAL LOW BACK PAIN WITH LEFT-SIDED SCIATICA: ICD-10-CM

## 2021-04-23 DIAGNOSIS — M35.9 UNDIFFERENTIATED CONNECTIVE TISSUE DISEASE (HCC): Primary | ICD-10-CM

## 2021-04-23 DIAGNOSIS — Z79.899 HIGH RISK MEDICATION USE: ICD-10-CM

## 2021-04-23 DIAGNOSIS — M05.9 RHEUMATOID ARTHRITIS WITH POSITIVE RHEUMATOID FACTOR, INVOLVING UNSPECIFIED SITE (HCC): ICD-10-CM

## 2021-04-23 DIAGNOSIS — R07.9 CHEST PAIN, UNSPECIFIED TYPE: ICD-10-CM

## 2021-04-23 DIAGNOSIS — M47.816 LUMBAR SPONDYLOSIS: ICD-10-CM

## 2021-04-23 DIAGNOSIS — I73.00 RAYNAUD'S DISEASE WITHOUT GANGRENE: ICD-10-CM

## 2021-04-23 DIAGNOSIS — M54.16 LUMBAR RADICULOPATHY: ICD-10-CM

## 2021-04-23 DIAGNOSIS — M62.838 MUSCLE SPASM: ICD-10-CM

## 2021-04-23 DIAGNOSIS — G89.29 CHRONIC BILATERAL LOW BACK PAIN WITH LEFT-SIDED SCIATICA: ICD-10-CM

## 2021-04-23 LAB
ALBUMIN SERPL BCP-MCNC: 4.2 G/DL (ref 3.5–5)
ALP SERPL-CCNC: 60 U/L (ref 46–116)
ALT SERPL W P-5'-P-CCNC: 32 U/L (ref 12–78)
ANION GAP SERPL CALCULATED.3IONS-SCNC: 6 MMOL/L (ref 4–13)
AST SERPL W P-5'-P-CCNC: 27 U/L (ref 5–45)
BASOPHILS # BLD AUTO: 0.05 THOUSANDS/ΜL (ref 0–0.1)
BASOPHILS NFR BLD AUTO: 1 % (ref 0–1)
BILIRUB SERPL-MCNC: 0.47 MG/DL (ref 0.2–1)
BUN SERPL-MCNC: 11 MG/DL (ref 5–25)
CALCIUM SERPL-MCNC: 10 MG/DL (ref 8.3–10.1)
CHLORIDE SERPL-SCNC: 103 MMOL/L (ref 100–108)
CO2 SERPL-SCNC: 30 MMOL/L (ref 21–32)
CREAT SERPL-MCNC: 0.71 MG/DL (ref 0.6–1.3)
EOSINOPHIL # BLD AUTO: 0.16 THOUSAND/ΜL (ref 0–0.61)
EOSINOPHIL NFR BLD AUTO: 2 % (ref 0–6)
ERYTHROCYTE [DISTWIDTH] IN BLOOD BY AUTOMATED COUNT: 12.3 % (ref 11.6–15.1)
GFR SERPL CREATININE-BSD FRML MDRD: 96 ML/MIN/1.73SQ M
GLUCOSE SERPL-MCNC: 85 MG/DL (ref 65–140)
HCT VFR BLD AUTO: 41.3 % (ref 34.8–46.1)
HGB BLD-MCNC: 13.8 G/DL (ref 11.5–15.4)
IMM GRANULOCYTES # BLD AUTO: 0.01 THOUSAND/UL (ref 0–0.2)
IMM GRANULOCYTES NFR BLD AUTO: 0 % (ref 0–2)
LYMPHOCYTES # BLD AUTO: 2.7 THOUSANDS/ΜL (ref 0.6–4.47)
LYMPHOCYTES NFR BLD AUTO: 35 % (ref 14–44)
MCH RBC QN AUTO: 33.1 PG (ref 26.8–34.3)
MCHC RBC AUTO-ENTMCNC: 33.4 G/DL (ref 31.4–37.4)
MCV RBC AUTO: 99 FL (ref 82–98)
MONOCYTES # BLD AUTO: 0.53 THOUSAND/ΜL (ref 0.17–1.22)
MONOCYTES NFR BLD AUTO: 7 % (ref 4–12)
NEUTROPHILS # BLD AUTO: 4.2 THOUSANDS/ΜL (ref 1.85–7.62)
NEUTS SEG NFR BLD AUTO: 55 % (ref 43–75)
NRBC BLD AUTO-RTO: 0 /100 WBCS
PLATELET # BLD AUTO: 221 THOUSANDS/UL (ref 149–390)
PMV BLD AUTO: 11 FL (ref 8.9–12.7)
POTASSIUM SERPL-SCNC: 3.5 MMOL/L (ref 3.5–5.3)
PROT SERPL-MCNC: 7.8 G/DL (ref 6.4–8.2)
RBC # BLD AUTO: 4.17 MILLION/UL (ref 3.81–5.12)
SODIUM SERPL-SCNC: 139 MMOL/L (ref 136–145)
URATE SERPL-MCNC: 4.6 MG/DL (ref 2–6.8)
WBC # BLD AUTO: 7.65 THOUSAND/UL (ref 4.31–10.16)

## 2021-04-23 PROCEDURE — 71046 X-RAY EXAM CHEST 2 VIEWS: CPT

## 2021-04-23 PROCEDURE — 3008F BODY MASS INDEX DOCD: CPT | Performed by: PHYSICIAN ASSISTANT

## 2021-04-23 PROCEDURE — 36415 COLL VENOUS BLD VENIPUNCTURE: CPT | Performed by: INTERNAL MEDICINE

## 2021-04-23 PROCEDURE — 85025 COMPLETE CBC W/AUTO DIFF WBC: CPT | Performed by: INTERNAL MEDICINE

## 2021-04-23 PROCEDURE — 80053 COMPREHEN METABOLIC PANEL: CPT | Performed by: INTERNAL MEDICINE

## 2021-04-23 PROCEDURE — 84550 ASSAY OF BLOOD/URIC ACID: CPT | Performed by: INTERNAL MEDICINE

## 2021-04-23 PROCEDURE — 99214 OFFICE O/P EST MOD 30 MIN: CPT | Performed by: INTERNAL MEDICINE

## 2021-04-23 RX ORDER — AMLODIPINE BESYLATE 2.5 MG/1
2.5 TABLET ORAL DAILY
Qty: 30 TABLET | Refills: 6 | Status: SHIPPED | OUTPATIENT
Start: 2021-04-23 | End: 2021-09-08

## 2021-04-23 RX ORDER — HYDROXYCHLOROQUINE SULFATE 200 MG/1
300 TABLET, FILM COATED ORAL DAILY
Qty: 135 TABLET | Refills: 1 | Status: SHIPPED | OUTPATIENT
Start: 2021-04-23 | End: 2021-10-29

## 2021-04-23 RX ORDER — TIZANIDINE 2 MG/1
2 TABLET ORAL EVERY 8 HOURS PRN
Qty: 90 TABLET | Refills: 3 | Status: SHIPPED | OUTPATIENT
Start: 2021-04-23 | End: 2022-03-28 | Stop reason: ALTCHOICE

## 2021-04-23 RX ORDER — DICLOFENAC SODIUM 75 MG/1
75 TABLET, DELAYED RELEASE ORAL 2 TIMES DAILY
Qty: 180 TABLET | Refills: 1 | Status: SHIPPED | OUTPATIENT
Start: 2021-04-23 | End: 2021-10-09

## 2021-04-23 NOTE — PROGRESS NOTES
Assessment and Plan: Summer Rice is a 54 y o   female who presents for follow-up of her UCTD  Complains of significant abdominal pain in discomfort as there was trapped inside her lungs/abdomen since she moved to heavy dresser recently  Below workup ordered, which returned unremarkable  Do labs  Do chest x-ray  Continue hydroxychloroquine one and a half tabs daily  Continue diclofenac twice a day as needed for joint pain  Take amlodipine daily for Raynaud's symptoms  Take tizanidine three times a day as needed for muscle pain instead of baclofen    Plan:  Diagnoses and all orders for this visit:    Undifferentiated connective tissue disease ([facility] [address] )  -     CBC and differential  -     Comprehensive metabolic panel    Chronic bilateral low back pain with left-sided sciatica  -     diclofenac (VOLTAREN) 75 mg EC tablet; Take 1 tablet (75 mg total) by mouth 2 (two) times a day    Lumbar spondylosis  -     diclofenac (VOLTAREN) 75 mg EC tablet; Take 1 tablet (75 mg total) by mouth 2 (two) times a day    Lumbar radiculopathy  -     diclofenac (VOLTAREN) 75 mg EC tablet; Take 1 tablet (75 mg total) by mouth 2 (two) times a day    Rheumatoid arthritis with positive rheumatoid factor, involving unspecified site (HCC)  -     hydroxychloroquine (PLAQUENIL) 200 mg tablet; Take 1 5 tablets (300 mg total) by mouth daily (Patient not taking: Reported on 5/26/2021)    Chest pain, unspecified type  -     XR chest pa & lateral; Future    Muscle spasm  -     tiZANidine (ZANAFLEX) 2 mg tablet; Take 1 tablet (2 mg total) by mouth every 8 (eight) hours as needed for muscle spasms    Raynaud's disease without gangrene  -     amLODIPine (NORVASC) 2 5 mg tablet;  Take 1 tablet (2 5 mg total) by mouth daily (Patient not taking: Reported on 5/4/2021)    Great toe pain, right  -     Uric acid    High risk medication use    High risk medication use - Benefits and risks of hydroxychloroquine, including but not limited to retinal toxicity, corneal deposits, gastrointestinal side effects, and headaches were discussed with the patient  The need for a regular eye exam to monitor for ocular toxicity while on this medication was also explained to the patient  Follow-up plan: Return to clinic in 6 months        Rheumatic Disease Summary  Gloria [place] Adjutant mookie Joseph originally presented on 5/22/20 as a Rheumatology consult referred by her Merced Burkitt, DO for evaluation of possible inflammatory arthritis given positive RF of 40 and KRISTA of 1:80 in a homogenous pattern  Patient has history of negative anti-CCP  Lupus activity labs, anti-centromere Ab, and anti-RNP ordered and returned unremarkable  ESR/CRP returned normal  Patient had some features to at least make a diagnosis of undifferentiated connective tissue disease, including arthralgia, photosensitivity, facial rashes, and Raynaud's symptoms  Her arthritis symptoms were not typical of RA since she denied morning stiffness and her joint symptoms were worse later in the day and with use  Hand and feet x-rays ordered to evaluate for any inflammatory changes returned unremarkable  For time-being, started patient on hydroxychloroquine 200mg po daily; asked her to get regular eye exams while on this medication to monitor for plaquenil-related retinal toxicity  8/28/20 via telemedicine: Nallely Rios is a 54 y o  female who presents for follow-up of UCTD (arthralgia, photosensitivity, facial rashes, and Raynaud's symptoms)  Her arthralgia has improved since starting HCQ  Based on her weight, have increased her hydroxychloroquine to 300mg po daily  Changed her diclofenac prescription to 75mg po bid instead of 50mg po tid, and continued baclofen 10mg po tid  NICA Gibson Giacomo is a 54 y o   female who presents for follow-up of her UCTD  She admits to getting muscle knotting up in her biceps  Admits that her right big toe gets swollen/red/painful    Feels her legs are getting weaker  Smokes less than pack per day his cigarettes  She had an eye exam recently, 1-2 months ago  Two weeks ago, was trying to move dresser; all of a sudden, "filled up with air under ribcage"  A big area of her mid-abdomen pops out, and when she pushes it back in, it gurgles  Feels constant gurgling all the time  Chest hurts when having bowel movement  Feels like axe is sticking in back when sleeping at night  The following portions of the patient's history were reviewed and updated as appropriate: allergies, current medications, past family history, past medical history, past social history, past surgical history and problem list     Review of Systems:   Review of Systems   Constitutional: Negative for chills, fatigue, fever and unexpected weight change  HENT: Negative for mouth sores and trouble swallowing  Eyes: Positive for visual disturbance  Negative for pain  Respiratory: Positive for shortness of breath and wheezing  Negative for cough  Cardiovascular: Positive for chest pain  Negative for leg swelling  Gastrointestinal: Positive for abdominal pain  Negative for constipation and diarrhea  Indigestion/heartburn   Musculoskeletal: Positive for arthralgias, back pain, joint swelling and myalgias  Skin: Negative for color change and rash  Neurological: Positive for dizziness, weakness and numbness  Hematological: Negative for adenopathy  Psychiatric/Behavioral: Negative for sleep disturbance         Home Medications:    Current Outpatient Medications:     albuterol (PROVENTIL HFA,VENTOLIN HFA) 90 mcg/act inhaler, inhale 2 puffs by mouth every 6 hours if needed for wheezing, Disp: , Rfl: 0    aspirin 81 mg chewable tablet, Chew 1 tablet daily Stop for the time being for foot surgery , Disp: , Rfl:     baclofen 10 mg tablet, Take 1 tablet (10 mg total) by mouth 3 (three) times a day, Disp: 90 tablet, Rfl: 6    diclofenac (VOLTAREN) 75 mg EC tablet, Take 1 tablet (75 mg total) by mouth 2 (two) times a day, Disp: 180 tablet, Rfl: 1    Diclofenac Sodium (VOLTAREN) 1 %, Apply 2 g topically 4 (four) times a day, Disp: 2 Tube, Rfl: 2    ergocalciferol (VITAMIN D2) 50,000 units, take 1 capsule by mouth every week, Disp: 12 capsule, Rfl: 1    ezetimibe (ZETIA) 10 mg tablet, Take 1 tablet (10 mg total) by mouth daily (Patient not taking: Reported on 5/26/2021), Disp: 90 tablet, Rfl: 3    hydroxychloroquine (PLAQUENIL) 200 mg tablet, Take 1 5 tablets (300 mg total) by mouth daily (Patient not taking: Reported on 5/26/2021), Disp: 135 tablet, Rfl: 1    nitroglycerin (NITROSTAT) 0 4 mg SL tablet, Place 1 tablet (0 4 mg total) under the tongue every 5 (five) minutes as needed for chest pain, Disp: 90 tablet, Rfl: 3    oxyCODONE-acetaminophen (PERCOCET) 5-325 mg per tablet, Take 1 tablet by mouth every 12 (twelve) hours , Disp: , Rfl:     rosuvastatin (CRESTOR) 5 mg tablet, Take 1 tablet (5 mg total) by mouth daily (Patient not taking: Reported on 5/26/2021), Disp: 90 tablet, Rfl: 3    amLODIPine (NORVASC) 2 5 mg tablet, Take 1 tablet (2 5 mg total) by mouth daily (Patient not taking: Reported on 5/4/2021), Disp: 30 tablet, Rfl: 6    indomethacin (INDOCIN) 50 mg capsule, , Disp: , Rfl:     lidocaine (LIDODERM) 5 %, Apply 1 patch topically daily Remove & Discard patch within 12 hours or as directed by MD, Disp: 30 patch, Rfl: 0    omeprazole (PriLOSEC) 40 MG capsule, Take 1 capsule (40 mg total) by mouth daily, Disp: 90 capsule, Rfl: 0    polyethylene glycol (MIRALAX) 17 g packet, Take 17 g by mouth daily, Disp: 90 each, Rfl: 0    tiZANidine (ZANAFLEX) 2 mg tablet, Take 1 tablet (2 mg total) by mouth every 8 (eight) hours as needed for muscle spasms, Disp: 90 tablet, Rfl: 3    Objective:    Vitals:    04/23/21 1529   BP: 135/83   BP Location: Left arm   Patient Position: Sitting   Cuff Size: Standard   Pulse: 72   Temp: 98 6 °F (37 °C)   TempSrc: Temporal   Weight: 58 5 kg (129 lb)   Height: 5' (1 524 m)       Physical Exam  Constitutional:       General: She is not in acute distress  Appearance: She is well-developed  HENT:      Head: Normocephalic and atraumatic  Eyes:      General: Lids are normal  No scleral icterus  Conjunctiva/sclera: Conjunctivae normal    Neck:      Musculoskeletal: Neck supple  No muscular tenderness  Cardiovascular:      Rate and Rhythm: Normal rate and regular rhythm  Heart sounds: S1 normal and S2 normal  No murmur  No friction rub  Pulmonary:      Effort: Pulmonary effort is normal  No tachypnea or respiratory distress  Breath sounds: Normal breath sounds  No wheezing, rhonchi or rales  Abdominal:      Tenderness: There is abdominal tenderness  Comments: Mid-epigastric tenderness   Musculoskeletal:         General: Tenderness present  Comments: Sternal tenderness   Skin:     General: Skin is warm and dry  Findings: No rash  Nails: There is no clubbing  Neurological:      Mental Status: She is alert  Sensory: No sensory deficit  Psychiatric:         Behavior: Behavior normal  Behavior is cooperative  Reviewed labs and imaging      Imaging:   CXR 4/23/21 -  Unremarkable    Labs:   Office Visit on 04/23/2021   Component Date Value Ref Range Status    WBC 04/23/2021 7 65  4 31 - 10 16 Thousand/uL Final    RBC 04/23/2021 4 17  3 81 - 5 12 Million/uL Final    Hemoglobin 04/23/2021 13 8  11 5 - 15 4 g/dL Final    Hematocrit 04/23/2021 41 3  34 8 - 46 1 % Final    MCV 04/23/2021 99* 82 - 98 fL Final    MCH 04/23/2021 33 1  26 8 - 34 3 pg Final    MCHC 04/23/2021 33 4  31 4 - 37 4 g/dL Final    RDW 04/23/2021 12 3  11 6 - 15 1 % Final    MPV 04/23/2021 11 0  8 9 - 12 7 fL Final    Platelets 52/27/8334 221  149 - 390 Thousands/uL Final    nRBC 04/23/2021 0  /100 WBCs Final    Neutrophils Relative 04/23/2021 55  43 - 75 % Final    Immat GRANS % 04/23/2021 0  0 - 2 % Final    Lymphocytes Relative 04/23/2021 35  14 - 44 % Final    Monocytes Relative 04/23/2021 7  4 - 12 % Final    Eosinophils Relative 04/23/2021 2  0 - 6 % Final    Basophils Relative 04/23/2021 1  0 - 1 % Final    Neutrophils Absolute 04/23/2021 4 20  1 85 - 7 62 Thousands/µL Final    Immature Grans Absolute 04/23/2021 0 01  0 00 - 0 20 Thousand/uL Final    Lymphocytes Absolute 04/23/2021 2 70  0 60 - 4 47 Thousands/µL Final    Monocytes Absolute 04/23/2021 0 53  0 17 - 1 22 Thousand/µL Final    Eosinophils Absolute 04/23/2021 0 16  0 00 - 0 61 Thousand/µL Final    Basophils Absolute 04/23/2021 0 05  0 00 - 0 10 Thousands/µL Final    Sodium 04/23/2021 139  136 - 145 mmol/L Final    Potassium 04/23/2021 3 5  3 5 - 5 3 mmol/L Final    Chloride 04/23/2021 103  100 - 108 mmol/L Final    CO2 04/23/2021 30  21 - 32 mmol/L Final    ANION GAP 04/23/2021 6  4 - 13 mmol/L Final    BUN 04/23/2021 11  5 - 25 mg/dL Final    Creatinine 04/23/2021 0 71  0 60 - 1 30 mg/dL Final    Standardized to IDMS reference method    Glucose 04/23/2021 85  65 - 140 mg/dL Final    If the patient is fasting, the ADA then defines impaired fasting glucose as > 100 mg/dL and diabetes as > or equal to 123 mg/dL  Specimen collection should occur prior to Sulfasalazine administration due to the potential for falsely depressed results  Specimen collection should occur prior to Sulfapyridine administration due to the potential for falsely elevated results   Calcium 04/23/2021 10 0  8 3 - 10 1 mg/dL Final    AST 04/23/2021 27  5 - 45 U/L Final    Specimen collection should occur prior to Sulfasalazine administration due to the potential for falsely depressed results   ALT 04/23/2021 32  12 - 78 U/L Final    Specimen collection should occur prior to Sulfasalazine and/or Sulfapyridine administration due to the potential for falsely depressed results       Alkaline Phosphatase 04/23/2021 60  46 - 116 U/L Final    Total Protein 04/23/2021 7 8  6 4 - 8 2 g/dL Final    Albumin 04/23/2021 4 2  3 5 - 5 0 g/dL Final    Total Bilirubin 04/23/2021 0 47  0 20 - 1 00 mg/dL Final    Use of this assay is not recommended for patients undergoing treatment with eltrombopag due to the potential for falsely elevated results   eGFR 04/23/2021 96  ml/min/1 73sq m Final    Uric Acid 04/23/2021 4 6  2 0 - 6 8 mg/dL Final    Specimen collection should occur prior to Metamizole administration due to the potential for falsely depressed results  Appointment on 11/05/2020   Component Date Value Ref Range Status    TSH 3RD GENERATON 11/05/2020 0 858  0 358 - 3 740 uIU/mL Final    The recommended reference ranges for TSH during pregnancy are as follows:   First trimester 0 1 to 2 5 uIU/mL   Second trimester  0 2 to 3 0 uIU/mL   Third trimester 0 3 to 3 0 uIU/m    Note: Normal ranges may not apply to patients who are transgender, non-binary, or whose legal sex, sex at birth, and gender identity differ  Using supplements with high doses of biotin 20 to more than 300 times greater than the adequate daily intake for adults of 30 mcg/day as established by the [place] [employer], can cause falsely depress results

## 2021-04-23 NOTE — PATIENT INSTRUCTIONS
Do labs  Do chest x-ray  Continue hydroxychloroquine one and a half tabs daily  Continue diclofenac twice a day as needed for joint pain  Take amlodipine daily for Raynaud's symptoms  Take tizanidine three times a day as needed for muscle pain instead of baclofen    Return to clinic in 6 months    Connective Tissue Disorders   WHAT YOU NEED TO KNOW:   What is a connective tissue disorder? A connective tissue disorder can affect any connective tissue in your body  Connective tissues support your organs, attach muscles to bones, and create scar tissue after an injury  Cartilage is an example of a connective tissue  There are many types of connective tissue disorders, such as rheumatoid arthritis, lupus, and scleroderma  The most common affected areas are joints, muscles, and skin  Your organs, eyes, nervous system, and blood vessels can also be affected  What increases my risk for a connective tissue disorder? You might have been born with the disorder, or it may develop from any of the following:  · Healthy cells in your body are attacked by your immune system by mistake    · An injury that causes scar tissue to form    · A family history of a connective tissue disorder     · A lack of vitamin C, causing a connective tissue disorder called scurvy    What are the signs and symptoms of a connective tissue disorder? Signs and symptoms depend on the type of connective tissue disorder and if it is severe  Symptoms may be mild or severe, and may come and go:  · Fever or fatigue    · Skin rash or thickening, blisters, or sensitivity to sunlight    · Rash on your cheeks that goes across your nose    · Joint pain, swelling, or warmth    · Deformed joints, or limited range of motion    · Cold, numb, or swollen fingers    · Loss of appetite, or weight loss without trying    · Dry mouth or eyes, vision problems, or an eye infection such as conjunctivitis    · Hair loss    How is a connective tissue disorder diagnosed?   You may have symptoms of several types of connective tissue disorders  This can make diagnosis difficult  Over time, you may develop one type of connective tissue disorder  · Blood tests  may be used to measure the amount of inflammation in your body or to check organ function  Blood tests may also be used to check for specific antibodies that are attacking healthy cells by mistake  · An x-ray, CT, or MRI  may be used to check your joints or organs for damage  Do not enter the MRI room with any metal  Metal can cause serious damage  Tell the healthcare provider if you have any metal in or on your body  · A biopsy  is a procedure used to take a sample of tissue to be tested  How is a connective tissue disorder treated? · Medicines  may be given to prevent your immune system from attacking healthy cells  You may also need medicines to stop the disease from getting worse  You may need to use topical creams or lotions to control a rash or other symptoms that affect your skin  · Prescription pain medicine  may be given  Ask your healthcare provider how to take this medicine safely  Some prescription pain medicines contain acetaminophen  Do not take other medicines that contain acetaminophen without talking to your healthcare provider  Too much acetaminophen may cause liver damage  Prescription pain medicine may cause constipation  Ask your healthcare provider how to prevent or treat constipation  · NSAIDs , such as ibuprofen, help decrease swelling, pain, and fever  This medicine is available with or without a doctor's order  NSAIDs can cause stomach bleeding or kidney problems in certain people  If you take blood thinner medicine, always ask your healthcare provider if NSAIDs are safe for you  Always read the medicine label and follow directions  · Acetaminophen  helps reduce pain and fever  Acetaminophen is available without a doctor's order  Ask how much to take and how often to take it   Follow directions  Acetaminophen can cause liver problems if not taken correctly  · Steroids  may be given to reduce swelling and pain  What can I do to manage my connective tissue disorder? · Rest as needed  Talk to your healthcare provider if you are having trouble sleeping because of pain or other symptoms  Rest your joints if they are stiff or painful  Your healthcare provider may suggest support devices such as crutches or splints to help your joints rest      · Eat a variety of healthy foods  Healthy foods include fruits, vegetables, lean meats, fish, and low-fat dairy products  Work with your healthcare provider or dietitian to create healthy meal plans  · Go to physical or occupational therapy as directed  A physical therapist can help you create an exercise plan  Exercise may help increase your energy  Exercise can also help keep stiff joints flexible and increase range of motion  An occupational therapist can help you learn to do your daily activities when you have pain or swelling  · Talk to your healthcare provider about pregnancy  If you are a woman and want to get pregnant, talk to your healthcare provider  You or your baby might be at risk for complications  You may need to wait until your disease is controlled or your medications are finished before you get pregnant  You may also have trouble getting pregnant because of your disease  Your healthcare provider may be able to suggest ways to improve your ability to become pregnant  · Do not smoke  Nicotine and other chemicals in cigarettes and cigars can cause blood vessel and lung damage  Ask your healthcare provider for information if you currently smoke and need help to quit  E-cigarettes or smokeless tobacco still contain nicotine  Talk to your healthcare provider before you use these products  · Manage stress  Stress may slow healing and lead to illness   Learn ways to control stress, such as relaxation, deep breathing, or listening to music  What can I do to manage flares? A flare means something triggered your symptoms  Stress, cold weather, and sunlight are examples of triggers  Your healthcare provider can help you create a management plan that includes what to do if you have a flare  Treat flares quickly to help prevent serious illness  · Apply ice or heat as directed  Ice helps reduce pain and swelling, and may help prevent tissue damage  Use a cold compress, or put crushed ice in a bag  Cover it with a towel and apply to the painful area for 15 to 20 minutes every hour, or as directed  Heat helps reduce pain and muscle spasms  Apply a warm compress to the area for 20 minutes every 2 hours, or as directed  · Elevate the area above the level of your heart  Elevation can help reduce swelling and pain, especially in your joints  Elevate the area as often as possible  · Keep your hands and feet warm  Certain connective tissue disorders can cause your hands and feet to become cold and painful  Over time, ulcers or gangrene (tissue death) may develop if frequent or severe attacks are not prevented  Dress warmly in cold weather, including gloves and thick socks  It may help to wiggle your fingers or toes to improve circulation  Call 911 for any of the following:   · You have trouble breathing, chest pain or pressure, or a fast heartbeat  · You are sweating, and your lips are pale or blue  · You are vomiting blood  · You have a high fever  When should I seek immediate care? · You lose feeling in your hands or feet  · You lose feeling on one side of your body  · You have sudden pain in your eyes and vision problems  When should I contact my healthcare provider? · You have trouble urinating, or you urinate less than usual     · You have trouble having a bowel movement, or you lose control of your bowel movements      · Your muscle or joint tightness worsens, or your fingers begin to curl     · Your symptoms get worse, even after treatment  · Your skin is itchy, swollen, or has a rash  · You have questions or concerns about your condition or care  CARE AGREEMENT:   You have the right to help plan your care  Learn about your health condition and how it may be treated  Discuss treatment options with your healthcare providers to decide what care you want to receive  You always have the right to refuse treatment  The above information is an  only  It is not intended as medical advice for individual conditions or treatments  Talk to your doctor, nurse or pharmacist before following any medical regimen to see if it is safe and effective for you  © Copyright [address] Information is for End User's use only and may not be sold, redistributed or otherwise used for commercial purposes   All illustrations and images included in CareNotes® are the copyrighted property of [employer]  or [address] [place] [address]

## 2021-05-04 ENCOUNTER — OFFICE VISIT (OUTPATIENT)
Dept: FAMILY MEDICINE CLINIC | Facility: CLINIC | Age: 56
End: 2021-05-04
Payer: COMMERCIAL

## 2021-05-04 VITALS
WEIGHT: 127 LBS | DIASTOLIC BLOOD PRESSURE: 84 MMHG | SYSTOLIC BLOOD PRESSURE: 120 MMHG | HEIGHT: 60 IN | BODY MASS INDEX: 24.94 KG/M2 | TEMPERATURE: 97.5 F

## 2021-05-04 DIAGNOSIS — K44.9 HIATAL HERNIA: ICD-10-CM

## 2021-05-04 DIAGNOSIS — R10.13 EPIGASTRIC PAIN: Primary | ICD-10-CM

## 2021-05-04 PROCEDURE — 99213 OFFICE O/P EST LOW 20 MIN: CPT | Performed by: FAMILY MEDICINE

## 2021-05-04 NOTE — PROGRESS NOTES
Assessment/Plan:   we will get a CT scan of her abdomen and pelvis and also refer to general surgery  She will call us if symptoms continue or increase  Follow-up as scheduled or p r n     Problem List Items Addressed This Visit     None      Visit Diagnoses     Epigastric pain    -  Primary    Relevant Orders    CT abdomen pelvis w contrast    Ambulatory referral to General Surgery    Hiatal hernia        Relevant Orders    CT abdomen pelvis w contrast    Ambulatory referral to General Surgery           Diagnoses and all orders for this visit:    Epigastric pain  -     CT abdomen pelvis w contrast; Future  -     Ambulatory referral to General Surgery; Future    Hiatal hernia  -     CT abdomen pelvis w contrast; Future  -     Ambulatory referral to General Surgery; Future        No problem-specific Assessment & Plan notes found for this encounter  Subjective:      Patient ID: Az Richardson is a 54 y o  female  Patient here today stating that she still has epigastric and bilateral upper quadrant discomfort  Patient states on 04/13/2021 was moving a heavy dresser and instantly had epigastric pain, states she felt like she "filled up with air"  Patient saw our PA after that and was given Carafate to try  No relief with it  Patient had an x-ray done which did not show any acute findings  Patient denies any nausea or vomiting, no increased pain after eating  No change in bowels  Abdominal Pain  This is a new problem  The current episode started 1 to 4 weeks ago  The onset quality is sudden  The problem occurs constantly  The problem has been unchanged  The pain is located in the epigastric region  The pain is moderate  The quality of the pain is aching and colicky  The abdominal pain radiates to the LUQ and RUQ  Associated symptoms include belching  Pertinent negatives include no diarrhea, fever, nausea or vomiting  The pain is aggravated by certain positions and movement   The pain is relieved by nothing  She has tried antacids for the symptoms  The treatment provided no relief  The following portions of the patient's history were reviewed and updated as appropriate:   She has a past medical history of Acquired ichthyosis, Anxiety, Cervical radiculopathy, Colorectal polyps, COPD (chronic obstructive pulmonary disease) ([facility] [address] ), Depression, Diverticulosis of colon, Foot pain, GERD (gastroesophageal reflux disease), Hyperlipemia, Hypertension, Myocardial infarction ([facility] [address] ), Osteopenia, Palpitations, PVD (peripheral vascular disease) ([facility] [address] ), Scapular dyskinesis, Shortness of breath, Tendonitis of left rotator cuff, and Vocal cord polyps  ,  does not have any pertinent problems on file  ,   has a past surgical history that includes Foot surgery (Right, 2015); Hysterectomy; Cholecystectomy;  section; Cardiac surgery; Throat surgery; Elbow surgery (Bilateral); Abdominal surgery; Esophagogastroduodenoscopy; Colonoscopy (10/27/2014); Tooth extraction; Esophagogastroduodenoscopy (N/A, 2016); Dental surgery; Coronary angioplasty with stent (); Trigeminal nerve decompression (Right, 6/15/2018); pr shldr arthroscop,surg,w/rotat cuff repr (Left, 2018); FL injection left shoulder (arthrogram) (2018); pr arthroscopy shoulder surgical biceps tenodesis (Left, 10/15/2018); Epidural block injection (N/A, 2019); Epidural block injection (Bilateral, 2019); FL guided needle plac bx/asp/inj (2019); pr arthrocentesis aspir&/inj major jt/bursa w/o us (Bilateral, 2019); FL guided needle plac bx/asp/inj (2019); Epidural block injection (Left, 10/3/2019); and FL guided needle plac bx/asp/inj (10/3/2019)  ,  family history includes No Known Problems in her father, maternal grandfather, maternal grandmother, mother, paternal grandfather, and paternal grandmother  ,   reports that she has been smoking  She has been smoking about 0 50 packs per day   She has never used smokeless tobacco  She reports that she does not drink alcohol or use drugs  ,  is allergic to ceclor [cefaclor]; cephalexin; codeine; gabapentin; lyrica [pregabalin]; ticlopidine; and penicillins     Current Outpatient Medications   Medication Sig Dispense Refill    albuterol (PROVENTIL HFA,VENTOLIN HFA) 90 mcg/act inhaler inhale 2 puffs by mouth every 6 hours if needed for wheezing  0    aspirin 81 mg chewable tablet Chew 1 tablet daily Stop for the time being for foot surgery       baclofen 10 mg tablet Take 1 tablet (10 mg total) by mouth 3 (three) times a day 90 tablet 6    diclofenac (VOLTAREN) 75 mg EC tablet Take 1 tablet (75 mg total) by mouth 2 (two) times a day 180 tablet 1    Diclofenac Sodium (VOLTAREN) 1 % Apply 2 g topically 4 (four) times a day 2 Tube 2    ergocalciferol (VITAMIN D2) 50,000 units take 1 capsule by mouth every week 12 capsule 1    ezetimibe (ZETIA) 10 mg tablet Take 1 tablet (10 mg total) by mouth daily 90 tablet 3    hydroxychloroquine (PLAQUENIL) 200 mg tablet Take 1 5 tablets (300 mg total) by mouth daily 135 tablet 1    oxyCODONE-acetaminophen (PERCOCET) 5-325 mg per tablet Take 1 tablet by mouth every 12 (twelve) hours       rosuvastatin (CRESTOR) 5 mg tablet Take 1 tablet (5 mg total) by mouth daily 90 tablet 3    tiZANidine (ZANAFLEX) 2 mg tablet Take 1 tablet (2 mg total) by mouth every 8 (eight) hours as needed for muscle spasms 90 tablet 3    amLODIPine (NORVASC) 2 5 mg tablet Take 1 tablet (2 5 mg total) by mouth daily (Patient not taking: Reported on 5/4/2021) 30 tablet 6    nitroglycerin (NITROSTAT) 0 4 mg SL tablet Place 1 tablet (0 4 mg total) under the tongue every 5 (five) minutes as needed for chest pain (Patient not taking: Reported on 5/4/2021) 90 tablet 3    sucralfate (CARAFATE) 1 g tablet Take 1 tablet (1 g total) by mouth 4 (four) times a day (Patient not taking: Reported on 5/4/2021) 30 tablet 0     No current facility-administered medications for this visit  Review of Systems   Constitutional: Negative  Negative for fever  Respiratory: Negative  Cardiovascular: Negative  Gastrointestinal: Positive for abdominal pain  Negative for diarrhea, nausea and vomiting  Genitourinary: Negative  Objective:  Vitals:    05/04/21 1200   BP: 120/84   Temp: 97 5 °F (36 4 °C)   Weight: 57 6 kg (127 lb)   Height: 5' (1 524 m)     Body mass index is 24 8 kg/m²  Physical Exam  Vitals signs reviewed  Constitutional:       General: She is not in acute distress  Appearance: She is well-developed  She is not diaphoretic  HENT:      Head: Normocephalic and atraumatic  Eyes:      Conjunctiva/sclera: Conjunctivae normal    Cardiovascular:      Rate and Rhythm: Normal rate and regular rhythm  Heart sounds: Normal heart sounds  No murmur  No friction rub  No gallop  Pulmonary:      Effort: Pulmonary effort is normal  No respiratory distress  Breath sounds: Normal breath sounds  No wheezing, rhonchi or rales  Abdominal:      General: Bowel sounds are normal       Tenderness: There is abdominal tenderness in the right upper quadrant, epigastric area and left upper quadrant  Neurological:      General: No focal deficit present  Mental Status: She is alert and oriented to person, place, and time  Psychiatric:         Mood and Affect: Mood normal          Behavior: Behavior normal          Thought Content:  Thought content normal          Judgment: Judgment normal

## 2021-05-11 ENCOUNTER — HOSPITAL ENCOUNTER (OUTPATIENT)
Dept: CT IMAGING | Facility: HOSPITAL | Age: 56
Discharge: HOME | End: 2021-05-11
Payer: COMMERCIAL

## 2021-05-11 DIAGNOSIS — K44.9 HIATAL HERNIA: ICD-10-CM

## 2021-05-11 DIAGNOSIS — R10.13 EPIGASTRIC PAIN: ICD-10-CM

## 2021-05-11 PROCEDURE — G1004 CDSM NDSC: HCPCS

## 2021-05-11 PROCEDURE — 74177 CT ABD & PELVIS W/CONTRAST: CPT

## 2021-05-11 RX ADMIN — IOHEXOL 100 ML: 350 INJECTION, SOLUTION INTRAVENOUS at 11:07

## 2021-05-12 ENCOUNTER — TELEPHONE (OUTPATIENT)
Dept: FAMILY MEDICINE CLINIC | Facility: CLINIC | Age: 56
End: 2021-05-12

## 2021-05-12 NOTE — TELEPHONE ENCOUNTER
GOT THE RESULTS OF THE CT SCAN, SOMETHING IS WRONG  HAVING THE PAIN AROUND THE RIB GOING AROUND THE BACK  WHERE TO GO FROM HERE, CAN'T STAY LIKE THIS

## 2021-05-17 ENCOUNTER — TELEPHONE (OUTPATIENT)
Dept: PAIN MEDICINE | Facility: CLINIC | Age: 56
End: 2021-05-17

## 2021-05-17 ENCOUNTER — CONSULT (OUTPATIENT)
Dept: SURGERY | Facility: CLINIC | Age: 56
End: 2021-05-17
Payer: COMMERCIAL

## 2021-05-17 VITALS
WEIGHT: 124 LBS | BODY MASS INDEX: 24.35 KG/M2 | TEMPERATURE: 97.9 F | HEIGHT: 60 IN | HEART RATE: 77 BPM | DIASTOLIC BLOOD PRESSURE: 73 MMHG | SYSTOLIC BLOOD PRESSURE: 108 MMHG

## 2021-05-17 DIAGNOSIS — K44.9 HIATAL HERNIA: ICD-10-CM

## 2021-05-17 DIAGNOSIS — M94.0 COSTOCHONDRITIS, ACUTE: Primary | ICD-10-CM

## 2021-05-17 DIAGNOSIS — R10.13 EPIGASTRIC PAIN: ICD-10-CM

## 2021-05-17 PROCEDURE — 99244 OFF/OP CNSLTJ NEW/EST MOD 40: CPT | Performed by: SURGERY

## 2021-05-17 RX ORDER — PREDNISONE 20 MG/1
20 TABLET ORAL DAILY
Qty: 5 TABLET | Refills: 0 | Status: SHIPPED | OUTPATIENT
Start: 2021-05-17 | End: 2021-05-22

## 2021-05-17 NOTE — TELEPHONE ENCOUNTER
Pt called in to cancel her procedure  Please be advise thank you  Please call patient back @ [phone] tomorrow

## 2021-05-17 NOTE — ASSESSMENT & PLAN NOTE
Ongoing for some time now, although worsened after lifting dresser last month  Associated with right upper quadrant pain, right-sided rib and back pain as well as bloating and nausea  Uncertain etiology  Does have small hiatal hernia though degree of symptoms does not seem to correlate with the hernia  Possibly related to costochondritis given right chest wall tenderness  SMA syndrome also a possibility given weight loss, epigastric bloating, though no notation of hyperangulation on CT  Will trial short course of prednisone for rib pain  Obtain mesenteric duplex to rule out SMA syndrome or mesenteric ischemia

## 2021-05-17 NOTE — PROGRESS NOTES
Assessment/Plan:    Epigastric pain  Ongoing for some time now, although worsened after lifting dresser last month  Associated with right upper quadrant pain, right-sided rib and back pain as well as bloating and nausea  Uncertain etiology  Does have small hiatal hernia though degree of symptoms does not seem to correlate with the hernia  Possibly related to costochondritis given right chest wall tenderness  SMA syndrome also a possibility given weight loss, epigastric bloating, though no notation of hyperangulation on CT  Will trial short course of prednisone for rib pain  Obtain mesenteric duplex to rule out SMA syndrome or mesenteric ischemia  Diagnoses and all orders for this visit:    Costochondritis, acute  -     predniSONE 20 mg tablet; Take 1 tablet (20 mg total) by mouth daily for 5 days    Epigastric pain  -     Ambulatory referral to General Surgery  -     VAS celiac and/or mesenteric duplex; complete study; Future    Hiatal hernia  -     Ambulatory referral to General Surgery          Subjective:      Patient ID: Pinky Mendoza is a 54 y o  female  54year old F with acute on chronic abdominal pain  She describes sharp epigastric/right upper quadrant abdominal pain  This has been present for some time, worse after lifting a heavy dresser last month  It does radiate to the back and is associated with some nausea and bloating  She describes decreased appetite due to this  Last week did have multiple episodes of diarrhea one day which was self-limited  She denies any fevers, chills  Has had EGD and colonoscopy last year which were relatively unremarkable  Recent CT last week also relatively unremarkable aside from small hiatal hernia and small umbilical hernia  Does have history of cholecystectomy years ago        The following portions of the patient's history were reviewed and updated as appropriate: allergies, current medications, past family history, past medical history, past social history, past surgical history and problem list     Review of Systems   Constitutional: Positive for appetite change  Negative for chills and fever  HENT: Negative  Eyes: Negative  Respiratory: Negative  Cardiovascular: Negative  Gastrointestinal: Positive for abdominal distention, abdominal pain and nausea  Endocrine: Negative  Genitourinary: Negative  Musculoskeletal: Positive for back pain  Skin: Negative  Allergic/Immunologic: Negative  Neurological: Negative  Hematological: Negative  Psychiatric/Behavioral: Negative  Objective:      /73   Pulse 77   Temp 97 9 °F (36 6 °C)   Ht 5' (1 524 m)   Wt 56 2 kg (124 lb)   BMI 24 22 kg/m²          Physical Exam  Constitutional:       General: She is not in acute distress  HENT:      Head: Normocephalic  Mouth/Throat:      Mouth: Mucous membranes are moist    Eyes:      Pupils: Pupils are equal, round, and reactive to light  Neck:      Musculoskeletal: Normal range of motion  Cardiovascular:      Rate and Rhythm: Normal rate  Pulmonary:      Effort: Pulmonary effort is normal  No respiratory distress  Abdominal:      Comments: Soft, non-distended, tiny umbilical hernia  Tender in epigastrium without rebound or guarding   Musculoskeletal: Normal range of motion  Skin:     General: Skin is warm and dry  Capillary Refill: Capillary refill takes less than 2 seconds  Neurological:      General: No focal deficit present  Mental Status: She is alert and oriented to person, place, and time     Psychiatric:         Mood and Affect: Mood normal

## 2021-05-20 ENCOUNTER — HOSPITAL ENCOUNTER (OUTPATIENT)
Dept: NON INVASIVE DIAGNOSTICS | Facility: HOSPITAL | Age: 56
Discharge: HOME | End: 2021-05-20
Payer: COMMERCIAL

## 2021-05-20 DIAGNOSIS — R10.13 EPIGASTRIC PAIN: ICD-10-CM

## 2021-05-20 PROCEDURE — 93975 VASCULAR STUDY: CPT

## 2021-05-20 PROCEDURE — 93975 VASCULAR STUDY: CPT | Performed by: SURGERY

## 2021-05-21 ENCOUNTER — PATIENT MESSAGE (OUTPATIENT)
Dept: SURGERY | Facility: CLINIC | Age: 56
End: 2021-05-21

## 2021-05-23 ENCOUNTER — TELEPHONE (OUTPATIENT)
Dept: FAMILY MEDICINE CLINIC | Facility: CLINIC | Age: 56
End: 2021-05-23

## 2021-05-23 DIAGNOSIS — K83.8 DILATED BILE DUCT: Primary | ICD-10-CM

## 2021-05-23 DIAGNOSIS — R10.13 EPIGASTRIC PAIN: ICD-10-CM

## 2021-05-23 NOTE — TELEPHONE ENCOUNTER
----- Message from Rose Mary Oakes sent at 5/21/2021  3:01 PM EDT -----  Regarding: FW: Visit Follow-Up Question  Contact: [phone]    ----- Message -----  From: Harini Melendrez  Sent: 5/21/2021   3:00 PM EDT  To: [facility]  Subject: Visit Follow-Up Question                         Hi, I have gone to see surgeon, he did an ultrasound but only of arteries and veins, perhaps i need an ultrasound of bile duct from where my gallbladder was  as i do have swelling in that area on right side where liver etc is located, i know that a catscan don't always show stones trapped in bile duct, the pain under right ribs and through to back is severe and debilitating,i can't function, im also still filling up with air in upper abdomen and under ribs i am afraid to eat as that makes everything worse, perhaps i somehow released air from windpipe or lungs and its trapped? i know its rare but it does happen and im in so much pain i am looking at all possibilities because i dont get any relief  and have been suffering for over a month, please order any tests that can possibly identify the problem because i cannot function like this

## 2021-05-24 ENCOUNTER — TELEPHONE (OUTPATIENT)
Dept: SURGERY | Facility: CLINIC | Age: 56
End: 2021-05-24

## 2021-05-24 NOTE — TELEPHONE ENCOUNTER
Patient reached out to our office about the continued upper abdominal pain/right upper quadrant pain inability to tolerate p o  Instead of returning the message via epic email I opted to phone the patient directly  An extensive discussion was had with the patient regarding her current condition  She still has significant upper abdominal pain and more so on the right side as well  States she feels like it stabbing through the right upper quadrant and into her back  She also says she continues to feel like  She feels up with air in the upper abdomen and there is a bulge  She also states that eating makes this worse  Explained her that I had a discussion with the radiologist about her recent CT scan and her recent duplexwhich was completed  The review of the CT scan there is possibility her symptoms could be from SMA syndrome  This does make sense given the of nearly 40 lb weight loss the patient has experience recently  However,  this does not mesh well with the story of the symptoms starting after she tried to move piece of furniture  No fevers or chills  There is some nausea occasional vomiting  At this point even though she has had recent upper and lower endoscopies back in June of last year I think it is pertinent for her follow-up with GI once again  She does have a history of her gallbladder being removed but this was back in the 1990s and therefore unlikely this pain is secondary to sphincter of Oddi dysfunction  There is potential for doing an upper GI study to see if there is any delay in passage of contrast through the duodenum which would be consistent with SMA syndrome  I did reach out to Surgical Oncology   Who stated that this is anextremely tough problem to deal with both conservatively and surgically  I will reach out to one of my senior partners to see if what his thoughts are and if he would consider seen her and a 2nd opinion    Furthermore I explained the patient she may require further workup and testing at a actinic center in [place]     At the endof the discussion all questions were answered and patient seemed satisfied and agreeable to the plan moving forward

## 2021-05-25 ENCOUNTER — TELEPHONE (OUTPATIENT)
Dept: GASTROENTEROLOGY | Facility: CLINIC | Age: 56
End: 2021-05-25

## 2021-05-25 DIAGNOSIS — M94.0 COSTOCHONDRITIS, ACUTE: ICD-10-CM

## 2021-05-25 DIAGNOSIS — K44.9 HIATAL HERNIA: ICD-10-CM

## 2021-05-25 DIAGNOSIS — R10.13 EPIGASTRIC PAIN: Primary | ICD-10-CM

## 2021-05-25 NOTE — TELEPHONE ENCOUNTER
From: Razia Lima  To:  Daya Rivera DO  Sent: 5/21/2021 1:56 PM EDT  Subject: Test Results Question    Hi, i have gone for the ultrasound,and taking the steroid with no change, i am still in severe pain under right rib cage through to back and also still filling with air in upper abdomen and under ribs, i am afraid to eat as this makes everything worse so i have been eating minimal jello and a pack of instant oatmeal here and there, please tell me where to go from here as this pain is debilitating all i do is lay around, i cant function to do anything due to the pain

## 2021-05-25 NOTE — TELEPHONE ENCOUNTER
----- Message from Verne Spatz sent at 5/25/2021  7:51 AM EDT -----  Regarding: FW: Test Results Question  Contact: [phone]  Please see Dr Oralia Hatchet Recommendation for GI appt   Referral placed    ----- Message -----  From: Kenyon Torres DO  Sent: 5/24/2021   5:38 PM EDT  To: Verne Spatz  Subject: FW: Test Results Question                         However at the time he that I did speak at length with the patient about her condition and told her that she we will help her set a GI appointment   ----- Message -----  From: Verne Spatz  Sent: 5/23/2021  10:03 PM EDT  To: Kenyon Torres DO  Subject: FW: Test Results Question                        Please advise   ----- Message -----  From: Jesenia Mcclelland  Sent: 5/21/2021   4:07 PM EDT  To: [facility]  Subject: FW: Test Results Question                          ----- Message -----  From: Walter Calderon  Sent: 5/21/2021   1:56 PM EDT  To: [facility]  Subject: Test Results Question                            Hi, i have gone for the ultrasound,and taking the steroid with no change, i am still in severe pain under right rib cage through to back and also still filling with air in upper abdomen and under ribs, i am afraid to eat as this makes everything worse so i have been eating minimal jello and a pack of instant oatmeal here and there, please tell me where to go from here as this pain is debilitating all i do is lay around, i cant function to do anything due to the pain

## 2021-05-26 ENCOUNTER — OFFICE VISIT (OUTPATIENT)
Dept: GASTROENTEROLOGY | Facility: CLINIC | Age: 56
End: 2021-05-26
Payer: COMMERCIAL

## 2021-05-26 VITALS
SYSTOLIC BLOOD PRESSURE: 102 MMHG | DIASTOLIC BLOOD PRESSURE: 67 MMHG | HEIGHT: 60 IN | BODY MASS INDEX: 24.23 KG/M2 | WEIGHT: 123.4 LBS | TEMPERATURE: 97.9 F | HEART RATE: 96 BPM

## 2021-05-26 DIAGNOSIS — K44.9 HIATAL HERNIA: ICD-10-CM

## 2021-05-26 DIAGNOSIS — K59.00 CONSTIPATION, UNSPECIFIED CONSTIPATION TYPE: ICD-10-CM

## 2021-05-26 DIAGNOSIS — M94.0 COSTOCHONDRITIS, ACUTE: ICD-10-CM

## 2021-05-26 DIAGNOSIS — R10.13 EPIGASTRIC PAIN: Primary | ICD-10-CM

## 2021-05-26 PROCEDURE — 3008F BODY MASS INDEX DOCD: CPT | Performed by: INTERNAL MEDICINE

## 2021-05-26 PROCEDURE — 99214 OFFICE O/P EST MOD 30 MIN: CPT | Performed by: INTERNAL MEDICINE

## 2021-05-26 PROCEDURE — 4004F PT TOBACCO SCREEN RCVD TLK: CPT | Performed by: INTERNAL MEDICINE

## 2021-05-26 RX ORDER — INDOMETHACIN 50 MG/1
CAPSULE ORAL
COMMUNITY
Start: 2021-03-04 | End: 2022-03-28 | Stop reason: ALTCHOICE

## 2021-05-26 RX ORDER — POLYETHYLENE GLYCOL 3350 17 G/17G
17 POWDER, FOR SOLUTION ORAL DAILY
Qty: 90 EACH | Refills: 0 | Status: SHIPPED | OUTPATIENT
Start: 2021-05-26 | End: 2022-03-28 | Stop reason: ALTCHOICE

## 2021-05-26 RX ORDER — LIDOCAINE 50 MG/G
1 PATCH TOPICAL DAILY
Qty: 30 PATCH | Refills: 0 | Status: SHIPPED | OUTPATIENT
Start: 2021-05-26 | End: 2022-03-28 | Stop reason: ALTCHOICE

## 2021-05-26 RX ORDER — OMEPRAZOLE 40 MG/1
40 CAPSULE, DELAYED RELEASE ORAL DAILY
Qty: 90 CAPSULE | Refills: 0 | Status: SHIPPED | OUTPATIENT
Start: 2021-05-26 | End: 2021-08-23

## 2021-05-26 NOTE — PATIENT INSTRUCTIONS
3 month follow up appointment with Bess Stanley PA-C on 9/1/2021 @ 12:30 pm  Patient  expressed understanding

## 2021-05-26 NOTE — PROGRESS NOTES
[facility] - Outpatient Note  Harini Melendrez 54 y o  female MRN: [id number]  Encounter: [id number]      ASSESSMENT AND PLAN:    Harini Melendrez is a 54 y o  old female with PMH of GERD, IBS D, hiatal hernia, chronic NSAIDs who presents for abdominal pain    Abdominal pain - suspect musculoskeletal in nature given relationship to heavy lifting and the fact that pain is worse with breathing and movement  However, she is at risk for gastritis/PUD with her chronic NSAID use and she has also had constipation that is noted on CT scan so will treat all the confounding variables  She does have weight loss but she had recent EGD/colonoscopy last year which was relatively unremarkable  · Omeprazole 40 mg daily  · Counseled on NSAID cessation  · Lidoderm Patch  · F/u U/S  · Start daily MiraLax for constipation  · Consider gastric emptying study in the future given chronic narcotic use  · Patient is already following pain management  Can consider nerve injection for possible acute cutaneous nerve entrapment syndrome  There are no diagnoses linked to this encounter   ______________________________________________________________________    SUBJECTIVE: Harini Melendrez is a 54 y o  old female with PMH of GERD, IBS D, hiatal hernia, chronic NSAIDs who presents for abdominal pain  Patient states 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 in her epigastric region which she filled up with air  This pain is constant, sharp in quality with no radiation worsened with movement breathing and all types of foods  She also has is chronic intermittent right upper quadrant pain that radiates around to the back  Associated with nausea  No vomiting  Does report early satiety or bloating  No significant worsening of GERD symptoms  Does have 40 lb weight loss in the past year and 10 lb weight loss in the past 7 weeks self reports  No dysphagia aphasia    Does smoke cigarettes but no significant alcohol use  REVIEW OF SYSTEMS IS OTHERWISE NEGATIVE  Historical Information   Past Medical History:   Diagnosis Date    Acquired ichthyosis     last assessed: 2013    Anxiety     Cervical radiculopathy     last assessed: 2014    Colorectal polyps     last assessed: 3/9/2015    COPD (chronic obstructive pulmonary disease) (HCC)     Depression     Diverticulosis of colon     Foot pain     GERD (gastroesophageal reflux disease)     Hyperlipemia     Hypertension     Myocardial infarction ([facility] )     at age 28    Osteopenia     last assessed: 2013    Palpitations     last assessed: 2014    PVD (peripheral vascular disease) ([facility] )     last assessed: 12/3/2012    Scapular dyskinesis     last assessed: 2014    Shortness of breath     Tendonitis of left rotator cuff     last assessed: 2014    Vocal cord polyps     last assessed: 2014     Past Surgical History:   Procedure Laterality Date    ABDOMINAL SURGERY      exploratory    CARDIAC SURGERY      cardiac stent      SECTION      last assessed: 2014    CHOLECYSTECTOMY      last assessed: 2014    COLONOSCOPY  10/27/2014    CORONARY ANGIOPLASTY WITH STENT PLACEMENT      LAD stent    DENTAL SURGERY      last assessed: 2014    ELBOW SURGERY Bilateral     arthroplasty    EPIDURAL BLOCK INJECTION N/A 2019    Procedure: C7 T1 Cervical Epidural Steroid Injection (93964);   Surgeon: Claudean Gambles, MD;  [facility]: [facility];  Service: Pain Management     EPIDURAL BLOCK INJECTION Bilateral 2019    Procedure: BLOCK / INJECTION EPIDURAL STEROID CERVICAL - C7-T1;  Surgeon: Claudean Gambles, MD;  [facility];  Service: Pain Management     EPIDURAL BLOCK INJECTION Left 10/3/2019    Procedure: C7-T1 interlaminar epidural steroid injection;  Surgeon: Claudean Gambles, MD;  [facility];  Service: Pain Management     ESOPHAGOGASTRODUODENOSCOPY  ESOPHAGOGASTRODUODENOSCOPY N/A 11/4/2016    Procedure: ESOPHAGOGASTRODUODENOSCOPY (EGD);   Surgeon: Alicia Casillas MD;  [facility];  Service:     FL GUIDED NEEDLE PLAC BX/ASP/INJ  5/2/2019    FL GUIDED NEEDLE PLAC BX/ASP/INJ  7/31/2019    FL GUIDED NEEDLE PLAC BX/ASP/INJ  10/3/2019    FL INJECTION LEFT SHOULDER (ARTHROGRAM)  9/25/2018    FOOT SURGERY Right 02/06/2015    x 4    HYSTERECTOMY      last assessed: 8/8/2014    NE ARTHROCENTESIS ASPIR&/INJ MAJOR JT/BURSA W/O US Bilateral 7/31/2019    Procedure: GREATER TROCHANTERIC HIP INJECTION;  Surgeon: Kashmir Fuentes MD;  [facility];  Service: Pain Management     NE ARTHROSCOPY SHOULDER SURGICAL BICEPS TENODESIS Left 10/15/2018    Procedure: ARTHROSCOPY SHOULDER; Debridement of shoulder;  Surgeon: Pipe Minor MD;  [facility];  Service: Orthopedics    NE SHLDR ARTHROSCOP,SURG,W/ROTAT CUFF REPR Left 7/23/2018    Procedure: ARTHROSCOPY SHOULDER, subacromial decompression, synovectomy;  Surgeon: Pipe Minor MD;  [facility];  Service: Orthopedics    THROAT SURGERY      TOOTH EXTRACTION      TRIGEMINAL NERVE DECOMPRESSION Right 6/15/2018    Procedure: FOOT NEUROPLASTY;  Surgeon: Kathleen Jane DPM;  [facility];  Service: Podiatry     Social History   Social History     Substance and Sexual Activity   Alcohol Use Never    Frequency: Never    Comment: rare     Social History     Substance and Sexual Activity   Drug Use Never     Social History     Tobacco Use   Smoking Status Current Every Day Smoker    Packs/day: 0 50   Smokeless Tobacco Never Used   Tobacco Comment    She is trying to cut back on her own     Family History   Problem Relation Age of Onset    No Known Problems Mother     No Known Problems Father     No Known Problems Maternal Grandmother     No Known Problems Maternal Grandfather     No Known Problems Paternal Grandmother     No Known Problems Paternal Grandfather Meds/Allergies       Current Outpatient Medications:     albuterol (PROVENTIL HFA,VENTOLIN HFA) 90 mcg/act inhaler    aspirin 81 mg chewable tablet    baclofen 10 mg tablet    diclofenac (VOLTAREN) 75 mg EC tablet    Diclofenac Sodium (VOLTAREN) 1 %    ergocalciferol (VITAMIN D2) 50,000 units    indomethacin (INDOCIN) 50 mg capsule    nitroglycerin (NITROSTAT) 0 4 mg SL tablet    oxyCODONE-acetaminophen (PERCOCET) 5-325 mg per tablet    tiZANidine (ZANAFLEX) 2 mg tablet    amLODIPine (NORVASC) 2 5 mg tablet    ezetimibe (ZETIA) 10 mg tablet    hydroxychloroquine (PLAQUENIL) 200 mg tablet    rosuvastatin (CRESTOR) 5 mg tablet    Allergies   Allergen Reactions    Ceclor [Cefaclor] Anaphylaxis    Cephalexin     Codeine Itching     Reaction Date: 09Jun2011;     Gabapentin      Body tremors, sweats    Lyrica [Pregabalin]      Body tremors, chills, heaviness in chest    Ticlopidine Hives     Other reaction(s): Hives    Penicillins Rash     Other reaction(s): Other           Objective     Blood pressure 102/67, pulse 96, temperature 97 9 °F (36 6 °C), temperature source Tympanic, height 5' (1 524 m), weight 56 kg (123 lb 6 4 oz)  Body mass index is 24 1 kg/m²  PHYSICAL EXAM:      General Appearance:   Alert, cooperative, no distress   HEENT:   Normocephalic, atraumatic, anicteric  Neck:  Supple, symmetrical, trachea midline   Lungs:   Clear to auscultation bilaterally; no rales, rhonchi or wheezing; respirations unlabored    Heart[de-identified]   Regular rate and rhythm; no murmur, rub, or gallop  Abdomen:   Soft, non-tender, non-distended; normal bowel sounds; no masses, no organomegaly    Genitalia:   Deferred    Rectal:   Deferred    Extremities:  No cyanosis, clubbing or edema    Pulses:  2+ and symmetric    Skin:  No jaundice, rashes, or lesions    Lymph nodes:  No palpable cervical lymphadenopathy        Lab Results:   No visits with results within 1 Day(s) from this visit     Latest known visit with results is:   Office Visit on 04/23/2021   Component Date Value    WBC 04/23/2021 7 65     RBC 04/23/2021 4 17     Hemoglobin 04/23/2021 13 8     Hematocrit 04/23/2021 41 3     MCV 04/23/2021 99*    MCH 04/23/2021 33 1     MCHC 04/23/2021 33 4     RDW 04/23/2021 12 3     MPV 04/23/2021 11 0     Platelets 60/46/2189 221     nRBC 04/23/2021 0     Neutrophils Relative 04/23/2021 55     Immat GRANS % 04/23/2021 0     Lymphocytes Relative 04/23/2021 35     Monocytes Relative 04/23/2021 7     Eosinophils Relative 04/23/2021 2     Basophils Relative 04/23/2021 1     Neutrophils Absolute 04/23/2021 4 20     Immature Grans Absolute 04/23/2021 0 01     Lymphocytes Absolute 04/23/2021 2 70     Monocytes Absolute 04/23/2021 0 53     Eosinophils Absolute 04/23/2021 0 16     Basophils Absolute 04/23/2021 0 05     Sodium 04/23/2021 139     Potassium 04/23/2021 3 5     Chloride 04/23/2021 103     CO2 04/23/2021 30     ANION GAP 04/23/2021 6     BUN 04/23/2021 11     Creatinine 04/23/2021 0 71     Glucose 04/23/2021 85     Calcium 04/23/2021 10 0     AST 04/23/2021 27     ALT 04/23/2021 32     Alkaline Phosphatase 04/23/2021 60     Total Protein 04/23/2021 7 8     Albumin 04/23/2021 4 2     Total Bilirubin 04/23/2021 0 47     eGFR 04/23/2021 96     Uric Acid 04/23/2021 4 6          Radiology Results:   Vas Celiac And/or Mesenteric Duplex; Complete Study    Result Date: 5/20/2021  Narrative:  [facility] REPORT CLINICAL: Indications: Patient presents with post prandial abdominal pain for the past month with fear of eating  Patient reports a 10 Lb  weight loss in the past month  Patient states she has constant epigastric pain  Operative History: Coronary Stent Right metatarsal surgeries Risk Factors The patient has history of HTN, HLD, PAD, CAD and smoking (current) 1-2 ppd    FINDINGS:  Unilateral       Impression  PSV  EDV  AP Diam  Sup-Lanette Ao                    54   12 SMA Ostial       >70%        287   86           Celiac                       197   55           Prox  SMA                    231   24           Jux Renal Aorta               35    0      1 6  Mid  SMA                     145   29           Px Inf-Irvin Ao                 53                Ds Inf-Irvin Ao                 46    9           MELI                          120   16            Segment     Rig       Left                    PSV  EDV  PSV  EDV  Prox Renal  105   37  125   42     CONCLUSION: Impression The aorta is patent and normal in caliber  The celiac, splenic and hepatic arteries are patent  There is >70% of the superior mesenteric artery; the inferior mesenteric artery is normal and patent in a fasting state  SIGNATURE: Electronically Signed by: Kayy Dobbins MD, RPVI on 2021-05-20 06:49:02 PM    Ct Abdomen Pelvis W Contrast    Result Date: 5/12/2021  Narrative: CT ABDOMEN AND PELVIS WITH IV CONTRAST INDICATION:   R10 13: Epigastric pain K44 9: Diaphragmatic hernia without obstruction or gangrene  COMPARISON:  CT abdomen and pelvis 8/12/2020 TECHNIQUE:  CT examination of the abdomen and pelvis was performed  Axial, sagittal, and coronal 2D reformatted images were created from the source data and submitted for interpretation  Radiation dose length product (DLP) for this visit:  364 9 mGy-cm   This examination, like all CT scans performed in the [facility], was performed utilizing techniques to minimize radiation dose exposure, including the use of iterative reconstruction and automated exposure control  IV Contrast:  100 mL of iohexol (OMNIPAQUE)  350 Enteric Contrast:  Enteric contrast was administered  FINDINGS: ABDOMEN LOWER CHEST:  No clinically significant abnormality identified in the visualized lower chest  LIVER/BILIARY TREE:  There is mild intra and extrahepatic bile duct distention within normal limits for patient's age and postcholecystectomy reservoir distention    No significant interval change  Liver otherwise unremarkable  GALLBLADDER:  Post cholecystectomy  SPLEEN:  Stable punctate splenic calcified granuloma  Otherwise unremarkable  PANCREAS:  Unremarkable  ADRENAL GLANDS:  Unremarkable  KIDNEYS/URETERS: One or more sharply circumscribed subcentimeter renal hypodensities are noted  These lesions are too small to accurately characterize, but are statistically most likely to represent benign cortical renal cyst(s)  According to the guidelines published in the [facility] Paper of the ACR Incidental Findings Committee (Radiology 2010), no further workup of these lesions is recommended    Kidneys otherwise unremarkable  No perinephric collection  STOMACH AND BOWEL:  Colonic diverticulosis without immediate adjacent fat stranding  No bowel obstruction  APPENDIX:  A normal appendix was visualized  ABDOMINOPELVIC CAVITY:  No ascites  No pneumoperitoneum  No lymphadenopathy  VESSELS:  Aortoiliac calcification  No aneurysm  PELVIS REPRODUCTIVE ORGANS:  Post hysterectomy  URINARY BLADDER:  Unremarkable for degree of distention  ABDOMINAL WALL/INGUINAL REGIONS:  Stable tiny fat-containing umbilical hernia  Stable ventral midline suprapubic focal abdominal wall scar image 61 series 2  OSSEOUS STRUCTURES:  No acute fracture or osseous destructive lesion identified  Mild degenerative changes of the spine  Small Schmorl's nodes in the lower thoracic spine and superior endplate of L4  Impression: No evidence of acute abdominopelvic process  No significant interval change   Workstation performed: [id number]

## 2021-06-03 NOTE — PROGRESS NOTES
PT Discharge  Today's date: 6/3/2021  Patient name: Maria Teresa Weaver  : 1965  MRN: [id number]  Referring provider: Homar Jameson PA-C  Dx:   Encounter Diagnosis     ICD-10-CM    1  S/P arthroscopy of left shoulder  [id number]      Assessment/Plan    Subjective    Objective     6/3/2021  Maria Teresa Call has not returned for more treatment  Maria Teresa Call did not attend today's appointment  I cannot provide you with a current progress report but I can provide you with information based on previous performance  Maria Teresa Call is discharged at this time

## 2021-06-10 ENCOUNTER — TELEPHONE (OUTPATIENT)
Dept: FAMILY MEDICINE CLINIC | Facility: CLINIC | Age: 56
End: 2021-06-10

## 2021-06-11 ENCOUNTER — HOSPITAL ENCOUNTER (OUTPATIENT)
Dept: ULTRASOUND IMAGING | Facility: HOSPITAL | Age: 56
Discharge: HOME | End: 2021-06-11
Payer: COMMERCIAL

## 2021-06-11 DIAGNOSIS — R10.13 EPIGASTRIC PAIN: ICD-10-CM

## 2021-06-11 DIAGNOSIS — K83.8 DILATED BILE DUCT: ICD-10-CM

## 2021-06-11 PROCEDURE — 76705 ECHO EXAM OF ABDOMEN: CPT

## 2021-07-15 ENCOUNTER — OFFICE VISIT (OUTPATIENT)
Dept: CARDIOLOGY CLINIC | Facility: HOSPITAL | Age: 56
End: 2021-07-15
Payer: COMMERCIAL

## 2021-07-15 VITALS
HEART RATE: 78 BPM | WEIGHT: 118.4 LBS | BODY MASS INDEX: 23.25 KG/M2 | HEIGHT: 60 IN | OXYGEN SATURATION: 97 % | DIASTOLIC BLOOD PRESSURE: 76 MMHG | SYSTOLIC BLOOD PRESSURE: 128 MMHG

## 2021-07-15 DIAGNOSIS — Z72.0 TOBACCO ABUSE: ICD-10-CM

## 2021-07-15 DIAGNOSIS — I73.9 PVD (PERIPHERAL VASCULAR DISEASE) (HCC): Primary | ICD-10-CM

## 2021-07-15 DIAGNOSIS — E78.49 OTHER HYPERLIPIDEMIA: ICD-10-CM

## 2021-07-15 DIAGNOSIS — I25.10 CAD IN NATIVE ARTERY: ICD-10-CM

## 2021-07-15 DIAGNOSIS — I10 ESSENTIAL HYPERTENSION: ICD-10-CM

## 2021-07-15 PROCEDURE — 3008F BODY MASS INDEX DOCD: CPT | Performed by: INTERNAL MEDICINE

## 2021-07-15 PROCEDURE — 4004F PT TOBACCO SCREEN RCVD TLK: CPT | Performed by: INTERNAL MEDICINE

## 2021-07-15 PROCEDURE — 99214 OFFICE O/P EST MOD 30 MIN: CPT | Performed by: INTERNAL MEDICINE

## 2021-07-15 PROCEDURE — 3074F SYST BP LT 130 MM HG: CPT | Performed by: INTERNAL MEDICINE

## 2021-07-15 PROCEDURE — 3078F DIAST BP <80 MM HG: CPT | Performed by: INTERNAL MEDICINE

## 2021-07-15 NOTE — PROGRESS NOTES
Cardiology Follow Up    Suresh Baker  1965  [id number]  [address] [facility] [address]  [phone]    No diagnosis found  There are no diagnoses linked to this encounter  I had the pleasure of seeing Suresh Baker for a follow up visit  Interval History: none    History of the presenting illness, Discussion/Summary and My Plan are as follows: She is a pleasant 19-year-old lady with a history of coronary disease, prior percutaneous coronary intervention to the left anterior descending coronary artery in 1998, hypertension as well as marked dyslipidemia with LDL has high as 170 to 220 and a history of non compliance with meds and follow ups  For atypical CPs, I had her do a Bia Nuc perfusion study that did not demonstrate ischemia - in September 2014  She had also been noncompliant with medications and followups  Continues to smoke      Her risk factors include hypertension, marked dyslipidemia, existing coronary artery disease, RA as well as smoking     She's been modestly active without any symptoms, limited by arthritis and abdominal discomfort - limiting her appetite      Plan:     CAD,  prior percutaneous coronary intervention to the left anterior descending coronary artery in 1998: Currently modestly active without symptoms  Limited by neck arthritis in pain     Dyslipidemia/Familial Hypercholesterolemia: Pravastatin with worsening leg pains  For a short while she was on Repatha (PCSK9 inhibitor)  Then it became expensive apparently  Had started her on Crestor with lowering of LDL to 101, subsequently 145, now also on Zetia, periodically non compliant, went back on them a month ago  Will recheck lipids - ordered at the last visit too    Coronary artery disease, status post PCI to the LAD in 1998, for atypical chest pains  Bia Nuc in Sept 2014 without ischemia   If has recurrence of chest pains, will then check a stress test  At this time she needs to quit smoking  On aspirin and statin, not on a beta-blocker - Metoprolol made her feel cold and caused hair loss     HTN: currently controlled    Chronic hydroxychloroquine therapy: Echo and  ECG were unremarkable      Tobacco use: She is rolling her own cigarettes  , currently still the equivalent of half pack a day  Her tobacco use remains in equally potent risk factor - cessation was strongly advised       Weight loss of 25 lb/24mo, 10 in the last 3mo, EGD and colonoscopy were negative  No cough  Will check TSH, Will defer to her PCP  Poor caloric intake - has SMA stenosis  Follow up in 6 months      Results for Kimberlee Patterson (MRN [id number]) as of 1/21/2020 13:32   Ref   Range 4/15/2016 10:29 no meds 8/30/2016 08:45 9/4/2018 13:59 on rosuvastatin  10/3/2019 12:21 on rosuvastatin with questionable compliance   Cholesterol Latest Ref Range: 50 - 200 mg/dL 314 (H) 280 (H) 177  225 (H)   Triglycerides Latest Ref Range: <=150 mg/dL 153 (H) 137 135  134   HDL Latest Ref Range: 40 - 60 mg/dL 58 54 49  53   LDL Direct Latest Ref Range: 0 - 100 mg/dL 225 (H) 199 (H) 101 (H)  145 (H)            Patient Active Problem List   Diagnosis    Gastro-esophageal reflux disease without esophagitis    Right lower quadrant pain    Abnormal weight loss    Acute pain of left shoulder    Lumbar spondylosis    Osteoarthritis of spine with radiculopathy, cervical region    Degenerative cervical disc    Lumbar degenerative disc disease    Bilateral carpal tunnel syndrome    CAD in native artery    Depression with anxiety    Other hyperlipidemia    Essential hypertension    PVD (peripheral vascular disease) (HCC)    Headache disorder    Rheumatoid factor positive    Positive KRISTA (antinuclear antibody)    Left facial numbness    Myofascial pain syndrome    Calcific tendinitis of left shoulder    Incomplete tear of left rotator cuff    Neuroma of foot    Chronic hepatitis C without hepatic coma (HCC)    Colorectal polyps    Macrocytic    Irritable bowel syndrome with diarrhea    Incomplete rotator cuff tear or rupture of left shoulder, not specified as traumatic    Biceps tendinosis of left shoulder    Arthritis    Cervical radiculopathy    Cervical radiculitis    RUQ pain    Sacroiliitis (HCC)    Chronic pain syndrome    RUQ abdominal pain    Dilated bile duct    Gastroesophageal reflux disease    Radiculopathy, cervical    Trochanteric bursitis of right hip    Trochanteric bursitis of left hip    Lumbar radiculopathy    Chronic bilateral low back pain with left-sided sciatica    Osteoarthritis of cervical spine    Flushing    Tobacco abuse    Neck pain    High risk medication use    Undifferentiated connective tissue disease ([facility] [address] )    Epigastric pain    Costochondritis, acute     Past Medical History:   Diagnosis Date    Acquired ichthyosis     last assessed: 5/9/2013    Anxiety     Cervical radiculopathy     last assessed: 6/13/2014    Colorectal polyps     last assessed: 3/9/2015    COPD (chronic obstructive pulmonary disease) ([facility] )     Depression     Diverticulosis of colon     Foot pain     GERD (gastroesophageal reflux disease)     Hyperlipemia     Hypertension     Myocardial infarction ([facility] )     at age 28    Osteopenia     last assessed: 12/6/2013    Palpitations     last assessed: 12/31/2014    PVD (peripheral vascular disease) ([facility] )     last assessed: 12/3/2012    Scapular dyskinesis     last assessed: 11/17/2014    Shortness of breath     Tendonitis of left rotator cuff     last assessed: 11/17/2014    Vocal cord polyps     last assessed: 8/8/2014     Social History     Socioeconomic History    Marital status:      Spouse name: Not on file    Number of children: Not on file    Years of education: Not on file    Highest education level: Not on file   Occupational History  Not on file   Tobacco Use    Smoking status: Current Every Day Smoker     Packs/day: 0 50    Smokeless tobacco: Never Used    Tobacco comment: She is trying to cut back on her own   Vaping Use    Vaping Use: Never used   Substance and Sexual Activity    Alcohol use: Never     Comment: rare    Drug use: Never    Sexual activity: Not on file   Other Topics Concern    Not on file   Social History Narrative    No living will     Social Determinants of Health     Financial Resource Strain:     Difficulty of Paying Living Expenses:    Food Insecurity:     Worried About Running Out of Food in the Last Year:     Ran Out of Food in the Last Year:    Transportation Needs:     Lack of Transportation (Medical):      Lack of Transportation (Non-Medical):    Physical Activity:     Days of Exercise per Week:     Minutes of Exercise per Session:    Stress:     Feeling of Stress :    Social Connections:     Frequency of Communication with Friends and Family:     Frequency of Social Gatherings with Friends and Family:     Attends [employer] Services:     Active Member of Clubs or Organizations:     Attends Club or Organization Meetings:     Marital Status:    Intimate Partner Violence:     Fear of Current or Ex-Partner:     Emotionally Abused:     Physically Abused:     Sexually Abused:       Family History   Problem Relation Age of Onset    No Known Problems Mother     No Known Problems Father     No Known Problems Maternal Grandmother     No Known Problems Maternal Grandfather     No Known Problems Paternal Grandmother     No Known Problems Paternal Grandfather      Past Surgical History:   Procedure Laterality Date    ABDOMINAL SURGERY      exploratory    CARDIAC SURGERY      cardiac stent      SECTION      last assessed: 2014    CHOLECYSTECTOMY      last assessed: 2014    COLONOSCOPY  10/27/2014    CORONARY ANGIOPLASTY WITH STENT PLACEMENT  1999    LAD stent   [facility]      last assessed: 8/8/2014    ELBOW SURGERY Bilateral     arthroplasty    EPIDURAL BLOCK INJECTION N/A 1/17/2019    Procedure: C7 T1 Cervical Epidural Steroid Injection (53019); Surgeon: Praveen Galeana MD;  [facility]: [facility];  Service: Pain Management     EPIDURAL BLOCK INJECTION Bilateral 5/2/2019    Procedure: BLOCK / INJECTION EPIDURAL STEROID CERVICAL - C7-T1;  Surgeon: Praveen Galeana MD;  [facility];  Service: Pain Management     EPIDURAL BLOCK INJECTION Left 10/3/2019    Procedure: C7-T1 interlaminar epidural steroid injection;  Surgeon: Praveen Galeana MD;  [facility];  Service: Pain Management     ESOPHAGOGASTRODUODENOSCOPY      ESOPHAGOGASTRODUODENOSCOPY N/A 11/4/2016    Procedure: ESOPHAGOGASTRODUODENOSCOPY (EGD);   Surgeon: Dolly Engle MD;  [facility];  Service:     FL GUIDED NEEDLE PLAC BX/ASP/INJ  5/2/2019    FL GUIDED NEEDLE PLAC BX/ASP/INJ  7/31/2019    FL GUIDED NEEDLE PLAC BX/ASP/INJ  10/3/2019    FL INJECTION LEFT SHOULDER (ARTHROGRAM)  9/25/2018    FOOT SURGERY Right 02/06/2015    x 4    HYSTERECTOMY      last assessed: 8/8/2014    NM ARTHROCENTESIS ASPIR&/INJ MAJOR JT/BURSA W/O US Bilateral 7/31/2019    Procedure: GREATER TROCHANTERIC HIP INJECTION;  Surgeon: Praveen Galeana MD;  [facility];  Service: Pain Management     NM ARTHROSCOPY SHOULDER SURGICAL BICEPS TENODESIS Left 10/15/2018    Procedure: ARTHROSCOPY SHOULDER; Debridement of shoulder;  Surgeon: Cathy Griffith MD;  [facility];  Service: Orthopedics    NM SHLDR ARTHROSCOP,SURG,W/ROTAT CUFF REPR Left 7/23/2018    Procedure: ARTHROSCOPY SHOULDER, subacromial decompression, synovectomy;  Surgeon: Cathy Griffith MD;  [facility];  Service: Orthopedics    THROAT SURGERY      TOOTH EXTRACTION      TRIGEMINAL NERVE DECOMPRESSION Right 6/15/2018    Procedure: FOOT NEUROPLASTY;  Surgeon: Brandy Donahue DPM;  [facility]; Service: Podiatry       Current Outpatient Medications:     albuterol (PROVENTIL HFA,VENTOLIN HFA) 90 mcg/act inhaler, inhale 2 puffs by mouth every 6 hours if needed for wheezing, Disp: , Rfl: 0    baclofen 10 mg tablet, Take 1 tablet (10 mg total) by mouth 3 (three) times a day, Disp: 90 tablet, Rfl: 6    diclofenac (VOLTAREN) 75 mg EC tablet, Take 1 tablet (75 mg total) by mouth 2 (two) times a day, Disp: 180 tablet, Rfl: 1    Diclofenac Sodium (VOLTAREN) 1 %, Apply 2 g topically 4 (four) times a day, Disp: 2 Tube, Rfl: 2    ezetimibe (ZETIA) 10 mg tablet, Take 1 tablet (10 mg total) by mouth daily, Disp: 90 tablet, Rfl: 3    hydroxychloroquine (PLAQUENIL) 200 mg tablet, Take 1 5 tablets (300 mg total) by mouth daily, Disp: 135 tablet, Rfl: 1    lidocaine (LIDODERM) 5 %, Apply 1 patch topically daily Remove & Discard patch within 12 hours or as directed by MD, Disp: 30 patch, Rfl: 0    nitroglycerin (NITROSTAT) 0 4 mg SL tablet, Place 1 tablet (0 4 mg total) under the tongue every 5 (five) minutes as needed for chest pain, Disp: 90 tablet, Rfl: 3    polyethylene glycol (MIRALAX) 17 g packet, Take 17 g by mouth daily, Disp: 90 each, Rfl: 0    rosuvastatin (CRESTOR) 5 mg tablet, Take 1 tablet (5 mg total) by mouth daily, Disp: 90 tablet, Rfl: 3    tiZANidine (ZANAFLEX) 2 mg tablet, Take 1 tablet (2 mg total) by mouth every 8 (eight) hours as needed for muscle spasms, Disp: 90 tablet, Rfl: 3    amLODIPine (NORVASC) 2 5 mg tablet, Take 1 tablet (2 5 mg total) by mouth daily (Patient not taking: Reported on 5/4/2021), Disp: 30 tablet, Rfl: 6    aspirin 81 mg chewable tablet, Chew 1 tablet daily Stop for the time being for foot surgery  (Patient not taking: Reported on 7/15/2021), Disp: , Rfl:     ergocalciferol (VITAMIN D2) 50,000 units, take 1 capsule by mouth every week, Disp: 12 capsule, Rfl: 1    indomethacin (INDOCIN) 50 mg capsule, , Disp: , Rfl:     omeprazole (PriLOSEC) 40 MG capsule, Take 1 capsule (40 mg total) by mouth daily, Disp: 90 capsule, Rfl: 0    oxyCODONE-acetaminophen (PERCOCET) 5-325 mg per tablet, Take 1 tablet by mouth every 12 (twelve) hours  (Patient not taking: Reported on 7/15/2021), Disp: , Rfl:   Allergies   Allergen Reactions    Ceclor [Cefaclor] Anaphylaxis    Cephalexin     Codeine Itching     Reaction Date: [id number];     Gabapentin      Body tremors, sweats    Lyrica [Pregabalin]      Body tremors, chills, heaviness in chest    Ticlopidine Hives     Other reaction(s): Hives    Penicillins Rash     Other reaction(s): Other       Labs:not applicable  Imaging: No results found  Review of Systems:  Review of Systems   Constitutional: Negative  HENT: Negative  Eyes: Negative  Respiratory: Negative  Cardiovascular: Negative  Endocrine: Negative  Musculoskeletal: Positive for arthralgias and neck pain  Physical Exam:  /76   Pulse 78   Ht 5' (1 524 m)   Wt 53 7 kg (118 lb 6 4 oz)   SpO2 97%   BMI 23 12 kg/m²   Physical Exam  Constitutional:       General: She is not in acute distress  Appearance: She is well-developed  She is not diaphoretic  HENT:      Head: Normocephalic and atraumatic  Eyes:      General: No scleral icterus  Right eye: No discharge  Left eye: No discharge  Conjunctiva/sclera: Conjunctivae normal       Pupils: Pupils are equal, round, and reactive to light  Neck:      Thyroid: No thyromegaly  Vascular: No JVD  Trachea: No tracheal deviation  Cardiovascular:      Rate and Rhythm: Regular rhythm  Heart sounds: Normal heart sounds  No murmur heard  No friction rub  Pulmonary:      Effort: Pulmonary effort is normal  No respiratory distress  Breath sounds: Normal breath sounds  No stridor  Musculoskeletal:         General: No tenderness or deformity  Normal range of motion  Cervical back: Normal range of motion  Skin:     General: Skin is warm        Coloration: Skin is not pale  Findings: No erythema or rash

## 2021-08-15 DIAGNOSIS — R10.13 EPIGASTRIC PAIN: ICD-10-CM

## 2021-08-23 RX ORDER — OMEPRAZOLE 40 MG/1
CAPSULE, DELAYED RELEASE ORAL
Qty: 90 CAPSULE | Refills: 0 | Status: SHIPPED | OUTPATIENT
Start: 2021-08-23 | End: 2022-03-28 | Stop reason: ALTCHOICE

## 2021-09-08 ENCOUNTER — HOSPITAL ENCOUNTER (OUTPATIENT)
Dept: BONE DENSITY | Facility: HOSPITAL | Age: 56
Discharge: HOME | End: 2021-09-08
Payer: COMMERCIAL

## 2021-09-08 ENCOUNTER — HOSPITAL ENCOUNTER (OUTPATIENT)
Dept: MAMMOGRAPHY | Facility: HOSPITAL | Age: 56
Discharge: HOME | End: 2021-09-08
Payer: COMMERCIAL

## 2021-09-08 DIAGNOSIS — Z12.31 ENCOUNTER FOR SCREENING MAMMOGRAM FOR BREAST CANCER: ICD-10-CM

## 2021-09-08 DIAGNOSIS — Z78.0 POSTMENOPAUSAL STATE: ICD-10-CM

## 2021-09-08 PROBLEM — M81.0 AGE-RELATED OSTEOPOROSIS WITHOUT CURRENT PATHOLOGICAL FRACTURE: Status: ACTIVE | Noted: 2021-09-08

## 2021-09-08 PROCEDURE — 77067 SCR MAMMO BI INCL CAD: CPT

## 2021-09-08 PROCEDURE — 77063 BREAST TOMOSYNTHESIS BI: CPT

## 2021-09-08 PROCEDURE — 77080 DXA BONE DENSITY AXIAL: CPT

## 2021-09-14 ENCOUNTER — HOSPITAL ENCOUNTER (OUTPATIENT)
Dept: NUCLEAR MEDICINE | Facility: HOSPITAL | Age: 56
Discharge: HOME | End: 2021-09-14
Payer: COMMERCIAL

## 2021-09-14 DIAGNOSIS — R10.9 ABDOMINAL PAIN: ICD-10-CM

## 2021-09-14 DIAGNOSIS — R10.13 EPIGASTRIC PAIN: ICD-10-CM

## 2021-09-14 PROCEDURE — 78264 GASTRIC EMPTYING IMG STUDY: CPT

## 2021-09-14 PROCEDURE — G1004 CDSM NDSC: HCPCS

## 2021-09-14 PROCEDURE — A9541 TC99M SULFUR COLLOID: HCPCS

## 2021-09-28 DIAGNOSIS — M79.18 MYOFASCIAL PAIN SYNDROME: ICD-10-CM

## 2021-09-29 RX ORDER — BACLOFEN 10 MG/1
10 TABLET ORAL 3 TIMES DAILY
Qty: 90 TABLET | Refills: 0 | Status: SHIPPED | OUTPATIENT
Start: 2021-09-29 | End: 2021-11-16 | Stop reason: SDUPTHER

## 2021-09-29 RX ORDER — BACLOFEN 10 MG/1
10 TABLET ORAL 3 TIMES DAILY
Qty: 90 TABLET | Refills: 0 | Status: SHIPPED | OUTPATIENT
Start: 2021-09-29 | End: 2021-09-29

## 2021-10-09 DIAGNOSIS — M54.16 LUMBAR RADICULOPATHY: ICD-10-CM

## 2021-10-09 DIAGNOSIS — M54.42 CHRONIC BILATERAL LOW BACK PAIN WITH LEFT-SIDED SCIATICA: ICD-10-CM

## 2021-10-09 DIAGNOSIS — M47.816 LUMBAR SPONDYLOSIS: ICD-10-CM

## 2021-10-09 DIAGNOSIS — G89.29 CHRONIC BILATERAL LOW BACK PAIN WITH LEFT-SIDED SCIATICA: ICD-10-CM

## 2021-10-09 RX ORDER — DICLOFENAC SODIUM 75 MG/1
TABLET, DELAYED RELEASE ORAL
Qty: 180 TABLET | Refills: 1 | Status: SHIPPED | OUTPATIENT
Start: 2021-10-09 | End: 2022-03-28 | Stop reason: ALTCHOICE

## 2021-10-18 ENCOUNTER — TELEPHONE (OUTPATIENT)
Dept: OBGYN CLINIC | Facility: HOSPITAL | Age: 56
End: 2021-10-18

## 2021-10-29 DIAGNOSIS — M05.9 RHEUMATOID ARTHRITIS WITH POSITIVE RHEUMATOID FACTOR, INVOLVING UNSPECIFIED SITE (HCC): ICD-10-CM

## 2021-10-29 RX ORDER — HYDROXYCHLOROQUINE SULFATE 200 MG/1
TABLET, FILM COATED ORAL
Qty: 135 TABLET | Refills: 1 | Status: SHIPPED | OUTPATIENT
Start: 2021-10-29 | End: 2022-01-18 | Stop reason: SDUPTHER

## 2021-11-03 ENCOUNTER — HOSPITAL ENCOUNTER (OUTPATIENT)
Dept: NON INVASIVE DIAGNOSTICS | Facility: HOSPITAL | Age: 56
Discharge: HOME | End: 2021-11-03
Payer: COMMERCIAL

## 2021-11-03 DIAGNOSIS — K55.1 SUPERIOR MESENTERIC ARTERY STENOSIS (HCC): ICD-10-CM

## 2021-11-03 PROCEDURE — 93975 VASCULAR STUDY: CPT

## 2021-11-03 PROCEDURE — 93975 VASCULAR STUDY: CPT | Performed by: SURGERY

## 2021-11-06 DIAGNOSIS — E78.49 OTHER HYPERLIPIDEMIA: ICD-10-CM

## 2021-11-06 DIAGNOSIS — I25.10 CAD IN NATIVE ARTERY: ICD-10-CM

## 2021-11-08 RX ORDER — EZETIMIBE 10 MG/1
TABLET ORAL
Qty: 90 TABLET | Refills: 3 | Status: SHIPPED | OUTPATIENT
Start: 2021-11-08

## 2021-11-16 DIAGNOSIS — M79.18 MYOFASCIAL PAIN SYNDROME: ICD-10-CM

## 2021-11-16 RX ORDER — BACLOFEN 10 MG/1
10 TABLET ORAL 3 TIMES DAILY
Qty: 90 TABLET | Refills: 6 | Status: SHIPPED | OUTPATIENT
Start: 2021-11-16

## 2021-11-25 DIAGNOSIS — E78.5 HYPERLIPIDEMIA, UNSPECIFIED HYPERLIPIDEMIA TYPE: ICD-10-CM

## 2021-11-26 RX ORDER — ROSUVASTATIN CALCIUM 5 MG/1
TABLET, COATED ORAL
Qty: 90 TABLET | Refills: 3 | Status: SHIPPED | OUTPATIENT
Start: 2021-11-26

## 2021-12-22 DIAGNOSIS — E55.9 VITAMIN D DEFICIENCY: ICD-10-CM

## 2021-12-27 RX ORDER — ERGOCALCIFEROL 1.25 MG/1
CAPSULE ORAL
Qty: 12 CAPSULE | Refills: 0 | Status: SHIPPED | OUTPATIENT
Start: 2021-12-27 | End: 2022-03-24

## 2022-01-18 ENCOUNTER — TELEMEDICINE (OUTPATIENT)
Dept: RHEUMATOLOGY | Facility: CLINIC | Age: 57
End: 2022-01-18
Payer: COMMERCIAL

## 2022-01-18 DIAGNOSIS — Z79.899 HIGH RISK MEDICATION USE: ICD-10-CM

## 2022-01-18 DIAGNOSIS — M81.0 OSTEOPOROSIS, UNSPECIFIED OSTEOPOROSIS TYPE, UNSPECIFIED PATHOLOGICAL FRACTURE PRESENCE: ICD-10-CM

## 2022-01-18 DIAGNOSIS — M35.9 UNDIFFERENTIATED CONNECTIVE TISSUE DISEASE (HCC): Primary | ICD-10-CM

## 2022-01-18 DIAGNOSIS — R76.8 POSITIVE ANA (ANTINUCLEAR ANTIBODY): ICD-10-CM

## 2022-01-18 DIAGNOSIS — I73.00 RAYNAUD'S DISEASE WITHOUT GANGRENE: ICD-10-CM

## 2022-01-18 DIAGNOSIS — M05.9 RHEUMATOID ARTHRITIS WITH POSITIVE RHEUMATOID FACTOR, INVOLVING UNSPECIFIED SITE (HCC): ICD-10-CM

## 2022-01-18 DIAGNOSIS — R76.8 RHEUMATOID FACTOR POSITIVE: ICD-10-CM

## 2022-01-18 PROCEDURE — 99214 OFFICE O/P EST MOD 30 MIN: CPT | Performed by: INTERNAL MEDICINE

## 2022-01-18 RX ORDER — AMLODIPINE BESYLATE 5 MG/1
5 TABLET ORAL DAILY
Qty: 30 TABLET | Refills: 6 | Status: SHIPPED | OUTPATIENT
Start: 2022-01-18 | End: 2022-03-28 | Stop reason: ALTCHOICE

## 2022-01-18 RX ORDER — HYDROXYCHLOROQUINE SULFATE 200 MG/1
200 TABLET, FILM COATED ORAL DAILY
Qty: 90 TABLET | Refills: 1 | Status: SHIPPED | OUTPATIENT
Start: 2022-01-18 | End: 2022-07-07 | Stop reason: SDUPTHER

## 2022-01-18 NOTE — PATIENT INSTRUCTIONS
Do labs  Decrease hydroxychloroquine to 1 tab daily  Start alendronate once a week, empty stomach, full glass of water, and do not lie down for 30 minutes after  Increase amlodipine to 5mg daily  Continue weekly Vit  D    Return to clinic on 7/18/22 at 2:30pm    Connective Tissue Disorders   AMBULATORY CARE:   A connective tissue disorder  can affect any connective tissue in your body  Connective tissues support your organs, attach muscles to bones, and create scar tissue after an injury  Cartilage is an example of connective tissue  There are many types of connective tissue disorders, such as rheumatoid arthritis, lupus, and scleroderma  The most common affected areas are joints, muscles, and skin  Your organs, eyes, nervous system, and blood vessels can also be affected  Common signs and symptoms of a connective tissue disorder:  Signs and symptoms depend on the type of connective tissue disorder and if it is severe  Symptoms may be mild or severe, and may come and go:  · Fever or fatigue    · Skin rash or thickening, blisters, or sensitivity to sunlight    · Rash on your cheeks that goes across your nose    · Joint pain, swelling, or warmth    · Deformed joints, or limited range of motion    · Cold, numb, or swollen fingers    · Loss of appetite, or weight loss without trying    · Dry mouth or eyes, vision problems, or an eye infection such as conjunctivitis    · Hair loss    Call 911 for any of the following:   · You are sweating, and your lips are pale or blue  · You have trouble breathing, chest pain or pressure, or a fast heartbeat  · You are vomiting blood  · You have a high fever  Seek care immediately if:   · You lose feeling in your hands or feet  · You lose feeling on one side of your body  · You have sudden pain in your eyes and vision problems      Contact your healthcare provider if:   · You have trouble urinating, or you urinate less than usual     · You have trouble having a bowel movement, or you lose control of your bowel movements  · Your muscle or joint tightness worsens, or your fingers begin to curl  · Your symptoms get worse, even after treatment  · Your skin is itchy, swollen, or has a rash  · You have questions or concerns about your condition or care  Treatment  may include any of the following:  · Medicines  may be given to prevent your immune system from attacking healthy cells  You may also need medicines to stop the disease from getting worse  You may need to use topical creams or lotions to control a rash or other symptoms that affect your skin  · Prescription pain medicine  may be given  Ask your healthcare provider how to take this medicine safely  Some prescription pain medicines contain acetaminophen  Do not take other medicines that contain acetaminophen without talking to your healthcare provider  Too much acetaminophen may cause liver damage  Prescription pain medicine may cause constipation  Ask your healthcare provider how to prevent or treat constipation  · NSAIDs , such as ibuprofen, help decrease swelling, pain, and fever  This medicine is available with or without a doctor's order  NSAIDs can cause stomach bleeding or kidney problems in certain people  If you take blood thinner medicine, always ask your healthcare provider if NSAIDs are safe for you  Always read the medicine label and follow directions  · Acetaminophen  helps reduce pain and fever  Acetaminophen is available without a doctor's order  Ask how much to take and how often to take it  Follow directions  Acetaminophen can cause liver problems if not taken correctly  · Steroids  may be given to reduce swelling and pain  Manage your connective tissue disorder:   · Rest as needed  Talk to your healthcare provider if you are having trouble sleeping because of pain or other symptoms  Rest your joints if they are stiff or painful   Your healthcare provider may suggest support devices such as crutches or splints to help your joints rest      · Eat a variety of healthy foods  Healthy foods include fruits, vegetables, lean meats, fish, and low-fat dairy products  Work with your healthcare provider or dietitian to create healthy meal plans  · Go to physical or occupational therapy as directed  A physical therapist can help you create an exercise plan  Exercise may help increase your energy  Exercise can also help keep stiff joints flexible and increase range of motion  An occupational therapist can help you learn to do your daily activities when you have pain or swelling  · Talk to your healthcare provider about pregnancy  If you are a woman and want to get pregnant, talk to your healthcare provider  You or your baby might be at risk for complications  You may need to wait until your disease is controlled or your medications are finished before you get pregnant  You may also have trouble getting pregnant because of your disease  Your healthcare provider may be able to suggest ways to improve your ability to become pregnant  · Do not smoke  Nicotine and other chemicals in cigarettes and cigars can cause blood vessel and lung damage  Ask your healthcare provider for information if you currently smoke and need help to quit  E-cigarettes or smokeless tobacco still contain nicotine  Talk to your healthcare provider before you use these products  · Manage stress  Stress may slow healing and lead to illness  Learn ways to control stress, such as relaxation, deep breathing, or listening to music  Manage flares:  A flare means something triggered your symptoms  Stress, cold weather, and sunlight are examples of triggers  Your healthcare provider can help you create a management plan that includes what to do if you have a flare  Treat flares quickly to help prevent serious illness  · Apply ice or heat as directed    Ice helps reduce pain and swelling, and may help prevent tissue damage  Use an ice pack, or put crushed ice in a bag  Cover the bag with a towel and apply to the painful area for 15 to 20 minutes every hour, or as directed  Heat helps reduce pain and muscle spasms  Apply a warm compress to the area for 20 minutes every 2 hours, or as directed  · Elevate the area above the level of your heart  Elevation can help reduce swelling and pain, especially in your joints  Elevate the area as often as possible  · Keep your hands and feet warm  Certain connective tissue disorders can cause your hands and feet to become cold and painful  Over time, ulcers or gangrene (tissue death) may develop if frequent or severe attacks are not prevented  Dress warmly in cold weather, including gloves and thick socks  It may help to wiggle your fingers or toes to improve circulation  Follow up with your healthcare provider as directed: You may need ongoing tests or treatment  Write down your questions so you remember to ask them during your visits  © Copyright [employer] 2021 Information is for End User's use only and may not be sold, redistributed or otherwise used for commercial purposes  All illustrations and images included in CareNotes® are the copyrighted property of [employer]  or [employer]   The above information is an  only  It is not intended as medical advice for individual conditions or treatments  Talk to your doctor, nurse or pharmacist before following any medical regimen to see if it is safe and effective for you

## 2022-01-18 NOTE — PROGRESS NOTES
Virtual Regular Visit    Assessment/Plan: Yana Shepherd is a 64 y o  female who presents for follow-up of UCTD (arthralgia, photosensitivity, facial rashes, and Raynaud's symptoms)  What's most significantly bothering patient is her abdominal pain and bloating sensation since she moved a dresser several months ago; it has slightly improved since then, but is still bothersome  Has seen multiple GI, and will be getting an EGD and colonoscopy soon for further workup  Has so far been diagnosed with delayed gastric emptying  She has lost weight to the point of being 110 pounds due to eating making the abdominal pain worse, which is present all the time  Did not start Fosamax yet, but will now after I explained the importance of medical therapy for her osteoporosis  Her left hip bursa area significantly bothers her, but bursa injections in the past didn't help; would like to arrange a hip bursa injection visit with me  Raynaud's symptoms are not fully under control  Do lupus activity labs  Decrease hydroxychloroquine to 1 tab daily since has lost weight  Start alendronate once a week, empty stomach, full glass of water, and do not lie down for 30 minutes after  Increase amlodipine to 5mg daily for Raynaud's symptoms  Continue weekly Vit  D for history of Vit   D deficiency    Problem List Items Addressed This Visit        Other    Rheumatoid factor positive    Positive KRISTA (antinuclear antibody)    High risk medication use    Undifferentiated connective tissue disease ([facility] [address] ) - Primary    Relevant Orders    CBC and differential    Comprehensive metabolic panel    C-reactive protein    Sedimentation rate, automated    Urinalysis with microscopic    Protein / creatinine ratio, urine    C4 complement    C3 complement    Anti-DNA antibody, double-stranded      Other Visit Diagnoses     Raynaud's disease without gangrene        Relevant Medications    amLODIPine (NORVASC) 5 mg tablet    Rheumatoid arthritis with positive rheumatoid factor, involving unspecified site [facility])        Relevant Medications    hydroxychloroquine (PLAQUENIL) 200 mg tablet    Osteoporosis, unspecified osteoporosis type, unspecified pathological fracture presence          Return to clinic on 7/18/22 at 2:30pm        Reason for visit is follow-up of UCTD    Encounter provider Miller Melendez MD    Provider located at [address] [facility] [address]  [address] [facility] [address] [phone]      Recent Visits  Date Type Provider Dept   01/18/22 Tarry Hashimoto, MD [facility]   Showing recent visits within past 7 days and meeting all other requirements  Future Appointments  No visits were found meeting these conditions  Showing future appointments within next 150 days and meeting all other requirements       The patient was identified by name and date of birth  Campos Lo was informed that this is a telemedicine visit and that the visit is being conducted through [facility] and patient was informed that this is a secure, HIPAA-compliant platform  She agrees to proceed  My office door was closed  No one else was in the room  She acknowledged consent and understanding of privacy and security of the video platform  The patient has agreed to participate and understands they can discontinue the visit at any time  Patient is aware this is a billable service  Rheumatic Disease Summary  Campos Lo is a 64 y o   female who originally presented on 5/22/20 as a Rheumatology consult referred by her PCP Natalia Duane, DO for evaluation of possible inflammatory arthritis given positive RF of 40 and KRISTA of 1:80 in a homogenous pattern  Patient has history of negative anti-CCP  Lupus activity labs, anti-centromere Ab, and anti-RNP ordered and returned unremarkable   ESR/CRP returned normal  Patient had some features to at least make a diagnosis of undifferentiated connective tissue disease, including arthralgia, photosensitivity, facial rashes, and Raynaud's symptoms  Her arthritis symptoms were not typical of RA since she denied morning stiffness and her joint symptoms were worse later in the day and with use  Hand and feet x-rays ordered to evaluate for any inflammatory changes returned unremarkable  For time-being, started patient on hydroxychloroquine 200mg po daily; asked her to get regular eye exams while on this medication to monitor for plaquenil-related retinal toxicity  8/28/20 via telemedicine:  Her arthralgia has improved since starting HCQ  Based on her weight, have increased her hydroxychloroquine to 300mg po daily  Changed her diclofenac prescription to 75mg po bid instead of 50mg po tid, and continued baclofen 10mg po tid  She is to call if she has any worsening symptoms  4/23/21:  Complains of significant abdominal pain in discomfort as there was trapped inside her lungs/abdomen since she moved to heavy dresser recently  Below workup ordered, which returned unremarkable  Do labs  Do chest x-ray  Continue hydroxychloroquine one and a half tabs daily  Continue diclofenac twice a day as needed for joint pain  Take amlodipine daily for Raynaud's symptoms  Take tizanidine three times a day as needed for muscle pain instead of baclofen    Subjective/HPI  Fletcher Crespo is a 64 y o  female who presents for follow-up of UCTD (arthralgia, photosensitivity, facial rashes, and Raynaud's symptoms)  Last clinic visit was in 4/2021, when she presented acutely with abdominal bloating pain after moving a heavy dresser  Continues to have similar discomfort, but not as bad as last visit; doesn't take NSAIDs due to her GI issues, takes omeprazole      Past Medical History:   Diagnosis Date    Acquired ichthyosis     last assessed: 5/9/2013    Anxiety     Cervical radiculopathy     last assessed: 6/13/2014    Colorectal polyps     last assessed: 3/9/2015    COPD [facility]chronic obstructive pulmonary disease) [facility])     Depression     Diverticulosis of colon     Foot pain     GERD (gastroesophageal reflux disease)     Hyperlipemia     Hypertension     Myocardial infarction [facility])     at age 28    Osteopenia     last assessed: 2013    Palpitations     last assessed: 2014    PVD (peripheral vascular disease) ([address] )     last assessed: 12/3/2012    Scapular dyskinesis     last assessed: 2014    Shortness of breath     Tendonitis of left rotator cuff     last assessed: 2014    Vocal cord polyps     last assessed: 2014       Past Surgical History:   Procedure Laterality Date    ABDOMINAL SURGERY      exploratory    CARDIAC SURGERY      cardiac stent      SECTION      last assessed: 2014    CHOLECYSTECTOMY      last assessed: 2014    COLONOSCOPY  10/27/2014    CORONARY ANGIOPLASTY WITH STENT PLACEMENT      LAD stent    DENTAL SURGERY      last assessed: 2014    ELBOW SURGERY Bilateral     arthroplasty    EPIDURAL BLOCK INJECTION N/A 2019    Procedure: C7 T1 Cervical Epidural Steroid Injection (15352); Surgeon: Abigail Moulton MD;  [facility];  Service: Pain Management     EPIDURAL BLOCK INJECTION Bilateral 2019    Procedure: BLOCK / INJECTION EPIDURAL STEROID CERVICAL - C7-T1;  Surgeon: Abigail Moulton MD;  [facility];  Service: Pain Management     EPIDURAL BLOCK INJECTION Left 10/3/2019    Procedure: C7-T1 interlaminar epidural steroid injection;  Surgeon: Abigail Moulton MD;  [facility];  Service: Pain Management     ESOPHAGOGASTRODUODENOSCOPY      ESOPHAGOGASTRODUODENOSCOPY N/A 2016    Procedure: ESOPHAGOGASTRODUODENOSCOPY (EGD);   Surgeon: Jael Tyler MD;  [facility];  Service:     FL GUIDED NEEDLE PLAC BX/ASP/INJ  2019    FL GUIDED NEEDLE PLAC BX/ASP/INJ  2019    FL GUIDED NEEDLE PLAC BX/ASP/INJ  10/3/2019    FL INJECTION LEFT SHOULDER (ARTHROGRAM)  9/25/2018    FOOT SURGERY Right 02/06/2015    x 4    HYSTERECTOMY      last assessed: 8/8/2014    MO ARTHROCENTESIS ASPIR&/INJ MAJOR JT/BURSA W/O US Bilateral 7/31/2019    Procedure: GREATER TROCHANTERIC HIP INJECTION;  Surgeon: Francoise Aguilar MD;  [facility];  Service: Pain Management     MO ARTHROSCOPY SHOULDER SURGICAL BICEPS TENODESIS Left 10/15/2018    Procedure: ARTHROSCOPY SHOULDER; Debridement of shoulder;  Surgeon: Martir Woods MD;  [facility];  Service: Orthopedics    MO SHLDR ARTHROSCOP,SURG,W/ROTAT CUFF REPR Left 7/23/2018    Procedure: ARTHROSCOPY SHOULDER, subacromial decompression, synovectomy;  Surgeon: Martir Woods MD;  [facility];  Service: Orthopedics    THROAT SURGERY      TOOTH EXTRACTION      TRIGEMINAL NERVE DECOMPRESSION Right 6/15/2018    Procedure: FOOT NEUROPLASTY;  Surgeon: Jass Nixon DPM;  [facility];  Service: Podiatry       Current Outpatient Medications   Medication Sig Dispense Refill    albuterol (PROVENTIL HFA,VENTOLIN HFA) 90 mcg/act inhaler inhale 2 puffs by mouth every 6 hours if needed for wheezing  0    alendronate (Fosamax) 70 mg tablet Take 1 tablet (70 mg total) by mouth every 7 days 4 tablet 5    amLODIPine (NORVASC) 5 mg tablet Take 1 tablet (5 mg total) by mouth daily 30 tablet 6    aspirin 81 mg chewable tablet Chew 1 tablet daily Stop for the time being for foot surgery  (Patient not taking: Reported on 7/15/2021)      baclofen 10 mg tablet Take 1 tablet (10 mg total) by mouth 3 (three) times a day 90 tablet 6    diclofenac (VOLTAREN) 75 mg EC tablet take 1 tablet by mouth twice a day 180 tablet 1    Diclofenac Sodium (VOLTAREN) 1 % Apply 2 g topically 4 (four) times a day 2 Tube 2    ergocalciferol (VITAMIN D2) 50,000 units take 1 capsule by mouth every week 12 capsule 0    ezetimibe (ZETIA) 10 mg tablet take 1 tablet by mouth daily 90 tablet 3    hydroxychloroquine (PLAQUENIL) 200 mg tablet Take 1 tablet (200 mg total) by mouth daily 90 tablet 1    indomethacin (INDOCIN) 50 mg capsule  (Patient not taking: Reported on 7/15/2021)      lidocaine (LIDODERM) 5 % Apply 1 patch topically daily Remove & Discard patch within 12 hours or as directed by MD 30 patch 0    nitroglycerin (NITROSTAT) 0 4 mg SL tablet Place 1 tablet (0 4 mg total) under the tongue every 5 (five) minutes as needed for chest pain 90 tablet 3    omeprazole (PriLOSEC) 40 MG capsule take 1 capsule by mouth once daily 90 capsule 0    oxyCODONE-acetaminophen (PERCOCET) 5-325 mg per tablet Take 1 tablet by mouth every 12 (twelve) hours  (Patient not taking: Reported on 7/15/2021)      polyethylene glycol (MIRALAX) 17 g packet Take 17 g by mouth daily 90 each 0    rosuvastatin (CRESTOR) 5 mg tablet take 1 tablet by mouth once daily 90 tablet 3    tiZANidine (ZANAFLEX) 2 mg tablet Take 1 tablet (2 mg total) by mouth every 8 (eight) hours as needed for muscle spasms 90 tablet 3     No current facility-administered medications for this visit  Allergies   Allergen Reactions    Ceclor [Cefaclor] Anaphylaxis    Cephalexin     Codeine Itching     Reaction Date: [id number];     Gabapentin      Body tremors, sweats    Lyrica [Pregabalin]      Body tremors, chills, heaviness in chest    Ticlopidine Hives     Other reaction(s): Hives    Penicillins Rash     Other reaction(s): Other       Review of Systems   Constitutional: Negative for fatigue and unexpected weight change  HENT: Negative for mouth sores  Respiratory: Negative for cough and shortness of breath  Gastrointestinal: Positive for abdominal pain  Negative for constipation and diarrhea  Musculoskeletal: Positive for arthralgias  Negative for back pain, joint swelling and myalgias  Abdominal bloating   Skin: Negative for color change and rash  Neurological: Negative for weakness     Psychiatric/Behavioral: Negative for sleep disturbance  Video Exam    There were no vitals filed for this visit  Physical Exam  Constitutional:       General: She is not in acute distress  Appearance: She is well-developed  HENT:      Head: Normocephalic and atraumatic  Eyes:      General: Lids are normal  No scleral icterus  Conjunctiva/sclera: Conjunctivae normal    Pulmonary:      Effort: Pulmonary effort is normal  No tachypnea, accessory muscle usage or respiratory distress  Skin:     General: Skin is dry  Findings: No rash  Neurological:      Mental Status: She is alert  Psychiatric:         Behavior: Behavior normal  Behavior is cooperative  I spent 25 minutes directly with the patient during this visit      [facility] acknowledges that she has consented to an online visit or consultation  She understands that the online visit is based solely on information provided by her, and that, in the absence of a face-to-face physical evaluation by the physician, the diagnosis she receives is both limited and provisional in terms of accuracy and completeness  This is not intended to replace a full medical face-to-face evaluation by the physician  Brianne Boland understands and accepts these terms

## 2022-01-21 ENCOUNTER — TELEPHONE (OUTPATIENT)
Dept: OBGYN CLINIC | Facility: MEDICAL CENTER | Age: 57
End: 2022-01-21

## 2022-01-21 NOTE — TELEPHONE ENCOUNTER
Patient sees Dr Deysi Kern  Patient is calling to schedule an injection in her left hip, please contact her for another date          # [phone]

## 2022-01-27 ENCOUNTER — TELEPHONE (OUTPATIENT)
Dept: FAMILY MEDICINE CLINIC | Facility: CLINIC | Age: 57
End: 2022-01-27

## 2022-01-27 ENCOUNTER — OFFICE VISIT (OUTPATIENT)
Dept: RHEUMATOLOGY | Facility: CLINIC | Age: 57
End: 2022-01-27
Payer: COMMERCIAL

## 2022-01-27 DIAGNOSIS — M70.62 TROCHANTERIC BURSITIS OF LEFT HIP: Primary | ICD-10-CM

## 2022-01-27 DIAGNOSIS — M35.9 UNDIFFERENTIATED CONNECTIVE TISSUE DISEASE (HCC): ICD-10-CM

## 2022-01-27 DIAGNOSIS — M81.0 OSTEOPOROSIS, UNSPECIFIED OSTEOPOROSIS TYPE, UNSPECIFIED PATHOLOGICAL FRACTURE PRESENCE: ICD-10-CM

## 2022-01-27 DIAGNOSIS — Z72.0 TOBACCO ABUSE: Primary | ICD-10-CM

## 2022-01-27 DIAGNOSIS — I73.00 RAYNAUD'S DISEASE WITHOUT GANGRENE: ICD-10-CM

## 2022-01-27 DIAGNOSIS — Z79.899 HIGH RISK MEDICATION USE: ICD-10-CM

## 2022-01-27 PROCEDURE — 99214 OFFICE O/P EST MOD 30 MIN: CPT | Performed by: INTERNAL MEDICINE

## 2022-01-27 PROCEDURE — 20610 DRAIN/INJ JOINT/BURSA W/O US: CPT | Performed by: INTERNAL MEDICINE

## 2022-01-27 RX ORDER — LIDOCAINE HYDROCHLORIDE 10 MG/ML
1 INJECTION, SOLUTION INFILTRATION; PERINEURAL ONCE
Status: COMPLETED | OUTPATIENT
Start: 2022-01-27 | End: 2022-01-28

## 2022-01-27 RX ORDER — TRIAMCINOLONE ACETONIDE 40 MG/ML
40 INJECTION, SUSPENSION INTRA-ARTICULAR; INTRAMUSCULAR ONCE
Status: COMPLETED | OUTPATIENT
Start: 2022-01-27 | End: 2022-01-28

## 2022-01-27 NOTE — TELEPHONE ENCOUNTER
RECEIVED A LETTER FROM THE DEPT OF RADIOLOGY REGARDING  CT OF CHEST W/O CONTRAST DATED 5/12/20    CONSIDER INITIATION OF ANNUAL LOW-DOSE CHEST CT LUNG CANCER SCREENING IN 12 MONTHS IF PATIENT MEETS USPST[facility] INCLUSION CRITERIA    PLEASE ADVISE  THANK YOU

## 2022-01-27 NOTE — PATIENT INSTRUCTIONS
Left hip bursa steroid injection given in clinic  Do hip bursitis exercises    Return to clinic in July 2022    Hip Bursitis Exercises   WHAT YOU NEED TO KNOW:   What do I need to know about hip bursitis exercises? Hip bursitis exercises help strengthen the muscles in your hip and keep the joint flexible  Strong muscles can help reduce pain, prevent injury, and keep the joint stable  The exercises can also help increase the range of motion in your hip joint  What do I need to know about exercise safety? · Move slowly and smoothly  Avoid fast or jerky motions  This will help prevent an injury  · Breathe normally  Do not hold your breath  It is important to breathe in and out so you do not tense up during exercise  Tension could prevent you from moving your joint in a full range of motion  · Do the exercises and stretches on both legs  This helps both hips remain strong and flexible  · Stop if you feel sharp pain or an increase in pain  Contact your healthcare provider or physical therapist  It is normal to feel some discomfort during exercise  Regular exercise will help decrease your discomfort over time  · Warm up before you stretch and exercise  Walk or ride a stationary bike for 5 to 10 minutes  How should I do hip stretches? Your healthcare provider or physical therapist will tell you how many times to do each stretch  Do the stretch on both sides before you move to the next stretch  · Standing iliotibial band stretch:  Stand with the leg on your injured side behind your other leg  Bend sideways toward the side that is not injured  Stop when you feel a stretch in your outer hip  Hold for 5 to 10 seconds  Then return to the starting position  · Lying iliotibial band stretch:  Lie on your back  Bend the knee on your injured side toward your chest  Place your hands on the outside of your knee and thigh  Slowly pull the knee across your body   Stop when you feel a stretch in your hip and outer thigh  Hold for 5 to 10 seconds  Return your leg to the starting position  · Hip stretch:  Lie on your back with both legs straight and on the ground  Bend the knee on your injured side toward your chest until you can reach your lower leg  Place both hands on your shin and pull your knee toward your chest  Hold for 5 to 10 seconds  Return your leg to the starting position  · Knee to chest:  Lie on your back with both knees bent and feet flat on the floor  Bend the knee on your injured side toward your chest until you can reach your lower leg  Place both hands on your shin and pull your knee toward your chest  Hold for 5 to 10 seconds  Return your leg to the starting position  · Internal hip rotator stretch:  You will do this exercise on a table  Lie on your side with the injured hip on top  You may be told to keep a pillow between your thighs  Move the top leg so the foot hangs below the edge of the table  Rotate your hip to raise your foot in the opposite direction of the bottom shoulder  Raise your foot as high as you can so you feel a stretch in the back of your thigh  Hold for 5 seconds  Then slowly lower your foot to the starting position  · External hip rotator stretch:  You will do this exercise on a table  Lie on your side with the injured hip on the bottom  You do not need a pillow between your thighs for this exercise  Move the bottom leg so the foot is off the edge of the table  Rotate your hip to lift the foot in the opposite direction of the bottom shoulder  Raise your foot as high as you can so you feel a stretch in your buttock  Hold for 5 seconds  Then slowly lower your foot to the starting position  · Kneeling hip flexor stretch:  Kneel on your knee on the injured side  Place the foot of your other leg on the floor so the knee is bent  Put both hands on top of your thigh  Keep your back straight and abdominal muscles tight   Lean forward until you feel a stretch in your other thigh  Hold the stretch for 10 seconds  Return to the starting position  How should I do hip strengthening exercises? Your healthcare provider or physical therapist will tell you how many times to do each exercise  Do the exercise on both sides before you move to the next exercise  · Straight leg lift to the side: This may also be called hip abduction  Lie on your side with straight legs, with the injured hip on top  Slowly raise your top leg toward the ceiling as high as you can  Keep your foot pointed  Hold for 5 seconds  Then slowly lower your leg to the starting position  · Inner thigh lift: This may also be called hip adduction  Lie on your side with straight legs, with the injured hip on the bottom  Cross your top leg over your bottom leg  Put the foot of your top leg on the floor in front of you  Raise your bottom leg until it touches the top leg  Hold for 5 seconds  Then slowly lower the leg to the floor  · Clam exercise:  Lie on your side so your injured side is on top  Bend your knees  Keep your heels together during this exercise  Slowly raise your top knee toward the ceiling  Then lower your leg so your knees are together  When should I call my doctor? · You have sharp pain during exercise or at rest     · You have questions or concerns about the stretches or exercises  CARE AGREEMENT:   You have the right to help plan your care  Learn about your health condition and how it may be treated  Discuss treatment options with your healthcare providers to decide what care you want to receive  You always have the right to refuse treatment  The above information is an  only  It is not intended as medical advice for individual conditions or treatments  Talk to your doctor, nurse or pharmacist before following any medical regimen to see if it is safe and effective for you    © Copyright [employer] 2021 Information is for End User's use only and may not be sold, redistributed or otherwise used for commercial purposes   All illustrations and images included in CareNotes® are the copyrighted property of A D A M [employer]  or [employer] Ronquillo

## 2022-01-27 NOTE — TELEPHONE ENCOUNTER
Message from Radiology stating  That patient should have CT lung cancer screening due to her history of mild emphysema seen on 05/12/2020 CT scan and history of smoking   I will put in the order   let her know that she might have to pay for the exam

## 2022-01-27 NOTE — PROGRESS NOTES
Assessment and Plan: Yassine Gómez is a 64 y o   female who presents for follow-up of her UCTD  Patient comes for injection into her left trochanteric bursa  She had recent visit on 1/18/22  Patient reports no new complaints  Raynaud's signs seem to be better controled with increased dose of amlodipine  Patient tolerates alendronate well  Patient already has follow up appointment scheduled in 6 months  Left hip bursa steroid injection given in clinic, which patient tolerated well  Is to continue the rest of her medications  Do hip bursitis exercises    Return to clinic in July 2022    Plan:  Diagnoses and all orders for this visit:    Trochanteric bursitis of left hip  -     triamcinolone acetonide (KENALOG-40) 40 mg/mL injection 40 mg  -     lidocaine (XYLOCAINE) 1 % injection 1 mL    Undifferentiated connective tissue disease ([facility] [address] )    Raynaud's disease without gangrene    Osteoporosis, unspecified osteoporosis type, unspecified pathological fracture presence    High risk medication use    Other orders  -     Large joint arthrocentesis     High risk medication use - Benefits and risks of hydroxychloroquine, including but not limited to retinal toxicity, corneal deposits, gastrointestinal side effects, and headaches were discussed with the patient  The need for a regular eye exam to monitor for ocular toxicity while on this medication was also explained to the patient  Large joint arthrocentesis: L greater trochanteric bursa  Universal Protocol:  Consent: Verbal consent obtained  Written consent not obtained  Consent given by: patient  Time out: Immediately prior to procedure a "time out" was called to verify the correct patient, procedure, equipment, support staff and site/side marked as required    Timeout called at: 1/27/2022 11:00 AM   Patient understanding: patient states understanding of the procedure being performed  Patient consent: the patient's understanding of the procedure matches consent given  Procedure consent: procedure consent matches procedure scheduled  Relevant documents: relevant documents present and verified  Test results: test results available and properly labeled  Site marked: the operative site was marked  Radiology Images displayed and confirmed  If images not available, report reviewed: imaging studies available  Required items: required blood products, implants, devices, and special equipment available  Patient identity confirmed: verbally with patient    Supporting Documentation  Indications: pain   Procedure Details  Location: hip - L greater trochanteric bursa  Preparation: Patient was prepped and draped in the usual sterile fashion  Needle size: 25 G  Ultrasound guidance: no  Approach: lateral    Patient tolerance: patient tolerated the procedure well with no immediate complications  Dressing:  Sterile dressing applied    After discussing the risks/benefits of left trochanteric bursa steroid injection, including minor risk of infection, bleeding, pain, or ineffectiveness, informed consent was obtained and patient was agreeable to proceed  Using sterile technique, the left hip bursa area was prepped with Chloraprep, and was topically anesthetized with Ethyl Chloride  The bursa was entered using a lateral approach and Kenalog 40 mg and 1 mL lidocaine 1% were then injected and the needle withdrawn  The procedure was well tolerated  Patient was instructed to contact our office with any concerns or questions  Follow-up plan: Return to clinic in July 2022        Rheumatic Disease Summary  Yadira damico 89 [address]  Favio Robles originally presented on 5/22/20 as a Rheumatology consult referred by her Robbin Perdue DO for evaluation of possible inflammatory arthritis given positive RF of 40 and KRISTA of 1:80 in a homogenous pattern  Patient has history of negative anti-CCP  Lupus activity labs, anti-centromere Ab, and anti-RNP ordered and returned unremarkable  ESR/CRP returned normal  Patient had some features to at least make a diagnosis of undifferentiated connective tissue disease, including arthralgia, photosensitivity, facial rashes, and Raynaud's symptoms  Her arthritis symptoms were not typical of RA since she denied morning stiffness and her joint symptoms were worse later in the day and with use  Hand and feet x-rays ordered to evaluate for any inflammatory changes returned unremarkable  For time-being, started patient on hydroxychloroquine 200mg po daily; asked her to get regular eye exams while on this medication to monitor for plaquenil-related retinal toxicity  8/28/20 via telemedicine: Skyler Carreon is a 54 y o  female who presents for follow-up of UCTD (arthralgia, photosensitivity, facial rashes, and Raynaud's symptoms)  Her arthralgia has improved since starting HCQ  Based on her weight, have increased her hydroxychloroquine to 300mg po daily  Changed her diclofenac prescription to 75mg po bid instead of 50mg po tid, and continued baclofen 10mg po tid  1/18/22 telemedicine: Skyler Carreon is a 64 y o  female who presented for follow-up of UCTD (arthralgia, photosensitivity, facial rashes, and Raynaud's symptoms)  What was most significantly bothering patient is was abdominal pain and bloating sensation since she moved a dresser several months ago; it had slightly improved since then, but was still bothersome  Had seen multiple GI, and will be getting an EGD and colonoscopy soon for further workup  Had so far been diagnosed with delayed gastric emptying  She had lost weight to the point of being 110 pounds due to eating making the abdominal pain worse, which was present all the time  Did not start Fosamax yet, but planned to after I explained the importance of medical therapy for her osteoporosis   Her left hip bursa area significantly bothered her, but bursa injections in the past didn't help; would like to arrange a hip bursa injection visit with me  Raynaud's symptoms were not fully under control  She was to do lupus activity labs  Decreased hydroxychloroquine to 1 tab daily since weight loss  Started alendronate once a week, empty stomach, full glass of water, and do not lie down for 30 minutes after  Increased amlodipine to 5mg daily for Raynaud's symptoms  Continued weekly Vit  D for history of Vit  D deficiency  NICA Babb is a 64 y o   female with undifferentiated connective tissue disesaese, Rheunaud's syndrome, rheumatoid arthritis, who presents for follow-up of her UCTD  Last visit was via telemedicine 1/18/22  Patient complains of left hip pain  Patient has ongoing problem with gastroparesis  EGD showed hiatal hernia, gastritis  Colonoscopy showed diverticulitis  Patient follows with GI doctor  She has lost about 6 lb over the last year according to her home weights  The following portions of the patient's history were reviewed and updated as appropriate: allergies, current medications, past family history, past medical history, past social history, past surgical history and problem list     Review of Systems:   Review of Systems   Constitutional: Negative  Negative for chills, fatigue and fever  HENT: Negative  Negative for congestion, hearing loss, mouth sores and trouble swallowing  Eyes: Negative  Negative for redness  Respiratory: Positive for wheezing  Negative for cough, chest tightness and shortness of breath  Cardiovascular: Negative for chest pain, palpitations and leg swelling  Gastrointestinal: Positive for abdominal pain  Negative for blood in stool, constipation, diarrhea and nausea  Endocrine: Positive for cold intolerance and polydipsia  Genitourinary: Negative for dysuria and hematuria  Musculoskeletal: Positive for arthralgias, back pain, joint swelling, myalgias and neck pain  Negative for gait problem  Left hip pain severe   Skin: Negative for pallor and rash  Allergic/Immunologic: Negative  Neurological: Positive for dizziness, weakness (general) and numbness (in fingers)  Negative for headaches  Hematological: Negative  Psychiatric/Behavioral: The patient is not nervous/anxious          Home Medications:    Current Outpatient Medications:     albuterol (PROVENTIL HFA,VENTOLIN HFA) 90 mcg/act inhaler, inhale 2 puffs by mouth every 6 hours if needed for wheezing, Disp: , Rfl: 0    alendronate (Fosamax) 70 mg tablet, Take 1 tablet (70 mg total) by mouth every 7 days, Disp: 4 tablet, Rfl: 5    amLODIPine (NORVASC) 5 mg tablet, Take 1 tablet (5 mg total) by mouth daily, Disp: 30 tablet, Rfl: 6    aspirin 81 mg chewable tablet, Chew 1 tablet daily Stop for the time being for foot surgery  (Patient not taking: Reported on 7/15/2021), Disp: , Rfl:     baclofen 10 mg tablet, Take 1 tablet (10 mg total) by mouth 3 (three) times a day, Disp: 90 tablet, Rfl: 6    diclofenac (VOLTAREN) 75 mg EC tablet, take 1 tablet by mouth twice a day, Disp: 180 tablet, Rfl: 1    Diclofenac Sodium (VOLTAREN) 1 %, Apply 2 g topically 4 (four) times a day, Disp: 2 Tube, Rfl: 2    ergocalciferol (VITAMIN D2) 50,000 units, take 1 capsule by mouth every week, Disp: 12 capsule, Rfl: 0    ezetimibe (ZETIA) 10 mg tablet, take 1 tablet by mouth daily, Disp: 90 tablet, Rfl: 3    hydroxychloroquine (PLAQUENIL) 200 mg tablet, Take 1 tablet (200 mg total) by mouth daily, Disp: 90 tablet, Rfl: 1    indomethacin (INDOCIN) 50 mg capsule, , Disp: , Rfl:     lidocaine (LIDODERM) 5 %, Apply 1 patch topically daily Remove & Discard patch within 12 hours or as directed by MD, Disp: 30 patch, Rfl: 0    nitroglycerin (NITROSTAT) 0 4 mg SL tablet, Place 1 tablet (0 4 mg total) under the tongue every 5 (five) minutes as needed for chest pain, Disp: 90 tablet, Rfl: 3    omeprazole (PriLOSEC) 40 MG capsule, take 1 capsule by mouth once daily, Disp: 90 capsule, Rfl: 0    oxyCODONE-acetaminophen (PERCOCET) 5-325 mg per tablet, Take 1 tablet by mouth every 12 (twelve) hours  (Patient not taking: Reported on 7/15/2021), Disp: , Rfl:     polyethylene glycol (MIRALAX) 17 g packet, Take 17 g by mouth daily, Disp: 90 each, Rfl: 0    rosuvastatin (CRESTOR) 5 mg tablet, take 1 tablet by mouth once daily, Disp: 90 tablet, Rfl: 3    tiZANidine (ZANAFLEX) 2 mg tablet, Take 1 tablet (2 mg total) by mouth every 8 (eight) hours as needed for muscle spasms, Disp: 90 tablet, Rfl: 3    Objective:    Vitals:    01/27/22 1052   BP: 102/74   Pulse: 74   Temp: 97 9 °F (36 6 °C)   Weight: 53 2 kg (117 lb 3 2 oz)   Height: 4' 11" (1 499 m)       Physical Exam  Vitals reviewed  Constitutional:       General: She is not in acute distress  Appearance: Normal appearance  She is not ill-appearing  HENT:      Head: Normocephalic and atraumatic  Nose: Nose normal       Mouth/Throat:      Mouth: Mucous membranes are moist    Eyes:      General: No scleral icterus  Extraocular Movements: Extraocular movements intact  Conjunctiva/sclera: Conjunctivae normal    Cardiovascular:      Rate and Rhythm: Normal rate and regular rhythm  Pulses: Normal pulses  Heart sounds: Normal heart sounds  No murmur heard  No gallop  Pulmonary:      Effort: Pulmonary effort is normal  No respiratory distress  Breath sounds: Normal breath sounds  No wheezing or rales  Abdominal:      General: Bowel sounds are normal  There is no distension  Tenderness: There is no abdominal tenderness  There is no guarding or rebound  Musculoskeletal:         General: Tenderness present  No swelling  Right lower leg: No edema  Left lower leg: No edema  Comments: Tenderness along the external surface of the left hip   Skin:     General: Skin is warm  Coloration: Skin is not jaundiced  Findings: No erythema or rash  Neurological:      General: No focal deficit present        Mental Status: She is alert and oriented to person, place, and time  Cranial Nerves: No cranial nerve deficit  Motor: No weakness  Psychiatric:         Mood and Affect: Mood normal          Behavior: Behavior normal          Reviewed labs and imaging  Imaging:   CXR 4/23/21   Unremarkable    DXA scan 9/8/21  RESULTS:      LUMBAR SPINE L2-L4 (L1 vertebra excluded from analysis due to local structural abnormalities or artifact): BMD  0 716  gm/cm2   T-score -3 3      LEFT  TOTAL HIP:   BMD:  0 699  gm/cm2    T-score:  -2 0     LEFT  FEMORAL NECK:   BMD:  0 610  gm/cm2   T score: -2 2      LEFT  FOREARM:    33% RADIUS BMD:  0 546  gm/cm2  T-score:  -2 4         IMPRESSION:     1  Osteoporosis  [Based on the lumbar spine]    Labs:   No visits with results within 6 Month(s) from this visit     Latest known visit with results is:   Office Visit on 04/23/2021   Component Date Value Ref Range Status    WBC 04/23/2021 7 65  4 31 - 10 16 Thousand/uL Final    RBC 04/23/2021 4 17  3 81 - 5 12 Million/uL Final    Hemoglobin 04/23/2021 13 8  11 5 - 15 4 g/dL Final    Hematocrit 04/23/2021 41 3  34 8 - 46 1 % Final    MCV 04/23/2021 99* 82 - 98 fL Final    MCH 04/23/2021 33 1  26 8 - 34 3 pg Final    MCHC 04/23/2021 33 4  31 4 - 37 4 g/dL Final    RDW 04/23/2021 12 3  11 6 - 15 1 % Final    MPV 04/23/2021 11 0  8 9 - 12 7 fL Final    Platelets 63/85/7547 221  149 - 390 Thousands/uL Final    nRBC 04/23/2021 0  /100 WBCs Final    Neutrophils Relative 04/23/2021 55  43 - 75 % Final    Immat GRANS % 04/23/2021 0  0 - 2 % Final    Lymphocytes Relative 04/23/2021 35  14 - 44 % Final    Monocytes Relative 04/23/2021 7  4 - 12 % Final    Eosinophils Relative 04/23/2021 2  0 - 6 % Final    Basophils Relative 04/23/2021 1  0 - 1 % Final    Neutrophils Absolute 04/23/2021 4 20  1 85 - 7 62 Thousands/µL Final    Immature Grans Absolute 04/23/2021 0 01  0 00 - 0 20 Thousand/uL Final    Lymphocytes Absolute 04/23/2021 2 70  0 60 - 4 47 Thousands/µL Final    Monocytes Absolute 04/23/2021 0 53  0 17 - 1 22 Thousand/µL Final    Eosinophils Absolute 04/23/2021 0 16  0 00 - 0 61 Thousand/µL Final    Basophils Absolute 04/23/2021 0 05  0 00 - 0 10 Thousands/µL Final    Sodium 04/23/2021 139  136 - 145 mmol/L Final    Potassium 04/23/2021 3 5  3 5 - 5 3 mmol/L Final    Chloride 04/23/2021 103  100 - 108 mmol/L Final    CO2 04/23/2021 30  21 - 32 mmol/L Final    ANION GAP 04/23/2021 6  4 - 13 mmol/L Final    BUN 04/23/2021 11  5 - 25 mg/dL Final    Creatinine 04/23/2021 0 71  0 60 - 1 30 mg/dL Final    Standardized to IDMS reference method    Glucose 04/23/2021 85  65 - 140 mg/dL Final    If the patient is fasting, the ADA then defines impaired fasting glucose as > 100 mg/dL and diabetes as > or equal to 123 mg/dL  Specimen collection should occur prior to Sulfasalazine administration due to the potential for falsely depressed results  Specimen collection should occur prior to Sulfapyridine administration due to the potential for falsely elevated results   Calcium 04/23/2021 10 0  8 3 - 10 1 mg/dL Final    AST 04/23/2021 27  5 - 45 U/L Final    Specimen collection should occur prior to Sulfasalazine administration due to the potential for falsely depressed results   ALT 04/23/2021 32  12 - 78 U/L Final    Specimen collection should occur prior to Sulfasalazine and/or Sulfapyridine administration due to the potential for falsely depressed results   Alkaline Phosphatase 04/23/2021 60  46 - 116 U/L Final    Total Protein 04/23/2021 7 8  6 4 - 8 2 g/dL Final    Albumin 04/23/2021 4 2  3 5 - 5 0 g/dL Final    Total Bilirubin 04/23/2021 0 47  0 20 - 1 00 mg/dL Final    Use of this assay is not recommended for patients undergoing treatment with eltrombopag due to the potential for falsely elevated results      eGFR 04/23/2021 96  ml/min/1 73sq m Final    Uric Acid 04/23/2021 4 6  2 0 - 6 8 mg/dL Final    Specimen collection should occur prior to Metamizole administration due to the potential for falsely depressed results

## 2022-01-28 VITALS
BODY MASS INDEX: 23.63 KG/M2 | DIASTOLIC BLOOD PRESSURE: 74 MMHG | TEMPERATURE: 97.9 F | WEIGHT: 117.2 LBS | SYSTOLIC BLOOD PRESSURE: 102 MMHG | HEART RATE: 74 BPM | HEIGHT: 59 IN

## 2022-01-28 RX ADMIN — LIDOCAINE HYDROCHLORIDE 1 ML: 10 INJECTION, SOLUTION INFILTRATION; PERINEURAL at 11:30

## 2022-01-28 RX ADMIN — TRIAMCINOLONE ACETONIDE 40 MG: 40 INJECTION, SUSPENSION INTRA-ARTICULAR; INTRAMUSCULAR at 11:31

## 2022-02-03 ENCOUNTER — TELEPHONE (OUTPATIENT)
Dept: FAMILY MEDICINE CLINIC | Facility: CLINIC | Age: 57
End: 2022-02-03

## 2022-02-03 DIAGNOSIS — K55.1 SUPERIOR MESENTERIC ARTERY STENOSIS (HCC): Primary | ICD-10-CM

## 2022-02-03 NOTE — TELEPHONE ENCOUNTER
I put in a referral to vascular surgery ASAP  She should call us if she has not heard from them in the next couple days

## 2022-02-03 NOTE — TELEPHONE ENCOUNTER
----- Message from Rafa Topete sent at 2/3/2022  8:30 AM EST -----  Regarding: FW: vascular    ----- Message -----  From: Brandon Escalera  Sent: 2/2/2022   7:09 PM EST  To: ANITA [facility]ARCMAY[facility]  Subject: vascular                                         Hi, i just had yet another colonoscopy/endoscopy today just to be told thats not my problem but was told to contact you about the blockage in my artery in my abdomen, it has been a year, the test i had done in July 2021 and redone in Dec 2021 i believe showed the blockage but nothing was done,  i am in pain every day for the past year and have lost aprox  40 pounds due to pain and inability to eat much, i was told to contact you and tell you i need to see a vascular surgeon, since i already see Dr Niko Chester at [facility] more than happy to go there, i dont understand why this wasnt addressed when i had the test back in july, i did not have this before i hurt myself last March 2021 so not sure if its plaque or a blood clot since it was brought on by physical exertion but its been a long painful year and i really need this addressed   thank you

## 2022-02-09 ENCOUNTER — APPOINTMENT (OUTPATIENT)
Dept: LAB | Facility: MEDICAL CENTER | Age: 57
End: 2022-02-09
Payer: COMMERCIAL

## 2022-02-09 DIAGNOSIS — I25.10 CAD IN NATIVE ARTERY: ICD-10-CM

## 2022-02-09 DIAGNOSIS — I73.9 PVD (PERIPHERAL VASCULAR DISEASE) (HCC): ICD-10-CM

## 2022-02-09 DIAGNOSIS — I10 ESSENTIAL HYPERTENSION: ICD-10-CM

## 2022-02-09 DIAGNOSIS — R63.4 WEIGHT LOSS, ABNORMAL: Primary | ICD-10-CM

## 2022-02-09 LAB
ALBUMIN SERPL BCP-MCNC: 4.4 G/DL (ref 3.5–5)
ALP SERPL-CCNC: 53 U/L (ref 46–116)
ALT SERPL W P-5'-P-CCNC: 14 U/L (ref 12–78)
ANION GAP SERPL CALCULATED.3IONS-SCNC: 5 MMOL/L (ref 4–13)
AST SERPL W P-5'-P-CCNC: 15 U/L (ref 5–45)
BACTERIA UR QL AUTO: ABNORMAL /HPF
BASOPHILS # BLD AUTO: 0.04 THOUSANDS/ΜL (ref 0–0.1)
BASOPHILS NFR BLD AUTO: 1 % (ref 0–1)
BILIRUB SERPL-MCNC: 0.63 MG/DL (ref 0.2–1)
BILIRUB UR QL STRIP: NEGATIVE
BUN SERPL-MCNC: 4 MG/DL (ref 5–25)
C3 SERPL-MCNC: 105 MG/DL (ref 90–180)
C4 SERPL-MCNC: 30 MG/DL (ref 10–40)
CALCIUM SERPL-MCNC: 9.5 MG/DL (ref 8.3–10.1)
CHLORIDE SERPL-SCNC: 105 MMOL/L (ref 100–108)
CHOLEST SERPL-MCNC: 133 MG/DL
CLARITY UR: CLEAR
CO2 SERPL-SCNC: 31 MMOL/L (ref 21–32)
COLOR UR: YELLOW
CREAT SERPL-MCNC: 0.67 MG/DL (ref 0.6–1.3)
CREAT UR-MCNC: 91.4 MG/DL
CRP SERPL QL: <3 MG/L
EOSINOPHIL # BLD AUTO: 0.14 THOUSAND/ΜL (ref 0–0.61)
EOSINOPHIL NFR BLD AUTO: 2 % (ref 0–6)
ERYTHROCYTE [DISTWIDTH] IN BLOOD BY AUTOMATED COUNT: 12.5 % (ref 11.6–15.1)
ERYTHROCYTE [SEDIMENTATION RATE] IN BLOOD: 4 MM/HOUR (ref 0–29)
GFR SERPL CREATININE-BSD FRML MDRD: 98 ML/MIN/1.73SQ M
GLUCOSE P FAST SERPL-MCNC: 93 MG/DL (ref 65–99)
GLUCOSE UR STRIP-MCNC: NEGATIVE MG/DL
HCT VFR BLD AUTO: 44.3 % (ref 34.8–46.1)
HDLC SERPL-MCNC: 64 MG/DL
HGB BLD-MCNC: 14.4 G/DL (ref 11.5–15.4)
HGB UR QL STRIP.AUTO: ABNORMAL
HYALINE CASTS #/AREA URNS LPF: ABNORMAL /LPF
IMM GRANULOCYTES # BLD AUTO: 0.01 THOUSAND/UL (ref 0–0.2)
IMM GRANULOCYTES NFR BLD AUTO: 0 % (ref 0–2)
KETONES UR STRIP-MCNC: NEGATIVE MG/DL
LDLC SERPL CALC-MCNC: 51 MG/DL (ref 0–100)
LEUKOCYTE ESTERASE UR QL STRIP: NEGATIVE
LYMPHOCYTES # BLD AUTO: 2.62 THOUSANDS/ΜL (ref 0.6–4.47)
LYMPHOCYTES NFR BLD AUTO: 41 % (ref 14–44)
MCH RBC QN AUTO: 32.4 PG (ref 26.8–34.3)
MCHC RBC AUTO-ENTMCNC: 32.5 G/DL (ref 31.4–37.4)
MCV RBC AUTO: 100 FL (ref 82–98)
MONOCYTES # BLD AUTO: 0.45 THOUSAND/ΜL (ref 0.17–1.22)
MONOCYTES NFR BLD AUTO: 7 % (ref 4–12)
NEUTROPHILS # BLD AUTO: 3.17 THOUSANDS/ΜL (ref 1.85–7.62)
NEUTS SEG NFR BLD AUTO: 49 % (ref 43–75)
NITRITE UR QL STRIP: NEGATIVE
NON-SQ EPI CELLS URNS QL MICRO: ABNORMAL /HPF
NRBC BLD AUTO-RTO: 0 /100 WBCS
PH UR STRIP.AUTO: 6 [PH]
PLATELET # BLD AUTO: 235 THOUSANDS/UL (ref 149–390)
PMV BLD AUTO: 10.8 FL (ref 8.9–12.7)
POTASSIUM SERPL-SCNC: 3.6 MMOL/L (ref 3.5–5.3)
PROT SERPL-MCNC: 7.6 G/DL (ref 6.4–8.2)
PROT UR STRIP-MCNC: NEGATIVE MG/DL
PROT UR-MCNC: 15 MG/DL
PROT/CREAT UR: 0.16 MG/G{CREAT} (ref 0–0.1)
RBC # BLD AUTO: 4.44 MILLION/UL (ref 3.81–5.12)
RBC #/AREA URNS AUTO: ABNORMAL /HPF
SODIUM SERPL-SCNC: 141 MMOL/L (ref 136–145)
SP GR UR STRIP.AUTO: 1.02 (ref 1–1.03)
TRIGL SERPL-MCNC: 92 MG/DL
TSH SERPL DL<=0.05 MIU/L-ACNC: 1.03 UIU/ML (ref 0.36–3.74)
UROBILINOGEN UR QL STRIP.AUTO: 0.2 E.U./DL
WBC # BLD AUTO: 6.43 THOUSAND/UL (ref 4.31–10.16)
WBC #/AREA URNS AUTO: ABNORMAL /HPF

## 2022-02-09 PROCEDURE — 85025 COMPLETE CBC W/AUTO DIFF WBC: CPT | Performed by: INTERNAL MEDICINE

## 2022-02-09 PROCEDURE — 80061 LIPID PANEL: CPT

## 2022-02-09 PROCEDURE — 80053 COMPREHEN METABOLIC PANEL: CPT | Performed by: INTERNAL MEDICINE

## 2022-02-09 PROCEDURE — 86160 COMPLEMENT ANTIGEN: CPT | Performed by: INTERNAL MEDICINE

## 2022-02-09 PROCEDURE — 84156 ASSAY OF PROTEIN URINE: CPT | Performed by: INTERNAL MEDICINE

## 2022-02-09 PROCEDURE — 86140 C-REACTIVE PROTEIN: CPT | Performed by: INTERNAL MEDICINE

## 2022-02-09 PROCEDURE — 85652 RBC SED RATE AUTOMATED: CPT | Performed by: INTERNAL MEDICINE

## 2022-02-09 PROCEDURE — 82570 ASSAY OF URINE CREATININE: CPT | Performed by: INTERNAL MEDICINE

## 2022-02-09 PROCEDURE — 36415 COLL VENOUS BLD VENIPUNCTURE: CPT | Performed by: INTERNAL MEDICINE

## 2022-02-09 PROCEDURE — 84443 ASSAY THYROID STIM HORMONE: CPT

## 2022-02-09 PROCEDURE — 86225 DNA ANTIBODY NATIVE: CPT | Performed by: INTERNAL MEDICINE

## 2022-02-09 PROCEDURE — 81001 URINALYSIS AUTO W/SCOPE: CPT | Performed by: INTERNAL MEDICINE

## 2022-02-10 LAB — DSDNA AB SER-ACNC: 1 IU/ML (ref 0–9)

## 2022-03-24 DIAGNOSIS — E55.9 VITAMIN D DEFICIENCY: ICD-10-CM

## 2022-03-24 RX ORDER — ERGOCALCIFEROL 1.25 MG/1
CAPSULE ORAL
Qty: 12 CAPSULE | Refills: 0 | Status: SHIPPED | OUTPATIENT
Start: 2022-03-24 | End: 2022-06-16

## 2022-03-28 ENCOUNTER — CONSULT (OUTPATIENT)
Dept: VASCULAR SURGERY | Facility: HOSPITAL | Age: 57
End: 2022-03-28
Payer: COMMERCIAL

## 2022-03-28 VITALS
HEART RATE: 114 BPM | DIASTOLIC BLOOD PRESSURE: 62 MMHG | HEIGHT: 60 IN | TEMPERATURE: 98 F | WEIGHT: 107 LBS | SYSTOLIC BLOOD PRESSURE: 120 MMHG | BODY MASS INDEX: 21.01 KG/M2

## 2022-03-28 DIAGNOSIS — K58.0 IRRITABLE BOWEL SYNDROME WITH DIARRHEA: ICD-10-CM

## 2022-03-28 DIAGNOSIS — K55.1 SUPERIOR MESENTERIC ARTERY STENOSIS (HCC): Primary | ICD-10-CM

## 2022-03-28 PROCEDURE — 99244 OFF/OP CNSLTJ NEW/EST MOD 40: CPT | Performed by: SURGERY

## 2022-03-28 RX ORDER — DICYCLOMINE HYDROCHLORIDE 10 MG/1
10 CAPSULE ORAL
Qty: 30 CAPSULE | Refills: 1 | Status: SHIPPED | OUTPATIENT
Start: 2022-03-28 | End: 2022-04-27

## 2022-03-28 NOTE — PROGRESS NOTES
Assessment/Plan:    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HCC)  Marek Mccurdy is a 59-year-old woman smoker with chronic abdominal pain and reported over 50 lb weight loss over the last year  She reports she has lost approximately 25 lb over the last several months  She has constant abdominal pain  She reports that several months back she was moving a dresser when everything flared up "  She feels is rolling lump" across her epigastrium  She at times feels like someone is "cutting her in half "  She has had upper and lower endoscopy studies  She has also had a gastric emptying scan which was remarkable for delayed gastric emptying  She has history of a cholecystectomy  She is referred to vascular due to duplex suggesting SMA stenosis  I have reviewed a CT scan from 2021 which did not demonstrate any significant mesenteric occlusive disease  She reports she has been previously diagnosed with irritable bowel syndrome however she was not treated  She has been undergoing workup for the last year and feels like she is getting the run around  We discussed the imaging studies  While I think there is a low yield for any significant mesenteric occlusive disease I will obtain a dedicated CTA of the abdomen and pelvis  Etiology unclear  She does have history of lupus  I suspect her symptoms are multifactorial with component of IBS  I have ordered a short course of bently and will have patient return to office to review results of CTA and reassess whether symptoms improved with bently  Diagnoses and all orders for this visit:    Superior mesenteric artery stenosis [facility])  -     Ambulatory Referral to Vascular Surgery  -     CTA abdomen pelvis w wo contrast; Future    Irritable bowel syndrome with diarrhea  -     dicyclomine (BENTYL) 10 mg capsule;  Take 1 capsule (10 mg total) by mouth 4 (four) times a day (before meals and at bedtime)          Subjective:      Patient ID: Skyler Guptazeferino is a 64 y o  female  Pt is new and referred by PCP  Pt is here to rev mes/ celiac done on 11/3/21  Pt reports weight loss (50lb)/ constant abdomen pain  Pt c/o poor appetite  Pt is taking rosuvastatin and asa 81mg  Pt smokes 1/2 ppd  HPI    The following portions of the patient's history were reviewed and updated as appropriate: allergies, current medications, past family history, past medical history, past social history, past surgical history and problem list     Review of Systems   Constitutional: Positive for appetite change and unexpected weight change  HENT: Negative  Eyes: Negative  Respiratory: Negative  Cardiovascular: Negative  Gastrointestinal: Positive for abdominal pain  Endocrine: Negative  Genitourinary: Negative  Musculoskeletal: Negative  Skin: Negative  Allergic/Immunologic: Negative  Neurological: Negative  Hematological: Negative  Psychiatric/Behavioral: Negative  I have personally reviewed the ROS entered by MA and agree as documented  Objective:      /62 (BP Location: Left arm, Patient Position: Sitting, Cuff Size: Standard)   Pulse (!) 114   Temp 98 °F (36 7 °C) (Tympanic)   Ht 5' (1 524 m)   Wt 48 5 kg (107 lb)   BMI 20 90 kg/m²          Physical Exam  Constitutional:       General: She is not in acute distress  Appearance: She is well-developed  HENT:      Head: Normocephalic and atraumatic  Right Ear: External ear normal       Left Ear: External ear normal    Eyes:      General: No scleral icterus  Conjunctiva/sclera: Conjunctivae normal    Neck:      Trachea: No tracheal deviation  Cardiovascular:      Rate and Rhythm: Normal rate and regular rhythm  Heart sounds: Normal heart sounds  Pulmonary:      Effort: Pulmonary effort is normal       Breath sounds: Normal breath sounds  Abdominal:      General: There is no distension  Palpations: Abdomen is soft   There is no mass (no appreciable aortic pulsation/aneurysm)  Tenderness: Tenderness: epigastrium  There is no guarding or rebound  Musculoskeletal:         General: Normal range of motion  Cervical back: Normal range of motion and neck supple  Skin:     General: Skin is warm and dry  Neurological:      Mental Status: She is alert and oriented to person, place, and time     Psychiatric:         Behavior: Behavior normal       Comments: Anxious and frustrated

## 2022-03-28 NOTE — ASSESSMENT & PLAN NOTE
Leslye Payne is a 60-year-old woman smoker with chronic abdominal pain and reported over 50 lb weight loss over the last year  She reports she has lost approximately 25 lb over the last several months  She has constant abdominal pain  She reports that several months back she was moving a dresser when everything flared up "  She feels a rolling lump" across her epigastrium  She at times feels like someone is "cutting her in half "  She has had upper and lower endoscopy studies  She has also had a gastric emptying scan which was remarkable for delayed gastric emptying  She has history of a cholecystectomy  She is referred to vascular due to duplex suggesting SMA stenosis  I have reviewed a CT scan from 2021 which did not demonstrate any significant mesenteric occlusive disease  She reports she has been previously diagnosed with irritable bowel syndrome however she was not treated  She has been undergoing workup for the last year and feels like she is getting the run around  We discussed the imaging studies  While I think there is a low yield for any significant mesenteric occlusive disease I will obtain a dedicated CTA of the abdomen and pelvis  Etiology unclear  She does have history of lupus  I suspect her symptoms are multifactorial with component of IBS  I have ordered a short course of bently and will have patient return to office to review results of CTA and reassess whether symptoms improved with bently

## 2022-04-01 ENCOUNTER — HOSPITAL ENCOUNTER (OUTPATIENT)
Dept: CT IMAGING | Facility: HOSPITAL | Age: 57
Discharge: HOME | End: 2022-04-01
Attending: SURGERY
Payer: COMMERCIAL

## 2022-04-01 DIAGNOSIS — K55.1 SUPERIOR MESENTERIC ARTERY STENOSIS (HCC): ICD-10-CM

## 2022-04-01 PROCEDURE — G1004 CDSM NDSC: HCPCS

## 2022-04-01 PROCEDURE — 74174 CTA ABD&PLVS W/CONTRAST: CPT

## 2022-04-01 RX ADMIN — IOHEXOL 100 ML: 350 INJECTION, SOLUTION INTRAVENOUS at 13:40

## 2022-04-06 ENCOUNTER — OFFICE VISIT (OUTPATIENT)
Dept: CARDIOLOGY CLINIC | Facility: HOSPITAL | Age: 57
End: 2022-04-06
Payer: COMMERCIAL

## 2022-04-06 VITALS
DIASTOLIC BLOOD PRESSURE: 78 MMHG | BODY MASS INDEX: 20.69 KG/M2 | WEIGHT: 105.4 LBS | HEART RATE: 87 BPM | SYSTOLIC BLOOD PRESSURE: 120 MMHG | HEIGHT: 60 IN

## 2022-04-06 DIAGNOSIS — R10.13 EPIGASTRIC PAIN: ICD-10-CM

## 2022-04-06 DIAGNOSIS — E78.49 OTHER HYPERLIPIDEMIA: ICD-10-CM

## 2022-04-06 DIAGNOSIS — Z72.0 TOBACCO ABUSE: ICD-10-CM

## 2022-04-06 DIAGNOSIS — I25.10 CAD IN NATIVE ARTERY: Primary | ICD-10-CM

## 2022-04-06 DIAGNOSIS — I10 ESSENTIAL HYPERTENSION: ICD-10-CM

## 2022-04-06 PROCEDURE — 93000 ELECTROCARDIOGRAM COMPLETE: CPT | Performed by: INTERNAL MEDICINE

## 2022-04-06 PROCEDURE — 99214 OFFICE O/P EST MOD 30 MIN: CPT | Performed by: INTERNAL MEDICINE

## 2022-04-06 NOTE — PROGRESS NOTES
Cardiology Follow Up    Mj Eller  1965  [id number]  [address] [facility] [address] [facility]  [phone]    No diagnosis found  There are no diagnoses linked to this encounter  I had the pleasure of seeing Mj Eller for a follow up visit  Interval History: none    History of the presenting illness, Discussion/Summary and My Plan are as follows: She is a pleasant 80-year-old lady with a history of Hypertension,  familial hypercholesterolemia, mixed/undifferentiated connective tissue disorder, tobacco use-less than half pack a day, coronary disease, prior percutaneous coronary intervention to the left anterior descending coronary artery in 1998, hypertension as well as marked dyslipidemia with LDL has high as 170 to 220 and a history of non compliance with meds and follow ups  For atypical CPs, I had her do a Bia Nuc perfusion study that did not demonstrate ischemia - in September 2014  She had been noncompliant with medications and followups  Continues to smoke      Her risk factors for vascular disease include hypertension, marked dyslipidemia, existing coronary artery disease, RA as well as smoking     She's been modestly active without any cardiac symptoms, limited by arthritis and constant 24/7 abdominal discomfort, started while pushing a dresser, feeling like a lump, limiting appetite, resulting significant weight loss, has been evaluated by GI, status post EGD and colonoscopy, felt to be musculoskeletal/irritable bowel, seen vascular, underwent CT without flow-limiting celiac or superior mesenteric artery stenosis  Also has a stable umbilical hernia as of May 2021     Has lost about 45 lb in 3 years      Cardiac exam is unremarkable, decreased left carotid impulse, abdomen was also examined, sore throughout her exam, particularly in the epigastric region, not particularly abdomen wall, could not reproduce a ventral hernia with lifting head up  Plan:     CAD,  prior percutaneous coronary intervention to the left anterior descending coronary artery in 1998: Currently modestly active without symptoms  Limited by arthritis     Dyslipidemia/Familial Hypercholesterolemia: Pravastatin with worsening leg pains  For a short while she was on Repatha (PCSK9 inhibitor)  Then it became expensive apparently  Had started her on Crestor with lowering of LDL to 101, subsequently increased to 145 and started Zetia, now much better controlled, I suspect from the weight loss she has had  No changes at this time    Coronary artery disease, status post PCI to the LAD in 1998, for atypical chest pains  Bia Nuc in Sept 2014 without ischemia  If has recurrence of chest pains, will then check a stress test  At this time she needs to quit smoking  On aspirin and statin, not on a beta-blocker - Metoprolol made her feel cold and caused hair loss     HTN: currently controlled    Chronic hydroxychloroquine therapy: Echo and  ECG were unremarkable      Tobacco use: She is rolling her own cigarettes  , currently still the equivalent of half pack a day  Her tobacco use remains in equally potent risk factor - cessation was strongly advised       Weight loss of 45 lb in 3 years with abdominal pain with some reproducible tenderness, EGD and colonoscopy were negative since then  No cough  Normal TSH  CT without significant mesenteric vascular disease  Exam-as described above, has also seen surgery,GI, vascular and pain management  Does have delayed gastric emptying but overall symptoms do not suggest gastroparesis    Follow up in 6 months      Results for Mildred Townsend (MRN [id number]) as of 4/6/2022 15:02   Ref   Range 10/3/2019 12:21 2/9/2022 14:12   Cholesterol Latest Ref Range: See Comment mg/dL 225 (H) 133   Triglycerides Latest Ref Range: See Comment mg/dL 134 92   HDL Latest Ref Range: >=50 mg/dL 53 64   LDL Calculated Latest Ref Range: 0 - 100 mg/dL 145 (H) 51     Results for Hakeem Go (MRN [id number]) as of 1/21/2020 13:32   Ref   Range 4/15/2016 10:29 no meds 8/30/2016 08:45 9/4/2018 13:59 on rosuvastatin  10/3/2019 12:21 on rosuvastatin with questionable compliance   Cholesterol Latest Ref Range: 50 - 200 mg/dL 314 (H) 280 (H) 177  225 (H)   Triglycerides Latest Ref Range: <=150 mg/dL 153 (H) 137 135  134   HDL Latest Ref Range: 40 - 60 mg/dL 58 54 49  53   LDL Direct Latest Ref Range: 0 - 100 mg/dL 225 (H) 199 (H) 101 (H)  145 (H)            Patient Active Problem List   Diagnosis    Gastro-esophageal reflux disease without esophagitis    Right lower quadrant pain    Abnormal weight loss    Acute pain of left shoulder    Lumbar spondylosis    Osteoarthritis of spine with radiculopathy, cervical region    Degenerative cervical disc    Lumbar degenerative disc disease    Bilateral carpal tunnel syndrome    CAD in native artery    Depression with anxiety    Other hyperlipidemia    Essential hypertension    PVD (peripheral vascular disease) (HCC)    Headache disorder    Rheumatoid factor positive    Positive KRISTA (antinuclear antibody)    Left facial numbness    Myofascial pain syndrome    Calcific tendinitis of left shoulder    Incomplete tear of left rotator cuff    Neuroma of foot    Chronic hepatitis C without hepatic coma (HCC)    Colorectal polyps    Macrocytic    Irritable bowel syndrome with diarrhea    Incomplete rotator cuff tear or rupture of left shoulder, not specified as traumatic    Biceps tendinosis of left shoulder    Arthritis    Cervical radiculopathy    Cervical radiculitis    RUQ pain    Sacroiliitis (HCC)    Chronic pain syndrome    RUQ abdominal pain    Dilated bile duct    Gastroesophageal reflux disease    Radiculopathy, cervical    Trochanteric bursitis of right hip    Trochanteric bursitis of left hip    Lumbar radiculopathy    Chronic bilateral low back pain with left-sided sciatica    Osteoarthritis of cervical spine    Flushing    Tobacco abuse    Neck pain    High risk medication use    Undifferentiated connective tissue disease ([facility] )    Epigastric pain    Costochondritis, acute    Age-related osteoporosis without current pathological fracture    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HCC)     Past Medical History:   Diagnosis Date    Acquired ichthyosis     last assessed: 5/9/2013    Anxiety     Cervical radiculopathy     last assessed: 6/13/2014    Colorectal polyps     last assessed: 3/9/2015    COPD (chronic obstructive pulmonary disease) ([place] [address] )     Depression     Diverticulosis of colon     Foot pain     GERD (gastroesophageal reflux disease)     Hyperlipemia     Hypertension     Myocardial infarction ([place] [address] )     at age 28    Osteopenia     last assessed: 12/6/2013    Palpitations     last assessed: 12/31/2014    PVD (peripheral vascular disease) ([place] [address] )     last assessed: 12/3/2012    Scapular dyskinesis     last assessed: 11/17/2014    Shortness of breath     Tendonitis of left rotator cuff     last assessed: 11/17/2014    Vocal cord polyps     last assessed: 8/8/2014     Social History     Socioeconomic History    Marital status:      Spouse name: Not on file    Number of children: Not on file    Years of education: Not on file    Highest education level: Not on file   Occupational History    Not on file   Tobacco Use    Smoking status: Current Every Day Smoker     Packs/day: 0 50    Smokeless tobacco: Never Used    Tobacco comment: She is trying to cut back on her own   Vaping Use    Vaping Use: Never used   Substance and Sexual Activity    Alcohol use: Never     Comment: rare    Drug use: Never    Sexual activity: Not on file   Other Topics Concern    Not on file   Social History Narrative    No living will     Social Determinants of Health     Financial Resource Strain: Not on file   Food Insecurity: Not on file   Transportation Needs: Not on file   Physical Activity: Not on file   Stress: Not on file   Social Connections: Not on file   Intimate Partner Violence: Not on file   Housing Stability: Not on file      Family History   Problem Relation Age of Onset    No Known Problems Mother     No Known Problems Father     No Known Problems Maternal Grandmother     No Known Problems Maternal Grandfather     No Known Problems Paternal Grandmother     No Known Problems Paternal Grandfather     Early death Sister     Hypertension Sister     No Known Problems Maternal Aunt     No Known Problems Maternal Aunt     No Known Problems Maternal Aunt     No Known Problems Maternal Aunt      Past Surgical History:   Procedure Laterality Date    ABDOMINAL SURGERY      exploratory    CARDIAC SURGERY      cardiac stent      SECTION      last assessed: 2014    CHOLECYSTECTOMY      last assessed: 2014    COLONOSCOPY  10/27/2014    CORONARY ANGIOPLASTY WITH STENT PLACEMENT      LAD stent    DENTAL SURGERY      last assessed: 2014    ELBOW SURGERY Bilateral     arthroplasty    EPIDURAL BLOCK INJECTION N/A 2019    Procedure: C7 T1 Cervical Epidural Steroid Injection (42699); Surgeon: Duncan Hutchins MD;  [facility];  Service: Pain Management     EPIDURAL BLOCK INJECTION Bilateral 2019    Procedure: BLOCK / INJECTION EPIDURAL STEROID CERVICAL - C7-T1;  Surgeon: Duncan Hutchins MD;  [facility];  Service: Pain Management     EPIDURAL BLOCK INJECTION Left 10/3/2019    Procedure: C7-T1 interlaminar epidural steroid injection;  Surgeon: Duncan Hutchins MD;  [facility];  Service: Pain Management     ESOPHAGOGASTRODUODENOSCOPY      ESOPHAGOGASTRODUODENOSCOPY N/A 2016    Procedure: ESOPHAGOGASTRODUODENOSCOPY (EGD);   Surgeon: Itzel Jim MD;  [facility];  Service:     FL GUIDED NEEDLE PLAC BX/ASP/INJ  5/2/2019    FL GUIDED NEEDLE PLAC BX/ASP/INJ  7/31/2019    FL GUIDED NEEDLE PLAC BX/ASP/INJ  10/3/2019    FL INJECTION LEFT SHOULDER (ARTHROGRAM)  9/25/2018    FOOT SURGERY Right 02/06/2015    x 4    HYSTERECTOMY      last assessed: 8/8/2014    NY ARTHROCENTESIS ASPIR&/INJ MAJOR JT/BURSA W/O US Bilateral 7/31/2019    Procedure: GREATER TROCHANTERIC HIP INJECTION;  Surgeon: Belkys Robertson MD;  [facility];  Service: Pain Management     NY ARTHROSCOPY SHOULDER SURGICAL BICEPS TENODESIS Left 10/15/2018    Procedure: ARTHROSCOPY SHOULDER; Debridement of shoulder;  Surgeon: Glo Uribe MD;  [facility];  Service: Orthopedics    NY SHLDR ARTHROSCOP,SURG,W/ROTAT CUFF REPR Left 7/23/2018    Procedure: ARTHROSCOPY SHOULDER, subacromial decompression, synovectomy;  Surgeon: lGo Uribe MD;  [facility];  Service: Orthopedics    THROAT SURGERY      TOOTH EXTRACTION      TRIGEMINAL NERVE DECOMPRESSION Right 6/15/2018    Procedure: FOOT NEUROPLASTY;  Surgeon: Lisbet Benites DPM;  [facility];  Service: Podiatry       Current Outpatient Medications:     aspirin 81 mg chewable tablet, Chew 1 tablet daily Stop for the time being for foot surgery , Disp: , Rfl:     baclofen 10 mg tablet, Take 1 tablet (10 mg total) by mouth 3 (three) times a day, Disp: 90 tablet, Rfl: 6    ergocalciferol (VITAMIN D2) 50,000 units, take 1 capsule by mouth every week, Disp: 12 capsule, Rfl: 0    ezetimibe (ZETIA) 10 mg tablet, take 1 tablet by mouth daily, Disp: 90 tablet, Rfl: 3    hydroxychloroquine (PLAQUENIL) 200 mg tablet, Take 1 tablet (200 mg total) by mouth daily, Disp: 90 tablet, Rfl: 1    nitroglycerin (NITROSTAT) 0 4 mg SL tablet, Place 1 tablet (0 4 mg total) under the tongue every 5 (five) minutes as needed for chest pain, Disp: 90 tablet, Rfl: 3    oxyCODONE-acetaminophen (PERCOCET) 5-325 mg per tablet, Take 1 tablet by mouth every 12 (twelve) hours  , Disp: , Rfl:     rosuvastatin (CRESTOR) 5 mg tablet, take 1 tablet by mouth once daily, Disp: 90 tablet, Rfl: 3    dicyclomine (BENTYL) 10 mg capsule, Take 1 capsule (10 mg total) by mouth 4 (four) times a day (before meals and at bedtime) (Patient not taking: Reported on 4/6/2022 ), Disp: 30 capsule, Rfl: 1  Allergies   Allergen Reactions    Ceclor [Cefaclor] Anaphylaxis    Cephalexin     Codeine Itching     Reaction Date: [id number];     Gabapentin      Body tremors, sweats    Lyrica [Pregabalin]      Body tremors, chills, heaviness in chest    Ticlopidine Hives     Other reaction(s): Hives    Penicillins Rash     Other reaction(s): Other       Labs:not applicable  Imaging: No results found  Review of Systems:  Review of Systems   Constitutional: Negative  HENT: Negative  Eyes: Negative  Respiratory: Negative  Cardiovascular: Negative  Endocrine: Negative  Musculoskeletal: Positive for arthralgias and neck pain  Physical Exam:  /78   Pulse 87   Ht 5' (1 524 m)   Wt 47 8 kg (105 lb 6 4 oz)   BMI 20 58 kg/m²   Physical Exam  Constitutional:       General: She is not in acute distress  Appearance: She is well-developed  She is not diaphoretic  HENT:      Head: Normocephalic and atraumatic  Eyes:      General: No scleral icterus  Right eye: No discharge  Left eye: No discharge  Conjunctiva/sclera: Conjunctivae normal       Pupils: Pupils are equal, round, and reactive to light  Neck:      Thyroid: No thyromegaly  Vascular: No JVD  Trachea: No tracheal deviation  Cardiovascular:      Rate and Rhythm: Regular rhythm  Heart sounds: Normal heart sounds  No murmur heard  No friction rub  Pulmonary:      Effort: Pulmonary effort is normal  No respiratory distress  Breath sounds: Normal breath sounds  No stridor  Abdominal:      General: Abdomen is flat        Comments: Abdominal ?  Wall tenderness, no reproducible hernia, lifting her head up, no lungs appreciated at the time of my exam    Musculoskeletal:         General: No tenderness or deformity  Normal range of motion  Cervical back: Normal range of motion  Skin:     General: Skin is warm  Coloration: Skin is not pale  Findings: No erythema or rash

## 2022-04-12 ENCOUNTER — TELEPHONE (OUTPATIENT)
Dept: FAMILY MEDICINE CLINIC | Facility: CLINIC | Age: 57
End: 2022-04-12

## 2022-04-12 DIAGNOSIS — J06.9 UPPER RESPIRATORY TRACT INFECTION, UNSPECIFIED TYPE: Primary | ICD-10-CM

## 2022-04-12 RX ORDER — FLUTICASONE PROPIONATE 50 MCG
2 SPRAY, SUSPENSION (ML) NASAL DAILY
Qty: 16 G | Refills: 0 | Status: SHIPPED | OUTPATIENT
Start: 2022-04-12 | End: 2022-04-27

## 2022-04-12 RX ORDER — AZITHROMYCIN 250 MG/1
TABLET, FILM COATED ORAL
Qty: 6 TABLET | Refills: 0 | Status: SHIPPED | OUTPATIENT
Start: 2022-04-12 | End: 2022-04-16

## 2022-04-12 NOTE — TELEPHONE ENCOUNTER
C/o cough, runny nose, body aches, diarrhea  She took two home covid tests that were negative  Can you rx something?

## 2022-04-12 NOTE — TELEPHONE ENCOUNTER
I put in for Flonase and Z-Hao  Also should try getting generic Claritin over-the-counter  Push fluids    Call symptoms continue or increase

## 2022-04-18 ENCOUNTER — TELEPHONE (OUTPATIENT)
Dept: FAMILY MEDICINE CLINIC | Facility: CLINIC | Age: 57
End: 2022-04-18

## 2022-04-18 ENCOUNTER — HOSPITAL ENCOUNTER (OUTPATIENT)
Dept: RADIOLOGY | Facility: HOSPITAL | Age: 57
Discharge: HOME | End: 2022-04-18
Payer: COMMERCIAL

## 2022-04-18 DIAGNOSIS — J06.9 UPPER RESPIRATORY TRACT INFECTION, UNSPECIFIED TYPE: Primary | ICD-10-CM

## 2022-04-18 DIAGNOSIS — J06.9 UPPER RESPIRATORY TRACT INFECTION, UNSPECIFIED TYPE: ICD-10-CM

## 2022-04-18 PROCEDURE — 71046 X-RAY EXAM CHEST 2 VIEWS: CPT

## 2022-04-18 NOTE — TELEPHONE ENCOUNTER
Just finished the z-pack, having bad pain across the back from coughing so much  Feels like mucous in the lungs/chest  What to do?

## 2022-04-18 NOTE — TELEPHONE ENCOUNTER
I ordered a chest x-ray  Should get generic Mucinex DM over-the-counter  Push fluids  She should make an appointment here to be seen  If healing worse, should go to the ER

## 2022-04-21 DIAGNOSIS — S22.43XD MULTIPLE CLOSED FRACTURES OF RIBS OF BOTH SIDES WITH ROUTINE HEALING, SUBSEQUENT ENCOUNTER: Primary | ICD-10-CM

## 2022-04-21 DIAGNOSIS — R09.89 HYPERINFLATION OF LUNGS: ICD-10-CM

## 2022-04-21 RX ORDER — DICLOFENAC SODIUM 75 MG/1
75 TABLET, DELAYED RELEASE ORAL 2 TIMES DAILY
Qty: 30 TABLET | Refills: 0 | Status: SHIPPED | OUTPATIENT
Start: 2022-04-21 | End: 2022-05-02 | Stop reason: SDUPTHER

## 2022-04-21 RX ORDER — OMEPRAZOLE 20 MG/1
20 CAPSULE, DELAYED RELEASE ORAL DAILY
COMMUNITY
Start: 2022-04-20

## 2022-04-27 ENCOUNTER — OFFICE VISIT (OUTPATIENT)
Dept: FAMILY MEDICINE CLINIC | Facility: CLINIC | Age: 57
End: 2022-04-27
Payer: COMMERCIAL

## 2022-04-27 ENCOUNTER — APPOINTMENT (OUTPATIENT)
Dept: LAB | Facility: MEDICAL CENTER | Age: 57
End: 2022-04-27
Payer: COMMERCIAL

## 2022-04-27 VITALS
BODY MASS INDEX: 21.05 KG/M2 | OXYGEN SATURATION: 100 % | HEART RATE: 78 BPM | TEMPERATURE: 96.8 F | SYSTOLIC BLOOD PRESSURE: 130 MMHG | DIASTOLIC BLOOD PRESSURE: 86 MMHG | WEIGHT: 107.2 LBS | HEIGHT: 60 IN

## 2022-04-27 DIAGNOSIS — R19.04 LEFT LOWER QUADRANT ABDOMINAL MASS: ICD-10-CM

## 2022-04-27 DIAGNOSIS — K52.9 INFLAMMATORY BOWEL DISEASE: ICD-10-CM

## 2022-04-27 DIAGNOSIS — S22.43XD MULTIPLE CLOSED FRACTURES OF RIBS OF BOTH SIDES WITH ROUTINE HEALING, SUBSEQUENT ENCOUNTER: Primary | ICD-10-CM

## 2022-04-27 PROCEDURE — 82653 EL-1 FECAL QUANTITATIVE: CPT

## 2022-04-27 PROCEDURE — 99213 OFFICE O/P EST LOW 20 MIN: CPT | Performed by: FAMILY MEDICINE

## 2022-04-27 NOTE — PROGRESS NOTES
Assessment/Plan: We will call patient with results of her chest CT scan that she is going to have on Monday  She will follow-up with surgery for her hiatal hernia  CT scan of her abdomen and pelvis ordered for 6 months to follow-up on her left lower quadrant mass  I encouraged her to continue to cut back and hopefully quit smoking  Follow-up here in 3 months or p r n  Problem List Items Addressed This Visit     None      Visit Diagnoses     Multiple closed fractures of ribs of both sides with routine healing, subsequent encounter    -  Primary    Left lower quadrant abdominal mass        Relevant Orders    CT abdomen pelvis w contrast           Diagnoses and all orders for this visit:    Multiple closed fractures of ribs of both sides with routine healing, subsequent encounter    Left lower quadrant abdominal mass  -     CT abdomen pelvis w contrast; Future      No problem-specific Assessment & Plan notes found for this encounter  Subjective:      Patient ID: Zachery Lomas is a 64 y o  female  Patient here today for follow-up on her rib fractures  Patient had been sick and coughing a lot when she developed left-sided rib pain  Recent chest x-ray showed bilateral rib fracture  Patient states feeling better, still has pain especially on the left side  Of note, CT scan done by vascular showed a small sub cm mass in her left lower quadrant  This needs follow-up in 6 months  Patient is going to follow-up with surgery for her history of hiatal hernia    Patient is down to smoking about 4 cigarettes a day      The following portions of the patient's history were reviewed and updated as appropriate:   She has a past medical history of Acquired ichthyosis, Anxiety, Cervical radiculopathy, Colorectal polyps, COPD (chronic obstructive pulmonary disease) ([address] ), Depression, Diverticulosis of colon, Foot pain, GERD (gastroesophageal reflux disease), Hyperlipemia, Hypertension, Myocardial infarction ([facility] ), Osteopenia, Palpitations, PVD (peripheral vascular disease) ([facility] [address] ), Scapular dyskinesis, Shortness of breath, Tendonitis of left rotator cuff, and Vocal cord polyps  ,  does not have any pertinent problems on file  ,   has a past surgical history that includes Foot surgery (Right, 2015); Hysterectomy; Cholecystectomy;  section; Cardiac surgery; Throat surgery; Elbow surgery (Bilateral); Abdominal surgery; Esophagogastroduodenoscopy; Colonoscopy (10/27/2014); Tooth extraction; Esophagogastroduodenoscopy (N/A, 2016); Dental surgery; Coronary angioplasty with stent (); Trigeminal nerve decompression (Right, 6/15/2018); pr shldr arthroscop,surg,w/rotat cuff repr (Left, 2018); FL injection left shoulder (arthrogram) (2018); pr arthroscopy shoulder surgical biceps tenodesis (Left, 10/15/2018); Epidural block injection (N/A, 2019); Epidural block injection (Bilateral, 2019); FL guided needle plac bx/asp/inj (2019); pr arthrocentesis aspir&/inj major jt/bursa w/o us (Bilateral, 2019); FL guided needle plac bx/asp/inj (2019); Epidural block injection (Left, 10/3/2019); and FL guided needle plac bx/asp/inj (10/3/2019)  ,  family history includes Early death in her sister; Hypertension in her sister; No Known Problems in her father, maternal aunt, maternal aunt, maternal aunt, maternal aunt, maternal grandfather, maternal grandmother, mother, paternal grandfather, and paternal grandmother  ,   reports that she has been smoking  She has been smoking about 0 50 packs per day  She has never used smokeless tobacco  She reports that she does not drink alcohol and does not use drugs  ,  is allergic to ceclor [cefaclor], cephalexin, codeine, gabapentin, lyrica [pregabalin], ticlopidine, and penicillins     Current Outpatient Medications   Medication Sig Dispense Refill    aspirin 81 mg chewable tablet Chew 1 tablet daily Stop for the time being for foot surgery       baclofen 10 mg tablet Take 1 tablet (10 mg total) by mouth 3 (three) times a day 90 tablet 6    diclofenac (VOLTAREN) 75 mg EC tablet Take 1 tablet (75 mg total) by mouth 2 (two) times a day 30 tablet 0    ergocalciferol (VITAMIN D2) 50,000 units take 1 capsule by mouth every week 12 capsule 0    ezetimibe (ZETIA) 10 mg tablet take 1 tablet by mouth daily 90 tablet 3    hydroxychloroquine (PLAQUENIL) 200 mg tablet Take 1 tablet (200 mg total) by mouth daily 90 tablet 1    omeprazole (PriLOSEC) 20 mg delayed release capsule Take 20 mg by mouth daily      oxyCODONE-acetaminophen (PERCOCET) 5-325 mg per tablet Take 1 tablet by mouth every 12 (twelve) hours        rosuvastatin (CRESTOR) 5 mg tablet take 1 tablet by mouth once daily 90 tablet 3    nitroglycerin (NITROSTAT) 0 4 mg SL tablet Place 1 tablet (0 4 mg total) under the tongue every 5 (five) minutes as needed for chest pain (Patient not taking: Reported on 4/27/2022 ) 90 tablet 3     No current facility-administered medications for this visit  Review of Systems   Constitutional: Negative  Respiratory: Negative  Cardiovascular: Positive for chest pain (As per HPI)  Negative for palpitations and leg swelling  Gastrointestinal: Negative  Genitourinary: Negative  Objective:  Vitals:    04/27/22 1432   BP: 130/86   Pulse: 78   Temp: (!) 96 8 °F (36 °C)   SpO2: 100%   Weight: 48 6 kg (107 lb 3 2 oz)   Height: 5' (1 524 m)     Body mass index is 20 94 kg/m²  Physical Exam  Vitals reviewed  Constitutional:       General: She is not in acute distress  Appearance: Normal appearance  She is well-developed  She is not ill-appearing, toxic-appearing or diaphoretic  HENT:      Head: Normocephalic and atraumatic  Eyes:      Conjunctiva/sclera: Conjunctivae normal    Cardiovascular:      Rate and Rhythm: Normal rate and regular rhythm  Heart sounds: Normal heart sounds  No murmur heard  No friction rub  No gallop      Pulmonary: Effort: Pulmonary effort is normal  No respiratory distress  Breath sounds: Normal breath sounds  No wheezing, rhonchi or rales  Musculoskeletal:      Right lower leg: No edema  Left lower leg: No edema  Neurological:      General: No focal deficit present  Mental Status: She is alert and oriented to person, place, and time  Psychiatric:         Mood and Affect: Mood normal          Behavior: Behavior normal          Thought Content:  Thought content normal          Judgment: Judgment normal

## 2022-05-02 ENCOUNTER — HOSPITAL ENCOUNTER (OUTPATIENT)
Dept: CT IMAGING | Facility: HOSPITAL | Age: 57
Discharge: HOME | End: 2022-05-02
Payer: COMMERCIAL

## 2022-05-02 DIAGNOSIS — S22.43XD MULTIPLE CLOSED FRACTURES OF RIBS OF BOTH SIDES WITH ROUTINE HEALING, SUBSEQUENT ENCOUNTER: ICD-10-CM

## 2022-05-02 DIAGNOSIS — R09.89 HYPERINFLATION OF LUNGS: ICD-10-CM

## 2022-05-02 PROCEDURE — G1004 CDSM NDSC: HCPCS

## 2022-05-02 PROCEDURE — 71250 CT THORAX DX C-: CPT

## 2022-05-02 RX ORDER — DICLOFENAC SODIUM 75 MG/1
75 TABLET, DELAYED RELEASE ORAL 2 TIMES DAILY
Qty: 30 TABLET | Refills: 0 | Status: SHIPPED | OUTPATIENT
Start: 2022-05-02 | End: 2022-05-18 | Stop reason: SDUPTHER

## 2022-05-04 LAB — ELASTASE PANC STL-MCNT: 221 UG ELAST./G

## 2022-05-18 DIAGNOSIS — S22.43XD MULTIPLE CLOSED FRACTURES OF RIBS OF BOTH SIDES WITH ROUTINE HEALING, SUBSEQUENT ENCOUNTER: ICD-10-CM

## 2022-05-19 RX ORDER — DICLOFENAC SODIUM 75 MG/1
75 TABLET, DELAYED RELEASE ORAL 2 TIMES DAILY
Qty: 30 TABLET | Refills: 0 | Status: SHIPPED | OUTPATIENT
Start: 2022-05-19 | End: 2022-05-31 | Stop reason: SDUPTHER

## 2022-05-24 ENCOUNTER — VBI (OUTPATIENT)
Dept: ADMINISTRATIVE | Facility: OTHER | Age: 57
End: 2022-05-24

## 2022-05-31 DIAGNOSIS — S22.43XD MULTIPLE CLOSED FRACTURES OF RIBS OF BOTH SIDES WITH ROUTINE HEALING, SUBSEQUENT ENCOUNTER: ICD-10-CM

## 2022-05-31 RX ORDER — DICLOFENAC SODIUM 75 MG/1
75 TABLET, DELAYED RELEASE ORAL 2 TIMES DAILY
Qty: 30 TABLET | Refills: 0 | Status: SHIPPED | OUTPATIENT
Start: 2022-05-31 | End: 2022-06-13 | Stop reason: ALTCHOICE

## 2022-06-13 ENCOUNTER — OFFICE VISIT (OUTPATIENT)
Dept: VASCULAR SURGERY | Facility: HOSPITAL | Age: 57
End: 2022-06-13
Payer: COMMERCIAL

## 2022-06-13 VITALS
WEIGHT: 104 LBS | DIASTOLIC BLOOD PRESSURE: 76 MMHG | BODY MASS INDEX: 20.42 KG/M2 | SYSTOLIC BLOOD PRESSURE: 122 MMHG | HEIGHT: 60 IN | HEART RATE: 86 BPM

## 2022-06-13 DIAGNOSIS — K55.1 SUPERIOR MESENTERIC ARTERY STENOSIS (HCC): Primary | ICD-10-CM

## 2022-06-13 PROCEDURE — 99214 OFFICE O/P EST MOD 30 MIN: CPT | Performed by: SURGERY

## 2022-06-13 NOTE — PROGRESS NOTES
Assessment/Plan:    Superior mesenteric artery stenosis [facility])  Patient returns to the office review results of her CTA of the abdomen pelvis which was obtained due to mesenteric duplex demonstrating elevated velocities of the SMA  She continues to have chronic abdominal pain  She reports she is unable to eat  She reports that she can not eat any eggs or dairy or her stomach blows up like a volcano  Her CTA demonstrates a widely patent celiac and SMA  No vascular etiology to account for patient's symptoms  She may follow up with the vascular office on an as-needed basis  Of note 1 of her listed diagnoses is irritable bowel syndrome  Recommend she follow-up with gastroenterology for possible discussion of repeat EGD if deemed indicated  Diagnoses and all orders for this visit:    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[facility] [address] )          Subjective:      Patient ID: Sebas Geiger is a 64 y o  female  Pt is here to rev CTA of abd/ pelvis done on 4/01/22  Pt c/o still progressively losing weight  Pt is taking rosuvastatin, and is a current smoker  Gloria returns to the office review results of her CTA of the abdomen pelvis  She continues to complain of chronic abdominal pain  She complains of inability to eat and continued weight loss  The following portions of the patient's history were reviewed and updated as appropriate: allergies, current medications, past family history, past medical history, past social history, past surgical history and problem list     Review of Systems   Constitutional: Positive for unexpected weight change  HENT: Negative  Eyes: Negative  Respiratory: Negative  Cardiovascular: Negative  Gastrointestinal: Negative  Endocrine: Negative  Genitourinary: Negative  Musculoskeletal: Negative  Skin: Negative  Allergic/Immunologic: Negative  Neurological: Negative  Hematological: Negative  Psychiatric/Behavioral: Negative  Objective:      /76 (BP Location: Left arm, Patient Position: Sitting, Cuff Size: Standard)   Pulse 86   Ht 5' (1 524 m)   Wt 47 2 kg (104 lb)   BMI 20 31 kg/m²          Physical Exam  Constitutional:       General: She is not in acute distress  Appearance: She is not ill-appearing, toxic-appearing or diaphoretic  HENT:      Head: Normocephalic and atraumatic  Right Ear: External ear normal       Left Ear: External ear normal       Nose: No congestion or rhinorrhea  Eyes:      Extraocular Movements: Extraocular movements intact  Cardiovascular:      Rate and Rhythm: Normal rate  Pulmonary:      Effort: Pulmonary effort is normal    Abdominal:      General: Abdomen is flat  Musculoskeletal:         General: Normal range of motion  Cervical back: Normal range of motion  Skin:     Coloration: Skin is not jaundiced or pale  Findings: No bruising, erythema, lesion or rash  Neurological:      Mental Status: She is alert  Psychiatric:         Mood and Affect: Mood normal          Behavior: Behavior normal          Thought Content: Thought content normal          Judgment: Judgment normal        CTA abdomen pelvis:  Impression:      1  No significant stenosis of the superior mesenteric artery  Patent pancreaticoduodenal arcade and celiac artery  See additional details as described above

## 2022-06-13 NOTE — ASSESSMENT & PLAN NOTE
Patient returns to the office review results of her CTA of the abdomen pelvis which was obtained due to mesenteric duplex demonstrating elevated velocities of the SMA  She continues to have chronic abdominal pain  She reports she is unable to eat  She reports that she can not eat any eggs or dairy or her stomach blows up like a volcano  Her CTA demonstrates a widely patent celiac and SMA  No vascular etiology to account for patient's symptoms  She may follow up with the vascular office on an as-needed basis  Of note 1 of her listed diagnoses is irritable bowel syndrome  Recommend she follow-up with gastroenterology for possible discussion of repeat EGD if deemed indicated

## 2022-06-15 DIAGNOSIS — E55.9 VITAMIN D DEFICIENCY: ICD-10-CM

## 2022-06-16 RX ORDER — ERGOCALCIFEROL 1.25 MG/1
CAPSULE ORAL
Qty: 12 CAPSULE | Refills: 0 | Status: SHIPPED | OUTPATIENT
Start: 2022-06-16

## 2022-07-06 NOTE — PROGRESS NOTES
Assessment and Plan: Toño Mata is a 64 y o   female who presents for follow-up of her UCTD  Was not taking amlodipine for her Raynaud's symptoms, so it was restarted  Was not taking alendronate for her osteoporosis, so it was restarted  Left trochanteric bursa steroid injection given in clinic today, which patient tolerated well  Continue hydroxychloroquine daily; continue regular eye exams  Restart amlodipine at 2 5mg daily for Raynaud's symptoms  Restart alendronate 70mg once a week for osteoporosis; take on an empty stomach with a full glass of water, and do not lie down for 30 minutes after  Continue weekly Vit  D for now  Do lab  Left trochanteric bursa steroid injection given in clinic today    Return to clinic in 6 months    Plan:  Diagnoses and all orders for this visit:    Undifferentiated connective tissue disease ([facility] [address] )    Rheumatoid arthritis with positive rheumatoid factor, involving unspecified site ([facility])  -     hydroxychloroquine (PLAQUENIL) 200 mg tablet; Take 1 tablet (200 mg total) by mouth daily    Osteoporosis, unspecified osteoporosis type, unspecified pathological fracture presence  -     Vitamin D 25 hydroxy  -     alendronate (FOSAMAX) 70 mg tablet; Take 1 tablet (70 mg total) by mouth every 7 days Take on an empty stomach with a full glass of water, and do not lie down for 30 minutes after    Raynaud's disease without gangrene  -     amLODIPine (NORVASC) 2 5 mg tablet;  Take 1 tablet (2 5 mg total) by mouth daily at bedtime    Vitamin D deficiency  -     Vitamin D 25 hydroxy    Greater trochanteric bursitis of left hip  -     lidocaine (XYLOCAINE) 1 % injection 1 mL  -     triamcinolone acetonide (KENALOG-40) 40 mg/mL injection 40 mg  -     Large joint arthrocentesis: L greater trochanteric bursa    High risk medication use   High risk medication use - Benefits and risks of hydroxychloroquine, including but not limited to retinal toxicity, corneal deposits, gastrointestinal side effects, and headaches were discussed with the patient  The need for a regular eye exam to monitor for ocular toxicity while on this medication was also explained to the patient  Large joint arthrocentesis: L greater trochanteric bursa  Universal Protocol:  Consent: Verbal consent obtained  Written consent not obtained  Risks and benefits: risks, benefits and alternatives were discussed  Consent given by: patient  Time out: Immediately prior to procedure a "time out" was called to verify the correct patient, procedure, equipment, support staff and site/side marked as required  Timeout called at: 7/7/2022 10:30 AM   Patient understanding: patient states understanding of the procedure being performed  Patient consent: the patient's understanding of the procedure matches consent given  Procedure consent: procedure consent matches procedure scheduled  Relevant documents: relevant documents present and verified  Test results: test results available and properly labeled  Site marked: the operative site was marked  Radiology Images displayed and confirmed  If images not available, report reviewed: imaging studies available  Required items: required blood products, implants, devices, and special equipment available  Patient identity confirmed: verbally with patient    Supporting Documentation  Indications: pain   Procedure Details  Location: hip - L greater trochanteric bursa  Preparation: Patient was prepped and draped in the usual sterile fashion  Needle size: 25 G  Ultrasound guidance: no  Approach: lateral    Patient tolerance: patient tolerated the procedure well with no immediate complications  Dressing:  Sterile dressing applied    After discussing the risks/benefits of left trochanteric bursa steroid injection, including minor risk of infection, bleeding, pain, or ineffectiveness, informed consent was obtained and patient was agreeable to proceed    Using sterile technique, the left hip bursa area was prepped with Chloraprep, and was topically anesthetized with Ethyl Chloride  The bursa was entered using a lateral approach and Kenalog 40 mg and 1% lidocaine 1 mL were then injected and the needle withdrawn  The procedure was well tolerated  Patient was instructed to contact our office with any concerns or questions  Follow-up plan: Return to clinic in 6 months        Rheumatic Disease Summary  Nori Mcdaniel is a 29 W  Bebe Cheese originally presented on 5/22/20 as a Rheumatology consult referred by her Jewels Wheeler DO for evaluation of possible inflammatory arthritis given positive RF of 40 and KRISTA of 1:80 in a homogenous pattern  Patient has history of negative anti-CCP  Lupus activity labs, anti-centromere Ab, and anti-RNP ordered and returned unremarkable  ESR/CRP returned normal  Patient had some features to at least make a diagnosis of undifferentiated connective tissue disease, including arthralgia, photosensitivity, facial rashes, and Raynaud's symptoms  Her arthritis symptoms were not typical of RA since she denied morning stiffness and her joint symptoms were worse later in the day and with use  Hand and feet x-rays ordered to evaluate for any inflammatory changes returned unremarkable  For time-being, started patient on hydroxychloroquine 200mg po daily; asked her to get regular eye exams while on this medication to monitor for plaquenil-related retinal toxicity  8/28/20 via telemedicine: Tamanna Jones is a 54 y o  female who presents for follow-up of UCTD (arthralgia, photosensitivity, facial rashes, and Raynaud's symptoms)  Her arthralgia has improved since starting HCQ  Based on her weight, have increased her hydroxychloroquine to 300mg po daily  Changed her diclofenac prescription to 75mg po bid instead of 50mg po tid, and continued baclofen 10mg po tid       1/18/22 telemedicine: Tamanna Jones is a 64 y o  female who presented for follow-up of UCTD (arthralgia, photosensitivity, facial rashes, and Raynaud's symptoms)  What was most significantly bothering patient is was abdominal pain and bloating sensation since she moved a dresser several months ago; it had slightly improved since then, but was still bothersome  Had seen multiple GI, and will be getting an EGD and colonoscopy soon for further workup  Had so far been diagnosed with delayed gastric emptying  She had lost weight to the point of being 110 pounds due to eating making the abdominal pain worse, which was present all the time  Did not start Fosamax yet, but planned to after I explained the importance of medical therapy for her osteoporosis  Her left hip bursa area significantly bothered her, but bursa injections in the past didn't help; would like to arrange a hip bursa injection visit with me  Raynaud's symptoms were not fully under control  She was to do lupus activity labs  Decreased hydroxychloroquine to 1 tab daily since weight loss  Started alendronate once a week, empty stomach, full glass of water, and do not lie down for 30 minutes after  Increased amlodipine to 5mg daily for Raynaud's symptoms  Continued weekly Vit  D for history of Vit  D deficiency  1/27/22: Patient comes for injection into her left trochanteric bursa  She had recent visit on 1/18/22  Patient reports no new complaints  Raynaud's signs seem to be better controled with increased dose of amlodipine  Patient tolerates alendronate well  Patient already has follow up appointment scheduled in 6 months  Left hip bursa steroid injection given in clinic, which patient tolerated well  Is to continue the rest of her medications and do hip bursitis exercises    HPI  Lauren Crook is a 62 y o   female with undifferentiated connective tissue disesaese, Rheunaud's syndrome, rheumatoid arthritis, who presents for follow-up of her UCTD  Last visit was 1/27/22  Has left hip bursa pain today      The following portions of the patient's history were reviewed and updated as appropriate: allergies, current medications, past family history, past medical history, past social history, past surgical history and problem list     Review of Systems:   Review of Systems   Constitutional: Negative for fatigue  HENT: Negative for mouth sores  Eyes: Negative for pain  Respiratory: Negative for shortness of breath  Cardiovascular: Negative for leg swelling  Musculoskeletal: Positive for arthralgias  Negative for joint swelling  Skin: Negative for rash  Neurological: Negative for weakness  Hematological: Negative for adenopathy  Psychiatric/Behavioral: Negative for sleep disturbance         Home Medications:    Current Outpatient Medications:     alendronate (FOSAMAX) 70 mg tablet, Take 1 tablet (70 mg total) by mouth every 7 days Take on an empty stomach with a full glass of water, and do not lie down for 30 minutes after, Disp: 12 tablet, Rfl: 1    amLODIPine (NORVASC) 2 5 mg tablet, Take 1 tablet (2 5 mg total) by mouth daily at bedtime, Disp: 90 tablet, Rfl: 1    baclofen 10 mg tablet, Take 1 tablet (10 mg total) by mouth 3 (three) times a day, Disp: 90 tablet, Rfl: 6    ergocalciferol (VITAMIN D2) 50,000 units, take 1 capsule by mouth every week, Disp: 12 capsule, Rfl: 0    ezetimibe (ZETIA) 10 mg tablet, take 1 tablet by mouth daily, Disp: 90 tablet, Rfl: 3    hydroxychloroquine (PLAQUENIL) 200 mg tablet, Take 1 tablet (200 mg total) by mouth daily, Disp: 90 tablet, Rfl: 1    omeprazole (PriLOSEC) 20 mg delayed release capsule, Take 20 mg by mouth daily, Disp: , Rfl:     oxyCODONE-acetaminophen (PERCOCET) 5-325 mg per tablet, Take 1 tablet by mouth every 12 (twelve) hours, Disp: , Rfl:     rosuvastatin (CRESTOR) 5 mg tablet, take 1 tablet by mouth once daily, Disp: 90 tablet, Rfl: 3    aspirin 81 mg chewable tablet, Chew 1 tablet daily Stop for the time being for foot surgery  (Patient not taking: No sig reported), Disp: , Rfl:    nitroglycerin (NITROSTAT) 0 4 mg SL tablet, Place 1 tablet (0 4 mg total) under the tongue every 5 (five) minutes as needed for chest pain (Patient not taking: Reported on 7/7/2022), Disp: 90 tablet, Rfl: 3    Objective:    Vitals:    07/07/22 1039   BP: 114/75   Pulse: 66   Weight: 47 6 kg (105 lb)   Height: 5' (1 524 m)       Physical Exam  Constitutional:       General: She is not in acute distress  HENT:      Head: Normocephalic and atraumatic  Eyes:      Conjunctiva/sclera: Conjunctivae normal    Cardiovascular:      Rate and Rhythm: Normal rate and regular rhythm  Heart sounds: S1 normal and S2 normal      No friction rub  Pulmonary:      Effort: Pulmonary effort is normal  No respiratory distress  Breath sounds: Normal breath sounds  No wheezing, rhonchi or rales  Musculoskeletal:         General: Tenderness present  Cervical back: Neck supple  Comments: Left trochanteric bursa tenderness   Skin:     Coloration: Skin is not pale  Neurological:      Mental Status: She is alert  Mental status is at baseline  Psychiatric:         Mood and Affect: Mood normal          Behavior: Behavior normal          Reviewed labs and imaging  Imaging:   CT chest wo contrast 5/2/22  Mild emphysematous changes  No worrisome pulmonary mass  No acute pulmonary process  Chronic fractures of the posterior right eighth and ninth ribs redemonstrated  CTA abdomen pelvis 4/1/22  1  No significant stenosis of the superior mesenteric artery  Patent pancreaticoduodenal arcade and celiac artery  See additional details as described above  2   Moderate descending and sigmoid colonic diverticulosis  3   7 mm x 5 mm peritoneal nodule in the left lower quadrant anterior abdomen, possibly new since the previous study from May 2021  This is indeterminate in etiology  Recommend follow-up CT in 6 months      DXA scan 9/8/21  RESULTS:      LUMBAR SPINE L2-L4 (L1 vertebra excluded from analysis due to local structural abnormalities or artifact): BMD  0 716  gm/cm2   T-score -3 3         LEFT  TOTAL HIP:   BMD:  0 699  gm/cm2    T-score:  -2 0     LEFT  FEMORAL NECK:   BMD:  0 610  gm/cm2   T score: -2 2      LEFT  FOREARM:    33% RADIUS BMD:  0 546  gm/cm2  T-score:  -2 4         IMPRESSION:     1  Osteoporosis  [Based on the lumbar spine]    CXR 4/23/21   Unremarkable    DXA scan 9/8/21  RESULTS:      LUMBAR SPINE L2-L4 (L1 vertebra excluded from analysis due to local structural abnormalities or artifact): BMD  0 716  gm/cm2   T-score -3 3      LEFT  TOTAL HIP:   BMD:  0 699  gm/cm2    T-score:  -2 0     LEFT  FEMORAL NECK:   BMD:  0 610  gm/cm2   T score: -2 2      LEFT  FOREARM:    33% RADIUS BMD:  0 546  gm/cm2  T-score:  -2 4         IMPRESSION:  1  Osteoporosis   [Based on the lumbar spine]    Labs:   Office Visit on 07/07/2022   Component Date Value Ref Range Status    Vit D, 25-Hydroxy 07/07/2022 62 4  30 0 - 100 0 ng/mL Final   Appointment on 04/27/2022   Component Date Value Ref Range Status    Pancreatic Elastase-1 04/27/2022 221  >200 ug Elast /g Final           Severe Pancreatic Insufficiency:          <100         Moderate Pancreatic Insufficiency:   100 - 200         Normal:                                   >200   Appointment on 02/09/2022   Component Date Value Ref Range Status    Cholesterol 02/09/2022 133  See Comment mg/dL Final    Cholesterol:         Pediatric <18 Years        Desirable          <170 mg/dL      Borderline High    170-199 mg/dL      High               >=200 mg/dL        Adult >=18 Years            Desirable         <200 mg/dL      Borderline High   200-239 mg/dL      High              >239 mg/dL      Triglycerides 02/09/2022 92  See Comment mg/dL Final    Triglyceride:     0-9Y            <75mg/dL     10Y-17Y         <90 mg/dL       >=18Y     Normal          <150 mg/dL     Borderline High 150-199 mg/dL     High            200-499 mg/dL        Very High       >499 mg/dL    Specimen collection should occur prior to N-Acetylcysteine or Metamizole administration due to the potential for falsely depressed results   HDL, Direct 02/09/2022 64  >=50 mg/dL Final    LDL Calculated 02/09/2022 51  0 - 100 mg/dL Final    This screening LDL is a calculated result  It does not have the accuracy of the Direct Measured LDL in the monitoring of patients with hyperlipidemia and/or statin therapy  Direct Measure LDL (UDM068) must be ordered separately in these patients  LDL Cholesterol:     Optimal           <100 mg/dl     Near Optimal      100-129 mg/dl     Above Optimal       Borderline High 130-159 mg/dl       High            160-189 mg/dl       Very High       >189 mg/dl           TSH 3RD [facility] 02/09/2022 1 030  0 358 - 3 740 uIU/mL Final    The recommended reference ranges for TSH during pregnancy are as follows:   First trimester 0 1 to 2 5 uIU/mL   Second trimester  0 2 to 3 0 uIU/mL   Third trimester 0 3 to 3 0 uIU/m    Note: Normal ranges may not apply to patients who are transgender, non-binary, or whose legal sex, sex at birth, and gender identity differ     Telemedicine on 01/18/2022   Component Date Value Ref Range Status    WBC 02/09/2022 6 43  4 31 - 10 16 Thousand/uL Final    RBC 02/09/2022 4 44  3 81 - 5 12 Million/uL Final    Hemoglobin 02/09/2022 14 4  11 5 - 15 4 g/dL Final    Hematocrit 02/09/2022 44 3  34 8 - 46 1 % Final    MCV 02/09/2022 100 (A) 82 - 98 fL Final    MCH 02/09/2022 32 4  26 8 - 34 3 pg Final    MCHC 02/09/2022 32 5  31 4 - 37 4 g/dL Final    RDW 02/09/2022 12 5  11 6 - 15 1 % Final    MPV 02/09/2022 10 8  8 9 - 12 7 fL Final    Platelets 97/94/9825 235  149 - 390 Thousands/uL Final    nRBC 02/09/2022 0  /100 WBCs Final    Neutrophils Relative 02/09/2022 49  43 - 75 % Final    Immat GRANS % 02/09/2022 0  0 - 2 % Final    Lymphocytes Relative 02/09/2022 41  14 - 44 % Final    Monocytes Relative 02/09/2022 7  4 - 12 % Final    Eosinophils Relative 02/09/2022 2  0 - 6 % Final    Basophils Relative 02/09/2022 1  0 - 1 % Final    Neutrophils Absolute 02/09/2022 3 17  1 85 - 7 62 Thousands/µL Final    Immature Grans Absolute 02/09/2022 0 01  0 00 - 0 20 Thousand/uL Final    Lymphocytes Absolute 02/09/2022 2 62  0 60 - 4 47 Thousands/µL Final    Monocytes Absolute 02/09/2022 0 45  0 17 - 1 22 Thousand/µL Final    Eosinophils Absolute 02/09/2022 0 14  0 00 - 0 61 Thousand/µL Final    Basophils Absolute 02/09/2022 0 04  0 00 - 0 10 Thousands/µL Final    Sodium 02/09/2022 141  136 - 145 mmol/L Final    Potassium 02/09/2022 3 6  3 5 - 5 3 mmol/L Final    Chloride 02/09/2022 105  100 - 108 mmol/L Final    CO2 02/09/2022 31  21 - 32 mmol/L Final    ANION GAP 02/09/2022 5  4 - 13 mmol/L Final    BUN 02/09/2022 4 (A) 5 - 25 mg/dL Final    Creatinine 02/09/2022 0 67  0 60 - 1 30 mg/dL Final    Standardized to IDMS reference method    Glucose, Fasting 02/09/2022 93  65 - 99 mg/dL Final    Specimen collection should occur prior to Sulfasalazine administration due to the potential for falsely depressed results  Specimen collection should occur prior to Sulfapyridine administration due to the potential for falsely elevated results   Calcium 02/09/2022 9 5  8 3 - 10 1 mg/dL Final    AST 02/09/2022 15  5 - 45 U/L Final    Specimen collection should occur prior to Sulfasalazine administration due to the potential for falsely depressed results   ALT 02/09/2022 14  12 - 78 U/L Final    Specimen collection should occur prior to Sulfasalazine and/or Sulfapyridine administration due to the potential for falsely depressed results       Alkaline Phosphatase 02/09/2022 53  46 - 116 U/L Final    Total Protein 02/09/2022 7 6  6 4 - 8 2 g/dL Final    Albumin 02/09/2022 4 4  3 5 - 5 0 g/dL Final    Total Bilirubin 02/09/2022 0 63  0 20 - 1 00 mg/dL Final    Use of this assay is not recommended for patients undergoing treatment with eltrombopag due to the potential for falsely elevated results      eGFR 02/09/2022 98  ml/min/1 73sq m Final    CRP 02/09/2022 <3 0  <3 0 mg/L Final    Sed Rate 02/09/2022 4  0 - 29 mm/hour Final    Clarity, UA 02/09/2022 Clear   Final    Color, UA 02/09/2022 Yellow   Final    Specific Gravity, UA 02/09/2022 1 020  1 003 - 1 030 Final    pH, UA 02/09/2022 6 0  4 5, 5 0, 5 5, 6 0, 6 5, 7 0, 7 5, 8 0 Final    Glucose, UA 02/09/2022 Negative  Negative mg/dl Final    Ketones, UA 02/09/2022 Negative  Negative mg/dl Final    Occult Blood, UA 02/09/2022 Small   Final    Protein, UA 02/09/2022 Negative  Negative mg/dl Final    Nitrite, UA 02/09/2022 Negative  Negative Final    Bilirubin, UA 02/09/2022 Negative  Negative Final    Urobilinogen, UA 02/09/2022 0 2  0 2, 1 0 E U /dl E U /dl Final    Leukocytes, UA 02/09/2022 Negative  Negative Final    WBC, UA 02/09/2022 None Seen  None Seen, 2-4, 5-60 /hpf Final    RBC, UA 02/09/2022 None Seen  None Seen, 2-4 /hpf Final    Hyaline Casts, UA 02/09/2022 3-5 (A) None Seen /lpf Final    Bacteria, UA 02/09/2022 Occasional  None Seen, Occasional /hpf Final    Epithelial Cells 02/09/2022 None Seen  None Seen, Occasional /hpf Final    Creatinine, Ur 02/09/2022 91 4  mg/dL Final    Protein Urine Random 02/09/2022 15  mg/dL Final    Prot/Creat Ratio, Ur 02/09/2022 0 16 (A) 0 00 - 0 10 Final    C4, COMPLEMENT 02/09/2022 30 0  10 0 - 40 0 mg/dL Final    C3 Complement 02/09/2022 105 0  90 0 - 180 0 mg/dL Final    ds DNA Ab 02/09/2022 1  0 - 9 IU/mL Final                                       Negative      <5                                     Equivocal  5 - 9                                     Positive      >9

## 2022-07-07 ENCOUNTER — APPOINTMENT (OUTPATIENT)
Dept: RADIOLOGY | Facility: MEDICAL CENTER | Age: 57
End: 2022-07-07
Payer: COMMERCIAL

## 2022-07-07 ENCOUNTER — OFFICE VISIT (OUTPATIENT)
Dept: RHEUMATOLOGY | Facility: CLINIC | Age: 57
End: 2022-07-07
Payer: COMMERCIAL

## 2022-07-07 ENCOUNTER — APPOINTMENT (OUTPATIENT)
Dept: LAB | Facility: MEDICAL CENTER | Age: 57
End: 2022-07-07
Payer: COMMERCIAL

## 2022-07-07 VITALS
HEIGHT: 60 IN | DIASTOLIC BLOOD PRESSURE: 75 MMHG | HEART RATE: 66 BPM | BODY MASS INDEX: 20.62 KG/M2 | WEIGHT: 105 LBS | SYSTOLIC BLOOD PRESSURE: 114 MMHG

## 2022-07-07 DIAGNOSIS — M79.671 RIGHT FOOT PAIN: ICD-10-CM

## 2022-07-07 DIAGNOSIS — Z79.899 HIGH RISK MEDICATION USE: ICD-10-CM

## 2022-07-07 DIAGNOSIS — I73.00 RAYNAUD'S DISEASE WITHOUT GANGRENE: ICD-10-CM

## 2022-07-07 DIAGNOSIS — E55.9 VITAMIN D DEFICIENCY: ICD-10-CM

## 2022-07-07 DIAGNOSIS — M70.62 GREATER TROCHANTERIC BURSITIS OF LEFT HIP: ICD-10-CM

## 2022-07-07 DIAGNOSIS — M35.9 UNDIFFERENTIATED CONNECTIVE TISSUE DISEASE (HCC): Primary | ICD-10-CM

## 2022-07-07 DIAGNOSIS — M81.0 OSTEOPOROSIS, UNSPECIFIED OSTEOPOROSIS TYPE, UNSPECIFIED PATHOLOGICAL FRACTURE PRESENCE: ICD-10-CM

## 2022-07-07 DIAGNOSIS — M05.9 RHEUMATOID ARTHRITIS WITH POSITIVE RHEUMATOID FACTOR, INVOLVING UNSPECIFIED SITE (HCC): ICD-10-CM

## 2022-07-07 LAB — 25(OH)D3 SERPL-MCNC: 62.4 NG/ML (ref 30–100)

## 2022-07-07 PROCEDURE — 73630 X-RAY EXAM OF FOOT: CPT

## 2022-07-07 PROCEDURE — 20610 DRAIN/INJ JOINT/BURSA W/O US: CPT | Performed by: INTERNAL MEDICINE

## 2022-07-07 PROCEDURE — 82306 VITAMIN D 25 HYDROXY: CPT | Performed by: INTERNAL MEDICINE

## 2022-07-07 PROCEDURE — 36415 COLL VENOUS BLD VENIPUNCTURE: CPT | Performed by: INTERNAL MEDICINE

## 2022-07-07 PROCEDURE — 99214 OFFICE O/P EST MOD 30 MIN: CPT | Performed by: INTERNAL MEDICINE

## 2022-07-07 RX ORDER — TRIAMCINOLONE ACETONIDE 40 MG/ML
40 INJECTION, SUSPENSION INTRA-ARTICULAR; INTRAMUSCULAR ONCE
Status: COMPLETED | OUTPATIENT
Start: 2022-07-07 | End: 2022-07-07

## 2022-07-07 RX ORDER — ALENDRONATE SODIUM 70 MG/1
70 TABLET ORAL
Qty: 12 TABLET | Refills: 1 | Status: SHIPPED | OUTPATIENT
Start: 2022-07-07 | End: 2022-08-24

## 2022-07-07 RX ORDER — AMLODIPINE BESYLATE 2.5 MG/1
2.5 TABLET ORAL
Qty: 90 TABLET | Refills: 1 | Status: SHIPPED | OUTPATIENT
Start: 2022-07-07 | End: 2022-08-24

## 2022-07-07 RX ORDER — HYDROXYCHLOROQUINE SULFATE 200 MG/1
200 TABLET, FILM COATED ORAL DAILY
Qty: 90 TABLET | Refills: 1 | Status: SHIPPED | OUTPATIENT
Start: 2022-07-07 | End: 2023-01-03

## 2022-07-07 RX ORDER — LIDOCAINE HYDROCHLORIDE 10 MG/ML
1 INJECTION, SOLUTION INFILTRATION; PERINEURAL ONCE
Status: COMPLETED | OUTPATIENT
Start: 2022-07-07 | End: 2022-07-07

## 2022-07-07 RX ADMIN — LIDOCAINE HYDROCHLORIDE 1 ML: 10 INJECTION, SOLUTION INFILTRATION; PERINEURAL at 11:51

## 2022-07-07 RX ADMIN — TRIAMCINOLONE ACETONIDE 40 MG: 40 INJECTION, SUSPENSION INTRA-ARTICULAR; INTRAMUSCULAR at 11:52

## 2022-07-07 NOTE — PATIENT INSTRUCTIONS
Continue hydroxychloroquine daily; continue regular eye exams  Restart amlodipine at 2 5mg daily for Raynaud's symptoms  Restart alendronate 70mg once a week for osteoporosis; take on an empty stomach with a full glass of water, and do not lie down for 30 minutes after  Continue weekly Vit   D for now  Do lab  Left hip bursa steroid injection given in clinic today    Return to clinic in 6 months

## 2022-08-24 ENCOUNTER — OFFICE VISIT (OUTPATIENT)
Dept: FAMILY MEDICINE CLINIC | Facility: CLINIC | Age: 57
End: 2022-08-24
Payer: COMMERCIAL

## 2022-08-24 VITALS
SYSTOLIC BLOOD PRESSURE: 118 MMHG | OXYGEN SATURATION: 98 % | BODY MASS INDEX: 20.03 KG/M2 | TEMPERATURE: 96.8 F | HEART RATE: 92 BPM | HEIGHT: 60 IN | WEIGHT: 102 LBS | DIASTOLIC BLOOD PRESSURE: 86 MMHG

## 2022-08-24 DIAGNOSIS — Z12.4 ENCOUNTER FOR SCREENING FOR CERVICAL CANCER: ICD-10-CM

## 2022-08-24 DIAGNOSIS — E16.2 HYPOGLYCEMIA: ICD-10-CM

## 2022-08-24 DIAGNOSIS — Z00.00 ANNUAL PHYSICAL EXAM: Primary | ICD-10-CM

## 2022-08-24 DIAGNOSIS — R19.04 LEFT LOWER QUADRANT ABDOMINAL MASS: ICD-10-CM

## 2022-08-24 PROBLEM — K42.9 UMBILICAL HERNIA WITHOUT OBSTRUCTION AND WITHOUT GANGRENE: Status: ACTIVE | Noted: 2022-05-18

## 2022-08-24 PROCEDURE — 99396 PREV VISIT EST AGE 40-64: CPT | Performed by: FAMILY MEDICINE

## 2022-08-24 NOTE — PROGRESS NOTES
ADULT ANNUAL [address] PRIMARY CARE    NAME: Lauren Trejo  AGE: 62 y o  SEX: female  : 1965     DATE: 2022     Assessment and Plan: Will check glucose tolerance test  CT abd/pelvis order to F/U on LLQ mass  F/U with specialists  F/U here in 3 months  Tobacco Cessation Counseling: Tobacco cessation counseling and education was provided  The patient is sincerely urged to quit consumption of tobacco  She is not ready to quit tobacco  The numerous health risks of tobacco consumption were discussed  If she decides to quit, there are a number of helpful adjunctive aids, and she can see me to discuss nicotine replacement therapy, chantix, or bupropion anytime in the future  Problem List Items Addressed This Visit    None     Visit Diagnoses     Annual physical exam    -  Primary    Encounter for screening for cervical cancer        Relevant Orders    Ambulatory Referral to Gynecology    Hypoglycemia        Relevant Orders    Glucose Tolerance (3 Sp Blood)    Left lower quadrant abdominal mass        Relevant Orders    CT abdomen pelvis w contrast          Immunizations and preventive care screenings were discussed with patient today  Appropriate education was printed on patient's after visit summary  Counseling:  Dental Health: discussed importance of regular tooth brushing, flossing, and dental visits  Return in about 3 months (around 2022) for Recheck  Chief Complaint:     Chief Complaint   Patient presents with    Well Check      History of Present Illness:     Adult Annual Physical   Patient here for a comprehensive physical exam  The patient reports problems - hypoglycemia  Diet and Physical Activity  Diet/Nutrition: poor diet  Exercise: no formal exercise        Depression Screening  PHQ-2/9 Depression Screening    Little interest or pleasure in doing things: 3 - nearly every day  Feeling down, depressed, or hopeless: 0 - not at all  Trouble falling or staying asleep, or sleeping too much: 3 - nearly every day  Feeling tired or having little energy: 3 - nearly every day  Poor appetite or overeating: 3 - nearly every day  Feeling bad about yourself - or that you are a failure or have let yourself or your family down: 0 - not at all  Trouble concentrating on things, such as reading the newspaper or watching television: 0 - not at all  Moving or speaking so slowly that other people could have noticed  Or the opposite - being so fidgety or restless that you have been moving around a lot more than usual: 0 - not at all  Thoughts that you would be better off dead, or of hurting yourself in some way: 0 - not at all  PHQ-9 Score: 12   PHQ-9 Interpretation: Moderate depression        General Health  Sleep: sleeps well  Hearing: normal - bilateral   Vision: wears glasses  Dental: dentures         /GYN Health  Patient is: postmenopausal       Review of Systems:     Review of Systems   Past Medical History:     Past Medical History:   Diagnosis Date    Acquired ichthyosis     last assessed: 5/9/2013    Anxiety     Cervical radiculopathy     last assessed: 6/13/2014    Colorectal polyps     last assessed: 3/9/2015    COPD (chronic obstructive pulmonary disease) ([facility] )     Depression     Diverticulosis of colon     Foot pain     GERD (gastroesophageal reflux disease)     Hyperlipemia     Hypertension     Myocardial infarction ([facility] )     at age 28    Osteopenia     last assessed: 12/6/2013    Palpitations     last assessed: 12/31/2014    PVD (peripheral vascular disease) ([facility] )     last assessed: 12/3/2012    Scapular dyskinesis     last assessed: 11/17/2014    Shortness of breath     Tendonitis of left rotator cuff     last assessed: 11/17/2014    Vocal cord polyps     last assessed: 8/8/2014      Past Surgical History:     Past Surgical History:   Procedure Laterality Date    ABDOMINAL SURGERY      exploratory   Dion Weeks CARDIAC SURGERY      cardiac stent      SECTION      last assessed: 2014    CHOLECYSTECTOMY      last assessed: 2014    COLONOSCOPY  10/27/2014    CORONARY ANGIOPLASTY WITH STENT PLACEMENT      LAD stent    DENTAL SURGERY      last assessed: 2014    ELBOW SURGERY Bilateral     arthroplasty    EPIDURAL BLOCK INJECTION N/A 2019    Procedure: C7 T1 Cervical Epidural Steroid Injection (93235); Surgeon: Kamaljit Perez MD;  [facility]: [facility];  Service: Pain Management     EPIDURAL BLOCK INJECTION Bilateral 2019    Procedure: BLOCK / INJECTION EPIDURAL STEROID CERVICAL - C7-T1;  Surgeon: Kamaljit Perez MD;  [facility];  Service: Pain Management     EPIDURAL BLOCK INJECTION Left 10/3/2019    Procedure: C7-T1 interlaminar epidural steroid injection;  Surgeon: Kamaljit Perez MD;  [facility];  Service: Pain Management     ESOPHAGOGASTRODUODENOSCOPY      ESOPHAGOGASTRODUODENOSCOPY N/A 2016    Procedure: ESOPHAGOGASTRODUODENOSCOPY (EGD);   Surgeon: Wanda Mccrary MD;  [facility];  Service:     FL GUIDED NEEDLE PLAC BX/ASP/INJ  2019    FL GUIDED NEEDLE PLAC BX/ASP/INJ  2019    FL GUIDED NEEDLE PLAC BX/ASP/INJ  10/3/2019    FL INJECTION LEFT SHOULDER (ARTHROGRAM)  2018    FOOT SURGERY Right 02/06/2015    x 4    HYSTERECTOMY      last assessed: 2014    OK ARTHROCENTESIS ASPIR&/INJ MAJOR JT/BURSA W/O US Bilateral 2019    Procedure: GREATER TROCHANTERIC HIP INJECTION;  Surgeon: Kamaljit Perez MD;  [facility];  Service: Pain Management     OK ARTHROSCOPY SHOULDER SURGICAL BICEPS TENODESIS Left 10/15/2018    Procedure: ARTHROSCOPY SHOULDER; Debridement of shoulder;  Surgeon: Ion Moura MD;  [facility];  Service: Orthopedics    OK SHLDR ARTHROSCOP,SURG,W/ROTAT CUFF REPR Left 2018    Procedure: ARTHROSCOPY SHOULDER, subacromial decompression, synovectomy;  Surgeon: Ion Moura MD;  [facility];  Service: Orthopedics    THROAT SURGERY      TOOTH EXTRACTION      TRIGEMINAL NERVE DECOMPRESSION Right 6/15/2018    Procedure: FOOT NEUROPLASTY;  Surgeon: Marcie Whitt DPM;  [facility];  Service: Podiatry      Social History:     Social History     Socioeconomic History    Marital status:      Spouse name: None    Number of children: None    Years of education: None    Highest education level: None   Occupational History    None   Tobacco Use    Smoking status: Current Every Day Smoker     Packs/day: 0 25    Smokeless tobacco: Never Used    Tobacco comment: She is trying to cut back on her own   Vaping Use    Vaping Use: Never used   Substance and Sexual Activity    Alcohol use: Never     Comment: rare    Drug use: Never    Sexual activity: None   Other Topics Concern    None   Social History Narrative    No living will     Social Determinants of Health     Financial Resource Strain: Not on file   Food Insecurity: Not on file   Transportation Needs: Not on file   Physical Activity: Not on file   Stress: Not on file   Social Connections: Not on file   Intimate Partner Violence: Not on file   Housing Stability: Not on file      Family History:     Family History   Problem Relation Age of Onset    No Known Problems Mother     No Known Problems Father     No Known Problems Maternal Grandmother     No Known Problems Maternal Grandfather     No Known Problems Paternal Grandmother     No Known Problems Paternal Grandfather     Early death Sister     Hypertension Sister     No Known Problems Maternal Aunt     No Known Problems Maternal Aunt     No Known Problems Maternal Aunt     No Known Problems Maternal Aunt       Current Medications:     Current Outpatient Medications   Medication Sig Dispense Refill    aspirin 81 mg chewable tablet Chew 1 tablet daily Stop for the time being for foot surgery      baclofen 10 mg tablet Take 1 tablet (10 mg total) by mouth 3 (three) times a day (Patient taking differently: Take 10 mg by mouth 3 (three) times a day Taking PRN) 90 tablet 6    ergocalciferol (VITAMIN D2) 50,000 units take 1 capsule by mouth every week 12 capsule 0    ezetimibe (ZETIA) 10 mg tablet take 1 tablet by mouth daily 90 tablet 3    hydroxychloroquine (PLAQUENIL) 200 mg tablet Take 1 tablet (200 mg total) by mouth daily 90 tablet 1    omeprazole (PriLOSEC) 20 mg delayed release capsule Take 20 mg by mouth daily      oxyCODONE-acetaminophen (PERCOCET) 5-325 mg per tablet Take 1 tablet by mouth every 12 (twelve) hours      rosuvastatin (CRESTOR) 5 mg tablet take 1 tablet by mouth once daily 90 tablet 3    nitroglycerin (NITROSTAT) 0 4 mg SL tablet Place 1 tablet (0 4 mg total) under the tongue every 5 (five) minutes as needed for chest pain (Patient not taking: No sig reported) 90 tablet 3     No current facility-administered medications for this visit  Allergies: Allergies   Allergen Reactions    Ceclor [Cefaclor] Anaphylaxis    Cephalexin     Codeine Itching     Reaction Date: [id number];     Gabapentin      Body tremors, sweats    Lyrica [Pregabalin]      Body tremors, chills, heaviness in chest    Ticlopidine Hives     Other reaction(s): Hives    Penicillins Rash     Other reaction(s):  Other      Physical Exam:     /86   Pulse 92   Temp (!) 96 8 °F (36 °C)   Ht 5' (1 524 m)   Wt 46 3 kg (102 lb)   SpO2 98%   BMI 19 92 kg/m²     Physical Exam     Van Benites DO  [facility] PRIMARY CARE

## 2022-08-24 NOTE — PATIENT INSTRUCTIONS

## 2022-09-07 DIAGNOSIS — E55.9 VITAMIN D DEFICIENCY: ICD-10-CM

## 2022-09-07 RX ORDER — ERGOCALCIFEROL 1.25 MG/1
CAPSULE ORAL
Qty: 12 CAPSULE | Refills: 0 | Status: SHIPPED | OUTPATIENT
Start: 2022-09-07

## 2022-09-21 ENCOUNTER — TELEPHONE (OUTPATIENT)
Dept: FAMILY MEDICINE CLINIC | Facility: CLINIC | Age: 57
End: 2022-09-21

## 2022-09-21 DIAGNOSIS — Z72.0 TOBACCO ABUSE: Primary | ICD-10-CM

## 2022-09-21 RX ORDER — NICOTINE 21 MG/24HR
1 PATCH, TRANSDERMAL 24 HOURS TRANSDERMAL EVERY 24 HOURS
Qty: 28 PATCH | Refills: 0 | Status: SHIPPED | OUTPATIENT
Start: 2022-09-21

## 2022-09-21 NOTE — TELEPHONE ENCOUNTER
----- Message from Douglas Ryan sent at 9/21/2022 10:53 AM EDT -----  Regarding: FW: Nicotine patches    ----- Message -----  From: Jose Martin Snowden  Sent: 9/21/2022  10:36 AM EDT  To: [facility]  Subject: Nicotine patches                                 Dr Vinicio Lovett    Can you order a script for nicotine patches to be covered by insurance please, I cannot tolerate the chantrix notr do I wish to take it since several lots were recalled way after I had already taken them   I really wish to quit smoking and am not able to cold turkey as I have tried several times, thank you

## 2022-09-23 ENCOUNTER — APPOINTMENT (OUTPATIENT)
Dept: LAB | Facility: HOSPITAL | Age: 57
End: 2022-09-23
Payer: COMMERCIAL

## 2022-09-23 ENCOUNTER — TELEPHONE (OUTPATIENT)
Dept: FAMILY MEDICINE CLINIC | Facility: CLINIC | Age: 57
End: 2022-09-23

## 2022-09-23 DIAGNOSIS — E16.2 HYPOGLYCEMIA: Primary | ICD-10-CM

## 2022-09-23 DIAGNOSIS — E16.2 HYPOGLYCEMIA: ICD-10-CM

## 2022-09-23 LAB
GLUCOSE 2H P 75 G GLC PO SERPL-MCNC: 93 MG/DL (ref 65–139)
GLUCOSE P FAST SERPL-MCNC: 95 MG/DL (ref 65–99)

## 2022-09-23 PROCEDURE — 82950 GLUCOSE TEST: CPT

## 2022-09-23 PROCEDURE — 82947 ASSAY GLUCOSE BLOOD QUANT: CPT

## 2022-09-23 PROCEDURE — 36415 COLL VENOUS BLD VENIPUNCTURE: CPT

## 2022-09-23 NOTE — TELEPHONE ENCOUNTER
FYI:  Hospital called stating the lab at [facility] does not do 3 hour glucose tests on non pregnant patients  They only do 2 hour tests    They cancelled 3 hour and put a new order in for 2 hour

## 2022-09-26 ENCOUNTER — TELEPHONE (OUTPATIENT)
Dept: FAMILY MEDICINE CLINIC | Facility: CLINIC | Age: 57
End: 2022-09-26

## 2022-09-26 DIAGNOSIS — E16.2 HYPOGLYCEMIA: Primary | ICD-10-CM

## 2022-09-26 NOTE — TELEPHONE ENCOUNTER
----- Message from Agnes Buitrago sent at 9/26/2022  6:50 AM EDT -----  Regarding: FW: Glucose test    ----- Message -----  From: Jules Delcid  Sent: 9/23/2022   5:30 PM EDT  To: [facility] Primary Care Clinical  Subject: Glucose test                                     Dr Ceci Chaudhary, I'm wondering how my glucose level can be higher when fasting then it is after I drank the glucose?  All I know is I have severe episodes where my body is convulsing, shaking so bad I can't walk or move and it takes a LOT of sugar to bring me around, last episode lasted over an hour and a half in middle of the night, they are very frequent,I shake everywhere uncontrollably, please let me know what I can do to get this under control, it's very sperattic,happens whether I eat or not

## 2022-10-04 ENCOUNTER — TELEPHONE (OUTPATIENT)
Dept: FAMILY MEDICINE CLINIC | Facility: CLINIC | Age: 57
End: 2022-10-04

## 2022-10-04 DIAGNOSIS — E16.2 HYPOGLYCEMIA: Primary | ICD-10-CM

## 2022-10-04 RX ORDER — LANCETS
EACH MISCELLANEOUS
Qty: 100 EACH | Refills: 1 | Status: SHIPPED | OUTPATIENT
Start: 2022-10-04 | End: 2022-10-07

## 2022-10-04 RX ORDER — BLOOD-GLUCOSE METER
EACH MISCELLANEOUS DAILY
Qty: 1 KIT | Refills: 0 | Status: SHIPPED | OUTPATIENT
Start: 2022-10-04

## 2022-10-04 RX ORDER — BLOOD SUGAR DIAGNOSTIC
STRIP MISCELLANEOUS
Qty: 100 STRIP | Refills: 1 | Status: SHIPPED | OUTPATIENT
Start: 2022-10-04

## 2022-10-04 NOTE — TELEPHONE ENCOUNTER
[facility] " is a code used by Medicare for some diagnoses    I put in a prescription for glucometer, strips and lancets

## 2022-10-04 NOTE — TELEPHONE ENCOUNTER
----- Message from Cris Morocho sent at 10/4/2022  3:10 PM EDT -----  Regarding: FW: Health summary    ----- Message -----  From: Campos Lo  Sent: 10/4/2022   2:40 PM EDT  To: [facility]  Subject: Health summary                                   Dr Tracy Ye, I was looking through my health summary which apparently I should do more often but I see in there one of my health conditions is " [address] ", do I have liver cancer? Also I scheduled with endocrinologist and they said I should have been told to track my sugar levels? Do I need a prescription for whatever meter etc that is needed for that?

## 2022-10-05 ENCOUNTER — HOSPITAL ENCOUNTER (OUTPATIENT)
Dept: CT IMAGING | Facility: HOSPITAL | Age: 57
Discharge: HOME | End: 2022-10-05
Payer: COMMERCIAL

## 2022-10-05 ENCOUNTER — APPOINTMENT (OUTPATIENT)
Dept: LAB | Facility: HOSPITAL | Age: 57
End: 2022-10-05
Payer: COMMERCIAL

## 2022-10-05 DIAGNOSIS — I10 ESSENTIAL HYPERTENSION: ICD-10-CM

## 2022-10-05 DIAGNOSIS — R19.04 LEFT LOWER QUADRANT ABDOMINAL MASS: ICD-10-CM

## 2022-10-05 DIAGNOSIS — I10 ESSENTIAL HYPERTENSION: Primary | ICD-10-CM

## 2022-10-05 LAB
BUN SERPL-MCNC: 4 MG/DL (ref 5–25)
CREAT SERPL-MCNC: 0.65 MG/DL (ref 0.6–1.3)
GFR SERPL CREATININE-BSD FRML MDRD: 98 ML/MIN/1.73SQ M

## 2022-10-05 PROCEDURE — 36415 COLL VENOUS BLD VENIPUNCTURE: CPT

## 2022-10-05 PROCEDURE — 74177 CT ABD & PELVIS W/CONTRAST: CPT

## 2022-10-05 PROCEDURE — 84520 ASSAY OF UREA NITROGEN: CPT

## 2022-10-05 PROCEDURE — 82565 ASSAY OF CREATININE: CPT

## 2022-10-05 PROCEDURE — G1004 CDSM NDSC: HCPCS

## 2022-10-05 RX ADMIN — IOHEXOL 90 ML: 350 INJECTION, SOLUTION INTRAVENOUS at 12:17

## 2022-10-07 ENCOUNTER — TELEPHONE (OUTPATIENT)
Dept: FAMILY MEDICINE CLINIC | Facility: CLINIC | Age: 57
End: 2022-10-07

## 2022-10-07 DIAGNOSIS — E16.2 HYPOGLYCEMIA: Primary | ICD-10-CM

## 2022-10-07 RX ORDER — LANCETS 33 GAUGE
EACH MISCELLANEOUS
Qty: 100 EACH | Refills: 5 | Status: SHIPPED | OUTPATIENT
Start: 2022-10-07

## 2022-10-07 NOTE — TELEPHONE ENCOUNTER
Pt called stating you ordered the wrong lancets  She needs one touch delica plus lancets  Do not need to specify a gauge but if you need to in order for it to go through please choose 33 gauge if need be   Please send new script to pharmacy

## 2022-10-28 DIAGNOSIS — E78.49 OTHER HYPERLIPIDEMIA: ICD-10-CM

## 2022-10-28 DIAGNOSIS — I25.10 CAD IN NATIVE ARTERY: ICD-10-CM

## 2022-10-28 RX ORDER — EZETIMIBE 10 MG/1
TABLET ORAL
Qty: 90 TABLET | Refills: 3 | Status: SHIPPED | OUTPATIENT
Start: 2022-10-28

## 2022-10-30 DIAGNOSIS — E78.5 HYPERLIPIDEMIA, UNSPECIFIED HYPERLIPIDEMIA TYPE: ICD-10-CM

## 2022-10-31 ENCOUNTER — TELEPHONE (OUTPATIENT)
Dept: FAMILY MEDICINE CLINIC | Facility: CLINIC | Age: 57
End: 2022-10-31

## 2022-10-31 DIAGNOSIS — F41.9 ANXIETY: Primary | ICD-10-CM

## 2022-10-31 DIAGNOSIS — Z72.0 TOBACCO ABUSE: ICD-10-CM

## 2022-10-31 RX ORDER — ESCITALOPRAM OXALATE 5 MG/1
5 TABLET ORAL DAILY
Qty: 30 TABLET | Refills: 3 | Status: SHIPPED | OUTPATIENT
Start: 2022-10-31

## 2022-10-31 RX ORDER — NICOTINE 21 MG/24HR
1 PATCH, TRANSDERMAL 24 HOURS TRANSDERMAL EVERY 24 HOURS
Qty: 28 PATCH | Refills: 0 | Status: SHIPPED | OUTPATIENT
Start: 2022-10-31

## 2022-10-31 RX ORDER — ROSUVASTATIN CALCIUM 5 MG/1
TABLET, COATED ORAL
Qty: 90 TABLET | Refills: 3 | Status: SHIPPED | OUTPATIENT
Start: 2022-10-31

## 2022-10-31 NOTE — TELEPHONE ENCOUNTER
----- Message from Whit Lewis sent at 10/31/2022  6:55 AM EDT -----  Regarding: FW: Nicotine patches (3)    ----- Message -----  From: Remington Gupta  Sent: 10/28/2022  11:45 PM EDT  To: [facility] Primary Care Clinical  Subject: Nicotine patches (3)                             My apologies for 3 messages but I'm thinking of how to get this smoking gone, I'm going to order ecigs/fake cigs, am hoping that with another round of step 2 patches and the fake cigs along with something for my high anxiety level I will be able to quit, I had step 1 patches before and they're too strong so I would like to stay with step 2 I'm hoping that with the fake cigs and something for my anxiety I will be able to quit so what I'm asking for is another month supply of step 2 patches and something for the anxiety ,thank you

## 2022-10-31 NOTE — TELEPHONE ENCOUNTER
I refilled her 14 mg nicotine patch  I also will start her on generic Lexapro 5 mg a day    Have her make a follow-up appointment in the next month

## 2022-11-19 DIAGNOSIS — E55.9 VITAMIN D DEFICIENCY: ICD-10-CM

## 2022-11-19 RX ORDER — ERGOCALCIFEROL 1.25 MG/1
CAPSULE ORAL
Qty: 12 CAPSULE | Refills: 0 | Status: SHIPPED | OUTPATIENT
Start: 2022-11-19

## 2022-11-20 DIAGNOSIS — M79.18 MYOFASCIAL PAIN SYNDROME: ICD-10-CM

## 2022-11-21 RX ORDER — BACLOFEN 10 MG/1
TABLET ORAL
Qty: 90 TABLET | Refills: 6 | Status: SHIPPED | OUTPATIENT
Start: 2022-11-21

## 2022-11-23 ENCOUNTER — TELEPHONE (OUTPATIENT)
Dept: CARDIOLOGY CLINIC | Facility: HOSPITAL | Age: 57
End: 2022-11-23

## 2022-11-28 ENCOUNTER — TELEPHONE (OUTPATIENT)
Dept: FAMILY MEDICINE CLINIC | Facility: CLINIC | Age: 57
End: 2022-11-28

## 2022-11-28 DIAGNOSIS — U07.1 COVID-19: Primary | ICD-10-CM

## 2022-11-28 RX ORDER — NIRMATRELVIR AND RITONAVIR 300-100 MG
3 KIT ORAL 2 TIMES DAILY
Qty: 30 TABLET | Refills: 0 | Status: SHIPPED | OUTPATIENT
Start: 2022-11-28 | End: 2022-12-03

## 2022-11-28 NOTE — TELEPHONE ENCOUNTER
I put in an Rx for Paxlovid into the [facility]  Is she is not able to take in fluids, or her cough becomes worse, I would suggest she goes to the ER

## 2022-11-28 NOTE — TELEPHONE ENCOUNTER
----- Message from Jared June sent at 11/28/2022 10:44 AM EST -----  Regarding: FW: Reply  Contact: [phone]    ----- Message -----  From: Omar Molina  Sent: 11/28/2022  10:40 AM EST  To: [facility]  Subject: Reply                                            I'm very sick to my stomach and unable to eat since Thursday is that normal ? I'm actually feeling worse today then yesterday and have developed a croopy like cough

## 2022-11-28 NOTE — TELEPHONE ENCOUNTER
----- Message from [facility] sent at 11/28/2022  6:55 AM EST -----  Regarding: FW: Covid  Contact: [phone]    ----- Message -----  From: Jac Faust  Sent: 11/27/2022   2:13 PM EST  To: [facility]  Subject: Covid                                            Dr Palak Keith, I started with skull splitting migraine Thursday nite and fever etc, I took a home covid test last nite (Saturday) and it was positive so I retook another today and also positive, it's same symptoms I had back in 2020 but was never tested and I was a lot sicker back then and have been suffering with long term covid ever since but never received any treatment because I was never tested,it was before covid blew up the way it did,anyway I have an appointment scheduled for Tuesday 29th, I'm doing better than I was few days ago except I'm very weak in legs and ankles and still having some sharp shooting pains in my head,should I keep this appointment or should I reschedule till I'm no longer positive for covid?  Also is there a medicine or anything I need to do for now,Tuesday will be the 5th day since I noticed symptoms but I am feeling somewhat better than I was ,please let me know if I need to reschedule and any other course of action I need to take,thank you

## 2022-12-01 ENCOUNTER — TELEPHONE (OUTPATIENT)
Dept: FAMILY MEDICINE CLINIC | Facility: CLINIC | Age: 57
End: 2022-12-01

## 2022-12-01 NOTE — TELEPHONE ENCOUNTER
Since her hearing improved, I am hoping her taste continues to improve  It can take a couple weeks for everything to get better  Messages from my chart do not come directly to me  They go to the staff 1st   Everybody works as a team, so anybody can check the message and send it to me

## 2022-12-01 NOTE — TELEPHONE ENCOUNTER
Should wear a mask until 10 days have passed from the start of symptoms  Is she still having the hearing and taste problems?

## 2022-12-01 NOTE — TELEPHONE ENCOUNTER
----- Message from Brendan Arciniega sent at 12/1/2022 11:37 AM EST -----  Regarding: FW: Dr Parada Call message  Contact: [phone]    ----- Message -----  From: Howie Jane  Sent: 12/1/2022  11:35 AM EST  To: [facility]  Subject: Dr Parada Call message                              Hearing seems better taste still all messed up

## 2022-12-01 NOTE — TELEPHONE ENCOUNTER
----- Message from Ulises Ivania sent at 12/1/2022  8:10 AM EST -----  Regarding: FW: Reply  Contact: [phone]    ----- Message -----  From: Mounika Mcguire  Sent: 11/30/2022   7:23 PM EST  To: [facility]  Subject: Reply                                            I'm feeling better than I was a week ago ,no more fever etc, I just need to know what to do now, still have heaviness in chest a bit but nothing like I was, I need to know what I'm suppose to do now like how long for mask or whatever goes along with this

## 2022-12-02 ENCOUNTER — TELEPHONE (OUTPATIENT)
Dept: FAMILY MEDICINE CLINIC | Facility: CLINIC | Age: 57
End: 2022-12-02

## 2022-12-02 DIAGNOSIS — U07.1 COVID-19: Primary | ICD-10-CM

## 2022-12-02 RX ORDER — BUDESONIDE 180 UG/1
4 AEROSOL, POWDER RESPIRATORY (INHALATION) 2 TIMES DAILY
Qty: 1 EACH | Refills: 0 | Status: SHIPPED | OUTPATIENT
Start: 2022-12-02 | End: 2022-12-16

## 2022-12-02 NOTE — TELEPHONE ENCOUNTER
----- Message from Samy Todd sent at 12/2/2022  6:40 AM EST -----  Regarding: FW: Covid  Contact: [phone]    ----- Message -----  From: Muna Tyler  Sent: 12/1/2022   9:38 PM EST  To: [facility] [place] [facility]  Subject: Covid                                            Dr Myranda Gutierrez, forgive me but tensions are high right now as I have never dealt with covid and it's confusing when things are explained in bits and pieces, I don't know if I should keep taking the paxlovid as stated in my messages yesterday due to fact it had already been 5 days since I started with symptoms and everything I read says start within the first days, I took 2 doses and lost hearing and got scared so didn't take anymore and sent you a message yesterday,but nothing was mentioned regarding the paxlovid, I thought my hearing was better but it is not, I can barely hear at times, so it's a week today should I restart the paxlovid or not? Is it past the window of being effective?  I still have heaviness in chest and slight cough, dry mouth,weakness,tired, please address each of these issues so I can understand what I need to do and if I should restart the paxlovid or not and will my hearing come back

## 2022-12-02 NOTE — TELEPHONE ENCOUNTER
----- Message from Lj Johnson sent at 12/2/2022  6:40 AM EST -----  Regarding: FW: Covid  Contact: [phone]    ----- Message -----  From: Cinthia Rose  Sent: 12/1/2022   9:38 PM EST  To: [facility]  Subject: Covid                                            Dr Dino Chavarria, forgive me but tensions are high right now as I have never dealt with covid and it's confusing when things are explained in bits and pieces, I don't know if I should keep taking the paxlovid as stated in my messages yesterday due to fact it had already been 5 days since I started with symptoms and everything I read says start within the first days, I took 2 doses and lost hearing and got scared so didn't take anymore and sent you a message yesterday,but nothing was mentioned regarding the paxlovid, I thought my hearing was better but it is not, I can barely hear at times, so it's a week today should I restart the paxlovid or not? Is it past the window of being effective?  I still have heaviness in chest and slight cough, dry mouth,weakness,tired, please address each of these issues so I can understand what I need to do and if I should restart the paxlovid or not and will my hearing come back

## 2022-12-02 NOTE — TELEPHONE ENCOUNTER
No need to start the Paxlovid again  Her hearing hopefully will come back  It might be due to congestion and eustachian tube dysfunction  For her chest tightness, I put in a prescription for budesonide inhaler  Follow directions on the prescription  Must rinse mouth out after using it  I am hoping her other symptoms get better over the next week  Call us if symptoms continue or increase

## 2023-01-05 NOTE — PROGRESS NOTES
Assessment and Plan: Tania Calabrese is a 62 y o   female who presents for follow-up of her UCTD and osteoporosis  Most prominent complaint today is left trochanteric bursitis symptoms; left trochanteric bursa steroid injection given in clinic today  Last one did not help, but the one before that did, so patient wants to pursue the injection today  Also counseled her on the importance of taking medication for her osteoporosis  Take 1,200mg of calcium daily  Take 2,000 units of Vit  D daily  Continue hydroxychloroquine daily; continue regular eye exams  Take alendronate 70mg once a week for osteoporosis; take on an empty stomach with a full glass of water, and do not lie down for 30 minutes after  Stop baclofen; start cyclobenzaprine at bedtime for muscle pain  Take celecoxib twice a day as needed for joint pain  Left trochanteric bursa steroid injection given in clinic today  General surgery referral made for right knee cyst removal     Return to clinic in 6 months    Plan:  Diagnoses and all orders for this visit:    Undifferentiated connective tissue disease ([facility] [address] )    Rheumatoid arthritis with positive rheumatoid factor, involving unspecified site ([facility])  -     hydroxychloroquine (PLAQUENIL) 200 mg tablet; Take 1 tablet (200 mg total) by mouth daily    Greater trochanteric bursitis of left hip  -     Large joint arthrocentesis: L greater trochanteric bursa  -     triamcinolone acetonide (KENALOG-40) 40 mg/mL injection 40 mg  -     lidocaine (XYLOCAINE) 1 % injection 1 mL    Osteoporosis, unspecified osteoporosis type, unspecified pathological fracture presence  -     alendronate (FOSAMAX) 70 mg tablet; Take 1 tablet (70 mg total) by mouth every 7 days Take on an empty stomach with a full glass of water, and do not lie down for 30 minutes after    Raynaud's disease without gangrene    Myofascial pain syndrome  -     cyclobenzaprine (FLEXERIL) 10 mg tablet;  Take 1 tablet (10 mg total) by mouth daily at bedtime    Chronic bilateral low back pain with left-sided sciatica  -     celecoxib (CeleBREX) 100 mg capsule; Take 1 capsule (100 mg total) by mouth 2 (two) times a day As needed for joint pain    Cyst of right knee joint  -     Ambulatory Referral to General Surgery; Future    High risk medication use   High risk medication use - Benefits and risks of hydroxychloroquine, including but not limited to retinal toxicity, corneal deposits, gastrointestinal side effects, and headaches were discussed with the patient  The need for a regular eye exam to monitor for ocular toxicity while on this medication was also explained to the patient  Large joint arthrocentesis: L greater trochanteric bursa  Universal Protocol:  Consent: Verbal consent obtained  Written consent not obtained  Risks and benefits: risks, benefits and alternatives were discussed  Consent given by: patient  Time out: Immediately prior to procedure a "time out" was called to verify the correct patient, procedure, equipment, support staff and site/side marked as required  Timeout called at: 1/10/2023 12:10 PM   Patient understanding: patient states understanding of the procedure being performed  Patient consent: the patient's understanding of the procedure matches consent given  Procedure consent: procedure consent matches procedure scheduled  Relevant documents: relevant documents present and verified  Test results: test results available and properly labeled  Site marked: the operative site was marked  Radiology Images displayed and confirmed   If images not available, report reviewed: imaging studies available  Required items: required blood products, implants, devices, and special equipment available  Patient identity confirmed: verbally with patient    Supporting Documentation  Indications: pain   Procedure Details  Location: hip - L greater trochanteric bursa  Preparation: Patient was prepped and draped in the usual sterile fashion  Needle size: 25 G  Ultrasound guidance: no  Approach: lateral      After discussing the risks/benefits of steroid injection including minor risk of infection, bleeding, pain, or ineffectiveness, informed consent was obtained and patient was agreeable to proceed  Using sterile technique, the left hip bursa area was prepped with Chloraprep, and was topically anesthetized with Ethyl Chloride  The left trochanteric bursa was entered using a lateral approach  Kenalog 40 mg and 1 mL lidocaine 1% were then injected and the needle withdrawn  The procedure was well tolerated  Patient was instructed to contact our office with any concerns or questions  Follow-up plan: RTC in 6 months        Rheumatic Disease Summary  Ivan Scheuermann Williams is a 57 y  Melford Hertz originally presented on 5/22/20 as a Rheumatology consult referred by her Obdulio Lawrence DO for evaluation of possible inflammatory arthritis given positive RF of 40 and KRISTA of 1:80 in a homogenous pattern  Patient has history of negative anti-CCP  Lupus activity labs, anti-centromere Ab, and anti-RNP ordered and returned unremarkable  ESR/CRP returned normal  Patient had some features to at least make a diagnosis of undifferentiated connective tissue disease, including arthralgia, photosensitivity, facial rashes, and Raynaud's symptoms  Her arthritis symptoms were not typical of RA since she denied morning stiffness and her joint symptoms were worse later in the day and with use  Hand and feet x-rays ordered to evaluate for any inflammatory changes returned unremarkable  For time-being, started patient on hydroxychloroquine 200mg po daily; asked her to get regular eye exams while on this medication to monitor for plaquenil-related retinal toxicity  8/28/20 via telemedicine: Patient presented for follow-up of UCTD (arthralgia, photosensitivity, facial rashes, and Raynaud's symptoms)  Her arthralgia has improved since starting HCQ   Based on her weight, have increased her hydroxychloroquine to 300mg po daily  Changed her diclofenac prescription to 75mg po bid instead of 50mg po tid, and continued baclofen 10mg po tid  1/18/22 telemedicine: Patient presented for follow-up of UCTD (arthralgia, photosensitivity, facial rashes, and Raynaud's symptoms)  What was most significantly bothering patient is was abdominal pain and bloating sensation since she moved a dresser several months ago; it had slightly improved since then, but was still bothersome  Had seen multiple GI, and will be getting an EGD and colonoscopy soon for further workup  Had so far been diagnosed with delayed gastric emptying  She had lost weight to the point of being 110 pounds due to eating making the abdominal pain worse, which was present all the time  Did not start Fosamax yet, but planned to after I explained the importance of medical therapy for her osteoporosis  Her left hip bursa area significantly bothered her, but bursa injections in the past didn't help; would like to arrange a hip bursa injection visit with me  Raynaud's symptoms were not fully under control  She was to do lupus activity labs  Decreased hydroxychloroquine to 1 tab daily since weight loss  Started alendronate once a week, empty stomach, full glass of water, and do not lie down for 30 minutes after  Increased amlodipine to 5mg daily for Raynaud's symptoms  Continued weekly Vit  D for history of Vit  D deficiency  1/27/22: Patient comes for injection into her left trochanteric bursa  She had recent visit on 1/18/22  Patient reports no new complaints  Raynaud's signs seem to be better controled with increased dose of amlodipine  Patient tolerates alendronate well  Patient already has follow up appointment scheduled in 6 months  Left hip bursa steroid injection given in clinic, which patient tolerated well   Is to continue the rest of her medications and do hip bursitis exercises    7/7/22: Was not taking amlodipine for her Raynaud's symptoms, so it was restarted  Was not taking alendronate for her osteoporosis, so it was restarted  Left trochanteric bursa steroid injection given in clinic today, which patient tolerated well  Continue hydroxychloroquine daily; continue regular eye exams  Restart amlodipine at 2 5mg daily for Raynaud's symptoms  Restart alendronate 70mg once a week for osteoporosis; take on an empty stomach with a full glass of water, and do not lie down for 30 minutes after  Continue weekly Vit  D for now  Do lab  Left trochanteric bursa steroid injection given in clinic today  Return to clinic in 6 months  HPI  Florentin Abraham is a 62 y o   female with undifferentiated connective tissue disesaese, Rheunaud's syndrome, rheumatoid arthritis, who presents for follow-up of her UCTD, osteoporosis, and left trochanteric bursitis  Last visit was 7/7/22  States injection in left hip in July didn't help  She is in a lot pain  Takes percocet 5/325 mg daily for 9 years  Sometimes takes Tylenol Arthritis  Sometimes uses lidocaine patches on hip but it doesn't help  States has had PT for hip and does all of the exercises at home  Has been having issues with ankles for the last two weeks, painful to walk  Both biceps are painful  Lower back has been bothering her  States diclofenac gel does not help her  Once in a while a rash on her arm pops up  Has lump on right knee that hurts badly  Was told it is bursitis  States she has tried to poke a hole in it to drain it but that didn't work  Says lump has been there for 30 years  Last eye exam was over a year ago  Is only taking the hydroxychloroquine - stopped everything else because she doesn't want to take them   Isn't taking amlodipine because it made her feel pressure in her chest     The following portions of the patient's history were reviewed and updated as appropriate: allergies, current medications, past family history, past medical history, past social history, past surgical history and problem list     Review of Systems:   Review of Systems   Constitutional: Positive for fatigue  Negative for chills and fever  HENT: Negative for ear pain and sore throat  Dry mouth   Eyes: Negative for pain and visual disturbance  Abnormal vision   Respiratory: Positive for shortness of breath and wheezing  Negative for cough  Cardiovascular: Negative for chest pain and palpitations  Hypotension   Gastrointestinal: Positive for constipation and diarrhea  Negative for abdominal pain and vomiting  Indigestion/heartburn   Endocrine: Positive for cold intolerance and heat intolerance  Tired/sluggish   Genitourinary: Negative for dysuria and hematuria  Musculoskeletal: Positive for arthralgias, back pain, joint swelling, myalgias and neck pain  Skin: Positive for color change and rash  Neurological: Positive for dizziness, weakness, numbness and headaches  Negative for seizures and syncope  All other systems reviewed and are negative        Home Medications:    Current Outpatient Medications:   •  alendronate (FOSAMAX) 70 mg tablet, Take 1 tablet (70 mg total) by mouth every 7 days Take on an empty stomach with a full glass of water, and do not lie down for 30 minutes after, Disp: 12 tablet, Rfl: 3  •  Blood Glucose Monitoring Suppl (ONE TOUCH ULTRA 2) w/Device KIT, Use in the morning, Disp: 1 kit, Rfl: 0  •  celecoxib (CeleBREX) 100 mg capsule, Take 1 capsule (100 mg total) by mouth 2 (two) times a day As needed for joint pain, Disp: 60 capsule, Rfl: 6  •  cyclobenzaprine (FLEXERIL) 10 mg tablet, Take 1 tablet (10 mg total) by mouth daily at bedtime, Disp: 30 tablet, Rfl: 6  •  ezetimibe (ZETIA) 10 mg tablet, take 1 tablet by mouth daily, Disp: 90 tablet, Rfl: 3  •  glucose blood (OneTouch Ultra) test strip, Use as instructed, Disp: 100 strip, Rfl: 1  •  hydroxychloroquine (PLAQUENIL) 200 mg tablet, Take 1 tablet (200 mg total) by mouth daily, Disp: 90 tablet, Rfl: 3  •  Lancets (OneTouch Delica Plus [id number]) MISC, Test test sugar daily, Disp: 100 each, Rfl: 5  •  nicotine (NICODERM CQ) 14 mg/24hr TD 24 hr patch, Place 1 patch on the skin every 24 hours, Disp: 28 patch, Rfl: 0  •  oxyCODONE-acetaminophen (PERCOCET) 5-325 mg per tablet, Take 1 tablet by mouth every 12 (twelve) hours, Disp: , Rfl:   •  rosuvastatin (CRESTOR) 5 mg tablet, take 1 tablet by mouth once daily, Disp: 90 tablet, Rfl: 3  •  aspirin 81 mg chewable tablet, Chew 1 tablet daily Stop for the time being for foot surgery, Disp: , Rfl:   •  budesonide (Pulmicort Flexhaler) 180 MCG/ACT inhaler, Inhale 4 puffs 2 (two) times a day for 14 days Rinse mouth after use , Disp: 1 each, Rfl: 0  •  escitalopram (LEXAPRO) 5 mg tablet, Take 1 tablet (5 mg total) by mouth daily, Disp: 30 tablet, Rfl: 3  •  nitroglycerin (NITROSTAT) 0 4 mg SL tablet, Place 1 tablet (0 4 mg total) under the tongue every 5 (five) minutes as needed for chest pain (Patient not taking: Reported on 7/7/2022), Disp: 90 tablet, Rfl: 3  •  omeprazole (PriLOSEC) 20 mg delayed release capsule, Take 20 mg by mouth daily, Disp: , Rfl:     Objective:    Vitals:    01/10/23 1122   BP: 110/66   Weight: 45 8 kg (101 lb)   Height: 5' (1 524 m)       Physical Exam  Constitutional:       General: She is not in acute distress  HENT:      Head: Normocephalic and atraumatic  Eyes:      Conjunctiva/sclera: Conjunctivae normal    Cardiovascular:      Rate and Rhythm: Normal rate and regular rhythm  Heart sounds: S1 normal and S2 normal      No friction rub  Pulmonary:      Effort: Pulmonary effort is normal  No respiratory distress  Breath sounds: Normal breath sounds  No wheezing, rhonchi or rales  Musculoskeletal:         General: Tenderness present  Cervical back: Neck supple  Comments: L trochanteric bursa tenderness   Skin:     Coloration: Skin is not pale  Neurological:      Mental Status: She is alert   Mental status is at baseline  Psychiatric:         Mood and Affect: Mood normal          Behavior: Behavior normal        Reviewed labs and imaging  Imaging:   XR right foot 7/7/22  Stable postoperative change after partial distal 4th metatarsal resection  No acute findings  CT chest wo contrast 5/2/22  Mild emphysematous changes  No worrisome pulmonary mass  No acute pulmonary process  Chronic fractures of the posterior right eighth and ninth ribs redemonstrated  CTA abdomen pelvis 4/1/22  1  No significant stenosis of the superior mesenteric artery  Patent pancreaticoduodenal arcade and celiac artery  See additional details as described above  2   Moderate descending and sigmoid colonic diverticulosis  3   7 mm x 5 mm peritoneal nodule in the left lower quadrant anterior abdomen, possibly new since the previous study from May 2021  This is indeterminate in etiology  Recommend follow-up CT in 6 months  DXA scan 9/8/21  RESULTS:      LUMBAR SPINE L2-L4 (L1 vertebra excluded from analysis due to local structural abnormalities or artifact): BMD  0 716  gm/cm2   T-score -3 3         LEFT  TOTAL HIP:   BMD:  0 699  gm/cm2    T-score:  -2 0     LEFT  FEMORAL NECK:   BMD:  0 610  gm/cm2   T score: -2 2      LEFT  FOREARM:    33% RADIUS BMD:  0 546  gm/cm2  T-score:  -2 4         IMPRESSION:     1  Osteoporosis  [Based on the lumbar spine]    CXR 4/23/21   Unremarkable    DXA scan 9/8/21  RESULTS:      LUMBAR SPINE L2-L4 (L1 vertebra excluded from analysis due to local structural abnormalities or artifact): BMD  0 716  gm/cm2   T-score -3 3      LEFT  TOTAL HIP:   BMD:  0 699  gm/cm2    T-score:  -2 0     LEFT  FEMORAL NECK:   BMD:  0 610  gm/cm2   T score: -2 2      LEFT  FOREARM:    33% RADIUS BMD:  0 546  gm/cm2  T-score:  -2 4         IMPRESSION:  1  Osteoporosis   [Based on the lumbar spine]    Labs:   Appointment on 10/05/2022   Component Date Value Ref Range Status   • Creatinine 10/05/2022 0 65 0 60 - 1 30 mg/dL Final    Standardized to IDMS reference method   • eGFR 10/05/2022 98  ml/min/1 73sq m Final   • BUN 10/05/2022 4 (L)  5 - 25 mg/dL Final   Appointment on 09/23/2022   Component Date Value Ref Range Status   • Glucose, GTT - Fasting 09/23/2022 95  65 - 99 mg/dL Final   • Glucose, 2 Hour 75 gm Gluc [place]* 09/23/2022 93  65 - 139 mg/dL Final    <140 Normal Glucose  140-199 Impaired fasting glucose  >=200 Diagnosis Diabetes Mellitus  Specimen collection should occur prior to Sulfasalazine administration due to the potential for falsely depressed results  Specimen collection should occur prior to Sulfapyridine administration due to the potential for falsely elevated results

## 2023-01-10 ENCOUNTER — OFFICE VISIT (OUTPATIENT)
Dept: RHEUMATOLOGY | Facility: CLINIC | Age: 58
End: 2023-01-10

## 2023-01-10 VITALS
DIASTOLIC BLOOD PRESSURE: 66 MMHG | BODY MASS INDEX: 19.83 KG/M2 | WEIGHT: 101 LBS | SYSTOLIC BLOOD PRESSURE: 110 MMHG | HEIGHT: 60 IN

## 2023-01-10 DIAGNOSIS — G89.29 CHRONIC BILATERAL LOW BACK PAIN WITH LEFT-SIDED SCIATICA: ICD-10-CM

## 2023-01-10 DIAGNOSIS — M35.9 UNDIFFERENTIATED CONNECTIVE TISSUE DISEASE (HCC): Primary | ICD-10-CM

## 2023-01-10 DIAGNOSIS — I73.00 RAYNAUD'S DISEASE WITHOUT GANGRENE: ICD-10-CM

## 2023-01-10 DIAGNOSIS — M54.42 CHRONIC BILATERAL LOW BACK PAIN WITH LEFT-SIDED SCIATICA: ICD-10-CM

## 2023-01-10 DIAGNOSIS — M25.861 CYST OF RIGHT KNEE JOINT: ICD-10-CM

## 2023-01-10 DIAGNOSIS — M05.9 RHEUMATOID ARTHRITIS WITH POSITIVE RHEUMATOID FACTOR, INVOLVING UNSPECIFIED SITE (HCC): ICD-10-CM

## 2023-01-10 DIAGNOSIS — M70.62 GREATER TROCHANTERIC BURSITIS OF LEFT HIP: ICD-10-CM

## 2023-01-10 DIAGNOSIS — Z79.899 HIGH RISK MEDICATION USE: ICD-10-CM

## 2023-01-10 DIAGNOSIS — M79.18 MYOFASCIAL PAIN SYNDROME: ICD-10-CM

## 2023-01-10 DIAGNOSIS — M81.0 OSTEOPOROSIS, UNSPECIFIED OSTEOPOROSIS TYPE, UNSPECIFIED PATHOLOGICAL FRACTURE PRESENCE: ICD-10-CM

## 2023-01-10 RX ORDER — ALENDRONATE SODIUM 70 MG/1
70 TABLET ORAL
Qty: 12 TABLET | Refills: 3 | Status: SHIPPED | OUTPATIENT
Start: 2023-01-10

## 2023-01-10 RX ORDER — CELECOXIB 100 MG/1
100 CAPSULE ORAL 2 TIMES DAILY
Qty: 60 CAPSULE | Refills: 6 | Status: SHIPPED | OUTPATIENT
Start: 2023-01-10

## 2023-01-10 RX ORDER — CYCLOBENZAPRINE HCL 10 MG
10 TABLET ORAL
Qty: 30 TABLET | Refills: 6 | Status: SHIPPED | OUTPATIENT
Start: 2023-01-10

## 2023-01-10 RX ORDER — LIDOCAINE HYDROCHLORIDE 10 MG/ML
1 INJECTION, SOLUTION INFILTRATION; PERINEURAL ONCE
Status: COMPLETED | OUTPATIENT
Start: 2023-01-10 | End: 2023-01-10

## 2023-01-10 RX ORDER — TRIAMCINOLONE ACETONIDE 40 MG/ML
40 INJECTION, SUSPENSION INTRA-ARTICULAR; INTRAMUSCULAR ONCE
Status: COMPLETED | OUTPATIENT
Start: 2023-01-10 | End: 2023-01-10

## 2023-01-10 RX ORDER — HYDROXYCHLOROQUINE SULFATE 200 MG/1
200 TABLET, FILM COATED ORAL DAILY
Qty: 90 TABLET | Refills: 3 | Status: SHIPPED | OUTPATIENT
Start: 2023-01-10 | End: 2024-01-05

## 2023-01-10 RX ADMIN — TRIAMCINOLONE ACETONIDE 40 MG: 40 INJECTION, SUSPENSION INTRA-ARTICULAR; INTRAMUSCULAR at 12:15

## 2023-01-10 RX ADMIN — LIDOCAINE HYDROCHLORIDE 1 ML: 10 INJECTION, SOLUTION INFILTRATION; PERINEURAL at 12:15

## 2023-01-10 NOTE — PATIENT INSTRUCTIONS
Take 1,200mg of calcium daily  Take 2,000 units of Vit   D daily  Continue hydroxychloroquine daily; continue regular eye exams  Take alendronate 70mg once a week for osteoporosis; take on an empty stomach with a full glass of water, and do not lie down for 30 minutes after  Stop baclofen; start cyclobenzaprine at bedtime for muscle pain  Take celecoxib twice a day as needed for joint pain  Left trochanteric bursa steroid injection given in clinic today  General surgery referral made for right knee cyst removal     Return to clinic in 6 months

## 2023-01-24 ENCOUNTER — TRANSCRIBE ORDERS (OUTPATIENT)
Dept: URGENT CARE | Facility: MEDICAL CENTER | Age: 58
End: 2023-01-24

## 2023-01-24 ENCOUNTER — APPOINTMENT (OUTPATIENT)
Dept: RADIOLOGY | Facility: MEDICAL CENTER | Age: 58
End: 2023-01-24

## 2023-01-24 DIAGNOSIS — M25.572 BILATERAL ANKLE PAIN, UNSPECIFIED CHRONICITY: Primary | ICD-10-CM

## 2023-01-24 DIAGNOSIS — M25.572 BILATERAL ANKLE PAIN, UNSPECIFIED CHRONICITY: ICD-10-CM

## 2023-01-24 DIAGNOSIS — M25.571 BILATERAL ANKLE PAIN, UNSPECIFIED CHRONICITY: ICD-10-CM

## 2023-01-24 DIAGNOSIS — M25.571 BILATERAL ANKLE PAIN, UNSPECIFIED CHRONICITY: Primary | ICD-10-CM

## 2023-01-26 ENCOUNTER — VBI (OUTPATIENT)
Dept: ADMINISTRATIVE | Facility: OTHER | Age: 58
End: 2023-01-26

## 2023-03-20 ENCOUNTER — OFFICE VISIT (OUTPATIENT)
Dept: CARDIOLOGY CLINIC | Facility: HOSPITAL | Age: 58
End: 2023-03-20

## 2023-03-20 VITALS
DIASTOLIC BLOOD PRESSURE: 68 MMHG | SYSTOLIC BLOOD PRESSURE: 120 MMHG | WEIGHT: 106 LBS | HEIGHT: 60 IN | BODY MASS INDEX: 20.81 KG/M2 | HEART RATE: 90 BPM

## 2023-03-20 DIAGNOSIS — E78.49 OTHER HYPERLIPIDEMIA: ICD-10-CM

## 2023-03-20 DIAGNOSIS — I10 ESSENTIAL HYPERTENSION: ICD-10-CM

## 2023-03-20 DIAGNOSIS — I25.10 CAD IN NATIVE ARTERY: ICD-10-CM

## 2023-03-20 DIAGNOSIS — I73.9 PVD (PERIPHERAL VASCULAR DISEASE) (HCC): ICD-10-CM

## 2023-03-20 DIAGNOSIS — I25.10 CAD IN NATIVE ARTERY: Primary | ICD-10-CM

## 2023-03-20 DIAGNOSIS — Z72.0 TOBACCO ABUSE: ICD-10-CM

## 2023-03-20 RX ORDER — NITROGLYCERIN 0.4 MG/1
0.4 TABLET SUBLINGUAL
Qty: 20 TABLET | Refills: 3 | Status: SHIPPED | OUTPATIENT
Start: 2023-03-20

## 2023-03-20 NOTE — PROGRESS NOTES
Cardiology Follow Up    Marcel Dhillon  1965  [id number]  [address] [facility]  [phone]    No diagnosis found  There are no diagnoses linked to this encounter  I had the pleasure of seeing Marcel Dhillon for a follow up visit  Interval History: none    History of the presenting illness, Discussion/Summary and My Plan are as follows: She is a pleasant 59-year-old lady with a history of Hypertension,  familial hypercholesterolemia, mixed/undifferentiated connective tissue disorder/RA, tobacco use-less than half pack a day, coronary disease, prior percutaneous coronary intervention to the left anterior descending coronary artery in 1998, hypertension as well as marked dyslipidemia with LDL has high as 170 to 220 and a history of non compliance with meds and follow ups  For atypical CPs, I had her do a Bia Nuc perfusion study that did not demonstrate ischemia - in September 2014  She had been noncompliant with medications and followups  Continues to smoke      Her risk factors for vascular disease include hypertension, marked dyslipidemia, existing coronary artery disease, RA as well as smoking     Has lost about 30 lb in 2 years, 39 in 3 yrs from poor appetite with abdominal discomfort limiting appetite and unclear etiology,  status post EGD and colonoscopy, felt to be musculoskeletal/irritable bowel, seen vascular, underwent CT without flow-limiting celiac or superior mesenteric artery stenosis  Also has a stable umbilical hernia as of May 2021    Cardiac exam is unremarkable, decreased left carotid impulse, abdomen was also examined, sore throughout her exam, particularly in the epigastric region, not particularly abdomen wall, could not reproduce a ventral hernia with lifting head up      Plan:     CAD,  prior percutaneous coronary intervention to the left anterior descending coronary artery in 1998: Currently modestly active without symptoms  Limited by arthritis     Dyslipidemia/Familial Hypercholesterolemia: Pravastatin with worsening leg pains  For a short while she was on Repatha (PCSK9 inhibitor)  Then it became expensive apparently  Had started her on Crestor with lowering of LDL to 101, subsequently increased to 145 and started Zetia, now much better controlled, I suspect from the weight loss she has had  No changes at this time but will need to recheck and potentially hold Zetia if LDL is less than 70    Coronary artery disease, status post PCI to the LAD in 1998, for atypical chest pains  Bia Nuc in Sept 2014 without ischemia  If has recurrence of chest pains, will then check a stress test  At this time she needs to quit smoking - half ppd or less  On aspirin and statin, not on a beta-blocker - Metoprolol made her feel cold and caused hair loss     HTN: currently controlled    Chronic hydroxychloroquine therapy for undifferentiated CTD /RA: Echo and  ECG were unremarkable      Tobacco use: She is rolling her own cigarettes, about 0 5 ppd  Her tobacco use remains in equally potent risk factor - cessation was strongly advised       Weight loss from poor appetite and constant abdominal pain without a clear etiology: appetite now has improved  See above for testing  Does have delayed gastric emptying - April 2021     Follow up in 6 months      Latest Reference Range & Units 10/03/19 12:21 02/09/22 14:12   Cholesterol See Comment mg/dL 225 (H) 133   Triglycerides See Comment mg/dL 134 92   HDL >=50 mg/dL 53 64   LDL Calculated 0 - 100 mg/dL 145 (H) 51   (H): Data is abnormally high       Results for Joe Ash (MRN [id number]) as of 1/21/2020 13:32   Ref   Range 4/15/2016 10:29 no meds 8/30/2016 08:45 9/4/2018 13:59 on rosuvastatin  10/3/2019 12:21 on rosuvastatin with questionable compliance   Cholesterol Latest Ref Range: 50 - 200 mg/dL 314 (H) 280 (H) 177  225 (H)   Triglycerides Latest Ref Range: <=150 mg/dL 153 (H) 137 135  134   HDL Latest Ref Range: 40 - 60 mg/dL 58 54 49  53   LDL Direct Latest Ref Range: 0 - 100 mg/dL 225 (H) 199 (H) 101 (H)  145 (H)            Patient Active Problem List   Diagnosis   • Gastro-esophageal reflux disease without esophagitis   • Right lower quadrant pain   • Abnormal weight loss   • Acute pain of left shoulder   • Lumbar spondylosis   • Osteoarthritis of spine with radiculopathy, cervical region   • Degenerative cervical disc   • Lumbar degenerative disc disease   • Bilateral carpal tunnel syndrome   • CAD in native artery   • Depression with anxiety   • Other hyperlipidemia   • Essential hypertension   • PVD (peripheral vascular disease) (HCC)   • Headache disorder   • Rheumatoid factor positive   • Positive KRISTA (antinuclear antibody)   • Left facial numbness   • Myofascial pain syndrome   • Calcific tendinitis of left shoulder   • Incomplete tear of left rotator cuff   • Neuroma of foot   • Chronic hepatitis C without hepatic coma (HCC)   • Colorectal polyps   • Macrocytic   • Irritable bowel syndrome with diarrhea   • Incomplete rotator cuff tear or rupture of left shoulder, not specified as traumatic   • Biceps tendinosis of left shoulder   • Arthritis   • Cervical radiculopathy   • Cervical radiculitis   • RUQ pain   • Sacroiliitis (HCC)   • Chronic pain syndrome   • RUQ abdominal pain   • Dilated bile duct   • Gastroesophageal reflux disease   • Radiculopathy, cervical   • Trochanteric bursitis of right hip   • Trochanteric bursitis of left hip   • Lumbar radiculopathy   • Chronic bilateral low back pain with left-sided sciatica   • Osteoarthritis of cervical spine   • Flushing   • Tobacco abuse   • Neck pain   • High risk medication use   • Undifferentiated connective tissue disease (HCC)   • Epigastric pain   • Costochondritis, acute   • Age-related osteoporosis without current pathological fracture   • 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HCC)   • Umbilical hernia without obstruction and without gangrene   • Anxiety     Past Medical History:   Diagnosis Date   • Acquired ichthyosis     last assessed: 5/9/2013   • Anxiety    • Cervical radiculopathy     last assessed: 6/13/2014   • Colorectal polyps     last assessed: 3/9/2015   • COPD (chronic obstructive pulmonary disease) ([facility])    • Depression    • Diverticulosis of colon    • Foot pain    • GERD (gastroesophageal reflux disease)    • Hyperlipemia    • Hypertension    • Myocardial infarction ([facility] )     at age 28   • Osteopenia     last assessed: 12/6/2013   • Palpitations     last assessed: 12/31/2014   • PVD (peripheral vascular disease) ([facility] )     last assessed: 12/3/2012   • Scapular dyskinesis     last assessed: 11/17/2014   • Shortness of breath    • Tendonitis of left rotator cuff     last assessed: 11/17/2014   • Vocal cord polyps     last assessed: 8/8/2014     Social History     Socioeconomic History   • Marital status:      Spouse name: Not on file   • Number of children: Not on file   • Years of education: Not on file   • Highest education level: Not on file   Occupational History   • Not on file   Tobacco Use   • Smoking status: Every Day     Packs/day: 0 25     Types: Cigarettes   • Smokeless tobacco: Never   • Tobacco comments:     She is trying to cut back on her own   Vaping Use   • Vaping Use: Never used   Substance and Sexual Activity   • Alcohol use: Never     Comment: rare   • Drug use: Never   • Sexual activity: Not on file   Other Topics Concern   • Not on file   Social History Narrative    No living will     Social Determinants of Health     Financial Resource Strain: Not on file   Food Insecurity: Not on file   Transportation Needs: Not on file   Physical Activity: Not on file   Stress: Not on file   Social Connections: Not on file   Intimate Partner Violence: Not on file   Housing Stability: Not on file      Family History Problem Relation Age of Onset   • No Known Problems Mother    • No Known Problems Father    • No Known Problems Maternal Grandmother    • No Known Problems Maternal Grandfather    • No Known Problems Paternal Grandmother    • No Known Problems Paternal Grandfather    • Early death Sister    • Hypertension Sister    • No Known Problems Maternal Aunt    • No Known Problems Maternal Aunt    • No Known Problems Maternal Aunt    • No Known Problems Maternal Aunt      Past Surgical History:   Procedure Laterality Date   • ABDOMINAL SURGERY      exploratory   • CARDIAC SURGERY      cardiac stent    •  SECTION      last assessed: 2014   • CHOLECYSTECTOMY      last assessed: 2014   • COLONOSCOPY  10/27/2014   • CORONARY ANGIOPLASTY WITH STENT PLACEMENT      LAD stent   • DENTAL SURGERY      last assessed: 2014   • ELBOW SURGERY Bilateral     arthroplasty   • EPIDURAL BLOCK INJECTION N/A 2019    Procedure: C7 T1 Cervical Epidural Steroid Injection (91094); Surgeon: Pop Mondragon MD;  Location: [facility];  Service: Pain Management    • EPIDURAL BLOCK INJECTION Bilateral 2019    Procedure: BLOCK / INJECTION EPIDURAL STEROID CERVICAL - C7-T1;  Surgeon: Pop Mondragon MD;  Location: [facility];  Service: Pain Management    • EPIDURAL BLOCK INJECTION Left 10/3/2019    Procedure: C7-T1 interlaminar epidural steroid injection;  Surgeon: Pop Mondragon MD;  Location: [facility];  Service: Pain Management    • ESOPHAGOGASTRODUODENOSCOPY     • ESOPHAGOGASTRODUODENOSCOPY N/A 2016    Procedure: ESOPHAGOGASTRODUODENOSCOPY (EGD);   Surgeon: Hira Guerrero MD;  Location: [facility];  Service:    • FL GUIDED NEEDLE PLAC BX/ASP/INJ  2019   • FL GUIDED NEEDLE PLAC BX/ASP/INJ  2019   • FL GUIDED NEEDLE PLAC BX/ASP/INJ  10/3/2019   • FL INJECTION LEFT SHOULDER (ARTHROGRAM)  2018   • FOOT SURGERY Right 02/06/2015    x 4   • HYSTERECTOMY      last assessed: 2014   • OK ARTHROCENTESIS ASPIR&/INJ MAJOR JT/BURSA W/O US Bilateral 7/31/2019    Procedure: GREATER TROCHANTERIC HIP INJECTION;  Surgeon: Mallorie Bunch MD;  Location: [facility];  Service: Pain Management    • KS SURGICAL ARTHROSCOPY SHOULDER BICEPS TENODESIS Left 10/15/2018    Procedure: ARTHROSCOPY SHOULDER; Debridement of shoulder;  Surgeon: Brianne Garcia MD;  Location: [facility];  Service: Orthopedics   • KS SURGICAL ARTHROSCOPY SHOULDER W/ROTATOR CUFF RPR Left 7/23/2018    Procedure: ARTHROSCOPY SHOULDER, subacromial decompression, synovectomy;  Surgeon: Brianne Garcia MD;  [facility]: [facility];  Service: Orthopedics   • THROAT SURGERY     • TOOTH EXTRACTION     • TRIGEMINAL NERVE DECOMPRESSION Right 6/15/2018    Procedure: FOOT NEUROPLASTY;  Surgeon: Jessica Augustin DPM;  Location: [facility];  Service: Podiatry       Current Outpatient Medications:   •  alendronate (FOSAMAX) 70 mg tablet, Take 1 tablet (70 mg total) by mouth every 7 days Take on an empty stomach with a full glass of water, and do not lie down for 30 minutes after, Disp: 12 tablet, Rfl: 3  •  aspirin 81 mg chewable tablet, Chew 1 tablet daily Pt only takes it when she has chest pain, Disp: , Rfl:   •  cyclobenzaprine (FLEXERIL) 10 mg tablet, Take 1 tablet (10 mg total) by mouth daily at bedtime, Disp: 30 tablet, Rfl: 6  •  ezetimibe (ZETIA) 10 mg tablet, take 1 tablet by mouth daily, Disp: 90 tablet, Rfl: 3  •  hydroxychloroquine (PLAQUENIL) 200 mg tablet, Take 1 tablet (200 mg total) by mouth daily, Disp: 90 tablet, Rfl: 3  •  nitroglycerin (NITROSTAT) 0 4 mg SL tablet, Place 1 tablet (0 4 mg total) under the tongue every 5 (five) minutes as needed for chest pain, Disp: 90 tablet, Rfl: 3  •  oxyCODONE-acetaminophen (PERCOCET) 5-325 mg per tablet, Take 1 tablet by mouth every 12 (twelve) hours, Disp: , Rfl:   •  rosuvastatin (CRESTOR) 5 mg tablet, take 1 tablet by mouth once daily, Disp: 90 tablet, Rfl: 3  •  Blood Glucose Monitoring Suppl (ONE TOUCH ULTRA 2) w/Device KIT, Use in the morning, Disp: 1 kit, Rfl: 0  •  budesonide (Pulmicort Flexhaler) 180 MCG/ACT inhaler, Inhale 4 puffs 2 (two) times a day for 14 days Rinse mouth after use , Disp: 1 each, Rfl: 0  •  celecoxib (CeleBREX) 100 mg capsule, Take 1 capsule (100 mg total) by mouth 2 (two) times a day As needed for joint pain (Patient not taking: Reported on 3/20/2023), Disp: 60 capsule, Rfl: 6  •  escitalopram (LEXAPRO) 5 mg tablet, Take 1 tablet (5 mg total) by mouth daily, Disp: 30 tablet, Rfl: 3  •  glucose blood (OneTouch Ultra) test strip, Use as instructed, Disp: 100 strip, Rfl: 1  •  Lancets (OneTouch Delica Plus AFHSUC72D) MISC, Test test sugar daily, Disp: 100 each, Rfl: 5  •  nicotine (NICODERM CQ) 14 mg/24hr TD 24 hr patch, Place 1 patch on the skin every 24 hours, Disp: 28 patch, Rfl: 0  •  omeprazole (PriLOSEC) 20 mg delayed release capsule, Take 20 mg by mouth daily, Disp: , Rfl:   Allergies   Allergen Reactions   • Ceclor [Cefaclor] Anaphylaxis   • Cephalexin    • Codeine Itching     Reaction Date: [id number];    • Gabapentin      Body tremors, sweats   • Lyrica [Pregabalin]      Body tremors, chills, heaviness in chest   • Ticlopidine Hives     Other reaction(s): Hives   • Penicillins Rash     Other reaction(s): Other       Labs:not applicable  Imaging: No results found  Review of Systems:  Review of Systems   Constitutional: Negative  HENT: Negative  Eyes: Negative  Respiratory: Negative  Cardiovascular: Negative  Endocrine: Negative  Musculoskeletal: Positive for arthralgias and neck pain  Physical Exam:  /68   Pulse 90   Ht 5' (1 524 m)   Wt 48 1 kg (106 lb)   BMI 20 70 kg/m²   Physical Exam  Constitutional:       General: She is not in acute distress  Appearance: She is well-developed  She is not diaphoretic  HENT:      Head: Normocephalic and atraumatic  Eyes:      General: No scleral icterus  Right eye: No discharge  Left eye: No discharge  Conjunctiva/sclera: Conjunctivae normal       Pupils: Pupils are equal, round, and reactive to light  Neck:      Thyroid: No thyromegaly  Vascular: No JVD  Trachea: No tracheal deviation  Cardiovascular:      Rate and Rhythm: Regular rhythm  Heart sounds: Normal heart sounds  No murmur heard  No friction rub  Pulmonary:      Effort: Pulmonary effort is normal  No respiratory distress  Breath sounds: Normal breath sounds  No stridor  Abdominal:      General: Abdomen is flat  Comments: Abdominal ?  Wall tenderness, no reproducible hernia, lifting her head up, no lungs appreciated at the time of my exam    Musculoskeletal:         General: No tenderness or deformity  Normal range of motion  Cervical back: Normal range of motion  Skin:     General: Skin is warm  Coloration: Skin is not pale  Findings: No erythema or rash

## 2023-04-26 ENCOUNTER — APPOINTMENT (OUTPATIENT)
Dept: LAB | Facility: HOSPITAL | Age: 58
End: 2023-04-26
Attending: INTERNAL MEDICINE

## 2023-04-26 ENCOUNTER — OFFICE VISIT (OUTPATIENT)
Dept: SURGERY | Facility: CLINIC | Age: 58
End: 2023-04-26

## 2023-04-26 VITALS
BODY MASS INDEX: 20.69 KG/M2 | WEIGHT: 105.4 LBS | OXYGEN SATURATION: 97 % | SYSTOLIC BLOOD PRESSURE: 122 MMHG | TEMPERATURE: 97 F | HEART RATE: 80 BPM | HEIGHT: 60 IN | DIASTOLIC BLOOD PRESSURE: 78 MMHG

## 2023-04-26 DIAGNOSIS — I25.10 CAD IN NATIVE ARTERY: ICD-10-CM

## 2023-04-26 DIAGNOSIS — M79.89 SOFT TISSUE MASS: Primary | ICD-10-CM

## 2023-04-26 DIAGNOSIS — M06.9 RHEUMATOID ARTHRITIS, INVOLVING UNSPECIFIED SITE, UNSPECIFIED WHETHER RHEUMATOID FACTOR PRESENT (HCC): ICD-10-CM

## 2023-04-26 DIAGNOSIS — E78.49 OTHER HYPERLIPIDEMIA: ICD-10-CM

## 2023-04-26 LAB
CHOLEST SERPL-MCNC: 152 MG/DL
CRP SERPL QL: <1 MG/L
ERYTHROCYTE [SEDIMENTATION RATE] IN BLOOD: 9 MM/HOUR (ref 0–29)
HDLC SERPL-MCNC: 61 MG/DL
LDLC SERPL CALC-MCNC: 66 MG/DL (ref 0–100)
TRIGL SERPL-MCNC: 126 MG/DL

## 2023-04-26 NOTE — PROGRESS NOTES
Assessment/Plan:    No problem-specific Assessment & Plan notes found for this encounter  Diagnoses and all orders for this visit:    Soft tissue mass  -     US extremity soft tissue; Future        63 y/o F with PMH of gout, rheumatoid arthritis, and lupus who presents to the clinic for evaluation of a lump on her right knee  She first noticed about 20 years ago, but for the last year it has grown and become increasingly painful  The pain is sharp and stabbing, on average 5/10  The skin over the bump sometimes becomes dark red, the color of a bruise, for unknown reasons  There is never discharge and she has not had any symptoms of infection  It is very tender  On palpation, the lump is located on the right knee lateral to the tibial tuberosity, cystic, mobile, and soft  Will order ultrasound to ensure the cyst is not contiguous with the joint space  Will plan on removing cyst in the office pending results of the ultrasound  Subjective:      Patient ID: King Mercado is a 62 y o  female  Brandy Lundy is a 63 y/o F with PMH of gout, rheumatoid arthritis, lupus, HTN, HLD, coronary artery disease, GERD, IBS, and hepatitis C, who presents to the clinic for evaluation of a lump on her right knee  She first noticed about 20 years ago, but for the last year it has grown and become increasingly painful  The pain is sharp and stabbing, on average 5/10  The skin over the bump sometimes becomes dark red, the color of a bruise, for unknown reasons  There is never discharge and she has not had any symptoms of infection  It is very tender       X-ray of right knee done 6/11/19 was WNL (no acute osseous abnormalities, no joint effusion, soft tissues unremarkable)    The following portions of the patient's history were reviewed and updated as appropriate: allergies, current medications, past family history, past medical history, past social history, past surgical history and problem list     Review of Systems   Constitutional: Negative for chills and fever  Musculoskeletal: Positive for arthralgias  Skin: Positive for color change  Objective:      /78 (BP Location: Left arm, Patient Position: Sitting, Cuff Size: Standard)   Pulse 80   Temp (!) 97 °F (36 1 °C) (Tympanic)   Ht 5' (1 524 m)   Wt 47 8 kg (105 lb 6 4 oz)   SpO2 97%   BMI 20 58 kg/m²          Physical Exam  Constitutional:       General: She is not in acute distress  Appearance: Normal appearance  Musculoskeletal:         General: Tenderness present  Right knee: No effusion  Normal range of motion  Tenderness present  Legs:       Comments: Lump inferior to right knee, appears cystic, is mobile and soft  Overlying skin slightly erythematous  Neurological:      Mental Status: She is alert     Psychiatric:         Mood and Affect: Mood normal          Behavior: Behavior normal

## 2023-05-02 ENCOUNTER — HOSPITAL ENCOUNTER (OUTPATIENT)
Dept: ULTRASOUND IMAGING | Facility: HOSPITAL | Age: 58
Discharge: HOME | End: 2023-05-02
Attending: SURGERY

## 2023-05-02 DIAGNOSIS — M79.89 SOFT TISSUE MASS: ICD-10-CM

## 2023-05-04 DIAGNOSIS — M79.89 SOFT TISSUE MASS: Primary | ICD-10-CM

## 2023-05-05 ENCOUNTER — TELEPHONE (OUTPATIENT)
Dept: SURGERY | Facility: CLINIC | Age: 58
End: 2023-05-05

## 2023-05-05 NOTE — TELEPHONE ENCOUNTER
----- Message from Tessa Anand DO sent at 5/4/2023  7:53 PM EDT -----  Trice Roperr  I spoke with patient  I will order MRI  Please make sure it gets scheduled  Thanks! !  ----- Message -----  From: Christian, Radiology Results In  Sent: 5/3/2023   8:41 AM EDT  To: Tessa Anand DO

## 2023-05-08 ENCOUNTER — TELEPHONE (OUTPATIENT)
Dept: SURGERY | Facility: CLINIC | Age: 58
End: 2023-05-08

## 2023-05-08 DIAGNOSIS — M79.89 SOFT TISSUE MASS: Primary | ICD-10-CM

## 2023-05-12 ENCOUNTER — HOSPITAL ENCOUNTER (OUTPATIENT)
Dept: MRI IMAGING | Facility: HOSPITAL | Age: 58
Discharge: HOME | End: 2023-05-12
Attending: SURGERY

## 2023-05-12 DIAGNOSIS — M79.89 SOFT TISSUE MASS: ICD-10-CM

## 2023-05-12 RX ADMIN — GADOBUTROL 4 ML: 604.72 INJECTION INTRAVENOUS at 13:03

## 2023-05-16 DIAGNOSIS — M79.89 SOFT TISSUE MASS: Primary | ICD-10-CM

## 2023-06-06 ENCOUNTER — APPOINTMENT (OUTPATIENT)
Dept: RADIOLOGY | Facility: MEDICAL CENTER | Age: 58
End: 2023-06-06
Payer: COMMERCIAL

## 2023-06-06 ENCOUNTER — OFFICE VISIT (OUTPATIENT)
Dept: URGENT CARE | Facility: MEDICAL CENTER | Age: 58
End: 2023-06-06
Payer: COMMERCIAL

## 2023-06-06 VITALS
HEART RATE: 86 BPM | OXYGEN SATURATION: 98 % | DIASTOLIC BLOOD PRESSURE: 78 MMHG | RESPIRATION RATE: 18 BRPM | BODY MASS INDEX: 21.01 KG/M2 | WEIGHT: 107 LBS | TEMPERATURE: 97.7 F | HEIGHT: 60 IN | SYSTOLIC BLOOD PRESSURE: 132 MMHG

## 2023-06-06 DIAGNOSIS — S63.615A SPRAIN OF LEFT RING FINGER, UNSPECIFIED SITE OF DIGIT, INITIAL ENCOUNTER: Primary | ICD-10-CM

## 2023-06-06 DIAGNOSIS — S69.92XA INJURY OF FINGER OF LEFT HAND, INITIAL ENCOUNTER: ICD-10-CM

## 2023-06-06 PROCEDURE — 73140 X-RAY EXAM OF FINGER(S): CPT

## 2023-06-06 PROCEDURE — 99213 OFFICE O/P EST LOW 20 MIN: CPT
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
## 2023-06-06 NOTE — PATIENT INSTRUCTIONS
Aleve twice per day for inflammation and pain  Wear brace for support (Remove braces every 3 hours)  Ice 20 minutes 3-4 times per day for 3 days  Insulate the skin from the ice to prevent frostbite  Rest and Elevate  Follow up with orthopedic if symptoms do not improve    Remove splint/brace and go straight to ER if you experience sudden increase in pain, swelling, change in color/temperature/sensation, chest pain, shortness or breath, or if you start coughing up blood  Follow up with PCP in 3-5 days  Proceed to  ER if symptoms worsen

## 2023-06-14 ENCOUNTER — OFFICE VISIT (OUTPATIENT)
Dept: OBGYN CLINIC | Facility: CLINIC | Age: 58
End: 2023-06-14
Payer: COMMERCIAL

## 2023-06-14 VITALS
HEIGHT: 60 IN | HEART RATE: 97 BPM | DIASTOLIC BLOOD PRESSURE: 94 MMHG | SYSTOLIC BLOOD PRESSURE: 125 MMHG | BODY MASS INDEX: 21.13 KG/M2 | WEIGHT: 107.6 LBS | RESPIRATION RATE: 18 BRPM

## 2023-06-14 DIAGNOSIS — S63.615A SPRAIN OF LEFT RING FINGER, UNSPECIFIED SITE OF DIGIT, INITIAL ENCOUNTER: Primary | ICD-10-CM

## 2023-06-14 PROCEDURE — 99203 OFFICE O/P NEW LOW 30 MIN: CPT | Performed by: FAMILY MEDICINE
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
## 2023-06-20 ENCOUNTER — OFFICE VISIT (OUTPATIENT)
Dept: OBGYN CLINIC | Facility: CLINIC | Age: 58
End: 2023-06-20
Payer: COMMERCIAL

## 2023-06-20 VITALS
HEIGHT: 60 IN | BODY MASS INDEX: 20.75 KG/M2 | SYSTOLIC BLOOD PRESSURE: 104 MMHG | DIASTOLIC BLOOD PRESSURE: 60 MMHG | WEIGHT: 105.7 LBS

## 2023-06-20 DIAGNOSIS — M79.89 SOFT TISSUE MASS: ICD-10-CM

## 2023-06-20 DIAGNOSIS — Z01.818 PRE-OP TESTING: Primary | ICD-10-CM

## 2023-06-20 PROCEDURE — 99244 OFF/OP CNSLTJ NEW/EST MOD 40: CPT | Performed by: STUDENT IN AN ORGANIZED HEALTH CARE EDUCATION/TRAINING PROGRAM

## 2023-06-20 RX ORDER — CHLORHEXIDINE GLUCONATE 4 G/100ML
SOLUTION TOPICAL DAILY PRN
OUTPATIENT
Start: 2023-06-20

## 2023-06-20 RX ORDER — CHLORHEXIDINE GLUCONATE 0.12 MG/ML
15 RINSE ORAL ONCE
OUTPATIENT
Start: 2023-06-20 | End: 2023-06-20

## 2023-06-20 NOTE — PROGRESS NOTES
Orthopedic Surgery Office Note  Estee Bella (57 y o  female)  : 1965 Encounter Date: 2023  Dr Julianne Marinelli DO, Orthopedic Surgeon  Orthopedic Oncology & Sarcoma Surgery   Phone:[phone] FRK:[phone]    Assessment and Plan: Estee Bella is a 62 y o  female with    1  Soft tissue mass of right knee  - Reviewed today's physical exam findings and advanced imaging findings with patient at time of visit  - Risks and benefits of conservative and operative treatments were discussed in detail with the patient  -The risks of excisional biopsy of right knee including infection, bleeding, injury to nerves, injury to the vessels, excess scar tissue formation, risk of failure of the procedure, the possible need for further surgery, and potential risk of loss of limb and life  - After weighing all the treatment options available, the patient has opted for surgical intervention and informed consent was obtained  - We will schedule the patient to be seen back postoperatively   -Discussed in depth with patient that I do not know what this mass is, it enhances with contrast and is causing her pain  It may be a nerve tumor  It may be a sarcoma  Discussed that I will remove it and it will be analyzed by pathologist   She may need further surgery in the future if this is a malignancy     -Patient will need to obtain medical clearance due to her comorbidities  Preoperative lab studies will be performed as well  Procedure:  No procedures performed    Surgical Planning:     Pre-op instructions  Medications:  Hold anticoagulation therapy 5-7 days prior to surgery date  Hold vitamins/minerals/supplements/ NSAIDs 7 days prior to surgery to decrease bleeding risk  Hold diabetic medications morning of surgery  Hold Ace/Arbs/CCB day of surgery  OK to take rest of your medications morning of surgery with small sip of water including pain medication    NPO night before surgery at midnight  Advised to discuss this with their prescribers as well  Follow up: Return for surgery  Diagnoses and all orders for this visit:    Soft tissue mass  -     Ambulatory Referral to Orthopedic Surgery    ___________________________________________________________________    History of Present Illness:     Hayder Rogers is a 62 y o  female with history of chronic hepatitis C, gastro-esophageal reflux disease, IBS, CAD, hypertension, PVD, neuroma of foot, tobacco abuse, who presents for consultation at the request of Reji Weir DO regarding soft tissue mass in right knee  The patient was last seen on 04/26/2023 by general surgery in which an ultrasound and MRI of right knee were ordered for further evaluation before undertaking a surgical intervention to remove the soft tissue mass  On today's presentation, the patient states that a few years ago she was kneeling next to her bathtub cleaning her bird cage, and noticed a pinching sensation  She states that she noticed a pea size mass along the proximal anterior / lateral aspect of her right lower extremity  The patient states that the last 2 years, the soft tissue mass has grown in size and became painful  She notes occasional sharp shooting sensations along the lateral aspect of her thigh and calf  The patient states that she chronically uses oxycodone due to five right foot operations  She states that she has been on it since 2011  At baseline patient gaits steady assistance  Denies constitutional symptoms such as fever, chills, night sweats, fatigue, weight gains/losses  Denies  chest pain/shortness of breath  Patient Denies  personal history of cancer  Review of Systems:   Allergies, medications, past medical/surgical/family/social history have been reviewed  Complete 12 system review performed and found to be negative except: except as per mentioned in HPI      Physical Examination:   Height: 5' (152 4 cm)  Weight - Scale: 47 9 kg (105 lb 11 2 oz)  BMI (Calculated): 20 6  BSA (Calculated - m2): 1 42 sq meters     Vitals:    06/20/23 1439   BP: 104/60     Body mass index is 20 64 kg/m²  General: alert and oriented x 3; well nourished/well developed; no apparent distress  Psychiatric: normal mood and affect  HEENT: NCAT  Head/neck - full range of motion  Lungs: breathing comfortably; equal symmetric chest expansion  Abdomen: soft, non-tender, non-distended  Skin: warm; dry; no lesions, rashes, petechiae or purpura; no clubbing, no cyanosis, no edema, with palpable mass in anterior/lateral knee  Extremity: Right knee   Inspection: no edema, skin abnormalities throughout   Palpation: with palpable mass in proximal anterior / lateral aspect of right knee   Range of motion of joints: full range of motion all extremities  Motor strength: WNL all extremities   5/5  Dorsal/Plantar flexion: 5/5  Sensation: grossly intact to all extremities  Pulses: palpable   Lymphatics: (-) lymphadenopathy  Gait: normal gait  IMAGING RESULTS, All images personally review today by Dr Summer Vázquez  Study: MRI with/without contrast  Area: Right knee  Date: 05/12/2023  Impression: I have personally reviewed all relevant imaging  My impression is as follows:  Small nodular subcutaneous lesion at the patellar tendon insertion measuring 1 4 x 0 8 x 1 4 cm corresponding to recently identified ultrasound abnormality and exhibiting solid post gadolinium enhancement  Findings remain indeterminate and tissue sampling is advised to exclude aggressive process  Study: Ultrasound extremity soft tissue  Date: 05/02/2023  Impression: I have personally reviewed all relevant imaging  My impression is as follows: Palpable abnormality in the right infrapatellar superficial subcutaneous soft tissues corresponds to a vascular solid 1 3 x 0 4 x 1 4 cm well-circumscribed mass with overall nonspecific appearance   History of longstanding nature suggests a benign etiology as discussed above, but further evaluation/characterization with MRI is suggested      Pertinent laboratory findings:  n/a    Pathology:   n/a    Microbiology:  Cultures: n/a    Review of referring provider's records:  Referring provider: Radha Wei DO    Patient Care team:   Patient Care Team:  Judy Sanchez DO as PCP - General  Judy Sanchez DO as PCP - [address] [place] [address] (RTE)  Judy Sanchez DO as PCP - PCP-[employer] (RTE)  MD Jerardo Villafuerte DPM Sueanne Stamps, MD Annice Sabins, MD Remigio Magic as Patient Care Assistant (Orthopedics)  Meghann Sanford MD (Pain Medicine)     Past Medical History:   Diagnosis Date   • Acquired ichthyosis     last assessed: 2013   • Anxiety    • Cervical radiculopathy     last assessed: 2014   • Colorectal polyps     last assessed: 3/9/2015   • COPD (chronic obstructive pulmonary disease) ([facility] )    • Depression    • Diverticulosis of colon    • Foot pain    • GERD (gastroesophageal reflux disease)    • Heart disease 1997    Coronary artery disease   • Hepatitis C 2018    Took meds no longer have   • Hyperlipemia    • Hypertension    • Myocardial infarction [facility])     at age 28   • Osteoarthritis unsure   • Osteopenia     last assessed: 2013   • Palpitations     last assessed: 2014   • PVD (peripheral vascular disease) ([facility] )     last assessed: 12/3/2012   • Rheumatoid arthritis ([facility] ) unsure   • Scapular dyskinesis     last assessed: 2014   • Shortness of breath    • Stomach disorder Unsure   • Tendonitis of left rotator cuff     last assessed: 2014   • Vocal cord polyps     last assessed: 2014     Past Surgical History:   Procedure Laterality Date   • ABDOMINAL SURGERY      exploratory   • CARDIAC SURGERY      cardiac stent    •  SECTION      last assessed: 2014   • CHOLECYSTECTOMY      last assessed: 2014   • COLONOSCOPY  10/27/2014   • CORONARY ANGIOPLASTY WITH STENT PLACEMENT  1999    LAD stent   • DENTAL SURGERY      last assessed: 8/8/2014   • ELBOW SURGERY Bilateral     arthroplasty   • EPIDURAL BLOCK INJECTION N/A 01/17/2019    Procedure: C7 T1 Cervical Epidural Steroid Injection (20290); Surgeon: Nas Wadsworth MD;  [facility]: [facility];  Service: Pain Management    • EPIDURAL BLOCK INJECTION Bilateral 05/02/2019    Procedure: BLOCK / INJECTION EPIDURAL STEROID CERVICAL - C7-T1;  Surgeon: Nas Wadsworth MD;  [facility]: [facility];  Service: Pain Management    • EPIDURAL BLOCK INJECTION Left 10/03/2019    Procedure: C7-T1 interlaminar epidural steroid injection;  Surgeon: Nas Wadsworth MD;  [facility];  Service: Pain Management    • ESOPHAGOGASTRODUODENOSCOPY     • ESOPHAGOGASTRODUODENOSCOPY N/A 11/04/2016    Procedure: ESOPHAGOGASTRODUODENOSCOPY (EGD);   Surgeon: Denis Mckeon MD;  Location: [facility];  Service:    • FL GUIDED NEEDLE PLAC BX/ASP/INJ  05/02/2019   • FL GUIDED NEEDLE PLAC BX/ASP/INJ  07/31/2019   • FL GUIDED NEEDLE PLAC BX/ASP/INJ  10/03/2019   • FL INJECTION LEFT SHOULDER (ARTHROGRAM)  09/25/2018   • FOOT SURGERY Right 02/06/2015    x 4   • HYSTERECTOMY      last assessed: 8/8/2014   • HI ARTHROCENTESIS ASPIR&/INJ MAJOR JT/BURSA W/O US Bilateral 07/31/2019    Procedure: GREATER TROCHANTERIC HIP INJECTION;  Surgeon: Nas Wadsworth MD;  [facility];  Service: Pain Management    • HI SURGICAL ARTHROSCOPY SHOULDER BICEPS TENODESIS Left 10/15/2018    Procedure: ARTHROSCOPY SHOULDER; Debridement of shoulder;  Surgeon: Pascual Figueroa MD;  [facility]: [facility];  Service: Orthopedics   • HI SURGICAL ARTHROSCOPY SHOULDER W/ROTATOR CUFF RPR Left 07/23/2018    Procedure: ARTHROSCOPY SHOULDER, subacromial decompression, synovectomy;  Surgeon: Pascual Figueroa MD;  [facility];  Service: Orthopedics   • SHOULDER SURGERY  june and oct 2018   • THROAT SURGERY     • TOOTH EXTRACTION     • TRIGEMINAL NERVE DECOMPRESSION Right 06/15/2018    Procedure: FOOT NEUROPLASTY;  Surgeon: Tolbert Schilder, DPM;  Location: [facility];  Service: Podiatry       Current Outpatient Medications:   •  alendronate (FOSAMAX) 70 mg tablet, Take 1 tablet (70 mg total) by mouth every 7 days Take on an empty stomach with a full glass of water, and do not lie down for 30 minutes after, Disp: 12 tablet, Rfl: 3  •  cyclobenzaprine (FLEXERIL) 10 mg tablet, Take 1 tablet (10 mg total) by mouth daily at bedtime (Patient taking differently: Take 10 mg by mouth daily at bedtime PRN), Disp: 30 tablet, Rfl: 6  •  ezetimibe (ZETIA) 10 mg tablet, take 1 tablet by mouth daily, Disp: 90 tablet, Rfl: 3  •  hydroxychloroquine (PLAQUENIL) 200 mg tablet, Take 1 tablet (200 mg total) by mouth daily, Disp: 90 tablet, Rfl: 3  •  nitroglycerin (NITROSTAT) 0 4 mg SL tablet, Place 1 tablet (0 4 mg total) under the tongue every 5 (five) minutes as needed for chest pain, Disp: 20 tablet, Rfl: 3  •  oxyCODONE-acetaminophen (PERCOCET) 5-325 mg per tablet, Take 1 tablet by mouth every 12 (twelve) hours, Disp: , Rfl:   •  rosuvastatin (CRESTOR) 5 mg tablet, take 1 tablet by mouth once daily, Disp: 90 tablet, Rfl: 3  •  aspirin 81 mg chewable tablet, Chew 1 tablet daily Pt only takes it when she has chest pain, Disp: , Rfl:   •  Blood Glucose Monitoring Suppl (ONE TOUCH ULTRA 2) w/Device KIT, Use in the morning, Disp: 1 kit, Rfl: 0  •  budesonide (Pulmicort Flexhaler) 180 MCG/ACT inhaler, Inhale 4 puffs 2 (two) times a day for 14 days Rinse mouth after use , Disp: 1 each, Rfl: 0  •  celecoxib (CeleBREX) 100 mg capsule, Take 1 capsule (100 mg total) by mouth 2 (two) times a day As needed for joint pain (Patient not taking: Reported on 3/20/2023), Disp: 60 capsule, Rfl: 6  •  escitalopram (LEXAPRO) 5 mg tablet, Take 1 tablet (5 mg total) by mouth daily, Disp: 30 tablet, Rfl: 3  •  glucose blood (OneTouch Ultra) test strip, Use as instructed, Disp: 100 strip, Rfl: 1  •  Lancets (OneTouch Delica Plus [id number]) MISC, Test test sugar daily, Disp: 100 each, Rfl: 5  •  nicotine (NICODERM CQ) 14 mg/24hr TD 24 hr patch, Place 1 patch on the skin every 24 hours, Disp: 28 patch, Rfl: 0  •  omeprazole (PriLOSEC) 20 mg delayed release capsule, Take 20 mg by mouth daily, Disp: , Rfl:   Allergies   Allergen Reactions   • Ceclor [Cefaclor] Anaphylaxis   • Cephalexin    • Codeine Itching     Reaction Date: 09Jun2011;    • Gabapentin      Body tremors, sweats   • Lyrica [Pregabalin]      Body tremors, chills, heaviness in chest   • Ticlopidine Hives     Other reaction(s): Hives   • Penicillins Rash     Other reaction(s):  Other     Family History   Problem Relation Age of Onset   • No Known Problems Mother    • No Known Problems Father    • No Known Problems Maternal Grandmother    • No Known Problems Maternal Grandfather    • No Known Problems Paternal Grandmother    • No Known Problems Paternal Grandfather    • Early death Sister    • Hypertension Sister    • No Known Problems Maternal Aunt    • No Known Problems Maternal Aunt    • No Known Problems Maternal Aunt    • No Known Problems Maternal Aunt      Social History     Socioeconomic History   • Marital status:      Spouse name: Not on file   • Number of children: Not on file   • Years of education: Not on file   • Highest education level: Not on file   Occupational History   • Not on file   Tobacco Use   • Smoking status: Every Day     Packs/day: 0 25     Years: 40 00     Total pack years: 10 00     Types: Cigarettes   • Smokeless tobacco: Never   • Tobacco comments:     She is trying to cut back on her own   Vaping Use   • Vaping Use: Never used   Substance and Sexual Activity   • Alcohol use: No     Comment: rare   • Drug use: No   • Sexual activity: Not Currently     Birth control/protection: Abstinence   Other Topics Concern   • Not on file   Social History Narrative    No living will     Social Determinants of Health     Financial Resource Strain: Not on file   Food Insecurity: Not on file   Transportation Needs: Not on file   Physical Activity: Not on file   Stress: Not on file   Social Connections: Not on file   Intimate Partner Violence: Not on file   Housing Stability: Not on file     45 minutes was spent in the coordination of care, reviewing of imaging and with the patient on the date of service    Scribe Attestation    I,:  Tej Bliss am acting as a scribe while in the presence of the attending physician :       I,:  Daniel Aleman DO personally performed the services described in this documentation    as scribed in my presence :

## 2023-07-05 ENCOUNTER — OFFICE VISIT (OUTPATIENT)
Dept: OBGYN CLINIC | Facility: CLINIC | Age: 58
End: 2023-07-05
Payer: COMMERCIAL

## 2023-07-05 VITALS
SYSTOLIC BLOOD PRESSURE: 106 MMHG | DIASTOLIC BLOOD PRESSURE: 68 MMHG | HEIGHT: 60 IN | BODY MASS INDEX: 20.81 KG/M2 | WEIGHT: 106 LBS | HEART RATE: 75 BPM

## 2023-07-05 DIAGNOSIS — S63.615A SPRAIN OF LEFT RING FINGER, UNSPECIFIED SITE OF DIGIT, INITIAL ENCOUNTER: Primary | ICD-10-CM

## 2023-07-05 PROCEDURE — 99213 OFFICE O/P EST LOW 20 MIN: CPT | Performed by: FAMILY MEDICINE
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
## 2023-08-02 ENCOUNTER — TELEPHONE (OUTPATIENT)
Dept: OBGYN CLINIC | Facility: CLINIC | Age: 58
End: 2023-08-02

## 2023-08-02 ENCOUNTER — OFFICE VISIT (OUTPATIENT)
Dept: OBGYN CLINIC | Facility: CLINIC | Age: 58
End: 2023-08-02
Payer: COMMERCIAL

## 2023-08-02 ENCOUNTER — APPOINTMENT (OUTPATIENT)
Dept: RADIOLOGY | Facility: MEDICAL CENTER | Age: 58
End: 2023-08-02
Payer: COMMERCIAL

## 2023-08-02 VITALS
WEIGHT: 106 LBS | SYSTOLIC BLOOD PRESSURE: 109 MMHG | DIASTOLIC BLOOD PRESSURE: 76 MMHG | BODY MASS INDEX: 20.81 KG/M2 | HEART RATE: 91 BPM | HEIGHT: 60 IN

## 2023-08-02 DIAGNOSIS — S63.615D SPRAIN OF LEFT RING FINGER, UNSPECIFIED SITE OF DIGIT, SUBSEQUENT ENCOUNTER: Primary | ICD-10-CM

## 2023-08-02 DIAGNOSIS — S63.615D SPRAIN OF LEFT RING FINGER, UNSPECIFIED SITE OF DIGIT, SUBSEQUENT ENCOUNTER: ICD-10-CM

## 2023-08-02 PROCEDURE — 73140 X-RAY EXAM OF FINGER(S): CPT

## 2023-08-02 PROCEDURE — 99214 OFFICE O/P EST MOD 30 MIN: CPT | Performed by: FAMILY MEDICINE
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
## 2023-08-10 ENCOUNTER — OFFICE VISIT (OUTPATIENT)
Dept: RHEUMATOLOGY | Facility: CLINIC | Age: 58
End: 2023-08-10
Payer: COMMERCIAL

## 2023-08-10 VITALS
DIASTOLIC BLOOD PRESSURE: 82 MMHG | HEIGHT: 60 IN | WEIGHT: 106 LBS | SYSTOLIC BLOOD PRESSURE: 128 MMHG | BODY MASS INDEX: 20.81 KG/M2

## 2023-08-10 DIAGNOSIS — M81.0 OSTEOPOROSIS, UNSPECIFIED OSTEOPOROSIS TYPE, UNSPECIFIED PATHOLOGICAL FRACTURE PRESENCE: ICD-10-CM

## 2023-08-10 DIAGNOSIS — M70.62 GREATER TROCHANTERIC BURSITIS OF LEFT HIP: Primary | ICD-10-CM

## 2023-08-10 DIAGNOSIS — I73.00 RAYNAUD'S DISEASE WITHOUT GANGRENE: ICD-10-CM

## 2023-08-10 DIAGNOSIS — M05.9 RHEUMATOID ARTHRITIS WITH POSITIVE RHEUMATOID FACTOR, INVOLVING UNSPECIFIED SITE (HCC): ICD-10-CM

## 2023-08-10 DIAGNOSIS — M35.9 UNDIFFERENTIATED CONNECTIVE TISSUE DISEASE (HCC): ICD-10-CM

## 2023-08-10 DIAGNOSIS — Z79.899 HIGH RISK MEDICATION USE: ICD-10-CM

## 2023-08-10 DIAGNOSIS — M79.18 MYOFASCIAL PAIN SYNDROME: ICD-10-CM

## 2023-08-10 PROCEDURE — 20610 DRAIN/INJ JOINT/BURSA W/O US: CPT | Performed by: INTERNAL MEDICINE

## 2023-08-10 PROCEDURE — 99214 OFFICE O/P EST MOD 30 MIN: CPT | Performed by: INTERNAL MEDICINE

## 2023-08-10 RX ORDER — TRIAMCINOLONE ACETONIDE 40 MG/ML
40 INJECTION, SUSPENSION INTRA-ARTICULAR; INTRAMUSCULAR ONCE
Status: COMPLETED | OUTPATIENT
Start: 2023-08-10 | End: 2023-08-10

## 2023-08-10 RX ORDER — AMLODIPINE BESYLATE 2.5 MG/1
2.5 TABLET ORAL DAILY
Qty: 30 TABLET | Refills: 6 | Status: SHIPPED | OUTPATIENT
Start: 2023-08-10 | End: 2023-08-25

## 2023-08-10 RX ORDER — HYDROXYCHLOROQUINE SULFATE 200 MG/1
200 TABLET, FILM COATED ORAL DAILY
Qty: 90 TABLET | Refills: 1 | Status: SHIPPED | OUTPATIENT
Start: 2023-08-10 | End: 2024-02-06

## 2023-08-10 RX ORDER — ALENDRONATE SODIUM 70 MG/1
70 TABLET ORAL
Qty: 12 TABLET | Refills: 3 | Status: SHIPPED | OUTPATIENT
Start: 2023-08-10

## 2023-08-10 RX ORDER — LIDOCAINE HYDROCHLORIDE 10 MG/ML
1 INJECTION, SOLUTION INFILTRATION; PERINEURAL ONCE
Status: COMPLETED | OUTPATIENT
Start: 2023-08-10 | End: 2023-08-10

## 2023-08-10 RX ORDER — CYCLOBENZAPRINE HCL 10 MG
10 TABLET ORAL
Qty: 30 TABLET | Refills: 6 | Status: SHIPPED | OUTPATIENT
Start: 2023-08-10

## 2023-08-10 RX ADMIN — TRIAMCINOLONE ACETONIDE 40 MG: 40 INJECTION, SUSPENSION INTRA-ARTICULAR; INTRAMUSCULAR at 12:38

## 2023-08-10 RX ADMIN — LIDOCAINE HYDROCHLORIDE 1 ML: 10 INJECTION, SOLUTION INFILTRATION; PERINEURAL at 12:37

## 2023-08-10 NOTE — PATIENT INSTRUCTIONS
Continue daily calcium and Vit.  D  Continue hydroxychloroquine daily; continue regular eye exams  Continue alendronate 70mg once a week for osteoporosis; take on an empty stomach with a full glass of water, and do not lie down for 30 minutes after  Continue cyclobenzaprine at bedtime for muscle pain  Take amlodipine at bedtime for Raynaud's symptoms  Left trochanteric bursa steroid injection given in clinic today  Will order next DEXA scan at next visit    Return to clinic in 6 months

## 2023-08-10 NOTE — PROGRESS NOTES
Assessment and Plan: Evin Chapman is a 62 y.o.  female who presents for follow-up of her UCTD and osteoporosis. Will be gettingg right knee cyst/mass removal 9/18th. Continue daily calcium and Vit. D  Continue hydroxychloroquine daily; continue regular eye exams  Continue alendronate 70mg once a week for osteoporosis; take on an empty stomach with a full glass of water, and do not lie down for 30 minutes after  Continue cyclobenzaprine at bedtime for muscle pain  Take amlodipine at bedtime for Raynaud's symptoms  Left trochanteric bursa steroid injection given in clinic today  Will order next DEXA scan at next visit    Return to clinic in 6 months     Plan:  Diagnoses and all orders for this visit:    Greater trochanteric bursitis of left hip  -     triamcinolone acetonide (KENALOG-40) 40 mg/mL injection 40 mg  -     lidocaine (XYLOCAINE) 1 % injection 1 mL  -     Large joint arthrocentesis: L greater trochanteric bursa    Undifferentiated connective tissue disease (HCC)    Rheumatoid arthritis with positive rheumatoid factor, involving unspecified site (HCC)  -     hydroxychloroquine (PLAQUENIL) 200 mg tablet; Take 1 tablet (200 mg total) by mouth daily    Raynaud's disease without gangrene  -     Discontinue: amLODIPine (NORVASC) 2.5 mg tablet; Take 1 tablet (2.5 mg total) by mouth daily    Myofascial pain syndrome  -     cyclobenzaprine (FLEXERIL) 10 mg tablet; Take 1 tablet (10 mg total) by mouth daily at bedtime (Patient taking differently: Take 10 mg by mouth daily at bedtime as needed)    Osteoporosis, unspecified osteoporosis type, unspecified pathological fracture presence  -     alendronate (FOSAMAX) 70 mg tablet;  Take 1 tablet (70 mg total) by mouth every 7 days Take on an empty stomach with a full glass of water, and do not lie down for 30 minutes after (Patient taking differently: Take 70 mg by mouth every 7 days Take on an empty stomach with a full glass of water, and do not lie down for 30 minutes after)    High risk medication use     High risk medication use - Benefits and risks of hydroxychloroquine, including but not limited to retinal toxicity, corneal deposits, gastrointestinal side effects, and headaches were discussed with the patient. The need for a regular eye exam to monitor for ocular toxicity while on this medication was also explained to the patient. Large joint arthrocentesis: L greater trochanteric bursa  Universal Protocol:  Consent: Verbal consent obtained. Written consent not obtained. Risks and benefits: risks, benefits and alternatives were discussed  Consent given by: patient  Time out: Immediately prior to procedure a "time out" was called to verify the correct patient, procedure, equipment, support staff and site/side marked as required. Timeout called at: 8/10/2023 11:30 AM.  Patient understanding: patient states understanding of the procedure being performed  Patient consent: the patient's understanding of the procedure matches consent given  Procedure consent: procedure consent matches procedure scheduled  Relevant documents: relevant documents present and verified  Test results: test results available and properly labeled  Site marked: the operative site was marked  Radiology Images displayed and confirmed.  If images not available, report reviewed: imaging studies available  Required items: required blood products, implants, devices, and special equipment available  Patient identity confirmed: verbally with patient    Supporting Documentation  Indications: pain   Procedure Details  Location: hip - L greater trochanteric bursa  Preparation: Patient was prepped and draped in the usual sterile fashion  Needle size: 25 G  Ultrasound guidance: no  Approach: lateral    Patient tolerance: patient tolerated the procedure well with no immediate complications  Dressing:  Sterile dressing applied    After discussing the risks/benefits of left trochanteric bursa steroid injection including minor risk of infection, bleeding, pain, or ineffectiveness, informed consent was obtained and patient was agreeable to proceed. Using sterile technique, the left hip bursa area was prepped with Chloraprep, and was topically anesthetized with Ethyl Chloride. The left trochanteric bursa was entered using a lateral approach. Kenalog 40 mg and 1 mL lidocaine 1% were then injected and the needle withdrawn. The procedure was well tolerated. Patient was instructed to contact our office with any concerns or questions. Follow-up plan: RTC in 6 months        Rheumatic Disease Summary  Darwin Mcdaniel is a 36 V. Albertina Kaur originally presented on 5/22/20 as a Rheumatology consult referred by her Mary Menchaca DO for evaluation of possible inflammatory arthritis given positive RF of 40 and KRISTA of 1:80 in a homogenous pattern. Patient has history of negative anti-CCP. Lupus activity labs, anti-centromere Ab, and anti-RNP ordered and returned unremarkable. ESR/CRP returned normal. Patient had some features to at least make a diagnosis of undifferentiated connective tissue disease, including arthralgia, photosensitivity, facial rashes, and Raynaud's symptoms. Her arthritis symptoms were not typical of RA since she denied morning stiffness and her joint symptoms were worse later in the day and with use. Hand and feet x-rays ordered to evaluate for any inflammatory changes returned unremarkable. For time-being, started patient on hydroxychloroquine 200mg po daily; asked her to get regular eye exams while on this medication to monitor for plaquenil-related retinal toxicity. 8/28/20 via telemedicine: Patient presented for follow-up of UCTD (arthralgia, photosensitivity, facial rashes, and Raynaud's symptoms). Her arthralgia has improved since starting HCQ. Based on her weight, have increased her hydroxychloroquine to 300mg po daily.  Changed her diclofenac prescription to 75mg po bid instead of 50mg po tid, and continued baclofen 10mg po tid. 1/18/22 telemedicine: Patient presented for follow-up of UCTD (arthralgia, photosensitivity, facial rashes, and Raynaud's symptoms). What was most significantly bothering patient is was abdominal pain and bloating sensation since she moved a dresser several months ago; it had slightly improved since then, but was still bothersome. Had seen multiple GI, and will be getting an EGD and colonoscopy soon for further workup. Had so far been diagnosed with delayed gastric emptying. She had lost weight to the point of being 110 pounds due to eating making the abdominal pain worse, which was present all the time. Did not start Fosamax yet, but planned to after I explained the importance of medical therapy for her osteoporosis. Her left hip bursa area significantly bothered her, but bursa injections in the past didn't help; would like to arrange a hip bursa injection visit with me. Raynaud's symptoms were not fully under control. She was to do lupus activity labs. Decreased hydroxychloroquine to 1 tab daily since weight loss. Started alendronate once a week, empty stomach, full glass of water, and do not lie down for 30 minutes after. Increased amlodipine to 5mg daily for Raynaud's symptoms. Continued weekly Vit. D for history of Vit. D deficiency. 1/27/22: Patient comes for injection into her left trochanteric bursa. She had recent visit on 1/18/22. Patient reports no new complaints. Raynaud's signs seem to be better controled with increased dose of amlodipine. Patient tolerates alendronate well. Patient already has follow up appointment scheduled in 6 months. Left hip bursa steroid injection given in clinic, which patient tolerated well. Is to continue the rest of her medications and do hip bursitis exercises    7/7/22: Was not taking amlodipine for her Raynaud's symptoms, so it was restarted. Was not taking alendronate for her osteoporosis, so it was restarted.  Left trochanteric bursa steroid injection given in clinic today, which patient tolerated well. Continue hydroxychloroquine daily; continue regular eye exams  Restart amlodipine at 2.5mg daily for Raynaud's symptoms. Restart alendronate 70mg once a week for osteoporosis; take on an empty stomach with a full glass of water, and do not lie down for 30 minutes after. Continue weekly Vit. D for now. Do lab. Left trochanteric bursa steroid injection given in clinic today. Return to clinic in 6 months. 1/10/23:   Most prominent complaint today is left trochanteric bursitis symptoms; left trochanteric bursa steroid injection given in clinic today. Last one did not help, but the one before that did, so patient wants to pursue the injection today. Also counseled her on the importance of taking medication for her osteoporosis. Take 1,200mg of calcium daily  Take 2,000 units of Vit. D daily  Continue hydroxychloroquine daily; continue regular eye exams  Take alendronate 70mg once a week for osteoporosis; take on an empty stomach with a full glass of water, and do not lie down for 30 minutes after  Stop baclofen; start cyclobenzaprine at bedtime for muscle pain  Take celecoxib twice a day as needed for joint pain  Left trochanteric bursa steroid injection given in clinic today    NICA Linder is a 62 y.o.  female with undifferentiated connective tissue disesaese, Rheunaud's syndrome, rheumatoid arthritis, who presents for follow-up of her UCTD, osteoporosis, and left trochanteric bursitis. Last visit was 1/10/23. Has been taking probiotics with prebiotics, which has made a difference with divericulitis; was pretty sick with divericulitis in July. Last eye exam was in June.      The following portions of the patient's history were reviewed and updated as appropriate: allergies, current medications, past family history, past medical history, past social history, past surgical history and problem list.    Review of Systems:   Pertinent review of systems positive for chills, headaches, visual disturbance, cough, shortness of breath, wheezing, abdominal pain indigestion, painful urination, joint pain, sometimes joint swelling, back pain, neck pain, muscle pain, hand color changes, dizziness sometimes, weakness, numbness, right arm/hand tremor, and cold intolerance. Rest of 14 point review of systems reviewed and otherwise negative.     Home Medications:    Current Outpatient Medications:   •  alendronate (FOSAMAX) 70 mg tablet, Take 1 tablet (70 mg total) by mouth every 7 days Take on an empty stomach with a full glass of water, and do not lie down for 30 minutes after (Patient taking differently: Take 70 mg by mouth every 7 days Take on an empty stomach with a full glass of water, and do not lie down for 30 minutes after), Disp: 12 tablet, Rfl: 3  •  cyclobenzaprine (FLEXERIL) 10 mg tablet, Take 1 tablet (10 mg total) by mouth daily at bedtime (Patient taking differently: Take 10 mg by mouth daily at bedtime as needed), Disp: 30 tablet, Rfl: 6  •  ezetimibe (ZETIA) 10 mg tablet, take 1 tablet by mouth daily, Disp: 90 tablet, Rfl: 3  •  hydroxychloroquine (PLAQUENIL) 200 mg tablet, Take 1 tablet (200 mg total) by mouth daily, Disp: 90 tablet, Rfl: 1  •  oxyCODONE-acetaminophen (PERCOCET) 5-325 mg per tablet, Take 1 tablet by mouth every 12 (twelve) hours, Disp: , Rfl:   •  rosuvastatin (CRESTOR) 5 mg tablet, take 1 tablet by mouth once daily, Disp: 90 tablet, Rfl: 3  •  albuterol (PROVENTIL HFA,VENTOLIN HFA) 90 mcg/act inhaler, Inhale 2 puffs every 6 (six) hours as needed for wheezing, Disp: 18 g, Rfl: 1  •  calcium carbonate (TUMS) 500 mg chewable tablet, Chew 1 tablet if needed for indigestion or heartburn, Disp: , Rfl:   •  diphenhydrAMINE-acetaminophen (TYLENOL PM)  MG TABS, Take 1 tablet by mouth daily at bedtime as needed for sleep, Disp: , Rfl:   •  famotidine (PEPCID) 20 mg tablet, Take 20 mg by mouth if needed for heartburn, Disp: , Rfl:   •  LACTASE ENZYME PO, Take 3 tablets by mouth if needed, Disp: , Rfl:   •  nitroglycerin (NITROSTAT) 0.4 mg SL tablet, Place 1 tablet (0.4 mg total) under the tongue every 5 (five) minutes as needed for chest pain (Patient taking differently: Place 0.4 mg under the tongue every 5 (five) minutes as needed for chest pain), Disp: 20 tablet, Rfl: 3  •  Probiotic Product (PROBIOTIC DAILY PO), Take 2 capsules by mouth in the morning Probiotic-prebiotic, Disp: , Rfl:   •  VITAMIN D PO, Take 2 tablets by mouth in the morning, Disp: , Rfl:     Objective:    Vitals:    08/10/23 1147   BP: 128/82   Weight: 48.1 kg (106 lb)   Height: 5' (1.524 m)       Physical Exam  Constitutional:       General: She is not in acute distress. HENT:      Head: Normocephalic and atraumatic. Eyes:      Conjunctiva/sclera: Conjunctivae normal.   Cardiovascular:      Rate and Rhythm: Normal rate and regular rhythm. Heart sounds: S1 normal and S2 normal.      No friction rub. Pulmonary:      Effort: Pulmonary effort is normal. No respiratory distress. Breath sounds: Normal breath sounds. No wheezing, rhonchi or rales. Musculoskeletal:         General: Tenderness present. Cervical back: Neck supple. Comments: L trochanteric bursa tenderness   Skin:     Coloration: Skin is not pale. Neurological:      Mental Status: She is alert. Mental status is at baseline. Psychiatric:         Mood and Affect: Mood normal.         Behavior: Behavior normal.       Reviewed labs and imaging. Imaging:   XR right foot 7/7/22  Stable postoperative change after partial distal 4th metatarsal resection. No acute findings. CT chest wo contrast 5/2/22  Mild emphysematous changes. No worrisome pulmonary mass. No acute pulmonary process. Chronic fractures of the posterior right eighth and ninth ribs redemonstrated. CTA abdomen pelvis 4/1/22  1. No significant stenosis of the superior mesenteric artery. Patent pancreaticoduodenal arcade and celiac artery. See additional details as described above. 2.  Moderate descending and sigmoid colonic diverticulosis. 3.  7 mm x 5 mm peritoneal nodule in the left lower quadrant anterior abdomen, possibly new since the previous study from May 2021. This is indeterminate in etiology. Recommend follow-up CT in 6 months. DXA scan 9/8/21  RESULTS:      LUMBAR SPINE L2-L4 (L1 vertebra excluded from analysis due to local structural abnormalities or artifact): BMD  0.716  gm/cm2   T-score -3.3         LEFT  TOTAL HIP:   BMD:  0.699  gm/cm2    T-score:  -2.0     LEFT  FEMORAL NECK:   BMD:  0.610  gm/cm2   T score: -2.2      LEFT  FOREARM:    33% RADIUS BMD:  0.546  gm/cm2  T-score:  -2.4         IMPRESSION:     1. Osteoporosis. [Based on the lumbar spine]    CXR 4/23/21   Unremarkable    DXA scan 9/8/21  RESULTS:      LUMBAR SPINE L2-L4 (L1 vertebra excluded from analysis due to local structural abnormalities or artifact): BMD  0.716  gm/cm2   T-score -3.3      LEFT  TOTAL HIP:   BMD:  0.699  gm/cm2    T-score:  -2.0     LEFT  FEMORAL NECK:   BMD:  0.610  gm/cm2   T score: -2.2      LEFT  FOREARM:    33% RADIUS BMD:  0.546  gm/cm2  T-score:  -2.4         IMPRESSION:  1. Osteoporosis.  [Based on the lumbar spine]    Labs:   Appointment on 04/26/2023   Component Date Value Ref Range Status   • Cholesterol 04/26/2023 152  See Comment mg/dL Final    Cholesterol:         Pediatric <18 Years        Desirable          <170 mg/dL      Borderline High    170-199 mg/dL      High               >=200 mg/dL        Adult >=18 Years            Desirable         <200 mg/dL      Borderline High   200-239 mg/dL      High              >239 mg/dL     • Triglycerides 04/26/2023 126  See Comment mg/dL Final    Triglyceride:     0-9Y            <75mg/dL     10Y-17Y         <90 mg/dL       >=18Y     Normal          <150 mg/dL     Borderline High 150-199 mg/dL     High            200-499 mg/dL Very High       >499 mg/dL    Specimen collection should occur prior to N-Acetylcysteine or Metamizole administration due to the potential for falsely depressed results. • HDL, Direct 04/26/2023 61  >=50 mg/dL Final   • LDL Calculated 04/26/2023 66  0 - 100 mg/dL Final    LDL Cholesterol:     Optimal           <100 mg/dl     Near Optimal      100-129 mg/dl     Above Optimal       Borderline High 130-159 mg/dl       High            160-189 mg/dl       Very High       >189 mg/dl         This screening LDL is a calculated result. It does not have the accuracy of the Direct Measured LDL in the monitoring of patients with hyperlipidemia and/or statin therapy. Direct Measure LDL (ADM042) must be ordered separately in these patients.    • Sed Rate 04/26/2023 9  0 - 29 mm/hour Final   • CRP 04/26/2023 <1.0  <3.0 mg/L Final

## 2023-08-24 ENCOUNTER — TELEPHONE (OUTPATIENT)
Age: 58
End: 2023-08-24

## 2023-08-24 NOTE — TELEPHONE ENCOUNTER
Called patient to let her know that Dr. Josie Anne will do the pre-op clearance appointment tomorrow with her physical.

## 2023-08-24 NOTE — TELEPHONE ENCOUNTER
Caller: Patient     Doctor: Leo Lin     Reason for call: Patient states she missed a call but hung up.      Please advise     Call back#: [phone] (Please lvm)

## 2023-08-25 ENCOUNTER — CONSULT (OUTPATIENT)
Dept: FAMILY MEDICINE CLINIC | Facility: CLINIC | Age: 58
End: 2023-08-25
Payer: COMMERCIAL

## 2023-08-25 VITALS
SYSTOLIC BLOOD PRESSURE: 140 MMHG | HEART RATE: 82 BPM | HEIGHT: 60 IN | OXYGEN SATURATION: 98 % | DIASTOLIC BLOOD PRESSURE: 92 MMHG | TEMPERATURE: 96.8 F | WEIGHT: 105.2 LBS | BODY MASS INDEX: 20.65 KG/M2

## 2023-08-25 DIAGNOSIS — M79.89 SOFT TISSUE MASS: ICD-10-CM

## 2023-08-25 DIAGNOSIS — Z01.818 PRE-OP TESTING: Primary | ICD-10-CM

## 2023-08-25 DIAGNOSIS — Z72.0 TOBACCO ABUSE: ICD-10-CM

## 2023-08-25 DIAGNOSIS — I25.10 CAD IN NATIVE ARTERY: ICD-10-CM

## 2023-08-25 DIAGNOSIS — Z01.818 PRE-OP EXAMINATION: ICD-10-CM

## 2023-08-25 DIAGNOSIS — M35.9 UNDIFFERENTIATED CONNECTIVE TISSUE DISEASE (HCC): ICD-10-CM

## 2023-08-25 PROBLEM — Z86.19 HISTORY OF HEPATITIS C: Status: ACTIVE | Noted: 2018-08-15

## 2023-08-25 PROCEDURE — 99243 OFF/OP CNSLTJ NEW/EST LOW 30: CPT | Performed by: FAMILY MEDICINE

## 2023-08-25 RX ORDER — NITROGLYCERIN 0.4 MG/1
0.4 TABLET SUBLINGUAL
Qty: 20 TABLET | Refills: 3 | Status: SHIPPED | OUTPATIENT
Start: 2023-08-25

## 2023-08-25 RX ORDER — ALBUTEROL SULFATE 90 UG/1
2 AEROSOL, METERED RESPIRATORY (INHALATION) EVERY 6 HOURS PRN
Qty: 18 G | Refills: 1 | Status: SHIPPED | OUTPATIENT
Start: 2023-08-25

## 2023-08-25 NOTE — PROGRESS NOTES
Subjective:     Aria Cabezas is a 62 y.o. female who presents to the office today for a preoperative consultation at the request of surgeon Dr. Arsenio Prasad who plans on performing R knee mass excision on September 12. This consultation is requested for the specific conditions prompting preoperative evaluation (i.e. because of potential affect on operative risk): CAD. Planned anesthesia: general. The patient has the following known anesthesia issues: none. Patients bleeding risk: no recent abnormal bleeding. The following portions of the patient's history were reviewed and updated as appropriate:   She  has a past medical history of Acquired ichthyosis, Anxiety, Cervical radiculopathy, Colorectal polyps, COPD (chronic obstructive pulmonary disease) ([address]), Depression, Diverticulosis of colon, Foot pain, GERD (gastroesophageal reflux disease), Heart disease (1997), Hepatitis C (july 2018), Hyperlipemia, Hypertension, Myocardial infarction ([address]), Osteoarthritis (unsure), Osteopenia, Palpitations, PVD (peripheral vascular disease) ([address]), Rheumatoid arthritis ([address]) (unsure), Scapular dyskinesis, Shortness of breath, Stomach disorder (Unsure), Tendonitis of left rotator cuff, and Vocal cord polyps.   She   Patient Active Problem List    Diagnosis Date Noted   • Soft tissue mass 04/26/2023   • Anxiety 87/32/9570   • Umbilical hernia without obstruction and without gangrene 05/18/2022   • 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[address]) 02/03/2022   • Age-related osteoporosis without current pathological fracture 09/08/2021   • Epigastric pain 05/17/2021   • Costochondritis, acute 05/17/2021   • High risk medication use 10/21/2020   • Undifferentiated connective tissue disease ([address]) 10/21/2020   • Neck pain 11/13/2019   • Flushing 10/02/2019   • Tobacco abuse 10/02/2019   • Lumbar radiculopathy 09/13/2019   • Chronic bilateral low back pain with left-sided sciatica 09/13/2019   • Osteoarthritis of cervical spine 09/13/2019   • Trochanteric bursitis of right hip 2019   • Trochanteric bursitis of left hip 2019   • Radiculopathy, cervical 04/10/2019   • RUQ abdominal pain 2019   • Dilated bile duct 2019   • Gastroesophageal reflux disease 2019   • Sacroiliitis ([address]) 2019   • Chronic pain syndrome 2019   • RUQ pain 2019   • Cervical radiculitis 12/10/2018   • Arthritis 10/25/2018   • Cervical radiculopathy 10/25/2018   • Incomplete rotator cuff tear or rupture of left shoulder, not specified as traumatic 10/02/2018   • Biceps tendinosis of left shoulder 10/02/2018   • Colorectal polyps 2018   • Macrocytic 2018   • Irritable bowel syndrome with diarrhea 2018   • History of hepatitis C 08/15/2018   • Neuroma of foot 2018   • Calcific tendinitis of left shoulder 2018   • Incomplete tear of left rotator cuff 2018   • Myofascial pain syndrome 2018   • Left facial numbness 2018   • Positive KRISTA (antinuclear antibody) 2018   • Rheumatoid factor positive 2018   • PVD (peripheral vascular disease) ([address]) 2018   • Headache disorder 2018   • Lumbar spondylosis 2018   • Osteoarthritis of spine with radiculopathy, cervical region 2018   • Degenerative cervical disc 2018   • Lumbar degenerative disc disease 2018   • Bilateral carpal tunnel syndrome 2018   • Acute pain of left shoulder 2018   • Gastro-esophageal reflux disease without esophagitis 2016   • Right lower quadrant pain 2016   • Abnormal weight loss 2016   • CAD in native artery 2012   • Other hyperlipidemia 2012     She  has a past surgical history that includes Foot surgery (Right, 2015); Hysterectomy; Cholecystectomy;  section; Cardiac surgery; Throat surgery; Elbow surgery (Bilateral); Abdominal surgery; Esophagogastroduodenoscopy; Colonoscopy (10/27/2014);  Tooth extraction; Esophagogastroduodenoscopy (N/A, 11/04/2016); Dental surgery; Coronary angioplasty with stent (1999); Trigeminal nerve decompression (Right, 06/15/2018); pr surgical arthroscopy shoulder w/rotator cuff rpr (Left, 07/23/2018); FL injection left shoulder (arthrogram) (09/25/2018); pr surgical arthroscopy shoulder biceps tenodesis (Left, 10/15/2018); Epidural block injection (N/A, 01/17/2019); Epidural block injection (Bilateral, 05/02/2019); FL guided needle plac bx/asp/inj (05/02/2019); pr arthrocentesis aspir&/inj major jt/bursa w/o us (Bilateral, 07/31/2019); FL guided needle plac bx/asp/inj (07/31/2019); Epidural block injection (Left, 10/03/2019); FL guided needle plac bx/asp/inj (10/03/2019); and Shoulder surgery (june and oct 2018). Her family history includes Early death in her sister; Hypertension in her sister; No Known Problems in her father, maternal aunt, maternal aunt, maternal aunt, maternal aunt, maternal grandfather, maternal grandmother, mother, paternal grandfather, and paternal grandmother. She  reports that she has been smoking cigarettes. She has a 10.00 pack-year smoking history. She has never used smokeless tobacco. She reports that she does not drink alcohol and does not use drugs.   Current Outpatient Medications   Medication Sig Dispense Refill   • albuterol (PROVENTIL HFA,VENTOLIN HFA) 90 mcg/act inhaler Inhale 2 puffs every 6 (six) hours as needed for wheezing 18 g 1   • alendronate (FOSAMAX) 70 mg tablet Take 1 tablet (70 mg total) by mouth every 7 days Take on an empty stomach with a full glass of water, and do not lie down for 30 minutes after 12 tablet 3   • cyclobenzaprine (FLEXERIL) 10 mg tablet Take 1 tablet (10 mg total) by mouth daily at bedtime 30 tablet 6   • ezetimibe (ZETIA) 10 mg tablet take 1 tablet by mouth daily 90 tablet 3   • hydroxychloroquine (PLAQUENIL) 200 mg tablet Take 1 tablet (200 mg total) by mouth daily 90 tablet 1   • nitroglycerin (NITROSTAT) 0.4 mg SL tablet Place 1 tablet (0.4 mg total) under the tongue every 5 (five) minutes as needed for chest pain 20 tablet 3   • oxyCODONE-acetaminophen (PERCOCET) 5-325 mg per tablet Take 1 tablet by mouth every 12 (twelve) hours     • Probiotic Product (PROBIOTIC DAILY PO) Take by mouth Probiotic-prebiotic     • rosuvastatin (CRESTOR) 5 mg tablet take 1 tablet by mouth once daily 90 tablet 3     No current facility-administered medications for this visit. Current Outpatient Medications on File Prior to Visit   Medication Sig   • alendronate (FOSAMAX) 70 mg tablet Take 1 tablet (70 mg total) by mouth every 7 days Take on an empty stomach with a full glass of water, and do not lie down for 30 minutes after   • cyclobenzaprine (FLEXERIL) 10 mg tablet Take 1 tablet (10 mg total) by mouth daily at bedtime   • ezetimibe (ZETIA) 10 mg tablet take 1 tablet by mouth daily   • hydroxychloroquine (PLAQUENIL) 200 mg tablet Take 1 tablet (200 mg total) by mouth daily   • oxyCODONE-acetaminophen (PERCOCET) 5-325 mg per tablet Take 1 tablet by mouth every 12 (twelve) hours   • Probiotic Product (PROBIOTIC DAILY PO) Take by mouth Probiotic-prebiotic   • rosuvastatin (CRESTOR) 5 mg tablet take 1 tablet by mouth once daily   • [DISCONTINUED] nitroglycerin (NITROSTAT) 0.4 mg SL tablet Place 1 tablet (0.4 mg total) under the tongue every 5 (five) minutes as needed for chest pain   • [DISCONTINUED] amLODIPine (NORVASC) 2.5 mg tablet Take 1 tablet (2.5 mg total) by mouth daily   • [DISCONTINUED] aspirin 81 mg chewable tablet Chew 1 tablet daily Pt only takes it when she has chest pain   • [DISCONTINUED] Blood Glucose Monitoring Suppl (ONE TOUCH ULTRA 2) w/Device KIT Use in the morning   • [DISCONTINUED] budesonide (Pulmicort Flexhaler) 180 MCG/ACT inhaler Inhale 4 puffs 2 (two) times a day for 14 days Rinse mouth after use.    • [DISCONTINUED] escitalopram (LEXAPRO) 5 mg tablet Take 1 tablet (5 mg total) by mouth daily   • [DISCONTINUED] glucose blood (OneTouch Ultra) test strip Use as instructed   • [DISCONTINUED] Lancets (OneTouch Delica Plus [id number]) MISC Test test sugar daily   • [DISCONTINUED] nicotine (NICODERM CQ) 14 mg/24hr TD 24 hr patch Place 1 patch on the skin every 24 hours   • [DISCONTINUED] omeprazole (PriLOSEC) 20 mg delayed release capsule Take 20 mg by mouth daily     No current facility-administered medications on file prior to visit. She is allergic to ceclor [cefaclor], cephalexin, codeine, gabapentin, lyrica [pregabalin], ticlopidine, and penicillins. .    Review of Systems  Pertinent items are noted in HPI. Objective:  Physical Exam  Vitals reviewed. Constitutional:       General: She is not in acute distress. Appearance: Normal appearance. She is well-developed. She is not ill-appearing, toxic-appearing or diaphoretic. HENT:      Head: Normocephalic and atraumatic. Eyes:      Conjunctiva/sclera: Conjunctivae normal.   Cardiovascular:      Rate and Rhythm: Normal rate and regular rhythm. Heart sounds: Normal heart sounds. No murmur heard. No friction rub. No gallop. Pulmonary:      Effort: Pulmonary effort is normal. No respiratory distress. Breath sounds: Normal breath sounds. No wheezing, rhonchi or rales. Musculoskeletal:      Right lower leg: No edema. Left lower leg: No edema. Neurological:      General: No focal deficit present. Mental Status: She is alert and oriented to person, place, and time. Psychiatric:         Mood and Affect: Mood normal.         Behavior: Behavior normal.         Thought Content: Thought content normal.         Judgment: Judgment normal.         Cardiographics  ECG: normal sinus rhythm, no blocks or conduction defects, no ischemic changes  Echocardiogram: not done    Imaging  Chest x-ray: not needed     Lab Review   No visits with results within 2 Month(s) from this visit.    Latest known visit with results is:   Appointment on 04/26/2023 Component Date Value   • Cholesterol 04/26/2023 152    • Triglycerides 04/26/2023 126    • HDL, Direct 04/26/2023 61    • LDL Calculated 04/26/2023 66    • Sed Rate 04/26/2023 9    • CRP 04/26/2023 <1.0         Assessment:     62 y.o. female with planned surgery as above. Known risk factors for perioperative complications: Coronary disease    Difficulty with intubation is not anticipated. Risk Estimation: Current medications which may produce withdrawal symptoms if withheld perioperatively: none      Plan:  Medically cleared for R knee soft tissue excision by Dr. Pennie Haines on 9/12/2023     1. Preoperative workup as follows hemoglobin, hematocrit, electrolytes, creatinine, glucose, liver function studies. 2. Change in medication regimen before surgery: none, continue medication regimen including morning of surgery, with sip of water. 3. Prophylaxis for cardiac events with perioperative beta-blockers: not indicated.   4. Deep vein thrombosis prophylaxis postoperatively:regimen to be chosen by surgical team.  5. Other measures: none

## 2023-08-31 ENCOUNTER — VBI (OUTPATIENT)
Dept: ADMINISTRATIVE | Facility: OTHER | Age: 58
End: 2023-08-31

## 2023-09-06 ENCOUNTER — APPOINTMENT (OUTPATIENT)
Dept: LAB | Facility: MEDICAL CENTER | Age: 58
End: 2023-09-06
Payer: COMMERCIAL

## 2023-09-06 DIAGNOSIS — M79.89 SOFT TISSUE MASS: ICD-10-CM

## 2023-09-06 LAB
ALBUMIN SERPL BCP-MCNC: 4.4 G/DL (ref 3.5–5)
ALP SERPL-CCNC: 36 U/L (ref 34–104)
ALT SERPL W P-5'-P-CCNC: 9 U/L (ref 7–52)
ANION GAP SERPL CALCULATED.3IONS-SCNC: 7 MMOL/L
AST SERPL W P-5'-P-CCNC: 15 U/L (ref 13–39)
BASOPHILS # BLD AUTO: 0.03 THOUSANDS/ÂΜL (ref 0–0.1)
BASOPHILS NFR BLD AUTO: 0 % (ref 0–1)
BILIRUB SERPL-MCNC: 0.58 MG/DL (ref 0.2–1)
BUN SERPL-MCNC: 6 MG/DL (ref 5–25)
CALCIUM SERPL-MCNC: 9.1 MG/DL (ref 8.4–10.2)
CHLORIDE SERPL-SCNC: 103 MMOL/L (ref 96–108)
CO2 SERPL-SCNC: 31 MMOL/L (ref 21–32)
CREAT SERPL-MCNC: 0.56 MG/DL (ref 0.6–1.3)
EOSINOPHIL # BLD AUTO: 0.08 THOUSAND/ÂΜL (ref 0–0.61)
EOSINOPHIL NFR BLD AUTO: 1 % (ref 0–6)
ERYTHROCYTE [DISTWIDTH] IN BLOOD BY AUTOMATED COUNT: 12.7 % (ref 11.6–15.1)
GFR SERPL CREATININE-BSD FRML MDRD: 103 ML/MIN/1.73SQ M
GLUCOSE P FAST SERPL-MCNC: 79 MG/DL (ref 65–99)
HCT VFR BLD AUTO: 43.9 % (ref 34.8–46.1)
HGB BLD-MCNC: 14 G/DL (ref 11.5–15.4)
IMM GRANULOCYTES # BLD AUTO: 0.01 THOUSAND/UL (ref 0–0.2)
IMM GRANULOCYTES NFR BLD AUTO: 0 % (ref 0–2)
LYMPHOCYTES # BLD AUTO: 2.74 THOUSANDS/ÂΜL (ref 0.6–4.47)
LYMPHOCYTES NFR BLD AUTO: 37 % (ref 14–44)
MCH RBC QN AUTO: 33 PG (ref 26.8–34.3)
MCHC RBC AUTO-ENTMCNC: 31.9 G/DL (ref 31.4–37.4)
MCV RBC AUTO: 104 FL (ref 82–98)
MONOCYTES # BLD AUTO: 0.51 THOUSAND/ÂΜL (ref 0.17–1.22)
MONOCYTES NFR BLD AUTO: 7 % (ref 4–12)
NEUTROPHILS # BLD AUTO: 4.01 THOUSANDS/ÂΜL (ref 1.85–7.62)
NEUTS SEG NFR BLD AUTO: 55 % (ref 43–75)
NRBC BLD AUTO-RTO: 0 /100 WBCS
PLATELET # BLD AUTO: 223 THOUSANDS/UL (ref 149–390)
PMV BLD AUTO: 9.9 FL (ref 8.9–12.7)
POTASSIUM SERPL-SCNC: 3.8 MMOL/L (ref 3.5–5.3)
PROT SERPL-MCNC: 6.7 G/DL (ref 6.4–8.4)
RBC # BLD AUTO: 4.24 MILLION/UL (ref 3.81–5.12)
SODIUM SERPL-SCNC: 141 MMOL/L (ref 135–147)
WBC # BLD AUTO: 7.38 THOUSAND/UL (ref 4.31–10.16)

## 2023-09-06 PROCEDURE — 85025 COMPLETE CBC W/AUTO DIFF WBC: CPT

## 2023-09-06 PROCEDURE — 36415 COLL VENOUS BLD VENIPUNCTURE: CPT

## 2023-09-06 PROCEDURE — 80053 COMPREHEN METABOLIC PANEL: CPT

## 2023-09-11 RX ORDER — FAMOTIDINE 20 MG/1
20 TABLET, FILM COATED ORAL AS NEEDED
COMMUNITY

## 2023-09-11 RX ORDER — CALCIUM CARBONATE 500 MG/1
1 TABLET, CHEWABLE ORAL AS NEEDED
COMMUNITY

## 2023-09-11 NOTE — PRE-PROCEDURE INSTRUCTIONS
Pre-Surgery Instructions:   Medication Instructions   • albuterol (PROVENTIL HFA,VENTOLIN HFA) 90 mcg/act inhaler Uses PRN- OK to take day of surgery   • alendronate (FOSAMAX) 70 mg tablet Pt takes weekly on a Sunday   • calcium carbonate (TUMS) 500 mg chewable tablet Hold day of surgery. • cyclobenzaprine (FLEXERIL) 10 mg tablet Uses PRN- DO NOT take day of surgery   • diphenhydrAMINE-acetaminophen (TYLENOL PM)  MG TABS ok to take prn at bedtime   • ezetimibe (ZETIA) 10 mg tablet Take night before surgery   • famotidine (PEPCID) 20 mg tablet Uses PRN- OK to take day of surgery   • hydroxychloroquine (PLAQUENIL) 200 mg tablet Take night before surgery   • LACTASE ENZYME PO Do not take the DOS   • nitroglycerin (NITROSTAT) 0.4 mg SL tablet Uses PRN- OK to take day of surgery   • oxyCODONE-acetaminophen (PERCOCET) 5-325 mg per tablet Ok to take the am of surgery   • Probiotic Product (PROBIOTIC DAILY PO) Hold day of surgery. • rosuvastatin (CRESTOR) 5 mg tablet Take night before surgery   • VITAMIN D PO Stop taking 7 days prior to surgery. See above    . Medication instructions for day surgery reviewed. Please use only a sip of water to take your instructed medications. Avoid all over the counter vitamins, supplements and NSAIDS for one week prior to surgery per anesthesia guidelines. Tylenol is ok to take as needed. You will receive a call one business day prior to surgery with an arrival time and hospital directions. If your surgery is scheduled on a Monday, the hospital will be calling you on the Friday prior to your surgery. If you have not heard from anyone by 8pm, please call the hospital supervisor through the hospital  at [phone]. Danni Ash [phone]). Do not eat or drink anything after midnight the night before your surgery, including candy, mints, lifesavers, or chewing gum. Do not drink alcohol 24hrs before your surgery. Try not to smoke at least 24hrs before your surgery. Follow the pre surgery showering instructions as listed in the [facility] Experience Booklet” or otherwise provided by your surgeon's office. Do not shave the surgical area 24 hours before surgery. Do not apply any lotions, creams, including makeup, cologne, deodorant, or perfumes after showering on the day of your surgery. No contact lenses, eye make-up, or artificial eyelashes. Remove nail polish, including gel polish, and any artificial, gel, or acrylic nails if possible. Remove all jewelry including rings and body piercing jewelry. Wear causal clothing that is easy to take on and off. Consider your type of surgery. Keep any valuables, jewelry, piercings at home. Please bring any specially ordered equipment (sling, braces) if indicated. Arrange for a responsible person to drive you to and from the hospital on the day of your surgery. Visitor Guidelines discussed. Call the surgeon's office with any new illnesses, exposures, or additional questions prior to surgery. Please reference your [facility] Surgical Experience Booklet” for additional information to prepare for your upcoming surgery.

## 2023-09-16 PROBLEM — I73.00 RAYNAUD'S DISEASE WITHOUT GANGRENE: Status: ACTIVE | Noted: 2023-09-16

## 2023-09-17 NOTE — H&P
H&P Exam - Orthopedics   Russell Melara 62 y.o. female MRN: [id number]  Unit/Bed#:     Assessment/Plan     Assessment:  62 y.o. female with soft tissue mass of right knee    Plan: We will proceed with excision of right knee mass    History of Present Illness   HPI:  Russell Melara is a 62 y.o. female who presents with history of COPD, HTN, hx MI, osteopenia, PVD, palpitations, rheumatoid arthritis, and shortness of breath- for removal of right knee mass. She also has a history of many operations to the right foot, leading to chronic pain. As per previous note, she is indicated to proceed with surgery. Since her last visit with orthopedics, she has been evaluated by family medicine for preop testing and she has been cleared for surgery. They have been NPO since midnight. They are keen for surgery today. Review of Systems:   Constitutional: Negative for fatigue, fever or loss of appetite. HENT: Negative. Respiratory: Negative for shortness of breath, dyspnea. Cardiovascular: Negative for chest pain/tightness. Gastrointestinal: Negative for abdominal pain, N/V. Endocrine: Negative for cold/heat intolerance, unexplained weight loss/gain. Genitourinary: Negative for flank pain, dysuria  Musculoskeletal: positive for arthralgia   Skin: Negative for rash. Neurological: negative for numbness or tingling  Psychiatric/Behavioral: Negative for agitation. Physical Exam:  There were no vitals taken for this visit. Cons: Appears well. No apparent distress. Psych: Alert. Oriented x3. Mood and affect normal.  Eyes: PERRLA, EOMI  Resp: Normal effort. No audible wheezing or stridor. CV: Extremities warm and well perfused. Abd:    No distention or guarding. Skin: Warm. No visible lesions. Neuro: Normal muscle tone. Ortho Exam:   Skin is intact over surgical site without erythema, ecchymosis, or lesions.  Small mobile mass over pre patellar area  ROM: WNL , 0-120  Motor grossly intact to EHL/FHL/DF/PF  SILT distally    Lab Results: Reviewed  Imaging: Reviewed    Study: MRI with/without contrast  Area: Right knee  Date: 2023  Impression: I have personally reviewed all relevant imaging. My impression is as follows:  Small nodular subcutaneous lesion at the patellar tendon insertion measuring 1.4 x 0.8 x 1.4 cm corresponding to recently identified ultrasound abnormality and exhibiting solid post gadolinium enhancement.  Findings remain indeterminate and tissue sampling is advised to exclude aggressive process.     Historical Information  Past Medical History:   Diagnosis Date   • Acquired ichthyosis     last assessed: 2013   • Anesthesia     Pt reports severe acid reflux after getting anesthesia- sometimes presents as chest pain   • Anxiety    • Cervical radiculopathy     last assessed: 2014   • Colorectal polyps     last assessed: 3/9/2015   • COPD (chronic obstructive pulmonary disease) (HCC)    • Depression    • Diverticulosis of colon    • Foot pain    • GERD (gastroesophageal reflux disease)    • Heart disease 1997    Coronary artery disease   • Hepatitis C 2018    Took meds no longer have   • Hyperlipemia    • Hypertension    • Lupus ([address])    • Myocardial infarction ([address])     at age 28   • Osteoarthritis unsure   • Osteopenia     last assessed: 2013   • Palpitations     last assessed: 2014   • PVD (peripheral vascular disease) ([address])     last assessed: 12/3/2012   • RA (rheumatoid arthritis) ([address])    • Rheumatoid arthritis ([address]) unsure   • Scapular dyskinesis     last assessed: 2014   • Shortness of breath    • Stomach disorder Unsure   • Tendonitis of left rotator cuff     last assessed: 2014   • Vocal cord polyps     last assessed: 2014     Past Surgical History:   Procedure Laterality Date   • ABDOMINAL SURGERY      exploratory   • CARDIAC SURGERY      cardiac stent    •  SECTION      last assessed: 2014   • CHOLECYSTECTOMY last assessed: 8/8/2014   • COLONOSCOPY  10/27/2014   • CORONARY ANGIOPLASTY WITH STENT PLACEMENT  1999    LAD stent   • DENTAL SURGERY      last assessed: 8/8/2014   • ELBOW SURGERY Bilateral     arthroplasty   • EPIDURAL BLOCK INJECTION N/A 01/17/2019    Procedure: C7 T1 Cervical Epidural Steroid Injection (12720); Surgeon: Chary Porter MD;  Location: [facility];  Service: Pain Management    • EPIDURAL BLOCK INJECTION Bilateral 05/02/2019    Procedure: BLOCK / INJECTION EPIDURAL STEROID CERVICAL - C7-T1;  Surgeon: Chary Porter MD;  Location: [facility];  Service: Pain Management    • EPIDURAL BLOCK INJECTION Left 10/03/2019    Procedure: C7-T1 interlaminar epidural steroid injection;  Surgeon: Chary Porter MD;  Location: [facility];  Service: Pain Management    • ESOPHAGOGASTRODUODENOSCOPY     • ESOPHAGOGASTRODUODENOSCOPY N/A 11/04/2016    Procedure: ESOPHAGOGASTRODUODENOSCOPY (EGD);   Surgeon: Mario Gomez MD;  Location: [facility];  Service:    • FL GUIDED NEEDLE PLAC BX/ASP/INJ  05/02/2019   • FL GUIDED NEEDLE PLAC BX/ASP/INJ  07/31/2019   • FL GUIDED NEEDLE PLAC BX/ASP/INJ  10/03/2019   • FL INJECTION LEFT SHOULDER (ARTHROGRAM)  09/25/2018   • FOOT SURGERY Right 02/06/2015    x 4   • HYSTERECTOMY      last assessed: 8/8/2014   • AZ ARTHROCENTESIS ASPIR&/INJ MAJOR JT/BURSA W/O US Bilateral 07/31/2019    Procedure: GREATER TROCHANTERIC HIP INJECTION;  Surgeon: Chary Porter MD;  Location: [facility];  Service: Pain Management    • AZ SURGICAL ARTHROSCOPY SHOULDER BICEPS TENODESIS Left 10/15/2018    Procedure: ARTHROSCOPY SHOULDER; Debridement of shoulder;  Surgeon: Grace Pedraza MD;  Location: [facility];  Service: Orthopedics   • AZ SURGICAL ARTHROSCOPY SHOULDER W/ROTATOR CUFF RPR Left 07/23/2018    Procedure: ARTHROSCOPY SHOULDER, subacromial decompression, synovectomy;  Surgeon: Grace Pedraza MD;  Location: [facility];  Service: Orthopedics   • SHOULDER SURGERY  dory and oct 2018   • THROAT SURGERY     • TOOTH EXTRACTION     • TRIGEMINAL NERVE DECOMPRESSION Right 06/15/2018    Procedure: FOOT NEUROPLASTY;  Surgeon: Lizbeth Mcintyre DPM;  Location: [facility];  Service: Sandra Bernardo PA-C

## 2023-09-18 ENCOUNTER — ANESTHESIA EVENT (OUTPATIENT)
Dept: PERIOP | Facility: HOSPITAL | Age: 58
End: 2023-09-18
Payer: COMMERCIAL

## 2023-09-18 ENCOUNTER — HOSPITAL ENCOUNTER (OUTPATIENT)
Facility: HOSPITAL | Age: 58
Setting detail: OUTPATIENT SURGERY
Discharge: HOME | End: 2023-09-18
Attending: STUDENT IN AN ORGANIZED HEALTH CARE EDUCATION/TRAINING PROGRAM | Admitting: STUDENT IN AN ORGANIZED HEALTH CARE EDUCATION/TRAINING PROGRAM
Payer: COMMERCIAL

## 2023-09-18 ENCOUNTER — ANESTHESIA (OUTPATIENT)
Dept: PERIOP | Facility: HOSPITAL | Age: 58
End: 2023-09-18
Payer: COMMERCIAL

## 2023-09-18 VITALS
RESPIRATION RATE: 22 BRPM | OXYGEN SATURATION: 96 % | HEART RATE: 81 BPM | TEMPERATURE: 97.6 F | BODY MASS INDEX: 20.62 KG/M2 | HEIGHT: 60 IN | DIASTOLIC BLOOD PRESSURE: 68 MMHG | SYSTOLIC BLOOD PRESSURE: 129 MMHG | WEIGHT: 105 LBS

## 2023-09-18 DIAGNOSIS — M79.89 SOFT TISSUE MASS: ICD-10-CM

## 2023-09-18 DIAGNOSIS — Z47.89 AFTERCARE FOLLOWING SURGERY OF THE MUSCULOSKELETAL SYSTEM: Primary | ICD-10-CM

## 2023-09-18 PROCEDURE — 27327 EXC THIGH/KNEE LES SC < 3 CM: CPT | Performed by: STUDENT IN AN ORGANIZED HEALTH CARE EDUCATION/TRAINING PROGRAM

## 2023-09-18 PROCEDURE — 88342 IMHCHEM/IMCYTCHM 1ST ANTB: CPT | Performed by: PATHOLOGY

## 2023-09-18 PROCEDURE — 27327 EXC THIGH/KNEE LES SC < 3 CM: CPT

## 2023-09-18 PROCEDURE — NC001 PR NO CHARGE: Performed by: STUDENT IN AN ORGANIZED HEALTH CARE EDUCATION/TRAINING PROGRAM

## 2023-09-18 PROCEDURE — 88307 TISSUE EXAM BY PATHOLOGIST: CPT | Performed by: PATHOLOGY

## 2023-09-18 PROCEDURE — 88341 IMHCHEM/IMCYTCHM EA ADD ANTB: CPT | Performed by: PATHOLOGY

## 2023-09-18 RX ORDER — MAGNESIUM HYDROXIDE 1200 MG/15ML
LIQUID ORAL AS NEEDED
Status: DISCONTINUED | OUTPATIENT
Start: 2023-09-18 | End: 2023-09-18 | Stop reason: HOSPADM

## 2023-09-18 RX ORDER — CHLORHEXIDINE GLUCONATE ORAL RINSE 1.2 MG/ML
15 SOLUTION DENTAL ONCE
Status: COMPLETED | OUTPATIENT
Start: 2023-09-18 | End: 2023-09-18

## 2023-09-18 RX ORDER — PROPOFOL 10 MG/ML
INJECTION, EMULSION INTRAVENOUS AS NEEDED
Status: DISCONTINUED | OUTPATIENT
Start: 2023-09-18 | End: 2023-09-18

## 2023-09-18 RX ORDER — ALBUTEROL SULFATE 2.5 MG/3ML
2.5 SOLUTION RESPIRATORY (INHALATION) ONCE AS NEEDED
Status: DISCONTINUED | OUTPATIENT
Start: 2023-09-18 | End: 2023-09-18 | Stop reason: HOSPADM

## 2023-09-18 RX ORDER — DEXAMETHASONE SODIUM PHOSPHATE 10 MG/ML
INJECTION, SOLUTION INTRAMUSCULAR; INTRAVENOUS AS NEEDED
Status: DISCONTINUED | OUTPATIENT
Start: 2023-09-18 | End: 2023-09-18

## 2023-09-18 RX ORDER — KETOROLAC TROMETHAMINE 30 MG/ML
INJECTION, SOLUTION INTRAMUSCULAR; INTRAVENOUS AS NEEDED
Status: DISCONTINUED | OUTPATIENT
Start: 2023-09-18 | End: 2023-09-18

## 2023-09-18 RX ORDER — LIDOCAINE HYDROCHLORIDE 10 MG/ML
INJECTION, SOLUTION EPIDURAL; INFILTRATION; INTRACAUDAL; PERINEURAL AS NEEDED
Status: DISCONTINUED | OUTPATIENT
Start: 2023-09-18 | End: 2023-09-18

## 2023-09-18 RX ORDER — FENTANYL CITRATE 50 UG/ML
INJECTION, SOLUTION INTRAMUSCULAR; INTRAVENOUS AS NEEDED
Status: DISCONTINUED | OUTPATIENT
Start: 2023-09-18 | End: 2023-09-18

## 2023-09-18 RX ORDER — ACETAMINOPHEN 325 MG/1
650 TABLET ORAL EVERY 6 HOURS PRN
Status: DISCONTINUED | OUTPATIENT
Start: 2023-09-18 | End: 2023-09-18 | Stop reason: HOSPADM

## 2023-09-18 RX ORDER — CELECOXIB 100 MG/1
100 CAPSULE ORAL 2 TIMES DAILY
Qty: 28 CAPSULE | Refills: 0 | Status: SHIPPED | OUTPATIENT
Start: 2023-09-18 | End: 2023-10-02

## 2023-09-18 RX ORDER — ONDANSETRON 2 MG/ML
INJECTION INTRAMUSCULAR; INTRAVENOUS AS NEEDED
Status: DISCONTINUED | OUTPATIENT
Start: 2023-09-18 | End: 2023-09-18

## 2023-09-18 RX ORDER — SODIUM CHLORIDE, SODIUM LACTATE, POTASSIUM CHLORIDE, CALCIUM CHLORIDE 600; 310; 30; 20 MG/100ML; MG/100ML; MG/100ML; MG/100ML
125 INJECTION, SOLUTION INTRAVENOUS CONTINUOUS
Status: DISCONTINUED | OUTPATIENT
Start: 2023-09-18 | End: 2023-09-18 | Stop reason: HOSPADM

## 2023-09-18 RX ORDER — CHLORHEXIDINE GLUCONATE 4 G/100ML
SOLUTION TOPICAL DAILY PRN
Status: DISCONTINUED | OUTPATIENT
Start: 2023-09-18 | End: 2023-09-18 | Stop reason: HOSPADM

## 2023-09-18 RX ORDER — SODIUM CHLORIDE, SODIUM LACTATE, POTASSIUM CHLORIDE, CALCIUM CHLORIDE 600; 310; 30; 20 MG/100ML; MG/100ML; MG/100ML; MG/100ML
100 INJECTION, SOLUTION INTRAVENOUS CONTINUOUS
Status: DISCONTINUED | OUTPATIENT
Start: 2023-09-18 | End: 2023-09-18 | Stop reason: HOSPADM

## 2023-09-18 RX ORDER — BUPIVACAINE HYDROCHLORIDE 5 MG/ML
INJECTION, SOLUTION EPIDURAL; INTRACAUDAL AS NEEDED
Status: DISCONTINUED | OUTPATIENT
Start: 2023-09-18 | End: 2023-09-18 | Stop reason: HOSPADM

## 2023-09-18 RX ORDER — FENTANYL CITRATE/PF 50 MCG/ML
25 SYRINGE (ML) INJECTION
Status: DISCONTINUED | OUTPATIENT
Start: 2023-09-18 | End: 2023-09-18 | Stop reason: HOSPADM

## 2023-09-18 RX ORDER — ONDANSETRON 2 MG/ML
4 INJECTION INTRAMUSCULAR; INTRAVENOUS ONCE AS NEEDED
Status: DISCONTINUED | OUTPATIENT
Start: 2023-09-18 | End: 2023-09-18 | Stop reason: HOSPADM

## 2023-09-18 RX ORDER — ONDANSETRON 4 MG/1
4 TABLET, ORALLY DISINTEGRATING ORAL EVERY 6 HOURS PRN
Qty: 15 TABLET | Refills: 0 | Status: SHIPPED | OUTPATIENT
Start: 2023-09-18

## 2023-09-18 RX ORDER — PHENYLEPHRINE HCL IN 0.9% NACL 1 MG/10 ML
SYRINGE (ML) INTRAVENOUS AS NEEDED
Status: DISCONTINUED | OUTPATIENT
Start: 2023-09-18 | End: 2023-09-18

## 2023-09-18 RX ORDER — ONDANSETRON 2 MG/ML
4 INJECTION INTRAMUSCULAR; INTRAVENOUS EVERY 6 HOURS PRN
Status: DISCONTINUED | OUTPATIENT
Start: 2023-09-18 | End: 2023-09-18 | Stop reason: HOSPADM

## 2023-09-18 RX ORDER — MIDAZOLAM HYDROCHLORIDE 2 MG/2ML
INJECTION, SOLUTION INTRAMUSCULAR; INTRAVENOUS AS NEEDED
Status: DISCONTINUED | OUTPATIENT
Start: 2023-09-18 | End: 2023-09-18

## 2023-09-18 RX ADMIN — Medication 100 MCG: at 14:20

## 2023-09-18 RX ADMIN — SODIUM CHLORIDE, SODIUM LACTATE, POTASSIUM CHLORIDE, AND CALCIUM CHLORIDE 100 ML/HR: .6; .31; .03; .02 INJECTION, SOLUTION INTRAVENOUS at 11:50

## 2023-09-18 RX ADMIN — ONDANSETRON 4 MG: 2 INJECTION INTRAMUSCULAR; INTRAVENOUS at 14:23

## 2023-09-18 RX ADMIN — FENTANYL CITRATE 25 MCG: 50 INJECTION, SOLUTION INTRAMUSCULAR; INTRAVENOUS at 14:29

## 2023-09-18 RX ADMIN — Medication 100 MCG: at 14:12

## 2023-09-18 RX ADMIN — MIDAZOLAM 2 MG: 1 INJECTION INTRAMUSCULAR; INTRAVENOUS at 13:57

## 2023-09-18 RX ADMIN — LIDOCAINE HYDROCHLORIDE 25 MG: 10 INJECTION, SOLUTION EPIDURAL; INFILTRATION; INTRACAUDAL; PERINEURAL at 14:00

## 2023-09-18 RX ADMIN — FENTANYL CITRATE 25 MCG: 50 INJECTION, SOLUTION INTRAMUSCULAR; INTRAVENOUS at 14:25

## 2023-09-18 RX ADMIN — PROPOFOL 120 MG: 10 INJECTION, EMULSION INTRAVENOUS at 14:00

## 2023-09-18 RX ADMIN — FENTANYL CITRATE 25 MCG: 50 INJECTION, SOLUTION INTRAMUSCULAR; INTRAVENOUS at 14:00

## 2023-09-18 RX ADMIN — FENTANYL CITRATE 25 MCG: 50 INJECTION, SOLUTION INTRAMUSCULAR; INTRAVENOUS at 14:11

## 2023-09-18 RX ADMIN — KETOROLAC TROMETHAMINE 15 MG: 30 INJECTION INTRAMUSCULAR; INTRAVENOUS at 14:23

## 2023-09-18 RX ADMIN — CHLORHEXIDINE GLUCONATE 0.12% ORAL RINSE 15 ML: 1.2 LIQUID ORAL at 11:51

## 2023-09-18 RX ADMIN — Medication 100 MCG: at 14:06

## 2023-09-18 RX ADMIN — Medication 100 MCG: at 14:00

## 2023-09-18 RX ADMIN — VANCOMYCIN HYDROCHLORIDE 1000 MG: 5 INJECTION, POWDER, LYOPHILIZED, FOR SOLUTION INTRAVENOUS at 12:53

## 2023-09-18 RX ADMIN — DEXAMETHASONE SODIUM PHOSPHATE 8 MG: 10 INJECTION, SOLUTION INTRAMUSCULAR; INTRAVENOUS at 14:08

## 2023-09-18 NOTE — ANESTHESIA POSTPROCEDURE EVALUATION
Post-Op Assessment Note    CV Status:  Stable  Pain Score: 0    Pain management: adequate     Mental Status:  Alert and awake   Hydration Status:  Euvolemic   PONV Controlled:  Controlled   Airway Patency:  Patent      Post Op Vitals Reviewed: Yes      Staff: Anesthesiologist, CRNA         No notable events documented.     BP   120/58   Temp  97.6   Pulse  99   Resp   17   SpO2   99%

## 2023-09-18 NOTE — OP NOTE
OPERATIVE REPORT    PATIENT NAME: Kristie Womack   :  1965  MRN: [id number]  Pt Location: [facility] OR ROOM 01    SURGERY DATE: 2023    SURGEON(S) and ROLE:  Primary: Zuri Rosales DO  Assisting: Darcy Merritt PA-C    NOTE:  The presence of a physician assistant was necessary to help with patient positioning, surgical exposure, wound retraction, wound closure, and other key portions of the procedure. No qualified resident was available for this case. PREOPERATIVE DIAGNOSES:  Right knee subcutaneous mass    POSTOPERATIVE DIAGNOSES:  Right knee subcutaneous mass    PROCEDURES:  Right knee subcutaneous mass removal, 2cm x 1.5cm      ANESTHESIA TYPE:  General LMA    ANESTHESIA STAFF:   Anesthesiologist: Tarah Louis MD  CRNA: Marii Ruby CRNA    ESTIMATED BLOOD LOSS:  0 mL    TOURNIQUET TIME:  10 minutes    PERIOPERATIVE ANTIBIOTICS:  vancomycin, 1 gram    IMPLANTS:  none    * No implants in log *    SPECIMENS:    ID Type Source Tests Collected by Time Destination   1 : right knee mass Tissue Soft Tissue, Other TISSUE EXAM Zuri Rosales DO 2023 1409        DRAINS:  None      OPERATIVE INDICATIONS:  The patient is a 62 y.o. female with a painful subcutaneous mass over the right prepatellar knee region. Patient has felt discomfort here, is continuously painful and uncomfortable. It enhances with contrast on the MRI. She is indicated for surgical removal and biopsy. .  Surgical treatment was indicated due to continued pain and pathologic diagnosis. Discussed in depth the patient that this could be something benign such as a schwannoma or could be a malignancy such as a sarcoma patient understands and agrees. After a thorough discussion of the potential risks, benefits, and alternative treatments, the patient agreed to proceed with surgery.   The patient understands that the risks of surgery include, but are not limited to: infection, neurovascular injury, wound healing complications, venous thromboembolism, persistent pain, stiffness, instability, recurrence of symptoms, potential need for additional surgeries, and loss of limb or life, need for future surgery. Oral and written consent for surgery was obtained from the patient preoperatively. PROCEDURE AND TECHNIQUE:  On the day of surgery, the patient was identified by myself in the preoperative holding area. The operative site was marked by the surgeon as the proper operative extremity. She was taken to the operating room where she was transferred operating room table, she placed in the supine position with all bony prominences well-padded. The patient was anesthetized, intubated and sedated in the standard manner and perioperative antibiotics, vancomycin, were administered. A tourniquet cuff was applied to the operative extremity, set at 250 mmHg. The operative site was prepped and draped using standard sterile technique. A time-out was conducted to confirm the patient's identity, identifying all team members in the room, the operative site, and the proposed procedure. Approximately 3 cm incision was made over the mass after  exsanguinating the limb with an Esmarch tourniquet inflating the tourniquet to 250 mmHg. Skin was sharply incised down to the subcutaneous tissue. Subcutaneous skin flaps were made both anteriorly and posteriorly. The mass was identified in this layer, mass was bluntly dissected away from the deep underlying tissue. The mass was removed en bloc from the surface of the deep fascia. Mass was soft round mobile, looked similar to a schwannoma grapelike. .  Measured on the back table to be approximate 2 cm x 1-1/2 cm. Blunt palpation was performed there is no leftover masslike tissue, only normal subcutaneous tissue within the area. Wound was gently irrigated with normal saline, cocktail injection was then placed within the subdeep subcutaneous tissue in a field block fashion. Subcutaneous tissue was closed with 2-0 Monocryl. Skin was closed with a running 3-0 Monocryl. Skin glue was placed. Skin was cleansed and dried, Mepilex dressing was placed. I was present for the entire procedure., A qualified resident physician was not available. and A physician assistant was required during the procedure for retraction, tissue handling, dissection and suturing.       COMPLICATIONS:  None    PATIENT DISPOSITION:  PACU  and extubated and stable      SIGNATURE:  Nas Bundy DO  DATE:  September 18, 2023  TIME:  2:31 PM

## 2023-09-18 NOTE — ANESTHESIA PREPROCEDURE EVALUATION
Procedure:  EXCISION BIOPSY TISSUE LESION/MASS LOWER EXTREMITY (Right: Knee)    Relevant Problems   CARDIO   (+) CAD in native artery   (+) Other hyperlipidemia   (+) PVD (peripheral vascular disease) (HCC)   (+) Raynaud's disease without gangrene   (+) 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HCC)      GI/HEPATIC   (+) Gastro-esophageal reflux disease without esophagitis   (+) Gastroesophageal reflux disease      MUSCULOSKELETAL   (+) Arthritis   (+) Chronic bilateral low back pain with left-sided sciatica   (+) Degenerative cervical disc   (+) Lumbar degenerative disc disease   (+) Lumbar spondylosis   (+) Myofascial pain syndrome   (+) Osteoarthritis of cervical spine   (+) Osteoarthritis of spine with radiculopathy, cervical region   (+) Sacroiliitis (HCC)      NEURO/PSYCH   (+) Anxiety   (+) Chronic bilateral low back pain with left-sided sciatica   (+) Chronic pain syndrome   (+) Headache disorder   (+) Myofascial pain syndrome      Digestive   (+) History of hepatitis C      Nervous and Auditory   (+) Cervical radiculopathy   (+) Lumbar radiculopathy      Musculoskeletal and Integument   (+) Incomplete rotator cuff tear or rupture of left shoulder, not specified as traumatic      Other   (+) Left facial numbness   (+) Positive KRISTA (antinuclear antibody)   (+) Rheumatoid factor positive   (+) Tobacco abuse   (+) Umbilical hernia without obstruction and without gangrene   (+) Undifferentiated connective tissue disease ([address])      Indications: coronary artery disease, PCI to left anterior descending     Diagnoses: I25.10 - Atherosclerotic heart disease of native coronary artery without angina pectoris     Sonographer:  Reza Craft RDCS  Primary Physician:  Angelito Ferguson DO  Referring Physician:  Jana Duverney, MD  Group:  [facility]  Interpreting Physician:  Honorio Reza DO     SUMMARY     LEFT VENTRICLE:  Systolic function was normal. Ejection fraction was estimated to be 60 %.  Although no diagnostic regional wall motion abnormality was identified, this possibility cannot be completely excluded on the basis of this study. Doppler parameters were consistent with abnormal left ventricular relaxation (grade 1 diastolic dysfunction).     TRICUSPID VALVE:  There was trace regurgitation.     HISTORY: PRIOR HISTORY: gerd, hepatitis C, hypertension, hyperlipidemia, peripheral vascular disease, current smoker     PROCEDURE: The procedure was performed in the echo lab. This was a routine study. The transthoracic approach was used. The study included complete 2D imaging, M-mode, complete spectral Doppler, and color Doppler. Images were obtained from  the parasternal, apical, subcostal, and suprasternal notch acoustic windows. Echocardiographic views were limited due to poor acoustic window availability and lung interference. Image quality was adequate.     LEFT VENTRICLE: Size was normal. Systolic function was normal. Ejection fraction was estimated to be 60 %. Although no diagnostic regional wall motion abnormality was identified, this possibility cannot be completely excluded on the basis  of this study. Wall thickness was normal. DOPPLER: Doppler parameters were consistent with abnormal left ventricular relaxation (grade 1 diastolic dysfunction).     RIGHT VENTRICLE: The size was normal. Systolic function was normal. Wall thickness was normal.     LEFT ATRIUM: Size was normal.     RIGHT ATRIUM: Size was normal.     MITRAL VALVE: Valve structure was normal. There was normal leaflet separation. DOPPLER: The transmitral velocity was within the normal range. There was no evidence for stenosis. There was no significant regurgitation.     AORTIC VALVE: The valve was trileaflet. Leaflets exhibited normal thickness and normal cuspal separation. DOPPLER: Transaortic velocity was within the normal range. There was no evidence for stenosis.  There was no regurgitation.     TRICUSPID VALVE: The valve structure was normal. There was normal leaflet separation. DOPPLER: The transtricuspid velocity was within the normal range. There was no evidence for stenosis. There was trace regurgitation.     PULMONIC VALVE: Leaflets exhibited normal thickness, no calcification, and normal cuspal separation. DOPPLER: The transpulmonic velocity was within the normal range. There was no regurgitation.     PERICARDIUM: There was no pericardial effusion. The pericardium was normal in appearance.     AORTA: The root exhibited normal size.     SYSTEMIC VEINS: IVC: The inferior vena cava was normal in size and course. Respirophasic changes were normal.     SYSTEM MEASUREMENT TABLES     2D  %FS: 33.72 %  Ao Diam: 2.83 cm  EDV(Teich): 52.77 ml  EF(Teich): 63.55 %  ESV(Teich): 19.24 ml  IVSd: 0.89 cm  LA Area: 7.39 cm2  LA Diam: 2.67 cm  LVEDV MOD A4C: 70.46 ml  LVEF MOD A4C: 64.92 %  LVESV MOD A4C: 24.72 ml  LVIDd: 3.55 cm  LVIDs: 2.36 cm  LVLd A4C: 7.2 cm  LVLs A4C: 5.57 cm  LVPWd: 0.86 cm  RA Area: 9.37 cm2  RVIDd: 2.65 cm  RWT: 0.48  SV MOD A4C: 45.74 ml  SV(Teich): 33.53 ml     CW  AV Vmax: 1.18 m/s  AV maxP.53 mmHg  PV Vmax: 0.8 m/s  PV maxP.59 mmHg     MM  TAPSE: 2.77 cm     PW  E' Lat: 0.11 m/s  E' Sept: 0.06 m/s  E/E' Lat: 7.57  E/E' Sept: 13.59  LVOT Vmax: 0.93 m/s  LVOT maxPG: 3.45 mmHg  MV A Ron: 0.84 m/s  MV Dec Page: 3.19 m/s2  MV DecT: 252.34 ms  MV E Ron: 0.81 m/s  MV E/A Ratio: 0.96  RVOT Vmax: 0.55 m/s  RVOT maxP.2 mmHg     IntersociCounts include 234 beds at the [facility] Commission Accredited Echocardiography Laboratory     Prepared and electronically signed by  Maru Dunlap DO  Signed 2020 16:07:12    Physical Exam    Airway    Mallampati score: II  TM Distance: >3 FB  Neck ROM: full     Dental   upper dentures and lower dentures,     Cardiovascular      Pulmonary      Other Findings        Anesthesia Plan  ASA Score- 2     Anesthesia Type- general with ASA Monitors.          Additional Monitors:   Airway Plan: LMA.          Plan Factors-Exercise tolerance (METS): >4 METS. Chart reviewed. EKG reviewed. Imaging results reviewed. Patient summary reviewed. Patient is a current smoker. Patient smoked on day of surgery. Induction- intravenous. Postoperative Plan- Plan for postoperative opioid use. Informed Consent- Anesthetic plan and risks discussed with patient. I personally reviewed this patient with the CRNA. Discussed and agreed on the Anesthesia Plan with the CRNA. Joel Leonard

## 2023-09-18 NOTE — DISCHARGE INSTR - AVS FIRST PAGE
POSTOPERATIVE INSTRUCTIONS     MEDICATIONS:  Resume all home medications unless otherwise instructed by your surgeon. Pain Medication:  as prescribed  If you were given a regional anesthetic (nerve block), please begin taking the pain medication as soon as you get home, even if you have minimal or no pain. DO NOT WAIT FOR THE NERVE BLOCK TO WEAR OFF. Possible side effects include nausea, constipation, and urinary retention. If you experience these side effects, please call our office for assistance. Pain med refills are authorized only during office hours (8am-4pm, Mon-Fri). Anti-Inflammatory:  as prescribed  Take with food. Stop if you experience nausea, reflux, or stomach pain. Nausea Medication:  as prescribed  Fill prescription ONLY if you expericnce severe nausea. Blood Clot Prevention:  ambulation  Pump your foot up and down 20 times per hour while you are less mobile. WOUND CARE:  Keep the dressing clean and dry. Light drainage may occur the first 2 days postop. DO NOT REMOVE THE DRESSINGS until you are seen in our office. Cover the bandages appropriately while washing to keep them from getting wet. Please call our office ([phone]) if you experience either of the following:  Sudden increase in swelling, redness, or warmth at the surgical site  Excessive incisional drainage that persists beyond the 3rd day after surgery  Oral temperature greater than 101 degrees, not relieved with Tylenol  Shortness of breath, chest pain, nausea, or any other concerning symptoms    SWELLING CONTROL:  Cold Therapy: The cold therapy device may be used either continuously or only as needed, according to your preference. Do not let the pad directly touch your skin. Alternatively, apply ice (20 min on, 20 min off) as often as you feel is necessary. Elevation:  Elevate the entire leg above heart level. Place pillows under your ankle to keep your knee straight.   Compression:  Apply ACE wraps or a thigh-length compression stocking as needed. RANGE OF MOTION:  You are allowed FULL RANGE OF MOTION as tolerated. IMMOBILIZATION:  None. You are allowed full range of motion as tolerated. ACTIVITY:   BEAR FULL WEIGHT AS TOLERATED on the operative leg. Use crutches to assist only as needed. PHYSICAL THERAPY:  You will be given a physical therapy prescription when you are seen in the office for your postoperative appointment. FOLLOW-UP APPOINTMENT:  10-14 days after surgery with:    Dr. Reji Gómez D.O.   [facility]  [phone]

## 2023-09-18 NOTE — ADDENDUM NOTE
Addendum  created 09/18/23 1554 by Azam Peguero MD    Order list changed, Pharmacy for encounter modified

## 2023-09-18 NOTE — ADDENDUM NOTE
Addendum  created 09/18/23 1556 by Orlando Carrion MD    Order list changed, Pharmacy for encounter modified

## 2023-09-19 ENCOUNTER — TELEPHONE (OUTPATIENT)
Dept: OBGYN CLINIC | Facility: HOSPITAL | Age: 58
End: 2023-09-19

## 2023-09-19 NOTE — TELEPHONE ENCOUNTER
Caller: Patient    Doctor: Candace Jacques    Reason for call: Patient calling for Post-op Appt. Doctors schedule is full. Please advise.     EXCISION BIOPSY TISSUE LESION/MASS LOWER EXTREMITY (Right: Knee    Call back#: [phone] home or  Cell [phone]

## 2023-09-25 PROCEDURE — 88341 IMHCHEM/IMCYTCHM EA ADD ANTB: CPT | Performed by: PATHOLOGY

## 2023-09-25 PROCEDURE — 88307 TISSUE EXAM BY PATHOLOGIST: CPT | Performed by: PATHOLOGY

## 2023-09-25 PROCEDURE — 88342 IMHCHEM/IMCYTCHM 1ST ANTB: CPT | Performed by: PATHOLOGY

## 2023-10-03 ENCOUNTER — OFFICE VISIT (OUTPATIENT)
Dept: OBGYN CLINIC | Facility: CLINIC | Age: 58
End: 2023-10-03

## 2023-10-03 VITALS — HEART RATE: 82 BPM | DIASTOLIC BLOOD PRESSURE: 80 MMHG | SYSTOLIC BLOOD PRESSURE: 121 MMHG

## 2023-10-03 DIAGNOSIS — Z47.89 AFTERCARE FOLLOWING SURGERY OF THE MUSCULOSKELETAL SYSTEM: Primary | ICD-10-CM

## 2023-10-03 DIAGNOSIS — N76.0 VULVOVAGINITIS: ICD-10-CM

## 2023-10-03 PROCEDURE — 99024 POSTOP FOLLOW-UP VISIT: CPT | Performed by: STUDENT IN AN ORGANIZED HEALTH CARE EDUCATION/TRAINING PROGRAM

## 2023-10-03 RX ORDER — FLUCONAZOLE 150 MG/1
150 TABLET ORAL ONCE
Qty: 1 TABLET | Refills: 0 | Status: SHIPPED | OUTPATIENT
Start: 2023-10-03 | End: 2023-10-03

## 2023-10-03 NOTE — PROGRESS NOTES
Orthopedic Oncology Post Operative Office Note  Kaila Chaves (89 y.o. female)   : 1965   MRN: [id number]   Encounter Date: 10/3/2023  Dr. Janette Prather DO, Orthopedic Surgeon  Orthopedic Oncology & Sarcoma Surgery   DOS: 2023  Procedure performed: Excision Biopsy Tissue Lesion/mass Lower Extremity - Right      Impression/Plan: 62 y.o. female with a history of bump on right knee 2 week s/p Excision of mass of right anterior knee. Final pathology consistent with leiomyoma. 1. S/p Excision of mass of right knee   · Wound Care   · OK to shower. , Dab dry with towel., Steristrips will fall off on their own., No soaking or bathing for another 2 weeks. , Encouraged scar care with vitamin E oil. · Pain control: PRN  · Continue ice packs/elevation  · Can continue compression with ACE wrap or compression stockings  · Physical therapy: Not indicated at this time  · WBAT to RLE  · Given 150mg fluconazole PO x 1, instructed on follow up with PCP and when to report to urgent care/ED  Return in about 4 weeks (around 10/31/2023) for 6 week follow up. · for evaluation without x-ray    2. leiomyoma  -Definitve diagnosis discussed at length.   - discussed pathology as purpose of surgery  - discussed improvements with surgery, but still trading a bump for a scar       Subjective:  62 y.o. female 2 week s/p Excision Biopsy Tissue Lesion/mass Lower Extremity - Right    Describes red,itchy rash about the vulvar area present after surgery, not resolved with monostat cream.     Pain/Complaints: some pain and swelling over incision site   Numbness/weakness extremity: none reported    Phycical Therapy Progress: Not indicated at this time   DVT ppx: ambulation   Abx: n/a   Eating/Drinking improving. Bowel/Bladder:  WNL   No noted fever/chills, numbness/tingling, injury/trauma, night sweats    Physical Exam:        Vitals:    10/03/23 1312   BP: 121/80   Pulse: 82     There is no height or weight on file to calculate BMI. General: alert and oriented; well nourished/well developed; no apparent distress. Extremity: Right anterior knee  mild swelling throughout  Dressing: removed without complication, glue intact  Surgical site: Without dehiscence, erythema, or delayed healing. mild ecchymosis  Motor/Senstation: grossly intact distally    Brisk cap refill  Calf: bilateral calves soft, nontender      Pathology: FINAL  A. Soft tissue, right knee mass, excision:  -Benign leiomyoma. Imaging:   No new imaging to review    Oncology History    No history exists.      Jennifer Adams PA-C, scribed this note in conjunction with and in the presence of Dr. Carmela Dobbs, who performed parts of the services as described in this documentation    Dr. Mary Huntley DO, Orthopedic Surgeon  Orthopedic Oncology & Sarcoma Surgery   Phone:[phone] HNV:[phone]

## 2023-10-20 DIAGNOSIS — Z72.0 TOBACCO ABUSE: ICD-10-CM

## 2023-10-20 RX ORDER — ALBUTEROL SULFATE 90 UG/1
2 AEROSOL, METERED RESPIRATORY (INHALATION) EVERY 6 HOURS PRN
Qty: 18 G | Refills: 1 | Status: SHIPPED | OUTPATIENT
Start: 2023-10-20

## 2023-11-03 ENCOUNTER — VBI (OUTPATIENT)
Dept: ADMINISTRATIVE | Facility: OTHER | Age: 58
End: 2023-11-03

## 2024-01-31 ENCOUNTER — OFFICE VISIT (OUTPATIENT)
Dept: FAMILY MEDICINE CLINIC | Facility: CLINIC | Age: 59
End: 2024-01-31
Payer: COMMERCIAL

## 2024-01-31 VITALS
DIASTOLIC BLOOD PRESSURE: 82 MMHG | BODY MASS INDEX: 21.48 KG/M2 | TEMPERATURE: 97.1 F | SYSTOLIC BLOOD PRESSURE: 114 MMHG | HEART RATE: 74 BPM | OXYGEN SATURATION: 97 % | WEIGHT: 109.4 LBS | HEIGHT: 60 IN

## 2024-01-31 DIAGNOSIS — Z00.00 ANNUAL PHYSICAL EXAM: Primary | ICD-10-CM

## 2024-01-31 DIAGNOSIS — Z12.31 ENCOUNTER FOR SCREENING MAMMOGRAM FOR BREAST CANCER: ICD-10-CM

## 2024-01-31 DIAGNOSIS — M25.511 CHRONIC RIGHT SHOULDER PAIN: ICD-10-CM

## 2024-01-31 DIAGNOSIS — G89.29 CHRONIC RIGHT SHOULDER PAIN: ICD-10-CM

## 2024-01-31 PROCEDURE — 99396 PREV VISIT EST AGE 40-64: CPT | Performed by: FAMILY MEDICINE

## 2024-01-31 NOTE — PROGRESS NOTES
Cardiology Follow Up    Gloria Mcdaniel  1965  [id number]  [facility] [address] [id number] [phone]    1. CAD in native artery        2. Other hyperlipidemia        3. Tobacco abuse        4. Essential hypertension            Discussion/Summary:    CAD  With prior hx of stent to LAD in 1998; NM stress from 2014 with no ischemia  Today she denies any chest pain; she has chronic BONILLA that has not worsened over the past year--likely due to continued tobacco abuse/emphysema  Continue GDMT    Essential HTN  controlled  She is not on any anti-hypertensive meds at this time  Low sodium diet recommended    Hyperlipidemia  Lipid panel from April 2023  Triglycerides 126  HDL 61  LDL 66  LDL is at goal  Continue Crestor 5 mg QD and Zetia 10 mg QD    Tobacco abuse  Currently smokes 1/4 PPD  Complete smoking cessation advised    She will return to the office in 6 months to follow up with Dr Rausch    Interval History:   Gloria Mcdaniel is a 58 y.o. female with PMH of HTN, HLD, tobacco abuse, CAD, RA and GI disorders who returns to the office today for routine follow up visit.  Today, the patient admits that overall she is rather sedentary with no formal exercise routine but does do her own grocery shopping and housecleaning.  She can perform these activities with no complaints of chest discomfort.  She does have chronic dyspnea on exertion that she states has not worsened in severity or frequency over at least the past year.  This dyspnea on exertion is likely secondary to continued tobacco abuse with mild emphysematous changes noted on CAT scan a few years ago.  She does occasionally get palpitations mainly in the evening and they are intermittent.  This may just be secondary to the fact that she is focusing more on her heart rate prior to sleep but also she notes that she drinks quite a bit  of coffee throughout the day and very little water.  I did discuss with her the need to reduce her caffeine intake to only 2 cups a day.  I have also again reinforced complete smoking sensation and have asked her to hydrate better drinking at least 2-3 bottles of water a day.  She has no lower extremity edema or orthopnea.  She does get intermittent lightheadedness mainly with position change and again I believe this is secondary to dehydration.  Her blood pressure is acceptable today on no antihypertensive medications and I have asked her to follow a low-sodium diet.  Her LDL from April 2023 is at goal.    Medical Problems       Problem List       Gastro-esophageal reflux disease without esophagitis    Right lower quadrant pain    Abnormal weight loss    Acute pain of left shoulder    Lumbar spondylosis    Osteoarthritis of spine with radiculopathy, cervical region    Degenerative cervical disc    Lumbar degenerative disc disease    Bilateral carpal tunnel syndrome    CAD in native artery    Other hyperlipidemia    PVD (peripheral vascular disease) (HCC)    Headache disorder    Rheumatoid factor positive    Positive KRISTA (antinuclear antibody)    Left facial numbness    Myofascial pain syndrome    Calcific tendinitis of left shoulder    Overview Signed 6/5/2018  2:04 PM by Francis Byrnes MD     Added automatically from request for surgery [id number]         Incomplete tear of left rotator cuff    Overview Signed 6/5/2018  2:04 PM by Francis Byrnes MD     Added automatically from request for surgery [id number]         Neuroma of foot    History of hepatitis C    Overview Signed 8/25/2023 12:15 PM by Lee Miller DO     Treated         Colorectal polyps    Overview Signed 9/18/2018  2:11 PM by Nikolay Quick MD     last assessed: 3/9/2015         Macrocytic    Irritable bowel syndrome with diarrhea    Incomplete rotator cuff tear or rupture of left shoulder, not specified as traumatic    Overview Signed 10/2/2018  2:05 PM by  Francis Byrnes MD     Added automatically from request for surgery [id number]         Biceps tendinosis of left shoulder    Overview Signed 10/2/2018  2:05 PM by Francis Byrnes MD     Added automatically from request for surgery [id number]         Arthritis    Cervical radiculopathy    Overview Deleted 6/1/2020  2:44 PM by Joshua Sy PA-C            Cervical radiculitis    Overview Signed 12/10/2018  1:24 PM by Ruby Rdz     Added automatically from request for surgery [id number]         RUQ pain    Sacroiliitis (HCC)    Chronic pain syndrome    RUQ abdominal pain    Overview Signed 3/1/2019 11:55 AM by Penelope Krishna MA     Added automatically from request for surgery [id number]         Dilated bile duct    Overview Signed 3/1/2019 11:55 AM by Penelope Krishna MA     Added automatically from request for surgery [id number]         Gastroesophageal reflux disease    Overview Signed 3/1/2019 11:55 AM by Penelope Krishna MA     Added automatically from request for surgery [id number]         Radiculopathy, cervical    Overview Signed 4/10/2019  8:08 AM by Treva Bobby MA     Added automatically from request for surgery [id number]         Trochanteric bursitis of right hip    Overview Signed 7/19/2019  2:36 PM by Treva Bobby MA     Added automatically from request for surgery [id number]         Trochanteric bursitis of left hip    Overview Signed 7/19/2019  2:36 PM by Treva Bobby MA     Added automatically from request for surgery [id number]         Lumbar radiculopathy    Chronic bilateral low back pain with left-sided sciatica    Osteoarthritis of cervical spine    Flushing    Tobacco abuse    Neck pain    High risk medication use    Undifferentiated connective tissue disease (HCC)    Epigastric pain    Costochondritis, acute    Age-related osteoporosis without current pathological fracture    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HCC)    Umbilical hernia without obstruction and without gangrene    Anxiety    Soft tissue mass    Raynaud's  disease without gangrene        Past Medical History:   Diagnosis Date    Acquired ichthyosis     last assessed: 5/9/2013    Anesthesia     Pt reports severe acid reflux after getting anesthesia- sometimes presents as chest pain    Anxiety     Cervical radiculopathy     last assessed: 6/13/2014    Colorectal polyps     last assessed: 3/9/2015    COPD (chronic obstructive pulmonary disease) ([facility])     Depression     Diverticulosis of colon     Foot pain     GERD (gastroesophageal reflux disease)     Heart disease 1997    Coronary artery disease    Hepatitis C july 2018    Took meds no longer have    Hyperlipemia     Hypertension     Lupus ([facility])     Myocardial infarction ([facility])     at age 32    Osteoarthritis unsure    Osteopenia     last assessed: 12/6/2013    Palpitations     last assessed: 12/31/2014    PVD (peripheral vascular disease) ([facility])     last assessed: 12/3/2012    RA (rheumatoid arthritis) ([facility])     Rheumatoid arthritis ([facility]) unsure    Scapular dyskinesis     last assessed: 11/17/2014    Shortness of breath     Stomach disorder Unsure    Tendonitis of left rotator cuff     last assessed: 11/17/2014    Vocal cord polyps     last assessed: 8/8/2014     Social History     Socioeconomic History    Marital status:      Spouse name: Not on file    Number of children: Not on file    Years of education: Not on file    Highest education level: Not on file   Occupational History    Not on file   Tobacco Use    Smoking status: Every Day     Current packs/day: 0.25     Average packs/day: 0.3 packs/day for 40.0 years (10.0 ttl pk-yrs)     Types: Cigarettes    Smokeless tobacco: Never    Tobacco comments:     She is trying to cut back on her own   Vaping Use    Vaping status: Never Used   Substance and Sexual Activity    Alcohol use: No     Comment: rare    Drug use: No    Sexual activity: Not Currently     Birth control/protection: Abstinence   Other Topics Concern    Not on file   Social History Narrative    No  living will     Social Determinants of Health     Financial Resource Strain: Not on file   Food Insecurity: Not on file   Transportation Needs: Not on file   Physical Activity: Not on file   Stress: Not on file   Social Connections: Not on file   Intimate Partner Violence: Not on file   Housing Stability: Not on file      Family History   Problem Relation Age of Onset    No Known Problems Mother     No Known Problems Father     No Known Problems Maternal Grandmother     No Known Problems Maternal Grandfather     No Known Problems Paternal Grandmother     No Known Problems Paternal Grandfather     Early death Sister     Hypertension Sister     No Known Problems Maternal Aunt     No Known Problems Maternal Aunt     No Known Problems Maternal Aunt     No Known Problems Maternal Aunt      Past Surgical History:   Procedure Laterality Date    ABDOMINAL SURGERY      exploratory    CARDIAC SURGERY      cardiac stent      SECTION      last assessed: 2014    CHOLECYSTECTOMY      last assessed: 2014    COLONOSCOPY  10/27/2014    CORONARY ANGIOPLASTY WITH STENT PLACEMENT      LAD stent    DENTAL SURGERY      last assessed: 2014    ELBOW SURGERY Bilateral     arthroplasty    EPIDURAL BLOCK INJECTION N/A 2019    Procedure: C7 T1 Cervical Epidural Steroid Injection ([id number]);  Surgeon: Ricardo Lawson MD;  Location: [facility];  Service: Pain Management     EPIDURAL BLOCK INJECTION Bilateral 2019    Procedure: BLOCK / INJECTION EPIDURAL STEROID CERVICAL - C7-T1;  Surgeon: Ricardo Lawson MD;  Location: [facility];  Service: Pain Management     EPIDURAL BLOCK INJECTION Left 10/03/2019    Procedure: C7-T1 interlaminar epidural steroid injection;  Surgeon: Ricardo Lawson MD;  Location: [facility];  Service: Pain Management     ESOPHAGOGASTRODUODENOSCOPY      ESOPHAGOGASTRODUODENOSCOPY N/A 2016    Procedure: ESOPHAGOGASTRODUODENOSCOPY (EGD);  Surgeon: Alejandro Carvajal MD;   Location: [facility];  Service:     FL GUIDED NEEDLE PLAC BX/ASP/INJ  05/02/2019    FL GUIDED NEEDLE PLAC BX/ASP/INJ  07/31/2019    FL GUIDED NEEDLE PLAC BX/ASP/INJ  10/03/2019    FL INJECTION LEFT SHOULDER (ARTHROGRAM)  09/25/2018    FOOT SURGERY Right 02/06/2015    x 4    HYSTERECTOMY      last assessed: 8/8/2014    MI ARTHROCENTESIS ASPIR&/INJ MAJOR JT/BURSA W/O US Bilateral 07/31/2019    Procedure: GREATER TROCHANTERIC HIP INJECTION;  Surgeon: Ricardo Lawson MD;  [facility]: [facility];  Service: Pain Management     MI EXCISON TUMOR SOFT TISSUE THIGH/KNEE SUBQ 3 CM/> Right 9/18/2023    Procedure: EXCISION BIOPSY TISSUE LESION/MASS LOWER EXTREMITY;  Surgeon: Franky Bragg DO;  Location: [facility];  Service: Orthopedics    MI SURGICAL ARTHROSCOPY SHOULDER BICEPS TENODESIS Left 10/15/2018    Procedure: ARTHROSCOPY SHOULDER; Debridement of shoulder;  Surgeon: Francis Byrnes MD;  [facility]: [facility];  Service: Orthopedics    MI SURGICAL ARTHROSCOPY SHOULDER W/ROTATOR CUFF RPR Left 07/23/2018    Procedure: ARTHROSCOPY SHOULDER, subacromial decompression, synovectomy;  Surgeon: Francis Byrnes MD;  [facility];  Service: Orthopedics    SHOULDER SURGERY  june and oct 2018    THROAT SURGERY      TOOTH EXTRACTION      TRIGEMINAL NERVE DECOMPRESSION Right 06/15/2018    Procedure: FOOT NEUROPLASTY;  Surgeon: Hernan Resendiz DPM;  Location: [facility];  Service: Podiatry       Current Outpatient Medications:     albuterol (PROVENTIL HFA,VENTOLIN HFA) 90 mcg/act inhaler, Inhale 2 puffs every 6 (six) hours as needed for wheezing, Disp: 18 g, Rfl: 1    alendronate (FOSAMAX) 70 mg tablet, Take 1 tablet (70 mg total) by mouth every 7 days Take on an empty stomach with a full glass of water, and do not lie down for 30 minutes after (Patient taking differently: Take 70 mg by mouth every 7 days Take on an empty stomach with a full glass of water, and do not lie down for 30 minutes after), Disp: 12 tablet, Rfl: 3     calcium carbonate (TUMS) 500 mg chewable tablet, Chew 1 tablet if needed for indigestion or heartburn, Disp: , Rfl:     celecoxib (CeleBREX) 100 mg capsule, Take 1 capsule (100 mg total) by mouth 2 (two) times a day for 14 days, Disp: 28 capsule, Rfl: 0    cyclobenzaprine (FLEXERIL) 10 mg tablet, Take 1 tablet (10 mg total) by mouth daily at bedtime (Patient taking differently: Take 10 mg by mouth daily at bedtime as needed), Disp: 30 tablet, Rfl: 6    diphenhydrAMINE-acetaminophen (TYLENOL PM)  MG TABS, Take 1 tablet by mouth daily at bedtime as needed for sleep, Disp: , Rfl:     ezetimibe (ZETIA) 10 mg tablet, take 1 tablet by mouth daily, Disp: 90 tablet, Rfl: 3    famotidine (PEPCID) 20 mg tablet, Take 20 mg by mouth if needed for heartburn, Disp: , Rfl:     hydroxychloroquine (PLAQUENIL) 200 mg tablet, Take 1 tablet (200 mg total) by mouth daily, Disp: 90 tablet, Rfl: 1    LACTASE ENZYME PO, Take 3 tablets by mouth if needed, Disp: , Rfl:     nitroglycerin (NITROSTAT) 0.4 mg SL tablet, Place 1 tablet (0.4 mg total) under the tongue every 5 (five) minutes as needed for chest pain (Patient taking differently: Place 0.4 mg under the tongue every 5 (five) minutes as needed for chest pain), Disp: 20 tablet, Rfl: 3    ondansetron (ZOFRAN-ODT) 4 mg disintegrating tablet, Take 1 tablet (4 mg total) by mouth every 6 (six) hours as needed for nausea or vomiting, Disp: 15 tablet, Rfl: 0    oxyCODONE-acetaminophen (PERCOCET) 5-325 mg per tablet, Take 1 tablet by mouth every 12 (twelve) hours, Disp: , Rfl:     Probiotic Product (PROBIOTIC DAILY PO), Take 2 capsules by mouth in the morning Probiotic-prebiotic, Disp: , Rfl:     rosuvastatin (CRESTOR) 5 mg tablet, take 1 tablet by mouth once daily, Disp: 90 tablet, Rfl: 3    VITAMIN D PO, Take 2 tablets by mouth in the morning, Disp: , Rfl:   Allergies   Allergen Reactions    Ceclor [Cefaclor] Anaphylaxis    Cephalexin     Codeine Itching     Reaction Date: 09Jun2011;   "   Gabapentin      Body tremors, sweats    Lyrica [Pregabalin]      Body tremors, chills, heaviness in chest    Ticlopidine Hives     Other reaction(s): Hives    Penicillins Rash     Other reaction(s): Other       Labs:     Chemistry        Component Value Date/Time     11/02/2015 1432    K 3.8 09/06/2023 1131    K 4.2 11/02/2015 1432     09/06/2023 1131     11/02/2015 1432    CO2 31 09/06/2023 1131    CO2 27.7 11/02/2015 1432    BUN 6 09/06/2023 1131    BUN 10 11/02/2015 1432    CREATININE 0.56 (L) 09/06/2023 1131    CREATININE 0.56 (L) 11/02/2015 1432        Component Value Date/Time    CALCIUM 9.1 09/06/2023 1131    CALCIUM 8.8 11/02/2015 1432    ALKPHOS 36 09/06/2023 1131    ALKPHOS 73 11/02/2015 1432    AST 15 09/06/2023 1131    AST 23 11/02/2015 1432    ALT 9 09/06/2023 1131    ALT 44 11/02/2015 1432    BILITOT 0.49 11/02/2015 1432            Lab Results   Component Value Date    CHOL 244 09/11/2015    CHOL 269 04/06/2015    CHOL 259 06/25/2014     Lab Results   Component Value Date    HDL 61 04/26/2023    HDL 64 02/09/2022    HDL 53 10/03/2019     Lab Results   Component Value Date    LDLCALC 66 04/26/2023    LDLCALC 51 02/09/2022    LDLCALC 145 (H) 10/03/2019     Lab Results   Component Value Date    TRIG 126 04/26/2023    TRIG 92 02/09/2022    TRIG 134 10/03/2019     No results found for: \"CHOLHDL\"    Imaging: No results found.      Review of Systems   Constitutional: Negative for chills, fever and malaise/fatigue.   HENT:  Negative for congestion.    Cardiovascular:  Positive for dyspnea on exertion (chronic) and palpitations (occasional). Negative for chest pain, leg swelling and orthopnea.   Respiratory:  Negative for cough, shortness of breath (no SOB at rest) and wheezing.    Endocrine: Negative.    Hematologic/Lymphatic: Negative.    Skin: Negative.    Musculoskeletal: Negative.    Gastrointestinal:  Negative for bloating, abdominal pain, nausea and vomiting.   Genitourinary: " Negative.    Neurological:  Negative for dizziness and light-headedness.   Psychiatric/Behavioral: Negative.     All other systems reviewed and are negative.      There were no vitals filed for this visit.  There were no vitals filed for this visit.      There is no height or weight on file to calculate BMI.    Physical Exam:  Physical Exam  Vitals reviewed.   Constitutional:       General: She is not in acute distress.     Appearance: She is obese.   HENT:      Head: Normocephalic and atraumatic.      Mouth/Throat:      Mouth: Mucous membranes are moist.   Cardiovascular:      Rate and Rhythm: Normal rate and regular rhythm.      Heart sounds: Normal heart sounds, S1 normal and S2 normal. No murmur heard.  Pulmonary:      Effort: Pulmonary effort is normal. No respiratory distress.      Comments: Scant crackles at the bases    Abdominal:      General: Bowel sounds are normal. There is no distension.      Palpations: Abdomen is soft.   Musculoskeletal:         General: Normal range of motion.      Cervical back: Normal range of motion and neck supple.      Right lower leg: No edema.      Left lower leg: No edema.   Skin:     General: Skin is warm and dry.   Neurological:      Mental Status: She is alert and oriented to person, place, and time.   Psychiatric:         Mood and Affect: Mood normal.

## 2024-01-31 NOTE — PROGRESS NOTES
ADULT ANNUAL PHYSICAL  [facility] PRIMARY CARE    NAME: Gloria Mcdaniel  AGE: 58 y.o. SEX: female  : 1965     DATE: 2024     Assessment and Plan:   Referral made to orthopedics for her right shoulder pain.  Continue to follow-up with cardiology as scheduled.  Encouraged her to continue to cut back and hopefully quit smoking.  Follow-up in 6 months or as needed.  Problem List Items Addressed This Visit    None  Visit Diagnoses     Annual physical exam    -  Primary    Encounter for screening mammogram for breast cancer        Relevant Orders    Mammo screening bilateral w 3d & cad    Chronic right shoulder pain        Relevant Orders    Ambulatory Referral to Orthopedic Surgery          Immunizations and preventive care screenings were discussed with patient today. Appropriate education was printed on patient's after visit summary.    Counseling:  Dental Health: discussed importance of regular tooth brushing, flossing, and dental visits.  Exercise: the importance of regular exercise/physical activity was discussed. Recommend exercise 3-5 times per week for at least 30 minutes.       Depression Screening and Follow-up Plan: Patient was screened for depression during today's encounter. They screened negative with a PHQ-2 score of 0.    Tobacco Cessation Counseling: Tobacco cessation counseling was provided. The patient is sincerely urged to quit consumption of tobacco. She is not ready to quit tobacco.         Return in about 6 months (around 2024) for Recheck.     Chief Complaint:     Chief Complaint   Patient presents with   • Arm Pain     Pt c/o right arm/shoulder pain since November.    • Well Check      History of Present Illness:     Adult Annual Physical   Patient here for a comprehensive physical exam. The patient reports problems - R shoulder pain .    Diet and Physical Activity  Diet/Nutrition: well balanced diet.   Exercise: no formal exercise.       Depression Screening  PHQ-2/9 Depression Screening    Little interest or pleasure in doing things: 0 - not at all  Feeling down, depressed, or hopeless: 0 - not at all  PHQ-2 Score: 0  PHQ-2 Interpretation: Negative depression screen       General Health  Sleep: sleeps poorly.   Hearing: normal - bilateral.  Vision: wears glasses.   Dental: regular dental visits.       /GYN Health  S/P Hysterectomy         Review of Systems:     Review of Systems   Constitutional: Negative.    Respiratory: Negative.     Cardiovascular: Negative.    Gastrointestinal: Negative.    Genitourinary: Negative.    Musculoskeletal:  Positive for arthralgias (Right shoulder).      Past Medical History:     Past Medical History:   Diagnosis Date   • Acquired ichthyosis     last assessed: 5/9/2013   • Anesthesia     Pt reports severe acid reflux after getting anesthesia- sometimes presents as chest pain   • Anxiety    • Cervical radiculopathy     last assessed: 6/13/2014   • Colorectal polyps     last assessed: 3/9/2015   • COPD (chronic obstructive pulmonary disease) ([facility])    • Depression    • Diverticulosis of colon    • Foot pain    • GERD (gastroesophageal reflux disease)    • Heart disease 1997    Coronary artery disease   • Hepatitis C july 2018    Took meds no longer have   • Hyperlipemia    • Hypertension    • Lupus (HCC)    • Myocardial infarction ([facility])     at age 32   • Osteoarthritis unsure   • Osteopenia     last assessed: 12/6/2013   • Palpitations     last assessed: 12/31/2014   • PVD (peripheral vascular disease) ([facility])     last assessed: 12/3/2012   • RA (rheumatoid arthritis) ([facility])    • Rheumatoid arthritis ([facility]) unsure   • Scapular dyskinesis     last assessed: 11/17/2014   • Shortness of breath    • Stomach disorder Unsure   • Tendonitis of left rotator cuff     last assessed: 11/17/2014   • Vocal cord polyps     last assessed: 8/8/2014      Past Surgical History:     Past Surgical History:   Procedure Laterality Date   •  ABDOMINAL SURGERY      exploratory   • CARDIAC SURGERY      cardiac stent    •  SECTION      last assessed: 2014   • CHOLECYSTECTOMY      last assessed: 2014   • COLONOSCOPY  10/27/2014   • CORONARY ANGIOPLASTY WITH STENT PLACEMENT  1999    LAD stent   • DENTAL SURGERY      last assessed: 2014   • ELBOW SURGERY Bilateral     arthroplasty   • EPIDURAL BLOCK INJECTION N/A 2019    Procedure: C7 T1 Cervical Epidural Steroid Injection (90823);  Surgeon: Ricardo Lawson MD;  Location: [facility];  Service: Pain Management    • EPIDURAL BLOCK INJECTION Bilateral 2019    Procedure: BLOCK / INJECTION EPIDURAL STEROID CERVICAL - C7-T1;  Surgeon: Ricardo Lawson MD;  Location: [facility];  Service: Pain Management    • EPIDURAL BLOCK INJECTION Left 10/03/2019    Procedure: C7-T1 interlaminar epidural steroid injection;  Surgeon: Ricardo Lawson MD;  [facility]: [facility];  Service: Pain Management    • ESOPHAGOGASTRODUODENOSCOPY     • ESOPHAGOGASTRODUODENOSCOPY N/A 2016    Procedure: ESOPHAGOGASTRODUODENOSCOPY (EGD);  Surgeon: Alejandro Carvajal MD;  Location: [facility];  Service:    • FL GUIDED NEEDLE PLAC BX/ASP/INJ  2019   • FL GUIDED NEEDLE PLAC BX/ASP/INJ  2019   • FL GUIDED NEEDLE PLAC BX/ASP/INJ  10/03/2019   • FL INJECTION LEFT SHOULDER (ARTHROGRAM)  2018   • FOOT SURGERY Right 02/06/2015    x 4   • HYSTERECTOMY      last assessed: 2014   • MD ARTHROCENTESIS ASPIR&/INJ MAJOR JT/BURSA W/O US Bilateral 2019    Procedure: GREATER TROCHANTERIC HIP INJECTION;  Surgeon: Ricardo Lawson MD;  [facility]: [facility];  Service: Pain Management    • MD EXCISON TUMOR SOFT TISSUE THIGH/KNEE SUBQ 3 CM/> Right 2023    Procedure: EXCISION BIOPSY TISSUE LESION/MASS LOWER EXTREMITY;  Surgeon: Franky Bragg DO;  Location: [facility];  Service: Orthopedics   • MD SURGICAL ARTHROSCOPY SHOULDER BICEPS TENODESIS Left 10/15/2018    Procedure: ARTHROSCOPY  SHOULDER; Debridement of shoulder;  Surgeon: Francis Byrnes MD;  Location: [facility];  Service: Orthopedics   • OK SURGICAL ARTHROSCOPY SHOULDER W/ROTATOR CUFF RPR Left 07/23/2018    Procedure: ARTHROSCOPY SHOULDER, subacromial decompression, synovectomy;  Surgeon: Francis Byrnes MD;  [facility]: [facility];  Service: Orthopedics   • SHOULDER SURGERY  june and oct 2018   • THROAT SURGERY     • TOOTH EXTRACTION     • TRIGEMINAL NERVE DECOMPRESSION Right 06/15/2018    Procedure: FOOT NEUROPLASTY;  Surgeon: Hernan Resendiz DPM;  Location: [facility];  Service: Podiatry      Social History:     Social History     Socioeconomic History   • Marital status:      Spouse name: None   • Number of children: None   • Years of education: None   • Highest education level: None   Occupational History   • None   Tobacco Use   • Smoking status: Every Day     Current packs/day: 0.25     Average packs/day: 0.3 packs/day for 40.0 years (10.0 ttl pk-yrs)     Types: Cigarettes   • Smokeless tobacco: Never   • Tobacco comments:     She is trying to cut back on her own   Vaping Use   • Vaping status: Never Used   Substance and Sexual Activity   • Alcohol use: No     Comment: rare   • Drug use: No   • Sexual activity: Not Currently     Birth control/protection: Abstinence   Other Topics Concern   • None   Social History Narrative    No living will     Social Determinants of Health     Financial Resource Strain: Not on file   Food Insecurity: Not on file   Transportation Needs: Not on file   Physical Activity: Not on file   Stress: Not on file   Social Connections: Not on file   Intimate Partner Violence: Not on file   Housing Stability: Not on file      Family History:     Family History   Problem Relation Age of Onset   • No Known Problems Mother    • No Known Problems Father    • No Known Problems Maternal Grandmother    • No Known Problems Maternal Grandfather    • No Known Problems Paternal Grandmother    • No Known Problems  Paternal Grandfather    • Early death Sister    • Hypertension Sister    • No Known Problems Maternal Aunt    • No Known Problems Maternal Aunt    • No Known Problems Maternal Aunt    • No Known Problems Maternal Aunt       Current Medications:     Current Outpatient Medications   Medication Sig Dispense Refill   • albuterol (PROVENTIL HFA,VENTOLIN HFA) 90 mcg/act inhaler Inhale 2 puffs every 6 (six) hours as needed for wheezing 18 g 1   • alendronate (FOSAMAX) 70 mg tablet Take 1 tablet (70 mg total) by mouth every 7 days Take on an empty stomach with a full glass of water, and do not lie down for 30 minutes after (Patient taking differently: Take 70 mg by mouth every 7 days Take on an empty stomach with a full glass of water, and do not lie down for 30 minutes after) 12 tablet 3   • calcium carbonate (TUMS) 500 mg chewable tablet Chew 1 tablet if needed for indigestion or heartburn     • COLLAGEN PO Take by mouth     • cyclobenzaprine (FLEXERIL) 10 mg tablet Take 1 tablet (10 mg total) by mouth daily at bedtime (Patient taking differently: Take 10 mg by mouth daily at bedtime as needed) 30 tablet 6   • diphenhydrAMINE-acetaminophen (TYLENOL PM)  MG TABS Take 1 tablet by mouth daily at bedtime as needed for sleep     • ezetimibe (ZETIA) 10 mg tablet take 1 tablet by mouth daily 90 tablet 3   • hydroxychloroquine (PLAQUENIL) 200 mg tablet Take 1 tablet (200 mg total) by mouth daily 90 tablet 1   • LACTASE ENZYME PO Take 3 tablets by mouth if needed     • nitroglycerin (NITROSTAT) 0.4 mg SL tablet Place 1 tablet (0.4 mg total) under the tongue every 5 (five) minutes as needed for chest pain (Patient taking differently: Place 0.4 mg under the tongue every 5 (five) minutes as needed for chest pain) 20 tablet 3   • Probiotic Product (PROBIOTIC DAILY PO) Take 2 capsules by mouth in the morning Probiotic-prebiotic     • rosuvastatin (CRESTOR) 5 mg tablet take 1 tablet by mouth once daily 90 tablet 3   • VITAMIN D PO  Take 2 tablets by mouth in the morning       No current facility-administered medications for this visit.      Allergies:     Allergies   Allergen Reactions   • Ceclor [Cefaclor] Anaphylaxis   • Cephalexin    • Codeine Itching     Reaction Date: 09Jun2011;    • Gabapentin      Body tremors, sweats   • Lyrica [Pregabalin]      Body tremors, chills, heaviness in chest   • Ticlopidine Hives     Other reaction(s): Hives   • Penicillins Rash     Other reaction(s): Other      Physical Exam:     /82   Pulse 74   Temp (!) 97.1 °F (36.2 °C)   Ht 5' (1.524 m)   Wt 49.6 kg (109 lb 6.4 oz)   SpO2 97%   BMI 21.37 kg/m²     Physical Exam  Vitals reviewed.   Constitutional:       General: She is not in acute distress.     Appearance: Normal appearance. She is well-developed. She is not diaphoretic.   HENT:      Head: Normocephalic and atraumatic.   Eyes:      Conjunctiva/sclera: Conjunctivae normal.   Cardiovascular:      Rate and Rhythm: Normal rate and regular rhythm.      Heart sounds: Normal heart sounds. No murmur heard.     No friction rub. No gallop.   Pulmonary:      Effort: Pulmonary effort is normal. No respiratory distress.      Breath sounds: Normal breath sounds. No wheezing, rhonchi or rales.   Musculoskeletal:         General: Tenderness (Right shoulder) present.      Right lower leg: No edema.      Left lower leg: No edema.   Neurological:      General: No focal deficit present.      Mental Status: She is alert and oriented to person, place, and time.   Psychiatric:         Mood and Affect: Mood normal.         Behavior: Behavior normal.         Thought Content: Thought content normal.         Judgment: Judgment normal.          Lee Miller [facility] PRIMARY CARE

## 2024-02-02 ENCOUNTER — OFFICE VISIT (OUTPATIENT)
Dept: OBGYN CLINIC | Facility: CLINIC | Age: 59
End: 2024-02-02
Payer: COMMERCIAL

## 2024-02-02 ENCOUNTER — APPOINTMENT (OUTPATIENT)
Dept: RADIOLOGY | Facility: MEDICAL CENTER | Age: 59
End: 2024-02-02
Payer: COMMERCIAL

## 2024-02-02 VITALS
RESPIRATION RATE: 16 BRPM | HEIGHT: 60 IN | WEIGHT: 109.4 LBS | SYSTOLIC BLOOD PRESSURE: 126 MMHG | HEART RATE: 94 BPM | BODY MASS INDEX: 21.48 KG/M2 | DIASTOLIC BLOOD PRESSURE: 86 MMHG

## 2024-02-02 DIAGNOSIS — M75.31 CALCIFIC TENDONITIS OF RIGHT SHOULDER: Primary | ICD-10-CM

## 2024-02-02 PROCEDURE — 99214 OFFICE O/P EST MOD 30 MIN: CPT | Performed by: FAMILY MEDICINE

## 2024-02-02 PROCEDURE — 20610 DRAIN/INJ JOINT/BURSA W/O US: CPT | Performed by: FAMILY MEDICINE

## 2024-02-02 PROCEDURE — 73030 X-RAY EXAM OF SHOULDER: CPT

## 2024-02-02 RX ORDER — INDOMETHACIN 50 MG/1
CAPSULE ORAL
COMMUNITY
Start: 2024-01-26

## 2024-02-02 RX ORDER — TRIAMCINOLONE ACETONIDE 40 MG/ML
40 INJECTION, SUSPENSION INTRA-ARTICULAR; INTRAMUSCULAR
Status: COMPLETED | OUTPATIENT
Start: 2024-02-02 | End: 2024-02-02

## 2024-02-02 RX ORDER — SACCHAROMYCES BOULARDII 250 MG
250 CAPSULE ORAL DAILY
COMMUNITY

## 2024-02-02 RX ADMIN — TRIAMCINOLONE ACETONIDE 40 MG: 40 INJECTION, SUSPENSION INTRA-ARTICULAR; INTRAMUSCULAR at 14:00

## 2024-02-02 NOTE — PROGRESS NOTES
Subjective:  Chief Complaint   Patient presents with    Right Shoulder - Pain       Gloria Mcdaniel is a 58 y.o. female complains of right shoulder pain. Onset of the symptoms was several months ago.  Mechanism of injury:  none . Aggravating factors:  shoulder motion . Treatment to date: rest. Symptoms have progressed to a point and plateaued.    The following portions of the patient's history were reviewed and updated as appropriate:   Medical history  Family history  Medication   PSH  Allergies      Occupation:         Objective:  /86   Pulse 94   Resp 16   Ht 5' (1.524 m)   Wt 49.6 kg (109 lb 6.4 oz)   BMI 21.37 kg/m²     Skin: no rashes, lesions, skin discolorations, lacerations  Vasculature: normal radial and ulnar pulse,  normal skin color, normal capillary refill in extremity, no upper extremity edema  Neurologic: Neurologic exam is normal throughout upper extremities, Awake, alert, and oriented x3, no apparent distress.   Musculoskeletal:   right SHOULDER EXAM    General: no gross deformity, no skin changes redness or warmth noted, no sign of infection    ROM:   FF (180): 120, PROm 160  ER (90): 90  IR : sacrum      Supraspinatus strength testingpain limited  Infraspinatus strength testing:pain limited  Subscapularis        Empty can: positive  Contreras: +  Neers +  Speed -  Biceps TTP: positive      [place]'s test:negative  Scapular posturing: good    Tenderness to palpation of AC joint: negative  Pain with cross-body adduction: negative        Imaging:    No results found.     Assessment/Plan:  1. Calcific tendonitis of right shoulder    Xray revealed calcific deposit greater tuberosity fo right shoulder  We discussed the nature of calcifici tendonitis at length and detailed the treatment approach.  Directed to ice the area daily for 20 minutes at a time using a barrier to protect the skin- stressed specifically icing after activity to address inflammation  Follow up in 3 weeks. Should  sx's worsen or any concerns arise, they were advised to follow up sooner or seek more immediate medical attention.  CSI performed in office without complciation         - Ambulatory Referral to Orthopedic Surgery  - XR shoulder 2+ vw right  - Large joint arthrocentesis: R subacromial bursa

## 2024-02-02 NOTE — PROGRESS NOTES
Large joint arthrocentesis: R subacromial bursa  Universal Protocol:  Consent: Verbal consent obtained.  Risks and benefits: risks, benefits and alternatives were discussed  Consent given by: patient  Supporting Documentation  Indications: pain   Procedure Details  Location: shoulder - R subacromial bursa  Preparation: Patient was prepped and draped in the usual sterile fashion  Needle size: 22 G  Ultrasound guidance: no  Approach: posterior  Medications administered: 40 mg triamcinolone acetonide 40 mg/mL (4 ml ropivicaine)    Patient tolerance: patient tolerated the procedure well with no immediate complications    Risks and benefits of CSI were discussed with patient extensively. Risks were highlighted which included but were not limited to infection, pain, local site swelling, and chance that injection may not be effective. Patient was also counseled regarding glucose elevation days after receiving CSI and to be mindful of diet and check sugars daily. Patient agreeable to proceed with CSI after counseling.

## 2024-02-09 ENCOUNTER — OFFICE VISIT (OUTPATIENT)
Dept: CARDIOLOGY CLINIC | Facility: HOSPITAL | Age: 59
End: 2024-02-09
Payer: COMMERCIAL

## 2024-02-09 VITALS
DIASTOLIC BLOOD PRESSURE: 60 MMHG | SYSTOLIC BLOOD PRESSURE: 100 MMHG | OXYGEN SATURATION: 98 % | HEART RATE: 96 BPM | HEIGHT: 60 IN | WEIGHT: 107 LBS | BODY MASS INDEX: 21.01 KG/M2

## 2024-02-09 DIAGNOSIS — Z72.0 TOBACCO ABUSE: ICD-10-CM

## 2024-02-09 DIAGNOSIS — E78.49 OTHER HYPERLIPIDEMIA: ICD-10-CM

## 2024-02-09 DIAGNOSIS — I25.10 CAD IN NATIVE ARTERY: ICD-10-CM

## 2024-02-09 DIAGNOSIS — E78.5 HYPERLIPIDEMIA, UNSPECIFIED HYPERLIPIDEMIA TYPE: ICD-10-CM

## 2024-02-09 DIAGNOSIS — I25.10 CAD IN NATIVE ARTERY: Primary | ICD-10-CM

## 2024-02-09 DIAGNOSIS — I10 ESSENTIAL HYPERTENSION: ICD-10-CM

## 2024-02-09 PROCEDURE — 99214 OFFICE O/P EST MOD 30 MIN: CPT | Performed by: NURSE PRACTITIONER

## 2024-02-09 RX ORDER — EZETIMIBE 10 MG/1
10 TABLET ORAL DAILY
Qty: 90 TABLET | Refills: 3 | Status: SHIPPED | OUTPATIENT
Start: 2024-02-09

## 2024-02-09 RX ORDER — ROSUVASTATIN CALCIUM 5 MG/1
5 TABLET, COATED ORAL DAILY
Qty: 90 TABLET | Refills: 3 | Status: SHIPPED | OUTPATIENT
Start: 2024-02-09

## 2024-03-13 ENCOUNTER — OFFICE VISIT (OUTPATIENT)
Dept: OBGYN CLINIC | Facility: CLINIC | Age: 59
End: 2024-03-13
Payer: COMMERCIAL

## 2024-03-13 VITALS
BODY MASS INDEX: 20.65 KG/M2 | HEIGHT: 60 IN | HEART RATE: 76 BPM | DIASTOLIC BLOOD PRESSURE: 80 MMHG | RESPIRATION RATE: 16 BRPM | WEIGHT: 105.2 LBS | SYSTOLIC BLOOD PRESSURE: 119 MMHG

## 2024-03-13 DIAGNOSIS — M75.31 CALCIFIC TENDONITIS OF RIGHT SHOULDER: Primary | ICD-10-CM

## 2024-03-13 PROCEDURE — 99213 OFFICE O/P EST LOW 20 MIN: CPT | Performed by: FAMILY MEDICINE

## 2024-03-13 NOTE — PROGRESS NOTES
Subjective:  Chief Complaint   Patient presents with    Right Shoulder - Follow-up       Gloria Mcdaniel is a 58 y.o. female complains of right shoulder pain. Onset of the symptoms was several months ago.  Mechanism of injury:  none . Aggravating factors:  shoulder motion . Treatment to date: corticosteroid injection which was effective and rest. Symptoms have essentially resolved. Still has some motion restriction with reaching behind her back.     The following portions of the patient's history were reviewed and updated as appropriate:   Medical history  Family history  Medication   PSH  Allergies      Occupation:         Objective:  /80   Pulse 76   Resp 16   Ht 5' (1.524 m)   Wt 47.7 kg (105 lb 3.2 oz)   BMI 20.55 kg/m²     Skin: no rashes, lesions, skin discolorations, lacerations  Vasculature: normal radial and ulnar pulse,  normal skin color, normal capillary refill in extremity, no upper extremity edema  Neurologic: Neurologic exam is normal throughout upper extremities, Awake, alert, and oriented x3, no apparent distress.   Musculoskeletal:   right SHOULDER EXAM    General: no gross deformity, no skin changes redness or warmth noted, no sign of infection    ROM:   FF (180): 180  ER (90): 90  IR : lumbar      Supraspinatus strength testin/5  Infraspinatus strength testing:pain4/5  Subscapularis        Empty can: positive  Contreras: +  Neers +  Speed -  Biceps TTP: positive      [place]'s test:negative  Scapular posturing: good    Tenderness to palpation of AC joint: negative  Pain with cross-body adduction: negative        Imaging:    No results found.     Assessment/Plan:  1. Calcific tendonitis of right shoulder    58-year-old female patient presenting for follow-up visit in regards to right shoulder pain.  Patient has reported subjective improvement in regards to her right shoulder pain symptoms.  Her examination also reveals improvement in regards to range of motion although her  strength evaluation still reveals some mild weakness.  She does have reproduction of pain with impingement testing.  I advised that she continue with a home exercise program to help prevent recurrence of the symptoms.  She will contact me if symptoms do recur.

## 2024-04-25 ENCOUNTER — APPOINTMENT (OUTPATIENT)
Dept: LAB | Facility: HOSPITAL | Age: 59
End: 2024-04-25
Payer: COMMERCIAL

## 2024-04-25 ENCOUNTER — OFFICE VISIT (OUTPATIENT)
Dept: FAMILY MEDICINE CLINIC | Facility: CLINIC | Age: 59
End: 2024-04-25
Payer: COMMERCIAL

## 2024-04-25 ENCOUNTER — HOSPITAL ENCOUNTER (OUTPATIENT)
Dept: CT IMAGING | Facility: HOSPITAL | Age: 59
Discharge: HOME | End: 2024-04-25
Payer: COMMERCIAL

## 2024-04-25 VITALS
HEART RATE: 96 BPM | OXYGEN SATURATION: 97 % | SYSTOLIC BLOOD PRESSURE: 122 MMHG | WEIGHT: 102.2 LBS | HEIGHT: 60 IN | BODY MASS INDEX: 20.07 KG/M2 | DIASTOLIC BLOOD PRESSURE: 82 MMHG | TEMPERATURE: 98 F

## 2024-04-25 DIAGNOSIS — R19.5 LOOSE STOOLS: ICD-10-CM

## 2024-04-25 DIAGNOSIS — Z12.4 SCREENING FOR CERVICAL CANCER: ICD-10-CM

## 2024-04-25 DIAGNOSIS — R10.84 GENERALIZED ABDOMINAL PAIN: Primary | ICD-10-CM

## 2024-04-25 DIAGNOSIS — K21.9 GASTROESOPHAGEAL REFLUX DISEASE, UNSPECIFIED WHETHER ESOPHAGITIS PRESENT: ICD-10-CM

## 2024-04-25 DIAGNOSIS — R10.84 GENERALIZED ABDOMINAL PAIN: ICD-10-CM

## 2024-04-25 DIAGNOSIS — E87.6 HYPOKALEMIA: Primary | ICD-10-CM

## 2024-04-25 LAB
ALBUMIN SERPL BCP-MCNC: 4.6 G/DL (ref 3.5–5)
ALP SERPL-CCNC: 41 U/L (ref 34–104)
ALT SERPL W P-5'-P-CCNC: 9 U/L (ref 7–52)
AMYLASE SERPL-CCNC: 28 IU/L (ref 29–103)
ANION GAP SERPL CALCULATED.3IONS-SCNC: 8 MMOL/L (ref 4–13)
AST SERPL W P-5'-P-CCNC: 16 U/L (ref 13–39)
BACTERIA UR QL AUTO: NORMAL /HPF
BASOPHILS # BLD AUTO: 0.05 THOUSANDS/ÂΜL (ref 0–0.1)
BASOPHILS NFR BLD AUTO: 1 % (ref 0–1)
BILIRUB SERPL-MCNC: 0.57 MG/DL (ref 0.2–1)
BILIRUB UR QL STRIP: NEGATIVE
BUN SERPL-MCNC: 4 MG/DL (ref 5–25)
CALCIUM SERPL-MCNC: 9.5 MG/DL (ref 8.4–10.2)
CHLORIDE SERPL-SCNC: 98 MMOL/L (ref 96–108)
CLARITY UR: CLEAR
CO2 SERPL-SCNC: 30 MMOL/L (ref 21–32)
COLOR UR: ABNORMAL
CREAT SERPL-MCNC: 0.55 MG/DL (ref 0.6–1.3)
EOSINOPHIL # BLD AUTO: 0.05 THOUSAND/ÂΜL (ref 0–0.61)
EOSINOPHIL NFR BLD AUTO: 1 % (ref 0–6)
ERYTHROCYTE [DISTWIDTH] IN BLOOD BY AUTOMATED COUNT: 12.2 % (ref 11.6–15.1)
GFR SERPL CREATININE-BSD FRML MDRD: 103 ML/MIN/1.73SQ M
GLUCOSE P FAST SERPL-MCNC: 102 MG/DL (ref 65–99)
GLUCOSE UR STRIP-MCNC: NEGATIVE MG/DL
HCT VFR BLD AUTO: 44.3 % (ref 34.8–46.1)
HGB BLD-MCNC: 14.4 G/DL (ref 11.5–15.4)
HGB UR QL STRIP.AUTO: ABNORMAL
IMM GRANULOCYTES # BLD AUTO: 0.03 THOUSAND/UL (ref 0–0.2)
IMM GRANULOCYTES NFR BLD AUTO: 0 % (ref 0–2)
KETONES UR STRIP-MCNC: NEGATIVE MG/DL
LEUKOCYTE ESTERASE UR QL STRIP: NEGATIVE
LIPASE SERPL-CCNC: 11 U/L (ref 11–82)
LYMPHOCYTES # BLD AUTO: 2.99 THOUSANDS/ÂΜL (ref 0.6–4.47)
LYMPHOCYTES NFR BLD AUTO: 38 % (ref 14–44)
MCH RBC QN AUTO: 32.5 PG (ref 26.8–34.3)
MCHC RBC AUTO-ENTMCNC: 32.5 G/DL (ref 31.4–37.4)
MCV RBC AUTO: 100 FL (ref 82–98)
MONOCYTES # BLD AUTO: 0.61 THOUSAND/ÂΜL (ref 0.17–1.22)
MONOCYTES NFR BLD AUTO: 8 % (ref 4–12)
NEUTROPHILS # BLD AUTO: 4.12 THOUSANDS/ÂΜL (ref 1.85–7.62)
NEUTS SEG NFR BLD AUTO: 52 % (ref 43–75)
NITRITE UR QL STRIP: NEGATIVE
NON-SQ EPI CELLS URNS QL MICRO: NORMAL /HPF
NRBC BLD AUTO-RTO: 0 /100 WBCS
PH UR STRIP.AUTO: 6 [PH]
PLATELET # BLD AUTO: 244 THOUSANDS/UL (ref 149–390)
PMV BLD AUTO: 9.4 FL (ref 8.9–12.7)
POTASSIUM SERPL-SCNC: 3.2 MMOL/L (ref 3.5–5.3)
PROT SERPL-MCNC: 6.7 G/DL (ref 6.4–8.4)
PROT UR STRIP-MCNC: NEGATIVE MG/DL
RBC # BLD AUTO: 4.43 MILLION/UL (ref 3.81–5.12)
RBC #/AREA URNS AUTO: NORMAL /HPF
SODIUM SERPL-SCNC: 136 MMOL/L (ref 135–147)
SP GR UR STRIP.AUTO: <1.005 (ref 1–1.03)
UROBILINOGEN UR STRIP-ACNC: <2 MG/DL
WBC # BLD AUTO: 7.85 THOUSAND/UL (ref 4.31–10.16)
WBC #/AREA URNS AUTO: NORMAL /HPF

## 2024-04-25 PROCEDURE — 80053 COMPREHEN METABOLIC PANEL: CPT | Performed by: FAMILY MEDICINE

## 2024-04-25 PROCEDURE — 82150 ASSAY OF AMYLASE: CPT | Performed by: FAMILY MEDICINE

## 2024-04-25 PROCEDURE — 85025 COMPLETE CBC W/AUTO DIFF WBC: CPT | Performed by: FAMILY MEDICINE

## 2024-04-25 PROCEDURE — 87086 URINE CULTURE/COLONY COUNT: CPT | Performed by: FAMILY MEDICINE

## 2024-04-25 PROCEDURE — 99214 OFFICE O/P EST MOD 30 MIN: CPT | Performed by: FAMILY MEDICINE

## 2024-04-25 PROCEDURE — 74177 CT ABD & PELVIS W/CONTRAST: CPT

## 2024-04-25 PROCEDURE — 36415 COLL VENOUS BLD VENIPUNCTURE: CPT | Performed by: FAMILY MEDICINE

## 2024-04-25 PROCEDURE — 81001 URINALYSIS AUTO W/SCOPE: CPT | Performed by: FAMILY MEDICINE

## 2024-04-25 PROCEDURE — 83690 ASSAY OF LIPASE: CPT | Performed by: FAMILY MEDICINE

## 2024-04-25 RX ORDER — POTASSIUM CHLORIDE 750 MG/1
10 CAPSULE, EXTENDED RELEASE ORAL DAILY
Qty: 7 CAPSULE | Refills: 0 | Status: SHIPPED | OUTPATIENT
Start: 2024-04-25 | End: 2024-05-02

## 2024-04-25 RX ORDER — OMEPRAZOLE 20 MG/1
20 CAPSULE, DELAYED RELEASE ORAL
Qty: 30 CAPSULE | Refills: 1 | Status: SHIPPED | OUTPATIENT
Start: 2024-04-25 | End: 2024-10-22

## 2024-04-25 RX ORDER — OXYCODONE HYDROCHLORIDE AND ACETAMINOPHEN 5; 325 MG/1; MG/1
1 TABLET ORAL EVERY 8 HOURS PRN
COMMUNITY

## 2024-04-25 RX ADMIN — IOHEXOL 100 ML: 350 INJECTION, SOLUTION INTRAVENOUS at 17:45

## 2024-04-25 NOTE — PROGRESS NOTES
Name: Gloria Mcdaniel      : 1965      MRN: [id number]  Encounter Provider: Lee Miller DO  Encounter Date: 2024   Encounter department: [facility]    Assessment & Plan   I will get a stat CAT scan of her abdomen and pelvis.  Lab work ordered.  Rx for omeprazole for her to start.  I will call her with the results and make further recommendations at that time.  1. Generalized abdominal pain  -     CBC and differential  -     Comprehensive metabolic panel  -     Amylase  -     Lipase  -     UA (URINE) with reflex to Scope  -     Urine culture  -     omeprazole (PriLOSEC) 20 mg delayed release capsule; Take 1 capsule (20 mg total) by mouth daily before breakfast  -     CT abdomen pelvis w contrast; Future; Expected date: 2024    2. Screening for cervical cancer  -     Ambulatory Referral to Obstetrics / Gynecology; Future    3. Gastroesophageal reflux disease, unspecified whether esophagitis present  -     CBC and differential  -     Comprehensive metabolic panel  -     Amylase  -     Lipase  -     UA (URINE) with reflex to Scope  -     Urine culture  -     omeprazole (PriLOSEC) 20 mg delayed release capsule; Take 1 capsule (20 mg total) by mouth daily before breakfast  -     CT abdomen pelvis w contrast; Future; Expected date: 2024    4. Loose stools  -     CBC and differential  -     Comprehensive metabolic panel  -     Amylase  -     Lipase  -     UA (URINE) with reflex to Scope  -     Urine culture  -     omeprazole (PriLOSEC) 20 mg delayed release capsule; Take 1 capsule (20 mg total) by mouth daily before breakfast  -     CT abdomen pelvis w contrast; Future; Expected date: 2024        Depression Screening and Follow-up Plan: Patient was screened for depression during today's encounter. They screened negative with a PHQ-2 score of 0.        Subjective     Patient here today stating for the past week and a half to have abdominal pain.  Started on the left lower  quadrant.  Pain is constant.  Over the last couple days it has radiated into the epigastric and right upper quadrant.  Eating seems to make the pain worse.  No nausea or vomiting.  She has been having liquid stool.  Last time she had a bowel movement was Tuesday.  Seems to get worse after bowel movement.  No fever or chills.  States was on vacation and forgot to take her probiotic.  She restarted it a couple days ago, but symptoms have not improved.      Review of Systems   Constitutional: Negative.    Respiratory: Negative.     Cardiovascular: Negative.    Gastrointestinal:  Positive for abdominal pain.   Genitourinary: Negative.        Past Medical History:   Diagnosis Date   • Acquired ichthyosis     last assessed: 5/9/2013   • Anesthesia     Pt reports severe acid reflux after getting anesthesia- sometimes presents as chest pain   • Anxiety    • Cervical radiculopathy     last assessed: 6/13/2014   • Colorectal polyps     last assessed: 3/9/2015   • COPD (chronic obstructive pulmonary disease) (Formerly [facility])    • Depression    • Diverticulosis of colon    • Foot pain    • GERD (gastroesophageal reflux disease)    • Heart disease 1997    Coronary artery disease   • Hepatitis C july 2018    Took meds no longer have   • Hyperlipemia    • Hypertension    • Lupus (HCC)    • Myocardial infarction (HCC)     at age 32   • Osteoarthritis unsure   • Osteopenia     last assessed: 12/6/2013   • Palpitations     last assessed: 12/31/2014   • PVD (peripheral vascular disease) ([facility])     last assessed: 12/3/2012   • RA (rheumatoid arthritis) ([facility])    • Rheumatoid arthritis (HCC) unsure   • Scapular dyskinesis     last assessed: 11/17/2014   • Shortness of breath    • Stomach disorder Unsure   • Tendonitis of left rotator cuff     last assessed: 11/17/2014   • Vocal cord polyps     last assessed: 8/8/2014     Past Surgical History:   Procedure Laterality Date   • ABDOMINAL SURGERY      exploratory   • CARDIAC SURGERY      cardiac stent     •  SECTION      last assessed: 2014   • CHOLECYSTECTOMY      last assessed: 2014   • COLONOSCOPY  10/27/2014   • CORONARY ANGIOPLASTY WITH STENT PLACEMENT  1999    LAD stent   • DENTAL SURGERY      last assessed: 2014   • ELBOW SURGERY Bilateral     arthroplasty   • EPIDURAL BLOCK INJECTION N/A 2019    Procedure: C7 T1 Cervical Epidural Steroid Injection (51620);  Surgeon: Ricardo Lawson MD;  Location: [facility];  Service: Pain Management    • EPIDURAL BLOCK INJECTION Bilateral 2019    Procedure: BLOCK / INJECTION EPIDURAL STEROID CERVICAL - C7-T1;  Surgeon: Ricardo Lawson MD;  Location: [facility];  Service: Pain Management    • EPIDURAL BLOCK INJECTION Left 10/03/2019    Procedure: C7-T1 interlaminar epidural steroid injection;  Surgeon: Ricardo Lawson MD;  Location: [facility];  Service: Pain Management    • ESOPHAGOGASTRODUODENOSCOPY     • ESOPHAGOGASTRODUODENOSCOPY N/A 2016    Procedure: ESOPHAGOGASTRODUODENOSCOPY (EGD);  Surgeon: Alejandro Carvajal MD;  Location: [facility];  Service:    • FL GUIDED NEEDLE PLAC BX/ASP/INJ  2019   • FL GUIDED NEEDLE PLAC BX/ASP/INJ  2019   • FL GUIDED NEEDLE PLAC BX/ASP/INJ  10/03/2019   • FL INJECTION LEFT SHOULDER (ARTHROGRAM)  2018   • FOOT SURGERY Right 02/06/2015    x 4   • HYSTERECTOMY      last assessed: 2014   • NC ARTHROCENTESIS ASPIR&/INJ MAJOR JT/BURSA W/O US Bilateral 2019    Procedure: GREATER TROCHANTERIC HIP INJECTION;  Surgeon: Ricardo Lawson MD;  Location: [facility];  Service: Pain Management    • NC EXCISON TUMOR SOFT TISSUE THIGH/KNEE SUBQ 3 CM/> Right 2023    Procedure: EXCISION BIOPSY TISSUE LESION/MASS LOWER EXTREMITY;  Surgeon: Franky Bragg DO;  Location: [facility];  Service: Orthopedics   • NC SURGICAL ARTHROSCOPY SHOULDER BICEPS TENODESIS Left 10/15/2018    Procedure: ARTHROSCOPY SHOULDER; Debridement of shoulder;  Surgeon: Francis Byrnes MD;  Location: [facility];  Service: Orthopedics   • LA SURGICAL ARTHROSCOPY SHOULDER W/ROTATOR CUFF RPR Left 07/23/2018    Procedure: ARTHROSCOPY SHOULDER, subacromial decompression, synovectomy;  Surgeon: Francis Byrnes MD;  Location: [facility];  Service: Orthopedics   • SHOULDER SURGERY  june and oct 2018   • THROAT SURGERY     • TOOTH EXTRACTION     • TRIGEMINAL NERVE DECOMPRESSION Right 06/15/2018    Procedure: FOOT NEUROPLASTY;  Surgeon: Hernan Resendiz DPM;  Location: [facility];  Service: Podiatry     Family History   Problem Relation Age of Onset   • No Known Problems Mother    • No Known Problems Father    • No Known Problems Maternal Grandmother    • No Known Problems Maternal Grandfather    • No Known Problems Paternal Grandmother    • No Known Problems Paternal Grandfather    • Early death Sister    • Hypertension Sister    • No Known Problems Maternal Aunt    • No Known Problems Maternal Aunt    • No Known Problems Maternal Aunt    • No Known Problems Maternal Aunt      Social History     Socioeconomic History   • Marital status:      Spouse name: None   • Number of children: None   • Years of education: None   • Highest education level: None   Occupational History   • None   Tobacco Use   • Smoking status: Every Day     Current packs/day: 0.25     Average packs/day: 0.3 packs/day for 40.0 years (10.0 ttl pk-yrs)     Types: Cigarettes   • Smokeless tobacco: Never   • Tobacco comments:     She is trying to cut back on her own   Vaping Use   • Vaping status: Never Used   Substance and Sexual Activity   • Alcohol use: No     Comment: rare   • Drug use: No   • Sexual activity: Not Currently     Birth control/protection: Abstinence   Other Topics Concern   • None   Social History Narrative    No living will     Social Determinants of Health     Financial Resource Strain: Not on file   Food Insecurity: Not on file   Transportation Needs: Not on file   Physical Activity: Not on file   Stress: Not on file   Social  Connections: Not on file   Intimate Partner Violence: Not on file   Housing Stability: Not on file     Current Outpatient Medications on File Prior to Visit   Medication Sig   • albuterol (PROVENTIL HFA,VENTOLIN HFA) 90 mcg/act inhaler Inhale 2 puffs every 6 (six) hours as needed for wheezing   • calcium carbonate (TUMS) 500 mg chewable tablet Chew 1 tablet if needed for indigestion or heartburn   • cyclobenzaprine (FLEXERIL) 10 mg tablet Take 1 tablet (10 mg total) by mouth daily at bedtime (Patient taking differently: Take 10 mg by mouth daily at bedtime as needed)   • diphenhydrAMINE-acetaminophen (TYLENOL PM)  MG TABS Take 1 tablet by mouth daily at bedtime as needed for sleep   • ezetimibe (ZETIA) 10 mg tablet Take 1 tablet (10 mg total) by mouth daily   • hydroxychloroquine (PLAQUENIL) 200 mg tablet Take 1 tablet (200 mg total) by mouth daily   • LACTASE ENZYME PO Take 3 tablets by mouth if needed As needed   • nitroglycerin (NITROSTAT) 0.4 mg SL tablet Place 1 tablet (0.4 mg total) under the tongue every 5 (five) minutes as needed for chest pain   • oxyCODONE-acetaminophen (PERCOCET) 5-325 mg per tablet Take 1 tablet by mouth every 8 (eight) hours as needed for moderate pain   • Probiotic Product (PROBIOTIC DAILY PO) Take 2 capsules by mouth in the morning Probiotic-prebiotic   • rosuvastatin (CRESTOR) 5 mg tablet Take 1 tablet (5 mg total) by mouth daily   • saccharomyces boulardii (Florastor) 250 mg capsule Take 250 mg by mouth in the morning   • Vitamin D-Vitamin K (K2-D3 5000 PO) Take by mouth daily   • [DISCONTINUED] alendronate (FOSAMAX) 70 mg tablet Take 1 tablet (70 mg total) by mouth every 7 days Take on an empty stomach with a full glass of water, and do not lie down for 30 minutes after (Patient not taking: Reported on 4/25/2024)   • [DISCONTINUED] COLLAGEN PO Take by mouth Once a week   • [DISCONTINUED] indomethacin (INDOCIN) 50 mg capsule take 2 capsules by mouth once daily for up to 5 days  if needed (Patient not taking: Reported on 2/2/2024)   • [DISCONTINUED] VITAMIN D PO Take 2 tablets by mouth in the morning (Patient not taking: Reported on 4/25/2024)     Allergies   Allergen Reactions   • Ceclor [Cefaclor] Anaphylaxis   • Cephalexin    • Codeine Itching     Reaction Date: 09Jun2011;    • Gabapentin      Body tremors, sweats   • Lyrica [Pregabalin]      Body tremors, chills, heaviness in chest   • Ticlopidine Hives     Other reaction(s): Hives   • Penicillins Rash     Other reaction(s): Other     Immunization History   Administered Date(s) Administered   • Tdap 10/11/2018       Objective     /82   Pulse 96   Temp 98 °F (36.7 °C)   Ht 5' (1.524 m)   Wt 46.4 kg (102 lb 3.2 oz)   SpO2 97%   BMI 19.96 kg/m²     Physical Exam  Vitals reviewed.   Constitutional:       General: She is not in acute distress.     Appearance: Normal appearance. She is well-developed. She is not ill-appearing, toxic-appearing or diaphoretic.   HENT:      Head: Normocephalic and atraumatic.   Eyes:      Conjunctiva/sclera: Conjunctivae normal.   Cardiovascular:      Rate and Rhythm: Normal rate and regular rhythm.      Heart sounds: Normal heart sounds. No murmur heard.     No friction rub. No gallop.   Pulmonary:      Effort: Pulmonary effort is normal. No respiratory distress.      Breath sounds: Normal breath sounds. No wheezing, rhonchi or rales.   Abdominal:      Palpations: There is no mass.      Tenderness: There is abdominal tenderness (Right upper quadrant, epigastric and left lower quadrant tenderness).   Musculoskeletal:      Right lower leg: No edema.      Left lower leg: No edema.   Neurological:      General: No focal deficit present.      Mental Status: She is alert and oriented to person, place, and time.   Psychiatric:         Mood and Affect: Mood normal.         Behavior: Behavior normal.         Thought Content: Thought content normal.         Judgment: Judgment normal.       Lee Miller  DO

## 2024-04-26 DIAGNOSIS — R10.84 GENERALIZED ABDOMINAL PAIN: Primary | ICD-10-CM

## 2024-04-26 DIAGNOSIS — K21.9 GASTROESOPHAGEAL REFLUX DISEASE, UNSPECIFIED WHETHER ESOPHAGITIS PRESENT: ICD-10-CM

## 2024-04-26 DIAGNOSIS — E87.6 HYPOKALEMIA: ICD-10-CM

## 2024-04-26 LAB — BACTERIA UR CULT: NORMAL

## 2024-04-28 RX ORDER — POTASSIUM CHLORIDE 750 MG/1
CAPSULE, EXTENDED RELEASE ORAL
Qty: 7 CAPSULE | Refills: 0 | OUTPATIENT
Start: 2024-04-28

## 2024-04-29 ENCOUNTER — CONSULT (OUTPATIENT)
Dept: GASTROENTEROLOGY | Facility: CLINIC | Age: 59
End: 2024-04-29
Payer: COMMERCIAL

## 2024-04-29 VITALS
BODY MASS INDEX: 19.91 KG/M2 | OXYGEN SATURATION: 95 % | WEIGHT: 101.4 LBS | HEIGHT: 60 IN | RESPIRATION RATE: 16 BRPM | SYSTOLIC BLOOD PRESSURE: 124 MMHG | TEMPERATURE: 97.3 F | DIASTOLIC BLOOD PRESSURE: 81 MMHG | HEART RATE: 105 BPM

## 2024-04-29 DIAGNOSIS — R10.10 PAIN OF UPPER ABDOMEN: Primary | ICD-10-CM

## 2024-04-29 DIAGNOSIS — K21.9 GASTROESOPHAGEAL REFLUX DISEASE, UNSPECIFIED WHETHER ESOPHAGITIS PRESENT: ICD-10-CM

## 2024-04-29 DIAGNOSIS — R63.4 ABNORMAL WEIGHT LOSS: ICD-10-CM

## 2024-04-29 DIAGNOSIS — K55.1 SUPERIOR MESENTERIC ARTERY STENOSIS (HCC): ICD-10-CM

## 2024-04-29 PROBLEM — R10.11 RUQ PAIN: Status: RESOLVED | Noted: 2019-01-21 | Resolved: 2024-04-29

## 2024-04-29 PROBLEM — R10.84 GENERALIZED ABDOMINAL PAIN: Status: ACTIVE | Noted: 2024-04-29

## 2024-04-29 PROBLEM — R10.11 RUQ ABDOMINAL PAIN: Status: RESOLVED | Noted: 2019-03-01 | Resolved: 2024-04-29

## 2024-04-29 PROBLEM — R10.13 EPIGASTRIC PAIN: Status: RESOLVED | Noted: 2021-05-17 | Resolved: 2024-04-29

## 2024-04-29 PROCEDURE — 99244 OFF/OP CNSLTJ NEW/EST MOD 40: CPT | Performed by: STUDENT IN AN ORGANIZED HEALTH CARE EDUCATION/TRAINING PROGRAM

## 2024-04-29 RX ORDER — POLYETHYLENE GLYCOL 3350 17 G/17G
238 POWDER, FOR SOLUTION ORAL ONCE
Qty: 238 G | Refills: 0 | Status: SHIPPED | OUTPATIENT
Start: 2024-04-29 | End: 2024-04-29

## 2024-04-29 RX ORDER — BISACODYL 5 MG/1
20 TABLET, DELAYED RELEASE ORAL ONCE
Qty: 4 TABLET | Refills: 0 | Status: SHIPPED | OUTPATIENT
Start: 2024-04-29 | End: 2024-04-29

## 2024-04-29 RX ORDER — DICYCLOMINE HCL 20 MG
20 TABLET ORAL EVERY 6 HOURS PRN
Qty: 60 TABLET | Refills: 0 | Status: SHIPPED | OUTPATIENT
Start: 2024-04-29

## 2024-04-29 NOTE — ASSESSMENT & PLAN NOTE
I suspect that her pain may be musculoskeletal in etiology based on location.  The far right and left flanks appear to be intercostal in location and are acutely tender to palpation.  Additionally, worsening symptoms after bowel movement and relief of symptoms with application of heating pad and lying down would be more consistent with musculoskeletal etiology.  While she certainly could have a component of GI cause, I do not believe that this would be consistent with her symptom complex.  Potential GI etiologies would include peptic ulcer disease versus gastritis versus functional disorder.  She does have a reported history of IBS with diarrhea, could also consider dysbiosis/SIBO given her bloating sensation.  Recent CT was reviewed and does not show any evidence of diverticulitis or any other acute intra-abdominal abnormalities.    As she has had recurrence of symptoms, we will recommend bidirectional endoscopy to rule out potential GI etiologies for pain  If endoscopic evaluation is unremarkable, I would recommend possible orthopedic evaluation to rule out radicular pain  MiraLAX/Dulcolax bowel prep  Would also recommend trial of Bentyl to see if this helps relieve her symptoms  Could also consider trial of rifaximin versus hydrogen breath testing to rule out SIBO if endoscopic evaluation is unremarkable  She may continue Percocet which is prescribed for chronic pain issues and would also recommend continued application of heating pad    I obtained informed consent from the patient.  The risks/benefits/alternatives of the procedure were discussed with the patient.  Risks included, but not limited to, infection, bleeding, perforation, injury to organs in the abdomen, missed lesion and incomplete procedure were discussed.  Patient was agreeable and electronic signature was obtained.

## 2024-04-29 NOTE — ASSESSMENT & PLAN NOTE
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  However, follow-up CTA and vascular surgery evaluation noted widely patent celiac and SMA.  Additionally, her symptoms do not seem consistent with chronic mesenteric ischemia and SMA syndrome.    Management as noted above

## 2024-04-29 NOTE — ASSESSMENT & PLAN NOTE
Her weight loss appears to be related to her decreased oral intake due to her pain.  She has had a similar presentation in 2020 and underwent extensive workup at that time which was largely unremarkable.    Plan for bidirectional endoscopy  Will hold off on repeat CT imaging at this time as she has had recent CT of the abdomen/pelvis  Trend body weight

## 2024-04-29 NOTE — PATIENT INSTRUCTIONS
We will schedule your for upper endoscopy and colonoscopy  Please follow the instructions for the bowel prep        COLONOSCOPY  MIRALAX/Dulcolax Bowel Preparation Instructions    The OR/GI Lab will contact you the evening prior to your procedure with your exact arrival time.    Our practice requires a 1 week notice for any cancellations or rescheduling. We kindly ask that you immediately notify us of any changes including any new medications that are prescribed. Thank you for your cooperation.       WEEK BEFORE YOUR PROCEDURE:  Stop taking Iron tablets.  5 days prior, AVOID vegetables and fruits with skins or seeds, nuts, corn, popcorn and whole grain breads.   Purchase: One (1) 238-gram container of Miralax (polyethylene glycol 3350), four (4) 5 mg Dulcolax (bisacodyl) tablets, and one (1) 64-ounce bottle of Gatorade (sports drink) - no red, orange, or purple. These may be purchased at any pharmacy without a prescription. Generic products are permissible.   Arrange responsible transportation for day of the procedure.     DAY BEFORE THE PROCEDURE:   CLEAR liquids only for entire day prior. Nothing red, orange or purple.    You MAY have:                                                               Soda  Water  Broth Gatorade  Jello  Popsicles Coffee/tea without milk/creamer     YOU MAY NOT HAVE:  Solid foods   Milk and milk products    Juice with pulp    BOWEL PREPARATION:  Includes: One (1) 238-gram container of Miralax (polyethylene glycol 3350), four (4) 5 mg Dulcolax (bisacodyl) tablets, and one (1) 64-ounce bottle of Gatorade (sports drink).  Preparation may be refrigerated.  Entire bowel prep should be completed.     Afternoon before the procedure (2:00 pm - 5:00 pm):    Take two (2) 5 mg Dulcolax laxative tablets.     Evening before the procedure (6:00 pm):  Mix entire container of Miralax with one (1) 64-ounce bottle of Gatorade and shake until all medication is dissolved.   Begin drinking solution. Drink  an eight (8) ounce cup every 10-15 minutes until you have consumed half (32 ounces) of the solution.  Refrigerate remaining solution.    Night before the procedure (8:00 pm):  Take two (2) 5 mg Dulcolax laxative tablets.     Beginning 5 hours before your procedure:  Drink the remaining amount of prepared solution (32 ounces).  Drink an eight (8) ounce cup every 10-15 minutes until you have consumed the remaining solution.     Bowel prep should be completed 4 hours prior to procedure time.    NOTHING TO EAT OR DRINK AFTER MIDNIGHT- EXCEPT FOR YOUR PREP    DAY OF THE PROCEDURE:  You may brush your teeth.  Leave all jewelry at home.  Please arrive for your procedure as indicated by the OR / GI Lab / Endoscopy Unit. The hospital will contact you the day before with your exact arrival time.   Make sure you have arranged ahead of time for a responsible adult (18 or older) to accompany and drive you home after the procedure.  Please discuss any transportation concerns with our staff prior to your procedure.    The effects of the anesthesia can persist for 24 hours.  After receiving the sedation, you must exercise caution before engaging in any activity that could harm yourself and others (such as driving a car).  Do not make any important decisions or do not drink any alcoholic beverages during this time period.  After your procedure, you may have anything you'd like to eat or drink.  You will probably want to start with something light.  Please include plenty of fluids.  Avoid items that cause gas such as sodas and salads.    SPECIAL INSTRUCTIONS:    For patients currently taking blood thinners and/or antiplatelet therapy our office will contact the prescribing provider.  Our office will contact you with any required changes to your medication regimen.     Blood thinner (i.e. - Coumadin, Pradaxa, Lovenox, Xarelto, Eliquis)  ?  Continue (Do Not Stop)  ? Stop______________for_____________days prior to the  procedure.    Antiplatelet (i.e. - Plavix, Aggrenox, Effient, Brilinta)  ?  Continue (Do Not Stop)  ? Stop______________for_____________days prior to the procedure.       Diabetes:   If you are Diabetic, please see separate Diabetic Instruction Sheet.          Prescribed medications:  Do not stop your aspirin, or any of your other medications (unless instructed otherwise).    Take the rest of your prescribed medications with small sips of water at least 2 hours prior to your procedure.      For any questions or concerns related to your bowel preparation or pre-procedure instructions, please contact our office at [phone].  Thank you for choosing [facility]!

## 2024-04-29 NOTE — PROGRESS NOTES
[facility]  Outpatient Consultation  Encounter: [id number]     PATIENT INFO     Name: Gloria Mcdaniel  YOB: 1965   Age: 58 y.o.   Sex: female   MRN: [id number]    ASSESSMENT & PLAN     Gloria Mcdaniel is a 58 y.o. female with GERD, IBS with diarrhea, SMA stenosis, chronic pain syndrome on chronic opioid therapy, degenerative disc disease who presents to GI office for evaluation of upper abdominal pain and bloating symptoms.    Problem List Items Addressed This Visit       Abnormal weight loss     Her weight loss appears to be related to her decreased oral intake due to her pain.  She has had a similar presentation in 2020 and underwent extensive workup at that time which was largely unremarkable.    Plan for bidirectional endoscopy  Will hold off on repeat CT imaging at this time as she has had recent CT of the abdomen/pelvis  Trend body weight         Gastroesophageal reflux disease    Relevant Medications    polyethylene glycol (MiraLax) 17 GM/SCOOP powder    bisacodyl (DULCOLAX) 5 mg EC tablet    dicyclomine (BENTYL) 20 mg tablet    Other Relevant Orders    Colonoscopy    EGD    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HCC)     Previously noted to have possible stenosis of the SMA.  However, follow-up CTA and vascular surgery evaluation noted widely patent celiac and SMA.  Additionally, her symptoms do not seem consistent with chronic mesenteric ischemia and SMA syndrome.    Management as noted above         Pain of upper abdomen - Primary     I suspect that her pain may be musculoskeletal in etiology based on location.  The far right and left flanks appear to be intercostal in location and are acutely tender to palpation.  Additionally, worsening symptoms after bowel movement and relief of symptoms with application of heating pad and lying down would be more consistent with musculoskeletal etiology.  While she certainly could have a component of GI cause, I do  not believe that this would be consistent with her symptom complex.  Potential GI etiologies would include peptic ulcer disease versus gastritis versus functional disorder.  She does have a reported history of IBS with diarrhea, could also consider dysbiosis/SIBO given her bloating sensation.  Recent CT was reviewed and does not show any evidence of diverticulitis or any other acute intra-abdominal abnormalities.    As she has had recurrence of symptoms, we will recommend bidirectional endoscopy to rule out potential GI etiologies for pain  If endoscopic evaluation is unremarkable, I would recommend possible orthopedic evaluation to rule out radicular pain  MiraLAX/Dulcolax bowel prep  Would also recommend trial of Bentyl to see if this helps relieve her symptoms  Could also consider trial of rifaximin versus hydrogen breath testing to rule out SIBO if endoscopic evaluation is unremarkable  She may continue Percocet which is prescribed for chronic pain issues and would also recommend continued application of heating pad    I obtained informed consent from the patient.  The risks/benefits/alternatives of the procedure were discussed with the patient.  Risks included, but not limited to, infection, bleeding, perforation, injury to organs in the abdomen, missed lesion and incomplete procedure were discussed.  Patient was agreeable and electronic signature was obtained.          Relevant Medications    polyethylene glycol (MiraLax) 17 GM/SCOOP powder    bisacodyl (DULCOLAX) 5 mg EC tablet    dicyclomine (BENTYL) 20 mg tablet    Other Relevant Orders    Colonoscopy    EGD     Orders Placed This Encounter   Procedures    Colonoscopy    EGD       FOLLOW-UP: After procedures    HISTORY OF PRESENT ILLNESS       Gloria Mcdaniel is a 58 y.o. female who presents to GI office for evaluation of abdominal pain.  Patient is new to me but has been seen by my colleagues in the past in 2020 for similar complaints.  She has a  history of chronic pain syndrome on chronic opioid therapy, degenerative disc disease, IBS with diarrhea, SMA stenosis.    Patient reports onset of abdominal pain approximately 2 and half weeks ago when she was visiting a friend.  She reports focal pain in the left and right flanks underneath the rib.  The pain is described as sharp and cramping.  She also has pain in the epigastric region with sensation of distention or bloating.  This worsens her symptoms worsen with eating and also with bowel movement.  She reports that trying to have a bowel movement causes significant pain, especially in the left flank side.  She denies any issues with urination, and denies any hematochezia or melena.  She does have a history of IBS with diarrhea and has had evaluation for abdominal pain previously.  That pain was similar on the left side but she did not have the right-sided pain at that time.  She underwent bidirectional endoscopy which showed evidence of gastritis and duodenitis as well as small sliding hiatal hernia and pandiverticulosis.  There was no clear etiology for her pain.  Her symptoms have been severe and have affected her appetite.  As a result of decreased intake, she has lost a few pounds.  She had a similar issue in the past as well where she lost more substantial amount of weight.  She denies any dysphagia or odynophagia although does feel the sensation of things sitting inside the upper part of her abdomen.    She is on chronic opioid therapy for chronic pain syndrome.  She reports that the Percocet has not provided any significant relief of her symptoms.  Application of heating pad across her abdomen has been helpful as well as lying down.  She denies any injuries during her travel.  Denies coming into contact with any sick individuals.  She underwent recent CT ordered by her PCP which showed no acute intra-abdominal abnormalities.  She does have a reported history of SMA stenosis although CTA in 2022  reviewed by vascular surgery showed widely patent celiac and SMA.     ENDOSCOPIC HISTORY     UPPER ENDOSCOPY: 6/29/2020 with evidence of mild gastritis and duodenitis, small sliding hiatal hernia, normal esophagus (biopsies negative for H. pylori and celiac sprue)    COLONOSCOPY: 6/29/2020 with 4 cm lipoma noted in the ascending colon, moderate pancolonic diverticula, internal hemorrhoids as well as hypertrophied anal papilla, otherwise unremarkable; recommended repeat in 5 years due to personal history of colon polyps)    REVIEW OF SYSTEMS     CONSTITUTIONAL: Denies any fever, chills, rigors  HEENT: No earache or tinnitus, denies hearing loss or visual disturbances  CARDIOVASCULAR: No chest pain or palpitations  RESPIRATORY: Denies any cough, hemoptysis, shortness of breath or dyspnea on exertion  GASTROINTESTINAL: As noted in the History of Present Illness  GENITOURINARY: No problems with urination, denies any hematuria or dysuria  NEUROLOGIC: No dizziness or vertigo, denies headaches   MUSCULOSKELETAL: Denies any muscle or joint pain   SKIN: Denies jaundice or itching  PSYCHOSOCIAL: Denies depression or anxiety, denies any recent memory loss    Answers submitted by the patient for this visit:  Abdominal Pain Questionnaire (Submitted on 4/28/2024)  Chief Complaint: Abdominal pain  Chronicity: recurrent  Onset: 1 to 4 weeks ago  Onset quality: sudden  Frequency: constantly  Progression since onset: waxing and waning  Pain location: epigastric region, periumbilical region, left flank, right flank  Pain - numeric: 8/10  Pain quality: cramping, a sensation of fullness, sharp  Radiates to: RUQ, epigastric region, periumbilical region, left flank, right flank  anorexia: Yes  arthralgias: No  belching: No  constipation: No  diarrhea: Yes  dysuria: No  fever: No  flatus: Yes  frequency: No  headaches: No  hematochezia: No  hematuria: No  melena: No  myalgias: No  nausea: No  weight loss: Yes  vomiting: No  Aggravated by:  bowel movement, certain positions, eating, movement  Relieved by: nothing  Diagnostic workup: CT scan     Historical Information   Past Medical History:   Diagnosis Date    Acquired ichthyosis     last assessed: 2013    Anesthesia     Pt reports severe acid reflux after getting anesthesia- sometimes presents as chest pain    Anxiety     Cervical radiculopathy     last assessed: 2014    Colorectal polyps     last assessed: 3/9/2015    COPD (chronic obstructive pulmonary disease) ([facility])     Depression     Diverticulosis of colon     Foot pain     GERD (gastroesophageal reflux disease)     Heart disease     Coronary artery disease    Hepatitis C 2018    Took meds no longer have    Hyperlipemia     Hypertension     Lupus (HCC)     Myocardial infarction ([facility])     at age 32    Osteoarthritis unsure    Osteopenia     last assessed: 2013    Palpitations     last assessed: 2014    PVD (peripheral vascular disease) ([facility])     last assessed: 12/3/2012    RA (rheumatoid arthritis) ([facility])     Rheumatoid arthritis ([facility]) unsure    Scapular dyskinesis     last assessed: 2014    Shortness of breath     Stomach disorder Unsure    Tendonitis of left rotator cuff     last assessed: 2014    Vocal cord polyps     last assessed: 2014     Past Surgical History:   Procedure Laterality Date    ABDOMINAL SURGERY      exploratory    CARDIAC SURGERY      cardiac stent      SECTION      last assessed: 2014    CHOLECYSTECTOMY      last assessed: 2014    COLONOSCOPY  10/27/2014    CORONARY ANGIOPLASTY WITH STENT PLACEMENT      LAD stent    DENTAL SURGERY      last assessed: 2014    ELBOW SURGERY Bilateral     arthroplasty    EPIDURAL BLOCK INJECTION N/A 2019    Procedure: C7 T1 Cervical Epidural Steroid Injection (25015);  Surgeon: Ricardo Lawson MD;  Location: [facility];  Service: Pain Management     EPIDURAL BLOCK INJECTION Bilateral 2019    Procedure: BLOCK /  INJECTION EPIDURAL STEROID CERVICAL - C7-T1;  Surgeon: Ricardo Lawson MD;  Location: [facility];  Service: Pain Management     EPIDURAL BLOCK INJECTION Left 10/03/2019    Procedure: C7-T1 interlaminar epidural steroid injection;  Surgeon: Ricardo Lawson MD;  [facility]: [facility];  Service: Pain Management     ESOPHAGOGASTRODUODENOSCOPY      ESOPHAGOGASTRODUODENOSCOPY N/A 11/04/2016    Procedure: ESOPHAGOGASTRODUODENOSCOPY (EGD);  Surgeon: Alejandro Carvajal MD;  Location: [facility];  Service:     FL GUIDED NEEDLE PLAC BX/ASP/INJ  05/02/2019    FL GUIDED NEEDLE PLAC BX/ASP/INJ  07/31/2019    FL GUIDED NEEDLE PLAC BX/ASP/INJ  10/03/2019    FL INJECTION LEFT SHOULDER (ARTHROGRAM)  09/25/2018    FOOT SURGERY Right 02/06/2015    x 4    HYSTERECTOMY      last assessed: 8/8/2014    NJ ARTHROCENTESIS ASPIR&/INJ MAJOR JT/BURSA W/O US Bilateral 07/31/2019    Procedure: GREATER TROCHANTERIC HIP INJECTION;  Surgeon: Ricardo Lawson MD;  [facility]: [facility];  Service: Pain Management     NJ EXCISON TUMOR SOFT TISSUE THIGH/KNEE SUBQ 3 CM/> Right 9/18/2023    Procedure: EXCISION BIOPSY TISSUE LESION/MASS LOWER EXTREMITY;  Surgeon: Franky Bragg DO;  [facility]: [facility];  Service: Orthopedics    NJ SURGICAL ARTHROSCOPY SHOULDER BICEPS TENODESIS Left 10/15/2018    Procedure: ARTHROSCOPY SHOULDER; Debridement of shoulder;  Surgeon: Francis Byrnes MD;  [facility]: [facility];  Service: Orthopedics    NJ SURGICAL ARTHROSCOPY SHOULDER W/ROTATOR CUFF RPR Left 07/23/2018    Procedure: ARTHROSCOPY SHOULDER, subacromial decompression, synovectomy;  Surgeon: Francis Byrnes MD;  [facility];  Service: Orthopedics    SHOULDER SURGERY  june and oct 2018    THROAT SURGERY      TOOTH EXTRACTION      TRIGEMINAL NERVE DECOMPRESSION Right 06/15/2018    Procedure: FOOT NEUROPLASTY;  Surgeon: Hernan Resendiz DPM;  Location: [facility];  Service: Podiatry     Social History   Social History     Substance and Sexual Activity    Alcohol Use No    Comment: rare     Social History     Substance and Sexual Activity   Drug Use No     Social History     Tobacco Use   Smoking Status Every Day    Current packs/day: 0.25    Average packs/day: 0.3 packs/day for 40.0 years (10.0 ttl pk-yrs)    Types: Cigarettes   Smokeless Tobacco Never   Tobacco Comments    She is trying to cut back on her own     Family History   Problem Relation Age of Onset    No Known Problems Mother     No Known Problems Father     No Known Problems Maternal Grandmother     No Known Problems Maternal Grandfather     No Known Problems Paternal Grandmother     No Known Problems Paternal Grandfather     Early death Sister     Hypertension Sister     No Known Problems Maternal Aunt     No Known Problems Maternal Aunt     No Known Problems Maternal Aunt     No Known Problems Maternal Aunt        MEDICATIONS & ALLERGIES     Current Outpatient Medications   Medication Instructions    albuterol (PROVENTIL HFA,VENTOLIN HFA) 90 mcg/act inhaler 2 puffs, Inhalation, Every 6 hours PRN    bisacodyl (DULCOLAX) 20 mg, Oral, Once    calcium carbonate (TUMS) 500 mg chewable tablet 1 tablet, Oral, As needed    cyclobenzaprine (FLEXERIL) 10 mg, Oral, Daily at bedtime    dicyclomine (BENTYL) 20 mg, Oral, Every 6 hours PRN    diphenhydrAMINE-acetaminophen (TYLENOL PM)  MG TABS 1 tablet, Oral, Daily at bedtime PRN    ezetimibe (ZETIA) 10 mg, Oral, Daily    hydroxychloroquine (PLAQUENIL) 200 mg, Oral, Daily    LACTASE ENZYME PO 3 tablets, Oral, As needed, As needed    nitroglycerin (NITROSTAT) 0.4 mg, Sublingual, Every 5 minutes PRN    omeprazole (PRILOSEC) 20 mg, Oral, Daily before breakfast    oxyCODONE-acetaminophen (PERCOCET) 5-325 mg per tablet 1 tablet, Oral, Every 8 hours PRN    polyethylene glycol (MIRALAX) 238 g, Oral, Once, Take 238 g my mouth. Use as directed    potassium chloride (MICRO-K) 10 MEQ CR capsule 10 mEq, Oral, Daily    Probiotic Product (PROBIOTIC DAILY PO) 2 capsules,  Oral, Daily, Probiotic-prebiotic    rosuvastatin (CRESTOR) 5 mg, Oral, Daily    saccharomyces boulardii (FLORASTOR) 250 mg, Oral, Daily    Vitamin D-Vitamin K (K2-D3 5000 PO) Oral, Daily     Allergies   Allergen Reactions    Ceclor [Cefaclor] Anaphylaxis    Cephalexin     Codeine Itching     Reaction Date: 09Jun2011;     Gabapentin      Body tremors, sweats    Lyrica [Pregabalin]      Body tremors, chills, heaviness in chest    Ticlopidine Hives     Other reaction(s): Hives    Penicillins Rash     Other reaction(s): Other       PHYSICAL EXAM      Objective   Blood pressure 124/81, pulse 105, temperature (!) 97.3 °F (36.3 °C), temperature source Temporal, resp. rate 16, height 5' (1.524 m), weight 46 kg (101 lb 6.4 oz), SpO2 95%. Body mass index is 19.8 kg/m².    General Appearance:   Alert, cooperative, no distress   HEENT:   Normocephalic, atraumatic, anicteric     Neck:   Supple, symmetrical, trachea midline   Lungs:   Equal chest rise, respirations unlabored    Heart:   Tachycardic   Abdomen:   Soft, TTP in right flank (subcostal) and left flank, non-distended; normal bowel sounds; no masses, no organomegaly    Rectal:   Deferred    Extremities:   No cyanosis or edema    Neuro:   Moves all 4 extremities    Skin:   No jaundice, rashes, or lesions       LABORATORY RESULTS     No visits with results within 1 Day(s) from this visit.   Latest known visit with results is:   Office Visit on 04/25/2024   Component Date Value    WBC 04/25/2024 7.85     RBC 04/25/2024 4.43     Hemoglobin 04/25/2024 14.4     Hematocrit 04/25/2024 44.3     MCV 04/25/2024 100 (H)     MCH 04/25/2024 32.5     MCHC 04/25/2024 32.5     RDW 04/25/2024 12.2     MPV 04/25/2024 9.4     Platelets 04/25/2024 244     nRBC 04/25/2024 0     Segmented % 04/25/2024 52     Immature Grans % 04/25/2024 0     Lymphocytes % 04/25/2024 38     Monocytes % 04/25/2024 8     Eosinophils Relative 04/25/2024 1     Basophils Relative 04/25/2024 1     Absolute  Neutrophils 04/25/2024 4.12     Absolute Immature Grans 04/25/2024 0.03     Absolute Lymphocytes 04/25/2024 2.99     Absolute Monocytes 04/25/2024 0.61     Eosinophils Absolute 04/25/2024 0.05     Basophils Absolute 04/25/2024 0.05     Sodium 04/25/2024 136     Potassium 04/25/2024 3.2 (L)     Chloride 04/25/2024 98     CO2 04/25/2024 30     ANION GAP 04/25/2024 8     BUN 04/25/2024 4 (L)     Creatinine 04/25/2024 0.55 (L)     Glucose, Fasting 04/25/2024 102 (H)     Calcium 04/25/2024 9.5     AST 04/25/2024 16     ALT 04/25/2024 9     Alkaline Phosphatase 04/25/2024 41     Total Protein 04/25/2024 6.7     Albumin 04/25/2024 4.6     Total Bilirubin 04/25/2024 0.57     eGFR 04/25/2024 103     Amylase 04/25/2024 28 (L)     Lipase 04/25/2024 11     Color, UA 04/25/2024 Light Yellow     Clarity, UA 04/25/2024 Clear     Specific Gravity, UA 04/25/2024 <1.005 (L)     pH, UA 04/25/2024 6.0     Leukocytes, UA 04/25/2024 Negative     Nitrite, UA 04/25/2024 Negative     Protein, UA 04/25/2024 Negative     Glucose, UA 04/25/2024 Negative     Ketones, UA 04/25/2024 Negative     Urobilinogen, UA 04/25/2024 <2.0     Bilirubin, UA 04/25/2024 Negative     Occult Blood, UA 04/25/2024 Small (A)     Urine Culture 04/25/2024 No Growth <1000 cfu/mL     RBC, UA 04/25/2024 1-2     WBC, UA 04/25/2024 0-1     Epithelial Cells 04/25/2024 Occasional     Bacteria, UA 04/25/2024 None Seen         IMAGING RESULTS     CT abdomen pelvis w contrast    Result Date: 4/25/2024  Narrative: CT ABDOMEN AND PELVIS WITH IV CONTRAST INDICATION: K21.9: Gastro-esophageal reflux disease without esophagitis R10.84: Generalized abdominal pain R19.5: Other fecal abnormalities. COMPARISON: None. TECHNIQUE: CT examination of the abdomen and pelvis was performed. Multiplanar 2D reformatted images were created from the source data. This examination, like all CT scans performed in the [facility], was performed utilizing techniques to minimize radiation  dose exposure, including the use of iterative reconstruction and automated exposure control. Radiation dose length product (DLP) for this visit: 370.95 mGy-cm IV Contrast: 100 mL of iohexol (OMNIPAQUE) Enteric Contrast: Not administered. FINDINGS: ABDOMEN LOWER CHEST: No clinically significant abnormality in the visualized lower chest. LIVER/BILIARY TREE: Unremarkable. GALLBLADDER: Post cholecystectomy. SPLEEN: Unremarkable. PANCREAS: Unremarkable. ADRENAL GLANDS: Unremarkable. KIDNEYS/URETERS: No hydronephrosis or urinary tract calculi. Subcentimeter hypoattenuating renal lesion(s), too small to characterize but statistically likely benign, which do not warrant follow-up (Radiology June 2019). STOMACH AND BOWEL: Probable 2.2 cm lipoma within the ascending colon (2/86). Colonic diverticulosis without findings of acute diverticulitis. APPENDIX: No findings to suggest appendicitis. ABDOMINOPELVIC CAVITY: No ascites. No pneumoperitoneum. No lymphadenopathy. Postsurgical changes in the anterior left lower quadrant, similar to prior studies. VESSELS: Unremarkable for patient's age. PELVIS REPRODUCTIVE ORGANS: Post hysterectomy. URINARY BLADDER: Unremarkable. ABDOMINAL WALL/INGUINAL REGIONS: Postsurgical changes of the anterior abdominal wall. BONES: No acute fracture or suspicious osseous lesion. Diffuse osseous demineralization. Degenerative changes of the spine with mild superior endplate deformity of L4, similar to prior study.     Impression: No acute abdominopelvic process. Workstation performed: [id number]     I have personally reviewed any available and pertinent imaging study reports.      Bhaskar Navarro D.O.  [facility]  Division of Gastroenterology & Hepatology  Available on [email or web address]  [email or web address]    ** Please Note: This note is constructed using a voice recognition dictation system. **

## 2024-04-30 DIAGNOSIS — M05.9 RHEUMATOID ARTHRITIS WITH POSITIVE RHEUMATOID FACTOR, INVOLVING UNSPECIFIED SITE (HCC): ICD-10-CM

## 2024-04-30 DIAGNOSIS — M79.18 MYOFASCIAL PAIN SYNDROME: ICD-10-CM

## 2024-04-30 RX ORDER — CYCLOBENZAPRINE HCL 10 MG
10 TABLET ORAL
Qty: 30 TABLET | Refills: 6 | Status: SHIPPED | OUTPATIENT
Start: 2024-04-30

## 2024-04-30 RX ORDER — HYDROXYCHLOROQUINE SULFATE 200 MG/1
200 TABLET, FILM COATED ORAL DAILY
Qty: 90 TABLET | Refills: 1 | Status: SHIPPED | OUTPATIENT
Start: 2024-04-30 | End: 2024-10-27

## 2024-04-30 NOTE — TELEPHONE ENCOUNTER
Patient requesting refills on Hydroxychrloroquine and flexeril. Patient needs a new script for Fosomax.

## 2024-05-01 DIAGNOSIS — M51.36 LUMBAR DEGENERATIVE DISC DISEASE: Primary | ICD-10-CM

## 2024-05-03 ENCOUNTER — TELEPHONE (OUTPATIENT)
Age: 59
End: 2024-05-03

## 2024-05-03 NOTE — TELEPHONE ENCOUNTER
Patient called and said that the doctor had put her on potassium and she wanted to know when she should get the blood work that was ordered, please call patient, thank you.

## 2024-05-05 DIAGNOSIS — M81.0 OSTEOPOROSIS, UNSPECIFIED OSTEOPOROSIS TYPE, UNSPECIFIED PATHOLOGICAL FRACTURE PRESENCE: Primary | ICD-10-CM

## 2024-05-05 RX ORDER — ALENDRONATE SODIUM 70 MG/1
70 TABLET ORAL
Qty: 12 TABLET | Refills: 1 | Status: SHIPPED | OUTPATIENT
Start: 2024-05-05

## 2024-05-11 ENCOUNTER — APPOINTMENT (OUTPATIENT)
Dept: LAB | Facility: MEDICAL CENTER | Age: 59
End: 2024-05-11
Payer: COMMERCIAL

## 2024-05-11 DIAGNOSIS — E87.6 HYPOKALEMIA: ICD-10-CM

## 2024-05-11 LAB
ANION GAP SERPL CALCULATED.3IONS-SCNC: 9 MMOL/L (ref 4–13)
BUN SERPL-MCNC: 5 MG/DL (ref 5–25)
CALCIUM SERPL-MCNC: 8.6 MG/DL (ref 8.4–10.2)
CHLORIDE SERPL-SCNC: 103 MMOL/L (ref 96–108)
CO2 SERPL-SCNC: 31 MMOL/L (ref 21–32)
CREAT SERPL-MCNC: 0.49 MG/DL (ref 0.6–1.3)
GFR SERPL CREATININE-BSD FRML MDRD: 107 ML/MIN/1.73SQ M
GLUCOSE P FAST SERPL-MCNC: 91 MG/DL (ref 65–99)
POTASSIUM SERPL-SCNC: 3.8 MMOL/L (ref 3.5–5.3)
SODIUM SERPL-SCNC: 143 MMOL/L (ref 135–147)

## 2024-05-11 PROCEDURE — 36415 COLL VENOUS BLD VENIPUNCTURE: CPT

## 2024-05-11 PROCEDURE — 80048 BASIC METABOLIC PNL TOTAL CA: CPT

## 2024-05-13 ENCOUNTER — TELEPHONE (OUTPATIENT)
Dept: FAMILY MEDICINE CLINIC | Facility: CLINIC | Age: 59
End: 2024-05-13

## 2024-05-13 NOTE — TELEPHONE ENCOUNTER
----- Message from Dorita Quiroz sent at 5/13/2024  9:21 AM EDT -----  Regarding: FW: Low sugar cont...  Contact: [phone]    ----- Message -----  From: Chiara Marks  Sent: 5/13/2024   9:13 AM EDT  To: [facility]  Subject: FW: Low sugar cont...                              ----- Message -----  From: Gloria Mcdaniel  Sent: 5/10/2024   8:04 PM EDT  To: [facility]  Subject: Low sugar cont...                                I also want to add that I have developed vibration in my right leg like a cell phone inside my leg vibrating and my tremors have got worse kaylyn in right arm, I don't know what if anything this all combined could mean but the sugar problem is serious

## 2024-05-13 NOTE — TELEPHONE ENCOUNTER
We have done glucose tolerance tests on her less than 2 years ago.  They did not confirm hypoglycemia.  Recent chemistry panel also did not show low sugar.  As far as the tingling in her leg, it could be coming from her back.  If she would like, I can refer her to endocrinology to see if she truly does have a sugar problem.  Let me know if she is agreeable.

## 2024-05-14 ENCOUNTER — TELEPHONE (OUTPATIENT)
Dept: FAMILY MEDICINE CLINIC | Facility: CLINIC | Age: 59
End: 2024-05-14

## 2024-05-14 DIAGNOSIS — E16.2 HYPOGLYCEMIA: Primary | ICD-10-CM

## 2024-05-14 NOTE — TELEPHONE ENCOUNTER
----- Message from Bertha Caro LPN sent at 5/14/2024  8:03 AM EDT -----  Regarding: FW: Reply  Contact: [phone]    ----- Message -----  From: Arleth Pelayo RN  Sent: 5/14/2024   8:01 AM EDT  To: [facility]  Subject: FW: Reply                                          ----- Message -----  From: Gloria Mcdaniel  Sent: 5/13/2024   6:00 PM EDT  To: [facility]  Subject: Reply                                            Yes please as I'm having constant issues of sugar bottoming out

## 2024-05-21 ENCOUNTER — OFFICE VISIT (OUTPATIENT)
Dept: RHEUMATOLOGY | Facility: CLINIC | Age: 59
End: 2024-05-21
Payer: COMMERCIAL

## 2024-05-21 VITALS
SYSTOLIC BLOOD PRESSURE: 126 MMHG | DIASTOLIC BLOOD PRESSURE: 68 MMHG | HEIGHT: 60 IN | WEIGHT: 103 LBS | BODY MASS INDEX: 20.22 KG/M2

## 2024-05-21 DIAGNOSIS — M35.9 UNDIFFERENTIATED CONNECTIVE TISSUE DISEASE (HCC): Primary | ICD-10-CM

## 2024-05-21 DIAGNOSIS — Z79.899 HIGH RISK MEDICATION USE: ICD-10-CM

## 2024-05-21 DIAGNOSIS — R25.1 TREMOR: ICD-10-CM

## 2024-05-21 DIAGNOSIS — M05.9 RHEUMATOID ARTHRITIS WITH POSITIVE RHEUMATOID FACTOR, INVOLVING UNSPECIFIED SITE (HCC): ICD-10-CM

## 2024-05-21 DIAGNOSIS — M81.0 OSTEOPOROSIS, UNSPECIFIED OSTEOPOROSIS TYPE, UNSPECIFIED PATHOLOGICAL FRACTURE PRESENCE: ICD-10-CM

## 2024-05-21 DIAGNOSIS — M70.62 GREATER TROCHANTERIC BURSITIS OF LEFT HIP: ICD-10-CM

## 2024-05-21 PROCEDURE — 99214 OFFICE O/P EST MOD 30 MIN: CPT | Performed by: INTERNAL MEDICINE

## 2024-05-21 PROCEDURE — 20610 DRAIN/INJ JOINT/BURSA W/O US: CPT | Performed by: INTERNAL MEDICINE

## 2024-05-21 RX ORDER — IBUPROFEN 800 MG/1
800 TABLET ORAL EVERY 8 HOURS PRN
Qty: 90 TABLET | Refills: 6 | Status: SHIPPED | OUTPATIENT
Start: 2024-05-21

## 2024-05-21 RX ORDER — TRIAMCINOLONE ACETONIDE 40 MG/ML
40 INJECTION, SUSPENSION INTRA-ARTICULAR; INTRAMUSCULAR ONCE
Status: COMPLETED | OUTPATIENT
Start: 2024-05-21 | End: 2024-05-22

## 2024-05-21 RX ORDER — PREDNISONE 5 MG/1
TABLET ORAL
Qty: 70 TABLET | Refills: 0 | Status: SHIPPED | OUTPATIENT
Start: 2024-05-21

## 2024-05-21 RX ORDER — HYDROXYCHLOROQUINE SULFATE 200 MG/1
200 TABLET, FILM COATED ORAL DAILY
Qty: 90 TABLET | Refills: 1 | Status: SHIPPED | OUTPATIENT
Start: 2024-05-21 | End: 2024-11-17

## 2024-05-21 RX ORDER — ALENDRONATE SODIUM 70 MG/1
70 TABLET ORAL
Qty: 12 TABLET | Refills: 1 | Status: SHIPPED | OUTPATIENT
Start: 2024-05-21

## 2024-05-21 RX ORDER — LIDOCAINE HYDROCHLORIDE 10 MG/ML
1 INJECTION, SOLUTION INFILTRATION; PERINEURAL ONCE
Status: COMPLETED | OUTPATIENT
Start: 2024-05-21 | End: 2024-05-22

## 2024-05-21 NOTE — PATIENT INSTRUCTIONS
Continue daily calcium and Vit. D  Continue hydroxychloroquine daily; continue regular eye exams  Continue alendronate 70mg once a week for osteoporosis; take on an empty stomach with a full glass of water, and do not lie down for 30 minutes after  Continue cyclobenzaprine at bedtime for muscle pain  Left trochanteric bursa steroid injection given in clinic today  Schedule DEXA scan  Do lupus activity labs  Do left shoulder x-rays  Use diclofenac gel as needed for joint pain  Can take ibuprofen up to 3 times a day as needed for joint pain, take with food  Can take prednisone taper when in a flare  Neurology referral made for tremor    Return to clinic in 6 months

## 2024-05-21 NOTE — PROGRESS NOTES
Assessment and Plan: Gloria Mcdaniel is a 58 y.o.  female who presents for follow-up of her UCTD and osteoporosis. Left hip bursa is bothering again. Is past due for her next DEXA scan. Has been having an uncontrollable tremor lately.    Continue daily calcium and Vit. D  Continue hydroxychloroquine daily; continue regular eye exams  Continue alendronate 70mg once a week for osteoporosis; take on an empty stomach with a full glass of water, and do not lie down for 30 minutes after  Continue cyclobenzaprine at bedtime for muscle pain  Left trochanteric bursa steroid injection given in clinic today  Schedule DEXA scan  Do lupus activity labs  Do left shoulder x-rays  Use diclofenac gel as needed for joint pain  Can take ibuprofen up to 3 times a day as needed for joint pain, take with food  Can take prednisone taper when in a flare  Neurology referral made for tremor    Return to clinic in 6 months     Plan:  Diagnoses and all orders for this visit:    Undifferentiated connective tissue disease (HCC)  -     ibuprofen (MOTRIN) 800 mg tablet; Take 1 tablet (800 mg total) by mouth every 8 (eight) hours as needed for moderate pain Take with food  -     Diclofenac Sodium (VOLTAREN) 1 %; Apply 2 g topically 4 (four) times a day  -     XR shoulder 2+ vw left; Future  -     CBC and differential  -     C4 complement  -     C3 complement  -     Anti-DNA antibody, double-stranded  -     Comprehensive metabolic panel  -     Urinalysis with microscopic; Future  -     Sedimentation rate, automated  -     Protein / creatinine ratio, urine; Future  -     C-reactive protein  -     predniSONE 5 mg tablet; 4 tabs x7 days, then 3 tabs x7 days, then 2 tabs x7 days, then 1 tab x7 days, then stop.    Rheumatoid arthritis with positive rheumatoid factor, involving unspecified site (HCC)  -     hydroxychloroquine (PLAQUENIL) 200 mg tablet; Take 1 tablet (200 mg total) by mouth daily  -     predniSONE 5 mg tablet; 4 tabs x7 days, then 3 tabs  "x7 days, then 2 tabs x7 days, then 1 tab x7 days, then stop.    Osteoporosis, unspecified osteoporosis type, unspecified pathological fracture presence  -     DXA bone density spine hip and pelvis; Future  -     alendronate (FOSAMAX) 70 mg tablet; Take 1 tablet (70 mg total) by mouth every 7 days    Greater trochanteric bursitis of left hip  -     Large joint arthrocentesis: L greater trochanteric bursa  -     lidocaine (XYLOCAINE) 1 % injection 1 mL  -     triamcinolone acetonide (KENALOG-40) 40 mg/mL injection 40 mg    Tremor  -     Ambulatory referral to Neurology; Future    High risk medication use     High risk medication use - Benefits and risks of hydroxychloroquine, including but not limited to retinal toxicity, corneal deposits, gastrointestinal side effects, and headaches were discussed with the patient. The need for a regular eye exam to monitor for ocular toxicity while on this medication was also explained to the patient.     Large joint arthrocentesis: L greater trochanteric bursa  Universal Protocol:  Consent: Verbal consent obtained. Written consent not obtained.  Risks and benefits: risks, benefits and alternatives were discussed  Consent given by: patient  Time out: Immediately prior to procedure a \"time out\" was called to verify the correct patient, procedure, equipment, support staff and site/side marked as required.  Timeout called at: 5/21/2024 3:47 PM.  Patient understanding: patient states understanding of the procedure being performed  Patient consent: the patient's understanding of the procedure matches consent given  Procedure consent: procedure consent matches procedure scheduled  Relevant documents: relevant documents present and verified  Test results: test results available and properly labeled  Site marked: the operative site was marked  Radiology Images displayed and confirmed. If images not available, report reviewed: imaging studies available  Required items: required blood " products, implants, devices, and special equipment available  Patient identity confirmed: verbally with patient  Supporting Documentation  Indications: pain   Procedure Details  Location: hip - L greater trochanteric bursa  Preparation: Patient was prepped and draped in the usual sterile fashion  Needle size: 25 G  Ultrasound guidance: no  Approach: lateral    Patient tolerance: patient tolerated the procedure well with no immediate complications  Dressing:  Sterile dressing applied    After discussing the risks/benefits of left trochanteric bursa steroid injection including minor risk of infection, bleeding, pain, or ineffectiveness, informed consent was obtained and patient was agreeable to proceed.  Using sterile technique, the left hip bursa area was prepped with Chloraprep, and was topically anesthetized with Ethyl Chloride. The left trochanteric bursa was entered using a lateral approach.  Kenalog 40 mg and 1 mL lidocaine 1% were then injected and the needle withdrawn.  The procedure was well tolerated.  Patient was instructed to contact our office with any concerns or questions.       Follow-up plan: RTC in 6 months        Rheumatic Disease Summary  Gloria Mcdaniel is a 58 y.o.  female who originally presented on 5/22/20 as a Rheumatology consult referred by her PCP Lee Miller DO for evaluation of possible inflammatory arthritis given positive RF of 40 and KRISTA of 1:80 in a homogenous pattern. Patient has history of negative anti-CCP. Lupus activity labs, anti-centromere Ab, and anti-RNP ordered and returned unremarkable. ESR/CRP returned normal. Patient had some features to at least make a diagnosis of undifferentiated connective tissue disease, including arthralgia, photosensitivity, facial rashes, and Raynaud's symptoms. Her arthritis symptoms were not typical of RA since she denied morning stiffness and her joint symptoms were worse later in the day and with use. Hand and feet x-rays ordered to  evaluate for any inflammatory changes returned unremarkable. For time-being, started patient on hydroxychloroquine 200mg po daily; asked her to get regular eye exams while on this medication to monitor for plaquenil-related retinal toxicity.     8/28/20 via telemedicine: Patient presented for follow-up of UCTD (arthralgia, photosensitivity, facial rashes, and Raynaud's symptoms). Her arthralgia has improved since starting HCQ. Based on her weight, have increased her hydroxychloroquine to 300mg po daily. Changed her diclofenac prescription to 75mg po bid instead of 50mg po tid, and continued baclofen 10mg po tid.     1/18/22 telemedicine: Patient presented for follow-up of UCTD (arthralgia, photosensitivity, facial rashes, and Raynaud's symptoms). What was most significantly bothering patient is was abdominal pain and bloating sensation since she moved a dresser several months ago; it had slightly improved since then, but was still bothersome. Had seen multiple GI, and will be getting an EGD and colonoscopy soon for further workup. Had so far been diagnosed with delayed gastric emptying. She had lost weight to the point of being 110 pounds due to eating making the abdominal pain worse, which was present all the time. Did not start Fosamax yet, but planned to after I explained the importance of medical therapy for her osteoporosis. Her left hip bursa area significantly bothered her, but bursa injections in the past didn't help; would like to arrange a hip bursa injection visit with me. Raynaud's symptoms were not fully under control. She was to do lupus activity labs. Decreased hydroxychloroquine to 1 tab daily since weight loss. Started alendronate once a week, empty stomach, full glass of water, and do not lie down for 30 minutes after. Increased amlodipine to 5mg daily for Raynaud's symptoms. Continued weekly Vit. D for history of Vit. D deficiency.     1/27/22: Patient comes for injection into her left  trochanteric bursa. She had recent visit on 1/18/22. Patient reports no new complaints. Raynaud's signs seem to be better controled with increased dose of amlodipine. Patient tolerates alendronate well. Patient already has follow up appointment scheduled in 6 months. Left hip bursa steroid injection given in clinic, which patient tolerated well. Is to continue the rest of her medications and do hip bursitis exercises    7/7/22: Was not taking amlodipine for her Raynaud's symptoms, so it was restarted. Was not taking alendronate for her osteoporosis, so it was restarted. Left trochanteric bursa steroid injection given in clinic today, which patient tolerated well. Continue hydroxychloroquine daily; continue regular eye exams  Restart amlodipine at 2.5mg daily for Raynaud's symptoms. Restart alendronate 70mg once a week for osteoporosis; take on an empty stomach with a full glass of water, and do not lie down for 30 minutes after. Continue weekly Vit. D for now. Do lab. Left trochanteric bursa steroid injection given in clinic today. Return to clinic in 6 months.    1/10/23:   Most prominent complaint today is left trochanteric bursitis symptoms; left trochanteric bursa steroid injection given in clinic today.  Last one did not help, but the one before that did, so patient wants to pursue the injection today.  Also counseled her on the importance of taking medication for her osteoporosis.  Take 1,200mg of calcium daily  Take 2,000 units of Vit. D daily  Continue hydroxychloroquine daily; continue regular eye exams  Take alendronate 70mg once a week for osteoporosis; take on an empty stomach with a full glass of water, and do not lie down for 30 minutes after  Stop baclofen; start cyclobenzaprine at bedtime for muscle pain  Take celecoxib twice a day as needed for joint pain  Left trochanteric bursa steroid injection given in clinic today    8/10/23: Will be gettingg right knee cyst/mass removal 9/18th.  Continue daily  calcium and Vit. D  Continue hydroxychloroquine daily; continue regular eye exams  Continue alendronate 70mg once a week for osteoporosis; take on an empty stomach with a full glass of water, and do not lie down for 30 minutes after  Continue cyclobenzaprine at bedtime for muscle pain  Take amlodipine at bedtime for Raynaud's symptoms  Left trochanteric bursa steroid injection given in clinic today  Will order next DEXA scan at next visit  left    HPI  Gloria Mcdaniel is a 58 y.o.  female with undifferentiated connective tissue disesaese, Raynaud's syndrome, rheumatoid arthritis, who presents for follow-up of her UCTD, left trochanteric bursitis, and osteoporosis. Left hip bursa is bothering her again.    The following portions of the patient's history were reviewed and updated as appropriate: allergies, current medications, past family history, past medical history, past social history, past surgical history and problem list.    Review of Systems:   Pertinent review of systems positive for chills, headaches, visual disturbance, cough, shortness of breath, wheezing, abdominal pain indigestion, painful urination, joint pain, sometimes joint swelling, back pain, neck pain, muscle pain, hand color changes, dizziness sometimes, weakness, numbness, right arm/hand tremor, and cold intolerance.  Rest of 14 point review of systems reviewed and otherwise negative.    Home Medications:    Current Outpatient Medications:     albuterol (PROVENTIL HFA,VENTOLIN HFA) 90 mcg/act inhaler, Inhale 2 puffs every 6 (six) hours as needed for wheezing, Disp: 18 g, Rfl: 1    alendronate (FOSAMAX) 70 mg tablet, Take 1 tablet (70 mg total) by mouth every 7 days, Disp: 12 tablet, Rfl: 1    calcium carbonate (TUMS) 500 mg chewable tablet, Chew 1 tablet if needed for indigestion or heartburn, Disp: , Rfl:     cyclobenzaprine (FLEXERIL) 10 mg tablet, Take 1 tablet (10 mg total) by mouth daily at bedtime (Patient taking differently: Take 10  mg by mouth daily at bedtime PRN), Disp: 30 tablet, Rfl: 6    Diclofenac Sodium (VOLTAREN) 1 %, Apply 2 g topically 4 (four) times a day, Disp: 100 g, Rfl: 6    dicyclomine (BENTYL) 20 mg tablet, Take 1 tablet (20 mg total) by mouth every 6 (six) hours as needed (abdominal pain), Disp: 60 tablet, Rfl: 0    diphenhydrAMINE-acetaminophen (TYLENOL PM)  MG TABS, Take 1 tablet by mouth daily at bedtime as needed for sleep, Disp: , Rfl:     ezetimibe (ZETIA) 10 mg tablet, Take 1 tablet (10 mg total) by mouth daily, Disp: 90 tablet, Rfl: 3    hydroxychloroquine (PLAQUENIL) 200 mg tablet, Take 1 tablet (200 mg total) by mouth daily, Disp: 90 tablet, Rfl: 1    ibuprofen (MOTRIN) 800 mg tablet, Take 1 tablet (800 mg total) by mouth every 8 (eight) hours as needed for moderate pain Take with food, Disp: 90 tablet, Rfl: 6    LACTASE ENZYME PO, Take 3 tablets by mouth if needed As needed, Disp: , Rfl:     nitroglycerin (NITROSTAT) 0.4 mg SL tablet, Place 1 tablet (0.4 mg total) under the tongue every 5 (five) minutes as needed for chest pain (Patient not taking: Reported on 6/6/2024), Disp: 20 tablet, Rfl: 3    omeprazole (PriLOSEC) 20 mg delayed release capsule, Take 1 capsule (20 mg total) by mouth daily before breakfast, Disp: 30 capsule, Rfl: 1    oxyCODONE-acetaminophen (PERCOCET) 5-325 mg per tablet, Take 1 tablet by mouth every 8 (eight) hours as needed for moderate pain, Disp: , Rfl:     predniSONE 5 mg tablet, 4 tabs x7 days, then 3 tabs x7 days, then 2 tabs x7 days, then 1 tab x7 days, then stop., Disp: 70 tablet, Rfl: 0    Probiotic Product (PROBIOTIC DAILY PO), Take 2 capsules by mouth in the morning Probiotic-prebiotic, Disp: , Rfl:     rosuvastatin (CRESTOR) 5 mg tablet, Take 1 tablet (5 mg total) by mouth daily, Disp: 90 tablet, Rfl: 3    Vitamin D-Vitamin K (K2-D3 5000 PO), Take by mouth daily, Disp: , Rfl:     bisacodyl (DULCOLAX) 5 mg EC tablet, Take 4 tablets (20 mg total) by mouth once for 1 dose, Disp:  4 tablet, Rfl: 0    polyethylene glycol (MiraLax) 17 GM/SCOOP powder, Take 238 g by mouth once for 1 dose Take 238 g my mouth. Use as directed, Disp: 238 g, Rfl: 0    saccharomyces boulardii (Florastor) 250 mg capsule, Take 250 mg by mouth in the morning (Patient not taking: Reported on 5/21/2024), Disp: , Rfl:     Objective:    Vitals:    05/21/24 1518   BP: 126/68   Weight: 46.7 kg (103 lb)   Height: 5' (1.524 m)       Physical Exam  Constitutional:       General: She is not in acute distress.  HENT:      Head: Normocephalic and atraumatic.   Eyes:      Conjunctiva/sclera: Conjunctivae normal.   Cardiovascular:      Rate and Rhythm: Normal rate and regular rhythm.      Heart sounds: S1 normal and S2 normal.      No friction rub.   Pulmonary:      Effort: Pulmonary effort is normal. No respiratory distress.      Breath sounds: Normal breath sounds. No wheezing, rhonchi or rales.   Musculoskeletal:         General: Tenderness present.      Cervical back: Neck supple.      Comments: L trochanteric bursa tenderness   Skin:     Coloration: Skin is not pale.   Neurological:      Mental Status: She is alert. Mental status is at baseline.   Psychiatric:         Mood and Affect: Mood normal.         Behavior: Behavior normal.       Reviewed labs and imaging.    Imaging:   XR right foot 7/7/22  Stable postoperative change after partial distal 4th metatarsal resection.  No acute findings.    CT chest wo contrast 5/2/22  Mild emphysematous changes. No worrisome pulmonary mass. No acute pulmonary process.  Chronic fractures of the posterior right eighth and ninth ribs redemonstrated.    CTA abdomen pelvis 4/1/22  1.  No significant stenosis of the superior mesenteric artery.  Patent pancreaticoduodenal arcade and celiac artery.  See additional details as described above.  2.  Moderate descending and sigmoid colonic diverticulosis.  3.  7 mm x 5 mm peritoneal nodule in the left lower quadrant anterior abdomen, possibly new  since the previous study from May 2021.  This is indeterminate in etiology.  Recommend follow-up CT in 6 months.    DXA scan 9/8/21  RESULTS:      LUMBAR SPINE L2-L4 (L1 vertebra excluded from analysis due to local structural abnormalities or artifact):   BMD  0.716  gm/cm2   T-score -3.3         LEFT  TOTAL HIP:   BMD:  0.699  gm/cm2    T-score:  -2.0     LEFT  FEMORAL NECK:   BMD:  0.610  gm/cm2   T score: -2.2      LEFT  FOREARM:    33% RADIUS BMD:  0.546  gm/cm2  T-score:  -2.4         IMPRESSION:     1.  Osteoporosis. [Based on the lumbar spine]    CXR 4/23/21   Unremarkable    DXA scan 9/8/21  RESULTS:      LUMBAR SPINE L2-L4 (L1 vertebra excluded from analysis due to local structural abnormalities or artifact):   BMD  0.716  gm/cm2   T-score -3.3      LEFT  TOTAL HIP:   BMD:  0.699  gm/cm2    T-score:  -2.0     LEFT  FEMORAL NECK:   BMD:  0.610  gm/cm2   T score: -2.2      LEFT  FOREARM:    33% RADIUS BMD:  0.546  gm/cm2  T-score:  -2.4         IMPRESSION:  1.  Osteoporosis. [Based on the lumbar spine]    Labs:   Office Visit on 05/21/2024   Component Date Value Ref Range Status    WBC 05/22/2024 5.91  4.31 - 10.16 Thousand/uL Final    RBC 05/22/2024 4.02  3.81 - 5.12 Million/uL Final    Hemoglobin 05/22/2024 13.4  11.5 - 15.4 g/dL Final    Hematocrit 05/22/2024 40.8  34.8 - 46.1 % Final    MCV 05/22/2024 102 (H)  82 - 98 fL Final    MCH 05/22/2024 33.3  26.8 - 34.3 pg Final    MCHC 05/22/2024 32.8  31.4 - 37.4 g/dL Final    RDW 05/22/2024 12.2  11.6 - 15.1 % Final    MPV 05/22/2024 10.3  8.9 - 12.7 fL Final    Platelets 05/22/2024 209  149 - 390 Thousands/uL Final    nRBC 05/22/2024 0  /100 WBCs Final    Segmented % 05/22/2024 51  43 - 75 % Final    Immature Grans % 05/22/2024 0  0 - 2 % Final    Lymphocytes % 05/22/2024 38  14 - 44 % Final    Monocytes % 05/22/2024 8  4 - 12 % Final    Eosinophils Relative 05/22/2024 2  0 - 6 % Final    Basophils Relative 05/22/2024 1  0 - 1 % Final    Absolute Neutrophils  05/22/2024 3.09  1.85 - 7.62 Thousands/µL Final    Absolute Immature Grans 05/22/2024 0.02  0.00 - 0.20 Thousand/uL Final    Absolute Lymphocytes 05/22/2024 2.22  0.60 - 4.47 Thousands/µL Final    Absolute Monocytes 05/22/2024 0.45  0.17 - 1.22 Thousand/µL Final    Eosinophils Absolute 05/22/2024 0.09  0.00 - 0.61 Thousand/µL Final    Basophils Absolute 05/22/2024 0.04  0.00 - 0.10 Thousands/µL Final    C4, COMPLEMENT 05/22/2024 39  19 - 52 mg/dL Final    C3 Complement 05/22/2024 122  87 - 200 mg/dL Final    ds DNA Ab 05/22/2024 <1  0 - 9 IU/mL Final                                       Negative      <5                                     Equivocal  5 - 9                                     Positive      >9    Sodium 05/22/2024 141  135 - 147 mmol/L Final    Potassium 05/22/2024 3.8  3.5 - 5.3 mmol/L Final    Chloride 05/22/2024 103  96 - 108 mmol/L Final    CO2 05/22/2024 28  21 - 32 mmol/L Final    ANION GAP 05/22/2024 10  4 - 13 mmol/L Final    BUN 05/22/2024 8  5 - 25 mg/dL Final    Creatinine 05/22/2024 0.50 (L)  0.60 - 1.30 mg/dL Final    Standardized to IDMS reference method    Glucose, Fasting 05/22/2024 86  65 - 99 mg/dL Final    Calcium 05/22/2024 9.2  8.4 - 10.2 mg/dL Final    AST 05/22/2024 15  13 - 39 U/L Final    ALT 05/22/2024 9  7 - 52 U/L Final    Specimen collection should occur prior to Sulfasalazine administration due to the potential for falsely depressed results.     Alkaline Phosphatase 05/22/2024 42  34 - 104 U/L Final    Total Protein 05/22/2024 6.8  6.4 - 8.4 g/dL Final    Albumin 05/22/2024 4.4  3.5 - 5.0 g/dL Final    Total Bilirubin 05/22/2024 0.44  0.20 - 1.00 mg/dL Final    Use of this assay is not recommended for patients undergoing treatment with eltrombopag due to the potential for falsely elevated results.  N-acetyl-p-benzoquinone imine (metabolite of Acetaminophen) will generate erroneously low results in samples for patients that have taken an overdose of Acetaminophen.    eGFR  05/22/2024 107  ml/min/1.73sq m Final    Sed Rate 05/22/2024 15  0 - 29 mm/hour Final    CRP 05/22/2024 <1.0  <3.0 mg/L Final   Appointment on 05/11/2024   Component Date Value Ref Range Status    Sodium 05/11/2024 143  135 - 147 mmol/L Final    Potassium 05/11/2024 3.8  3.5 - 5.3 mmol/L Final    Chloride 05/11/2024 103  96 - 108 mmol/L Final    CO2 05/11/2024 31  21 - 32 mmol/L Final    ANION GAP 05/11/2024 9  4 - 13 mmol/L Final    BUN 05/11/2024 5  5 - 25 mg/dL Final    Creatinine 05/11/2024 0.49 (L)  0.60 - 1.30 mg/dL Final    Standardized to IDMS reference method    Glucose, Fasting 05/11/2024 91  65 - 99 mg/dL Final    Calcium 05/11/2024 8.6  8.4 - 10.2 mg/dL Final    eGFR 05/11/2024 107  ml/min/1.73sq m Final   Office Visit on 04/25/2024   Component Date Value Ref Range Status    WBC 04/25/2024 7.85  4.31 - 10.16 Thousand/uL Final    RBC 04/25/2024 4.43  3.81 - 5.12 Million/uL Final    Hemoglobin 04/25/2024 14.4  11.5 - 15.4 g/dL Final    Hematocrit 04/25/2024 44.3  34.8 - 46.1 % Final    MCV 04/25/2024 100 (H)  82 - 98 fL Final    MCH 04/25/2024 32.5  26.8 - 34.3 pg Final    MCHC 04/25/2024 32.5  31.4 - 37.4 g/dL Final    RDW 04/25/2024 12.2  11.6 - 15.1 % Final    MPV 04/25/2024 9.4  8.9 - 12.7 fL Final    Platelets 04/25/2024 244  149 - 390 Thousands/uL Final    nRBC 04/25/2024 0  /100 WBCs Final    Segmented % 04/25/2024 52  43 - 75 % Final    Immature Grans % 04/25/2024 0  0 - 2 % Final    Lymphocytes % 04/25/2024 38  14 - 44 % Final    Monocytes % 04/25/2024 8  4 - 12 % Final    Eosinophils Relative 04/25/2024 1  0 - 6 % Final    Basophils Relative 04/25/2024 1  0 - 1 % Final    Absolute Neutrophils 04/25/2024 4.12  1.85 - 7.62 Thousands/µL Final    Absolute Immature Grans 04/25/2024 0.03  0.00 - 0.20 Thousand/uL Final    Absolute Lymphocytes 04/25/2024 2.99  0.60 - 4.47 Thousands/µL Final    Absolute Monocytes 04/25/2024 0.61  0.17 - 1.22 Thousand/µL Final    Eosinophils Absolute 04/25/2024 0.05  0.00  - 0.61 Thousand/µL Final    Basophils Absolute 04/25/2024 0.05  0.00 - 0.10 Thousands/µL Final    Sodium 04/25/2024 136  135 - 147 mmol/L Final    Potassium 04/25/2024 3.2 (L)  3.5 - 5.3 mmol/L Final    Chloride 04/25/2024 98  96 - 108 mmol/L Final    CO2 04/25/2024 30  21 - 32 mmol/L Final    ANION GAP 04/25/2024 8  4 - 13 mmol/L Final    BUN 04/25/2024 4 (L)  5 - 25 mg/dL Final    Creatinine 04/25/2024 0.55 (L)  0.60 - 1.30 mg/dL Final    Standardized to IDMS reference method    Glucose, Fasting 04/25/2024 102 (H)  65 - 99 mg/dL Final    Calcium 04/25/2024 9.5  8.4 - 10.2 mg/dL Final    AST 04/25/2024 16  13 - 39 U/L Final    ALT 04/25/2024 9  7 - 52 U/L Final    Specimen collection should occur prior to Sulfasalazine administration due to the potential for falsely depressed results.     Alkaline Phosphatase 04/25/2024 41  34 - 104 U/L Final    Total Protein 04/25/2024 6.7  6.4 - 8.4 g/dL Final    Albumin 04/25/2024 4.6  3.5 - 5.0 g/dL Final    Total Bilirubin 04/25/2024 0.57  0.20 - 1.00 mg/dL Final    Use of this assay is not recommended for patients undergoing treatment with eltrombopag due to the potential for falsely elevated results.  N-acetyl-p-benzoquinone imine (metabolite of Acetaminophen) will generate erroneously low results in samples for patients that have taken an overdose of Acetaminophen.    eGFR 04/25/2024 103  ml/min/1.73sq m Final    Amylase 04/25/2024 28 (L)  29 - 103 IU/L Final    Lipase 04/25/2024 11  11 - 82 u/L Final    Color, UA 04/25/2024 Light Yellow   Final    Clarity, UA 04/25/2024 Clear   Final    Specific Gravity, UA 04/25/2024 <1.005 (L)  1.005 - 1.030 Final    pH, UA 04/25/2024 6.0  4.5, 5.0, 5.5, 6.0, 6.5, 7.0, 7.5, 8.0 Final    Leukocytes, UA 04/25/2024 Negative  Negative Final    Nitrite, UA 04/25/2024 Negative  Negative Final    Protein, UA 04/25/2024 Negative  Negative mg/dl Final    Glucose, UA 04/25/2024 Negative  Negative mg/dl Final    Ketones, UA 04/25/2024 Negative   Negative mg/dl Final    Urobilinogen, UA 04/25/2024 <2.0  <2.0 mg/dl mg/dl Final    Bilirubin, UA 04/25/2024 Negative  Negative Final    Occult Blood, UA 04/25/2024 Small (A)  Negative Final    Urine Culture 04/25/2024 No Growth <1000 cfu/mL   Final    RBC, UA 04/25/2024 1-2  None Seen, 0-1, 1-2, 2-4, 0-5 /hpf Final    WBC, UA 04/25/2024 0-1  None Seen, 0-1, 1-2, 0-5, 2-4 /hpf Final    Epithelial Cells 04/25/2024 Occasional  None Seen, Occasional /hpf Final    Bacteria, UA 04/25/2024 None Seen  None Seen, Occasional /hpf Final

## 2024-05-22 ENCOUNTER — APPOINTMENT (OUTPATIENT)
Dept: LAB | Facility: MEDICAL CENTER | Age: 59
End: 2024-05-22
Payer: COMMERCIAL

## 2024-05-22 ENCOUNTER — APPOINTMENT (OUTPATIENT)
Dept: RADIOLOGY | Facility: MEDICAL CENTER | Age: 59
End: 2024-05-22
Payer: COMMERCIAL

## 2024-05-22 DIAGNOSIS — M35.9 UNDIFFERENTIATED CONNECTIVE TISSUE DISEASE (HCC): ICD-10-CM

## 2024-05-22 LAB
ALBUMIN SERPL BCP-MCNC: 4.4 G/DL (ref 3.5–5)
ALP SERPL-CCNC: 42 U/L (ref 34–104)
ALT SERPL W P-5'-P-CCNC: 9 U/L (ref 7–52)
ANION GAP SERPL CALCULATED.3IONS-SCNC: 10 MMOL/L (ref 4–13)
AST SERPL W P-5'-P-CCNC: 15 U/L (ref 13–39)
BACTERIA UR QL AUTO: ABNORMAL /HPF
BASOPHILS # BLD AUTO: 0.04 THOUSANDS/ÂΜL (ref 0–0.1)
BASOPHILS NFR BLD AUTO: 1 % (ref 0–1)
BILIRUB SERPL-MCNC: 0.44 MG/DL (ref 0.2–1)
BILIRUB UR QL STRIP: NEGATIVE
BUN SERPL-MCNC: 8 MG/DL (ref 5–25)
C3 SERPL-MCNC: 122 MG/DL (ref 87–200)
C4 SERPL-MCNC: 39 MG/DL (ref 19–52)
CALCIUM SERPL-MCNC: 9.2 MG/DL (ref 8.4–10.2)
CHLORIDE SERPL-SCNC: 103 MMOL/L (ref 96–108)
CLARITY UR: ABNORMAL
CO2 SERPL-SCNC: 28 MMOL/L (ref 21–32)
COLOR UR: YELLOW
CREAT SERPL-MCNC: 0.5 MG/DL (ref 0.6–1.3)
CREAT UR-MCNC: 134.4 MG/DL
CRP SERPL QL: <1 MG/L
EOSINOPHIL # BLD AUTO: 0.09 THOUSAND/ÂΜL (ref 0–0.61)
EOSINOPHIL NFR BLD AUTO: 2 % (ref 0–6)
ERYTHROCYTE [DISTWIDTH] IN BLOOD BY AUTOMATED COUNT: 12.2 % (ref 11.6–15.1)
ERYTHROCYTE [SEDIMENTATION RATE] IN BLOOD: 15 MM/HOUR (ref 0–29)
GFR SERPL CREATININE-BSD FRML MDRD: 107 ML/MIN/1.73SQ M
GLUCOSE P FAST SERPL-MCNC: 86 MG/DL (ref 65–99)
GLUCOSE UR STRIP-MCNC: NEGATIVE MG/DL
HCT VFR BLD AUTO: 40.8 % (ref 34.8–46.1)
HGB BLD-MCNC: 13.4 G/DL (ref 11.5–15.4)
HGB UR QL STRIP.AUTO: ABNORMAL
IMM GRANULOCYTES # BLD AUTO: 0.02 THOUSAND/UL (ref 0–0.2)
IMM GRANULOCYTES NFR BLD AUTO: 0 % (ref 0–2)
KETONES UR STRIP-MCNC: NEGATIVE MG/DL
LEUKOCYTE ESTERASE UR QL STRIP: NEGATIVE
LYMPHOCYTES # BLD AUTO: 2.22 THOUSANDS/ÂΜL (ref 0.6–4.47)
LYMPHOCYTES NFR BLD AUTO: 38 % (ref 14–44)
MCH RBC QN AUTO: 33.3 PG (ref 26.8–34.3)
MCHC RBC AUTO-ENTMCNC: 32.8 G/DL (ref 31.4–37.4)
MCV RBC AUTO: 102 FL (ref 82–98)
MONOCYTES # BLD AUTO: 0.45 THOUSAND/ÂΜL (ref 0.17–1.22)
MONOCYTES NFR BLD AUTO: 8 % (ref 4–12)
MUCOUS THREADS UR QL AUTO: ABNORMAL
NEUTROPHILS # BLD AUTO: 3.09 THOUSANDS/ÂΜL (ref 1.85–7.62)
NEUTS SEG NFR BLD AUTO: 51 % (ref 43–75)
NITRITE UR QL STRIP: NEGATIVE
NON-SQ EPI CELLS URNS QL MICRO: ABNORMAL /HPF
NRBC BLD AUTO-RTO: 0 /100 WBCS
PH UR STRIP.AUTO: 6 [PH]
PLATELET # BLD AUTO: 209 THOUSANDS/UL (ref 149–390)
PMV BLD AUTO: 10.3 FL (ref 8.9–12.7)
POTASSIUM SERPL-SCNC: 3.8 MMOL/L (ref 3.5–5.3)
PROT SERPL-MCNC: 6.8 G/DL (ref 6.4–8.4)
PROT UR STRIP-MCNC: ABNORMAL MG/DL
PROT UR-MCNC: 15 MG/DL
PROT/CREAT UR: 0.11 MG/G{CREAT} (ref 0–0.1)
RBC # BLD AUTO: 4.02 MILLION/UL (ref 3.81–5.12)
RBC #/AREA URNS AUTO: ABNORMAL /HPF
SODIUM SERPL-SCNC: 141 MMOL/L (ref 135–147)
SP GR UR STRIP.AUTO: 1.02 (ref 1–1.03)
UROBILINOGEN UR STRIP-ACNC: <2 MG/DL
WBC # BLD AUTO: 5.91 THOUSAND/UL (ref 4.31–10.16)
WBC #/AREA URNS AUTO: ABNORMAL /HPF

## 2024-05-22 PROCEDURE — 80053 COMPREHEN METABOLIC PANEL: CPT | Performed by: INTERNAL MEDICINE

## 2024-05-22 PROCEDURE — 36415 COLL VENOUS BLD VENIPUNCTURE: CPT | Performed by: INTERNAL MEDICINE

## 2024-05-22 PROCEDURE — 85652 RBC SED RATE AUTOMATED: CPT | Performed by: INTERNAL MEDICINE

## 2024-05-22 PROCEDURE — 81001 URINALYSIS AUTO W/SCOPE: CPT

## 2024-05-22 PROCEDURE — 84156 ASSAY OF PROTEIN URINE: CPT

## 2024-05-22 PROCEDURE — 86225 DNA ANTIBODY NATIVE: CPT | Performed by: INTERNAL MEDICINE

## 2024-05-22 PROCEDURE — 73030 X-RAY EXAM OF SHOULDER: CPT

## 2024-05-22 PROCEDURE — 86160 COMPLEMENT ANTIGEN: CPT | Performed by: INTERNAL MEDICINE

## 2024-05-22 PROCEDURE — 82570 ASSAY OF URINE CREATININE: CPT

## 2024-05-22 PROCEDURE — 86140 C-REACTIVE PROTEIN: CPT | Performed by: INTERNAL MEDICINE

## 2024-05-22 PROCEDURE — 85025 COMPLETE CBC W/AUTO DIFF WBC: CPT | Performed by: INTERNAL MEDICINE

## 2024-05-22 RX ADMIN — LIDOCAINE HYDROCHLORIDE 1 ML: 10 INJECTION, SOLUTION INFILTRATION; PERINEURAL at 16:04

## 2024-05-22 RX ADMIN — TRIAMCINOLONE ACETONIDE 40 MG: 40 INJECTION, SUSPENSION INTRA-ARTICULAR; INTRAMUSCULAR at 16:05

## 2024-05-23 LAB — DSDNA AB SER-ACNC: <1 IU/ML (ref 0–9)

## 2024-05-29 ENCOUNTER — CONSULT (OUTPATIENT)
Dept: PAIN MEDICINE | Facility: CLINIC | Age: 59
End: 2024-05-29
Payer: COMMERCIAL

## 2024-05-29 ENCOUNTER — APPOINTMENT (OUTPATIENT)
Dept: RADIOLOGY | Facility: MEDICAL CENTER | Age: 59
End: 2024-05-29
Payer: COMMERCIAL

## 2024-05-29 VITALS
SYSTOLIC BLOOD PRESSURE: 102 MMHG | RESPIRATION RATE: 16 BRPM | DIASTOLIC BLOOD PRESSURE: 67 MMHG | WEIGHT: 103 LBS | HEIGHT: 60 IN | HEART RATE: 105 BPM | BODY MASS INDEX: 20.22 KG/M2

## 2024-05-29 DIAGNOSIS — M54.9 MID BACK PAIN: ICD-10-CM

## 2024-05-29 DIAGNOSIS — G89.29 CHRONIC PAIN OF BOTH SHOULDERS: ICD-10-CM

## 2024-05-29 DIAGNOSIS — M54.16 LUMBAR RADICULOPATHY: ICD-10-CM

## 2024-05-29 DIAGNOSIS — M54.14 THORACIC RADICULOPATHY: ICD-10-CM

## 2024-05-29 DIAGNOSIS — G89.4 CHRONIC PAIN SYNDROME: ICD-10-CM

## 2024-05-29 DIAGNOSIS — M54.2 NECK PAIN: Primary | ICD-10-CM

## 2024-05-29 DIAGNOSIS — M54.2 NECK PAIN: ICD-10-CM

## 2024-05-29 DIAGNOSIS — M51.36 LUMBAR DEGENERATIVE DISC DISEASE: ICD-10-CM

## 2024-05-29 DIAGNOSIS — M25.511 CHRONIC PAIN OF BOTH SHOULDERS: ICD-10-CM

## 2024-05-29 DIAGNOSIS — M25.512 CHRONIC PAIN OF BOTH SHOULDERS: ICD-10-CM

## 2024-05-29 PROCEDURE — 72072 X-RAY EXAM THORAC SPINE 3VWS: CPT

## 2024-05-29 PROCEDURE — 99204 OFFICE O/P NEW MOD 45 MIN: CPT | Performed by: ANESTHESIOLOGY

## 2024-05-29 PROCEDURE — 72050 X-RAY EXAM NECK SPINE 4/5VWS: CPT

## 2024-05-29 PROCEDURE — 72114 X-RAY EXAM L-S SPINE BENDING: CPT

## 2024-05-29 NOTE — PROGRESS NOTES
Assessment:  1. Neck pain    2. Lumbar degenerative disc disease    3. Chronic pain of both shoulders    4. Lumbar radiculopathy    5. Chronic pain syndrome    6. Mid back pain    7. Thoracic radiculopathy        Plan:  Patient is a 58-year-old female with complaints of shoulder pain, mid back pain, low back pain, left leg pain with chronic pain syndrome secondary lumbar degenerative disease, lumbar radiculopathy, greater trochanteric bursitis presents to office for initial consultation.  Patient been having increasing abdominal pain and has been seeing a GI doctor in reference to that pain.  Majority of the GI scans have been negative and the gastroenterology specialist feels that the pain is originating from the low back.  At this time we will try to decipher between musculoskeletal/neuropathic pain and GI symptoms through diagnostic testing.  1.  We will order physical therapy thoracic and lumbar core strengthening exercises  2.  Will order an x-ray of the cervical, thoracic, lumbar spine to assess for the degenerative changes or correlate patient current presentation.  3.  After patient completed physical therapy we will then order an MRI of the thoracic and lumbar spine to better assess the discogenic pathology on patient's current presentation.  Patient continues to have in addition to abdominal pain which appears to be in the T8 through T11 nerve root distribution and left lower extremity L4 and L5 nerve root distribution  4.  Follow-up in 6 weeks with      History of Present Illness:    The patient is a 58 y.o. female who presents for consultation in regards to Back Pain.  Symptoms have been present for 1 year. Symptoms began without any precipitating injury or trauma. Pain is reported to be 6 on the numeric rating scale.  Symptoms are felt nearly constantly and worst in the no typical pattern.  Symptoms are characterized as burning, cramping, sharp, and cutting.  Symptoms are associated with left leg  weakness.  Aggravating factors include standing, bending, leaning forward, leaning bckward, walking, exercise, and bowel movements.  Relieving factors include lying down and relaxation.  No change in symptoms with kneeling, sitting, and coughing/sneezing.  Treatments that have been helpful include prior injections including TPI left greater trochanteric . physical therapy and home exercise have provided no relief.  Medications to relieve symptoms include Flexeril, diclofenac gel.    Review of Systems:    Review of Systems   Constitutional:  Positive for chills and unexpected weight change.   Eyes:  Positive for pain.   Respiratory:  Positive for cough, shortness of breath and wheezing.    Gastrointestinal:  Positive for abdominal pain.   Genitourinary:  Positive for hematuria.   Musculoskeletal:  Positive for back pain and myalgias.        Joint pain     Neurological:  Positive for dizziness and numbness.   All other systems reviewed and are negative.          Past Medical History:   Diagnosis Date    Acquired ichthyosis     last assessed: 5/9/2013    Anesthesia     Pt reports severe acid reflux after getting anesthesia- sometimes presents as chest pain    Anxiety     Cervical radiculopathy     last assessed: 6/13/2014    Colorectal polyps     last assessed: 3/9/2015    COPD (chronic obstructive pulmonary disease) ([facility])     Depression     Diverticulosis of colon     Foot pain     GERD (gastroesophageal reflux disease)     Heart disease 1997    Coronary artery disease    Hepatitis C july 2018    Took meds no longer have    Hyperlipemia     Hypertension     Lupus ([facility])     Myocardial infarction ([facility])     at age 32    Osteoarthritis unsure    Osteopenia     last assessed: 12/6/2013    Palpitations     last assessed: 12/31/2014    PVD (peripheral vascular disease) ([facility])     last assessed: 12/3/2012    RA (rheumatoid arthritis) ([facility])     Rheumatoid arthritis ([facility]) unsure    Scapular dyskinesis     last assessed:  2014    Shortness of breath     Stomach disorder Unsure    Tendonitis of left rotator cuff     last assessed: 2014    Vocal cord polyps     last assessed: 2014       Past Surgical History:   Procedure Laterality Date    ABDOMINAL SURGERY      exploratory    CARDIAC SURGERY      cardiac stent      SECTION      last assessed: 2014    CHOLECYSTECTOMY      last assessed: 2014    COLONOSCOPY  10/27/2014    CORONARY ANGIOPLASTY WITH STENT PLACEMENT      LAD stent    DENTAL SURGERY      last assessed: 2014    ELBOW SURGERY Bilateral     arthroplasty    EPIDURAL BLOCK INJECTION N/A 2019    Procedure: C7 T1 Cervical Epidural Steroid Injection (74387);  Surgeon: Ricardo Lawson MD;  Location: [facility];  Service: Pain Management     EPIDURAL BLOCK INJECTION Bilateral 2019    Procedure: BLOCK / INJECTION EPIDURAL STEROID CERVICAL - C7-T1;  Surgeon: Ricardo Lawson MD;  Location: [facility];  Service: Pain Management     EPIDURAL BLOCK INJECTION Left 10/03/2019    Procedure: C7-T1 interlaminar epidural steroid injection;  Surgeon: Ricardo Lawson MD;  Location: [facility];  Service: Pain Management     ESOPHAGOGASTRODUODENOSCOPY      ESOPHAGOGASTRODUODENOSCOPY N/A 2016    Procedure: ESOPHAGOGASTRODUODENOSCOPY (EGD);  Surgeon: Alejandro Carvajal MD;  Location: [facility];  Service:     FL GUIDED NEEDLE PLAC BX/ASP/INJ  2019    FL GUIDED NEEDLE PLAC BX/ASP/INJ  2019    FL GUIDED NEEDLE PLAC BX/ASP/INJ  10/03/2019    FL INJECTION LEFT SHOULDER (ARTHROGRAM)  2018    FOOT SURGERY Right 02/06/2015    x 4    HYSTERECTOMY      last assessed: 2014    MN ARTHROCENTESIS ASPIR&/INJ MAJOR JT/BURSA W/O US Bilateral 2019    Procedure: GREATER TROCHANTERIC HIP INJECTION;  Surgeon: Ricardo Lawson MD;  [facility]: [facility];  Service: Pain Management     MN EXCISON TUMOR SOFT TISSUE THIGH/KNEE SUBQ 3 CM/> Right 2023    Procedure: EXCISION  BIOPSY TISSUE LESION/MASS LOWER EXTREMITY;  Surgeon: Franky Bragg DO;  Location: [facility];  Service: Orthopedics    KS SURGICAL ARTHROSCOPY SHOULDER BICEPS TENODESIS Left 10/15/2018    Procedure: ARTHROSCOPY SHOULDER; Debridement of shoulder;  Surgeon: Francis Byrnes MD;  Location: [facility];  Service: Orthopedics    KS SURGICAL ARTHROSCOPY SHOULDER W/ROTATOR CUFF RPR Left 07/23/2018    Procedure: ARTHROSCOPY SHOULDER, subacromial decompression, synovectomy;  Surgeon: Francis Byrnes MD;  Location: [facility];  Service: Orthopedics    SHOULDER SURGERY  june and oct 2018    THROAT SURGERY      TOOTH EXTRACTION      TRIGEMINAL NERVE DECOMPRESSION Right 06/15/2018    Procedure: FOOT NEUROPLASTY;  Surgeon: Hernan Resendiz DPM;  Location: [facility];  Service: Podiatry       Family History   Problem Relation Age of Onset    No Known Problems Mother     No Known Problems Father     No Known Problems Maternal Grandmother     No Known Problems Maternal Grandfather     No Known Problems Paternal Grandmother     No Known Problems Paternal Grandfather     Early death Sister     Hypertension Sister     No Known Problems Maternal Aunt     No Known Problems Maternal Aunt     No Known Problems Maternal Aunt     No Known Problems Maternal Aunt        Social History     Occupational History    Not on file   Tobacco Use    Smoking status: Every Day     Current packs/day: 0.25     Average packs/day: 0.3 packs/day for 40.0 years (10.0 ttl pk-yrs)     Types: Cigarettes    Smokeless tobacco: Never    Tobacco comments:     She is trying to cut back on her own   Vaping Use    Vaping status: Never Used   Substance and Sexual Activity    Alcohol use: No     Comment: rare    Drug use: No    Sexual activity: Not Currently     Birth control/protection: Abstinence         Current Outpatient Medications:     albuterol (PROVENTIL HFA,VENTOLIN HFA) 90 mcg/act inhaler, Inhale 2 puffs every 6 (six) hours as needed for wheezing, Disp: 18 g, Rfl:  1    alendronate (FOSAMAX) 70 mg tablet, Take 1 tablet (70 mg total) by mouth every 7 days, Disp: 12 tablet, Rfl: 1    bisacodyl (DULCOLAX) 5 mg EC tablet, Take 4 tablets (20 mg total) by mouth once for 1 dose, Disp: 4 tablet, Rfl: 0    calcium carbonate (TUMS) 500 mg chewable tablet, Chew 1 tablet if needed for indigestion or heartburn, Disp: , Rfl:     cyclobenzaprine (FLEXERIL) 10 mg tablet, Take 1 tablet (10 mg total) by mouth daily at bedtime (Patient taking differently: Take 10 mg by mouth daily at bedtime PRN), Disp: 30 tablet, Rfl: 6    Diclofenac Sodium (VOLTAREN) 1 %, Apply 2 g topically 4 (four) times a day, Disp: 100 g, Rfl: 6    dicyclomine (BENTYL) 20 mg tablet, Take 1 tablet (20 mg total) by mouth every 6 (six) hours as needed (abdominal pain), Disp: 60 tablet, Rfl: 0    diphenhydrAMINE-acetaminophen (TYLENOL PM)  MG TABS, Take 1 tablet by mouth daily at bedtime as needed for sleep, Disp: , Rfl:     ezetimibe (ZETIA) 10 mg tablet, Take 1 tablet (10 mg total) by mouth daily, Disp: 90 tablet, Rfl: 3    hydroxychloroquine (PLAQUENIL) 200 mg tablet, Take 1 tablet (200 mg total) by mouth daily, Disp: 90 tablet, Rfl: 1    ibuprofen (MOTRIN) 800 mg tablet, Take 1 tablet (800 mg total) by mouth every 8 (eight) hours as needed for moderate pain Take with food, Disp: 90 tablet, Rfl: 6    LACTASE ENZYME PO, Take 3 tablets by mouth if needed As needed, Disp: , Rfl:     omeprazole (PriLOSEC) 20 mg delayed release capsule, Take 1 capsule (20 mg total) by mouth daily before breakfast, Disp: 30 capsule, Rfl: 1    oxyCODONE-acetaminophen (PERCOCET) 5-325 mg per tablet, Take 1 tablet by mouth every 8 (eight) hours as needed for moderate pain, Disp: , Rfl:     polyethylene glycol (MiraLax) 17 GM/SCOOP powder, Take 238 g by mouth once for 1 dose Take 238 g my mouth. Use as directed, Disp: 238 g, Rfl: 0    predniSONE 5 mg tablet, 4 tabs x7 days, then 3 tabs x7 days, then 2 tabs x7 days, then 1 tab x7 days, then  stop., Disp: 70 tablet, Rfl: 0    Probiotic Product (PROBIOTIC DAILY PO), Take 2 capsules by mouth in the morning Probiotic-prebiotic, Disp: , Rfl:     rosuvastatin (CRESTOR) 5 mg tablet, Take 1 tablet (5 mg total) by mouth daily, Disp: 90 tablet, Rfl: 3    Vitamin D-Vitamin K (K2-D3 5000 PO), Take by mouth daily, Disp: , Rfl:     nitroglycerin (NITROSTAT) 0.4 mg SL tablet, Place 1 tablet (0.4 mg total) under the tongue every 5 (five) minutes as needed for chest pain (Patient not taking: Reported on 5/29/2024), Disp: 20 tablet, Rfl: 3    saccharomyces boulardii (Florastor) 250 mg capsule, Take 250 mg by mouth in the morning (Patient not taking: Reported on 5/21/2024), Disp: , Rfl:     Allergies   Allergen Reactions    Ceclor [Cefaclor] Anaphylaxis    Cephalexin     Codeine Itching     Reaction Date: [id number];     Gabapentin      Body tremors, sweats    Lyrica [Pregabalin]      Body tremors, chills, heaviness in chest    Ticlopidine Hives     Other reaction(s): Hives    Penicillins Rash     Other reaction(s): Other       Physical Exam:    /67   Pulse 105   Resp 16   Ht 5' (1.524 m)   Wt 46.7 kg (103 lb)   BMI 20.12 kg/m²     Constitutional: normal, well developed, well nourished, alert, in no distress and non-toxic and no overt pain behavior.  Eyes: anicteric  HEENT: grossly intact  Neck: supple, symmetric, trachea midline and no masses   Pulmonary:even and unlabored  Cardiovascular:No edema or pitting edema present  Skin:Normal without rashes or lesions and well hydrated  Psychiatric:Mood and affect appropriate  Neurologic:Cranial Nerves II-XII grossly intact  Musculoskeletal:antalgic    Cervical Spine examination demonstrates. Decreased ROM secondary to pain with lateral rotation to the left/right and bending to the left/right, in addition to neck flexion. 5/5 upper extremity strength in all muscle groups bilaterally. Negative Spurling's maneuver to the b/l Ue, sensitivity to light touch intact b/l  Ue.    Lumbar/Sacral Spine examination demonstrates.  Decreased range of motion lumbar spine with pain upon: flexion, lateral rotation to the left/right, and bending to the left/right.  Bilateral lumbar paraspinals tender to palpation. Muscle spasms noted in the lumbar area bilaterally. 4/5 lower extremity strength in all muscle groups bilaterally. Positive seated straight leg raise for bilateral lower extremities.  Sensitivity to light touch intact bilateral lower extremities. 2+ reflexes in the patella and Achilles.  No ankle clonus    Imaging            No orders to display       No orders of the defined types were placed in this encounter.

## 2024-06-06 ENCOUNTER — OFFICE VISIT (OUTPATIENT)
Dept: OBGYN CLINIC | Facility: CLINIC | Age: 59
End: 2024-06-06
Payer: COMMERCIAL

## 2024-06-06 VITALS
WEIGHT: 102.6 LBS | DIASTOLIC BLOOD PRESSURE: 67 MMHG | SYSTOLIC BLOOD PRESSURE: 103 MMHG | HEIGHT: 60 IN | BODY MASS INDEX: 20.14 KG/M2

## 2024-06-06 DIAGNOSIS — R31.29 MICROSCOPIC HEMATURIA: Primary | ICD-10-CM

## 2024-06-06 DIAGNOSIS — Z12.4 SCREENING FOR CERVICAL CANCER: ICD-10-CM

## 2024-06-06 PROCEDURE — 99203 OFFICE O/P NEW LOW 30 MIN: CPT | Performed by: OBSTETRICS & GYNECOLOGY

## 2024-06-06 NOTE — PROGRESS NOTES
Assessment:  58 y.o.  who presents for GYN eval in setting of weight loss. No overt GYN pathology.    Plan:  Diagnoses and all orders for this visit:    Microscopic hematuria  -     Cytology, urine; Future    Screening for cervical cancer  -     Per ASCCP guidelines, does not require further pap testing  - Normal exam without overy gyn pathology appreciated  - CT A&P reviewed. No evidence of adnexal masses  - Discussed gyn resources as needed      __________________________________________________________________    Subjective   Gloria Mcdaniel is a 58 y.o.  who presents for consult in setting of unexplained weight loss.    Unsure what parts she has remaining or if needs GYN assessment. Reports has had ongoing GI concerns and over this time lost 60lbs. Feels explainable by symptoms. Pain noted is diffuse and related to bowel patterns. Following with GI for same. Reports pelvic pain, but within this symptom. No vaginal bleeding, dyscharge dysuria, urinary frequency/urgency, gross hematuria, or blood in stools. GYN hx notable for hyst for bleeding. Tried a D&C prior, but insufficient relief. Was told she had partial hyst, but required unexpected laparotomy and woke up in ICU. Told tumors all over uterus. Denies hx STD or abnormal pap. Does not think she had paps after.         The following portions of the patient's history were reviewed and updated as appropriate: allergies, current medications, past family history, past medical history, past social history, past surgical history, and problem list.    Review of Systems  Review of Systems   Constitutional:  Positive for unexpected weight change. Negative for chills, fatigue and fever.   Respiratory:  Negative for shortness of breath.    Cardiovascular:  Negative for chest pain and palpitations.   Gastrointestinal:  Positive for abdominal distention, abdominal pain, diarrhea and nausea. Negative for constipation and vomiting.   Genitourinary:   Positive for pelvic pain. Negative for decreased urine volume, dysuria, flank pain, menstrual problem, vaginal bleeding, vaginal discharge and vaginal pain.   Neurological:  Negative for dizziness, light-headedness and headaches.           Objective  /67   Ht 5' (1.524 m)   Wt 46.5 kg (102 lb 9.6 oz)   BMI 20.04 kg/m²      Physical Exam:  Physical Exam  Exam conducted with a chaperone present.   Constitutional:       General: She is not in acute distress.     Appearance: Normal appearance. She is not ill-appearing, toxic-appearing or diaphoretic.   Eyes:      General: No scleral icterus.        Right eye: No discharge.         Left eye: No discharge.      Conjunctiva/sclera: Conjunctivae normal.   Cardiovascular:      Rate and Rhythm: Normal rate.   Pulmonary:      Effort: Pulmonary effort is normal. No respiratory distress.   Abdominal:      General: There is no distension.      Palpations: There is no mass.      Tenderness: There is no abdominal tenderness. There is no guarding or rebound.      Hernia: No hernia is present.   Genitourinary:     General: Normal vulva.      Exam position: Lithotomy position.      Labia:         Right: No rash, tenderness or lesion.         Left: No rash, tenderness or lesion.       Urethra: No prolapse, urethral swelling or urethral lesion.      Vagina: No signs of injury. No vaginal discharge, erythema (atrophic), tenderness, bleeding or prolapsed vaginal walls.      Cervix: No cervical motion tenderness (surgically absent. intact, normal appearing cuff. closed and normal textured on palpation).      Uterus: Absent.       Adnexa:         Right: No mass (nonpalpable b/l).          Left: No mass.     Musculoskeletal:         General: No swelling.   Skin:     General: Skin is warm and dry.      Coloration: Skin is not jaundiced or pale.      Findings: No bruising or erythema.   Neurological:      Mental Status: She is alert.   Psychiatric:         Mood and Affect: Mood  normal.         Behavior: Behavior normal.         Thought Content: Thought content normal.         Judgment: Judgment normal.           Lab Review  UA with micro (5/22/24)  CT A&P, images reviewed as well (4/25/24)

## 2024-06-07 ENCOUNTER — APPOINTMENT (OUTPATIENT)
Dept: LAB | Facility: MEDICAL CENTER | Age: 59
End: 2024-06-07
Payer: COMMERCIAL

## 2024-06-07 DIAGNOSIS — R31.29 MICROSCOPIC HEMATURIA: ICD-10-CM

## 2024-06-07 PROCEDURE — 88112 CYTOPATH CELL ENHANCE TECH: CPT | Performed by: PATHOLOGY

## 2024-06-10 ENCOUNTER — TELEPHONE (OUTPATIENT)
Age: 59
End: 2024-06-10

## 2024-06-12 PROCEDURE — 88112 CYTOPATH CELL ENHANCE TECH: CPT | Performed by: PATHOLOGY

## 2024-06-21 ENCOUNTER — TELEPHONE (OUTPATIENT)
Age: 59
End: 2024-06-21

## 2024-06-21 NOTE — TELEPHONE ENCOUNTER
Patient will be flying for the first time and she wanted to know if there are any concerns regarding her flying.  If you could please call the patient and advise

## 2024-06-25 ENCOUNTER — HOSPITAL ENCOUNTER (OUTPATIENT)
Dept: MAMMOGRAPHY | Facility: HOSPITAL | Age: 59
Discharge: HOME | End: 2024-06-25
Attending: INTERNAL MEDICINE
Payer: COMMERCIAL

## 2024-06-25 ENCOUNTER — HOSPITAL ENCOUNTER (OUTPATIENT)
Dept: BONE DENSITY | Facility: HOSPITAL | Age: 59
Discharge: HOME | End: 2024-06-25
Attending: INTERNAL MEDICINE
Payer: COMMERCIAL

## 2024-06-25 VITALS — BODY MASS INDEX: 20.03 KG/M2 | HEIGHT: 60 IN | WEIGHT: 102 LBS

## 2024-06-25 VITALS — WEIGHT: 102 LBS | BODY MASS INDEX: 20.03 KG/M2 | HEIGHT: 60 IN

## 2024-06-25 DIAGNOSIS — M81.0 OSTEOPOROSIS, UNSPECIFIED OSTEOPOROSIS TYPE, UNSPECIFIED PATHOLOGICAL FRACTURE PRESENCE: ICD-10-CM

## 2024-06-25 DIAGNOSIS — Z12.31 ENCOUNTER FOR SCREENING MAMMOGRAM FOR BREAST CANCER: ICD-10-CM

## 2024-06-25 PROCEDURE — 77067 SCR MAMMO BI INCL CAD: CPT

## 2024-06-25 PROCEDURE — 77080 DXA BONE DENSITY AXIAL: CPT

## 2024-06-25 PROCEDURE — 77063 BREAST TOMOSYNTHESIS BI: CPT

## 2024-06-25 NOTE — TELEPHONE ENCOUNTER
Called patient and informed her that I discussed her concern about flying with Dr. Rausch and he stated that it is okay for her to do.

## 2024-06-26 ENCOUNTER — CONSULT (OUTPATIENT)
Dept: ENDOCRINOLOGY | Facility: CLINIC | Age: 59
End: 2024-06-26
Payer: COMMERCIAL

## 2024-06-26 VITALS
HEART RATE: 79 BPM | DIASTOLIC BLOOD PRESSURE: 88 MMHG | WEIGHT: 101 LBS | OXYGEN SATURATION: 98 % | SYSTOLIC BLOOD PRESSURE: 138 MMHG | HEIGHT: 60 IN | BODY MASS INDEX: 19.83 KG/M2

## 2024-06-26 DIAGNOSIS — E16.2 HYPOGLYCEMIA: Primary | ICD-10-CM

## 2024-06-26 DIAGNOSIS — K31.84 GASTROPARESIS: ICD-10-CM

## 2024-06-26 PROCEDURE — 99244 OFF/OP CNSLTJ NEW/EST MOD 40: CPT | Performed by: STUDENT IN AN ORGANIZED HEALTH CARE EDUCATION/TRAINING PROGRAM

## 2024-06-26 NOTE — PROGRESS NOTES
Gloria Mcdaniel 58 y.o. female MRN: [id number]    Encounter: [id number]      Assessment & Plan     Problem List Items Addressed This Visit     Hypoglycemia - Primary     Patient reports history of hypoglycemia. No CBG records. Whipple's triad not satisfied. Risk factors include poor nutrition, possibly high glycemic index meals leading to reactive lows. May benefit from nutrition referral. We discussed CGM trial for biofeedback. Patient to go on vacation in July. Will call office upon return to arrange for CMG placement, and will have follow up 2-weeks thereafter with labs. Will follow         Relevant Orders    Hemoglobin A1C    Comprehensive metabolic panel    Beta Hydroxybutyrate    Insulin, fasting    C-peptide    TSH, 3rd generation with Free T4 reflex   Other Visit Diagnoses     Gastroparesis            RTC 6-weeks    CC: hypoglycemia    History of Present Illness     HPI:    Gloria presents for evaluation of hypoglycemia.  She states hypoglycemia is of several years duration. She reports symptos of shaky, queezy, feeling like she could pass out. Symptoms may occur daily or sometimes more often, although recently have been occurring less after stopping fosamax. Patient has never documented a low blood sugar and does not perform CBG testing, nor is she comfortable doing so. Treatment of symptoms with meals does not consistently relieve symptoms. She has no GI surgeries. But she does mention significant GI symptoms and history of gastroparesis. She has been losing weight and does not tend to eat more than a meal per day. She mentions convenience food items including sodas and crackers, among other things.      Review of Systems   Constitutional:  Negative for unexpected weight change.   Gastrointestinal:  Positive for abdominal pain.   Neurological:  Positive for light-headedness. Negative for syncope.   All other systems reviewed and are negative.      Historical Information   Past Medical History:    Diagnosis Date   • Acquired ichthyosis     last assessed: 2013   • Anesthesia     Pt reports severe acid reflux after getting anesthesia- sometimes presents as chest pain   • Anxiety    • Cervical radiculopathy     last assessed: 2014   • Colorectal polyps     last assessed: 3/9/2015   • COPD (chronic obstructive pulmonary disease) (Formerly [facility])    • Depression    • Diverticulosis of colon    • Foot pain    • GERD (gastroesophageal reflux disease)    • Heart disease 1997    Coronary artery disease   • Hepatitis C 2018    Took meds no longer have   • Hyperlipemia    • Hypertension    • Lupus (HCC)    • Myocardial infarction (Formerly [facility])     at age 32   • Osteoarthritis unsure   • Osteopenia     last assessed: 2013   • Palpitations     last assessed: 2014   • PVD (peripheral vascular disease) (Formerly [facility])     last assessed: 12/3/2012   • RA (rheumatoid arthritis) (Formerly [facility])    • Rheumatoid arthritis (Formerly [facility]) unsure   • Scapular dyskinesis     last assessed: 2014   • Shortness of breath    • Stomach disorder Unsure   • Tendonitis of left rotator cuff     last assessed: 2014   • Vocal cord polyps     last assessed: 2014     Past Surgical History:   Procedure Laterality Date   • ABDOMINAL SURGERY      exploratory   • CARDIAC SURGERY      cardiac stent    •  SECTION      last assessed: 2014   • CHOLECYSTECTOMY      last assessed: 2014   • COLONOSCOPY  10/27/2014   • CORONARY ANGIOPLASTY WITH STENT PLACEMENT  1999    LAD stent   • DENTAL SURGERY      last assessed: 2014   • ELBOW SURGERY Bilateral     arthroplasty   • EPIDURAL BLOCK INJECTION N/A 2019    Procedure: C7 T1 Cervical Epidural Steroid Injection (68222);  Surgeon: Ricardo Lawson MD;  [facility]: [facility];  Service: Pain Management    • EPIDURAL BLOCK INJECTION Bilateral 2019    Procedure: BLOCK / INJECTION EPIDURAL STEROID CERVICAL - C7-T1;  Surgeon: Ricardo Lawson MD;  [facility];  Service: Pain  Management    • EPIDURAL BLOCK INJECTION Left 10/03/2019    Procedure: C7-T1 interlaminar epidural steroid injection;  Surgeon: Ricardo Lawson MD;  [facility];  Service: Pain Management    • ESOPHAGOGASTRODUODENOSCOPY     • ESOPHAGOGASTRODUODENOSCOPY N/A 11/04/2016    Procedure: ESOPHAGOGASTRODUODENOSCOPY (EGD);  Surgeon: Alejandro Carvajal MD;  [facility]: [facility];  Service:    • FL GUIDED NEEDLE PLAC BX/ASP/INJ  05/02/2019   • FL GUIDED NEEDLE PLAC BX/ASP/INJ  07/31/2019   • FL GUIDED NEEDLE PLAC BX/ASP/INJ  10/03/2019   • FL INJECTION LEFT SHOULDER (ARTHROGRAM)  09/25/2018   • FOOT SURGERY Right 02/06/2015    x 4   • HYSTERECTOMY      last assessed: 8/8/2014   • ND ARTHROCENTESIS ASPIR&/INJ MAJOR JT/BURSA W/O US Bilateral 07/31/2019    Procedure: GREATER TROCHANTERIC HIP INJECTION;  Surgeon: Ricardo Lawson MD;  [facility];  Service: Pain Management    • ND EXCISON TUMOR SOFT TISSUE THIGH/KNEE SUBQ 3 CM/> Right 9/18/2023    Procedure: EXCISION BIOPSY TISSUE LESION/MASS LOWER EXTREMITY;  Surgeon: Franky Bragg DO;  [facility]: [facility];  Service: Orthopedics   • ND SURGICAL ARTHROSCOPY SHOULDER BICEPS TENODESIS Left 10/15/2018    Procedure: ARTHROSCOPY SHOULDER; Debridement of shoulder;  Surgeon: Francis Byrnes MD;  [facility]: [facility];  Service: Orthopedics   • ND SURGICAL ARTHROSCOPY SHOULDER W/ROTATOR CUFF RPR Left 07/23/2018    Procedure: ARTHROSCOPY SHOULDER, subacromial decompression, synovectomy;  Surgeon: Francsi Byrnes MD;  [facility]: [facility];  Service: Orthopedics   • SHOULDER SURGERY  june and oct 2018   • THROAT SURGERY     • TOOTH EXTRACTION     • TRIGEMINAL NERVE DECOMPRESSION Right 06/15/2018    Procedure: FOOT NEUROPLASTY;  Surgeon: Hernan Resendiz DPM;  [facility]: [facility];  Service: Podiatry     Social History   Social History     Substance and Sexual Activity   Alcohol Use No    Comment: rare     Social History     Substance and Sexual Activity   Drug Use No      Social History     Tobacco Use   Smoking Status Every Day   • Current packs/day: 0.25   • Average packs/day: 0.3 packs/day for 40.0 years (10.0 ttl pk-yrs)   • Types: Cigarettes   Smokeless Tobacco Never   Tobacco Comments    She is trying to cut back on her own     Family History:   Family History   Problem Relation Age of Onset   • No Known Problems Mother    • No Known Problems Father    • No Known Problems Maternal Grandmother    • No Known Problems Maternal Grandfather    • No Known Problems Paternal Grandmother    • No Known Problems Paternal Grandfather    • Early death Sister    • Hypertension Sister    • No Known Problems Maternal Aunt    • No Known Problems Maternal Aunt    • No Known Problems Maternal Aunt    • No Known Problems Maternal Aunt        Meds/Allergies   Current Outpatient Medications   Medication Sig Dispense Refill   • albuterol (PROVENTIL HFA,VENTOLIN HFA) 90 mcg/act inhaler Inhale 2 puffs every 6 (six) hours as needed for wheezing 18 g 1   • calcium carbonate (TUMS) 500 mg chewable tablet Chew 1 tablet if needed for indigestion or heartburn     • diphenhydrAMINE-acetaminophen (TYLENOL PM)  MG TABS Take 1 tablet by mouth daily at bedtime as needed for sleep     • ezetimibe (ZETIA) 10 mg tablet Take 1 tablet (10 mg total) by mouth daily 90 tablet 3   • hydroxychloroquine (PLAQUENIL) 200 mg tablet Take 1 tablet (200 mg total) by mouth daily 90 tablet 1   • ibuprofen (MOTRIN) 800 mg tablet Take 1 tablet (800 mg total) by mouth every 8 (eight) hours as needed for moderate pain Take with food 90 tablet 6   • oxyCODONE-acetaminophen (PERCOCET) 5-325 mg per tablet Take 1 tablet by mouth every 8 (eight) hours as needed for moderate pain     • Probiotic Product (PROBIOTIC DAILY PO) Take 2 capsules by mouth in the morning Probiotic-prebiotic     • rosuvastatin (CRESTOR) 5 mg tablet Take 1 tablet (5 mg total) by mouth daily 90 tablet 3   • Vitamin D-Vitamin K (K2-D3 5000 PO) Take by mouth  daily     • alendronate (FOSAMAX) 70 mg tablet Take 1 tablet (70 mg total) by mouth every 7 days (Patient not taking: Reported on 6/26/2024) 12 tablet 1   • bisacodyl (DULCOLAX) 5 mg EC tablet Take 4 tablets (20 mg total) by mouth once for 1 dose 4 tablet 0   • cyclobenzaprine (FLEXERIL) 10 mg tablet Take 1 tablet (10 mg total) by mouth daily at bedtime (Patient not taking: Reported on 6/26/2024) 30 tablet 6   • Diclofenac Sodium (VOLTAREN) 1 % Apply 2 g topically 4 (four) times a day 100 g 6   • dicyclomine (BENTYL) 20 mg tablet Take 1 tablet (20 mg total) by mouth every 6 (six) hours as needed (abdominal pain) (Patient not taking: Reported on 6/26/2024) 60 tablet 0   • LACTASE ENZYME PO Take 3 tablets by mouth if needed As needed (Patient not taking: Reported on 6/26/2024)     • nitroglycerin (NITROSTAT) 0.4 mg SL tablet Place 1 tablet (0.4 mg total) under the tongue every 5 (five) minutes as needed for chest pain (Patient not taking: Reported on 6/6/2024) 20 tablet 3   • omeprazole (PriLOSEC) 20 mg delayed release capsule Take 1 capsule (20 mg total) by mouth daily before breakfast (Patient not taking: Reported on 6/26/2024) 30 capsule 1   • polyethylene glycol (MiraLax) 17 GM/SCOOP powder Take 238 g by mouth once for 1 dose Take 238 g my mouth. Use as directed 238 g 0   • predniSONE 5 mg tablet 4 tabs x7 days, then 3 tabs x7 days, then 2 tabs x7 days, then 1 tab x7 days, then stop. 70 tablet 0   • saccharomyces boulardii (Florastor) 250 mg capsule Take 250 mg by mouth in the morning (Patient not taking: Reported on 5/21/2024)       No current facility-administered medications for this visit.     Allergies   Allergen Reactions   • Ceclor [Cefaclor] Anaphylaxis   • Cephalexin    • Codeine Itching     Reaction Date: 09Jun2011;    • Gabapentin      Body tremors, sweats   • Lyrica [Pregabalin]      Body tremors, chills, heaviness in chest   • Ticlopidine Hives     Other reaction(s): Hives   • Penicillins Rash      "Other reaction(s): Other       Objective   Vitals: Blood pressure 138/88, pulse 79, height 4' 11.75\" (1.518 m), weight 45.8 kg (101 lb), SpO2 98%.    Physical Exam  Vitals reviewed.   Constitutional:       General: She is not in acute distress.     Appearance: Normal appearance. She is not ill-appearing.   HENT:      Head: Normocephalic and atraumatic.      Nose: Nose normal.   Eyes:      General: No scleral icterus.     Conjunctiva/sclera: Conjunctivae normal.   Pulmonary:      Effort: Pulmonary effort is normal. No respiratory distress.   Abdominal:      Palpations: Abdomen is soft.      Tenderness: There is no abdominal tenderness.   Musculoskeletal:         General: No deformity.      Cervical back: Normal range of motion.   Skin:     General: Skin is warm and dry.   Neurological:      General: No focal deficit present.      Mental Status: She is alert.   Psychiatric:         Mood and Affect: Mood normal.         Behavior: Behavior normal.         The history was obtained from the review of the chart, patient.    Lab Results:   Lab Results   Component Value Date/Time    WBC 5.91 05/22/2024 01:16 PM    WBC 7.85 04/25/2024 03:02 PM    WBC 7.38 09/06/2023 11:31 AM    Hemoglobin 13.4 05/22/2024 01:16 PM    Hemoglobin 14.4 04/25/2024 03:02 PM    Hemoglobin 14.0 09/06/2023 11:31 AM    Hematocrit 40.8 05/22/2024 01:16 PM    Hematocrit 44.3 04/25/2024 03:02 PM    Hematocrit 43.9 09/06/2023 11:31 AM     (H) 05/22/2024 01:16 PM     (H) 04/25/2024 03:02 PM     (H) 09/06/2023 11:31 AM    Platelets 209 05/22/2024 01:16 PM    Platelets 244 04/25/2024 03:02 PM    Platelets 223 09/06/2023 11:31 AM    BUN 8 05/22/2024 01:16 PM    BUN 5 05/11/2024 11:01 AM    BUN 4 (L) 04/25/2024 03:02 PM    Potassium 3.8 05/22/2024 01:16 PM    Potassium 3.8 05/11/2024 11:01 AM    Potassium 3.2 (L) 04/25/2024 03:02 PM    Chloride 103 05/22/2024 01:16 PM    Chloride 103 05/11/2024 11:01 AM    Chloride 98 04/25/2024 03:02 PM    " "CO2 28 05/22/2024 01:16 PM    CO2 31 05/11/2024 11:01 AM    CO2 30 04/25/2024 03:02 PM    Creatinine 0.50 (L) 05/22/2024 01:16 PM    Creatinine 0.49 (L) 05/11/2024 11:01 AM    Creatinine 0.55 (L) 04/25/2024 03:02 PM    AST 15 05/22/2024 01:16 PM    AST 16 04/25/2024 03:02 PM    AST 15 09/06/2023 11:31 AM    ALT 9 05/22/2024 01:16 PM    ALT 9 04/25/2024 03:02 PM    ALT 9 09/06/2023 11:31 AM    Total Protein 6.8 05/22/2024 01:16 PM    Total Protein 6.7 04/25/2024 03:02 PM    Total Protein 6.7 09/06/2023 11:31 AM    Albumin 4.4 05/22/2024 01:16 PM    Albumin 4.6 04/25/2024 03:02 PM    Albumin 4.4 09/06/2023 11:31 AM           Imaging Studies: I have personally reviewed pertinent reports.      Portions of the record may have been created with voice recognition software. Occasional wrong word or \"sound a like\" substitutions may have occurred due to the inherent limitations of voice recognition software. Read the chart carefully and recognize, using context, where substitutions have occurred.    "

## 2024-06-26 NOTE — ASSESSMENT & PLAN NOTE
Patient reports history of hypoglycemia. No CBG records. Whipple's triad not satisfied. Risk factors include poor nutrition, possibly high glycemic index meals leading to reactive lows. May benefit from nutrition referral. We discussed CGM trial for biofeedback. Patient to go on vacation in July. Will call office upon return to arrange for CMG placement, and will have follow up 2-weeks thereafter with labs. Will follow

## 2024-07-08 ENCOUNTER — TELEPHONE (OUTPATIENT)
Dept: RHEUMATOLOGY | Facility: CLINIC | Age: 59
End: 2024-07-08

## 2024-07-08 NOTE — TELEPHONE ENCOUNTER
Rheumatology Prior Authorization Request      Medication/Disease Information:   Diagnosis:  worsening osteoporosis  Medication: Prolia (denosumab) 60mg every 6 month subcutaneous injection in clinic, to receive first one at next clinic visit in 12/2024  Failed or Intolerant to or Contraindicated:   failing alendronate   Screening  Renal function and calcium level:  normal

## 2024-07-09 DIAGNOSIS — K21.9 GASTROESOPHAGEAL REFLUX DISEASE, UNSPECIFIED WHETHER ESOPHAGITIS PRESENT: ICD-10-CM

## 2024-07-09 DIAGNOSIS — R19.5 LOOSE STOOLS: ICD-10-CM

## 2024-07-09 DIAGNOSIS — R10.84 GENERALIZED ABDOMINAL PAIN: ICD-10-CM

## 2024-07-10 RX ORDER — OMEPRAZOLE 20 MG/1
20 CAPSULE, DELAYED RELEASE ORAL
Qty: 30 CAPSULE | Refills: 5 | Status: SHIPPED | OUTPATIENT
Start: 2024-07-10

## 2024-07-10 NOTE — TELEPHONE ENCOUNTER
Will call and justify that patient is having worsening osteoporosis on bisphosphonates and class needs to be switched

## 2024-07-15 NOTE — TELEPHONE ENCOUNTER
Called and was unable to reach patient. Mailbox was full and was unable to LM.     Will try to reach again this afternoon.

## 2024-07-15 NOTE — TELEPHONE ENCOUNTER
I think closest [facility] infusion center for patient will be [place] or [place], need to check with patient

## 2024-07-15 NOTE — TELEPHONE ENCOUNTER
Tried to do a peer to peer. Angelica, please let patient know that her insurance prefers she try once a year Reclast, an IV infusion first, which may be more effective than her current alendronate (Fosamax) pill medication, before we move onto the Prolia. Is she willing to do that? Can be set up at any [facility] center close to her. If she is okay with that, please obtain Reclast prior auth. Let me know if she's okay with the switch.    Rheumatology Prior Authorization Request      Medication/Disease Information:   Diagnosis:  osteoporosis  Medication: 5 mg  IV zoledronic acid once a year for maximum of 3 years  Failed or Intolerant to or Contraindicated:  failing alendronate  Screening  Renal function:  normal

## 2024-07-17 NOTE — TELEPHONE ENCOUNTER
Third attempt to reach patient and was unsuccessful. Unable to LM due to mailbox being full.     Will send certified letter.

## 2024-07-23 ENCOUNTER — APPOINTMENT (OUTPATIENT)
Dept: LAB | Facility: MEDICAL CENTER | Age: 59
End: 2024-07-23
Payer: COMMERCIAL

## 2024-07-23 LAB
ALBUMIN SERPL BCG-MCNC: 4.4 G/DL (ref 3.5–5)
ALP SERPL-CCNC: 38 U/L (ref 34–104)
ALT SERPL W P-5'-P-CCNC: 8 U/L (ref 7–52)
ANION GAP SERPL CALCULATED.3IONS-SCNC: 7 MMOL/L (ref 4–13)
AST SERPL W P-5'-P-CCNC: 15 U/L (ref 13–39)
B-OH-BUTYR SERPL-MCNC: 0.3 MMOL/L (ref 0.02–0.27)
BILIRUB SERPL-MCNC: 0.54 MG/DL (ref 0.2–1)
BUN SERPL-MCNC: 6 MG/DL (ref 5–25)
CALCIUM SERPL-MCNC: 9.2 MG/DL (ref 8.4–10.2)
CHLORIDE SERPL-SCNC: 104 MMOL/L (ref 96–108)
CO2 SERPL-SCNC: 29 MMOL/L (ref 21–32)
CREAT SERPL-MCNC: 0.43 MG/DL (ref 0.6–1.3)
EST. AVERAGE GLUCOSE BLD GHB EST-MCNC: 114 MG/DL
GFR SERPL CREATININE-BSD FRML MDRD: 111 ML/MIN/1.73SQ M
GLUCOSE P FAST SERPL-MCNC: 85 MG/DL (ref 65–99)
HBA1C MFR BLD: 5.6 %
INSULIN SERPL-ACNC: 4.13 UIU/ML (ref 1.9–23)
POTASSIUM SERPL-SCNC: 3.8 MMOL/L (ref 3.5–5.3)
PROT SERPL-MCNC: 6.6 G/DL (ref 6.4–8.4)
SODIUM SERPL-SCNC: 140 MMOL/L (ref 135–147)
TSH SERPL DL<=0.05 MIU/L-ACNC: 0.46 UIU/ML (ref 0.45–4.5)

## 2024-07-24 LAB — C PEPTIDE SERPL-MCNC: 1.8 NG/ML (ref 1.1–4.4)

## 2024-08-01 ENCOUNTER — TELEPHONE (OUTPATIENT)
Age: 59
End: 2024-08-01

## 2024-08-01 NOTE — TELEPHONE ENCOUNTER
Patient called- she was in a few days ago and got a Dexcom G7 put on her arm. She was under the impression that it was going to last until her appointment on 8/8, but it alerted her that it is going to run out tomorrow afternoon.    Do we have a sample she can come  to hold her over until her appointment? She said she hasn't had any episodes but some low sugars overnight. Please advise.

## 2024-08-02 ENCOUNTER — TELEPHONE (OUTPATIENT)
Dept: ENDOCRINOLOGY | Facility: CLINIC | Age: 59
End: 2024-08-02

## 2024-08-02 NOTE — TELEPHONE ENCOUNTER
Patient arrived at office on 8/2/24 to  a sample sensor of Dexcom G7. Asked for assistance for placement-currently unavailable. Informed the patient if she ever needed a CGM placement to give a call to the office to schedule a Nurse Visit. Patient will be putting the sensor on at home-and will call to update us if she has any concerns. Offered a NV for Monday, she was not interested at this time.

## 2024-08-05 ENCOUNTER — OFFICE VISIT (OUTPATIENT)
Dept: FAMILY MEDICINE CLINIC | Facility: CLINIC | Age: 59
End: 2024-08-05
Payer: COMMERCIAL

## 2024-08-05 VITALS
OXYGEN SATURATION: 96 % | DIASTOLIC BLOOD PRESSURE: 66 MMHG | BODY MASS INDEX: 20.14 KG/M2 | HEIGHT: 60 IN | SYSTOLIC BLOOD PRESSURE: 118 MMHG | HEART RATE: 82 BPM | WEIGHT: 102.6 LBS

## 2024-08-05 DIAGNOSIS — K21.9 GASTRO-ESOPHAGEAL REFLUX DISEASE WITHOUT ESOPHAGITIS: ICD-10-CM

## 2024-08-05 DIAGNOSIS — M25.552 CHRONIC LEFT HIP PAIN: ICD-10-CM

## 2024-08-05 DIAGNOSIS — R25.1 TREMOR: ICD-10-CM

## 2024-08-05 DIAGNOSIS — E16.2 HYPOGLYCEMIA: ICD-10-CM

## 2024-08-05 DIAGNOSIS — Z72.0 TOBACCO ABUSE: ICD-10-CM

## 2024-08-05 DIAGNOSIS — M35.9 UNDIFFERENTIATED CONNECTIVE TISSUE DISEASE (HCC): ICD-10-CM

## 2024-08-05 DIAGNOSIS — I73.9 PVD (PERIPHERAL VASCULAR DISEASE) (HCC): ICD-10-CM

## 2024-08-05 DIAGNOSIS — M54.16 LUMBAR RADICULOPATHY: ICD-10-CM

## 2024-08-05 DIAGNOSIS — M05.9 RHEUMATOID ARTHRITIS WITH POSITIVE RHEUMATOID FACTOR, INVOLVING UNSPECIFIED SITE (HCC): ICD-10-CM

## 2024-08-05 DIAGNOSIS — M54.42 CHRONIC BILATERAL LOW BACK PAIN WITH LEFT-SIDED SCIATICA: ICD-10-CM

## 2024-08-05 DIAGNOSIS — M81.0 AGE-RELATED OSTEOPOROSIS WITHOUT CURRENT PATHOLOGICAL FRACTURE: ICD-10-CM

## 2024-08-05 DIAGNOSIS — E78.49 OTHER HYPERLIPIDEMIA: ICD-10-CM

## 2024-08-05 DIAGNOSIS — R31.29 MICROSCOPIC HEMATURIA: ICD-10-CM

## 2024-08-05 DIAGNOSIS — I73.00 RAYNAUD'S DISEASE WITHOUT GANGRENE: ICD-10-CM

## 2024-08-05 DIAGNOSIS — R20.0 LEFT FACIAL NUMBNESS: Primary | ICD-10-CM

## 2024-08-05 DIAGNOSIS — M51.36 LUMBAR DEGENERATIVE DISC DISEASE: ICD-10-CM

## 2024-08-05 DIAGNOSIS — M47.22 OSTEOARTHRITIS OF SPINE WITH RADICULOPATHY, CERVICAL REGION: ICD-10-CM

## 2024-08-05 DIAGNOSIS — I25.10 CAD IN NATIVE ARTERY: ICD-10-CM

## 2024-08-05 DIAGNOSIS — G89.29 CHRONIC LEFT HIP PAIN: ICD-10-CM

## 2024-08-05 DIAGNOSIS — G89.29 CHRONIC BILATERAL LOW BACK PAIN WITH LEFT-SIDED SCIATICA: ICD-10-CM

## 2024-08-05 DIAGNOSIS — J43.9 PULMONARY EMPHYSEMA, UNSPECIFIED EMPHYSEMA TYPE (HCC): ICD-10-CM

## 2024-08-05 DIAGNOSIS — D36.13 NEUROMA OF FOOT: ICD-10-CM

## 2024-08-05 PROBLEM — D75.89 MACROCYTIC: Status: RESOLVED | Noted: 2018-09-18 | Resolved: 2024-08-05

## 2024-08-05 PROBLEM — R10.10 PAIN OF UPPER ABDOMEN: Status: RESOLVED | Noted: 2024-04-29 | Resolved: 2024-08-05

## 2024-08-05 PROBLEM — K83.8 DILATED BILE DUCT: Status: RESOLVED | Noted: 2019-03-01 | Resolved: 2024-08-05

## 2024-08-05 PROCEDURE — 99214 OFFICE O/P EST MOD 30 MIN: CPT | Performed by: PHYSICIAN ASSISTANT

## 2024-08-05 RX ORDER — ALBUTEROL SULFATE 90 UG/1
2 AEROSOL, METERED RESPIRATORY (INHALATION) EVERY 6 HOURS PRN
Qty: 18 G | Refills: 5 | Status: SHIPPED | OUTPATIENT
Start: 2024-08-05

## 2024-08-05 RX ORDER — NICOTINE 21 MG/24HR
1 PATCH, TRANSDERMAL 24 HOURS TRANSDERMAL EVERY 24 HOURS
Qty: 28 PATCH | Refills: 1 | Status: SHIPPED | OUTPATIENT
Start: 2024-08-05

## 2024-08-06 NOTE — ASSESSMENT & PLAN NOTE
Continue to follow with pain management.  Recommended the patient follow through with physical therapy as directed by pain management.

## 2024-08-06 NOTE — ASSESSMENT & PLAN NOTE
Continue to follow with pain management.  Recommended that patient follow through with the physical therapy.

## 2024-08-06 NOTE — ASSESSMENT & PLAN NOTE
Continue to follow with endocrinology.  Patient currently wearing continuous glucose monitor.  Recommended more frequent, high protein meals

## 2024-08-06 NOTE — ASSESSMENT & PLAN NOTE
Will get urinalysis to evaluate.  Per patient, this has been a finding in her urinalysis for years, but never had workup.  This is concerning since patient is a smoker.  I explained that if urinalysis does show microscopic hematuria, we will refer her to urology.

## 2024-08-08 ENCOUNTER — APPOINTMENT (OUTPATIENT)
Dept: RADIOLOGY | Facility: MEDICAL CENTER | Age: 59
End: 2024-08-08
Payer: COMMERCIAL

## 2024-08-08 ENCOUNTER — OFFICE VISIT (OUTPATIENT)
Dept: ENDOCRINOLOGY | Facility: CLINIC | Age: 59
End: 2024-08-08
Payer: COMMERCIAL

## 2024-08-08 ENCOUNTER — APPOINTMENT (OUTPATIENT)
Dept: LAB | Facility: MEDICAL CENTER | Age: 59
End: 2024-08-08
Payer: COMMERCIAL

## 2024-08-08 VITALS
OXYGEN SATURATION: 97 % | TEMPERATURE: 97.6 F | HEART RATE: 83 BPM | WEIGHT: 105 LBS | BODY MASS INDEX: 20.62 KG/M2 | SYSTOLIC BLOOD PRESSURE: 120 MMHG | HEIGHT: 60 IN | DIASTOLIC BLOOD PRESSURE: 70 MMHG

## 2024-08-08 DIAGNOSIS — R31.29 MICROSCOPIC HEMATURIA: ICD-10-CM

## 2024-08-08 DIAGNOSIS — E16.2 HYPOGLYCEMIA: Primary | ICD-10-CM

## 2024-08-08 DIAGNOSIS — M25.552 CHRONIC LEFT HIP PAIN: ICD-10-CM

## 2024-08-08 DIAGNOSIS — G89.29 CHRONIC LEFT HIP PAIN: ICD-10-CM

## 2024-08-08 LAB
BACTERIA UR QL AUTO: ABNORMAL /HPF
BILIRUB UR QL STRIP: NEGATIVE
CLARITY UR: CLEAR
COLOR UR: YELLOW
GLUCOSE UR STRIP-MCNC: NEGATIVE MG/DL
HGB UR QL STRIP.AUTO: ABNORMAL
KETONES UR STRIP-MCNC: NEGATIVE MG/DL
LEUKOCYTE ESTERASE UR QL STRIP: NEGATIVE
MUCOUS THREADS UR QL AUTO: ABNORMAL
NITRITE UR QL STRIP: NEGATIVE
NON-SQ EPI CELLS URNS QL MICRO: ABNORMAL /HPF
PH UR STRIP.AUTO: 6 [PH]
PROT UR STRIP-MCNC: ABNORMAL MG/DL
RBC #/AREA URNS AUTO: ABNORMAL /HPF
SP GR UR STRIP.AUTO: 1.02 (ref 1–1.03)
UROBILINOGEN UR STRIP-ACNC: <2 MG/DL
WBC #/AREA URNS AUTO: ABNORMAL /HPF

## 2024-08-08 PROCEDURE — 73502 X-RAY EXAM HIP UNI 2-3 VIEWS: CPT

## 2024-08-08 PROCEDURE — 81001 URINALYSIS AUTO W/SCOPE: CPT

## 2024-08-08 PROCEDURE — 99214 OFFICE O/P EST MOD 30 MIN: CPT | Performed by: STUDENT IN AN ORGANIZED HEALTH CARE EDUCATION/TRAINING PROGRAM

## 2024-08-08 PROCEDURE — 95251 CONT GLUC MNTR ANALYSIS I&R: CPT | Performed by: STUDENT IN AN ORGANIZED HEALTH CARE EDUCATION/TRAINING PROGRAM

## 2024-08-08 RX ORDER — LANCETS 33 GAUGE
EACH MISCELLANEOUS
Qty: 100 EACH | Refills: 3 | Status: SHIPPED | OUTPATIENT
Start: 2024-08-08

## 2024-08-08 RX ORDER — BLOOD SUGAR DIAGNOSTIC
STRIP MISCELLANEOUS
Qty: 100 EACH | Refills: 3 | Status: SHIPPED | OUTPATIENT
Start: 2024-08-08

## 2024-08-08 RX ORDER — BLOOD-GLUCOSE METER
KIT MISCELLANEOUS
Qty: 1 KIT | Refills: 0 | Status: SHIPPED | OUTPATIENT
Start: 2024-08-08

## 2024-08-08 NOTE — PROGRESS NOTES
Ambulatory Visit  Name: Gloria Mcdaniel      : 1965      MRN: [id number]  Encounter Provider: Wyatt Mckeon,   Encounter Date: 2024   Encounter department: [facility]    Assessment & Plan   1. Hypoglycemia  Assessment & Plan:  No hypoglycemia on CGM. Whipple's not confirmed.  Patient had 16-hour fasting labs that showed very mild ketosis without hypoglycemia and an appropriate insulin response.  Reassurance provided. Continue mindfulness about meals, preferably low glycemic index meals. Advised confirmation of low values with glucometer, which I have rx. May contact office if having values <60 and with symptoms. I recommended creating event log of lows to include symptoms, blood sugar value, whether symptoms resolved upon consumption of meal, and timing and content of most recent meal.   Orders:  -     Blood Glucose Monitoring Suppl (OneTouch Verio Reflect) w/Device KIT; Check blood sugars once daily. Please substitute with appropriate alternative as covered by patient's insurance. Dx: E11.65  -     glucose blood (OneTouch Verio) test strip; Check blood sugars once daily. Please substitute with appropriate alternative as covered by patient's insurance. Dx: E11.65  -     OneTouch Delica Lancets 33G MISC; Check blood sugars once daily. Please substitute with appropriate alternative as covered by patient's insurance. Dx: E11.65    RTC PRN    History of Present Illness     Gloria Mcdaniel is a 59 y.o. female who presents in follow up of hypoglycemia. She had cgm trial and notices no lows blood sugar events, no hypos. Notes that symptoms at times occur even despite normal blood sugar values. High sugar meals tend to be more of an issue. She attributes symptoms of lows without performing testing. She is here today to review labs and CGM. She has food diary which will be scanned into media.     Review of Systems   Constitutional:  Negative for  "unexpected weight change.   Gastrointestinal:  Negative for nausea and vomiting.   Neurological:  Positive for tremors and light-headedness. Negative for syncope.   All other systems reviewed and are negative.    Medical History Reviewed by provider this encounter:  Tobacco  Allergies  Meds  Problems  Med Hx  Surg Hx  Fam Hx       Objective     /70 (BP Location: Right arm, Patient Position: Sitting)   Pulse 83   Temp 97.6 °F (36.4 °C)   Ht 4' 11.75\" (1.518 m)   Wt 47.6 kg (105 lb)   SpO2 97%   BMI 20.68 kg/m²     Physical Exam  Vitals reviewed.   Constitutional:       General: She is not in acute distress.     Appearance: Normal appearance.   HENT:      Head: Normocephalic and atraumatic.      Nose: Nose normal.   Eyes:      General: No scleral icterus.     Conjunctiva/sclera: Conjunctivae normal.   Pulmonary:      Effort: Pulmonary effort is normal. No respiratory distress.   Musculoskeletal:         General: No deformity.      Cervical back: Normal range of motion.   Skin:     General: Skin is warm and dry.   Neurological:      General: No focal deficit present.      Mental Status: She is alert.   Psychiatric:         Mood and Affect: Mood normal.         Behavior: Behavior normal.       Component      Latest Ref Rng 7/23/2024   Sodium      135 - 147 mmol/L 140    Potassium      3.5 - 5.3 mmol/L 3.8    Chloride      96 - 108 mmol/L 104    Carbon Dioxide      21 - 32 mmol/L 29    ANION GAP      4 - 13 mmol/L 7    BUN      5 - 25 mg/dL 6    Creatinine      0.60 - 1.30 mg/dL 0.43 (L)    GLUCOSE, FASTING      65 - 99 mg/dL 85    Calcium      8.4 - 10.2 mg/dL 9.2    AST      13 - 39 U/L 15    ALT      7 - 52 U/L 8    ALK PHOS      34 - 104 U/L 38    Total Protein      6.4 - 8.4 g/dL 6.6    Albumin      3.5 - 5.0 g/dL 4.4    Total Bilirubin      0.20 - 1.00 mg/dL 0.54    GFR, Calculated      ml/min/1.73sq m 111    Hemoglobin A1C      Normal 4.0-5.6%; PreDiabetic 5.7-6.4%; Diabetic >=6.5%; Glycemic " control for adults with diabetes <7.0% % 5.6    eAG, EST AVG Glucose      mg/dl 114    Beta- Hydroxybutyrate      0.02 - 0.27 mmol/L 0.30 (H)    INSULIN, FASTING      1.90 - 23.00 uIU/mL 4.13    C-PEPTIDE      1.1 - 4.4 ng/mL 1.8    TSH 3RD GENERATON      0.450 - 4.500 uIU/mL 0.459       Legend:  (L) Low  (H) High    Gloria Mcdaniel   Device used DEXCOM  Home use     Indication   Hypoglycemia    More than 72 hours of data was reviewed. Report to be scanned to chart.     Date Range: Jul 26 - Aug 8, 2024    Analysis of data:   % time CGM used: 100%  Average Glucose: 100 mg/dL    SD : 14 mg/dL   Time in Target Range: 100%   Time Above Range: 0%   Time Below Range: 0%     Interpretation of data: no hypoglycemia      Administrative Statements

## 2024-08-08 NOTE — ASSESSMENT & PLAN NOTE
No hypoglycemia on CGM. Whipple's not confirmed.  Patient had 16-hour fasting labs that showed very mild ketosis without hypoglycemia and an appropriate insulin response.  Reassurance provided. Continue mindfulness about meals, preferably low glycemic index meals. Advised confirmation of low values with glucometer, which I have rx. May contact office if having values <60 and with symptoms. I recommended creating event log of lows to include symptoms, blood sugar value, whether symptoms resolved upon consumption of meal, and timing and content of most recent meal.

## 2024-08-09 DIAGNOSIS — R31.29 MICROSCOPIC HEMATURIA: Primary | ICD-10-CM

## 2024-08-16 ENCOUNTER — HOSPITAL ENCOUNTER (OUTPATIENT)
Dept: INFUSION CENTER | Facility: HOSPITAL | Age: 59
Discharge: HOME | End: 2024-08-16
Attending: INTERNAL MEDICINE

## 2024-09-03 ENCOUNTER — HOSPITAL ENCOUNTER (OUTPATIENT)
Dept: INFUSION CENTER | Facility: HOSPITAL | Age: 59
Discharge: HOME | End: 2024-09-03
Attending: INTERNAL MEDICINE

## 2024-09-20 ENCOUNTER — TELEPHONE (OUTPATIENT)
Age: 59
End: 2024-09-20

## 2024-09-20 NOTE — TELEPHONE ENCOUNTER
Patient states she is trying to update her insurance to reflect Dr. Zuluaga as PCP. They are telling her his profile is not listed as a PCP so they cannot update it. She is requesting someone reach out to insurance to figure out what is going on so she can get this rectified and not delay care. Patient requests a call back to discuss.

## 2024-09-20 NOTE — TELEPHONE ENCOUNTER
Per email, [employer] is still pending waiting on approval. Ask patient to use Dr. Ortega till we get Dr. Zuluaga credentialed.

## 2024-09-25 ENCOUNTER — CONSULT (OUTPATIENT)
Dept: UROLOGY | Facility: CLINIC | Age: 59
End: 2024-09-25
Payer: COMMERCIAL

## 2024-09-25 VITALS
TEMPERATURE: 97.3 F | WEIGHT: 103.6 LBS | HEIGHT: 60 IN | BODY MASS INDEX: 20.34 KG/M2 | HEART RATE: 73 BPM | DIASTOLIC BLOOD PRESSURE: 76 MMHG | SYSTOLIC BLOOD PRESSURE: 112 MMHG | OXYGEN SATURATION: 99 %

## 2024-09-25 DIAGNOSIS — R31.29 MICROSCOPIC HEMATURIA: Primary | ICD-10-CM

## 2024-09-25 LAB
SL AMB  POCT GLUCOSE, UA: NEGATIVE
SL AMB LEUKOCYTE ESTERASE,UA: NEGATIVE
SL AMB POCT BILIRUBIN,UA: NEGATIVE
SL AMB POCT BLOOD,UA: NORMAL
SL AMB POCT CLARITY,UA: CLEAR
SL AMB POCT COLOR,UA: YELLOW
SL AMB POCT KETONES,UA: NEGATIVE
SL AMB POCT NITRITE,UA: NEGATIVE
SL AMB POCT PH,UA: 6
SL AMB POCT SPECIFIC GRAVITY,UA: 1
SL AMB POCT URINE PROTEIN: NEGATIVE
SL AMB POCT UROBILINOGEN: 0.2

## 2024-09-25 PROCEDURE — 99203 OFFICE O/P NEW LOW 30 MIN: CPT

## 2024-09-25 PROCEDURE — 81002 URINALYSIS NONAUTO W/O SCOPE: CPT

## 2024-09-25 PROCEDURE — 87086 URINE CULTURE/COLONY COUNT: CPT

## 2024-09-25 NOTE — PROGRESS NOTES
9/25/2024    Chief Complaint   Patient presents with    Follow-up     NEWP to be established for microscopic hematuria, some lower abdomen pain/pressure when urinating at times       Assessment and Plan    59 y.o. female new patient to office    1.  Microscopic hematuria  Urine testing over the past year has been positive for true microscopic hematuria.  Patient reports a chronic history of microscopic hematuria in the past without prior workup.  She does have a heavy tobacco smoking history.  She denies any known family history of urinary tract malignancy. She does report taking Zantac for over 20 years which has recently been linked to possible bladder cancer among other types of cancer.  CT imaging in April is reassuring along with negative Urine cytology in June.   Urine dip today is positive for microscopic blood.  We will send this for culture to exclude infection although low clinical suspicion.  Given risk factors, I am recommending cystoscopy evaluation to complete workup.  Of note her CT imaging in April was not a CT urogram but did not include IV contrast.      History of Present Illness  Gloria Mcdaniel is a 59 y.o. female new patient to office with PMHx significant for coronary artery disease, PVD, superior mesenteric artery stenosis, Raynaud's disease, pulmonary emphysema, GERD, history of hepatitis C, colorectal polyps, IBS, chronic back pain, radiculopathy, chronic pain syndrome. Here for evaluation of microscopic hematuria.    She presents today reporting a chronic history of microscopic hematuria for many years. She has never had formal work-up in the past. She recently switched PCPs and is currently following with Aria Hagen PA-C who recommended further work-up with Urology.  She does report previously taking Zantac for over 20 years which was recently discontinued due to cancer related risk.    Urinalysis from 8/8/2024 showed trace protein and trace occult blood, microscopic analysis showed  "4-10 RBCs, no WBCs, occasional bacteria and occasional mucus threads.  CT abdomen pelvis from 4/25/2024 showed no hydronephrosis or urinary tract calculi bilaterally.  There are subcentimeter hypoattenuating renal lesions, too small to accurately characterize but statistically likely benign.  Bladder was unremarkable. Urine cytology from 6/07/2024 was negative.  She does report 2 prior stone episodes in the past but nothing recently.    Unknown family history of urinary tract malignancy. She is a lifetime smoker since age 18. She currently smokes 0.25 ppd but does reports 1 ppd on average.       Review of Systems   Constitutional:  Negative for chills and fever.   HENT:  Negative for ear pain and sore throat.    Eyes:  Negative for pain and visual disturbance.   Respiratory:  Negative for cough and shortness of breath.    Cardiovascular:  Negative for chest pain and palpitations.   Gastrointestinal:  Negative for abdominal pain and vomiting.   Genitourinary:  Negative for dysuria and hematuria.   Musculoskeletal:  Negative for arthralgias and back pain.   Skin:  Negative for color change and rash.   Neurological:  Negative for seizures and syncope.   All other systems reviewed and are negative.              Vitals  Vitals:    09/25/24 1139   BP: 112/76   Pulse: 73   Temp: (!) 97.3 °F (36.3 °C)   SpO2: 99%   Weight: 47 kg (103 lb 9.6 oz)   Height: 4' 11.75\" (1.518 m)       Physical Exam  Vitals reviewed.   Constitutional:       General: She is not in acute distress.     Appearance: Normal appearance. She is normal weight. She is not ill-appearing or toxic-appearing.   HENT:      Head: Normocephalic and atraumatic.      Nose: Nose normal.   Eyes:      General: No scleral icterus.     Conjunctiva/sclera: Conjunctivae normal.   Cardiovascular:      Rate and Rhythm: Normal rate.      Pulses: Normal pulses.   Pulmonary:      Effort: Pulmonary effort is normal. No respiratory distress.   Abdominal:      General: Abdomen is " flat.      Palpations: Abdomen is soft.      Tenderness: There is no abdominal tenderness. There is no right CVA tenderness or left CVA tenderness.      Hernia: No hernia is present.   Musculoskeletal:         General: Normal range of motion.      Cervical back: Normal range of motion.   Skin:     General: Skin is warm and dry.   Neurological:      General: No focal deficit present.      Mental Status: She is alert and oriented to person, place, and time. Mental status is at baseline.   Psychiatric:         Mood and Affect: Mood normal.         Behavior: Behavior normal.         Thought Content: Thought content normal.         Judgment: Judgment normal.         Past History  Past Medical History:   Diagnosis Date    Acquired ichthyosis     last assessed: 5/9/2013    Anesthesia     Pt reports severe acid reflux after getting anesthesia- sometimes presents as chest pain    Anxiety     Cervical radiculopathy     last assessed: 6/13/2014    Colorectal polyps     last assessed: 3/9/2015    COPD (chronic obstructive pulmonary disease) ([facility])     Depression     Diverticulosis of colon     Foot pain     GERD (gastroesophageal reflux disease)     Heart disease 1997    Coronary artery disease    Hepatitis C july 2018    Took meds no longer have    Hyperlipemia     Hypertension     Lupus ([facility])     Myocardial infarction ([facility])     at age 32    Osteoarthritis unsure    Osteopenia     last assessed: 12/6/2013    Palpitations     last assessed: 12/31/2014    PVD (peripheral vascular disease) ([facility])     last assessed: 12/3/2012    RA (rheumatoid arthritis) ([facility])     Rheumatoid arthritis ([facility]) unsure    Scapular dyskinesis     last assessed: 11/17/2014    Shortness of breath     Stomach disorder Unsure    Tendonitis of left rotator cuff     last assessed: 11/17/2014    Vocal cord polyps     last assessed: 8/8/2014     Social History     Socioeconomic History    Marital status:      Spouse name: None    Number of children: None     Years of education: None    Highest education level: None   Occupational History    None   Tobacco Use    Smoking status: Every Day     Current packs/day: 0.25     Average packs/day: 0.3 packs/day for 40.0 years (10.0 ttl pk-yrs)     Types: Cigarettes    Smokeless tobacco: Never    Tobacco comments:     She is trying to cut back on her own   Vaping Use    Vaping status: Never Used   Substance and Sexual Activity    Alcohol use: No     Comment: rare    Drug use: No    Sexual activity: Not Currently     Birth control/protection: Abstinence   Other Topics Concern    None   Social History Narrative    No living will     Social Determinants of Health     Financial Resource Strain: Not on file   Food Insecurity: Not on file   Transportation Needs: Not on file   Physical Activity: Not on file   Stress: Not on file   Social Connections: Not on file   Intimate Partner Violence: Not on file   Housing Stability: Not on file     Social History     Tobacco Use   Smoking Status Every Day    Current packs/day: 0.25    Average packs/day: 0.3 packs/day for 40.0 years (10.0 ttl pk-yrs)    Types: Cigarettes   Smokeless Tobacco Never   Tobacco Comments    She is trying to cut back on her own     Family History   Problem Relation Age of Onset    No Known Problems Mother     No Known Problems Father     No Known Problems Maternal Grandmother     No Known Problems Maternal Grandfather     No Known Problems Paternal Grandmother     No Known Problems Paternal Grandfather     Early death Sister     Hypertension Sister     No Known Problems Maternal Aunt     No Known Problems Maternal Aunt     No Known Problems Maternal Aunt     No Known Problems Maternal Aunt        The following portions of the patient's history were reviewed and updated as appropriate allergies, current medications, past medical history, past social history, past surgical history and problem list    [id number]    CT ABDOMEN AND PELVIS WITH IV CONTRAST      INDICATION: K21.9: Gastro-esophageal reflux disease without esophagitis  R10.84: Generalized abdominal pain  R19.5: Other fecal abnormalities.     COMPARISON: None.     TECHNIQUE: CT examination of the abdomen and pelvis was performed. Multiplanar 2D reformatted images were created from the source data.     This examination, like all CT scans performed in the [facility], was performed utilizing techniques to minimize radiation dose exposure, including the use of iterative reconstruction and automated exposure control. Radiation dose length   product (DLP) for this visit: 370.95 mGy-cm     IV Contrast: 100 mL of iohexol (OMNIPAQUE)  Enteric Contrast: Not administered.     FINDINGS:     ABDOMEN     LOWER CHEST: No clinically significant abnormality in the visualized lower chest.     LIVER/BILIARY TREE: Unremarkable.     GALLBLADDER: Post cholecystectomy.     SPLEEN: Unremarkable.     PANCREAS: Unremarkable.     ADRENAL GLANDS: Unremarkable.     KIDNEYS/URETERS: No hydronephrosis or urinary tract calculi. Subcentimeter hypoattenuating renal lesion(s), too small to characterize but statistically likely benign, which do not warrant follow-up (Radiology June 2019).     STOMACH AND BOWEL: Probable 2.2 cm lipoma within the ascending colon (2/86). Colonic diverticulosis without findings of acute diverticulitis.     APPENDIX: No findings to suggest appendicitis.     ABDOMINOPELVIC CAVITY: No ascites. No pneumoperitoneum. No lymphadenopathy. Postsurgical changes in the anterior left lower quadrant, similar to prior studies.     VESSELS: Unremarkable for patient's age.     PELVIS     REPRODUCTIVE ORGANS: Post hysterectomy.     URINARY BLADDER: Unremarkable.     ABDOMINAL WALL/INGUINAL REGIONS: Postsurgical changes of the anterior abdominal wall.     BONES: No acute fracture or suspicious osseous lesion. Diffuse osseous demineralization. Degenerative changes of the spine with mild superior endplate deformity  "of L4, similar to prior study.     IMPRESSION:     No acute abdominopelvic process.    Results  Recent Results (from the past 1 hour(s))   POCT urine dip    Collection Time: 09/25/24 11:47 AM   Result Value Ref Range    LEUKOCYTE ESTERASE,UA negative     NITRITE,UA negative     SL AMB POCT UROBILINOGEN 0.2     POCT URINE PROTEIN negative      PH,UA 6.0     BLOOD,UA 1+/25     SPECIFIC GRAVITY,UA 1.005     KETONES,UA negative     BILIRUBIN,UA negative     GLUCOSE, UA negative      COLOR,UA yellow     CLARITY,UA clear    ]  No results found for: \"PSA\"  Lab Results   Component Value Date    GLUCOSE 94 11/02/2015    CALCIUM 9.2 07/23/2024     11/02/2015    K 3.8 07/23/2024    CO2 29 07/23/2024     07/23/2024    BUN 6 07/23/2024    CREATININE 0.43 (L) 07/23/2024     Lab Results   Component Value Date    WBC 5.91 05/22/2024    HGB 13.4 05/22/2024    HCT 40.8 05/22/2024     (H) 05/22/2024     05/22/2024       Please Note:  Voice dictation software has been used to create this document. There may be inadvertent transcriptions errors.     CORINA Lewis 09/25/24   "

## 2024-09-27 LAB — BACTERIA UR CULT: NORMAL

## 2024-10-08 ENCOUNTER — OFFICE VISIT (OUTPATIENT)
Dept: FAMILY MEDICINE CLINIC | Facility: CLINIC | Age: 59
End: 2024-10-08
Payer: COMMERCIAL

## 2024-10-08 ENCOUNTER — APPOINTMENT (OUTPATIENT)
Dept: RADIOLOGY | Facility: MEDICAL CENTER | Age: 59
End: 2024-10-08
Payer: COMMERCIAL

## 2024-10-08 ENCOUNTER — NURSE TRIAGE (OUTPATIENT)
Age: 59
End: 2024-10-08

## 2024-10-08 VITALS
TEMPERATURE: 97.9 F | SYSTOLIC BLOOD PRESSURE: 110 MMHG | DIASTOLIC BLOOD PRESSURE: 64 MMHG | BODY MASS INDEX: 19.94 KG/M2 | HEART RATE: 82 BPM | WEIGHT: 101.6 LBS | HEIGHT: 60 IN | OXYGEN SATURATION: 97 %

## 2024-10-08 DIAGNOSIS — J22 LOWER RESPIRATORY INFECTION: ICD-10-CM

## 2024-10-08 DIAGNOSIS — J22 LOWER RESPIRATORY INFECTION: Primary | ICD-10-CM

## 2024-10-08 PROCEDURE — 71046 X-RAY EXAM CHEST 2 VIEWS: CPT

## 2024-10-08 PROCEDURE — 99213 OFFICE O/P EST LOW 20 MIN: CPT | Performed by: PHYSICIAN ASSISTANT

## 2024-10-08 RX ORDER — AZITHROMYCIN 250 MG/1
TABLET, FILM COATED ORAL
Qty: 6 TABLET | Refills: 0 | Status: SHIPPED | OUTPATIENT
Start: 2024-10-08 | End: 2024-10-12

## 2024-10-08 RX ORDER — IPRATROPIUM BROMIDE AND ALBUTEROL SULFATE 2.5; .5 MG/3ML; MG/3ML
3 SOLUTION RESPIRATORY (INHALATION) 4 TIMES DAILY
Qty: 90 ML | Refills: 2 | Status: SHIPPED | OUTPATIENT
Start: 2024-10-08

## 2024-10-08 RX ORDER — METHYLPREDNISOLONE 4 MG
TABLET, DOSE PACK ORAL
Qty: 21 EACH | Refills: 0 | Status: SHIPPED | OUTPATIENT
Start: 2024-10-08

## 2024-10-08 NOTE — PROGRESS NOTES
Ambulatory Visit  Name: Gloria Mcdaniel      : 1965      MRN: [id number]  Encounter Provider: Aria Hagen PA-C  Encounter Date: 10/8/2024   Encounter department: [facility]    Assessment & Plan  Lower respiratory infection  Will get chest x-ray  Prescription for Zithromax, Medrol Dosepak, and DuoNeb  Prescription for nebulizer sent to home  Encouraged symptomatic care  She will notify us if symptoms do not improve or worsen  Orders:    ipratropium-albuterol (DUO-NEB) 0.5-2.5 mg/3 mL nebulizer solution; Take 3 mL by nebulization 4 (four) times a day    azithromycin (ZITHROMAX) 250 mg tablet; Take 2 tablets today then 1 tablet daily x 4 days    methylPREDNISolone 4 MG tablet therapy pack; Use as directed on package    XR chest pa and lateral; Future       History of Present Illness     Tamiko is a very pleasant 59-year-old female who is here today complaining of cold-like symptoms since Friday.  She is complaining of cough, wheezing, shortness of breath, chest tightness, headache, runny nose, congestion, and sore throat.  She did not have a measurable fever.  She denies any sick contacts.  She took a COVID test on Saturday and , which were both negative.  She has been taking Delsym, which is providing mild relief.  She admits that the cough is productive.  She has been using her albuterol inhaler.          Review of Systems   Constitutional:  Positive for diaphoresis. Negative for chills, fatigue and fever.   HENT:  Positive for congestion, postnasal drip, rhinorrhea, sinus pressure and sore throat. Negative for ear pain, sneezing and trouble swallowing.    Eyes:  Negative for pain and visual disturbance.   Respiratory:  Positive for cough, chest tightness, shortness of breath and wheezing. Negative for apnea.    Cardiovascular:  Negative for chest pain and palpitations.   Gastrointestinal:  Negative for abdominal pain, constipation, diarrhea, nausea and vomiting.   Genitourinary:   "Negative for dysuria and hematuria.   Musculoskeletal:  Negative for arthralgias, gait problem and myalgias.   Neurological:  Positive for headaches. Negative for dizziness, syncope, weakness, light-headedness and numbness.   Psychiatric/Behavioral:  Negative for suicidal ideas. The patient is not nervous/anxious.            Objective     /64   Pulse 82   Temp 97.9 °F (36.6 °C)   Ht 4' 11.75\" (1.518 m)   Wt 46.1 kg (101 lb 9.6 oz)   SpO2 97%   BMI 20.01 kg/m²     Physical Exam  Vitals and nursing note reviewed.   Constitutional:       Appearance: She is well-developed.   HENT:      Head: Normocephalic and atraumatic.      Right Ear: Tympanic membrane, ear canal and external ear normal.      Left Ear: Tympanic membrane, ear canal and external ear normal.      Nose: Congestion and rhinorrhea present.      Mouth/Throat:      Pharynx: Posterior oropharyngeal erythema present. No oropharyngeal exudate.   Eyes:      Extraocular Movements: Extraocular movements intact.   Cardiovascular:      Rate and Rhythm: Normal rate and regular rhythm.      Heart sounds: Normal heart sounds. No murmur heard.     No friction rub. No gallop.   Pulmonary:      Effort: Pulmonary effort is normal. No respiratory distress.      Breath sounds: Decreased air movement present. Wheezing (Scattered in all lung fields) present. No rales.   Musculoskeletal:         General: Normal range of motion.      Cervical back: Normal range of motion and neck supple.   Lymphadenopathy:      Cervical: No cervical adenopathy.   Skin:     General: Skin is warm and dry.   Neurological:      Mental Status: She is alert and oriented to person, place, and time.   Psychiatric:         Behavior: Behavior normal.         Thought Content: Thought content normal.         Judgment: Judgment normal.         "

## 2024-10-08 NOTE — TELEPHONE ENCOUNTER
"Patient called complaining of coughing, SOB and green mucous since Friday 10/4/24.  Patient quit smoking on Thursday.  Patient took 2 Covid tests that were negative. Patient uses and as needed rescue inhaler.  Patient was started on an additional inhaler but had stopped after leg cramps.  Appointment made for today, 10/8/24 with HERBER Knapp at 1140    Reason for Disposition   Continuous (nonstop) coughing interferes with work or school and no improvement using cough treatment per Care Advice    Answer Assessment - Initial Assessment Questions  1. ONSET: \"When did the cough begin?\"      10/4  2. SEVERITY: \"How bad is the cough today?\"       Pretty bad, getting worse  3. SPUTUM: \"Describe the color of your sputum\" (none, dry cough; clear, white, yellow, green)      Green mucous  4. HEMOPTYSIS: \"Are you coughing up any blood?\" If so ask: \"How much?\" (flecks, streaks, tablespoons, etc.)      Little specks  5. DIFFICULTY BREATHING: \"Are you having difficulty breathing?\" If Yes, ask: \"How bad is it?\" (e.g., mild, moderate, severe)     - MILD: No SOB at rest, mild SOB with walking, speaks normally in sentences, can lay down, no retractions, pulse < 100.     - MODERATE: SOB at rest, SOB with minimal exertion and prefers to sit, cannot lie down flat, speaks in phrases, mild retractions, audible wheezing, pulse 100-120.     - SEVERE: Very SOB at rest, speaks in single words, struggling to breathe, sitting hunched forward, retractions, pulse > 120       moderate  6. FEVER: \"Do you have a fever?\" If Yes, ask: \"What is your temperature, how was it measured, and when did it start?\"      Unsure. Sweaty on Saturday morning  7. CARDIAC HISTORY: \"Do you have any history of heart disease?\" (e.g., heart attack, congestive heart failure)       Cardiac stents  8. LUNG HISTORY: \"Do you have any history of lung disease?\"  (e.g., pulmonary embolus, asthma, emphysema)      Takes rescue inhaler, started on a 2nd inhaler but stopped  9. PE RISK " "FACTORS: \"Do you have a history of blood clots?\" (or: recent major surgery, recent prolonged travel, bedridden)      none  10. OTHER SYMPTOMS: \"Do you have any other symptoms?\" (e.g., runny nose, wheezing, chest pain)        Sore throat  11. PREGNANCY: \"Is there any chance you are pregnant?\" \"When was your last menstrual period?\"          12. TRAVEL: \"Have you traveled out of the country in the last month?\" (e.g., travel history, exposures)        no    Protocols used: Cough-ADULT-[place]    "

## 2024-10-11 LAB
DME PARACHUTE DELIVERY DATE ACTUAL: NORMAL
DME PARACHUTE DELIVERY DATE REQUESTED: NORMAL
DME PARACHUTE ITEM DESCRIPTION: NORMAL
DME PARACHUTE ORDER STATUS: NORMAL
DME PARACHUTE SUPPLIER NAME: NORMAL
DME PARACHUTE SUPPLIER PHONE: NORMAL

## 2024-10-17 ENCOUNTER — TELEPHONE (OUTPATIENT)
Age: 59
End: 2024-10-17

## 2024-10-17 DIAGNOSIS — J22 LOWER RESPIRATORY INFECTION: ICD-10-CM

## 2024-10-17 DIAGNOSIS — J22 LOWER RESPIRATORY INFECTION: Primary | ICD-10-CM

## 2024-10-17 RX ORDER — DOXYCYCLINE HYCLATE 100 MG
100 TABLET ORAL 2 TIMES DAILY
Qty: 20 TABLET | Refills: 0 | Status: SHIPPED | OUTPATIENT
Start: 2024-10-17 | End: 2024-10-27

## 2024-10-17 RX ORDER — METHYLPREDNISOLONE 4 MG/1
TABLET ORAL
Qty: 21 EACH | Refills: 0 | Status: CANCELLED | OUTPATIENT
Start: 2024-10-17

## 2024-10-17 NOTE — TELEPHONE ENCOUNTER
Reason for call:   [x] Refill   [] Prior Auth  [] Other:     Office:   [x] PCP/Provider - [facility] / Aria Hagen PA-C   [] Specialty/Provider -     Medication: methylPREDNISolone 4 MG tablet therapy pack     Dose/Frequency: Use as directed on package     Quantity: 21 each    Pharmacy: [id number] - [place] - [address]     Does the patient have enough for 3 days?   [] Yes   [x] No - Send as HP to POD

## 2024-11-05 ENCOUNTER — OFFICE VISIT (OUTPATIENT)
Dept: FAMILY MEDICINE CLINIC | Facility: CLINIC | Age: 59
End: 2024-11-05
Payer: COMMERCIAL

## 2024-11-05 VITALS
SYSTOLIC BLOOD PRESSURE: 118 MMHG | BODY MASS INDEX: 21.2 KG/M2 | DIASTOLIC BLOOD PRESSURE: 64 MMHG | WEIGHT: 108 LBS | OXYGEN SATURATION: 99 % | HEART RATE: 74 BPM | HEIGHT: 60 IN

## 2024-11-05 DIAGNOSIS — F51.01 PRIMARY INSOMNIA: ICD-10-CM

## 2024-11-05 DIAGNOSIS — J43.9 PULMONARY EMPHYSEMA, UNSPECIFIED EMPHYSEMA TYPE (HCC): Primary | ICD-10-CM

## 2024-11-05 DIAGNOSIS — R31.29 MICROSCOPIC HEMATURIA: ICD-10-CM

## 2024-11-05 DIAGNOSIS — E78.49 OTHER HYPERLIPIDEMIA: ICD-10-CM

## 2024-11-05 DIAGNOSIS — G89.4 CHRONIC PAIN SYNDROME: ICD-10-CM

## 2024-11-05 DIAGNOSIS — R42 DIZZINESS: ICD-10-CM

## 2024-11-05 DIAGNOSIS — I25.10 CAD IN NATIVE ARTERY: ICD-10-CM

## 2024-11-05 DIAGNOSIS — M05.9 RHEUMATOID ARTHRITIS WITH POSITIVE RHEUMATOID FACTOR, INVOLVING UNSPECIFIED SITE (HCC): ICD-10-CM

## 2024-11-05 DIAGNOSIS — H81.12 BENIGN PAROXYSMAL POSITIONAL VERTIGO OF LEFT EAR: ICD-10-CM

## 2024-11-05 PROCEDURE — 99214 OFFICE O/P EST MOD 30 MIN: CPT | Performed by: PHYSICIAN ASSISTANT

## 2024-11-05 RX ORDER — TRAZODONE HYDROCHLORIDE 50 MG/1
50 TABLET, FILM COATED ORAL
Qty: 30 TABLET | Refills: 5 | Status: SHIPPED | OUTPATIENT
Start: 2024-11-05

## 2024-11-05 RX ORDER — MECLIZINE HCL 12.5 MG 12.5 MG/1
12.5 TABLET ORAL 3 TIMES DAILY PRN
Qty: 30 TABLET | Refills: 0 | Status: SHIPPED | OUTPATIENT
Start: 2024-11-05

## 2024-11-05 NOTE — ASSESSMENT & PLAN NOTE
Continue to follow with rheumatology.  Patient no longer follows with pain management.  She will notify us if she would like another referral.

## 2024-11-05 NOTE — PROGRESS NOTES
Ambulatory Visit  Name: Gloria Mcdaniel      : 1965      MRN: [id number]  Encounter Provider: Aria Hagen PA-C  Encounter Date: 2024   Encounter department: [facility]    Assessment & Plan  Pulmonary emphysema, unspecified emphysema type (HCC)  Patient quit smoking.  Encouraged her to continue with smoking cessation.  She stopped using Anoro daily.  She only uses albuterol as needed.  Encouraged patient to eat healthy snacks, try going for a walk, or other activities to help prevent her from overeating.  Rheumatoid arthritis with positive rheumatoid factor, involving unspecified site (HCC)  Continue to follow with rheumatology       Microscopic hematuria  Continue to follow with urology.  Patient is scheduled for cystoscopy       Chronic pain syndrome  Continue to follow with rheumatology.  Patient no longer follows with pain management.  She will notify us if she would like another referral.       Other hyperlipidemia  Labs ordered to evaluate.  Continue rosuvastatin  Orders:    Lipid panel; Future    CAD in native artery  Stable.  Continue to follow with cardiology.  Continue Zetia and rosuvastatin       Primary insomnia  Will start on trazodone 50 mg at bedtime.  If no relief, may increase to 100 mg at bedtime.  Follow-up in 3 months or sooner if needed  Orders:    traZODone (DESYREL) 50 mg tablet; Take 1 tablet (50 mg total) by mouth daily at bedtime    Dizziness  Labs ordered to evaluate.  I believe the dizziness is being caused by BPPV.  Referral placed to vestibular therapy.  Prescription for meclizine as needed.      Orders:    Comprehensive metabolic panel; Future    TSH, 3rd generation with Free T4 reflex; Future    CBC and differential; Future    Hemoglobin A1C; Future    Benign paroxysmal positional vertigo of left ear  Referral placed to vestibular therapy.  Prescription for meclizine.  She will notify us if symptoms do not improve or worsen.  Orders:    Ambulatory  Referral to Physical Therapy; Future    meclizine (ANTIVERT) 12.5 MG tablet; Take 1 tablet (12.5 mg total) by mouth 3 (three) times a day as needed for dizziness       History of Present Illness     Tamiko is a pleasant 59-year-old female who is here today for routine follow-up.  She is complaining of chronic pain.  She continues to follow with her rheumatologist.  She admits that the pain makes it difficult for her to sleep.  She admits that she has been having episodes of insomnia.  She admits that she has difficulty falling and staying asleep.  She has tried over-the-counter melatonin and Tylenol PM, but they make her feel hung over in the morning.  She admits that her mind does race at night.  She is interested in trying a medication to help with her sleep.  She continues to get injections from rheumatology in her left hip.  She suffers from bursitis.  She is not interested in going to physical therapy at this time.  She is also complaining of intermittent episodes of dizziness.  She admits that this typically happens with position changes.  It has been worse over the past few days.  She denies any recent head trauma, falls, syncope, or presyncopal episodes.  She successfully quit smoking.  She admits that since quitting smoking she has been eating more.  She has noticed that she gained weight.  She is very concerned about this.          Review of Systems   Constitutional:  Positive for unexpected weight change (Weight gain). Negative for chills, diaphoresis, fatigue and fever.   HENT:  Negative for congestion, ear pain, postnasal drip, rhinorrhea, sneezing, sore throat and trouble swallowing.    Eyes:  Negative for pain and visual disturbance.   Respiratory:  Negative for apnea, cough, shortness of breath and wheezing.    Cardiovascular:  Negative for chest pain and palpitations.   Gastrointestinal:  Negative for abdominal pain, constipation, diarrhea, nausea and vomiting.   Genitourinary:  Negative for  "dysuria.   Musculoskeletal:  Positive for arthralgias. Negative for gait problem and myalgias.   Neurological:  Positive for dizziness. Negative for syncope, weakness, light-headedness, numbness and headaches.   Psychiatric/Behavioral:  Positive for sleep disturbance. Negative for suicidal ideas. The patient is nervous/anxious.            Objective     /64   Pulse 74   Ht 4' 11.75\" (1.518 m)   Wt 49 kg (108 lb)   SpO2 99%   BMI 21.27 kg/m²     Physical Exam  Vitals and nursing note reviewed.   Constitutional:       General: She is not in acute distress.     Appearance: She is well-developed. She is not diaphoretic.   HENT:      Head: Normocephalic and atraumatic.      Right Ear: Hearing, tympanic membrane, ear canal and external ear normal. There is no impacted cerumen.      Left Ear: Hearing, tympanic membrane, ear canal and external ear normal. There is no impacted cerumen.      Nose: Nose normal. No mucosal edema or rhinorrhea.      Mouth/Throat:      Pharynx: No oropharyngeal exudate or posterior oropharyngeal erythema.   Eyes:      Extraocular Movements: Extraocular movements intact.   Cardiovascular:      Rate and Rhythm: Normal rate and regular rhythm.      Heart sounds: Normal heart sounds. No murmur heard.     No friction rub. No gallop.   Pulmonary:      Effort: Pulmonary effort is normal. No respiratory distress.      Breath sounds: Normal breath sounds. No wheezing or rales.   Musculoskeletal:         General: Normal range of motion.      Cervical back: Normal range of motion and neck supple.   Skin:     General: Skin is warm and dry.      Findings: No rash.   Neurological:      Mental Status: She is alert and oriented to person, place, and time.      Comments: Positive Stehekin-Hallpike maneuver. L>R   Psychiatric:         Behavior: Behavior normal.         Thought Content: Thought content normal.         Judgment: Judgment normal.         "

## 2024-11-05 NOTE — ASSESSMENT & PLAN NOTE
Patient quit smoking.  Encouraged her to continue with smoking cessation.  She stopped using Anoro daily.  She only uses albuterol as needed.  Encouraged patient to eat healthy snacks, try going for a walk, or other activities to help prevent her from overeating.

## 2024-11-11 ENCOUNTER — NURSE TRIAGE (OUTPATIENT)
Age: 59
End: 2024-11-11

## 2024-11-11 NOTE — TELEPHONE ENCOUNTER
"Provider: Dr. Hurley    C/O: L Hip Pain, RA Flare    Actionable Item: Advise on L Hip Pain/RA Flare    Reason for Disposition   Hip pain    Answer Assessment - Initial Assessment Questions  1. LOCATION and RADIATION: \"Where is the pain located?\"       L Hip Pain  2. QUALITY: \"What does the pain feel like?\"  (e.g., sharp, dull, aching, burning)      Sharp, stabbing  3. SEVERITY: \"How bad is the pain?\" \"What does it keep you from doing?\"   (Scale 1-10; or mild, moderate, severe)      10+  4. ONSET: \"When did the pain start?\" \"Does it come and go, or is it there all the time?\"      Worsening over past 2 months  5. WORK OR EXERCISE: \"Has there been any recent work or exercise that involved this part of the body?\"       No  6. CAUSE: \"What do you think is causing the hip pain?\"       RA, bursitis  7. AGGRAVATING FACTORS: \"What makes the hip pain worse?\" (e.g., walking, climbing stairs, running)      Moving, laying on it  8. OTHER SYMPTOMS: \"Do you have any other symptoms?\" (e.g., back pain, pain shooting down leg,  fever, rash)      Warm to touch    Protocols used: Hip Pain-Adult-[place]    "

## 2024-11-12 ENCOUNTER — OFFICE VISIT (OUTPATIENT)
Dept: RHEUMATOLOGY | Facility: CLINIC | Age: 59
End: 2024-11-12
Payer: COMMERCIAL

## 2024-11-12 VITALS
DIASTOLIC BLOOD PRESSURE: 84 MMHG | HEIGHT: 60 IN | SYSTOLIC BLOOD PRESSURE: 112 MMHG | WEIGHT: 105 LBS | HEART RATE: 91 BPM | BODY MASS INDEX: 20.62 KG/M2 | OXYGEN SATURATION: 99 %

## 2024-11-12 DIAGNOSIS — M05.9 RHEUMATOID ARTHRITIS WITH POSITIVE RHEUMATOID FACTOR, INVOLVING UNSPECIFIED SITE (HCC): ICD-10-CM

## 2024-11-12 DIAGNOSIS — M79.18 MYOFASCIAL PAIN SYNDROME: ICD-10-CM

## 2024-11-12 DIAGNOSIS — M81.0 AGE-RELATED OSTEOPOROSIS WITHOUT CURRENT PATHOLOGICAL FRACTURE: ICD-10-CM

## 2024-11-12 DIAGNOSIS — Z79.899 HIGH RISK MEDICATION USE: ICD-10-CM

## 2024-11-12 DIAGNOSIS — M70.62 GREATER TROCHANTERIC BURSITIS OF LEFT HIP: Primary | ICD-10-CM

## 2024-11-12 DIAGNOSIS — M35.9 UNDIFFERENTIATED CONNECTIVE TISSUE DISEASE (HCC): ICD-10-CM

## 2024-11-12 PROCEDURE — 99214 OFFICE O/P EST MOD 30 MIN: CPT | Performed by: INTERNAL MEDICINE

## 2024-11-12 PROCEDURE — 20610 DRAIN/INJ JOINT/BURSA W/O US: CPT | Performed by: INTERNAL MEDICINE

## 2024-11-12 RX ORDER — LIDOCAINE HYDROCHLORIDE 10 MG/ML
1 INJECTION, SOLUTION INFILTRATION; PERINEURAL ONCE
Status: COMPLETED | OUTPATIENT
Start: 2024-11-12 | End: 2024-11-12

## 2024-11-12 RX ORDER — CYCLOBENZAPRINE HCL 10 MG
10 TABLET ORAL
Qty: 30 TABLET | Refills: 6 | Status: SHIPPED | OUTPATIENT
Start: 2024-11-12

## 2024-11-12 RX ORDER — HYDROXYCHLOROQUINE SULFATE 200 MG/1
200 TABLET, FILM COATED ORAL DAILY
Qty: 90 TABLET | Refills: 2 | Status: SHIPPED | OUTPATIENT
Start: 2024-11-12 | End: 2025-08-09

## 2024-11-12 RX ORDER — IBUPROFEN 800 MG/1
800 TABLET, FILM COATED ORAL EVERY 8 HOURS PRN
Qty: 90 TABLET | Refills: 6 | Status: SHIPPED | OUTPATIENT
Start: 2024-11-12

## 2024-11-12 RX ORDER — TRIAMCINOLONE ACETONIDE 40 MG/ML
40 INJECTION, SUSPENSION INTRA-ARTICULAR; INTRAMUSCULAR ONCE
Status: COMPLETED | OUTPATIENT
Start: 2024-11-12 | End: 2024-11-12

## 2024-11-12 RX ADMIN — TRIAMCINOLONE ACETONIDE 40 MG: 40 INJECTION, SUSPENSION INTRA-ARTICULAR; INTRAMUSCULAR at 14:30

## 2024-11-12 RX ADMIN — LIDOCAINE HYDROCHLORIDE 1 ML: 10 INJECTION, SOLUTION INFILTRATION; PERINEURAL at 14:30

## 2024-11-12 RX ADMIN — TRIAMCINOLONE ACETONIDE 40 MG: 40 INJECTION, SUSPENSION INTRA-ARTICULAR; INTRAMUSCULAR at 15:00

## 2024-11-12 NOTE — PATIENT INSTRUCTIONS
Continue daily calcium and Vit. D  Continue hydroxychloroquine daily; continue regular eye exams  Continue cyclobenzaprine at bedtime for muscle pain  Left trochanteric bursa steroid injection given in clinic today  Try diclofenac gel as needed for joint pain  Can take ibuprofen up to 3 times a day as needed for joint pain, take with food     Return to clinic in 6 months

## 2024-11-12 NOTE — TELEPHONE ENCOUNTER
АНДРЕЙ for pt to [facility] regarding Dr. Hurley's message about coming in today at 12pm [facility] office for bursa injection to [facility] ASAP to arrange that.

## 2024-11-12 NOTE — PROGRESS NOTES
"Assessment and Plan:   Gloria Mcdaniel is a 58 y.o.  female who presents for follow-up of her UCTD and osteoporosis. Left hip bursa is bothering again. Her RA is controlled on hydroxychloroquine.    Continue daily calcium and Vit. D  Continue hydroxychloroquine daily; continue regular eye exams  Continue cyclobenzaprine at bedtime for muscle pain  Left trochanteric bursa steroid injection given in clinic today  Try diclofenac gel as needed for joint pain  Can take ibuprofen up to 3 times a day as needed for joint pain, take with food     Return to clinic in 6 months     Large joint arthrocentesis: L greater trochanteric bursa  Universal Protocol:  procedure performed by consultantConsent: Verbal consent obtained. Written consent not obtained.  Risks and benefits: risks, benefits and alternatives were discussed  Consent given by: patient  Time out: Immediately prior to procedure a \"time out\" was called to verify the correct patient, procedure, equipment, support staff and site/side marked as required.  Timeout called at: 11/12/2024 3:00 PM.  Patient understanding: patient states understanding of the procedure being performed  Patient consent: the patient's understanding of the procedure matches consent given  Procedure consent: procedure consent matches procedure scheduled  Site marked: the operative site was marked  Patient identity confirmed: verbally with patient  Supporting Documentation  Indications: pain   Procedure Details  Location: hip - L greater trochanteric bursa  Preparation: Patient was prepped and draped in the usual sterile fashion  Needle size: 25 G  Ultrasound guidance: no  Approach: lateral  Medications administered: 1 mL lidocaine (XYLOCAINE) injection 1 %; 40 mg triamcinolone acetonide 40 mg/mL    Patient tolerance: patient tolerated the procedure well with no immediate complications  Dressing:  Sterile dressing applied    After discussing the risks/benefits of (left) hp bursa steroid " injection, including minor risk of infection, bleeding, pain, or ineffectiveness, informed consent was obtained and patient was agreeable to proceed.  Using sterile technique, the (left hip bursa) was prepped with Chloraprep, and topically anesthetized with Ethyl Chloride. Bursa was entered using a lateral approach and Kenalog 40 mg and 1% lidocaine 1 mL were then injected and the needle withdrawn.  The procedure was well tolerated.    Patient should avoid strenuous activities for the next 1-2 days. Patient should avoid vaccines for the next 2 weeks if possible. If patient is diabetic, should monitor glucose levels for the next 7 to 10 days. Can apply cold compress for soreness. If patient feels relief with the injections, procedure can be repeated as frequently as every 3 months.   Patient was instructed to contact our office with any concerns or questions.        Plan:  Diagnoses and all orders for this visit:    Greater trochanteric bursitis of left hip  -     triamcinolone acetonide (KENALOG-40) 40 mg/mL injection 40 mg  -     lidocaine (XYLOCAINE) 1 % injection 1 mL  -     Large joint arthrocentesis: L greater trochanteric bursa    Rheumatoid arthritis with positive rheumatoid factor, involving unspecified site (HCC)  -     hydroxychloroquine (PLAQUENIL) 200 mg tablet; Take 1 tablet (200 mg total) by mouth daily  -     Diclofenac Sodium (VOLTAREN) 1 %; Apply 2 g topically 4 (four) times a day    Undifferentiated connective tissue disease (HCC)  -     ibuprofen (MOTRIN) 800 mg tablet; Take 1 tablet (800 mg total) by mouth every 8 (eight) hours as needed for moderate pain Take with food  -     Diclofenac Sodium (VOLTAREN) 1 %; Apply 2 g topically 4 (four) times a day    Myofascial pain syndrome  -     cyclobenzaprine (FLEXERIL) 10 mg tablet; Take 1 tablet (10 mg total) by mouth daily at bedtime    Age-related osteoporosis without current pathological fracture    High risk medication use    High risk medication  use - Benefits and risks of hydroxychloroquine, including but not limited to retinal toxicity, corneal deposits, gastrointestinal side effects, and headaches were discussed with the patient. The need for a regular eye exam to monitor for ocular toxicity while on this medication was also explained to the patient.     Follow-up plan: Return to clinic in 6 months        Rheumatic Disease Summary  Gloria Mcdaniel is a 59 y.o.  female who originally presented on 5/22/20 as a Rheumatology consult referred by her PCP Lee Miller DO for evaluation of possible inflammatory arthritis given positive RF of 40 and KRISTA of 1:80 in a homogenous pattern. Patient has history of negative anti-CCP. Lupus activity labs, anti-centromere Ab, and anti-RNP ordered and returned unremarkable. ESR/CRP returned normal. Patient had some features to at least make a diagnosis of undifferentiated connective tissue disease, including arthralgia, photosensitivity, facial rashes, and Raynaud's symptoms. Her arthritis symptoms were not typical of RA since she denied morning stiffness and her joint symptoms were worse later in the day and with use. Hand and feet x-rays ordered to evaluate for any inflammatory changes returned unremarkable. For time-being, started patient on hydroxychloroquine 200mg po daily; asked her to get regular eye exams while on this medication to monitor for plaquenil-related retinal toxicity.      8/28/20 via telemedicine: Patient presented for follow-up of UCTD (arthralgia, photosensitivity, facial rashes, and Raynaud's symptoms). Her arthralgia has improved since starting HCQ. Based on her weight, have increased her hydroxychloroquine to 300mg po daily. Changed her diclofenac prescription to 75mg po bid instead of 50mg po tid, and continued baclofen 10mg po tid.      1/18/22 telemedicine: Patient presented for follow-up of UCTD (arthralgia, photosensitivity, facial rashes, and Raynaud's symptoms). What was most  significantly bothering patient is was abdominal pain and bloating sensation since she moved a dresser several months ago; it had slightly improved since then, but was still bothersome. Had seen multiple GI, and will be getting an EGD and colonoscopy soon for further workup. Had so far been diagnosed with delayed gastric emptying. She had lost weight to the point of being 110 pounds due to eating making the abdominal pain worse, which was present all the time. Did not start Fosamax yet, but planned to after I explained the importance of medical therapy for her osteoporosis. Her left hip bursa area significantly bothered her, but bursa injections in the past didn't help; would like to arrange a hip bursa injection visit with me. Raynaud's symptoms were not fully under control. She was to do lupus activity labs. Decreased hydroxychloroquine to 1 tab daily since weight loss. Started alendronate once a week, empty stomach, full glass of water, and do not lie down for 30 minutes after. Increased amlodipine to 5mg daily for Raynaud's symptoms. Continued weekly Vit. D for history of Vit. D deficiency.      1/27/22: Patient comes for injection into her left trochanteric bursa. She had recent visit on 1/18/22. Patient reports no new complaints. Raynaud's signs seem to be better controled with increased dose of amlodipine. Patient tolerates alendronate well. Patient already has follow up appointment scheduled in 6 months. Left hip bursa steroid injection given in clinic, which patient tolerated well. Is to continue the rest of her medications and do hip bursitis exercises     7/7/22: Was not taking amlodipine for her Raynaud's symptoms, so it was restarted. Was not taking alendronate for her osteoporosis, so it was restarted. Left trochanteric bursa steroid injection given in clinic today, which patient tolerated well. Continue hydroxychloroquine daily; continue regular eye exams  Restart amlodipine at 2.5mg daily for  Raynaud's symptoms. Restart alendronate 70mg once a week for osteoporosis; take on an empty stomach with a full glass of water, and do not lie down for 30 minutes after. Continue weekly Vit. D for now. Do lab. Left trochanteric bursa steroid injection given in clinic today. Return to clinic in 6 months.     1/10/23:   Most prominent complaint today is left trochanteric bursitis symptoms; left trochanteric bursa steroid injection given in clinic today.  Last one did not help, but the one before that did, so patient wants to pursue the injection today.  Also counseled her on the importance of taking medication for her osteoporosis.  Take 1,200mg of calcium daily  Take 2,000 units of Vit. D daily  Continue hydroxychloroquine daily; continue regular eye exams  Take alendronate 70mg once a week for osteoporosis; take on an empty stomach with a full glass of water, and do not lie down for 30 minutes after  Stop baclofen; start cyclobenzaprine at bedtime for muscle pain  Take celecoxib twice a day as needed for joint pain  Left trochanteric bursa steroid injection given in clinic today     8/10/23: Will be gettingg right knee cyst/mass removal 9/18th.  Continue daily calcium and Vit. D  Continue hydroxychloroquine daily; continue regular eye exams  Continue alendronate 70mg once a week for osteoporosis; take on an empty stomach with a full glass of water, and do not lie down for 30 minutes after  Continue cyclobenzaprine at bedtime for muscle pain  Take amlodipine at bedtime for Raynaud's symptoms  Left trochanteric bursa steroid injection given in clinic today  Will order next DEXA scan at next visit  Left    05/21/24:   Continue daily calcium and Vit. D  Continue hydroxychloroquine daily; continue regular eye exams  Continue alendronate 70mg once a week for osteoporosis; take on an empty stomach with a full glass of water, and do not lie down for 30 minutes after  Continue cyclobenzaprine at bedtime for muscle pain  Left  trochanteric bursa steroid injection given in clinic today  Schedule DEXA scan  Do lupus activity labs  Do left shoulder x-rays  Use diclofenac gel as needed for joint pain  Can take ibuprofen up to 3 times a day as needed for joint pain, take with food  Can take prednisone taper when in a flare  Neurology referral made for tremor    Chief Complaint  No chief complaint on file.      HPI  Gloria Mcdaniel is a 58 y.o.  female with undifferentiated connective tissue disesaese, Raynaud's syndrome, rheumatoid arthritis, who presents for follow-up of her UCTD, left trochanteric bursitis, and osteoporosis. Left hip bursa is bothering her again. Otherwise, overall her RA symptoms are controlled     The following portions of the patient's history were reviewed and updated as appropriate: allergies, current medications, past family history, past medical history, past social history, past surgical history and problem list    Review of Systems:   Review of Systems   Constitutional:  Negative for fatigue.   HENT:  Negative for mouth sores.    Eyes:  Negative for pain.   Respiratory:  Negative for shortness of breath.    Cardiovascular:  Negative for leg swelling.   Musculoskeletal:  Positive for arthralgias, back pain, joint swelling, myalgias and neck pain.   Skin:  Negative for rash.   Neurological:  Negative for weakness.   Hematological:  Negative for adenopathy.   Psychiatric/Behavioral:  Negative for sleep disturbance.      Reviewed and agree.    Home Medications:    Current Outpatient Medications:     cyclobenzaprine (FLEXERIL) 10 mg tablet, Take 1 tablet (10 mg total) by mouth daily at bedtime, Disp: 30 tablet, Rfl: 6    Diclofenac Sodium (VOLTAREN) 1 %, Apply 2 g topically 4 (four) times a day, Disp: 100 g, Rfl: 6    hydroxychloroquine (PLAQUENIL) 200 mg tablet, Take 1 tablet (200 mg total) by mouth daily, Disp: 90 tablet, Rfl: 2    ibuprofen (MOTRIN) 800 mg tablet, Take 1 tablet (800 mg total) by mouth every 8  (eight) hours as needed for moderate pain Take with food, Disp: 90 tablet, Rfl: 6    albuterol (PROVENTIL HFA,VENTOLIN HFA) 90 mcg/act inhaler, Inhale 2 puffs every 6 (six) hours as needed for wheezing, Disp: 18 g, Rfl: 5    calcium carbonate (TUMS) 500 mg chewable tablet, Chew 1 tablet if needed for indigestion or heartburn, Disp: , Rfl:     diphenhydrAMINE-acetaminophen (TYLENOL PM)  MG TABS, Take 1 tablet by mouth daily at bedtime as needed for sleep, Disp: , Rfl:     ezetimibe (ZETIA) 10 mg tablet, Take 1 tablet (10 mg total) by mouth daily, Disp: 90 tablet, Rfl: 3    ipratropium-albuterol (DUO-NEB) 0.5-2.5 mg/3 mL nebulizer solution, Take 3 mL by nebulization 4 (four) times a day, Disp: 90 mL, Rfl: 2    LACTASE ENZYME PO, Take 3 tablets by mouth if needed As needed, Disp: , Rfl:     meclizine (ANTIVERT) 12.5 MG tablet, Take 1 tablet (12.5 mg total) by mouth 3 (three) times a day as needed for dizziness, Disp: 30 tablet, Rfl: 0    nitroglycerin (NITROSTAT) 0.4 mg SL tablet, Place 1 tablet (0.4 mg total) under the tongue every 5 (five) minutes as needed for chest pain, Disp: 20 tablet, Rfl: 3    oxyCODONE-acetaminophen (PERCOCET) 5-325 mg per tablet, Take 1 tablet by mouth every 8 (eight) hours as needed for moderate pain, Disp: , Rfl:     Probiotic Product (PROBIOTIC DAILY PO), Take 2 capsules by mouth in the morning Probiotic-prebiotic, Disp: , Rfl:     rosuvastatin (CRESTOR) 5 mg tablet, Take 1 tablet (5 mg total) by mouth daily, Disp: 90 tablet, Rfl: 3    traZODone (DESYREL) 50 mg tablet, Take 1 tablet (50 mg total) by mouth daily at bedtime (Patient not taking: Reported on 11/12/2024), Disp: 30 tablet, Rfl: 5    umeclidinium-vilanterol 62.5-25 mcg/actuation inhaler, Inhale 1 puff daily, Disp: 60 blister, Rfl: 5    Vitamin D-Vitamin K (K2-D3 5000 PO), Take by mouth daily, Disp: , Rfl:     Current Facility-Administered Medications:     lidocaine (XYLOCAINE) 1 % injection 1 mL, 1 mL, Injection, Once,      "triamcinolone acetonide (KENALOG-40) 40 mg/mL injection 40 mg, 40 mg, Intra-lesional, Once,     Objective:    Vitals:    11/12/24 1410   BP: 112/84   Pulse: 91   SpO2: 99%   Weight: 47.6 kg (105 lb)   Height: 4' 11.75\" (1.518 m)       Physical Exam  Constitutional:       General: She is not in acute distress.  HENT:      Head: Normocephalic and atraumatic.   Eyes:      Conjunctiva/sclera: Conjunctivae normal.   Cardiovascular:      Rate and Rhythm: Normal rate and regular rhythm.      Heart sounds: S1 normal and S2 normal.      No friction rub.   Pulmonary:      Effort: Pulmonary effort is normal. No respiratory distress.      Breath sounds: Normal breath sounds. No wheezing, rhonchi or rales.   Musculoskeletal:         General: Tenderness present.      Cervical back: Neck supple.      Comments: L trochanteric bursa tenderness   Skin:     Coloration: Skin is not pale.   Neurological:      Mental Status: She is alert. Mental status is at baseline.   Psychiatric:         Mood and Affect: Mood normal.         Behavior: Behavior normal.  Reviewed labs and imaging.    Imaging:   No results found.    Labs:   Office Visit on 10/08/2024   Component Date Value Ref Range Status    Supplier Name 10/08/2024 [employer]   Final-Edited    Supplier Phone Number 10/08/2024 [phone]   Final-Edited    Order Status 10/08/2024 Delivery Successful   Final-Edited    Delivery Request Date 10/08/2024 10/08/2024   Final-Edited    Date Delivered  10/08/2024 10/09/2024   Final-Edited    Item Description 10/08/2024 Nebulizer Compressor with Mouthpiece Only   Final-Edited    Qty: 1    Item Description 10/08/2024 Nebulizer Set, Reusable   Final-Edited    Qty: 1    Item Description 10/08/2024 Mouthpiece Only, N/A   Final-Edited    Qty: 1   Consult on 09/25/2024   Component Date Value Ref Range Status    LEUKOCYTE ESTERASE,UA 09/25/2024 negative   Final    NITRITE,UA 09/25/2024 negative   Final    SL AMB POCT " UROBILINOGEN 09/25/2024 0.2   Final    POCT URINE PROTEIN 09/25/2024 negative   Final     PH,UA 09/25/2024 6.0   Final    BLOOD,UA 09/25/2024 1+/25   Final    SPECIFIC GRAVITY,UA 09/25/2024 1.005   Final    KETONES,UA 09/25/2024 negative   Final    BILIRUBIN,UA 09/25/2024 negative   Final    GLUCOSE, UA 09/25/2024 negative   Final     COLOR,UA 09/25/2024 yellow   Final    CLARITY,UA 09/25/2024 clear   Final    Urine Culture 09/25/2024 10,000-19,000 cfu/ml   Final    Mixed Contaminants X3   Appointment on 08/08/2024   Component Date Value Ref Range Status    Color, UA 08/08/2024 Yellow   Final    Clarity, UA 08/08/2024 Clear   Final    Specific Gravity, UA 08/08/2024 1.020  1.003 - 1.030 Final    pH, UA 08/08/2024 6.0  4.5, 5.0, 5.5, 6.0, 6.5, 7.0, 7.5, 8.0 Final    Leukocytes, UA 08/08/2024 Negative  Negative Final    Nitrite, UA 08/08/2024 Negative  Negative Final    Protein, UA 08/08/2024 Trace (A)  Negative mg/dl Final    Glucose, UA 08/08/2024 Negative  Negative mg/dl Final    Ketones, UA 08/08/2024 Negative  Negative mg/dl Final    Urobilinogen, UA 08/08/2024 <2.0  <2.0 mg/dl mg/dl Final    Bilirubin, UA 08/08/2024 Negative  Negative Final    Occult Blood, UA 08/08/2024 Trace (A)  Negative Final    RBC, UA 08/08/2024 4-10 (A)  None Seen, 1-2 /hpf Final    WBC, UA 08/08/2024 None Seen  None Seen, 1-2 /hpf Final    Epithelial Cells 08/08/2024 Occasional  None Seen, Occasional /hpf Final    Bacteria, UA 08/08/2024 Occasional  None Seen, Occasional /hpf Final    MUCUS THREADS 08/08/2024 Occasional (A)  None Seen Final   Consult on 06/26/2024   Component Date Value Ref Range Status    Hemoglobin A1C 07/23/2024 5.6  Normal 4.0-5.6%; PreDiabetic 5.7-6.4%; Diabetic >=6.5%; Glycemic control for adults with diabetes <7.0% % Final    EAG 07/23/2024 114  mg/dl Final    Sodium 07/23/2024 140  135 - 147 mmol/L Final    Potassium 07/23/2024 3.8  3.5 - 5.3 mmol/L Final    Chloride 07/23/2024 104  96 - 108 mmol/L Final    CO2  07/23/2024 29  21 - 32 mmol/L Final    ANION GAP 07/23/2024 7  4 - 13 mmol/L Final    BUN 07/23/2024 6  5 - 25 mg/dL Final    Creatinine 07/23/2024 0.43 (L)  0.60 - 1.30 mg/dL Final    Standardized to IDMS reference method    Glucose, Fasting 07/23/2024 85  65 - 99 mg/dL Final    Calcium 07/23/2024 9.2  8.4 - 10.2 mg/dL Final    AST 07/23/2024 15  13 - 39 U/L Final    ALT 07/23/2024 8  7 - 52 U/L Final    Specimen collection should occur prior to Sulfasalazine administration due to the potential for falsely depressed results.     Alkaline Phosphatase 07/23/2024 38  34 - 104 U/L Final    Total Protein 07/23/2024 6.6  6.4 - 8.4 g/dL Final    Albumin 07/23/2024 4.4  3.5 - 5.0 g/dL Final    Total Bilirubin 07/23/2024 0.54  0.20 - 1.00 mg/dL Final    Use of this assay is not recommended for patients undergoing treatment with eltrombopag due to the potential for falsely elevated results.  N-acetyl-p-benzoquinone imine (metabolite of Acetaminophen) will generate erroneously low results in samples for patients that have taken an overdose of Acetaminophen.    eGFR 07/23/2024 111  ml/min/1.73sq m Final    Beta- Hydroxybutyrate 07/23/2024 0.30 (H)  0.02 - 0.27 mmol/L Final    Insulin, Fasting 07/23/2024 4.13  1.90 - 23.00 uIU/mL Final    C-Peptide 07/23/2024 1.8  1.1 - 4.4 ng/mL Final    C-Peptide reference interval is for fasting patients.    TSH 3RD GENERATON 07/23/2024 0.459  0.450 - 4.500 uIU/mL Final    The recommended reference ranges for TSH during pregnancy are as follows:   First trimester 0.100 to 2.500 uIU/mL   Second trimester  0.200 to 3.000 uIU/mL   Third trimester 0.300 to 3.000 uIU/m    Note: Normal ranges may not apply to patients who are transgender, non-binary, or whose legal sex, sex at birth, and gender identity differ.  Adult TSH (3rd generation) reference range follows the recommended guidelines of the American Thyroid Association, January, 2020.   Appointment on 06/07/2024   Component Date Value Ref  Range Status    Case Report 06/07/2024    Final                    Value:Non-gynecologic Cytology                          Case: [id number]                                  Authorizing Provider:  Jeri Pike MD       Collected:           06/07/2024 1134              Ordering Location:     [facility]      Received:            06/07/2024 1134                                     [facility]                                                         Pathologist:           Maureen Brizuela MD                                                    Specimen:    Urine, Clean Catch                                                                         Final Diagnosis 06/07/2024    Final                    Value:A. Urine, Clean Catch, :  Negative for high grade urothelial carcinoma ([facility]) - see comment.  Predominantly benign squamous cells.  Benign urothelial cells.  Few mixed inflammatory cells.    Satisfactory for evaluation.    Comment:  The above diagnostic category is from the recently published book, The Cristin System for Reporting Urinary Cytology, and is in keeping with the ongoing effort for utilization of standardized diagnostic terminology in urine cytology.*    *The Cristin System for Reporting Urinary Cytology. Loli Hauser, Mine Bazan, Karthik Garner; 2016.        Gross Description 06/07/2024    Final                    Value:A. 20mL, dark yellow, hazy, not received in [employer]toLyt         Additional Information 06/07/2024    Final                    Value:[employer]'s FDA approved ,  and ThinPrep Imaging Duo System are utilized with strict adherence to the 's instruction manual to prepare gynecologic and non-gynecologic cytology specimens for the production of ThinPrep slides as well as for gynecologic ThinPrep imaging. These processes have been validated by our laboratory and/or by the .    These tests were developed and their performance  characteristics determined by [facility] or [facility]. They may not be cleared or approved by the U.S. Food and Drug Administration. The FDA has determined that such clearance or approval is not necessary. These tests are used for clinical purposes. They should not be regarded as investigational or for research. This laboratory has been approved by CLIA 88, designated as a high-complexity laboratory and is qualified to perform these tests.    Interpretation performed at [facility], [address] [facility] [address]     Appointment on 05/22/2024   Component Date Value Ref Range Status    Color, UA 05/22/2024 Yellow   Final    Clarity, UA 05/22/2024 Turbid   Final    Specific Gravity, UA 05/22/2024 1.023  1.003 - 1.030 Final    pH, UA 05/22/2024 6.0  4.5, 5.0, 5.5, 6.0, 6.5, 7.0, 7.5, 8.0 Final    Leukocytes, UA 05/22/2024 Negative  Negative Final    Nitrite, UA 05/22/2024 Negative  Negative Final    Protein, UA 05/22/2024 30 (1+) (A)  Negative mg/dl Final    Glucose, UA 05/22/2024 Negative  Negative mg/dl Final    Ketones, UA 05/22/2024 Negative  Negative mg/dl Final    Urobilinogen, UA 05/22/2024 <2.0  <2.0 mg/dl mg/dl Final    Bilirubin, UA 05/22/2024 Negative  Negative Final    Occult Blood, UA 05/22/2024 Small (A)  Negative Final    RBC, UA 05/22/2024 4-10 (A)  None Seen, 1-2 /hpf Final    WBC, UA 05/22/2024 None Seen  None Seen, 1-2 /hpf Final    Epithelial Cells 05/22/2024 Moderate (A)  None Seen, Occasional /hpf Final    Bacteria, UA 05/22/2024 Occasional  None Seen, Occasional /hpf Final    MUCUS THREADS 05/22/2024 Occasional (A)  None Seen Final    Creatinine, Ur 05/22/2024 134.4  Reference range not established. mg/dL Final    Protein Urine Random 05/22/2024 15  Reference range not established. mg/dL Final    Prot/Creat Ratio, Ur 05/22/2024 0.11 (H)  0.00 - 0.10 Final   Office Visit on 05/21/2024   Component Date Value Ref Range Status    WBC 05/22/2024 5.91  4.31 -  10.16 Thousand/uL Final    RBC 05/22/2024 4.02  3.81 - 5.12 Million/uL Final    Hemoglobin 05/22/2024 13.4  11.5 - 15.4 g/dL Final    Hematocrit 05/22/2024 40.8  34.8 - 46.1 % Final    MCV 05/22/2024 102 (H)  82 - 98 fL Final    MCH 05/22/2024 33.3  26.8 - 34.3 pg Final    MCHC 05/22/2024 32.8  31.4 - 37.4 g/dL Final    RDW 05/22/2024 12.2  11.6 - 15.1 % Final    MPV 05/22/2024 10.3  8.9 - 12.7 fL Final    Platelets 05/22/2024 209  149 - 390 Thousands/uL Final    nRBC 05/22/2024 0  /100 WBCs Final    Segmented % 05/22/2024 51  43 - 75 % Final    Immature Grans % 05/22/2024 0  0 - 2 % Final    Lymphocytes % 05/22/2024 38  14 - 44 % Final    Monocytes % 05/22/2024 8  4 - 12 % Final    Eosinophils Relative 05/22/2024 2  0 - 6 % Final    Basophils Relative 05/22/2024 1  0 - 1 % Final    Absolute Neutrophils 05/22/2024 3.09  1.85 - 7.62 Thousands/µL Final    Absolute Immature Grans 05/22/2024 0.02  0.00 - 0.20 Thousand/uL Final    Absolute Lymphocytes 05/22/2024 2.22  0.60 - 4.47 Thousands/µL Final    Absolute Monocytes 05/22/2024 0.45  0.17 - 1.22 Thousand/µL Final    Eosinophils Absolute 05/22/2024 0.09  0.00 - 0.61 Thousand/µL Final    Basophils Absolute 05/22/2024 0.04  0.00 - 0.10 Thousands/µL Final    C4, COMPLEMENT 05/22/2024 39  19 - 52 mg/dL Final    C3 Complement 05/22/2024 122  87 - 200 mg/dL Final    ds DNA Ab 05/22/2024 <1  0 - 9 IU/mL Final                                       Negative      <5                                     Equivocal  5 - 9                                     Positive      >9    Sodium 05/22/2024 141  135 - 147 mmol/L Final    Potassium 05/22/2024 3.8  3.5 - 5.3 mmol/L Final    Chloride 05/22/2024 103  96 - 108 mmol/L Final    CO2 05/22/2024 28  21 - 32 mmol/L Final    ANION GAP 05/22/2024 10  4 - 13 mmol/L Final    BUN 05/22/2024 8  5 - 25 mg/dL Final    Creatinine 05/22/2024 0.50 (L)  0.60 - 1.30 mg/dL Final    Standardized to IDMS reference method    Glucose, Fasting 05/22/2024 86   65 - 99 mg/dL Final    Calcium 05/22/2024 9.2  8.4 - 10.2 mg/dL Final    AST 05/22/2024 15  13 - 39 U/L Final    ALT 05/22/2024 9  7 - 52 U/L Final    Specimen collection should occur prior to Sulfasalazine administration due to the potential for falsely depressed results.     Alkaline Phosphatase 05/22/2024 42  34 - 104 U/L Final    Total Protein 05/22/2024 6.8  6.4 - 8.4 g/dL Final    Albumin 05/22/2024 4.4  3.5 - 5.0 g/dL Final    Total Bilirubin 05/22/2024 0.44  0.20 - 1.00 mg/dL Final    Use of this assay is not recommended for patients undergoing treatment with eltrombopag due to the potential for falsely elevated results.  N-acetyl-p-benzoquinone imine (metabolite of Acetaminophen) will generate erroneously low results in samples for patients that have taken an overdose of Acetaminophen.    eGFR 05/22/2024 107  ml/min/1.73sq m Final    Sed Rate 05/22/2024 15  0 - 29 mm/hour Final    CRP 05/22/2024 <1.0  <3.0 mg/L Final

## 2024-11-12 NOTE — TELEPHONE ENCOUNTER
Patient called and stated today at 12:00 noon will not work for her as she is taking neighbor to an appointment. Patient asked for a call back to discuss another date with a time of 12:00 noon or later. Please advise.

## 2024-11-20 ENCOUNTER — APPOINTMENT (OUTPATIENT)
Dept: LAB | Facility: MEDICAL CENTER | Age: 59
End: 2024-11-20
Payer: COMMERCIAL

## 2024-11-20 DIAGNOSIS — E78.49 OTHER HYPERLIPIDEMIA: ICD-10-CM

## 2024-11-20 DIAGNOSIS — R42 DIZZINESS: ICD-10-CM

## 2024-11-20 LAB
ALBUMIN SERPL BCG-MCNC: 5.1 G/DL (ref 3.5–5)
ALP SERPL-CCNC: 43 U/L (ref 34–104)
ALT SERPL W P-5'-P-CCNC: 10 U/L (ref 7–52)
ANION GAP SERPL CALCULATED.3IONS-SCNC: 11 MMOL/L (ref 4–13)
AST SERPL W P-5'-P-CCNC: 17 U/L (ref 13–39)
BASOPHILS # BLD AUTO: 0.06 THOUSANDS/ÂΜL (ref 0–0.1)
BASOPHILS NFR BLD AUTO: 1 % (ref 0–1)
BILIRUB SERPL-MCNC: 0.59 MG/DL (ref 0.2–1)
BUN SERPL-MCNC: 9 MG/DL (ref 5–25)
CALCIUM SERPL-MCNC: 9.4 MG/DL (ref 8.4–10.2)
CHLORIDE SERPL-SCNC: 97 MMOL/L (ref 96–108)
CHOLEST SERPL-MCNC: 188 MG/DL (ref ?–200)
CO2 SERPL-SCNC: 30 MMOL/L (ref 21–32)
CREAT SERPL-MCNC: 0.57 MG/DL (ref 0.6–1.3)
EOSINOPHIL # BLD AUTO: 0.06 THOUSAND/ÂΜL (ref 0–0.61)
EOSINOPHIL NFR BLD AUTO: 1 % (ref 0–6)
ERYTHROCYTE [DISTWIDTH] IN BLOOD BY AUTOMATED COUNT: 12.4 % (ref 11.6–15.1)
EST. AVERAGE GLUCOSE BLD GHB EST-MCNC: 111 MG/DL
GFR SERPL CREATININE-BSD FRML MDRD: 101 ML/MIN/1.73SQ M
GLUCOSE P FAST SERPL-MCNC: 77 MG/DL (ref 65–99)
HBA1C MFR BLD: 5.5 %
HCT VFR BLD AUTO: 43.2 % (ref 34.8–46.1)
HDLC SERPL-MCNC: 81 MG/DL
HGB BLD-MCNC: 14.2 G/DL (ref 11.5–15.4)
IMM GRANULOCYTES # BLD AUTO: 0.03 THOUSAND/UL (ref 0–0.2)
IMM GRANULOCYTES NFR BLD AUTO: 0 % (ref 0–2)
LDLC SERPL CALC-MCNC: 88 MG/DL (ref 0–100)
LYMPHOCYTES # BLD AUTO: 3.2 THOUSANDS/ÂΜL (ref 0.6–4.47)
LYMPHOCYTES NFR BLD AUTO: 39 % (ref 14–44)
MCH RBC QN AUTO: 32.6 PG (ref 26.8–34.3)
MCHC RBC AUTO-ENTMCNC: 32.9 G/DL (ref 31.4–37.4)
MCV RBC AUTO: 99 FL (ref 82–98)
MONOCYTES # BLD AUTO: 0.64 THOUSAND/ÂΜL (ref 0.17–1.22)
MONOCYTES NFR BLD AUTO: 8 % (ref 4–12)
NEUTROPHILS # BLD AUTO: 4.2 THOUSANDS/ÂΜL (ref 1.85–7.62)
NEUTS SEG NFR BLD AUTO: 51 % (ref 43–75)
NONHDLC SERPL-MCNC: 107 MG/DL
NRBC BLD AUTO-RTO: 0 /100 WBCS
PLATELET # BLD AUTO: 273 THOUSANDS/UL (ref 149–390)
PMV BLD AUTO: 10.2 FL (ref 8.9–12.7)
POTASSIUM SERPL-SCNC: 3.7 MMOL/L (ref 3.5–5.3)
PROT SERPL-MCNC: 7.5 G/DL (ref 6.4–8.4)
RBC # BLD AUTO: 4.35 MILLION/UL (ref 3.81–5.12)
SODIUM SERPL-SCNC: 138 MMOL/L (ref 135–147)
TRIGL SERPL-MCNC: 97 MG/DL (ref ?–150)
TSH SERPL DL<=0.05 MIU/L-ACNC: 0.92 UIU/ML (ref 0.45–4.5)
WBC # BLD AUTO: 8.19 THOUSAND/UL (ref 4.31–10.16)

## 2024-11-20 PROCEDURE — 80061 LIPID PANEL: CPT

## 2024-11-20 PROCEDURE — 83036 HEMOGLOBIN GLYCOSYLATED A1C: CPT

## 2024-11-20 PROCEDURE — 85025 COMPLETE CBC W/AUTO DIFF WBC: CPT

## 2024-11-20 PROCEDURE — 36415 COLL VENOUS BLD VENIPUNCTURE: CPT

## 2024-11-20 PROCEDURE — 84443 ASSAY THYROID STIM HORMONE: CPT

## 2024-11-20 PROCEDURE — 80053 COMPREHEN METABOLIC PANEL: CPT

## 2024-11-21 ENCOUNTER — RESULTS FOLLOW-UP (OUTPATIENT)
Dept: FAMILY MEDICINE CLINIC | Facility: CLINIC | Age: 59
End: 2024-11-21

## 2024-11-24 ENCOUNTER — PATIENT MESSAGE (OUTPATIENT)
Dept: RHEUMATOLOGY | Facility: CLINIC | Age: 59
End: 2024-11-24

## 2024-11-24 RX ORDER — TRIAMCINOLONE ACETONIDE 40 MG/ML
40 INJECTION, SUSPENSION INTRA-ARTICULAR; INTRAMUSCULAR
Status: COMPLETED | OUTPATIENT
Start: 2024-11-12 | End: 2024-11-12

## 2024-12-13 ENCOUNTER — APPOINTMENT (OUTPATIENT)
Dept: RADIOLOGY | Facility: MEDICAL CENTER | Age: 59
End: 2024-12-13
Payer: COMMERCIAL

## 2024-12-13 DIAGNOSIS — M79.671 ARCH PAIN OF RIGHT FOOT: ICD-10-CM

## 2024-12-13 DIAGNOSIS — M25.571 BILATERAL ANKLE PAIN, UNSPECIFIED CHRONICITY: ICD-10-CM

## 2024-12-13 DIAGNOSIS — M25.572 BILATERAL ANKLE PAIN, UNSPECIFIED CHRONICITY: ICD-10-CM

## 2024-12-13 PROCEDURE — 73630 X-RAY EXAM OF FOOT: CPT

## 2024-12-13 PROCEDURE — 73610 X-RAY EXAM OF ANKLE: CPT

## 2024-12-17 ENCOUNTER — OFFICE VISIT (OUTPATIENT)
Dept: URGENT CARE | Facility: MEDICAL CENTER | Age: 59
End: 2024-12-17
Payer: COMMERCIAL

## 2024-12-17 ENCOUNTER — APPOINTMENT (OUTPATIENT)
Dept: RADIOLOGY | Facility: MEDICAL CENTER | Age: 59
End: 2024-12-17
Payer: COMMERCIAL

## 2024-12-17 VITALS
OXYGEN SATURATION: 97 % | HEART RATE: 82 BPM | HEIGHT: 59 IN | DIASTOLIC BLOOD PRESSURE: 79 MMHG | BODY MASS INDEX: 20.76 KG/M2 | SYSTOLIC BLOOD PRESSURE: 117 MMHG | WEIGHT: 103 LBS | TEMPERATURE: 98.3 F | RESPIRATION RATE: 18 BRPM

## 2024-12-17 DIAGNOSIS — S29.9XXA RIB INJURY: ICD-10-CM

## 2024-12-17 DIAGNOSIS — S29.9XXA RIB INJURY: Primary | ICD-10-CM

## 2024-12-17 PROCEDURE — 71101 X-RAY EXAM UNILAT RIBS/CHEST: CPT

## 2024-12-17 PROCEDURE — 93005 ELECTROCARDIOGRAM TRACING: CPT | Performed by: STUDENT IN AN ORGANIZED HEALTH CARE EDUCATION/TRAINING PROGRAM

## 2024-12-17 PROCEDURE — 99213 OFFICE O/P EST LOW 20 MIN: CPT | Performed by: STUDENT IN AN ORGANIZED HEALTH CARE EDUCATION/TRAINING PROGRAM

## 2024-12-17 RX ORDER — LIDOCAINE 50 MG/G
1 PATCH TOPICAL DAILY
Qty: 5 PATCH | Refills: 0 | Status: SHIPPED | OUTPATIENT
Start: 2024-12-17 | End: 2024-12-22

## 2024-12-17 NOTE — PROGRESS NOTES
[facility]        NAME: Gloria Mcdaniel is a 59 y.o. female  : 1965    MRN: [id number]  DATE: 2024  TIME: 3:37 PM    Assessment and Orders   Rib injury [S29.9XXA]  1. Rib injury  XR ribs left w pa chest min 3 views    lidocaine (Lidoderm) 5 %            Plan and Discussion      Symptoms and exam consistent with rib injury.  Tenderness to the left side of the rib cage.  No acute osseous abnormalities noted on x-ray per wet read.  Will follow-up with final radiology report.    On abundance of caution, obtain EKG which revealed normal sinus rhythm and possible left atrial enlargement.  No ST elevations.  Patient denies crushing substernal chest pain and understands that this should prompt immediate evaluation in ER.    Will treat acute rib pain with topical lidocaine patches.    Risks and benefits discussed. Patient understands and agrees with the plan.     PATIENT INSTRUCTIONS      If tests have been performed at [facility], our office will contact you with results if changes need to be made to the care plan discussed with you at the visit.  You can review your full results on [facility] MyChart.    Follow up with PCP.     If any of the following occur, please report to your nearest ED for evaluation or call 911.   Difficultly breathing or shortness of breath  Chest pain  Acutely worsening symptoms.         Chief Complaint     Chief Complaint   Patient presents with    Rib Pain     Left rib pain started 3 days ago, opt was leaning over her love seat the wood that runs along the back of the love seat is where the pt was leaning and she felt a pop in her chest, hurst to breathe, sharp pain, pt normal pain killer aren't working, hot/cold compress isn't working          History of Present Illness       Leaning over piece of furniture and hurt the anterior ribcage. Pain with deep breathing. Really sharp pain. No bruising.  Causes breathing to be shallow.  States it hurts all the time.   States she thinks that her rib rolled out of place and then back because she did hear a pop when she leaned over.        Review of Systems   Review of Systems  As stated above    Current Medications       Current Outpatient Medications:     albuterol (PROVENTIL HFA,VENTOLIN HFA) 90 mcg/act inhaler, Inhale 2 puffs every 6 (six) hours as needed for wheezing, Disp: 18 g, Rfl: 5    calcium carbonate (TUMS) 500 mg chewable tablet, Chew 1 tablet if needed for indigestion or heartburn, Disp: , Rfl:     cyclobenzaprine (FLEXERIL) 10 mg tablet, Take 1 tablet (10 mg total) by mouth daily at bedtime, Disp: 30 tablet, Rfl: 6    Diclofenac Sodium (VOLTAREN) 1 %, Apply 2 g topically 4 (four) times a day, Disp: 100 g, Rfl: 6    diphenhydrAMINE-acetaminophen (TYLENOL PM)  MG TABS, Take 1 tablet by mouth daily at bedtime as needed for sleep, Disp: , Rfl:     ezetimibe (ZETIA) 10 mg tablet, Take 1 tablet (10 mg total) by mouth daily, Disp: 90 tablet, Rfl: 3    hydroxychloroquine (PLAQUENIL) 200 mg tablet, Take 1 tablet (200 mg total) by mouth daily, Disp: 90 tablet, Rfl: 2    ibuprofen (MOTRIN) 800 mg tablet, Take 1 tablet (800 mg total) by mouth every 8 (eight) hours as needed for moderate pain Take with food, Disp: 90 tablet, Rfl: 6    ipratropium-albuterol (DUO-NEB) 0.5-2.5 mg/3 mL nebulizer solution, Take 3 mL by nebulization 4 (four) times a day, Disp: 90 mL, Rfl: 2    LACTASE ENZYME PO, Take 3 tablets by mouth if needed As needed, Disp: , Rfl:     lidocaine (Lidoderm) 5 %, Apply 1 patch topically over 12 hours daily for 5 days Remove & Discard patch within 12 hours or as directed by MD, Disp: 5 patch, Rfl: 0    meclizine (ANTIVERT) 12.5 MG tablet, Take 1 tablet (12.5 mg total) by mouth 3 (three) times a day as needed for dizziness, Disp: 30 tablet, Rfl: 0    oxyCODONE-acetaminophen (PERCOCET) 5-325 mg per tablet, Take 1 tablet by mouth every 8 (eight) hours as needed for moderate pain, Disp: , Rfl:     Probiotic Product  (PROBIOTIC DAILY PO), Take 2 capsules by mouth in the morning Probiotic-prebiotic, Disp: , Rfl:     rosuvastatin (CRESTOR) 5 mg tablet, Take 1 tablet (5 mg total) by mouth daily, Disp: 90 tablet, Rfl: 3    umeclidinium-vilanterol 62.5-25 mcg/actuation inhaler, Inhale 1 puff daily, Disp: 60 blister, Rfl: 5    Vitamin D-Vitamin K (K2-D3 5000 PO), Take by mouth daily, Disp: , Rfl:     nitroglycerin (NITROSTAT) 0.4 mg SL tablet, Place 1 tablet (0.4 mg total) under the tongue every 5 (five) minutes as needed for chest pain (Patient not taking: Reported on 12/17/2024), Disp: 20 tablet, Rfl: 3    traZODone (DESYREL) 50 mg tablet, Take 1 tablet (50 mg total) by mouth daily at bedtime (Patient not taking: Reported on 12/17/2024), Disp: 30 tablet, Rfl: 5    Current Allergies     Allergies as of 12/17/2024 - Reviewed 12/17/2024   Allergen Reaction Noted    Ceclor [cefaclor] Anaphylaxis 09/02/2016    Cephalexin  06/09/2011    Codeine Itching 06/09/2011    Gabapentin  05/22/2020    Lyrica [pregabalin]  05/22/2020    Ticlopidine Hives 11/07/2014    Penicillins Rash 12/02/2013            The following portions of the patient's history were reviewed and updated as appropriate: allergies, current medications, past family history, past medical history, past social history, past surgical history and problem list.     Past Medical History:   Diagnosis Date    Acquired ichthyosis     last assessed: 5/9/2013    Anesthesia     Pt reports severe acid reflux after getting anesthesia- sometimes presents as chest pain    Anxiety     Cervical radiculopathy     last assessed: 6/13/2014    Colorectal polyps     last assessed: 3/9/2015    COPD (chronic obstructive pulmonary disease) (HCC)     Depression     Diverticulosis of colon     Foot pain     GERD (gastroesophageal reflux disease)     Heart disease 1997    Coronary artery disease    Hepatitis C july 2018    Took meds no longer have    Hyperlipemia     Hypertension     Lupus     Myocardial  infarction ([facility])     at age 32    Osteoarthritis unsure    Osteopenia     last assessed: 2013    Palpitations     last assessed: 2014    PVD (peripheral vascular disease) ([facility])     last assessed: 12/3/2012    RA (rheumatoid arthritis) ([facility])     Rheumatoid arthritis ([facility]) unsure    Scapular dyskinesis     last assessed: 2014    Shortness of breath     Stomach disorder Unsure    Tendonitis of left rotator cuff     last assessed: 2014    Vocal cord polyps     last assessed: 2014       Past Surgical History:   Procedure Laterality Date    ABDOMINAL SURGERY      exploratory    CARDIAC SURGERY      cardiac stent      SECTION      last assessed: 2014    CHOLECYSTECTOMY      last assessed: 2014    COLONOSCOPY  10/27/2014    CORONARY ANGIOPLASTY WITH STENT PLACEMENT      LAD stent    DENTAL SURGERY      last assessed: 2014    ELBOW SURGERY Bilateral     arthroplasty    EPIDURAL BLOCK INJECTION N/A 2019    Procedure: C7 T1 Cervical Epidural Steroid Injection (43443);  Surgeon: Ricardo Lawson MD;  Location: [facility];  Service: Pain Management     EPIDURAL BLOCK INJECTION Bilateral 2019    Procedure: BLOCK / INJECTION EPIDURAL STEROID CERVICAL - C7-T1;  Surgeon: Ricardo Lawson MD;  Location: [facility];  Service: Pain Management     EPIDURAL BLOCK INJECTION Left 10/03/2019    Procedure: C7-T1 interlaminar epidural steroid injection;  Surgeon: Ricardo Lawson MD;  Location: [facility];  Service: Pain Management     ESOPHAGOGASTRODUODENOSCOPY      ESOPHAGOGASTRODUODENOSCOPY N/A 2016    Procedure: ESOPHAGOGASTRODUODENOSCOPY (EGD);  Surgeon: Alejandro Carvajal MD;  Location: [facility];  Service:     FL GUIDED NEEDLE PLAC BX/ASP/INJ  2019    FL GUIDED NEEDLE PLAC BX/ASP/INJ  2019    FL GUIDED NEEDLE PLAC BX/ASP/INJ  10/03/2019    FL INJECTION LEFT SHOULDER (ARTHROGRAM)  2018    FOOT SURGERY Right 02/06/2015    x 4    HYSTERECTOMY    "   last assessed: 8/8/2014    AK ARTHROCENTESIS ASPIR&/INJ MAJOR JT/BURSA W/O US Bilateral 07/31/2019    Procedure: GREATER TROCHANTERIC HIP INJECTION;  Surgeon: Ricardo Lawson MD;  Location: [facility];  Service: Pain Management     AK EXCISON TUMOR SOFT TISSUE THIGH/KNEE SUBQ 3 CM/> Right 9/18/2023    Procedure: EXCISION BIOPSY TISSUE LESION/MASS LOWER EXTREMITY;  Surgeon: Franky Bragg DO;  Location: [facility];  Service: Orthopedics    AK SURGICAL ARTHROSCOPY SHOULDER BICEPS TENODESIS Left 10/15/2018    Procedure: ARTHROSCOPY SHOULDER; Debridement of shoulder;  Surgeon: Francis Byrnes MD;  Location: [facility];  Service: Orthopedics    AK SURGICAL ARTHROSCOPY SHOULDER W/ROTATOR CUFF RPR Left 07/23/2018    Procedure: ARTHROSCOPY SHOULDER, subacromial decompression, synovectomy;  Surgeon: Francis Byrnes MD;  Location: [facility];  Service: Orthopedics    SHOULDER SURGERY  june and oct 2018    THROAT SURGERY      TOOTH EXTRACTION      TRIGEMINAL NERVE DECOMPRESSION Right 06/15/2018    Procedure: FOOT NEUROPLASTY;  Surgeon: Hernan Resendiz DPM;  Location: [facility];  Service: Podiatry       Family History   Problem Relation Age of Onset    No Known Problems Mother     No Known Problems Father     No Known Problems Maternal Grandmother     No Known Problems Maternal Grandfather     No Known Problems Paternal Grandmother     No Known Problems Paternal Grandfather     Early death Sister     Hypertension Sister     No Known Problems Maternal Aunt     No Known Problems Maternal Aunt     No Known Problems Maternal Aunt     No Known Problems Maternal Aunt          Medications have been verified.        Objective   /79   Pulse 82   Temp 98.3 °F (36.8 °C)   Resp 18   Ht 4' 11\" (1.499 m)   Wt 46.7 kg (103 lb)   SpO2 97%   BMI 20.80 kg/m²   No LMP recorded. Patient has had a hysterectomy.       Physical Exam     Physical Exam  Constitutional:       Appearance: She is normal weight.   Cardiovascular:      " Rate and Rhythm: Normal rate and regular rhythm.   Pulmonary:      Effort: Pulmonary effort is normal. No respiratory distress.      Breath sounds: No decreased air movement or transmitted upper airway sounds. No decreased breath sounds.   Chest:      Chest wall: Tenderness present.          Comments: Area of tenderness.  No bruising.  No subcutaneous air.  No obvious deformity.  Neurological:      General: No focal deficit present.      Mental Status: She is alert and oriented to person, place, and time.   Psychiatric:         Mood and Affect: Mood normal.         Behavior: Behavior normal.               Heather Lujan DO

## 2024-12-18 ENCOUNTER — APPOINTMENT (EMERGENCY)
Dept: CT IMAGING | Facility: HOSPITAL | Age: 59
End: 2024-12-18
Payer: COMMERCIAL

## 2024-12-18 ENCOUNTER — NURSE TRIAGE (OUTPATIENT)
Age: 59
End: 2024-12-18

## 2024-12-18 ENCOUNTER — HOSPITAL ENCOUNTER (EMERGENCY)
Facility: HOSPITAL | Age: 59
Discharge: HOME | End: 2024-12-18
Attending: EMERGENCY MEDICINE
Payer: COMMERCIAL

## 2024-12-18 ENCOUNTER — RESULTS FOLLOW-UP (OUTPATIENT)
Dept: URGENT CARE | Facility: MEDICAL CENTER | Age: 59
End: 2024-12-18

## 2024-12-18 VITALS
WEIGHT: 107.58 LBS | SYSTOLIC BLOOD PRESSURE: 143 MMHG | RESPIRATION RATE: 14 BRPM | HEART RATE: 64 BPM | TEMPERATURE: 98.4 F | OXYGEN SATURATION: 96 % | DIASTOLIC BLOOD PRESSURE: 67 MMHG | BODY MASS INDEX: 21.73 KG/M2

## 2024-12-18 DIAGNOSIS — R07.89 CHEST WALL PAIN: Primary | ICD-10-CM

## 2024-12-18 LAB
ALBUMIN SERPL BCG-MCNC: 4.6 G/DL (ref 3.5–5)
ALP SERPL-CCNC: 41 U/L (ref 34–104)
ALT SERPL W P-5'-P-CCNC: 8 U/L (ref 7–52)
ANION GAP SERPL CALCULATED.3IONS-SCNC: 7 MMOL/L (ref 4–13)
AST SERPL W P-5'-P-CCNC: 14 U/L (ref 13–39)
ATRIAL RATE: 63 BPM
ATRIAL RATE: 67 BPM
BASOPHILS # BLD AUTO: 0.03 THOUSANDS/ΜL (ref 0–0.1)
BASOPHILS NFR BLD AUTO: 1 % (ref 0–1)
BILIRUB SERPL-MCNC: 0.62 MG/DL (ref 0.2–1)
BUN SERPL-MCNC: 5 MG/DL (ref 5–25)
CALCIUM SERPL-MCNC: 9.4 MG/DL (ref 8.4–10.2)
CARDIAC TROPONIN I PNL SERPL HS: <2 NG/L (ref ?–50)
CHLORIDE SERPL-SCNC: 100 MMOL/L (ref 96–108)
CO2 SERPL-SCNC: 30 MMOL/L (ref 21–32)
CREAT SERPL-MCNC: 0.55 MG/DL (ref 0.6–1.3)
D DIMER PPP FEU-MCNC: 0.32 UG/ML FEU
EOSINOPHIL # BLD AUTO: 0.07 THOUSAND/ΜL (ref 0–0.61)
EOSINOPHIL NFR BLD AUTO: 1 % (ref 0–6)
ERYTHROCYTE [DISTWIDTH] IN BLOOD BY AUTOMATED COUNT: 12.4 % (ref 11.6–15.1)
GFR SERPL CREATININE-BSD FRML MDRD: 102 ML/MIN/1.73SQ M
GLUCOSE SERPL-MCNC: 85 MG/DL (ref 65–140)
HCT VFR BLD AUTO: 43.5 % (ref 34.8–46.1)
HGB BLD-MCNC: 14.2 G/DL (ref 11.5–15.4)
IMM GRANULOCYTES # BLD AUTO: 0.01 THOUSAND/UL (ref 0–0.2)
IMM GRANULOCYTES NFR BLD AUTO: 0 % (ref 0–2)
LYMPHOCYTES # BLD AUTO: 2.66 THOUSANDS/ΜL (ref 0.6–4.47)
LYMPHOCYTES NFR BLD AUTO: 44 % (ref 14–44)
MCH RBC QN AUTO: 32.6 PG (ref 26.8–34.3)
MCHC RBC AUTO-ENTMCNC: 32.6 G/DL (ref 31.4–37.4)
MCV RBC AUTO: 100 FL (ref 82–98)
MONOCYTES # BLD AUTO: 0.54 THOUSAND/ΜL (ref 0.17–1.22)
MONOCYTES NFR BLD AUTO: 9 % (ref 4–12)
NEUTROPHILS # BLD AUTO: 2.71 THOUSANDS/ΜL (ref 1.85–7.62)
NEUTS SEG NFR BLD AUTO: 45 % (ref 43–75)
NRBC BLD AUTO-RTO: 0 /100 WBCS
P AXIS: 80 DEGREES
P AXIS: 81 DEGREES
PLATELET # BLD AUTO: 242 THOUSANDS/UL (ref 149–390)
PMV BLD AUTO: 10.1 FL (ref 8.9–12.7)
POTASSIUM SERPL-SCNC: 3.5 MMOL/L (ref 3.5–5.3)
PR INTERVAL: 152 MS
PR INTERVAL: 154 MS
PROT SERPL-MCNC: 6.9 G/DL (ref 6.4–8.4)
QRS AXIS: 28 DEGREES
QRS AXIS: 5 DEGREES
QRSD INTERVAL: 74 MS
QRSD INTERVAL: 74 MS
QT INTERVAL: 386 MS
QT INTERVAL: 390 MS
QTC INTERVAL: 396 MS
QTC INTERVAL: 412 MS
RBC # BLD AUTO: 4.36 MILLION/UL (ref 3.81–5.12)
SODIUM SERPL-SCNC: 137 MMOL/L (ref 135–147)
T WAVE AXIS: 71 DEGREES
T WAVE AXIS: 72 DEGREES
VENTRICULAR RATE: 63 BPM
VENTRICULAR RATE: 67 BPM
WBC # BLD AUTO: 6.02 THOUSAND/UL (ref 4.31–10.16)

## 2024-12-18 PROCEDURE — 93005 ELECTROCARDIOGRAM TRACING: CPT

## 2024-12-18 PROCEDURE — 85025 COMPLETE CBC W/AUTO DIFF WBC: CPT | Performed by: EMERGENCY MEDICINE

## 2024-12-18 PROCEDURE — 96376 TX/PRO/DX INJ SAME DRUG ADON: CPT

## 2024-12-18 PROCEDURE — 71250 CT THORAX DX C-: CPT

## 2024-12-18 PROCEDURE — 96375 TX/PRO/DX INJ NEW DRUG ADDON: CPT

## 2024-12-18 PROCEDURE — 99285 EMERGENCY DEPT VISIT HI MDM: CPT

## 2024-12-18 PROCEDURE — 96374 THER/PROPH/DIAG INJ IV PUSH: CPT

## 2024-12-18 PROCEDURE — 80053 COMPREHEN METABOLIC PANEL: CPT | Performed by: EMERGENCY MEDICINE

## 2024-12-18 PROCEDURE — 84484 ASSAY OF TROPONIN QUANT: CPT | Performed by: EMERGENCY MEDICINE

## 2024-12-18 PROCEDURE — 99285 EMERGENCY DEPT VISIT HI MDM: CPT | Performed by: EMERGENCY MEDICINE

## 2024-12-18 PROCEDURE — 93010 ELECTROCARDIOGRAM REPORT: CPT | Performed by: INTERNAL MEDICINE

## 2024-12-18 PROCEDURE — 36415 COLL VENOUS BLD VENIPUNCTURE: CPT | Performed by: EMERGENCY MEDICINE

## 2024-12-18 PROCEDURE — 85379 FIBRIN DEGRADATION QUANT: CPT | Performed by: EMERGENCY MEDICINE

## 2024-12-18 RX ORDER — KETOROLAC TROMETHAMINE 30 MG/ML
15 INJECTION, SOLUTION INTRAMUSCULAR; INTRAVENOUS ONCE
Status: COMPLETED | OUTPATIENT
Start: 2024-12-18 | End: 2024-12-18

## 2024-12-18 RX ORDER — HYDROMORPHONE HCL/PF 1 MG/ML
0.5 SYRINGE (ML) INJECTION ONCE
Refills: 0 | Status: COMPLETED | OUTPATIENT
Start: 2024-12-18 | End: 2024-12-18

## 2024-12-18 RX ORDER — OXYCODONE AND ACETAMINOPHEN 5; 325 MG/1; MG/1
1 TABLET ORAL EVERY 4 HOURS PRN
Qty: 12 TABLET | Refills: 0 | Status: SHIPPED | OUTPATIENT
Start: 2024-12-18 | End: 2024-12-28

## 2024-12-18 RX ADMIN — HYDROMORPHONE HYDROCHLORIDE 0.5 MG: 1 INJECTION, SOLUTION INTRAMUSCULAR; INTRAVENOUS; SUBCUTANEOUS at 18:43

## 2024-12-18 RX ADMIN — HYDROMORPHONE HYDROCHLORIDE 0.5 MG: 1 INJECTION, SOLUTION INTRAMUSCULAR; INTRAVENOUS; SUBCUTANEOUS at 16:12

## 2024-12-18 RX ADMIN — KETOROLAC TROMETHAMINE 15 MG: 30 INJECTION, SOLUTION INTRAMUSCULAR at 16:11

## 2024-12-18 NOTE — TELEPHONE ENCOUNTER
"Gloria reports chest injury on 12/14/2024, was leaning over couch, felt pop in ribs, saw movement of ribs seeming displaced then popping back into place.    She reports severe pain just left of sternum, shortness of breath due to severity of pain. She states the pain sharp and constant, worsening.    Gloria was seen at Urgent Care yesterday, 12/17/2024, x-rays done, per note: \"No changes in treatment plan necessary.\"     She has been using lidocaine patches, as prescribed by Urgent Care provider, without any improvement in pain.    Advised Emergency Department for evaluation. Gloria verbalized understanding and is agreeable.      Reason for Disposition   SEVERE chest pain    Answer Assessment - Initial Assessment Questions  1. MECHANISM: \"How did the injury happen?\"      Was leaning over couch, felt ribs pop and saw movement of ribs out of place, seemed to pop back into place    2. ONSET: \"When did the injury happen?\" (.e.g., minutes, hours, days ago)      Saturday, 12/14/2024    3. LOCATION: \"Where on the chest is the injury located?\"      Chest, just left of sternum    4. APPEARANCE: \"What does the injury look like?\"      Notice ribs pop out of place at time of injury    5. BLEEDING: \"Is there any bleeding now?\" If Yes, ask: \"How long has it been bleeding?\"      Denies    6. SEVERITY: \"Any difficulty with breathing?\"      Breathing difficulty due to pain, denies other shortness of breath     7. SIZE: For cuts, bruises, or swelling, ask: \"How large is it?\" (e.g., inches or centimeters)      Denies    8. PAIN: \"Is there pain?\" If Yes, ask: \"How bad is the pain?\" (e.g., Scale 0-10; none, mild, moderate, severe)      Severe    Protocols used: Chest Injury-Adult-[place]    "

## 2024-12-18 NOTE — ED PROVIDER NOTES
Time reflects when diagnosis was documented in both MDM as applicable and the Disposition within this note       Time User Action Codes Description Comment    12/18/2024  7:47 PM KootenaiTiffanie Stewart Add [R07.89] Chest wall pain           ED Disposition       ED Disposition   Discharge    Condition   Stable    Date/Time   Wed Dec 18, 2024  7:47 PM    Comment   Gloria Mcdaniel discharge to home/self care.                   Assessment & Plan       Medical Decision Making      59-year-old female presenting for evaluation of left-sided chest pain.  Patient has had left-sided chest pain for the past few days, feels like pop after leaning over a few days ago.  She has tenderness over the chest wall, no rashes over the chest wall.  Differential diagnosis includes: Rib fracture, dislocation, pneumothorax, ACS, PE.  Will check CBC to evaluate for anemia or leukocytosis, CMP to evaluate for metabolic abnormality, EKG and troponin to evaluate for ACS or arrhythmia, D-dimer to evaluate for PE.  Will obtain CT scan, either with or without contrast pending D-dimer.  Will treat symptomatically and reassess.  Reviewed labs, no marked abnormalities.  D-dimer negative.  Reviewed CT scan, no acute findings.  Reassessed patient, pain is slightly improved.  Will treat patient as muscle strain/rib contusion with pain control.  Differential also includes very early shingles although she does not have a rash at this time.  Discussed with patient strict return precautions.  Patient expressed understanding of is agreeable for discharge.         Medications   HYDROmorphone (DILAUDID) injection 0.5 mg (0.5 mg Intravenous Given 12/18/24 1612)   ketorolac (TORADOL) injection 15 mg (15 mg Intravenous Given 12/18/24 1611)   HYDROmorphone (DILAUDID) injection 0.5 mg (0.5 mg Intravenous Given 12/18/24 1843)       ED Risk Strat Scores                          SBIRT 22yo+      Flowsheet Row Most Recent Value   Initial Alcohol Screen: US AUDIT-C     1.  How often do you have a drink containing alcohol? 0 Filed at: 12/18/2024 1524   2. How many drinks containing alcohol do you have on a typical day you are drinking?  0 Filed at: 12/18/2024 1524   3b. FEMALE Any Age, or MALE 65+: How often do you have 4 or more drinks on one occassion? 0 Filed at: 12/18/2024 1524   Audit-C Score 0 Filed at: 12/18/2024 1524   GUILLERMO: How many times in the past year have you...    Used an illegal drug or used a prescription medication for non-medical reasons? Never Filed at: 12/18/2024 1524                            History of Present Illness       Chief Complaint   Patient presents with    Chest Pain     States that on Saturday she was leaning over her couch and felt a pop in her chest,  has been having pain on the left side of her chest since. Seen at urgent care yesterday for same        Past Medical History:   Diagnosis Date    Acquired ichthyosis     last assessed: 5/9/2013    Anesthesia     Pt reports severe acid reflux after getting anesthesia- sometimes presents as chest pain    Anxiety     Cervical radiculopathy     last assessed: 6/13/2014    Colorectal polyps     last assessed: 3/9/2015    COPD (chronic obstructive pulmonary disease) ([facility])     Depression     Diverticulosis of colon     Foot pain     GERD (gastroesophageal reflux disease)     Heart disease 1997    Coronary artery disease    Hepatitis C july 2018    Took meds no longer have    Hyperlipemia     Hypertension     Lupus     Myocardial infarction (HCC)     at age 32    Osteoarthritis unsure    Osteopenia     last assessed: 12/6/2013    Palpitations     last assessed: 12/31/2014    PVD (peripheral vascular disease) ([facility])     last assessed: 12/3/2012    RA (rheumatoid arthritis) ([facility])     Rheumatoid arthritis ([facility]) unsure    Scapular dyskinesis     last assessed: 11/17/2014    Shortness of breath     Stomach disorder Unsure    Tendonitis of left rotator cuff     last assessed: 11/17/2014    Vocal cord polyps     last  assessed: 2014      Past Surgical History:   Procedure Laterality Date    ABDOMINAL SURGERY      exploratory    CARDIAC SURGERY      cardiac stent      SECTION      last assessed: 2014    CHOLECYSTECTOMY      last assessed: 2014    COLONOSCOPY  10/27/2014    CORONARY ANGIOPLASTY WITH STENT PLACEMENT      LAD stent    DENTAL SURGERY      last assessed: 2014    ELBOW SURGERY Bilateral     arthroplasty    EPIDURAL BLOCK INJECTION N/A 2019    Procedure: C7 T1 Cervical Epidural Steroid Injection (24303);  Surgeon: Ricardo Lawson MD;  Location: [facility];  Service: Pain Management     EPIDURAL BLOCK INJECTION Bilateral 2019    Procedure: BLOCK / INJECTION EPIDURAL STEROID CERVICAL - C7-T1;  Surgeon: Ricardo Lawson MD;  Location: [facility];  Service: Pain Management     EPIDURAL BLOCK INJECTION Left 10/03/2019    Procedure: C7-T1 interlaminar epidural steroid injection;  Surgeon: Ricardo Lawson MD;  Location: [facility];  Service: Pain Management     ESOPHAGOGASTRODUODENOSCOPY      ESOPHAGOGASTRODUODENOSCOPY N/A 2016    Procedure: ESOPHAGOGASTRODUODENOSCOPY (EGD);  Surgeon: Alejandro Carvajal MD;  Location: [facility];  Service:     FL GUIDED NEEDLE PLAC BX/ASP/INJ  2019    FL GUIDED NEEDLE PLAC BX/ASP/INJ  2019    FL GUIDED NEEDLE PLAC BX/ASP/INJ  10/03/2019    FL INJECTION LEFT SHOULDER (ARTHROGRAM)  2018    FOOT SURGERY Right 02/06/2015    x 4    HYSTERECTOMY      last assessed: 2014    IN ARTHROCENTESIS ASPIR&/INJ MAJOR JT/BURSA W/O US Bilateral 2019    Procedure: GREATER TROCHANTERIC HIP INJECTION;  Surgeon: Ricardo Lawson MD;  Location: [facility];  Service: Pain Management     IN EXCISON TUMOR SOFT TISSUE THIGH/KNEE SUBQ 3 CM/> Right 2023    Procedure: EXCISION BIOPSY TISSUE LESION/MASS LOWER EXTREMITY;  Surgeon: Franky Bragg DO;  Location: [facility];  Service: Orthopedics    IN SURGICAL ARTHROSCOPY SHOULDER  BICEPS TENODESIS Left 10/15/2018    Procedure: ARTHROSCOPY SHOULDER; Debridement of shoulder;  Surgeon: Francis Byrnes MD;  Location: [facility];  Service: Orthopedics    OH SURGICAL ARTHROSCOPY SHOULDER W/ROTATOR CUFF RPR Left 07/23/2018    Procedure: ARTHROSCOPY SHOULDER, subacromial decompression, synovectomy;  Surgeon: Francis Byrnes MD;  Location: [facility];  Service: Orthopedics    SHOULDER SURGERY  june and oct 2018    THROAT SURGERY      TOOTH EXTRACTION      TRIGEMINAL NERVE DECOMPRESSION Right 06/15/2018    Procedure: FOOT NEUROPLASTY;  Surgeon: Hernan Resendiz DPM;  Location: [facility];  Service: Podiatry      Family History   Problem Relation Age of Onset    No Known Problems Mother     No Known Problems Father     No Known Problems Maternal Grandmother     No Known Problems Maternal Grandfather     No Known Problems Paternal Grandmother     No Known Problems Paternal Grandfather     Early death Sister     Hypertension Sister     No Known Problems Maternal Aunt     No Known Problems Maternal Aunt     No Known Problems Maternal Aunt     No Known Problems Maternal Aunt       Social History     Tobacco Use    Smoking status: Former     Current packs/day: 0.25     Average packs/day: 0.3 packs/day for 40.0 years (10.0 ttl pk-yrs)     Types: Cigarettes    Smokeless tobacco: Never   Vaping Use    Vaping status: Never Used   Substance Use Topics    Alcohol use: Not Currently     Comment: rare    Drug use: No      E-Cigarette/Vaping    E-Cigarette Use Never User       E-Cigarette/Vaping Substances    Nicotine No     THC No     CBD No     Flavoring No     Other No     Unknown No       I have reviewed and agree with the history as documented.     HPI    59-year-old female presenting for evaluation of left-sided chest pain.  Patient states she was leaning over her left seat in hit her chest against a wood piece on the back of the left seat.  She felt something pop in her chest.  She states she has pain with taking  a deep breath.  Pain is getting worse since then.  She was seen in urgent care yesterday and had x-rays which were negative for any obvious rib fracture or pneumothorax.  She states she does take 5 mg Percocet 2 times a day which she has been on for the past 11 years.  She has tried some Tylenol and lidocaine patch without significant improvement in pain.  Denies any shortness of breath.  She states she has a mild cough for the past few days.  Denies fevers.  Denies abdominal pain, nausea, vomiting, or diarrhea.  Denies leg pain or swelling.  She states she does have history of blood clots in her legs when she was 17 years old but is not on blood thinners anymore.    Review of Systems   Constitutional:  Negative for appetite change, chills and fever.   HENT:  Negative for congestion, rhinorrhea and sore throat.    Respiratory:  Positive for cough. Negative for shortness of breath.    Cardiovascular:  Positive for chest pain. Negative for leg swelling.   Gastrointestinal:  Negative for abdominal pain, diarrhea, nausea and vomiting.   Genitourinary:  Negative for dysuria, frequency, hematuria and urgency.   Musculoskeletal:  Negative for arthralgias and myalgias.   Skin:  Negative for rash.   Neurological:  Negative for dizziness, weakness, light-headedness, numbness and headaches.   All other systems reviewed and are negative.          Objective       ED Triage Vitals [12/18/24 1521]   Temperature Pulse Blood Pressure Respirations SpO2 Patient Position - Orthostatic VS   98.4 °F (36.9 °C) 88 152/70 18 99 % Sitting      Temp Source Heart Rate Source BP Location FiO2 (%) Pain Score    Temporal Monitor Right arm -- 10 - Worst Possible Pain      Vitals      Date and Time Temp Pulse SpO2 Resp BP Pain Score FACES Pain Rating User   12/18/24 1930 -- 64 96 % 14 143/67 -- --    12/18/24 1900 -- 66 97 % 14 130/59 -- --    12/18/24 1845 -- 82 98 % 20 146/69 -- --    12/18/24 1843 -- -- -- -- -- 10 - Worst Possible Pain --     12/18/24 1800 -- 65 97 % 19 134/74 -- --    12/18/24 1745 -- 66 98 % 15 146/70 -- --    12/18/24 1700 -- 67 95 % 19 126/68 -- --    12/18/24 1612 -- -- -- -- -- 10 - Worst Possible Pain --    12/18/24 1611 -- -- -- -- -- 10 - Worst Possible Pain --    12/18/24 1600 -- 80 97 % 19 131/62 -- --    12/18/24 1521 98.4 °F (36.9 °C) 88 99 % 18 152/70 10 - Worst Possible Pain -- [place]            Physical Exam  Vitals and nursing note reviewed.   Constitutional:       General: She is not in acute distress.     Appearance: Normal appearance. She is well-developed and normal weight. She is not ill-appearing, toxic-appearing or diaphoretic.   HENT:      Head: Normocephalic and atraumatic.      Right Ear: External ear normal.      Left Ear: External ear normal.      Nose: Nose normal.      Mouth/Throat:      Mouth: Mucous membranes are moist.      Pharynx: Oropharynx is clear.   Eyes:      Extraocular Movements: Extraocular movements intact.      Conjunctiva/sclera: Conjunctivae normal.   Cardiovascular:      Rate and Rhythm: Normal rate and regular rhythm.      Pulses: Normal pulses.      Heart sounds: Normal heart sounds. No murmur heard.     No friction rub. No gallop.   Pulmonary:      Effort: Pulmonary effort is normal. No respiratory distress.      Breath sounds: Normal breath sounds. No decreased breath sounds, wheezing, rhonchi or rales.   Chest:      Chest wall: Tenderness present.      Comments: Tenderness over the left 3rd through 5th ribs, mild tenderness over the left side of the sternum.  No obvious deformity or bruising.  Abdominal:      General: There is no distension.      Palpations: Abdomen is soft.      Tenderness: There is no abdominal tenderness. There is no guarding or rebound.   Musculoskeletal:         General: No tenderness.      Cervical back: Neck supple.      Right lower leg: No tenderness. No edema.      Left lower leg: No tenderness. No edema.   Skin:     General: Skin is warm and  dry.      Coloration: Skin is not pale.      Findings: No erythema or rash.      Comments: No skin rashes over the chest wall.   Neurological:      General: No focal deficit present.      Mental Status: She is alert and oriented to person, place, and time.      Cranial Nerves: No cranial nerve deficit.      Sensory: No sensory deficit.      Motor: No weakness.   Psychiatric:         Mood and Affect: Mood normal.         Behavior: Behavior normal.         Results Reviewed       Procedure Component Value Units Date/Time    HS Troponin 0hr (reflex protocol) [[id number]]  (Normal) Collected: 12/18/24 1609    Lab Status: Final result Specimen: Blood from Arm, Right Updated: 12/18/24 1652     hs TnI 0hr <2 ng/L     Comprehensive metabolic panel [[id number]]  (Abnormal) Collected: 12/18/24 1609    Lab Status: Final result Specimen: Blood from Arm, Right Updated: 12/18/24 1645     Sodium 137 mmol/L      Potassium 3.5 mmol/L      Chloride 100 mmol/L      CO2 30 mmol/L      ANION GAP 7 mmol/L      BUN 5 mg/dL      Creatinine 0.55 mg/dL      Glucose 85 mg/dL      Calcium 9.4 mg/dL      AST 14 U/L      ALT 8 U/L      Alkaline Phosphatase 41 U/L      Total Protein 6.9 g/dL      Albumin 4.6 g/dL      Total Bilirubin 0.62 mg/dL      eGFR 102 ml/min/1.73sq m     Narrative:      National Kid[employer] guidelines for Chronic Kidney Disease (CKD):     Stage 1 with normal or high GFR (GFR > 90 mL/min/1.73 square meters)    Stage 2 Mild CKD (GFR = 60-89 mL/min/1.73 square meters)    Stage 3A Moderate CKD (GFR = 45-59 mL/min/1.73 square meters)    Stage 3B Moderate CKD (GFR = 30-44 mL/min/1.73 square meters)    Stage 4 Severe CKD (GFR = 15-29 mL/min/1.73 square meters)    Stage 5 End Stage CKD (GFR <15 mL/min/1.73 square meters)  Note: GFR calculation is accurate only with a steady state creatinine    D-dimer, quantitative [[id number]]  (Normal) Collected: 12/18/24 1609    Lab Status: Final result Specimen: Blood from Arm, Right  Updated: 12/18/24 1641     D-Dimer, Quant 0.32 ug/ml FEU     Narrative:      In the evaluation for possible pulmonary embolism, in the appropriate (Well's Score of 4 or less) patient, the age adjusted d-dimer cutoff for this patient can be calculated as:    Age x 0.01 (in ug/mL) for Age-adjusted D-dimer exclusion threshold for a patient over 50 years.    CBC and differential [[id number]]  (Abnormal) Collected: 12/18/24 1609    Lab Status: Final result Specimen: Blood from Arm, Right Updated: 12/18/24 1626     WBC 6.02 Thousand/uL      RBC 4.36 Million/uL      Hemoglobin 14.2 g/dL      Hematocrit 43.5 %       fL      MCH 32.6 pg      MCHC 32.6 g/dL      RDW 12.4 %      MPV 10.1 fL      Platelets 242 Thousands/uL      nRBC 0 /100 WBCs      Segmented % 45 %      Immature Grans % 0 %      Lymphocytes % 44 %      Monocytes % 9 %      Eosinophils Relative 1 %      Basophils Relative 1 %      Absolute Neutrophils 2.71 Thousands/µL      Absolute Immature Grans 0.01 Thousand/uL      Absolute Lymphocytes 2.66 Thousands/µL      Absolute Monocytes 0.54 Thousand/µL      Eosinophils Absolute 0.07 Thousand/µL      Basophils Absolute 0.03 Thousands/µL             CT chest wo contrast   Final Interpretation by Maya East MD (12/18 1934)      No evidence of acute intrathoracic pathology. Emphysematous changes.               Workstation performed: [id number]             ECG 12 Lead Documentation Only    Date/Time: 12/18/2024 3:44 PM    Performed by: Tiffanie Choi MD  Authorized by: Tiffanie Choi MD    Indications / Diagnosis:  Chest pain  ECG reviewed by me, the ED Provider: yes    Patient location:  ED  Previous ECG:     Previous ECG:  Compared to current    Comparison to cardiac monitor: Yes    Interpretation:     Interpretation: normal    Rate:     ECG rate:  91    ECG rate assessment: normal    Rhythm:     Rhythm: sinus rhythm    Ectopy:     Ectopy: none    QRS:     QRS axis:  Left    QRS intervals:   Normal  Conduction:     Conduction: normal    ST segments:     ST segments:  Normal  Other findings:     Other findings: poor R wave progression        ED Medication and Procedure Management   Prior to Admission Medications   Prescriptions Last Dose Informant Patient Reported? Taking?   Diclofenac Sodium (VOLTAREN) 1 %   No No   Sig: Apply 2 g topically 4 (four) times a day   LACTASE ENZYME PO  Self Yes No   Sig: Take 3 tablets by mouth if needed As needed   Probiotic Product (PROBIOTIC DAILY PO)  Self Yes No   Sig: Take 2 capsules by mouth in the morning Probiotic-prebiotic   Vitamin D-Vitamin K (K2-D3 5000 PO)  Self Yes No   Sig: Take by mouth daily   albuterol (PROVENTIL HFA,VENTOLIN HFA) 90 mcg/act inhaler  Self No No   Sig: Inhale 2 puffs every 6 (six) hours as needed for wheezing   calcium carbonate (TUMS) 500 mg chewable tablet  Self Yes No   Sig: Chew 1 tablet if needed for indigestion or heartburn   cyclobenzaprine (FLEXERIL) 10 mg tablet   No No   Sig: Take 1 tablet (10 mg total) by mouth daily at bedtime   diphenhydrAMINE-acetaminophen (TYLENOL PM)  MG TABS  Self Yes No   Sig: Take 1 tablet by mouth daily at bedtime as needed for sleep   ezetimibe (ZETIA) 10 mg tablet  Self No No   Sig: Take 1 tablet (10 mg total) by mouth daily   hydroxychloroquine (PLAQUENIL) 200 mg tablet   No No   Sig: Take 1 tablet (200 mg total) by mouth daily   ibuprofen (MOTRIN) 800 mg tablet   No No   Sig: Take 1 tablet (800 mg total) by mouth every 8 (eight) hours as needed for moderate pain Take with food   ipratropium-albuterol (DUO-NEB) 0.5-2.5 mg/3 mL nebulizer solution   No No   Sig: Take 3 mL by nebulization 4 (four) times a day   lidocaine (Lidoderm) 5 %   No No   Sig: Apply 1 patch topically over 12 hours daily for 5 days Remove & Discard patch within 12 hours or as directed by MD   meclizine (ANTIVERT) 12.5 MG tablet   No No   Sig: Take 1 tablet (12.5 mg total) by mouth 3 (three) times a day as needed for  dizziness   nitroglycerin (NITROSTAT) 0.4 mg SL tablet  Self No No   Sig: Place 1 tablet (0.4 mg total) under the tongue every 5 (five) minutes as needed for chest pain   Patient not taking: Reported on 12/17/2024   oxyCODONE-acetaminophen (PERCOCET) 5-325 mg per tablet  Self Yes No   Sig: Take 1 tablet by mouth every 8 (eight) hours as needed for moderate pain   rosuvastatin (CRESTOR) 5 mg tablet  Self No No   Sig: Take 1 tablet (5 mg total) by mouth daily   traZODone (DESYREL) 50 mg tablet   No No   Sig: Take 1 tablet (50 mg total) by mouth daily at bedtime   Patient not taking: Reported on 12/17/2024   umeclidinium-vilanterol 62.5-25 mcg/actuation inhaler  Self No No   Sig: Inhale 1 puff daily      Facility-Administered Medications: None     Discharge Medication List as of 12/18/2024  7:50 PM        START taking these medications    Details   !! oxyCODONE-acetaminophen (Percocet) 5-325 mg per tablet Take 1 tablet by mouth every 4 (four) hours as needed for moderate pain for up to 10 days Max Daily Amount: 6 tablets, Starting Wed 12/18/2024, Until Sat 12/28/2024 at 2359, Normal       !! - Potential duplicate medications found. Please discuss with provider.        CONTINUE these medications which have NOT CHANGED    Details   albuterol (PROVENTIL HFA,VENTOLIN HFA) 90 mcg/act inhaler Inhale 2 puffs every 6 (six) hours as needed for wheezing, Starting Mon 8/5/2024, Normal      calcium carbonate (TUMS) 500 mg chewable tablet Chew 1 tablet if needed for indigestion or heartburn, Historical Med      cyclobenzaprine (FLEXERIL) 10 mg tablet Take 1 tablet (10 mg total) by mouth daily at bedtime, Starting Tue 11/12/2024, Normal      Diclofenac Sodium (VOLTAREN) 1 % Apply 2 g topically 4 (four) times a day, Starting Tue 11/12/2024, Normal      diphenhydrAMINE-acetaminophen (TYLENOL PM)  MG TABS Take 1 tablet by mouth daily at bedtime as needed for sleep, Historical Med      ezetimibe (ZETIA) 10 mg tablet Take 1 tablet  (10 mg total) by mouth daily, Starting Fri 2/9/2024, Normal      hydroxychloroquine (PLAQUENIL) 200 mg tablet Take 1 tablet (200 mg total) by mouth daily, Starting Tue 11/12/2024, Until Sat 8/9/2025, Normal      ibuprofen (MOTRIN) 800 mg tablet Take 1 tablet (800 mg total) by mouth every 8 (eight) hours as needed for moderate pain Take with food, Starting Tue 11/12/2024, Normal      ipratropium-albuterol (DUO-NEB) 0.5-2.5 mg/3 mL nebulizer solution Take 3 mL by nebulization 4 (four) times a day, Starting Tue 10/8/2024, Normal      LACTASE ENZYME PO Take 3 tablets by mouth if needed As needed, Historical Med      lidocaine (Lidoderm) 5 % Apply 1 patch topically over 12 hours daily for 5 days Remove & Discard patch within 12 hours or as directed by MD, Starting Tue 12/17/2024, Until Sun 12/22/2024, Normal      meclizine (ANTIVERT) 12.5 MG tablet Take 1 tablet (12.5 mg total) by mouth 3 (three) times a day as needed for dizziness, Starting Tue 11/5/2024, Normal      nitroglycerin (NITROSTAT) 0.4 mg SL tablet Place 1 tablet (0.4 mg total) under the tongue every 5 (five) minutes as needed for chest pain, Starting Fri 8/25/2023, Normal      !! oxyCODONE-acetaminophen (PERCOCET) 5-325 mg per tablet Take 1 tablet by mouth every 8 (eight) hours as needed for moderate pain, Historical Med      Probiotic Product (PROBIOTIC DAILY PO) Take 2 capsules by mouth in the morning Probiotic-prebiotic, Historical Med      rosuvastatin (CRESTOR) 5 mg tablet Take 1 tablet (5 mg total) by mouth daily, Starting Fri 2/9/2024, Normal      traZODone (DESYREL) 50 mg tablet Take 1 tablet (50 mg total) by mouth daily at bedtime, Starting Tue 11/5/2024, Normal      umeclidinium-vilanterol 62.5-25 mcg/actuation inhaler Inhale 1 puff daily, Starting Mon 8/5/2024, Normal      Vitamin D-Vitamin K (K2-D3 5000 PO) Take by mouth daily, Historical Med       !! - Potential duplicate medications found. Please discuss with provider.        No discharge  procedures on file.  ED SEPSIS DOCUMENTATION   Time reflects when diagnosis was documented in both MDM as applicable and the Disposition within this note       Time User Action Codes Description Comment    12/18/2024  7:47 PM Tiffanie Choi Add [R07.89] Chest wall pain                  Tiffanie Choi MD  12/18/24 2513

## 2024-12-19 LAB
ATRIAL RATE: 91 BPM
P AXIS: 80 DEGREES
PR INTERVAL: 156 MS
QRS AXIS: -85 DEGREES
QRSD INTERVAL: 70 MS
QT INTERVAL: 348 MS
QTC INTERVAL: 428 MS
T WAVE AXIS: 69 DEGREES
VENTRICULAR RATE: 91 BPM

## 2024-12-19 PROCEDURE — 93010 ELECTROCARDIOGRAM REPORT: CPT | Performed by: INTERNAL MEDICINE

## 2024-12-19 NOTE — DISCHARGE INSTRUCTIONS
Patient:   JEREMIAH SANCHEZ            MRN: [id number]            FIN: [id number]              Age:   70 years     Sex:  FEMALE     :  49   Associated Diagnoses:   None   Author:   ELENITA [facility] Progress Note  Subjective:  Patient reporting significant abdominal pain this morning; Due for Norco dose. No additional complaints at this time, including HA, N/V, chest pain, SOB, urinary complaints. Last night preliminary pathology w/ adenocarcinoma, cholangiocarcinoma. Discussed with daughter who was hoping to tell patient herself but did not feel comfortable doing so as patient was in good spirits last night. She would like to tell patient when there will be other  people present to help explain the diagnosis and the next steps.  Update: Dr. Enriquez met with patient and daughter to discuss diagnosis. Met with patient and daughter afterwards; Plan for oncology and palliative to meet later this morning. Patient's pain with mild improvement s/p dose of Motrin, Norco, heating pads.  Objective:  Vitals between:   30-OCT-2019 08:32:35   TO   31-OCT-2019 08:32:35                   LAST RESULT MINIMUM MAXIMUM  Temperature 36.6 36.3 36.7  Heart Rate 63 63 85  Respiratory Rate 16 16 16  NISBP           123 99 123  NIDBP           68 63 68  NIMBP           86 75 86  SpO2                    94 93 97        Physical Examination   GENERAL APPEARANCE: Alert, cachectic, appears uncomfortable, no acute distress.  HEAD: Normocephalic, atraumatic, moist mucous membranes.   EYES: Extra ocular movements intact, conjunctiva normal.   LUNGS: Chest symmetrical, mild bibasilar crackles, no increased work of breathing.  CARDIOVASCULAR: Regular rate, regularly irregular rhythm, no rubs/murmurs/gallops appreciated.  ABDOMEN: Soft, mildly distended, normal active bowel sounds, significant TTP throughout abdomen w/ mild voluntary guarding, mild rebound tenderness, multiple small nodularities in periumbilical  Please progress every 4 hours as needed for severe pain.  Please take Advil 600 mg every 6 hours for pain.  Please follow-up with your primary care doctor.   region, dressing over right abdominal biopsy site C/D/I.  EXTREMITIES: No edema.  SKIN: Mild xerosis of LE b/l.   NEUROLOGIC: A&Ox3, CNII-XII grossly intact.   PSYCH: Cooperative, appropriate mood and affect.      Review / Management   Laboratory results:     Labs between:  30-OCT-2019 07:59 to 31-OCT-2019 07:59  CBC:                 WBC  HgB  Hct  Plt  MCV  RDW   31-OCT-2019 (H) 12.2  13.1  40.2  252  89.7  13.2   DIFF:                 Seg  Neutroph//ABS  Lymph//ABS  Mono//ABS  EOS/ABS  31-OCT-2019 NOT APPLICABLE  83 // (H) 10.3  5 // (L) 0.6  11 // (H) 1.3  0 // (L) 0.0  BMP:                 Na  Cl  BUN  Glu   31-OCT-2019 139  104  (H) 31  (H) 101                              K  CO2  Cr  Ca                              3.9  25  0.91  (L) 8.2   CMP:                 AST  ALT  AlkPhos  Bili  Albumin   31-OCT-2019 (H) 63  (H) 85  (H) 541  0.3  (L) 2.3                  .    Imaging:  EXAM: US ABDOMEN LTD, US VASC PORTAL HEPATIC DUPLEX  DATE: 10/27/2019  CLINICAL INDICATION: Transaminitis.  COMPARISON: Right upper quadrant ultrasound 05/09/2014, CT abdomen pelvis with contrast 10/25/2019  IMPRESSION:  Intra and extrahepatic biliary dilatation is similar to CT.  No evidence of choledocholithiasis but the distal duct is not visualized.  Gallbladder is full of stones.  3.2 cm focal gallbladder wall thickening in the fundus may represent gallbladder neoplasm with unusual focal adenomyomatosis considered less likely.  Small amount of pericholecystic fluid.  Indeterminate 2.1 cm hypoechoic lesion in the left liver without corresponding abnormality on recent prior CT.  Moderate right hydronephrosis, similar to CT.  Right pleural effusion as seen on CT.  EXAM: 2D ECHO  DATE: 10/28/2019  STUDY CONCLUSIONS  SUMMARY:  1. Left ventricle: The cavity size is normal. Wall thickness is     normal. Systolic function is severely reduced. The estimated     ejection fraction is 10-15%, by visual assessment. Features are    consistent with  a pseudonormal left ventricular filling pattern,    with concomitant abnormal relaxation and increased filling     pressure (grade 2 diastolic dysfunction).  2. Ventricular septum: Septal motion is severely dyssynergic.  3. Aortic valve: Mild regurgitation.  4. Left atrium: The atrium is severely dilated.  5. Right ventricle: Pacemaker lead noted in right ventricle.     Systolic pressure is within the normal range. The estimated peak    pressure is 28mm Hg.  6. Right atrium: Pacemaker lead noted in right atrium.  7. Compared to prior assessment from 10/5/2018, there is no     significant change.  LINES  Peripheral Intravenous Antecubital Right   Gauge: 20   Charted: 10/30/19 20:00  Inserted: 10/25/19   Days Since Insertion: 5 days  Indication of Use: IV Meds          Impression and Plan   Stefania Mendoza is a 69yo F with a complex PMH including paraoxysmal afib on Xarelto, s/p pacemaker, CAD s/p PCI, HTN, HLD, CHF, CVA, MI who presented to the ED with complaints of SOB and dizziness that have since resolved. Also complained of abdominal pain, nonbloody diarrhea x3 days, NBNB emesis x1 day, subsequently found to have transaminitis, SBP, CT scan concerning for metastatic malignancy, admitted for further workup.  #Concern for metastatic cancer  #SBP  #Transaminitis  #Abdominal pain #Nausea  - CT scan with concern for primary GB cancer and peritoneal metastasis.  - Radiology recommended f/u MRCP; However, patient has pacemaker and per lead/product search on 's website, not MRI compatible.  - RUQ ultrasound/portal hepatic duplex 10/27 showing intra/extrahep biliary dilation w/o evidence of choledocholithiasis, +cholelithiasis, +3.2cm gallbladder wall thickening possibly representing gallbladder neoplasm vs focal adenomyomatosis less likely.  -  elevated to 1077, CEA elevated to 7.2, AFP wnl.  - S/p paracentesis 10/28; SAAG 0.6 g/dL (low) c/w malignant ascites; PMNs 2278 w/ +E. coli c/w SBP.  - S/p biopsy  of falciform ligament 10/28; Preliminary pathology adenocarcinoma, cholangiocarcinoma.  Plan  - F/u final biopsy pathology results.    > Discussed prelim pathology with daughter who was hoping to tell patient herself but did not feel comfortable doing so as patient was in good spirits last night; She would like to tell patient when there will be other people present to help explain the diagnosis and the next steps.   > Dr. Enriquez met with patient and daughter 10/31AM to discuss diagnosis.   > Met with patient and daughter afterwards to further discuss.    > Plan for oncology and palliative to meet later this morning.  - Oncology consulted; Appreciate recommendations.  - Palliative on consult; Appreciate recommndations.   > D/w family, does not want patient to suffer, does not believe she can tolerate toxic medications.  > Plan to offer outpatient palliative vs hospice pending workup.  - GI on consult; Appreciate recommendations.    > Tumor markers, RUQ ultrasound, SAAG as noted above.   > Can hold off on MRI with contrast and MRCP given stable LFTs w/o evidence of biliary obstruction.   - Will need coordination with [employer] representative for MRI/MRCP given her pacemaker; Faxed pacemaker information to MRI department.   - 10/30 MRI department reports OK for general diet if req MRI/MRCP, does not require CLD.  > Defer EGD pending above; If indicated, will require cardiac clearance.   - D/w cardiology who reviewed Echo and reported OK to move forward with EGD testing if indicated.  - ID consulted for SBP; Appreciate recommendations.   > Continue CTX 1g IV q24h, Flagyl 500mg IV TID (started 10/29).  - Pain management w/ scheduld Ibuprofen 600mg q8h, Norco 5-325mg q4h prn, Tylenol 650mg q6h prn.   > Will also add lidocaine patches, Bentyl, sucralfate PRN.    > Schedule docusate-senna, Miralax; Milk of mag PRN.   - Nausea management w/ Zofran prn.  - Continue to monitor CMPs while inpatient.  - Hold Xarelefren ASA for  now; Restart as appropriate.  #Non-ischemic dilated cardiomyopathy (LVEF 10-15% by TTE 07/01/18)  #Intermittent Vtach  #LBBB s/p CRT-D  #CAD s/p PCI to LCx 2008  - Echo 10/28 LVEF 10-15%, grade 2 diastolic dysfunction, severely dilated LA  - Intermittent brief episodes of Vtach throughout admission, self-resolve, asymptomatic  Plan  - Continue to monitor for symptoms.   - Continue home metoprolol succ 50mg daily, torsemide 20mg daily, lisinopril 5mg daily.  #Paroxysmal Afib  - Pacemaker and ICD in place w paced rhythm.  Plan  - Continue home metoprolol succ 50mg daily.  - Hold Xarelto, ASA for possible biopsy, restart as appropriate.  #Hypokalemia, resolved  - Potassium improved from 3.3 10/30 to 3.9 this AM  Plan  - Continue to monitor CMP.  - Replete PRN.    #Severe malnutrition  #Deconditioning   - Cachectic on exam.  Plan  - General diet to encourage increased caloric intake.   - Ensure with meals.   - D/c telemetry monitoring to encourge ambulation.   - PT/OT consulted; Appreciate recommendations.   #Rheumatoid Arthritis  - Continue prednisone 20mg daily  #HTN #HLD  - Normotensive  Plan  - Continue home metoprolol succ 50mg daily, torsemide 20mg daily, lisinopril 5mg daily  - Continue home atorvastatin 80mg daily   #Elevated troponin, improved  - 0.07 in ED, peak 0.2 on 10/25 @0500  - No CP or EKG changes  - Continue to monitor for symptoms  #SOB, resolved  #Dizziness, resolved  - Present on admission; Resolved.  - Continue to monitor for symptoms  #Pruritis, resolved  - Generalized, improved s/p dose of Benadryl.   Plan  - Benadryl PRN for pruritis.  - Continue to monitor for return of symptoms.  GI/FEN:  - Diet: General.  - IVF: SLIV.  Prophylaxis:  - DVT: SCDs, Lovenox 30mg qd; Holding Xarelto.  Advanced Directives:  - Code: Full Code.  - Emergency Contact: Kecia Lira (daughter) [phone].   - PCP: Dr. Enriquez  Disposition:   - Continue inpatient management for continued work-up.   Discussed w/ senior  resident; Will discuss w/ attending.   Albert Reyes DO  PGY-1 Family Medicine Resident  Please contact via [employer]  PGY3-Senior Addendum  S: Pt seen and examined independently of Family Medicine Intern. Still complaining of some abddominal pain. Pt had recently had discussion with Dr. Enriquez and daughter about diagnosis. She appears to understand the implications.  O:  Vitals between:   30-OCT-2019 10:56:32   TO   31-OCT-2019 10:56:32                   LAST RESULT MINIMUM MAXIMUM  Temperature 36.6 36.3 36.7  Heart Rate 63 63 85  Respiratory Rate 16 16 16  NISBP           123 99 123  NIDBP           68 63 68  NIMBP           86 75 86  SpO2                    94 93 97   GENERAL APPEARANCE: Alert, cachectic, appears uncomfortable, no acute distress.  LUNGS: Chest symmetrical, mild bibasilar crackles, no increased work of breathing.  CARDIOVASCULAR: Regular rate, regularly irregular rhythm, no rubs/murmurs/gallops appreciated.  ABDOMEN: Soft, mildly distended, normal active bowel sounds, significant TTP throughout abdomen w/ mild voluntary guarding, mild rebound tenderness, multiple small nodularities in periumbilical region, dressing over right abdominal biopsy site C/D/I.  A/P:  Agree with assesment and plan in intern's note with following changes/updates/clarifications.  Plan pending further conversation with heme/onc. Will focus on keeping patient comfortable for now with scheduled motrin and PRN pain medications. Discussed with daughter that patient may not be a canditate for chemo and focus may be on comfort.  Pt and plan discussed with attending physician  Dr. Ciro Jones DO  Family Medicine PGY-3  Transfer of care: Stefania Mendoza's care will be transferred to Dr. Brtit Rainey as of Friday, 11/1/2019.

## 2024-12-23 ENCOUNTER — TELEPHONE (OUTPATIENT)
Dept: FAMILY MEDICINE CLINIC | Facility: CLINIC | Age: 59
End: 2024-12-23

## 2024-12-23 NOTE — TELEPHONE ENCOUNTER
Please advise, I put this pt in a 20 minute time slot. Nothing else available before the holiday, pt has broken ribs,seen in uc and ed on 12/18, having trouble sitting,laying down and painful to breathe .. Please advise if you cannot see her in this time frame

## 2025-01-09 ENCOUNTER — HOSPITAL ENCOUNTER (OUTPATIENT)
Dept: INFUSION CENTER | Facility: HOSPITAL | Age: 60
Discharge: HOME | End: 2025-01-09
Attending: INTERNAL MEDICINE

## 2025-01-09 ENCOUNTER — OFFICE VISIT (OUTPATIENT)
Dept: URGENT CARE | Facility: MEDICAL CENTER | Age: 60
End: 2025-01-09
Payer: COMMERCIAL

## 2025-01-09 ENCOUNTER — APPOINTMENT (OUTPATIENT)
Dept: RADIOLOGY | Facility: MEDICAL CENTER | Age: 60
End: 2025-01-09
Payer: COMMERCIAL

## 2025-01-09 VITALS
HEART RATE: 125 BPM | HEIGHT: 59 IN | BODY MASS INDEX: 20.76 KG/M2 | DIASTOLIC BLOOD PRESSURE: 74 MMHG | RESPIRATION RATE: 20 BRPM | SYSTOLIC BLOOD PRESSURE: 90 MMHG | TEMPERATURE: 98.9 F | OXYGEN SATURATION: 96 % | WEIGHT: 103 LBS

## 2025-01-09 DIAGNOSIS — R05.3 PERSISTENT COUGH: ICD-10-CM

## 2025-01-09 DIAGNOSIS — R05.3 PERSISTENT COUGH: Primary | ICD-10-CM

## 2025-01-09 PROCEDURE — 99213 OFFICE O/P EST LOW 20 MIN: CPT

## 2025-01-09 PROCEDURE — 71046 X-RAY EXAM CHEST 2 VIEWS: CPT

## 2025-01-09 RX ORDER — ALBUTEROL SULFATE 90 UG/1
2 INHALANT RESPIRATORY (INHALATION) EVERY 6 HOURS PRN
Qty: 8.5 G | Refills: 0 | Status: SHIPPED | OUTPATIENT
Start: 2025-01-09

## 2025-01-09 RX ORDER — AZITHROMYCIN 250 MG/1
TABLET, FILM COATED ORAL
Qty: 6 TABLET | Refills: 0 | Status: SHIPPED | OUTPATIENT
Start: 2025-01-09 | End: 2025-01-13

## 2025-01-09 NOTE — PATIENT INSTRUCTIONS
Thank you for trusting your health with [facility].    Please review the following instructions and return to our clinic or consider an Emergency Department visit for worsening or severe symptoms.      Instructions:   Utilize saline nasal spray OTC STRAIGHT down each nostril as often as needed  Utilize Flonase nasal spray with steroids OTC ANGLED towards your sinuses 2 times a day as needed  Maintain Tylenol every 6 hours as needed, do not exceed six, 500 mg doses in a 24 hour time period for fever, aches, and chills  Mucinex, blue box, consistently as written on the box can help break up mucus well  Hydrate, hydrate, hydrate  If symptoms worsen or do not start resolving with the week, please contact PCP or consider another visit with our team    Inhaler: 2x a day, 2 puffs, 2 breathes per puff.

## 2025-01-09 NOTE — PROGRESS NOTES
"Name: Gloria Mcdaniel      : 1965      MRN: [id number]  Encounter Provider: Kayley Rodarte PA-C  Encounter Date: 2025   Encounter department: [facility] [place]  :  Assessment & Plan  Persistent cough    Orders:    XR chest pa and lateral; Future    azithromycin (ZITHROMAX) 250 mg tablet; Take 2 tablets today then 1 tablet daily x 4 days    albuterol (ProAir HFA) 90 mcg/act inhaler; Inhale 2 puffs every 6 (six) hours as needed for wheezing    No obvious pneumonia development on chest xray but wheezes and congestion on lung exam concerning for developing bronchitis vs pneumonia.  Azithromycin and albuterol inhaler sent. Discussed low BP and tachycardia. Pt understands importance of going to ED if her symptoms do not improve, if she develops a fever, or if she has any true difficulty breathing. Pt understands return precautions clearly.  Reviewed OTC mucus management as below:  Instructions:   Utilize saline nasal spray OTC STRAIGHT down each nostril as often as needed  Utilize Flonase nasal spray with steroids OTC ANGLED towards your sinuses 2 times a day as needed  Maintain Tylenol every 6 hours as needed, do not exceed six, 500 mg doses in a 24 hour time period for fever, aches, and chills  Mucinex, blue box, consistently as written on the box can help break up mucus well  Hydrate, hydrate, hydrate  If symptoms worsen or do not start resolving with the week, please contact PCP or consider another visit with our team      History of Present Illness   HPI  Gloria Mcdaniel is a 59 y.o. female who presents with cough since  and headache for 2 days.        Review of Systems   Constitutional:  Negative for fever.   HENT:  Positive for postnasal drip.    Respiratory:  Positive for cough and wheezing.    Neurological:  Positive for headaches.          Objective   BP 90/74   Pulse (!) 125   Temp 98.9 °F (37.2 °C)   Resp 20   Ht 4' 11\" (1.499 m)   Wt 46.7 kg (103 lb)   SpO2 96% "   BMI 20.80 kg/m²      Physical Exam  Constitutional:       Appearance: She is ill-appearing.   HENT:      Head: Normocephalic and atraumatic.      Right Ear: Ear canal normal.      Left Ear: Ear canal normal.      Ears:      Comments: Bulging b/l TM, red on the R     Nose: Congestion present.      Mouth/Throat:      Mouth: Mucous membranes are moist.      Pharynx: Posterior oropharyngeal erythema present.   Cardiovascular:      Rate and Rhythm: Tachycardia present.   Pulmonary:      Effort: Pulmonary effort is normal.      Breath sounds: Wheezing present.   Skin:     General: Skin is warm and dry.   Neurological:      Mental Status: She is alert.

## 2025-01-21 DIAGNOSIS — E78.5 HYPERLIPIDEMIA, UNSPECIFIED HYPERLIPIDEMIA TYPE: ICD-10-CM

## 2025-01-22 RX ORDER — ROSUVASTATIN CALCIUM 5 MG/1
5 TABLET, COATED ORAL DAILY
Qty: 90 TABLET | Refills: 1 | Status: SHIPPED | OUTPATIENT
Start: 2025-01-22

## 2025-02-05 ENCOUNTER — OFFICE VISIT (OUTPATIENT)
Dept: FAMILY MEDICINE CLINIC | Facility: CLINIC | Age: 60
End: 2025-02-05
Payer: COMMERCIAL

## 2025-02-05 VITALS
TEMPERATURE: 98.1 F | BODY MASS INDEX: 21.21 KG/M2 | HEIGHT: 59 IN | OXYGEN SATURATION: 98 % | WEIGHT: 105.2 LBS | DIASTOLIC BLOOD PRESSURE: 62 MMHG | HEART RATE: 87 BPM | SYSTOLIC BLOOD PRESSURE: 120 MMHG

## 2025-02-05 DIAGNOSIS — I25.10 CAD IN NATIVE ARTERY: ICD-10-CM

## 2025-02-05 DIAGNOSIS — M35.9 UNDIFFERENTIATED CONNECTIVE TISSUE DISEASE (HCC): ICD-10-CM

## 2025-02-05 DIAGNOSIS — J01.00 ACUTE NON-RECURRENT MAXILLARY SINUSITIS: ICD-10-CM

## 2025-02-05 DIAGNOSIS — L81.4 SOLAR LENTIGO: ICD-10-CM

## 2025-02-05 DIAGNOSIS — M54.42 CHRONIC BILATERAL LOW BACK PAIN WITH LEFT-SIDED SCIATICA: ICD-10-CM

## 2025-02-05 DIAGNOSIS — K21.9 GASTROESOPHAGEAL REFLUX DISEASE, UNSPECIFIED WHETHER ESOPHAGITIS PRESENT: ICD-10-CM

## 2025-02-05 DIAGNOSIS — J43.9 PULMONARY EMPHYSEMA, UNSPECIFIED EMPHYSEMA TYPE (HCC): ICD-10-CM

## 2025-02-05 DIAGNOSIS — M81.0 AGE-RELATED OSTEOPOROSIS WITHOUT CURRENT PATHOLOGICAL FRACTURE: ICD-10-CM

## 2025-02-05 DIAGNOSIS — G89.29 CHRONIC BILATERAL LOW BACK PAIN WITH LEFT-SIDED SCIATICA: ICD-10-CM

## 2025-02-05 DIAGNOSIS — Z00.00 ANNUAL PHYSICAL EXAM: Primary | ICD-10-CM

## 2025-02-05 DIAGNOSIS — M05.9 RHEUMATOID ARTHRITIS WITH POSITIVE RHEUMATOID FACTOR, INVOLVING UNSPECIFIED SITE (HCC): ICD-10-CM

## 2025-02-05 DIAGNOSIS — E16.2 HYPOGLYCEMIA: ICD-10-CM

## 2025-02-05 PROCEDURE — 99214 OFFICE O/P EST MOD 30 MIN: CPT | Performed by: PHYSICIAN ASSISTANT

## 2025-02-05 PROCEDURE — 99396 PREV VISIT EST AGE 40-64: CPT | Performed by: PHYSICIAN ASSISTANT

## 2025-02-05 RX ORDER — DOXYCYCLINE HYCLATE 100 MG
100 TABLET ORAL 2 TIMES DAILY
Qty: 20 TABLET | Refills: 0 | Status: SHIPPED | OUTPATIENT
Start: 2025-02-05 | End: 2025-02-15

## 2025-02-05 RX ORDER — BUDESONIDE AND FORMOTEROL FUMARATE DIHYDRATE 160; 4.5 UG/1; UG/1
2 AEROSOL RESPIRATORY (INHALATION) 2 TIMES DAILY
Qty: 10.2 G | Refills: 5 | Status: SHIPPED | OUTPATIENT
Start: 2025-02-05 | End: 2025-02-10 | Stop reason: SDUPTHER

## 2025-02-05 NOTE — ASSESSMENT & PLAN NOTE
Labs ordered to evaluate.  Recommended small, frequent meals.  Orders:  •  Comprehensive metabolic panel; Future  •  Hemoglobin A1C; Future

## 2025-02-05 NOTE — ASSESSMENT & PLAN NOTE
Will start on Symbicort.  Continue albuterol inhaler and DuoNeb as needed.  Encouraged patient to quit smoking  Orders:  •  budesonide-formoterol (Symbicort) 160-4.5 mcg/act inhaler; Inhale 2 puffs 2 (two) times a day Rinse mouth after use.

## 2025-02-05 NOTE — ASSESSMENT & PLAN NOTE
Patient was supposed to start Reclast infusions through rheumatology, but became acutely ill.  Encouraged her to call rheumatology to reschedule.

## 2025-02-05 NOTE — PROGRESS NOTES
Adult Annual Physical  Name: Gloria Mcdaniel      : 1965      MRN: [id number]  Encounter Provider: Aria Hagen PA-C  Encounter Date: 2025   Encounter department: [facility]    Assessment & Plan  Annual physical exam  Patient is up-to-date with labs.  She will be due for repeat blood work in May prior to next office visit.  Orders placed.  Up-to-date with colon cancer screenings  Up-to-date with mammograms  Up-to-date with eye exams  Scheduled for dental visit-patient wears dentures  Orders:  •  Comprehensive metabolic panel; Future  •  CBC and differential; Future  •  Lipid panel; Future  •  Hemoglobin A1C; Future  •  TSH, 3rd generation with Free T4 reflex; Future    CAD in native artery  Continue to follow with cardiology.  Continue Zetia and Crestor  Orders:  •  Comprehensive metabolic panel; Future  •  CBC and differential; Future  •  Lipid panel; Future    Pulmonary emphysema, unspecified emphysema type (HCC)  Will start on Symbicort.  Continue albuterol inhaler and DuoNeb as needed.  Encouraged patient to quit smoking  Orders:  •  budesonide-formoterol (Symbicort) 160-4.5 mcg/act inhaler; Inhale 2 puffs 2 (two) times a day Rinse mouth after use.    Gastroesophageal reflux disease, unspecified whether esophagitis present  Stable.  Continue Tums as needed       Chronic bilateral low back pain with left-sided sciatica  Encouraged patient to schedule a follow-up with pain management.       Undifferentiated connective tissue disease (HCC)  Continue to follow with rheumatology       Rheumatoid arthritis with positive rheumatoid factor, involving unspecified site (HCC)  Continue to follow with rheumatology       Acute non-recurrent maxillary sinusitis  Prescription for doxycycline.  Recommended that she continue Flonase nasal spray.  Orders:  •  doxycycline hyclate (VIBRA-TABS) 100 mg tablet; Take 1 tablet (100 mg total) by mouth 2 (two) times a day for 10 days    Age-related  osteoporosis without current pathological fracture  Patient was supposed to start Reclast infusions through rheumatology, but became acutely ill.  Encouraged her to call rheumatology to reschedule.       Hypoglycemia  Labs ordered to evaluate.  Recommended small, frequent meals.  Orders:  •  Comprehensive metabolic panel; Future  •  Hemoglobin A1C; Future    Solar lentigo  Provided reassurance.  Offered referral to dermatology, but patient is not interested at this time.       Immunizations and preventive care screenings were discussed with patient today. Appropriate education was printed on patient's after visit summary.    Counseling:  Alcohol/drug use: discussed moderation in alcohol intake, the recommendations for healthy alcohol use, and avoidance of illicit drug use.  Dental Health: discussed importance of regular tooth brushing, flossing, and dental visits.  Injury prevention: discussed safety/seat belts, safety helmets, smoke detectors, carbon monoxide detectors, and smoking near bedding or upholstery.  Sexual health: discussed sexually transmitted diseases, partner selection, use of condoms, avoidance of unintended pregnancy, and contraceptive alternatives.  Exercise: the importance of regular exercise/physical activity was discussed. Recommend exercise 3-5 times per week for at least 30 minutes.          History of Present Illness     Adult Annual Physical:  Patient presents for annual physical. Gloria is a very pleasant 59-year-old female who is here today for her annual physical.  She also has a few issues that she would like to discuss.  She is up-to-date with routine labs.  She has been trying to watch her diet.  Unfortunately, she started smoking again.  She is up-to-date with colon cancer and breast cancer screenings.  She is up-to-date with eye exams.  She has a dental exam scheduled.  She is complaining of coughing and sinus pressure and congestion.  She was treated for an upper respiratory tract  "infection in January.  She admits that this did help with the cough, but did not take it away completely.  She admits that the cough is productive at times.  She does have a history of emphysema.  She has been using her nebulizer and albuterol inhaler, which provide some mild temporary relief.  She is not using any maintenance inhalers.  She continues to follow with her cardiologist, neurologist, urologist, and rheumatologist.  She also has a lesion on her face that she would like looked at..     Diet and Physical Activity:  - Diet/Nutrition: well balanced diet.  - Exercise: no formal exercise.    General Health:  - Sleep: sleeps well.  - Hearing: normal hearing bilateral ears.  - Vision: goes for regular eye exams and wears glasses.  - Dental:. Wears dentures    /GYN Health:  - Follows with GYN: no.   - Menopause: postmenopausal.     Review of Systems   Constitutional:  Negative for chills, diaphoresis, fatigue and fever.   HENT:  Positive for congestion and sinus pressure. Negative for ear pain, postnasal drip, rhinorrhea, sneezing, sore throat and trouble swallowing.    Eyes:  Negative for pain and visual disturbance.   Respiratory:  Positive for cough. Negative for apnea, shortness of breath and wheezing.    Cardiovascular:  Negative for chest pain and palpitations.   Gastrointestinal:  Negative for abdominal pain, constipation, diarrhea, nausea and vomiting.   Genitourinary:  Negative for dysuria and hematuria.   Musculoskeletal:  Positive for arthralgias and back pain. Negative for gait problem and myalgias.   Neurological:  Positive for headaches. Negative for dizziness, syncope, weakness, light-headedness and numbness.   Psychiatric/Behavioral:  Negative for suicidal ideas. The patient is not nervous/anxious.          Objective   /62   Pulse 87   Temp 98.1 °F (36.7 °C)   Ht 4' 11\" (1.499 m)   Wt 47.7 kg (105 lb 3.2 oz)   SpO2 98%   BMI 21.25 kg/m²     Physical Exam  Constitutional:       " Appearance: She is well-developed.   HENT:      Head: Normocephalic and atraumatic.      Right Ear: Tympanic membrane, ear canal and external ear normal.      Left Ear: Tympanic membrane, ear canal and external ear normal.      Nose: Congestion present.      Mouth/Throat:      Pharynx: No oropharyngeal exudate or posterior oropharyngeal erythema.   Eyes:      Extraocular Movements: Extraocular movements intact.   Cardiovascular:      Rate and Rhythm: Normal rate and regular rhythm.      Heart sounds: Normal heart sounds. No murmur heard.     No friction rub. No gallop.   Pulmonary:      Effort: Pulmonary effort is normal. No respiratory distress.      Breath sounds: Normal breath sounds. Decreased air movement present. No wheezing or rales.   Abdominal:      Palpations: Abdomen is soft.      Tenderness: There is no abdominal tenderness.   Musculoskeletal:         General: Normal range of motion.      Cervical back: Normal range of motion and neck supple.   Skin:     General: Skin is warm and dry.   Neurological:      Mental Status: She is alert and oriented to person, place, and time.   Psychiatric:         Behavior: Behavior normal.         Thought Content: Thought content normal.         Judgment: Judgment normal.

## 2025-02-05 NOTE — ASSESSMENT & PLAN NOTE
Continue to follow with cardiology.  Continue Zetia and Crestor  Orders:  •  Comprehensive metabolic panel; Future  •  CBC and differential; Future  •  Lipid panel; Future

## 2025-02-05 NOTE — PATIENT INSTRUCTIONS
"Patient Education     Routine physical for adults   The Basics   Written by the doctors and editors at [facility]   What is a physical? -- A physical is a routine visit, or \"check-up,\" with your doctor. You might also hear it called a \"wellness visit\" or \"preventive visit.\"  During each visit, the doctor will:   Ask about your physical and mental health   Ask about your habits, behaviors, and lifestyle   Do an exam   Give you vaccines if needed   Talk to you about any medicines you take   Give advice about your health   Answer your questions  Getting regular check-ups is an important part of taking care of your health. It can help your doctor find and treat any problems you have. But it's also important for preventing health problems.  A routine physical is different from a \"sick visit.\" A sick visit is when you see a doctor because of a health concern or problem. Since physicals are scheduled ahead of time, you can think about what you want to ask the doctor.  How often should I get a physical? -- It depends on your age and health. In general, for people age 21 years and older:   If you are younger than 50 years, you might be able to get a physical every 3 years.   If you are 50 years or older, your doctor might recommend a physical every year.  If you have an ongoing health condition, like diabetes or high blood pressure, your doctor will probably want to see you more often.  What happens during a physical? -- In general, each visit will include:   Physical exam - The doctor or nurse will check your height, weight, heart rate, and blood pressure. They will also look at your eyes and ears. They will ask about how you are feeling and whether you have any symptoms that bother you.   Medicines - It's a good idea to bring a list of all the medicines you take to each doctor visit. Your doctor will talk to you about your medicines and answer any questions. Tell them if you are having any side effects that bother you. You " "should also tell them if you are having trouble paying for any of your medicines.   Habits and behaviors - This includes:   Your diet   Your exercise habits   Whether you smoke, drink alcohol, or use drugs   Whether you are sexually active   Whether you feel safe at home  Your doctor will talk to you about things you can do to improve your health and lower your risk of health problems. They will also offer help and support. For example, if you want to quit smoking, they can give you advice and might prescribe medicines. If you want to improve your diet or get more physical activity, they can help you with this, too.   Lab tests, if needed - The tests you get will depend on your age and situation. For example, your doctor might want to check your:   Cholesterol   Blood sugar   Iron level   Vaccines - The recommended vaccines will depend on your age, health, and what vaccines you already had. Vaccines are very important because they can prevent certain serious or deadly infections.   Discussion of screening - \"Screening\" means checking for diseases or other health problems before they cause symptoms. Your doctor can recommend screening based on your age, risk, and preferences. This might include tests to check for:   Cancer, such as breast, prostate, cervical, ovarian, colorectal, prostate, lung, or skin cancer   Sexually transmitted infections, such as chlamydia and gonorrhea   Mental health conditions like depression and anxiety  Your doctor will talk to you about the different types of screening tests. They can help you decide which screenings to have. They can also explain what the results might mean.   Answering questions - The physical is a good time to ask the doctor or nurse questions about your health. If needed, they can refer you to other doctors or specialists, too.  Adults older than 65 years often need other care, too. As you get older, your doctor will talk to you about:   How to prevent falling at " home   Hearing or vision tests   Memory testing   How to take your medicines safely   Making sure that you have the help and support you need at home  All topics are updated as new evidence becomes available and our peer review process is complete.  This topic retrieved from [email or web address] on: May 02, 2024.  Topic 468451 Version 1.0  Release: 32.4.3 - C32.122  © 2024 UpToDate, Inc. and/or its affiliates. All rights reserved.  Consumer Information Use and Disclaimer   Disclaimer: This generalized information is a limited summary of diagnosis, treatment, and/or medication information. It is not meant to be comprehensive and should be used as a tool to help the user understand and/or assess potential diagnostic and treatment options. It does NOT include all information about conditions, treatments, medications, side effects, or risks that may apply to a specific patient. It is not intended to be medical advice or a substitute for the medical advice, diagnosis, or treatment of a health care provider based on the health care provider's examination and assessment of a patient's specific and unique circumstances. Patients must speak with a health care provider for complete information about their health, medical questions, and treatment options, including any risks or benefits regarding use of medications. This information does not endorse any treatments or medications as safe, effective, or approved for treating a specific patient. UpToDate, Inc. and its affiliates disclaim any warranty or liability relating to this information or the use thereof.The use of this information is governed by the Terms of Use, available at https://www.woltersboolinouwer.com/en/know/clinical-effectiveness-terms. 2024© UpToDate, Inc. and its affiliates and/or licensors. All rights reserved.  Copyright   © 2024 UpToDate, Inc. and/or its affiliates. All rights reserved.    Patient Education     Routine physical for adults   The Basics   Written by the  "doctors and editors at [facility]   What is a physical? -- A physical is a routine visit, or \"check-up,\" with your doctor. You might also hear it called a \"wellness visit\" or \"preventive visit.\"  During each visit, the doctor will:   Ask about your physical and mental health   Ask about your habits, behaviors, and lifestyle   Do an exam   Give you vaccines if needed   Talk to you about any medicines you take   Give advice about your health   Answer your questions  Getting regular check-ups is an important part of taking care of your health. It can help your doctor find and treat any problems you have. But it's also important for preventing health problems.  A routine physical is different from a \"sick visit.\" A sick visit is when you see a doctor because of a health concern or problem. Since physicals are scheduled ahead of time, you can think about what you want to ask the doctor.  How often should I get a physical? -- It depends on your age and health. In general, for people age 21 years and older:   If you are younger than 50 years, you might be able to get a physical every 3 years.   If you are 50 years or older, your doctor might recommend a physical every year.  If you have an ongoing health condition, like diabetes or high blood pressure, your doctor will probably want to see you more often.  What happens during a physical? -- In general, each visit will include:   Physical exam - The doctor or nurse will check your height, weight, heart rate, and blood pressure. They will also look at your eyes and ears. They will ask about how you are feeling and whether you have any symptoms that bother you.   Medicines - It's a good idea to bring a list of all the medicines you take to each doctor visit. Your doctor will talk to you about your medicines and answer any questions. Tell them if you are having any side effects that bother you. You should also tell them if you are having trouble paying for any of your " "medicines.   Habits and behaviors - This includes:   Your diet   Your exercise habits   Whether you smoke, drink alcohol, or use drugs   Whether you are sexually active   Whether you feel safe at home  Your doctor will talk to you about things you can do to improve your health and lower your risk of health problems. They will also offer help and support. For example, if you want to quit smoking, they can give you advice and might prescribe medicines. If you want to improve your diet or get more physical activity, they can help you with this, too.   Lab tests, if needed - The tests you get will depend on your age and situation. For example, your doctor might want to check your:   Cholesterol   Blood sugar   Iron level   Vaccines - The recommended vaccines will depend on your age, health, and what vaccines you already had. Vaccines are very important because they can prevent certain serious or deadly infections.   Discussion of screening - \"Screening\" means checking for diseases or other health problems before they cause symptoms. Your doctor can recommend screening based on your age, risk, and preferences. This might include tests to check for:   Cancer, such as breast, prostate, cervical, ovarian, colorectal, prostate, lung, or skin cancer   Sexually transmitted infections, such as chlamydia and gonorrhea   Mental health conditions like depression and anxiety  Your doctor will talk to you about the different types of screening tests. They can help you decide which screenings to have. They can also explain what the results might mean.   Answering questions - The physical is a good time to ask the doctor or nurse questions about your health. If needed, they can refer you to other doctors or specialists, too.  Adults older than 65 years often need other care, too. As you get older, your doctor will talk to you about:   How to prevent falling at home   Hearing or vision tests   Memory testing   How to take your " medicines safely   Making sure that you have the help and support you need at home  All topics are updated as new evidence becomes available and our peer review process is complete.  This topic retrieved from [employer] on: May 02, 2024.  Topic 626064 Version 1.0  Release: 32.4.3 - C32.122  © 2024 UpToDate, Inc. and/or its affiliates. All rights reserved.  Consumer Information Use and Disclaimer   Disclaimer: This generalized information is a limited summary of diagnosis, treatment, and/or medication information. It is not meant to be comprehensive and should be used as a tool to help the user understand and/or assess potential diagnostic and treatment options. It does NOT include all information about conditions, treatments, medications, side effects, or risks that may apply to a specific patient. It is not intended to be medical advice or a substitute for the medical advice, diagnosis, or treatment of a health care provider based on the health care provider's examination and assessment of a patient's specific and unique circumstances. Patients must speak with a health care provider for complete information about their health, medical questions, and treatment options, including any risks or benefits regarding use of medications. This information does not endorse any treatments or medications as safe, effective, or approved for treating a specific patient. UpToDate, Inc. and its affiliates disclaim any warranty or liability relating to this information or the use thereof.The use of this information is governed by the Terms of Use, available at https://www.woltersUtripuwer.com/en/know/clinical-effectiveness-terms. 2024© UpToDate, Inc. and its affiliates and/or licensors. All rights reserved.  Copyright   © 2024 UpToDate, Inc. and/or its affiliates. All rights reserved.

## 2025-02-07 ENCOUNTER — TELEPHONE (OUTPATIENT)
Age: 60
End: 2025-02-07

## 2025-02-07 DIAGNOSIS — J43.9 PULMONARY EMPHYSEMA, UNSPECIFIED EMPHYSEMA TYPE (HCC): ICD-10-CM

## 2025-02-07 NOTE — TELEPHONE ENCOUNTER
PA for budesonide-formoterol (Symbicort) 160-4.5 mcg/act inhaler SUBMITTED to [facility]    via    [x]CMM-KEY: [id number]  []Surescripts-Case ID #   []Availity-Auth ID # NDC [id number]   []Faxed to plan   []Other website   []Phone call Case ID #     [x]PA sent as URGENT    All office notes, labs and other pertaining documents and studies sent. Clinical questions answered. Awaiting determination from insurance company.     Turnaround time for your insurance to make a decision on your Prior Authorization can take 7-21 business days.

## 2025-02-10 RX ORDER — BUDESONIDE AND FORMOTEROL FUMARATE DIHYDRATE 160; 4.5 UG/1; UG/1
2 AEROSOL RESPIRATORY (INHALATION) 2 TIMES DAILY
Qty: 10.2 G | Refills: 5 | Status: SHIPPED | OUTPATIENT
Start: 2025-02-10

## 2025-02-10 NOTE — TELEPHONE ENCOUNTER
PA for budesonide-formoterol (Symbicort) 160-4.5 mcg/act inhaler DENIED    Reason:(Screenshot if applicable)        Message sent to office clinical pool Yes    Denial letter scanned into Media Yes    Appeal started No (Provider will need to decide if appeal is warranted and send clinical documentation to Prior Authorization Team for initiation.)    **Please follow up with your patient regarding denial and next steps**

## 2025-02-18 ENCOUNTER — PROCEDURE VISIT (OUTPATIENT)
Dept: UROLOGY | Facility: CLINIC | Age: 60
End: 2025-02-18
Payer: COMMERCIAL

## 2025-02-18 VITALS
WEIGHT: 107.8 LBS | SYSTOLIC BLOOD PRESSURE: 110 MMHG | DIASTOLIC BLOOD PRESSURE: 78 MMHG | HEART RATE: 83 BPM | OXYGEN SATURATION: 99 % | TEMPERATURE: 97.8 F | BODY MASS INDEX: 21.73 KG/M2 | HEIGHT: 59 IN

## 2025-02-18 DIAGNOSIS — R31.29 MICROSCOPIC HEMATURIA: Primary | ICD-10-CM

## 2025-02-18 LAB
SL AMB  POCT GLUCOSE, UA: NORMAL
SL AMB LEUKOCYTE ESTERASE,UA: NORMAL
SL AMB POCT BILIRUBIN,UA: NORMAL
SL AMB POCT BLOOD,UA: NORMAL
SL AMB POCT CLARITY,UA: CLEAR
SL AMB POCT COLOR,UA: YELLOW
SL AMB POCT KETONES,UA: NORMAL
SL AMB POCT NITRITE,UA: NORMAL
SL AMB POCT PH,UA: 6
SL AMB POCT SPECIFIC GRAVITY,UA: 1
SL AMB POCT URINE PROTEIN: NORMAL
SL AMB POCT UROBILINOGEN: 0.2

## 2025-02-18 PROCEDURE — 99213 OFFICE O/P EST LOW 20 MIN: CPT | Performed by: UROLOGY

## 2025-02-18 PROCEDURE — 81002 URINALYSIS NONAUTO W/O SCOPE: CPT | Performed by: UROLOGY

## 2025-02-18 PROCEDURE — 52000 CYSTOURETHROSCOPY: CPT | Performed by: UROLOGY

## 2025-02-18 RX ORDER — CIPROFLOXACIN 500 MG/1
500 TABLET, FILM COATED ORAL ONCE
Qty: 1 TABLET | Refills: 0 | Status: SHIPPED | OUTPATIENT
Start: 2025-02-18 | End: 2025-02-18

## 2025-02-18 NOTE — LETTER
2025     Aria Hagen PA-C  [address] [facility] [address]    Patient: Gloria Mcdaniel   YOB: 1965   Date of Visit: 2025           Normal cystoscopy, normal kidneys on her CT scan last year.  She has benign essential microscopic hematuria.  No need for further workup.       Sincerely,        Armando Pope MD        CC: No Recipients    Armando Pope MD  2025  1:46 PM  Sign when Signing Visit  UROLOGY PROGRESS NOTE   [facility]  [address] [place] [address] [place]  [address]  [place] [address]  [phone]  Fax:[phone]  [email or web address]      NAME: Gloria Mcdaniel  AGE: 59 y.o. SEX: female  : 1965   MRN: [id number]    DATE: 2025  TIME: 1:46 PM    Assessment and Plan:    Benign microscopic hematuria-structurally she is normal both upper tract and lower tract.  Reassured.  No need for further workup.  Can follow-up as needed.    Occasional pelvic floor dysfunction-not interested in physical therapy, told her to look up on YouTube female pelvic floor dysfunction exercises.               Chief Complaint   No chief complaint on file.      History of Present Illness   59-year-old woman, established patient new to me-microscopic hematuria, has a history of this in the past without prior workup.  Has a history of cigarette smoking.  Tract imaging with CT scan of the abdomen pelvis 2024 which showed no hydronephrosis or urinary tract calculi.  Urinary bladder is unremarkable.  Here for cystoscopy.       Cystoscopy     Date/Time  2025 1:30 PM     Performed by  Armando Pope MD   Authorized by  CORINA Lewis     Universal Protocol:  Consent: Verbal consent obtained. Written consent obtained.      Procedure Details:  Procedure type: cystoscopy    Additional Procedure Details: Cystoscopy Procedure Note        Pre-operative Diagnosis: Microhematuria    Post-operative Diagnosis: Benign essential microscopic hematuria    Procedure: Flexible  cystoscopy    Surgeon: Armando Pope MD    Anesthesia: 1% Xylocaine per urethra    EBL: Minimal    Complications: none    Procedure Details   The risks, benefits, complications, treatment options, and expected outcomes were discussed with the patient. The patient concurred with the proposed plan, giving informed consent.    Cystoscopy was performed today under local anesthesia, using sterile technique. The patient was placed in the supine position, prepped with Betadine, and draped in the usual sterile fashion. The flexible cystocope was used to inspect both the urethra and bladder    Findings:  Urethra: Normal urethra.  No stricture.    Bladder:  Smooth, not trabeculated and there were no stones tumors or other lesions.  The orifices were orthotopic and intact.           Specimens: None                 Complications:  None           Disposition: To home            Condition:  Stable              The following portions of the patient's history were reviewed and updated as appropriate: allergies, current medications, past family history, past medical history, past social history, past surgical history and problem list.  Past Medical History:   Diagnosis Date   • Acquired ichthyosis     last assessed: 5/9/2013   • Anesthesia     Pt reports severe acid reflux after getting anesthesia- sometimes presents as chest pain   • Anxiety    • Cervical radiculopathy     last assessed: 6/13/2014   • Colorectal polyps     last assessed: 3/9/2015   • COPD (chronic obstructive pulmonary disease) (HCC)    • Depression    • Diverticulosis of colon    • Foot pain    • GERD (gastroesophageal reflux disease)    • Heart disease 1997    Coronary artery disease   • Hepatitis C july 2018    Took meds no longer have   • Hyperlipemia    • Hypertension    • Lupus    • Myocardial infarction (HCC)     at age 32   • Osteoarthritis unsure   • Osteopenia     last assessed: 12/6/2013   • Palpitations     last assessed: 12/31/2014   • PVD (peripheral  vascular disease) ([facility])     last assessed: 12/3/2012   • RA (rheumatoid arthritis) ([facility])    • Rheumatoid arthritis (HCC) unsure   • Scapular dyskinesis     last assessed: 2014   • Shortness of breath    • Stomach disorder Unsure   • Tendonitis of left rotator cuff     last assessed: 2014   • Vocal cord polyps     last assessed: 2014     Past Surgical History:   Procedure Laterality Date   • ABDOMINAL SURGERY      exploratory   • CARDIAC SURGERY      cardiac stent    •  SECTION      last assessed: 2014   • CHOLECYSTECTOMY      last assessed: 2014   • COLONOSCOPY  10/27/2014   • CORONARY ANGIOPLASTY WITH STENT PLACEMENT  1999    LAD stent   • DENTAL SURGERY      last assessed: 2014   • ELBOW SURGERY Bilateral     arthroplasty   • EPIDURAL BLOCK INJECTION N/A 2019    Procedure: C7 T1 Cervical Epidural Steroid Injection (89486);  Surgeon: Ricardo Lawson MD;  Location: [facility];  Service: Pain Management    • EPIDURAL BLOCK INJECTION Bilateral 2019    Procedure: BLOCK / INJECTION EPIDURAL STEROID CERVICAL - C7-T1;  Surgeon: Ricardo Lawson MD;  [facility]: [facility];  Service: Pain Management    • EPIDURAL BLOCK INJECTION Left 10/03/2019    Procedure: C7-T1 interlaminar epidural steroid injection;  Surgeon: Ricardo Lawson MD;  [facility]: [facility];  Service: Pain Management    • ESOPHAGOGASTRODUODENOSCOPY     • ESOPHAGOGASTRODUODENOSCOPY N/A 2016    Procedure: ESOPHAGOGASTRODUODENOSCOPY (EGD);  Surgeon: Alejandro Carvajal MD;  Location: [facility];  Service:    • FL GUIDED NEEDLE PLAC BX/ASP/INJ  2019   • FL GUIDED NEEDLE PLAC BX/ASP/INJ  2019   • FL GUIDED NEEDLE PLAC BX/ASP/INJ  10/03/2019   • FL INJECTION LEFT SHOULDER (ARTHROGRAM)  2018   • FOOT SURGERY Right 02/06/2015    x 4   • HYSTERECTOMY      last assessed: 2014   • AL ARTHROCENTESIS ASPIR&/INJ MAJOR JT/BURSA W/O US Bilateral 2019    Procedure: GREATER TROCHANTERIC  HIP INJECTION;  Surgeon: Ricardo Lawson MD;  [facility];  Service: Pain Management    • WI EXCISON TUMOR SOFT TISSUE THIGH/KNEE SUBQ 3 CM/> Right 9/18/2023    Procedure: EXCISION BIOPSY TISSUE LESION/MASS LOWER EXTREMITY;  Surgeon: Franky Bragg DO;  Location: [facility];  Service: Orthopedics   • WI SURGICAL ARTHROSCOPY SHOULDER BICEPS TENODESIS Left 10/15/2018    Procedure: ARTHROSCOPY SHOULDER; Debridement of shoulder;  Surgeon: Francis Byrnes MD;  [facility]: [facility];  Service: Orthopedics   • WI SURGICAL ARTHROSCOPY SHOULDER W/ROTATOR CUFF RPR Left 07/23/2018    Procedure: ARTHROSCOPY SHOULDER, subacromial decompression, synovectomy;  Surgeon: Francis Byrnes MD;  [facility]: [facility];  Service: Orthopedics   • SHOULDER SURGERY  june and oct 2018   • THROAT SURGERY     • TOOTH EXTRACTION     • TRIGEMINAL NERVE DECOMPRESSION Right 06/15/2018    Procedure: FOOT NEUROPLASTY;  Surgeon: Hernan Resendiz DPM;  Location: [facility];  Service: Podiatry       Review of Systems   Review of Systems   Gastrointestinal:         As per HPI   Genitourinary:  Negative for hematuria.        Occasional bladder spasms and pain not very often.  Her bowels act up the same way.  Occasionally has difficulty urinating with these episodes.  Most likely pelvic floor dysfunction       Active Problem List     Patient Active Problem List   Diagnosis   • Gastro-esophageal reflux disease without esophagitis   • Abnormal weight loss   • Acute pain of left shoulder   • Lumbar spondylosis   • Osteoarthritis of spine with radiculopathy, cervical region   • Degenerative cervical disc   • Lumbar degenerative disc disease   • Bilateral carpal tunnel syndrome   • CAD in native artery   • Other hyperlipidemia   • PVD (peripheral vascular disease) ([facility])   • Headache disorder   • Rheumatoid arthritis with positive rheumatoid factor ([facility])   • Positive KRISTA (antinuclear antibody)   • Left facial numbness   • Myofascial pain syndrome   •  "Calcific tendinitis of left shoulder   • Incomplete tear of left rotator cuff   • Neuroma of foot   • History of hepatitis C   • Colorectal polyps   • Irritable bowel syndrome with diarrhea   • Incomplete rotator cuff tear or rupture of left shoulder, not specified as traumatic   • Biceps tendinosis of left shoulder   • Arthritis   • Cervical radiculopathy   • Cervical radiculitis   • Sacroiliitis (HCC)   • Chronic pain syndrome   • Gastroesophageal reflux disease   • Radiculopathy, cervical   • Trochanteric bursitis of right hip   • Trochanteric bursitis of left hip   • Lumbar radiculopathy   • Chronic bilateral low back pain with left-sided sciatica   • Osteoarthritis of cervical spine   • Tobacco abuse   • Neck pain   • High risk medication use   • Undifferentiated connective tissue disease (HCC)   • Costochondritis, acute   • Age-related osteoporosis without current pathological fracture   • 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HCC)   • Umbilical hernia without obstruction and without gangrene   • Anxiety   • Soft tissue mass   • Raynaud's disease without gangrene   • Hypoglycemia   • Pulmonary emphysema (HCC)   • Tremor   • Microscopic hematuria       Objective   /78   Pulse 83   Temp 97.8 °F (36.6 °C)   Ht 4' 11\" (1.499 m)   Wt 48.9 kg (107 lb 12.8 oz)   SpO2 99%   BMI 21.77 kg/m²     Physical Exam  Vitals reviewed.   Constitutional:       Appearance: Normal appearance.   HENT:      Head: Normocephalic and atraumatic.   Eyes:      Extraocular Movements: Extraocular movements intact.   Pulmonary:      Effort: Pulmonary effort is normal.   Genitourinary:     General: Normal vulva.      Vagina: No vaginal discharge.      Comments: Small introitus, no lesions  Musculoskeletal:         General: Normal range of motion.      Cervical back: Normal range of motion.   Skin:     Coloration: Skin is not jaundiced or pale.   Neurological:      General: No focal deficit present.      Mental Status: She is alert " and oriented to person, place, and time.   Psychiatric:         Mood and Affect: Mood normal.         Behavior: Behavior normal.         Thought Content: Thought content normal.         Judgment: Judgment normal.             Current Medications     Current Outpatient Medications:   •  ciprofloxacin (Cipro) 500 mg tablet, Take 1 tablet (500 mg total) by mouth once for 1 dose Take 1 dose after  procedure, Disp: 1 tablet, Rfl: 0  •  albuterol (ProAir HFA) 90 mcg/act inhaler, Inhale 2 puffs every 6 (six) hours as needed for wheezing, Disp: 8.5 g, Rfl: 0  •  albuterol (PROVENTIL HFA,VENTOLIN HFA) 90 mcg/act inhaler, Inhale 2 puffs every 6 (six) hours as needed for wheezing, Disp: 18 g, Rfl: 5  •  calcium carbonate (TUMS) 500 mg chewable tablet, Chew 1 tablet if needed for indigestion or heartburn, Disp: , Rfl:   •  cyclobenzaprine (FLEXERIL) 10 mg tablet, Take 1 tablet (10 mg total) by mouth daily at bedtime, Disp: 30 tablet, Rfl: 6  •  Diclofenac Sodium (VOLTAREN) 1 %, Apply 2 g topically 4 (four) times a day, Disp: 100 g, Rfl: 6  •  diphenhydrAMINE-acetaminophen (TYLENOL PM)  MG TABS, Take 1 tablet by mouth daily at bedtime as needed for sleep, Disp: , Rfl:   •  ezetimibe (ZETIA) 10 mg tablet, Take 1 tablet (10 mg total) by mouth daily, Disp: 90 tablet, Rfl: 3  •  hydroxychloroquine (PLAQUENIL) 200 mg tablet, Take 1 tablet (200 mg total) by mouth daily, Disp: 90 tablet, Rfl: 2  •  ibuprofen (MOTRIN) 800 mg tablet, Take 1 tablet (800 mg total) by mouth every 8 (eight) hours as needed for moderate pain Take with food, Disp: 90 tablet, Rfl: 6  •  ipratropium-albuterol (DUO-NEB) 0.5-2.5 mg/3 mL nebulizer solution, Take 3 mL by nebulization 4 (four) times a day, Disp: 90 mL, Rfl: 2  •  LACTASE ENZYME PO, Take 3 tablets by mouth if needed As needed, Disp: , Rfl:   •  lidocaine (Lidoderm) 5 %, Apply 1 patch topically over 12 hours daily for 5 days Remove & Discard patch within 12 hours or as directed by MD, Disp: 5  patch, Rfl: 0  •  meclizine (ANTIVERT) 12.5 MG tablet, Take 1 tablet (12.5 mg total) by mouth 3 (three) times a day as needed for dizziness, Disp: 30 tablet, Rfl: 0  •  nitroglycerin (NITROSTAT) 0.4 mg SL tablet, Place 1 tablet (0.4 mg total) under the tongue every 5 (five) minutes as needed for chest pain (Patient not taking: Reported on 12/17/2024), Disp: 20 tablet, Rfl: 3  •  oxyCODONE-acetaminophen (PERCOCET) 5-325 mg per tablet, Take 1 tablet by mouth every 8 (eight) hours as needed for moderate pain, Disp: , Rfl:   •  Probiotic Product (PROBIOTIC DAILY PO), Take 2 capsules by mouth in the morning Probiotic-prebiotic, Disp: , Rfl:   •  rosuvastatin (CRESTOR) 5 mg tablet, TAKE 1 TABLET BY MOUTH DAILY, Disp: 90 tablet, Rfl: 1  •  Symbicort 160-4.5 MCG/ACT inhaler, Inhale 2 puffs 2 (two) times a day Rinse mouth after use., Disp: 10.2 g, Rfl: 5  •  traZODone (DESYREL) 50 mg tablet, Take 1 tablet (50 mg total) by mouth daily at bedtime (Patient not taking: Reported on 2/5/2025), Disp: 30 tablet, Rfl: 5  •  Vitamin D-Vitamin K (K2-D3 5000 PO), Take by mouth daily, Disp: , Rfl:         Armando Pope MD

## 2025-02-18 NOTE — PROGRESS NOTES
UROLOGY PROGRESS NOTE   [facility]  [address]  [address]  [place] [address]  [phone]  Fax:[phone]  [email or web address]      NAME: Gloria Mcdaniel  AGE: 59 y.o. SEX: female  : 1965   MRN: [id number]    DATE: 2025  TIME: 1:46 PM    Assessment and Plan:    Benign microscopic hematuria-structurally she is normal both upper tract and lower tract.  Reassured.  No need for further workup.  Can follow-up as needed.    Occasional pelvic floor dysfunction-not interested in physical therapy, told her to look up on YouTube female pelvic floor dysfunction exercises.               Chief Complaint   No chief complaint on file.      History of Present Illness   59-year-old woman, established patient new to me-microscopic hematuria, has a history of this in the past without prior workup.  Has a history of cigarette smoking.  Tract imaging with CT scan of the abdomen pelvis 2024 which showed no hydronephrosis or urinary tract calculi.  Urinary bladder is unremarkable.  Here for cystoscopy.       Cystoscopy     Date/Time  2025 1:30 PM     Performed by  Armando Pope MD   Authorized by  CORINA Lewis     Universal Protocol:  Consent: Verbal consent obtained. Written consent obtained.      Procedure Details:  Procedure type: cystoscopy    Additional Procedure Details: Cystoscopy Procedure Note        Pre-operative Diagnosis: Microhematuria    Post-operative Diagnosis: Benign essential microscopic hematuria    Procedure: Flexible cystoscopy    Surgeon: Armando Pope MD    Anesthesia: 1% Xylocaine per urethra    EBL: Minimal    Complications: none    Procedure Details   The risks, benefits, complications, treatment options, and expected outcomes were discussed with the patient. The patient concurred with the proposed plan, giving informed consent.    Cystoscopy was performed today under local anesthesia, using sterile technique. The patient was placed in the supine  position, prepped with Betadine, and draped in the usual sterile fashion. The flexible cystocope was used to inspect both the urethra and bladder    Findings:  Urethra: Normal urethra.  No stricture.    Bladder:  Smooth, not trabeculated and there were no stones tumors or other lesions.  The orifices were orthotopic and intact.           Specimens: None                 Complications:  None           Disposition: To home            Condition:  Stable              The following portions of the patient's history were reviewed and updated as appropriate: allergies, current medications, past family history, past medical history, past social history, past surgical history and problem list.  Past Medical History:   Diagnosis Date    Acquired ichthyosis     last assessed: 5/9/2013    Anesthesia     Pt reports severe acid reflux after getting anesthesia- sometimes presents as chest pain    Anxiety     Cervical radiculopathy     last assessed: 6/13/2014    Colorectal polyps     last assessed: 3/9/2015    COPD (chronic obstructive pulmonary disease) ([facility])     Depression     Diverticulosis of colon     Foot pain     GERD (gastroesophageal reflux disease)     Heart disease 1997    Coronary artery disease    Hepatitis C july 2018    Took meds no longer have    Hyperlipemia     Hypertension     Lupus     Myocardial infarction (HCC)     at age 32    Osteoarthritis unsure    Osteopenia     last assessed: 12/6/2013    Palpitations     last assessed: 12/31/2014    PVD (peripheral vascular disease) ([facility])     last assessed: 12/3/2012    RA (rheumatoid arthritis) ([facility])     Rheumatoid arthritis ([facility]) unsure    Scapular dyskinesis     last assessed: 11/17/2014    Shortness of breath     Stomach disorder Unsure    Tendonitis of left rotator cuff     last assessed: 11/17/2014    Vocal cord polyps     last assessed: 8/8/2014     Past Surgical History:   Procedure Laterality Date    ABDOMINAL SURGERY      exploratory    CARDIAC SURGERY       cardiac stent      SECTION      last assessed: 2014    CHOLECYSTECTOMY      last assessed: 2014    COLONOSCOPY  10/27/2014    CORONARY ANGIOPLASTY WITH STENT PLACEMENT      LAD stent    DENTAL SURGERY      last assessed: 2014    ELBOW SURGERY Bilateral     arthroplasty    EPIDURAL BLOCK INJECTION N/A 2019    Procedure: C7 T1 Cervical Epidural Steroid Injection (35996);  Surgeon: Ricardo Lawson MD;  Location: [facility];  Service: Pain Management     EPIDURAL BLOCK INJECTION Bilateral 2019    Procedure: BLOCK / INJECTION EPIDURAL STEROID CERVICAL - C7-T1;  Surgeon: Ricardo Lawson MD;  Location: [facility];  Service: Pain Management     EPIDURAL BLOCK INJECTION Left 10/03/2019    Procedure: C7-T1 interlaminar epidural steroid injection;  Surgeon: Ricardo Lawson MD;  [facility];  Service: Pain Management     ESOPHAGOGASTRODUODENOSCOPY      ESOPHAGOGASTRODUODENOSCOPY N/A 2016    Procedure: ESOPHAGOGASTRODUODENOSCOPY (EGD);  Surgeon: Alejandro Carvajal MD;  Location: [facility];  Service:     FL GUIDED NEEDLE PLAC BX/ASP/INJ  2019    FL GUIDED NEEDLE PLAC BX/ASP/INJ  2019    FL GUIDED NEEDLE PLAC BX/ASP/INJ  10/03/2019    FL INJECTION LEFT SHOULDER (ARTHROGRAM)  2018    FOOT SURGERY Right 02/06/2015    x 4    HYSTERECTOMY      last assessed: 2014    OH ARTHROCENTESIS ASPIR&/INJ MAJOR JT/BURSA W/O US Bilateral 2019    Procedure: GREATER TROCHANTERIC HIP INJECTION;  Surgeon: Ricardo Lawson MD;  [facility]: [facility];  Service: Pain Management     OH EXCISON TUMOR SOFT TISSUE THIGH/KNEE SUBQ 3 CM/> Right 2023    Procedure: EXCISION BIOPSY TISSUE LESION/MASS LOWER EXTREMITY;  Surgeon: Franky Bragg DO;  Location: [facility];  Service: Orthopedics    OH SURGICAL ARTHROSCOPY SHOULDER BICEPS TENODESIS Left 10/15/2018    Procedure: ARTHROSCOPY SHOULDER; Debridement of shoulder;  Surgeon: Francis Byrnes MD;  [facility];  Service: Orthopedics    UT SURGICAL ARTHROSCOPY SHOULDER W/ROTATOR CUFF RPR Left 07/23/2018    Procedure: ARTHROSCOPY SHOULDER, subacromial decompression, synovectomy;  Surgeon: Francis Byrnes MD;  [facility]: [facility];  Service: Orthopedics    SHOULDER SURGERY  june and oct 2018    THROAT SURGERY      TOOTH EXTRACTION      TRIGEMINAL NERVE DECOMPRESSION Right 06/15/2018    Procedure: FOOT NEUROPLASTY;  Surgeon: Hernan Resendiz DPM;  Location: [facility];  Service: Podiatry       Review of Systems   Review of Systems   Gastrointestinal:         As per HPI   Genitourinary:  Negative for hematuria.        Occasional bladder spasms and pain not very often.  Her bowels act up the same way.  Occasionally has difficulty urinating with these episodes.  Most likely pelvic floor dysfunction       Active Problem List     Patient Active Problem List   Diagnosis    Gastro-esophageal reflux disease without esophagitis    Abnormal weight loss    Acute pain of left shoulder    Lumbar spondylosis    Osteoarthritis of spine with radiculopathy, cervical region    Degenerative cervical disc    Lumbar degenerative disc disease    Bilateral carpal tunnel syndrome    CAD in native artery    Other hyperlipidemia    PVD (peripheral vascular disease) ([facility])    Headache disorder    Rheumatoid arthritis with positive rheumatoid factor (HCC)    Positive KRISTA (antinuclear antibody)    Left facial numbness    Myofascial pain syndrome    Calcific tendinitis of left shoulder    Incomplete tear of left rotator cuff    Neuroma of foot    History of hepatitis C    Colorectal polyps    Irritable bowel syndrome with diarrhea    Incomplete rotator cuff tear or rupture of left shoulder, not specified as traumatic    Biceps tendinosis of left shoulder    Arthritis    Cervical radiculopathy    Cervical radiculitis    Sacroiliitis (HCC)    Chronic pain syndrome    Gastroesophageal reflux disease    Radiculopathy, cervical    Trochanteric bursitis of right  "hip    Trochanteric bursitis of left hip    Lumbar radiculopathy    Chronic bilateral low back pain with left-sided sciatica    Osteoarthritis of cervical spine    Tobacco abuse    Neck pain    High risk medication use    Undifferentiated connective tissue disease (HCC)    Costochondritis, acute    Age-related osteoporosis without current pathological fracture    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HCC)    Umbilical hernia without obstruction and without gangrene    Anxiety    Soft tissue mass    Raynaud's disease without gangrene    Hypoglycemia    Pulmonary emphysema (HCC)    Tremor    Microscopic hematuria       Objective   /78   Pulse 83   Temp 97.8 °F (36.6 °C)   Ht 4' 11\" (1.499 m)   Wt 48.9 kg (107 lb 12.8 oz)   SpO2 99%   BMI 21.77 kg/m²     Physical Exam  Vitals reviewed.   Constitutional:       Appearance: Normal appearance.   HENT:      Head: Normocephalic and atraumatic.   Eyes:      Extraocular Movements: Extraocular movements intact.   Pulmonary:      Effort: Pulmonary effort is normal.   Genitourinary:     General: Normal vulva.      Vagina: No vaginal discharge.      Comments: Small introitus, no lesions  Musculoskeletal:         General: Normal range of motion.      Cervical back: Normal range of motion.   Skin:     Coloration: Skin is not jaundiced or pale.   Neurological:      General: No focal deficit present.      Mental Status: She is alert and oriented to person, place, and time.   Psychiatric:         Mood and Affect: Mood normal.         Behavior: Behavior normal.         Thought Content: Thought content normal.         Judgment: Judgment normal.             Current Medications     Current Outpatient Medications:     ciprofloxacin (Cipro) 500 mg tablet, Take 1 tablet (500 mg total) by mouth once for 1 dose Take 1 dose after  procedure, Disp: 1 tablet, Rfl: 0    albuterol (ProAir HFA) 90 mcg/act inhaler, Inhale 2 puffs every 6 (six) hours as needed for wheezing, Disp: 8.5 g, Rfl: " 0    albuterol (PROVENTIL HFA,VENTOLIN HFA) 90 mcg/act inhaler, Inhale 2 puffs every 6 (six) hours as needed for wheezing, Disp: 18 g, Rfl: 5    calcium carbonate (TUMS) 500 mg chewable tablet, Chew 1 tablet if needed for indigestion or heartburn, Disp: , Rfl:     cyclobenzaprine (FLEXERIL) 10 mg tablet, Take 1 tablet (10 mg total) by mouth daily at bedtime, Disp: 30 tablet, Rfl: 6    Diclofenac Sodium (VOLTAREN) 1 %, Apply 2 g topically 4 (four) times a day, Disp: 100 g, Rfl: 6    diphenhydrAMINE-acetaminophen (TYLENOL PM)  MG TABS, Take 1 tablet by mouth daily at bedtime as needed for sleep, Disp: , Rfl:     ezetimibe (ZETIA) 10 mg tablet, Take 1 tablet (10 mg total) by mouth daily, Disp: 90 tablet, Rfl: 3    hydroxychloroquine (PLAQUENIL) 200 mg tablet, Take 1 tablet (200 mg total) by mouth daily, Disp: 90 tablet, Rfl: 2    ibuprofen (MOTRIN) 800 mg tablet, Take 1 tablet (800 mg total) by mouth every 8 (eight) hours as needed for moderate pain Take with food, Disp: 90 tablet, Rfl: 6    ipratropium-albuterol (DUO-NEB) 0.5-2.5 mg/3 mL nebulizer solution, Take 3 mL by nebulization 4 (four) times a day, Disp: 90 mL, Rfl: 2    LACTASE ENZYME PO, Take 3 tablets by mouth if needed As needed, Disp: , Rfl:     lidocaine (Lidoderm) 5 %, Apply 1 patch topically over 12 hours daily for 5 days Remove & Discard patch within 12 hours or as directed by MD, Disp: 5 patch, Rfl: 0    meclizine (ANTIVERT) 12.5 MG tablet, Take 1 tablet (12.5 mg total) by mouth 3 (three) times a day as needed for dizziness, Disp: 30 tablet, Rfl: 0    nitroglycerin (NITROSTAT) 0.4 mg SL tablet, Place 1 tablet (0.4 mg total) under the tongue every 5 (five) minutes as needed for chest pain (Patient not taking: Reported on 12/17/2024), Disp: 20 tablet, Rfl: 3    oxyCODONE-acetaminophen (PERCOCET) 5-325 mg per tablet, Take 1 tablet by mouth every 8 (eight) hours as needed for moderate pain, Disp: , Rfl:     Probiotic Product (PROBIOTIC DAILY PO),  Take 2 capsules by mouth in the morning Probiotic-prebiotic, Disp: , Rfl:     rosuvastatin (CRESTOR) 5 mg tablet, TAKE 1 TABLET BY MOUTH DAILY, Disp: 90 tablet, Rfl: 1    Symbicort 160-4.5 MCG/ACT inhaler, Inhale 2 puffs 2 (two) times a day Rinse mouth after use., Disp: 10.2 g, Rfl: 5    traZODone (DESYREL) 50 mg tablet, Take 1 tablet (50 mg total) by mouth daily at bedtime (Patient not taking: Reported on 2/5/2025), Disp: 30 tablet, Rfl: 5    Vitamin D-Vitamin K (K2-D3 5000 PO), Take by mouth daily, Disp: , Rfl:         Armando Pope MD

## 2025-02-18 NOTE — PATIENT INSTRUCTIONS
You have just undergone cystoscopy of the bladder.  Expect to see some blood in the urine, which should clear up within 24 to 48 hours.  You also may experience burning urgency and frequency of urination which is normal.  Call for fever greater than 101.5 degrees, severe pain not relieved by over-the-counter medicines, or inability to urinate.  You may resume all normal activities.  For irritative symptoms, you can purchase over-the-counter remedies such as cystek or Azo, or any other preparations advertised in the pharmacy for bladder symptoms.  I will call in Pyridium which is a prescription strength medication for bladder irritability upon your request.     Look up on You Tube female pelvic floor relaxation   Self

## 2025-03-14 ENCOUNTER — TELEPHONE (OUTPATIENT)
Age: 60
End: 2025-03-14

## 2025-03-14 NOTE — TELEPHONE ENCOUNTER
Patient calling in, stated she would like a call back to help her reschedule the appt on 3/17 as she is not feeling well and cannot get labs done prior. Please call her back at [phone]

## 2025-03-17 ENCOUNTER — HOSPITAL ENCOUNTER (OUTPATIENT)
Dept: INFUSION CENTER | Facility: HOSPITAL | Age: 60
Discharge: HOME | End: 2025-03-17
Attending: INTERNAL MEDICINE

## 2025-03-27 DIAGNOSIS — Z72.0 TOBACCO ABUSE: ICD-10-CM

## 2025-03-27 DIAGNOSIS — R05.3 PERSISTENT COUGH: ICD-10-CM

## 2025-03-27 NOTE — TELEPHONE ENCOUNTER
Reason for call:   [x] Refill   [] Prior Auth  [] Other:     Office:   [x] PCP/Provider - [facility]   [] Specialty/Provider -     Medication: albuterol (PROVENTIL HFA,VENTOLIN HFA) 90 mcg/act inhaler     Dose/Frequency: 2 puff, Every 6 hours PRN     Quantity: 18 g    Pharmacy: [facility] #[id number]    Local Pharmacy   Does the patient have enough for 3 days?   [x] Yes   [] No - Send as HP to POD    Mail Away Pharmacy   Does the patient have enough for 10 days?   [] Yes   [] No - Send as HP to POD

## 2025-03-28 RX ORDER — ALBUTEROL SULFATE 90 UG/1
INHALANT RESPIRATORY (INHALATION)
Qty: 8.5 G | Refills: 0 | OUTPATIENT
Start: 2025-03-28

## 2025-03-28 RX ORDER — ALBUTEROL SULFATE 90 UG/1
2 INHALANT RESPIRATORY (INHALATION) EVERY 6 HOURS PRN
Qty: 18 G | Refills: 3 | Status: SHIPPED | OUTPATIENT
Start: 2025-03-28

## 2025-05-01 ENCOUNTER — OFFICE VISIT (OUTPATIENT)
Dept: CARDIOLOGY CLINIC | Facility: HOSPITAL | Age: 60
End: 2025-05-01
Payer: COMMERCIAL

## 2025-05-01 VITALS
OXYGEN SATURATION: 97 % | DIASTOLIC BLOOD PRESSURE: 70 MMHG | SYSTOLIC BLOOD PRESSURE: 108 MMHG | WEIGHT: 105.6 LBS | BODY MASS INDEX: 21.29 KG/M2 | HEIGHT: 59 IN | HEART RATE: 80 BPM

## 2025-05-01 DIAGNOSIS — E78.5 HYPERLIPIDEMIA, UNSPECIFIED HYPERLIPIDEMIA TYPE: ICD-10-CM

## 2025-05-01 DIAGNOSIS — I25.10 CAD IN NATIVE ARTERY: Primary | ICD-10-CM

## 2025-05-01 DIAGNOSIS — I73.9 PVD (PERIPHERAL VASCULAR DISEASE) (HCC): ICD-10-CM

## 2025-05-01 DIAGNOSIS — I25.10 CAD IN NATIVE ARTERY: ICD-10-CM

## 2025-05-01 DIAGNOSIS — E78.49 OTHER HYPERLIPIDEMIA: ICD-10-CM

## 2025-05-01 DIAGNOSIS — Z79.899 LONG-TERM USE OF HYDROXYCHLOROQUINE: ICD-10-CM

## 2025-05-01 PROBLEM — I10 PRIMARY HYPERTENSION: Status: ACTIVE | Noted: 2025-05-01

## 2025-05-01 PROCEDURE — 99214 OFFICE O/P EST MOD 30 MIN: CPT | Performed by: INTERNAL MEDICINE

## 2025-05-01 RX ORDER — ROSUVASTATIN CALCIUM 10 MG/1
10 TABLET, COATED ORAL DAILY
Qty: 90 TABLET | Refills: 3 | Status: SHIPPED | OUTPATIENT
Start: 2025-05-01

## 2025-05-01 RX ORDER — EZETIMIBE 10 MG/1
10 TABLET ORAL DAILY
Qty: 90 TABLET | Refills: 3 | Status: SHIPPED | OUTPATIENT
Start: 2025-05-01

## 2025-05-01 RX ORDER — NITROGLYCERIN 0.4 MG/1
0.4 TABLET SUBLINGUAL
Qty: 20 TABLET | Refills: 3 | Status: SHIPPED | OUTPATIENT
Start: 2025-05-01

## 2025-05-01 RX ORDER — EZETIMIBE 10 MG/1
10 TABLET ORAL DAILY
Qty: 90 TABLET | Refills: 3 | Status: SHIPPED | OUTPATIENT
Start: 2025-05-01 | End: 2025-05-01 | Stop reason: SDUPTHER

## 2025-05-01 NOTE — LETTER
May 1, 2025     Aria Hagen PA-C  [address] [facility] [address]    Patient: Gloria Mcdaniel   YOB: 1965   Date of Visit: 5/1/2025       Dear Dr. Aria Hagen PA-C:    Thank you for referring Gloria Mcdaniel to me for evaluation. Below are my notes for this consultation.    If you have questions, please do not hesitate to call me. I look forward to following your patient along with you.         Sincerely,        Griselda Rausch MD        CC: No Recipients    Griselda Rausch MD  5/1/2025  1:06 PM  Incomplete                                             Cardiology Follow Up    Gloria Mcdaniel  1965  [id number]  [facility] [address] [address]  [phone]  [phone]    No diagnosis found.    There are no diagnoses linked to this encounter.  I had the pleasure of seeing Gloria Mcdaniel for a follow up visit.     INTERVAL HISTORY: none    History of the presenting illness, Discussion/Summary and My Plan are as follows:::    She is a pleasant 59-year-old lady with a history of Hypertension,  familial hypercholesterolemia, mixed/undifferentiated connective tissue disorder/RA, chronic tobacco use-now about half pack a day, coronary disease, prior percutaneous coronary intervention to the left anterior descending coronary artery in 1998, hypertension as well as marked dyslipidemia with LDL has high as 170 to 220 and a prior history of non compliance with meds and follow ups. For atypical CPs, I had her do a Bia Nuc perfusion study that did not demonstrate ischemia - in September 2014.  She had been noncompliant with medications and followups. Continues to smoke.     Her risk factors for vascular disease include hypertension, marked dyslipidemia, existing coronary artery disease, RA as well as smoking      In December 2024, fell while bent over, felt like a rib dislocation that popped back in place, came to the ER with  reproducible tenderness, negative evaluation for ACS, CT was negative but showed heavy coronary calcification.     Cardiac exam is unremarkable, decreased left carotid impulse, Currently modestly active without symptoms with activity and no symptoms suggestive of angina. Activity is limited by arthritis     Plan:     CAD,  prior percutaneous coronary intervention to the left anterior descending coronary artery in 1998:      Dyslipidemia/Familial Hypercholesterolemia: Pravastatin with worsening leg pains. For a short while she was on Repatha (PCSK9 inhibitor). Then it became expensive apparently.  Had started her on Crestor currently at 5 mg daily plus Zetia 10 mg daily with her most recent numbers slightly above goal from 2024 , increase Rosuva/Crestor to 10 mg, recheck in 3 mo    coronary artery disease, status post PCI to the LAD in 1998, for atypical chest pains  Bia Nuc in Sept 2014 without ischemia. If has recurrence of chest pains, will then check a stress test. At this time she needs to quit smoking - half ppd or less.  On aspirin and statin, not on a beta-blocker - Metoprolol made her feel cold and caused hair loss     HTN: currently controlled     Chronic hydroxychloroquine therapy for undifferentiated CTD /RA: Echo and  ECG were unremarkable.  Last echo was in 2020, will repeat (can cause a cardiomyopathy)     Tobacco use: She is rolling her own cigarettes, about 0.5 ppd. Her tobacco use remains her most significant risk factor - cessation was strongly advised.      Weight loss from poor appetite and constant abdominal pain without a clear etiology: appetite now has improved. See above for testing.  Does have delayed gastric emptying - April 2021, weight has now been more or less stable.  Has had GI evaluation as well.     Follow up in 6 months        Latest Reference Range & Units 02/09/22 14:12 04/26/23 14:03 11/20/24 11:43   Cholesterol See Comment mg/dL 133 152 188   Triglycerides See Comment mg/dL 92  126 97   HDL >=50 mg/dL 64 61 81   Non-HDL Cholesterol mg/dl   107   LDL Calculated 0 - 100 mg/dL 51 66 88      Latest Reference Range & Units 12/18/24 16:09   Hemoglobin 11.5 - 15.4 g/dL 14.2   Hematocrit 34.8 - 46.1 % 43.5         Patient Active Problem List   Diagnosis   • Gastro-esophageal reflux disease without esophagitis   • Abnormal weight loss   • Acute pain of left shoulder   • Lumbar spondylosis   • Osteoarthritis of spine with radiculopathy, cervical region   • Degenerative cervical disc   • Lumbar degenerative disc disease   • Bilateral carpal tunnel syndrome   • CAD in native artery   • Other hyperlipidemia   • PVD (peripheral vascular disease) ([facility])   • Headache disorder   • Rheumatoid arthritis with positive rheumatoid factor ([facility])   • Positive KRISTA (antinuclear antibody)   • Left facial numbness   • Myofascial pain syndrome   • Calcific tendinitis of left shoulder   • Incomplete tear of left rotator cuff   • Neuroma of foot   • History of hepatitis C   • Colorectal polyps   • Irritable bowel syndrome with diarrhea   • Incomplete rotator cuff tear or rupture of left shoulder, not specified as traumatic   • Biceps tendinosis of left shoulder   • Arthritis   • Cervical radiculopathy   • Cervical radiculitis   • Sacroiliitis ([facility])   • Chronic pain syndrome   • Gastroesophageal reflux disease   • Radiculopathy, cervical   • Trochanteric bursitis of right hip   • Trochanteric bursitis of left hip   • Lumbar radiculopathy   • Chronic bilateral low back pain with left-sided sciatica   • Osteoarthritis of cervical spine   • Tobacco abuse   • Neck pain   • High risk medication use   • Undifferentiated connective tissue disease ([facility])   • Costochondritis, acute   • Age-related osteoporosis without current pathological fracture   • 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[facility])   • Umbilical hernia without obstruction and without gangrene   • Anxiety   • Soft tissue mass   • Raynaud's disease without gangrene   •  Hypoglycemia   • Pulmonary emphysema ([facility])   • Tremor   • Microscopic hematuria     Past Medical History:   Diagnosis Date   • Acquired ichthyosis     last assessed: 5/9/2013   • Anesthesia     Pt reports severe acid reflux after getting anesthesia- sometimes presents as chest pain   • Anxiety    • Cervical radiculopathy     last assessed: 6/13/2014   • Colorectal polyps     last assessed: 3/9/2015   • COPD (chronic obstructive pulmonary disease) ([facility])    • Depression    • Diverticulosis of colon    • Foot pain    • GERD (gastroesophageal reflux disease)    • Heart disease 1997    Coronary artery disease   • Hepatitis C july 2018    Took meds no longer have   • Hyperlipemia    • Hypertension    • Lupus    • Myocardial infarction ([facility])     at age 32   • Osteoarthritis unsure   • Osteopenia     last assessed: 12/6/2013   • Palpitations     last assessed: 12/31/2014   • PVD (peripheral vascular disease) ([facility])     last assessed: 12/3/2012   • RA (rheumatoid arthritis) ([facility])    • Rheumatoid arthritis ([facility]) unsure   • Scapular dyskinesis     last assessed: 11/17/2014   • Shortness of breath    • Stomach disorder Unsure   • Tendonitis of left rotator cuff     last assessed: 11/17/2014   • Vocal cord polyps     last assessed: 8/8/2014     Social History     Socioeconomic History   • Marital status:      Spouse name: Not on file   • Number of children: Not on file   • Years of education: Not on file   • Highest education level: Not on file   Occupational History   • Not on file   Tobacco Use   • Smoking status: Former     Current packs/day: 0.25     Average packs/day: 0.3 packs/day for 40.0 years (10.0 ttl pk-yrs)     Types: Cigarettes   • Smokeless tobacco: Never   Vaping Use   • Vaping status: Never Used   Substance and Sexual Activity   • Alcohol use: Not Currently     Comment: rare   • Drug use: No   • Sexual activity: Not Currently     Birth control/protection: Abstinence   Other Topics Concern   • Not on file    Social History Narrative    No living will     Social Drivers of Health     Financial Resource Strain: Not on file   Food Insecurity: Not on file   Transportation Needs: Not on file   Physical Activity: Not on file   Stress: Not on file   Social Connections: Not on file   Intimate Partner Violence: Not on file   Housing Stability: Not on file      Family History   Problem Relation Age of Onset   • No Known Problems Mother    • No Known Problems Father    • No Known Problems Maternal Grandmother    • No Known Problems Maternal Grandfather    • No Known Problems Paternal Grandmother    • No Known Problems Paternal Grandfather    • Early death Sister    • Hypertension Sister    • No Known Problems Maternal Aunt    • No Known Problems Maternal Aunt    • No Known Problems Maternal Aunt    • No Known Problems Maternal Aunt      Past Surgical History:   Procedure Laterality Date   • ABDOMINAL SURGERY      exploratory   • CARDIAC SURGERY      cardiac stent    •  SECTION      last assessed: 2014   • CHOLECYSTECTOMY      last assessed: 2014   • COLONOSCOPY  10/27/2014   • CORONARY ANGIOPLASTY WITH STENT PLACEMENT  1999    LAD stent   • DENTAL SURGERY      last assessed: 2014   • ELBOW SURGERY Bilateral     arthroplasty   • EPIDURAL BLOCK INJECTION N/A 2019    Procedure: C7 T1 Cervical Epidural Steroid Injection (40673);  Surgeon: Ricardo Lawson MD;  Location: [facility];  Service: Pain Management    • EPIDURAL BLOCK INJECTION Bilateral 2019    Procedure: BLOCK / INJECTION EPIDURAL STEROID CERVICAL - C7-T1;  Surgeon: Ricardo Lawson MD;  Location: [facility];  Service: Pain Management    • EPIDURAL BLOCK INJECTION Left 10/03/2019    Procedure: C7-T1 interlaminar epidural steroid injection;  Surgeon: Ricardo Lawson MD;  Location: [facility];  Service: Pain Management    • ESOPHAGOGASTRODUODENOSCOPY     • ESOPHAGOGASTRODUODENOSCOPY N/A 2016    Procedure:  ESOPHAGOGASTRODUODENOSCOPY (EGD);  Surgeon: Alejandro Carvajal MD;  Location: [facility];  Service:    • FL GUIDED NEEDLE PLAC BX/ASP/INJ  05/02/2019   • FL GUIDED NEEDLE PLAC BX/ASP/INJ  07/31/2019   • FL GUIDED NEEDLE PLAC BX/ASP/INJ  10/03/2019   • FL INJECTION LEFT SHOULDER (ARTHROGRAM)  09/25/2018   • FOOT SURGERY Right 02/06/2015    x 4   • HYSTERECTOMY      last assessed: 8/8/2014   • SC ARTHROCENTESIS ASPIR&/INJ MAJOR JT/BURSA W/O US Bilateral 07/31/2019    Procedure: GREATER TROCHANTERIC HIP INJECTION;  Surgeon: Ricardo Lawson MD;  Location: [facility];  Service: Pain Management    • SC EXCISON TUMOR SOFT TISSUE THIGH/KNEE SUBQ 3 CM/> Right 9/18/2023    Procedure: EXCISION BIOPSY TISSUE LESION/MASS LOWER EXTREMITY;  Surgeon: Franky Bragg DO;  Location: [facility];  Service: Orthopedics   • SC SURGICAL ARTHROSCOPY SHOULDER BICEPS TENODESIS Left 10/15/2018    Procedure: ARTHROSCOPY SHOULDER; Debridement of shoulder;  Surgeon: Francis Byrnes MD;  Location: [facility];  Service: Orthopedics   • SC SURGICAL ARTHROSCOPY SHOULDER W/ROTATOR CUFF RPR Left 07/23/2018    Procedure: ARTHROSCOPY SHOULDER, subacromial decompression, synovectomy;  Surgeon: Francis Byrnes MD;  [facility]: [facility];  Service: Orthopedics   • SHOULDER SURGERY  june and oct 2018   • THROAT SURGERY     • TOOTH EXTRACTION     • TRIGEMINAL NERVE DECOMPRESSION Right 06/15/2018    Procedure: FOOT NEUROPLASTY;  Surgeon: Hernan Resendiz DPM;  Location: [facility];  Service: Podiatry       Current Outpatient Medications:   •  albuterol (ProAir HFA) 90 mcg/act inhaler, Inhale 2 puffs every 6 (six) hours as needed for wheezing, Disp: 8.5 g, Rfl: 0  •  albuterol (PROVENTIL HFA,VENTOLIN HFA) 90 mcg/act inhaler, Inhale 2 puffs every 6 (six) hours as needed for wheezing, Disp: 18 g, Rfl: 3  •  calcium carbonate (TUMS) 500 mg chewable tablet, Chew 1 tablet if needed for indigestion or heartburn, Disp: , Rfl:   •  cyclobenzaprine (FLEXERIL) 10 mg tablet,  Take 1 tablet (10 mg total) by mouth daily at bedtime, Disp: 30 tablet, Rfl: 6  •  diphenhydrAMINE-acetaminophen (TYLENOL PM)  MG TABS, Take 1 tablet by mouth daily at bedtime as needed for sleep, Disp: , Rfl:   •  ezetimibe (ZETIA) 10 mg tablet, Take 1 tablet (10 mg total) by mouth daily, Disp: 90 tablet, Rfl: 3  •  hydroxychloroquine (PLAQUENIL) 200 mg tablet, Take 1 tablet (200 mg total) by mouth daily, Disp: 90 tablet, Rfl: 2  •  ibuprofen (MOTRIN) 800 mg tablet, Take 1 tablet (800 mg total) by mouth every 8 (eight) hours as needed for moderate pain Take with food, Disp: 90 tablet, Rfl: 6  •  ipratropium-albuterol (DUO-NEB) 0.5-2.5 mg/3 mL nebulizer solution, Take 3 mL by nebulization 4 (four) times a day, Disp: 90 mL, Rfl: 2  •  LACTASE ENZYME PO, Take 3 tablets by mouth if needed As needed, Disp: , Rfl:   •  nitroglycerin (NITROSTAT) 0.4 mg SL tablet, Place 1 tablet (0.4 mg total) under the tongue every 5 (five) minutes as needed for chest pain, Disp: 20 tablet, Rfl: 3  •  oxyCODONE-acetaminophen (PERCOCET) 5-325 mg per tablet, Take 1 tablet by mouth every 8 (eight) hours as needed for moderate pain, Disp: , Rfl:   •  Probiotic Product (PROBIOTIC DAILY PO), Take 2 capsules by mouth in the morning Probiotic-prebiotic, Disp: , Rfl:   •  rosuvastatin (CRESTOR) 5 mg tablet, TAKE 1 TABLET BY MOUTH DAILY, Disp: 90 tablet, Rfl: 1  •  Vitamin D-Vitamin K (K2-D3 5000 PO), Take by mouth daily, Disp: , Rfl:   •  Diclofenac Sodium (VOLTAREN) 1 %, Apply 2 g topically 4 (four) times a day, Disp: 100 g, Rfl: 6  •  lidocaine (Lidoderm) 5 %, Apply 1 patch topically over 12 hours daily for 5 days Remove & Discard patch within 12 hours or as directed by MD, Disp: 5 patch, Rfl: 0  •  meclizine (ANTIVERT) 12.5 MG tablet, Take 1 tablet (12.5 mg total) by mouth 3 (three) times a day as needed for dizziness, Disp: 30 tablet, Rfl: 0  •  Symbicort 160-4.5 MCG/ACT inhaler, Inhale 2 puffs 2 (two) times a day Rinse mouth after use.,  "Disp: 10.2 g, Rfl: 5  •  traZODone (DESYREL) 50 mg tablet, Take 1 tablet (50 mg total) by mouth daily at bedtime (Patient not taking: Reported on 2/5/2025), Disp: 30 tablet, Rfl: 5  Allergies   Allergen Reactions   • Ceclor [Cefaclor] Anaphylaxis   • Cephalexin    • Codeine Itching     Reaction Date: [id number];    • Gabapentin      Body tremors, sweats   • Lyrica [Pregabalin]      Body tremors, chills, heaviness in chest   • Ticlopidine Hives     Other reaction(s): Hives   • Penicillins Rash     Other reaction(s): Other       Imaging: No results found.    Review of Systems:  Review of Systems   Constitutional: Negative.    HENT: Negative.     Eyes: Negative.    Respiratory: Negative.     Cardiovascular: Negative.    Musculoskeletal: Negative.        Physical Exam:  /70 (BP Location: Left arm, Patient Position: Sitting, Cuff Size: Standard)   Pulse 80   Ht 4' 11\" (1.499 m)   Wt 47.9 kg (105 lb 9.6 oz)   SpO2 97%   BMI 21.33 kg/m²   Physical Exam  Constitutional:       General: She is not in acute distress.     Appearance: She is not ill-appearing, toxic-appearing or diaphoretic.   HENT:      Nose: Nose normal. No congestion or rhinorrhea.   Neck:      Vascular: No carotid bruit.   Cardiovascular:      Rate and Rhythm: Normal rate and regular rhythm.      Heart sounds: No murmur heard.     No friction rub. No gallop.   Pulmonary:      Effort: Pulmonary effort is normal. No respiratory distress.      Breath sounds: No wheezing or rhonchi.   Abdominal:      General: Abdomen is flat.   Musculoskeletal:         General: No swelling, tenderness, deformity or signs of injury. Normal range of motion.      Cervical back: Normal range of motion. No rigidity or tenderness.   Lymphadenopathy:      Cervical: No cervical adenopathy.   Neurological:      Mental Status: She is alert.         This note was completed in part utilizing [employer] direct voice recognition software.   Grammatical errors, random word " "insertion, spelling mistakes, occasional wrong word or \"sound-alike\" substitutions and incomplete sentences may be an occasional consequence of the system secondary to software limitations, ambient noise and hardware issues. At the time of dictation, efforts were made to edit, clarify and /or correct errors.  Please read the chart carefully and recognize, using context, where substitutions have occurred.  If you have any questions or concerns about the context, text or information contained within the body of this dictation, please contact myself, the provider, for further clarification.  "

## 2025-05-01 NOTE — PROGRESS NOTES
Cardiology Follow Up    Gloria Mcdaniel  1965  [id number]  [facility] [address] [id number] [phone]    No diagnosis found.    There are no diagnoses linked to this encounter.  I had the pleasure of seeing Gloria Mcdaniel for a follow up visit.     INTERVAL HISTORY: none    History of the presenting illness, Discussion/Summary and My Plan are as follows:::    She is a pleasant 59-year-old lady with a history of Hypertension,  familial hypercholesterolemia, mixed/undifferentiated connective tissue disorder/RA, chronic tobacco use-now about half pack a day, coronary disease, prior percutaneous coronary intervention to the left anterior descending coronary artery in 1998, hypertension as well as marked dyslipidemia with LDL has high as 170 to 220 and a prior history of non compliance with meds and follow ups. For atypical CPs, I had her do a Bia Nuc perfusion study that did not demonstrate ischemia - in September 2014.  She had been noncompliant with medications and followups. Continues to smoke.     Her risk factors for vascular disease include hypertension, marked dyslipidemia, existing coronary artery disease, RA as well as smoking      In December 2024, fell while bent over, felt like a rib dislocation that popped back in place, came to the ER with reproducible tenderness, negative evaluation for ACS, CT was negative but showed heavy coronary calcification.     Cardiac exam is unremarkable, decreased left carotid impulse, Currently modestly active without symptoms with activity and no symptoms suggestive of angina. Activity is limited by arthritis     Plan:     CAD,  prior percutaneous coronary intervention to the left anterior descending coronary artery in 1998:      Dyslipidemia/Familial Hypercholesterolemia: Pravastatin with worsening leg pains. For a short while she was on  Repatha (PCSK9 inhibitor). Then it became expensive apparently.  Had started her on Crestor currently at 5 mg daily plus Zetia 10 mg daily with her most recent numbers slightly above goal from 2024 , increase Rosuva/Crestor to 10 mg, recheck in 3 mo    coronary artery disease, status post PCI to the LAD in 1998, for atypical chest pains  Bia Nuc in Sept 2014 without ischemia. If has recurrence of chest pains, will then check a stress test. At this time she needs to quit smoking - half ppd or less.  On aspirin and statin, not on a beta-blocker - Metoprolol made her feel cold and caused hair loss     HTN: currently controlled     Chronic hydroxychloroquine therapy for undifferentiated CTD /RA: Echo and  ECG were unremarkable.  Last echo was in 2020, will repeat (can cause a cardiomyopathy)     Tobacco use: She is rolling her own cigarettes, about 0.5 ppd. Her tobacco use remains her most significant risk factor - cessation was strongly advised.      Weight loss from poor appetite and constant abdominal pain without a clear etiology: appetite now has improved. See above for testing.  Does have delayed gastric emptying - April 2021, weight has now been more or less stable.  Has had GI evaluation as well.     Follow up in 6 months        Latest Reference Range & Units 02/09/22 14:12 04/26/23 14:03 11/20/24 11:43   Cholesterol See Comment mg/dL 133 152 188   Triglycerides See Comment mg/dL 92 126 97   HDL >=50 mg/dL 64 61 81   Non-HDL Cholesterol mg/dl   107   LDL Calculated 0 - 100 mg/dL 51 66 88      Latest Reference Range & Units 12/18/24 16:09   Hemoglobin 11.5 - 15.4 g/dL 14.2   Hematocrit 34.8 - 46.1 % 43.5         Patient Active Problem List   Diagnosis    Gastro-esophageal reflux disease without esophagitis    Abnormal weight loss    Acute pain of left shoulder    Lumbar spondylosis    Osteoarthritis of spine with radiculopathy, cervical region    Degenerative cervical disc    Lumbar degenerative disc disease     Bilateral carpal tunnel syndrome    CAD in native artery    Other hyperlipidemia    PVD (peripheral vascular disease) (HCC)    Headache disorder    Rheumatoid arthritis with positive rheumatoid factor (HCC)    Positive KRISTA (antinuclear antibody)    Left facial numbness    Myofascial pain syndrome    Calcific tendinitis of left shoulder    Incomplete tear of left rotator cuff    Neuroma of foot    History of hepatitis C    Colorectal polyps    Irritable bowel syndrome with diarrhea    Incomplete rotator cuff tear or rupture of left shoulder, not specified as traumatic    Biceps tendinosis of left shoulder    Arthritis    Cervical radiculopathy    Cervical radiculitis    Sacroiliitis (HCC)    Chronic pain syndrome    Gastroesophageal reflux disease    Radiculopathy, cervical    Trochanteric bursitis of right hip    Trochanteric bursitis of left hip    Lumbar radiculopathy    Chronic bilateral low back pain with left-sided sciatica    Osteoarthritis of cervical spine    Tobacco abuse    Neck pain    High risk medication use    Undifferentiated connective tissue disease (HCC)    Costochondritis, acute    Age-related osteoporosis without current pathological fracture    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HCC)    Umbilical hernia without obstruction and without gangrene    Anxiety    Soft tissue mass    Raynaud's disease without gangrene    Hypoglycemia    Pulmonary emphysema (HCC)    Tremor    Microscopic hematuria     Past Medical History:   Diagnosis Date    Acquired ichthyosis     last assessed: 5/9/2013    Anesthesia     Pt reports severe acid reflux after getting anesthesia- sometimes presents as chest pain    Anxiety     Cervical radiculopathy     last assessed: 6/13/2014    Colorectal polyps     last assessed: 3/9/2015    COPD (chronic obstructive pulmonary disease) (HCC)     Depression     Diverticulosis of colon     Foot pain     GERD (gastroesophageal reflux disease)     Heart disease 1997    Coronary artery  disease    Hepatitis C july 2018    Took meds no longer have    Hyperlipemia     Hypertension     Lupus     Myocardial infarction (HCC)     at age 32    Osteoarthritis unsure    Osteopenia     last assessed: 12/6/2013    Palpitations     last assessed: 12/31/2014    PVD (peripheral vascular disease) (HCC)     last assessed: 12/3/2012    RA (rheumatoid arthritis) ([facility])     Rheumatoid arthritis (HCC) unsure    Scapular dyskinesis     last assessed: 11/17/2014    Shortness of breath     Stomach disorder Unsure    Tendonitis of left rotator cuff     last assessed: 11/17/2014    Vocal cord polyps     last assessed: 8/8/2014     Social History     Socioeconomic History    Marital status:      Spouse name: Not on file    Number of children: Not on file    Years of education: Not on file    Highest education level: Not on file   Occupational History    Not on file   Tobacco Use    Smoking status: Former     Current packs/day: 0.25     Average packs/day: 0.3 packs/day for 40.0 years (10.0 ttl pk-yrs)     Types: Cigarettes    Smokeless tobacco: Never   Vaping Use    Vaping status: Never Used   Substance and Sexual Activity    Alcohol use: Not Currently     Comment: rare    Drug use: No    Sexual activity: Not Currently     Birth control/protection: Abstinence   Other Topics Concern    Not on file   Social History Narrative    No living will     Social Drivers of Health     Financial Resource Strain: Not on file   Food Insecurity: Not on file   Transportation Needs: Not on file   Physical Activity: Not on file   Stress: Not on file   Social Connections: Not on file   Intimate Partner Violence: Not on file   Housing Stability: Not on file      Family History   Problem Relation Age of Onset    No Known Problems Mother     No Known Problems Father     No Known Problems Maternal Grandmother     No Known Problems Maternal Grandfather     No Known Problems Paternal Grandmother     No Known Problems Paternal Grandfather      Early death Sister     Hypertension Sister     No Known Problems Maternal Aunt     No Known Problems Maternal Aunt     No Known Problems Maternal Aunt     No Known Problems Maternal Aunt      Past Surgical History:   Procedure Laterality Date    ABDOMINAL SURGERY      exploratory    CARDIAC SURGERY      cardiac stent      SECTION      last assessed: 2014    CHOLECYSTECTOMY      last assessed: 2014    COLONOSCOPY  10/27/2014    CORONARY ANGIOPLASTY WITH STENT PLACEMENT      LAD stent    DENTAL SURGERY      last assessed: 2014    ELBOW SURGERY Bilateral     arthroplasty    EPIDURAL BLOCK INJECTION N/A 2019    Procedure: C7 T1 Cervical Epidural Steroid Injection (84581);  Surgeon: Ricardo Lawson MD;  Location: [facility];  Service: Pain Management     EPIDURAL BLOCK INJECTION Bilateral 2019    Procedure: BLOCK / INJECTION EPIDURAL STEROID CERVICAL - C7-T1;  Surgeon: Ricardo Lawson MD;  Location: [facility];  Service: Pain Management     EPIDURAL BLOCK INJECTION Left 10/03/2019    Procedure: C7-T1 interlaminar epidural steroid injection;  Surgeon: Ricardo Lawson MD;  Location: [facility];  Service: Pain Management     ESOPHAGOGASTRODUODENOSCOPY      ESOPHAGOGASTRODUODENOSCOPY N/A 2016    Procedure: ESOPHAGOGASTRODUODENOSCOPY (EGD);  Surgeon: Alejandro Carvajal MD;  Location: [facility];  Service:     FL GUIDED NEEDLE PLAC BX/ASP/INJ  2019    FL GUIDED NEEDLE PLAC BX/ASP/INJ  2019    FL GUIDED NEEDLE PLAC BX/ASP/INJ  10/03/2019    FL INJECTION LEFT SHOULDER (ARTHROGRAM)  2018    FOOT SURGERY Right 02/06/2015    x 4    HYSTERECTOMY      last assessed: 2014    AL ARTHROCENTESIS ASPIR&/INJ MAJOR JT/BURSA W/O US Bilateral 2019    Procedure: GREATER TROCHANTERIC HIP INJECTION;  Surgeon: Ricardo Lawson MD;  Location: [facility];  Service: Pain Management     AL EXCISON TUMOR SOFT TISSUE THIGH/KNEE SUBQ 3 CM/> Right 2023    Procedure:  EXCISION BIOPSY TISSUE LESION/MASS LOWER EXTREMITY;  Surgeon: Franky Bragg DO;  Location: [facility];  Service: Orthopedics    UT SURGICAL ARTHROSCOPY SHOULDER BICEPS TENODESIS Left 10/15/2018    Procedure: ARTHROSCOPY SHOULDER; Debridement of shoulder;  Surgeon: Francis Byrnes MD;  Location: [facility];  Service: Orthopedics    UT SURGICAL ARTHROSCOPY SHOULDER W/ROTATOR CUFF RPR Left 07/23/2018    Procedure: ARTHROSCOPY SHOULDER, subacromial decompression, synovectomy;  Surgeon: Francis Byrnes MD;  Location: [facility];  Service: Orthopedics    SHOULDER SURGERY  june and oct 2018    THROAT SURGERY      TOOTH EXTRACTION      TRIGEMINAL NERVE DECOMPRESSION Right 06/15/2018    Procedure: FOOT NEUROPLASTY;  Surgeon: Hernan Resendiz DPM;  Location: [facility];  Service: Podiatry       Current Outpatient Medications:     albuterol (ProAir HFA) 90 mcg/act inhaler, Inhale 2 puffs every 6 (six) hours as needed for wheezing, Disp: 8.5 g, Rfl: 0    albuterol (PROVENTIL HFA,VENTOLIN HFA) 90 mcg/act inhaler, Inhale 2 puffs every 6 (six) hours as needed for wheezing, Disp: 18 g, Rfl: 3    calcium carbonate (TUMS) 500 mg chewable tablet, Chew 1 tablet if needed for indigestion or heartburn, Disp: , Rfl:     cyclobenzaprine (FLEXERIL) 10 mg tablet, Take 1 tablet (10 mg total) by mouth daily at bedtime, Disp: 30 tablet, Rfl: 6    diphenhydrAMINE-acetaminophen (TYLENOL PM)  MG TABS, Take 1 tablet by mouth daily at bedtime as needed for sleep, Disp: , Rfl:     ezetimibe (ZETIA) 10 mg tablet, Take 1 tablet (10 mg total) by mouth daily, Disp: 90 tablet, Rfl: 3    hydroxychloroquine (PLAQUENIL) 200 mg tablet, Take 1 tablet (200 mg total) by mouth daily, Disp: 90 tablet, Rfl: 2    ibuprofen (MOTRIN) 800 mg tablet, Take 1 tablet (800 mg total) by mouth every 8 (eight) hours as needed for moderate pain Take with food, Disp: 90 tablet, Rfl: 6    ipratropium-albuterol (DUO-NEB) 0.5-2.5 mg/3 mL nebulizer solution, Take 3 mL by  nebulization 4 (four) times a day, Disp: 90 mL, Rfl: 2    LACTASE ENZYME PO, Take 3 tablets by mouth if needed As needed, Disp: , Rfl:     nitroglycerin (NITROSTAT) 0.4 mg SL tablet, Place 1 tablet (0.4 mg total) under the tongue every 5 (five) minutes as needed for chest pain, Disp: 20 tablet, Rfl: 3    oxyCODONE-acetaminophen (PERCOCET) 5-325 mg per tablet, Take 1 tablet by mouth every 8 (eight) hours as needed for moderate pain, Disp: , Rfl:     Probiotic Product (PROBIOTIC DAILY PO), Take 2 capsules by mouth in the morning Probiotic-prebiotic, Disp: , Rfl:     rosuvastatin (CRESTOR) 5 mg tablet, TAKE 1 TABLET BY MOUTH DAILY, Disp: 90 tablet, Rfl: 1    Vitamin D-Vitamin K (K2-D3 5000 PO), Take by mouth daily, Disp: , Rfl:     Diclofenac Sodium (VOLTAREN) 1 %, Apply 2 g topically 4 (four) times a day, Disp: 100 g, Rfl: 6    lidocaine (Lidoderm) 5 %, Apply 1 patch topically over 12 hours daily for 5 days Remove & Discard patch within 12 hours or as directed by MD, Disp: 5 patch, Rfl: 0    meclizine (ANTIVERT) 12.5 MG tablet, Take 1 tablet (12.5 mg total) by mouth 3 (three) times a day as needed for dizziness, Disp: 30 tablet, Rfl: 0    Symbicort 160-4.5 MCG/ACT inhaler, Inhale 2 puffs 2 (two) times a day Rinse mouth after use., Disp: 10.2 g, Rfl: 5    traZODone (DESYREL) 50 mg tablet, Take 1 tablet (50 mg total) by mouth daily at bedtime (Patient not taking: Reported on 2/5/2025), Disp: 30 tablet, Rfl: 5  Allergies   Allergen Reactions    Ceclor [Cefaclor] Anaphylaxis    Cephalexin     Codeine Itching     Reaction Date: 09Jun2011;     Gabapentin      Body tremors, sweats    Lyrica [Pregabalin]      Body tremors, chills, heaviness in chest    Ticlopidine Hives     Other reaction(s): Hives    Penicillins Rash     Other reaction(s): Other       Imaging: No results found.    Review of Systems:  Review of Systems   Constitutional: Negative.    HENT: Negative.     Eyes: Negative.    Respiratory: Negative.    "  Cardiovascular: Negative.    Musculoskeletal: Negative.        Physical Exam:  /70 (BP Location: Left arm, Patient Position: Sitting, Cuff Size: Standard)   Pulse 80   Ht 4' 11\" (1.499 m)   Wt 47.9 kg (105 lb 9.6 oz)   SpO2 97%   BMI 21.33 kg/m²   Physical Exam  Constitutional:       General: She is not in acute distress.     Appearance: She is not ill-appearing, toxic-appearing or diaphoretic.   HENT:      Nose: Nose normal. No congestion or rhinorrhea.   Neck:      Vascular: No carotid bruit.   Cardiovascular:      Rate and Rhythm: Normal rate and regular rhythm.      Heart sounds: No murmur heard.     No friction rub. No gallop.   Pulmonary:      Effort: Pulmonary effort is normal. No respiratory distress.      Breath sounds: No wheezing or rhonchi.   Abdominal:      General: Abdomen is flat.   Musculoskeletal:         General: No swelling, tenderness, deformity or signs of injury. Normal range of motion.      Cervical back: Normal range of motion. No rigidity or tenderness.   Lymphadenopathy:      Cervical: No cervical adenopathy.   Neurological:      Mental Status: She is alert.         This note was completed in part utilizing [employer] direct voice recognition software.   Grammatical errors, random word insertion, spelling mistakes, occasional wrong word or \"sound-alike\" substitutions and incomplete sentences may be an occasional consequence of the system secondary to software limitations, ambient noise and hardware issues. At the time of dictation, efforts were made to edit, clarify and /or correct errors.  Please read the chart carefully and recognize, using context, where substitutions have occurred.  If you have any questions or concerns about the context, text or information contained within the body of this dictation, please contact myself, the provider, for further clarification.  "

## 2025-05-07 ENCOUNTER — TELEPHONE (OUTPATIENT)
Dept: FAMILY MEDICINE CLINIC | Facility: CLINIC | Age: 60
End: 2025-05-07

## 2025-05-21 ENCOUNTER — HOSPITAL ENCOUNTER (OUTPATIENT)
Dept: NON INVASIVE DIAGNOSTICS | Facility: HOSPITAL | Age: 60
Discharge: HOME | End: 2025-05-21
Attending: INTERNAL MEDICINE
Payer: COMMERCIAL

## 2025-05-21 VITALS
HEIGHT: 59 IN | WEIGHT: 105 LBS | HEART RATE: 80 BPM | SYSTOLIC BLOOD PRESSURE: 108 MMHG | BODY MASS INDEX: 21.17 KG/M2 | DIASTOLIC BLOOD PRESSURE: 70 MMHG

## 2025-05-21 DIAGNOSIS — Z79.899 LONG-TERM USE OF HYDROXYCHLOROQUINE: ICD-10-CM

## 2025-05-21 DIAGNOSIS — I25.10 CAD IN NATIVE ARTERY: ICD-10-CM

## 2025-05-21 LAB
AORTIC ROOT: 2.7 CM
ASCENDING AORTA: 2.8 CM
BSA FOR ECHO PROCEDURE: 1.4 M2
E WAVE DECELERATION TIME: 266 MS
E/A RATIO: 0.74
FRACTIONAL SHORTENING: 36 (ref 28–44)
INTERVENTRICULAR SEPTUM IN DIASTOLE (PARASTERNAL SHORT AXIS VIEW): 0.8 CM
INTERVENTRICULAR SEPTUM: 0.8 CM (ref 0.6–1.1)
LAAS-AP2: 9.1 CM2
LAAS-AP4: 9.8 CM2
LEFT ATRIUM SIZE: 2.7 CM
LEFT ATRIUM VOLUME (MOD BIPLANE): 18 ML
LEFT ATRIUM VOLUME INDEX (MOD BIPLANE): 12.8 ML/M2
LEFT INTERNAL DIMENSION IN SYSTOLE: 2.3 CM (ref 2.1–4)
LEFT VENTRICULAR INTERNAL DIMENSION IN DIASTOLE: 3.6 CM (ref 3.5–6)
LEFT VENTRICULAR POSTERIOR WALL IN END DIASTOLE: 0.9 CM
LEFT VENTRICULAR STROKE VOLUME: 35 ML
LV EF US.2D.A4C+ESTIMATED: 64 %
LVSV (TEICH): 35 ML
MV E'TISSUE VEL-LAT: 14 CM/S
MV E'TISSUE VEL-SEP: 11 CM/S
MV PEAK A VEL: 0.94 M/S
MV PEAK E VEL: 70 CM/S
MV STENOSIS PRESSURE HALF TIME: 77 MS
MV VALVE AREA P 1/2 METHOD: 2.86
RA PRESSURE ESTIMATED: 5 MMHG
RIGHT ATRIUM AREA SYSTOLE A4C: 12.1 CM2
RIGHT VENTRICLE ID DIMENSION: 2.5 CM
SL CV LEFT ATRIUM LENGTH A2C: 3.8 CM
SL CV LV EF: 60
SL CV PED ECHO LEFT VENTRICLE DIASTOLIC VOLUME (MOD BIPLANE) 2D: 53 ML
SL CV PED ECHO LEFT VENTRICLE SYSTOLIC VOLUME (MOD BIPLANE) 2D: 18 ML
TRICUSPID ANNULAR PLANE SYSTOLIC EXCURSION: 2.2 CM

## 2025-05-21 PROCEDURE — 93306 TTE W/DOPPLER COMPLETE: CPT | Performed by: INTERNAL MEDICINE

## 2025-05-21 PROCEDURE — 93306 TTE W/DOPPLER COMPLETE: CPT

## 2025-05-22 ENCOUNTER — RESULTS FOLLOW-UP (OUTPATIENT)
Dept: NON INVASIVE DIAGNOSTICS | Facility: HOSPITAL | Age: 60
End: 2025-05-22

## 2025-06-18 ENCOUNTER — TELEPHONE (OUTPATIENT)
Dept: GASTROENTEROLOGY | Facility: CLINIC | Age: 60
End: 2025-06-18

## 2025-06-26 ENCOUNTER — APPOINTMENT (OUTPATIENT)
Dept: LAB | Facility: MEDICAL CENTER | Age: 60
End: 2025-06-26
Payer: COMMERCIAL

## 2025-06-26 ENCOUNTER — APPOINTMENT (OUTPATIENT)
Dept: RADIOLOGY | Facility: MEDICAL CENTER | Age: 60
End: 2025-06-26
Payer: COMMERCIAL

## 2025-06-26 DIAGNOSIS — M79.672 LEFT FOOT PAIN: ICD-10-CM

## 2025-06-26 DIAGNOSIS — I25.10 CAD IN NATIVE ARTERY: ICD-10-CM

## 2025-06-26 DIAGNOSIS — E16.2 HYPOGLYCEMIA: ICD-10-CM

## 2025-06-26 DIAGNOSIS — Z00.00 ANNUAL PHYSICAL EXAM: ICD-10-CM

## 2025-06-26 LAB
ALBUMIN SERPL BCG-MCNC: 4.7 G/DL (ref 3.5–5)
ALP SERPL-CCNC: 51 U/L (ref 34–104)
ALT SERPL W P-5'-P-CCNC: 11 U/L (ref 7–52)
ANION GAP SERPL CALCULATED.3IONS-SCNC: 10 MMOL/L (ref 4–13)
AST SERPL W P-5'-P-CCNC: 21 U/L (ref 13–39)
BASOPHILS # BLD AUTO: 0.04 THOUSANDS/ÂΜL (ref 0–0.1)
BASOPHILS NFR BLD AUTO: 1 % (ref 0–1)
BILIRUB SERPL-MCNC: 0.54 MG/DL (ref 0.2–1)
BUN SERPL-MCNC: 6 MG/DL (ref 5–25)
CALCIUM SERPL-MCNC: 9.4 MG/DL (ref 8.4–10.2)
CHLORIDE SERPL-SCNC: 100 MMOL/L (ref 96–108)
CHOLEST SERPL-MCNC: 143 MG/DL (ref ?–200)
CO2 SERPL-SCNC: 29 MMOL/L (ref 21–32)
CREAT SERPL-MCNC: 0.53 MG/DL (ref 0.6–1.3)
EOSINOPHIL # BLD AUTO: 0.1 THOUSAND/ÂΜL (ref 0–0.61)
EOSINOPHIL NFR BLD AUTO: 1 % (ref 0–6)
ERYTHROCYTE [DISTWIDTH] IN BLOOD BY AUTOMATED COUNT: 12.9 % (ref 11.6–15.1)
EST. AVERAGE GLUCOSE BLD GHB EST-MCNC: 114 MG/DL
GFR SERPL CREATININE-BSD FRML MDRD: 104 ML/MIN/1.73SQ M
GLUCOSE P FAST SERPL-MCNC: 84 MG/DL (ref 65–99)
HBA1C MFR BLD: 5.6 %
HCT VFR BLD AUTO: 40.3 % (ref 34.8–46.1)
HDLC SERPL-MCNC: 61 MG/DL
HGB BLD-MCNC: 13.5 G/DL (ref 11.5–15.4)
IMM GRANULOCYTES # BLD AUTO: 0.02 THOUSAND/UL (ref 0–0.2)
IMM GRANULOCYTES NFR BLD AUTO: 0 % (ref 0–2)
LDLC SERPL CALC-MCNC: 60 MG/DL (ref 0–100)
LYMPHOCYTES # BLD AUTO: 2.51 THOUSANDS/ÂΜL (ref 0.6–4.47)
LYMPHOCYTES NFR BLD AUTO: 35 % (ref 14–44)
MCH RBC QN AUTO: 32.8 PG (ref 26.8–34.3)
MCHC RBC AUTO-ENTMCNC: 33.5 G/DL (ref 31.4–37.4)
MCV RBC AUTO: 98 FL (ref 82–98)
MONOCYTES # BLD AUTO: 0.6 THOUSAND/ÂΜL (ref 0.17–1.22)
MONOCYTES NFR BLD AUTO: 8 % (ref 4–12)
NEUTROPHILS # BLD AUTO: 3.96 THOUSANDS/ÂΜL (ref 1.85–7.62)
NEUTS SEG NFR BLD AUTO: 55 % (ref 43–75)
NONHDLC SERPL-MCNC: 82 MG/DL
NRBC BLD AUTO-RTO: 0 /100 WBCS
PLATELET # BLD AUTO: 233 THOUSANDS/UL (ref 149–390)
PMV BLD AUTO: 10.8 FL (ref 8.9–12.7)
POTASSIUM SERPL-SCNC: 3.8 MMOL/L (ref 3.5–5.3)
PROT SERPL-MCNC: 7.1 G/DL (ref 6.4–8.4)
RBC # BLD AUTO: 4.11 MILLION/UL (ref 3.81–5.12)
SODIUM SERPL-SCNC: 139 MMOL/L (ref 135–147)
TRIGL SERPL-MCNC: 109 MG/DL (ref ?–150)
TSH SERPL DL<=0.05 MIU/L-ACNC: 1.21 UIU/ML (ref 0.45–4.5)
WBC # BLD AUTO: 7.23 THOUSAND/UL (ref 4.31–10.16)

## 2025-06-26 PROCEDURE — 73630 X-RAY EXAM OF FOOT: CPT

## 2025-06-26 PROCEDURE — 80053 COMPREHEN METABOLIC PANEL: CPT

## 2025-06-26 PROCEDURE — 85025 COMPLETE CBC W/AUTO DIFF WBC: CPT

## 2025-06-26 PROCEDURE — 83036 HEMOGLOBIN GLYCOSYLATED A1C: CPT

## 2025-06-26 PROCEDURE — 36415 COLL VENOUS BLD VENIPUNCTURE: CPT

## 2025-06-26 PROCEDURE — 84443 ASSAY THYROID STIM HORMONE: CPT

## 2025-06-26 PROCEDURE — 80061 LIPID PANEL: CPT

## 2025-07-01 ENCOUNTER — OFFICE VISIT (OUTPATIENT)
Dept: FAMILY MEDICINE CLINIC | Facility: CLINIC | Age: 60
End: 2025-07-01
Payer: COMMERCIAL

## 2025-07-01 VITALS
TEMPERATURE: 98.9 F | HEIGHT: 59 IN | DIASTOLIC BLOOD PRESSURE: 74 MMHG | BODY MASS INDEX: 21.13 KG/M2 | WEIGHT: 104.8 LBS | SYSTOLIC BLOOD PRESSURE: 108 MMHG | HEART RATE: 87 BPM | OXYGEN SATURATION: 98 %

## 2025-07-01 DIAGNOSIS — M70.62 TROCHANTERIC BURSITIS OF LEFT HIP: ICD-10-CM

## 2025-07-01 DIAGNOSIS — M05.9 RHEUMATOID ARTHRITIS WITH POSITIVE RHEUMATOID FACTOR, INVOLVING UNSPECIFIED SITE (HCC): Primary | ICD-10-CM

## 2025-07-01 DIAGNOSIS — K21.9 GASTRO-ESOPHAGEAL REFLUX DISEASE WITHOUT ESOPHAGITIS: ICD-10-CM

## 2025-07-01 DIAGNOSIS — J38.1 VOCAL CORD POLYPS: ICD-10-CM

## 2025-07-01 DIAGNOSIS — J43.9 PULMONARY EMPHYSEMA, UNSPECIFIED EMPHYSEMA TYPE (HCC): ICD-10-CM

## 2025-07-01 DIAGNOSIS — Z12.31 ENCOUNTER FOR SCREENING MAMMOGRAM FOR BREAST CANCER: ICD-10-CM

## 2025-07-01 DIAGNOSIS — Z12.11 SCREENING FOR COLON CANCER: ICD-10-CM

## 2025-07-01 DIAGNOSIS — G89.4 CHRONIC PAIN SYNDROME: ICD-10-CM

## 2025-07-01 DIAGNOSIS — Z86.0100 HISTORY OF COLON POLYPS: ICD-10-CM

## 2025-07-01 DIAGNOSIS — I25.10 CAD IN NATIVE ARTERY: ICD-10-CM

## 2025-07-01 PROBLEM — I10 PRIMARY HYPERTENSION: Status: RESOLVED | Noted: 2025-05-01 | Resolved: 2025-07-01

## 2025-07-01 PROCEDURE — 99214 OFFICE O/P EST MOD 30 MIN: CPT | Performed by: PHYSICIAN ASSISTANT

## 2025-07-01 NOTE — ASSESSMENT & PLAN NOTE
Referral placed to pain management  Orders:  •  Ambulatory referral to Spine & Pain Management; Future

## 2025-07-01 NOTE — PROGRESS NOTES
Name: Gloria Mcdaniel      : 1965      MRN: [id number]  Encounter Provider: Aria Hagen PA-C  Encounter Date: 2025   Encounter department: [facility]  :  Assessment & Plan  Rheumatoid arthritis with positive rheumatoid factor, involving unspecified site (HCC)  Continue to follow with rheumatology.  Continue hydroxychloroquine.  Recommended that patient reach out to rheumatologist when she thinks she is having a flare.          Encounter for screening mammogram for breast cancer    Orders:  •  Mammo screening bilateral w 3d and cad; Future    CAD in native artery  Continue to follow with cardiology.  Continue Zetia and Crestor.  Reviewed lipid panel with patient.  Lipids are well-controlled.          Gastro-esophageal reflux disease without esophagitis  Stable.  Continue Zantac as needed.        Pulmonary emphysema, unspecified emphysema type (HCC)  Continue albuterol inhaler and DuoNeb as needed.  Encouraged patient to quit smoking          Trochanteric bursitis of left hip  Referral placed to pain management  Orders:  •  Ambulatory referral to Spine & Pain Management; Future    Chronic pain syndrome  Referral placed to pain management  Orders:  •  Ambulatory referral to Spine & Pain Management; Future    History of colon polyps  Referral placed to gastroenterology for colon cancer screening  Orders:  •  Ambulatory Referral to Gastroenterology; Future    Screening for colon cancer    Orders:  •  Ambulatory Referral to Gastroenterology; Future    Vocal cord polyps  Referral placed to ENT  Orders:  •  Ambulatory Referral to Otolaryngology; Future           History of Present Illness   Gloria is a pleasant 59-year-old female who is here today for routine follow-up.  She recently got over a flareup of gout.  She also feels that she may have had a flareup of her rheumatoid arthritis/lupus.  She does follow with rheumatology.  She has not reach out to them to make them aware.  She is taking  "her hydroxychloroquine as prescribed.  She recently finished a prescription of indomethacin from her podiatrist for gout.  She admits that this is feeling much better.  She is having a lot of pain in her left hip.  She would like a referral to pain management.  She does also suffer from chronic back pain.  She is also asking for referral to ENT.  She has a history of vocal cord polyps.  She was also told that she had a benign mass, but never followed up with ENT.  She would like to review her blood work.  She is due for a colon cancer screening and mammogram.      Review of Systems   Constitutional:  Negative for chills, diaphoresis, fatigue and fever.   HENT:  Negative for congestion, ear pain, postnasal drip, rhinorrhea, sneezing, sore throat and trouble swallowing.    Eyes:  Negative for pain and visual disturbance.   Respiratory:  Negative for apnea, cough, shortness of breath and wheezing.    Cardiovascular:  Negative for chest pain and palpitations.   Gastrointestinal:  Negative for abdominal pain, constipation, diarrhea, nausea and vomiting.   Genitourinary:  Negative for dysuria.   Musculoskeletal:  Positive for arthralgias and back pain. Negative for gait problem and myalgias.   Neurological:  Negative for dizziness, syncope, weakness, light-headedness, numbness and headaches.   Psychiatric/Behavioral:  Negative for suicidal ideas. The patient is not nervous/anxious.        Objective   /74   Pulse 87   Temp 98.9 °F (37.2 °C)   Ht 4' 11\" (1.499 m)   Wt 47.5 kg (104 lb 12.8 oz)   SpO2 98%   BMI 21.17 kg/m²      Physical Exam  Vitals and nursing note reviewed.   Constitutional:       General: She is not in acute distress.     Appearance: She is well-developed. She is not diaphoretic.   HENT:      Head: Normocephalic and atraumatic.      Right Ear: Hearing and external ear normal.      Left Ear: Hearing and external ear normal.      Nose: Nose normal. No mucosal edema or rhinorrhea.      " Mouth/Throat:      Pharynx: No oropharyngeal exudate or posterior oropharyngeal erythema.     Eyes:      Extraocular Movements: Extraocular movements intact.       Cardiovascular:      Rate and Rhythm: Normal rate and regular rhythm.      Heart sounds: Normal heart sounds. No murmur heard.     No friction rub. No gallop.   Pulmonary:      Effort: Pulmonary effort is normal. No respiratory distress.      Breath sounds: Normal breath sounds. No wheezing or rales.   Abdominal:      General: Bowel sounds are normal.      Palpations: Abdomen is soft.      Tenderness: There is no abdominal tenderness.     Musculoskeletal:         General: Normal range of motion.      Cervical back: Normal range of motion and neck supple.      Thoracic back: Tenderness present.      Lumbar back: Tenderness present.      Left hip: Tenderness present.     Skin:     General: Skin is warm and dry.      Findings: No rash.     Neurological:      Mental Status: She is alert and oriented to person, place, and time.     Psychiatric:         Behavior: Behavior normal.         Thought Content: Thought content normal.         Judgment: Judgment normal.

## 2025-07-01 NOTE — ASSESSMENT & PLAN NOTE
Continue to follow with cardiology.  Continue Zetia and Crestor.  Reviewed lipid panel with patient.  Lipids are well-controlled.

## 2025-07-01 NOTE — ASSESSMENT & PLAN NOTE
Continue to follow with rheumatology.  Continue hydroxychloroquine.  Recommended that patient reach out to rheumatologist when she thinks she is having a flare.

## 2025-08-15 ENCOUNTER — OFFICE VISIT (OUTPATIENT)
Age: 60
End: 2025-08-15
Payer: COMMERCIAL

## 2025-08-15 VITALS
TEMPERATURE: 98.3 F | WEIGHT: 103 LBS | HEART RATE: 77 BPM | HEIGHT: 59 IN | BODY MASS INDEX: 20.76 KG/M2 | OXYGEN SATURATION: 96 %

## 2025-08-15 DIAGNOSIS — K21.9 GASTRO-ESOPHAGEAL REFLUX DISEASE WITHOUT ESOPHAGITIS: Primary | ICD-10-CM

## 2025-08-15 DIAGNOSIS — K58.2 IRRITABLE BOWEL SYNDROME WITH BOTH CONSTIPATION AND DIARRHEA: ICD-10-CM

## 2025-08-15 DIAGNOSIS — Z12.11 SCREENING FOR COLON CANCER: ICD-10-CM

## 2025-08-15 DIAGNOSIS — R10.13 EPIGASTRIC PAIN: ICD-10-CM

## 2025-08-15 DIAGNOSIS — R63.4 ABNORMAL WEIGHT LOSS: ICD-10-CM

## 2025-08-15 DIAGNOSIS — R63.0 DECREASED APPETITE: ICD-10-CM

## 2025-08-15 PROCEDURE — 99214 OFFICE O/P EST MOD 30 MIN: CPT | Performed by: NURSE PRACTITIONER

## 2025-08-15 RX ORDER — SODIUM CHLORIDE, SODIUM LACTATE, POTASSIUM CHLORIDE, CALCIUM CHLORIDE 600; 310; 30; 20 MG/100ML; MG/100ML; MG/100ML; MG/100ML
125 INJECTION, SOLUTION INTRAVENOUS CONTINUOUS
Status: CANCELLED | OUTPATIENT
Start: 2025-08-15

## 2025-08-15 RX ORDER — SUCRALFATE ORAL 1 G/10ML
1 SUSPENSION ORAL 4 TIMES DAILY
Qty: 473 ML | Refills: 2 | Status: SHIPPED | OUTPATIENT
Start: 2025-08-15

## 2025-08-15 RX ORDER — FAMOTIDINE 20 MG/1
20 TABLET, FILM COATED ORAL 2 TIMES DAILY
COMMUNITY

## 2025-08-18 PROBLEM — R63.0 DECREASED APPETITE: Status: ACTIVE | Noted: 2025-08-18

## 2025-08-22 ENCOUNTER — ANESTHESIA (OUTPATIENT)
Dept: GASTROENTEROLOGY | Facility: HOSPITAL | Age: 60
End: 2025-08-22
Payer: COMMERCIAL

## 2025-08-22 ENCOUNTER — ANESTHESIA EVENT (OUTPATIENT)
Dept: GASTROENTEROLOGY | Facility: HOSPITAL | Age: 60
End: 2025-08-22
Payer: COMMERCIAL

## 2025-08-22 RX ORDER — LIDOCAINE HYDROCHLORIDE 20 MG/ML
INJECTION, SOLUTION EPIDURAL; INFILTRATION; INTRACAUDAL; PERINEURAL AS NEEDED
Status: DISCONTINUED | OUTPATIENT
Start: 2025-08-22 | End: 2025-08-22

## 2025-08-22 RX ORDER — PROPOFOL 10 MG/ML
INJECTION, EMULSION INTRAVENOUS AS NEEDED
Status: DISCONTINUED | OUTPATIENT
Start: 2025-08-22 | End: 2025-08-22

## 2025-08-22 RX ADMIN — PROPOFOL 100 MG: 10 INJECTION, EMULSION INTRAVENOUS at 11:13

## 2025-08-22 RX ADMIN — PROPOFOL 30 MG: 10 INJECTION, EMULSION INTRAVENOUS at 11:16

## 2025-08-22 RX ADMIN — LIDOCAINE HYDROCHLORIDE 80 MG: 20 INJECTION, SOLUTION EPIDURAL; INFILTRATION; INTRACAUDAL at 11:13

## 2025-08-22 RX ADMIN — PROPOFOL 30 MG: 10 INJECTION, EMULSION INTRAVENOUS at 11:20

## (undated) DEVICE — PADDING CAST 4 IN  COTTON STRL

## (undated) DEVICE — SPONGE LAP 18 X 18 IN

## (undated) DEVICE — NEEDLE SUT SCORPION MULTIFIRE

## (undated) DEVICE — 3M™ STERI-DRAPE™ U-DRAPE 1015: Brand: STERI-DRAPE™

## (undated) DEVICE — NEEDLE 25G X 1 1/2

## (undated) DEVICE — 3M™ TEGADERM™ TRANSPARENT FILM DRESSING FRAME STYLE, 1626W, 4 IN X 4-3/4 IN (10 CM X 12 CM), 50/CT 4CT/CASE: Brand: 3M™ TEGADERM™

## (undated) DEVICE — FLEXIBLE ADHESIVE BANDAGE,X-LARGE: Brand: CURITY

## (undated) DEVICE — ACE WRAP 4 IN STERILE

## (undated) DEVICE — ELECTRODE ELECSURG SUPER TURBOVAC 90 3.75MM X 5.4 IN

## (undated) DEVICE — LIGHT GLOVE GREEN

## (undated) DEVICE — TUBING SUCTION 5MM X 12 FT

## (undated) DEVICE — DRESSING MEPILEX BORDER 4 X 8 IN

## (undated) DEVICE — SCD SEQUENTIAL COMPRESSION COMFORT SLEEVE MEDIUM KNEE LENGTH: Brand: KENDALL SCD

## (undated) DEVICE — BETHLEHEM UNIVERSAL  ARTHRO PK: Brand: CARDINAL HEALTH

## (undated) DEVICE — NEEDLE 21 G X 1

## (undated) DEVICE — PLUMEPEN PRO 10FT

## (undated) DEVICE — WEBRIL 6 IN UNSTERILE

## (undated) DEVICE — GAUZE SPONGES,16 PLY: Brand: CURITY

## (undated) DEVICE — COBAN 6 IN STERILE

## (undated) DEVICE — IMPERVIOUS STOCKINETTE: Brand: DEROYAL

## (undated) DEVICE — GLOVE SRG BIOGEL 8

## (undated) DEVICE — OCCLUSIVE GAUZE STRIP,3% BISMUTH TRIBROMOPHENATE IN PETROLATUM BLEND: Brand: XEROFORM

## (undated) DEVICE — X-RAY DETECTABLE SPONGES,16 PLY: Brand: VISTEC

## (undated) DEVICE — INTENDED FOR TISSUE SEPARATION, AND OTHER PROCEDURES THAT REQUIRE A SHARP SURGICAL BLADE TO PUNCTURE OR CUT.: Brand: BARD-PARKER ® CARBON RIB-BACK BLADES

## (undated) DEVICE — SUT MONOCRYL 2-0 CT-1 36 IN Y945H

## (undated) DEVICE — CHLORAPREP HI-LITE 10.5ML ORANGE

## (undated) DEVICE — 3M™ IOBAN™ 2 ANTIMICROBIAL INCISE DRAPE 6648EZ: Brand: IOBAN™ 2

## (undated) DEVICE — SYRINGE 10ML LL

## (undated) DEVICE — GLOVE SRG BIOGEL 7.5

## (undated) DEVICE — CURITY NON-ADHERENT STRIPS: Brand: CURITY

## (undated) DEVICE — SYRINGE 5ML LL

## (undated) DEVICE — CHLORAPREP HI-LITE 26ML ORANGE

## (undated) DEVICE — ABDOMINAL PAD: Brand: DERMACEA

## (undated) DEVICE — SUT MONOCRYL 3-0 PS-2 18 IN Y497G

## (undated) DEVICE — BETHLEHEM UNIVERSAL  MIONR EXT: Brand: CARDINAL HEALTH

## (undated) DEVICE — SUT ETHILON 4-0 PS-2 18 IN 1667H

## (undated) DEVICE — SUT ETHILON 3-0 PS-1 18 IN 1663H

## (undated) DEVICE — DRAPE SHEET THREE QUARTER

## (undated) DEVICE — ALL PURPOSE SPONGES,NON-WOVEN, 4 PLY: Brand: CURITY

## (undated) DEVICE — SURGICAL GOWN, XL SMARTSLEEVE: Brand: CONVERTORS

## (undated) DEVICE — NEEDLE 18 G X 1 1/2

## (undated) DEVICE — GLOVE SRG BIOGEL ECLIPSE 8

## (undated) DEVICE — SYRINGE 3ML LL

## (undated) DEVICE — BURR  OVAL 4MM 13CM 8 FLUTE COOLCUT

## (undated) DEVICE — MAT FLOOR STEP DRI 24 X 36 IN N-STRL

## (undated) DEVICE — SKIN MARKER DUAL TIP WITH RULER CAP, FLEXIBLE RULER AND LABELS: Brand: DEVON

## (undated) DEVICE — TRAY EPIDURAL PERIFIX 20GA X 3.5IN TUOHY 8ML

## (undated) DEVICE — THREE-QUARTER SHEET: Brand: CONVERTORS

## (undated) DEVICE — SHOULDER SUSPENSION KIT 6 PER BOX

## (undated) DEVICE — TUBING ARTHROSCOPIC WAVE  MAIN PUMP

## (undated) DEVICE — CURITY STRETCH BANDAGE: Brand: CURITY

## (undated) DEVICE — TRANSPOSAL ULTRAFLEX DUO/QUAD ULTRA CART MANIFOLD

## (undated) DEVICE — PLASTIC ADHESIVE BANDAGE: Brand: CURITY

## (undated) DEVICE — 3000CC GUARDIAN II: Brand: GUARDIAN

## (undated) DEVICE — KERLIX BANDAGE ROLL: Brand: KERLIX

## (undated) DEVICE — DRY-DOC CANNULA WITH DISPOSABLE OBTURATOR, 8.0 X 85 MM: Brand: DRY-DOC

## (undated) DEVICE — SUT FIBERWIRE #2 1/2 CIRCLE T-5 38IN AR-7200

## (undated) DEVICE — SURGI KIT INSTRUMENT ORGANIZER

## (undated) DEVICE — GLOVE INDICATOR PI UNDERGLOVE SZ 8 BLUE

## (undated) DEVICE — SUT PDS II 1 CT-1 27 IN Z347H

## (undated) DEVICE — SHOE, POST-OPERATIVE: Brand: DEROYAL

## (undated) DEVICE — NEEDLE SPINAL18G X 3.5 IN QUINCKE

## (undated) DEVICE — BANDAID WATERPROOF 1-7/8 X 4

## (undated) DEVICE — ADHESIVE SKIN HIGH VISCOSITY EXOFIN MICRO 0.5ML

## (undated) DEVICE — IV EXTENSION TUBING 33 IN

## (undated) DEVICE — BLADE SHAVER EXCALIBUR 4MM 13CM COOLCUT

## (undated) DEVICE — PADDING CAST 6IN COTTON STRL

## (undated) DEVICE — UNDYED BRAIDED (POLYGLACTIN 910), SYNTHETIC ABSORBABLE SUTURE: Brand: COATED VICRYL

## (undated) DEVICE — MEDI-VAC YANKAUER SUCTION HANDLE W/BULBOUS AND CONTROL VENT: Brand: CARDINAL HEALTH

## (undated) DEVICE — DRY-DOC CANNULA WITH DISPOSABLE OBTURATOR, 7.0 X 85 MM: Brand: DRY-DOC

## (undated) DEVICE — NON-STERILE REUSABLE TOURNIQUET CUFF SINGLE BLADDER, DUAL PORT AND QUICK CONNECT CONNECTOR: Brand: COLOR CUFF

## (undated) DEVICE — NEEDLE SPINAL 22G X 3.5IN  QUINCKE

## (undated) DEVICE — PACK UNIVERSAL ARTHRSCOPY PBDS

## (undated) DEVICE — 10FR FRAZIER SUCTION HANDLE: Brand: CARDINAL HEALTH

## (undated) DEVICE — SUT VICRYL 4-0 PS-2 27 IN J426H

## (undated) DEVICE — SYRINGE 20ML LL

## (undated) DEVICE — ADHESIVE SKIN HIGH VISCOSITY EXOFIN 1ML

## (undated) DEVICE — SUT VICRYL 1 CT-1 36 IN J947H

## (undated) DEVICE — 3M™ IOBAN™ 2 ANTIMICROBIAL INCISE DRAPE 6651EZ: Brand: IOBAN™ 2

## (undated) DEVICE — DRESSING MEPILEX AG BORDER 4 X 8 IN

## (undated) DEVICE — SPONGE LAP 18 X 18 IN STRL RFD

## (undated) DEVICE — 1820 FOAM BLOCK NEEDLE COUNTER: Brand: DEVON

## (undated) DEVICE — UNIVERSAL  MINOR EXTREMITY PK: Brand: CARDINAL HEALTH

## (undated) DEVICE — SUT MONOCRYL 3-0 PS-2 27 IN Y427H